# Patient Record
Sex: FEMALE | Race: WHITE | NOT HISPANIC OR LATINO | Employment: OTHER | URBAN - METROPOLITAN AREA
[De-identification: names, ages, dates, MRNs, and addresses within clinical notes are randomized per-mention and may not be internally consistent; named-entity substitution may affect disease eponyms.]

---

## 2017-05-30 ENCOUNTER — DOCUMENTATION ONLY (OUTPATIENT)
Dept: BARIATRICS | Facility: CLINIC | Age: 67
End: 2017-05-30

## 2017-05-30 NOTE — PROGRESS NOTES
Bariatric Surgery Online Course Form Submission  Someone has submitted a Bariatric Surgery Online Course Form Submission on this page.  Date: 2017-05-30 - 14:09  Patient's Name: Genoveva logan   Date of Birth: [id number] Email: [email or web address]  Phone: [phone]   Patient Address: [address] [place] [id number]  Preferred Surgical Location: [facility] [place]   I certify that I am 18 years of age or older:   Confirmation Code: [id number]  Verification of Bariatric Seminar: y  Insurance Information  Insurance or Self Pay? Self Pay/No Insurance - Skip fields below  Insurance Company Name:   Type of Coverage/Coverage Plan (i.e. PPO, HMO):   Group Name:   Subscriber Name:   Member Name (patient's name)   Member ID/Policy #:   Plan Effective Date:   Card Issuance date:

## 2017-06-02 ENCOUNTER — TELEPHONE (OUTPATIENT)
Dept: BARIATRICS | Facility: CLINIC | Age: 67
End: 2017-06-02

## 2017-06-02 DIAGNOSIS — G25.81 RESTLESS LEG SYNDROME: ICD-10-CM

## 2017-06-02 DIAGNOSIS — M79.7 FIBROMYALGIA: ICD-10-CM

## 2017-06-02 DIAGNOSIS — G35: ICD-10-CM

## 2017-06-02 DIAGNOSIS — I10 BENIGN ESSENTIAL HTN: ICD-10-CM

## 2017-06-02 DIAGNOSIS — E66.01 MORBID OBESITY DUE TO EXCESS CALORIES: Primary | ICD-10-CM

## 2017-06-02 DIAGNOSIS — K21.9 GASTROESOPHAGEAL REFLUX DISEASE, ESOPHAGITIS PRESENCE NOT SPECIFIED: ICD-10-CM

## 2017-06-02 DIAGNOSIS — E78.00 ELEVATED CHOLESTEROL: ICD-10-CM

## 2017-06-02 NOTE — TELEPHONE ENCOUNTER
Rescheduled appt's so they can all be on the same day.  She will bring her o/s labs in and she's already had the cbc and cmp done.  Dates/times agreed upon and she understands when she receives her packet of info that she needs to complete the necessary forms and bring with her to her appt..

## 2017-06-20 ENCOUNTER — TELEPHONE (OUTPATIENT)
Dept: BARIATRICS | Facility: CLINIC | Age: 67
End: 2017-06-20

## 2017-06-21 ENCOUNTER — HOSPITAL ENCOUNTER (OUTPATIENT)
Dept: RADIOLOGY | Facility: HOSPITAL | Age: 67
Discharge: HOME | End: 2017-06-21
Attending: SURGERY
Payer: MEDICARE

## 2017-06-21 ENCOUNTER — CLINICAL SUPPORT (OUTPATIENT)
Dept: BARIATRICS | Facility: CLINIC | Age: 67
End: 2017-06-21
Payer: MEDICARE

## 2017-06-21 ENCOUNTER — HOSPITAL ENCOUNTER (OUTPATIENT)
Dept: CARDIOLOGY | Facility: CLINIC | Age: 67
Discharge: HOME | End: 2017-06-21
Payer: MEDICARE

## 2017-06-21 ENCOUNTER — INITIAL CONSULT (OUTPATIENT)
Dept: BARIATRICS | Facility: CLINIC | Age: 67
End: 2017-06-21
Payer: MEDICARE

## 2017-06-21 VITALS
HEART RATE: 84 BPM | DIASTOLIC BLOOD PRESSURE: 98 MMHG | WEIGHT: 227.06 LBS | HEIGHT: 59 IN | SYSTOLIC BLOOD PRESSURE: 132 MMHG | BODY MASS INDEX: 45.77 KG/M2

## 2017-06-21 VITALS — WEIGHT: 226.88 LBS | BODY MASS INDEX: 45.82 KG/M2

## 2017-06-21 DIAGNOSIS — I10 BENIGN ESSENTIAL HTN: ICD-10-CM

## 2017-06-21 DIAGNOSIS — K21.9 GASTROESOPHAGEAL REFLUX DISEASE, ESOPHAGITIS PRESENCE NOT SPECIFIED: ICD-10-CM

## 2017-06-21 DIAGNOSIS — E78.00 ELEVATED CHOLESTEROL: ICD-10-CM

## 2017-06-21 DIAGNOSIS — E78.5 HYPERLIPIDEMIA, UNSPECIFIED HYPERLIPIDEMIA TYPE: ICD-10-CM

## 2017-06-21 DIAGNOSIS — R79.89 ELEVATED LFTS: Primary | ICD-10-CM

## 2017-06-21 DIAGNOSIS — M79.7 FIBROMYALGIA: ICD-10-CM

## 2017-06-21 DIAGNOSIS — I10 ESSENTIAL HYPERTENSION: ICD-10-CM

## 2017-06-21 DIAGNOSIS — E66.01 MORBID OBESITY DUE TO EXCESS CALORIES: ICD-10-CM

## 2017-06-21 DIAGNOSIS — G35: ICD-10-CM

## 2017-06-21 DIAGNOSIS — G25.81 RESTLESS LEG SYNDROME: ICD-10-CM

## 2017-06-21 DIAGNOSIS — E55.9 VITAMIN D DEFICIENCY: Primary | ICD-10-CM

## 2017-06-21 DIAGNOSIS — E03.9 HYPOTHYROIDISM, UNSPECIFIED TYPE: ICD-10-CM

## 2017-06-21 DIAGNOSIS — E66.01 MORBID OBESITY WITH BMI OF 45.0-49.9, ADULT: ICD-10-CM

## 2017-06-21 PROCEDURE — 1126F AMNT PAIN NOTED NONE PRSNT: CPT | Mod: S$GLB,,, | Performed by: PHYSICIAN ASSISTANT

## 2017-06-21 PROCEDURE — 71020 XR CHEST PA AND LATERAL: CPT | Mod: 26,,, | Performed by: RADIOLOGY

## 2017-06-21 PROCEDURE — 99999 PR PBB SHADOW E&M-EST. PATIENT-LVL V: CPT | Mod: PBBFAC,,, | Performed by: PHYSICIAN ASSISTANT

## 2017-06-21 PROCEDURE — 93000 ELECTROCARDIOGRAM COMPLETE: CPT | Mod: S$GLB,,, | Performed by: INTERNAL MEDICINE

## 2017-06-21 PROCEDURE — 99205 OFFICE O/P NEW HI 60 MIN: CPT | Mod: S$GLB,,, | Performed by: PHYSICIAN ASSISTANT

## 2017-06-21 PROCEDURE — 1159F MED LIST DOCD IN RCRD: CPT | Mod: S$GLB,,, | Performed by: PHYSICIAN ASSISTANT

## 2017-06-21 PROCEDURE — 99999 PR PBB SHADOW E&M-EST. PATIENT-LVL II: CPT | Mod: PBBFAC,,, | Performed by: DIETITIAN, REGISTERED

## 2017-06-21 PROCEDURE — 99499 UNLISTED E&M SERVICE: CPT | Mod: S$GLB,,, | Performed by: DIETITIAN, REGISTERED

## 2017-06-21 RX ORDER — ERGOCALCIFEROL 1.25 MG/1
50000 CAPSULE ORAL
Qty: 24 CAPSULE | Refills: 0 | Status: SHIPPED | OUTPATIENT
Start: 2017-06-22 | End: 2017-09-18

## 2017-06-21 NOTE — PROGRESS NOTES
BARIATRIC NEW PATIENT EVALUATION    CHIEF COMPLAINT:   Morbid obesity, Body mass index is 45.86 kg/m². and inability to lose weight.    HPI:  Genoveva Gilbert is a 67 y.o. female presenting for morbid obesity, Body mass index is 45.86 kg/m². and inability to lose weight. The patient has tried WW x 2 and diet modifications.  The most weight lost with Weight Watchers was 30-35 lbs.  She has been overweight since her second marriage about 13 years ago.  Her highest weight is her current weight, 227 lbs.  Her challenge is with junk food.  She wants a Sleeve Gastrectomy with Dr. Bradley.      PAST MEDICAL HISTORY:  Past Medical History:   Diagnosis Date    Allergy     Anxiety     Arthritis, rheumatoid     Back pain     Depression     Fibromyalgia     GERD (gastroesophageal reflux disease)     High cholesterol     Hypertension     Incontinence of urine     MS (multiple sclerosis)     benign; another MD stated she has no MS    Neuropathy     Restless leg syndrome     Seasonal allergies     Sinus congestion     Thyroid disease     Wheezing          PAST SURGICAL HISTORY:  Past Surgical History:   Procedure Laterality Date    APPENDECTOMY      BREAST LUMPECTOMY      bilateral, benign    BREAST SURGERY      reduction    HYSTERECTOMY      partial - fibroids    TONSILLECTOMY         FAMILY HISTORY:  Family History   Problem Relation Age of Onset    Heart disease Mother           SOCIAL HISTORY:   reports that she quit smoking about 21 years ago. She has never used smokeless tobacco. She reports that she does not drink alcohol or use drugs.  She is retired and on disability for MS only based on symptoms.  She is  40 years with 2 kids, then .  She is remarried to her childhood sweetheart.  One of her children passed away.      MEDICATIONS:  Medications have been reviewed.    ALLERGIES:  Allergies have been reviewed.    Review of Systems   Constitutional: Negative for chills, fever and  malaise/fatigue.   Eyes: Negative for blurred vision and double vision.   Respiratory: Positive for shortness of breath (walking stairs). Negative for cough and hemoptysis.    Cardiovascular: Negative for chest pain, palpitations and leg swelling.   Gastrointestinal: Negative for abdominal pain, blood in stool, constipation, diarrhea, heartburn, melena, nausea and vomiting.   Genitourinary: Negative for dysuria and hematuria.   Musculoskeletal: Positive for joint pain (right shoulder). Negative for back pain, falls, myalgias and neck pain.   Skin: Negative for rash.   Neurological: Negative for dizziness, tingling, weakness and headaches.   Endo/Heme/Allergies: Positive for environmental allergies (asymptomatic on allegra). Bruises/bleeds easily (bruise easily).   Psychiatric/Behavioral: Positive for depression (stable on medications). Negative for hallucinations, memory loss, substance abuse and suicidal ideas. The patient is not nervous/anxious and does not have insomnia.        Vitals:    06/21/17 1101   BP: (!) 132/98   Pulse: 84       Physical Exam   Constitutional: She is oriented to person, place, and time and well-developed, well-nourished, and in no distress.   HENT:   Head: Normocephalic and atraumatic.   Eyes: No scleral icterus.   Cardiovascular: Normal rate, regular rhythm, normal heart sounds and intact distal pulses.  Exam reveals no gallop and no friction rub.    No murmur heard.  Pulmonary/Chest: Effort normal and breath sounds normal. No respiratory distress. She has no wheezes. She has no rales. She exhibits no tenderness.   Abdominal: Soft. Bowel sounds are normal. She exhibits no distension and no mass. There is no tenderness. There is no rebound and no guarding.   Musculoskeletal: She exhibits no edema.   Neurological: She is alert and oriented to person, place, and time.   Skin: Skin is warm and dry. No rash noted. No erythema. No pallor.   Psychiatric: Mood, memory, affect and judgment  normal.   Nursing note and vitals reviewed.      DIAGNOSIS:  1. Morbid Obesity, Body mass index is 45.86 kg/m². and inability to lose weight.  2. Co-morbidities: depression, dyslipidemia, GERD, hypertension and osteoarthritis    PLAN:  The patient is a good candidate for Bariatric Surgery. The patient is interested in sleeve gastrectomy. The surgery and post-op care was discussed in detail with the patient. All questions were answered.    The patient understands that bariatric surgery is a tool to aid in weight loss and that they need to be committed to the diet and exercise post-operatively for successful weight loss. Discussed with patient that bariatric surgery is not the easy way out and that it will take plenty of dedication on the patient's part to be successful. Also discussed the possibility of weight regain if the patient strays from the diet guidelines or exercise requirements. Patient verbalized understanding and wishes to proceed with the work-up.    Estimated Goal weight is 175 lbs, approximately 50% of their excess weight.      **I certify that I have personally reviewed the patient's blue intake packet with the patient.  All information has been added into epic.        ORDERS:  1. Stress Test, Chest X-Ray, EKG, EGD, Upper GI and US abdomen and Stress Test from [facility]  2. Psychological Consult, Bariatric Dietician Consult, PCP Clearance and Hepatology  3. Bariatric Labs: BMP, CBC, Folate Serum, H. pylori, HgA1C, Hepatic Panel/LFT, Iron & TIBC, Lipid Profile, Magnesium, Phosphate, T3, T4, TSH, Free T4, Vitamin B12, and Vitamin B1.    Referring Physician: Tariq Crews MD  RTC: As scheduled.

## 2017-06-21 NOTE — PATIENT INSTRUCTIONS
1200- 1500 calorie Sample meal plan  80-120g protein per day.   Protein drinks and bars: 0-4 grams sugar  Drink lots of water throughout the day and exercise!  MENU # 1  Breakfast: 2 eggs, 1 turkey sausage america, 1 apple  Snack: Atkins bar  Lunch: 2 roll-ups (sliced turkey, low-fat sliced cheese, mustard), 12 baby carrots dipped in 1 Tbsp natural peanut butter  Mid-Day Snack: ¼ cup unsalted almonds, ½ cup fruit  Dinner: 1 chicken thigh simmered in tomato sauce + 2 Tbsp mozzarella cheese, ½ cup black beans, 1/2 cup steamed carrots  Evening Snack: 1/2 cup grapes with 4 cubes low-fat cheddar cheese   ___________________________________________________  MENU # 2  Breakfast: 200 calorie protein drink  Mid-morning snack : ¼ cup unsalted nuts, medium banana  Lunch: 3oz tuna or chicken salad made with 2 tbsp light martínez, over salad with tomatoes and cucumbers.   Snack: low-fat cheese stick  Dinner: 3oz hamburger america, slice of low-fat cheese, 1 cup boiled yellow squash and zucchini.   Snack: 6oz light yogurt  _______________________________________________________  Menu #3  Breakfast: 6oz plain Greek yogurt + fruit (½ banana, ½ cup fruit - pineapple, blueberries, strawberries, peach), may add Splenda to angel.  Lunch: Grilled  chicken breast w/ slice low-fat pepper cheryle cheese, 1/2 cup grilled/baked asparagus and small salad with Salad Spritzer.    Mid-Day snack: 200 calorie protein drink   Dinner: 4oz Grilled fish, ½ cup white beans, ½ cup cooked spinach  Evening Snack: fudgsicle no-sugar-added    Menu # 4  Breakfast: 1 scoop vanilla protein powder + 4oz skim milk + 4oz coffee   Snack: Pure protein bar  Lunch: 2 Lettuce tacos: 3oz seasoned ground turkey wrapped in a Kareem lettuce leaves with 1 Tbsp shredded cheese and dollop low-fat sour cream  Snack: ½ cup cottage cheese, ½ cup pineapple chunks  Dinner: Shrimp omelet: 2 eggs, ½ cup shrimp, green onions, and shredded  cheese  ______________________________________________________  Menu #5  Breakfast: 1 cup low-fat cottage cheese, ½ cup peaches (no sugar added)  Snack: 1 apple, 4 cubes cheese  Lunch: 3oz baked pork chop, 1 cup okra and tomato stew  Snack: 1/2 cup black beans + salsa + dollop light sour cream  Dinner: Caprese chicken salad: 3 oz chicken breast, 1oz fresh mozzarella, sliced tomato, 1 Tbsp olive oil, basil  Snack: sugar-free popsicle    Menu #6  Breakfast: ½ cup part-skim ricotta cheese, 2 Tbsp sugar-free strawberry preserves, sprinkle of slivered almonds  Snack: 1 orange  Lunch: 3 oz canned chicken, 1oz shredded cheddar cheese, ½  cup black beans, 2 Tbsp salsa  Snack: 200 calorie Protein drink  Dinner: 4 oz grilled salmon steak, over mixed green salad with 2 tbsp light dressing    Meal Ideas for Regular Bariatric Diet  *Recipes and products available at [email or web address]      Breakfast: (15-20g protein)    - Egg white omelet: 2 egg whites or ½ cup Egg Beaters. (Optional proteins: cheese, shrimp, black beans, chicken, sliced turkey) (Optional veggies: tomatoes, salsa, spinach, mushrooms, onions, green peppers, or small slice avocado)     - Egg and sausage: 1 egg or ¼ cup Egg Beaters (any variety), with 1 america or 2 links of Turkey sausage or Veggie breakfast sausage ([employer] or Next Gen Illumination)    - Crust-less breakfast quiche: To make a glass pie dish, mix 4oz part skim Ricotta, 1 cup skim milk, and 2 eggs as your base. Add protein: shredded cheese, sliced lean ham or turkey, turkey gonzalez/sausage. Add veggies: tomato, onion, green onion, mushroom, green pepper, spinach, etc.    - Yogurt parfait: Mix 1 - 6oz container Dannon Light N Fit vanilla yogurt, with ¼ cup crushed unsalted nuts    - Cottage cheese and fruit: ½ cup part-skim cottage cheese or ricotta cheese topped with fresh fruit or sugar free preserves     - Viri Mccarty's Vanilla Egg custard* (add 2 Tbsp instant coffee granules to make Cappuccino  Custard*)    - Hi-Protein café latte (skim milk, decaf coffee, 1 scoop protein powder). Optional to add Sugar free syrup or extract flavoring.    - Breakfast Lox: spread fat free cream cheese on slices of smoked salmon. Serve over scrambled or egg over easy (sauteed with nonstick cookspray) OR on a cucumber slice    - Eggwhich: Scramble or cook 1 large egg over easy using nonstick cookspray. Place between 2 slices of Spanish gonzalez and low fat cheese.     Lunch: (20-30g protein)    - ½ cup Black bean soup (Homemade or Progresso), with ¼ cup shredded low-fat cheese. Top with chopped tomato or fresh salsa.     - Lean deli turkey breast and low-fat sliced cheese, mustard or light martínez to moisten, rolled up together, or wrapped in a Kareem lettuce leaf    - Chicken salad made from dinner leftovers, moisten with low-fat salad dressing or light martínez. Also try leftover salmon, shrimp, tuna or boiled eggs. Serve ½ cup over dark green salad    - Fat-free canned refried beans, topped with ¼ cup shredded low-fat cheese. Top with chopped tomato or fresh salsa.     - Greek salad: Top mixed greens with 1-2oz grilled chicken, tomatoes, red onions, 2-3 kalamata olives, and sprinkle lightly with feta cheese. Spritz with Balsamic vinegar to taste.     - Crust-less lunch quiche: To make a glass pie dish, mix 4oz part skim Ricotta, 1 cup skim milk, and 2 eggs as your base. Add protein: shredded cheese, sliced lean ham or turkey, shrimp, chicken. Add veggies: tomato, onion, green onion, mushroom, green pepper, spinach, artichoke, broccoli, etc.    - Pizza bake: spread a  raza glenn mushroom with tomato sauce, low-fat shredded mozzarella and turkey pepperoni or [place] gonzalez. Add any veggies. Roast for 10-15 minutes, until cheese melted.     - Cucumber crab bites: Spread ¼ cup crab dip (lump crabmeat + light cream cheese and green onions) over sliced cucumber.     - Chicken with light spinach and artichoke dip*: Puree in food  processor: 6oz cooked and drained spinach, 2 cloves garlic, 1 can cannelloni beans, ½ cup chopped green onions, 1 can drained artichoke hearts (not marinated in oil), lemon juice and basil. Mix in 2oz chopped up chicken.    Supper: (20-30g protein)    - Serve grilled fish over dark green salad tossed with low-fat dressing, served with grilled asparagus haddad     - Rotisserie chicken salad: served with sliced strawberries, walnuts, fat-free feta cheese crumbles and 1 tbsp [place]s Own Light Raspberry [place] Vinaigrette    - Shrimp cocktail: Dip cold boiled shrimp in homemade low-sugar cocktail sauce (1/2 cup Brady One Carb ketchup, 2 tbsp horseradish, 1/4 tsp hot sauce, 1 tsp Worcestershire sauce, 1 tbsp freshly-squeezed lemon juice). Serve with dark green salad, walnuts, and crumbled blue cheese drizzled with olive oil and Balsamic vinegar    - Tuna Melt: Spread tuna salad onto 2 thick slices of tomato. Top with low-fat cheese and broil until cheese is melted. May also be made with chicken salad of shrimp salad. [place] with different types of cheeses.    - Chicken or beef fajitas (no tortilla, rice, beans, chips). Top meat and veggies w/ fresh salsa, fat free sour cream.     - Homemade low-fat Chili using extra lean ground beef or ground turkey. Top with shredded cheese and salsa as desired. May add dollop fat-free sour cream if desired    - Chicken parmesan: Top chicken breast w/ low sugar marinara sauce, mozzarella cheese and bake until chicken reaches 165*.  Serve w/ spaghetti SQUASH or Tamazight cut green beans    - Dinner Omelet with shrimp or chicken and onion, green peppers and chives.    - No noodle lasagna: Use sliced zucchini or eggplant in place of noodles.  Layer with part skim ricotta cheese and low sugar meat sauce (use very lean ground beef or ground turkey).    - Mexican chicken bake: Bake chunks of chicken breast or thigh with taco seasoning, Pace brand enchilada sauce, green onions and low-fat  cheese. Serve with ¼ cup black beans or fat free refried beans topped with chopped tomatoes or salsa.    - Mary frozen meatballs, simmered in Classico Marinara sauce. Different flavors of salsa or spaghetti sauce create different dishes! Sprinkle with parmesan cheese. Serve with grilled or steamed veggies, or a dark green salad.    - Simmer boneless skinless chicken thigh chunks in Classico Marinara sauce or roasted salsa until tender with chopped onion, bell pepper, garlic, mushrooms, spinach, etc.     - Hamburger or veggie burger, without the bun, dressed the way you like. Served with grilled or steamed veggies.    - Eggplant parmesan: Bake slices of eggplant at 350 degrees for 15 minutes. Layer tomato sauce, sliced eggplant and low-fat mozzarella cheese in a baking dish and cover with foil. Bake 30-40 more minutes or until bubbly. Uncover and bake at 400 degrees for about 15 more minutes, or until top is slightly crisp.    - Fish tacos: grilled/baked white fish, wrapped in Kareem lettuce leaf, topped with salsa, shredded low-fat cheese, and light coleslaw.    - Chicken reid: Sprinkle chicken w/ 1 tsp of hidden valley ranch dip mix. Then grill chicken and top with black beans, salsa and 1 tsp fat free sour cream.     - Cauliflower pizza crust: Use cauliflower as crust (see recipe on pedro pablo, no flour!). Top w/ low fat cheese, turkey pepperoni and veggies and bake again    - chicken or turkey crust pizza: use ground chicken or turkey instead of cauliflower, spread in [place] and bake at 350 for about 20-30 minutes(may want to add garlic, black pepper, oregano and other herbs to ground meat mixture).  Remove and top w/ low fat cheese, turkey pepperoni and veggies and bake again for another 10 minutes or until cheese is browned.     Snacks: (100-200 calories; >5g protein)    - 1 low-fat cheese stick with 8 cherry tomatoes or 1 serving fresh fruit  - 4 thin slices fat-free turkey breast and 1 slice low-fat  cheese  - 4 thin slices fat-free honey ham with wedge of melon  - 6-8 edamame pods (equivalent to about 1/4 cup edamame without pods).   - 1/4 cup unsalted nuts with ½ cup fruit  - 6-oz container Dannon Light n Fit vanilla yogurt, topped with 1oz unsalted nuts         - apple, celery or baby carrots spread with 2 Tbsp PB2  - apple slices with 1 oz slice low-fat cheese  - Apple slices dipped in 2 Tbsp of PB2  - celery, cucumber, bell pepper or baby carrots dipped in ¼ cup hummus bean spread or light spinach and artichoke dip (*recipe in lunch section)  - celery, cucumber, baby carrots dipped in high protein greek yogurt (Mix 16 oz plain greek yogurt + 1 packet of hidden valley ranch dip mix)  - Jr Links Beef Steak - 14g protein! (similar to beef jerky)  - 2 wedges Laughing Cow - Light Herb & Garlic Cheese with sliced cucumber or green bell pepper  - 1/2 cup low-fat cottage cheese with ¼ cup fruit or ¼ cup salsa  - RTD Protein drinks: Atkins, Low Carb Slim Fast, EAS light, Muscle Milk Light, etc.  - Homemade Protein drinks: GNC Soy95, Isopure, Nectar, UNJURY, Whey Gourmet, etc. Mix 1 scoop powder with 8oz skim/1% milk or light soymilk.  - Protein bars: Atkins, EAS, Pure Protein, Think Thin, Detour, etc. Must have 0-4 grams sugar - Read the label.    Takeout Options: No more than twice/week  Deli - Salads (no pasta or rice), meats, cheeses. Roasted chicken. Lox (salmon)    Mexican - Platters which don't include tortillas, chips, or rice. Go easy on the beans. Example: Fajitas without the tortillas. Ask the  not to bring chips to the table if they are too tempting.    Greek - Meat or fish and vegetable, but no bread or rice. Including hummus, baba ganoush, etc, is OK. Most sit-down Greek restaurants can provide you with cucumber slices for dipping instead of shahida bread.    Fast Food (Avoid as much as possible) - Salads (no croutons and limit salad dressing to 2 tbsp), grilled chicken sandwich without the bun  and ask for no martínez. Danyells low fat chili or Taco Bell pintos and cheese.    BBQ - The meats are fine if you ask for sauces on the side, but most of the traditional side dishes are loaded with carbs. Rip slaw, baked beans and BBQ sauce are typically made with sugar.    Chinese - Nothing deep-fried, no rice or noodles. Many Chinese sauces have starch and sugar in them, so you'll have to use your judgement. If you find that these sauces trigger cravings, or cause Dumping, you can ask for the sauce to be made without sugar or just use soy sauce.

## 2017-06-21 NOTE — PATIENT INSTRUCTIONS
The following tests will be required for your Bariatric Surgery work-up.  There may be additional tests added.      - Lab work  - Chest X-ray  - EKG  - Copy of your Stress Test from Dr. Krzysztof OLIVER  - Nutrition Consult and Clearance  - Ultrasound of Liver  - Hepatology  - Crush Xanax, Cymbalta & Amitryptiline 1 month before surgery.  - Psychological Consult and Clearance (Call the Psychiatry Department to schedule a Bariatric Surgery Clearance Appointment with Dr. Zhu at [phone])    You have been referred to psychological testing because you are considering an elective surgery that does not involve an emergency.  This testing helps us ensure patient safety and effective outcomes.  On your psychological testing day, you will be working on multiple choice or true/false questionnaires that require one to two hours of your time.  Please bring reading glasses or someone to help you read the items if needed.  Feel free to bring water or any medications you may need.  After this testing day, you will return on another day to meet with a clinical psychologist for an interview and to review your results, which will take approximately one hour.  If you have any questions about this process, please call the Department of Psychiatry at [phone].  Thank you for your cooperation!    - Primary Care Doctor Clearance (This needs to be done within 30 days of surgery date)      In preparation for bariatric surgery, please complete the following:   · Discuss your current medications with your primary care provider, remember your medications will need to be crushed, chewable, or in liquid form for the first 3 months after a Sleeve.  · If you take a blood thinner such as: Coumadin (warfarin), Pradaxa (dabigatran), or Plavix (clopidogrel), you will need to speak with your prescribing provider on how or if this medication can be stopped before surgery.   · If you take a medication for depression or anxiety, you will need to  begin crushing or opening the capsule 1-3 months prior to surgery.  Remember to discuss this with the psychologist or psychiatrist that you see.   · If you take medication for arthritis on a daily basis that is considered a non-steroidal anti-inflammatory (NSAID), please discuss with your prescribing physician an alternative medication.  After having gastric bypass or gastric sleeve, this group of medications is not appropriate to take due to increased risk of bleeding stomach ulcers.      NO SHOW POLICY      DEFINITIONS  Appointments: Pre-scheduled meetings or consultations with any physician, advanced practice provider, dietitian, or psychologist, and labs, imaging studies, sleep studies, etc.   Late cancellation: Cancelling an appointment 24-48 hours prior to scheduled time.  No-Show appointment:  is when    You do NOT arrive to your appointment at the time its scheduled.   You call to cancel or cancel via [facility] less than 24 hours in advance of your scheduled appointment   You show up 15 minutes AFTER your scheduled appointment time without any notification of being late.     POLICY  1. You are allowed up to 3 cancellations for appointments.    After 3 cancellations your case will be placed on hold for 2 months and after that time you can resume the program.   2. You are allowed only 1 no-show for an appointment.    You will be re-scheduled one time and if there is a 2nd no-show at any point, your case will be placed on hold for 3 months.  After 3 months you can resume the program.     3. Upon resuming the program after being placed on hold for either above mentioned reasons, if you have a late cancel or no show for any appointment, the bariatric team will review if youre an appropriate candidate for surgery at the monthly meeting.

## 2017-06-21 NOTE — PROGRESS NOTES
NUTRITIONAL CONSULT    Referring Physician: Justino Bradley M.D.  Reason for MNT Referral: Initial assessment for sleeve gastrectomy work-up    PAST MEDICAL HISTORY:   67 y.o. female presents with a BMI of Body mass index is 45.82 kg/m².  Weight history includes she has been overweight since her second marriage about 13 years ago.  Her highest weight is her current weight, 227 lbs. Her challenge is with junk food.  Dieting attempts include the patient has tried WW x2 and diet modifications.  The most weight lost with Weight Watchers was 30-35 lbs.    Past Medical History:   Diagnosis Date    Allergy     Anxiety     Arthritis, rheumatoid     Back pain     Depression     Fibromyalgia     GERD (gastroesophageal reflux disease)     High cholesterol     Hypertension     Incontinence of urine     MS (multiple sclerosis)     benign; another MD stated she has no MS    Neuropathy     Restless leg syndrome     Seasonal allergies     Sinus congestion     Thyroid disease     Wheezing        CLINICAL DATA:  67 y.o.-year-old White female.  Height: 4 ft 11 in  Weight: 226 lbs  IBW: 123 lbs  BMI: 45.82  The patient's goal weight (50% EBW): 175 lbs (discussed realistic weight)  Personal goal weight: 125 lbs (patient states she wants the weight to improve)    Goal for Bariatric Surgery: to improve health, to improve quality of life and to lose weight    NUTRITION & HEALTH HISTORY:  Greatest challenge: sweets, starchy CHO, portion control, irregular meal patterns, high-fat diet and junk food snacking before bed    Current diet recall:   Brk (9-10 am): cup of coffee with sweet and low and coffee mate creamer  Snk: sausage biscuits OR cottage with pineapple, 2nd cup of coffee  Breakfast sometimes runs into lunch  Di: take out from local restaurant-orders daily special-yesterday grilled pork chop+red beans and rice OR home cooked meal OR low calorie frozen dinner  Patient is not a big meat eater.     Current  Diet:  Meal pattern: 2-3 meals/day  Protein supplements: none  Snacking: night time/ day (popcorn, chips, candy)  Vegetables: Likes a variety. Eats once per week.  Fruits: Likes a variety. Eats almost daily.  Beverages: water, sugar-free beverages, coffee without sugar and fat-free milk  Dining out: Weekly. (3x's/week) Mostly restaurants and take-out.  Cooking at home: Daily. Weekly. Mostly baked and grilled meat, fish, starchy CHO and vegetables. (canned foods-beans+rice)    Exercise:  Past exercise: None    Current exercise: None  Restrictions to exercise: none    Vitamins / Minerals / Herbs:   Multivitamin  Biotin  Align Probiotic    Food Allergies:   No known    Social:  Retired. No work.  Lives with .  Grocery shopping and food prep done by patient.  Patient believes the household will be supportive after surgery.  Alcohol: Rarely. (few times/month)  Smoking: None. Quit 20+ years ago    ASSESSMENT:  · Patient reports attempts at weight loss, only to regain lost weight.  · Patient demonstrated knowledge of healthy eating behaviors and exercise patterns; admits to not eating healthy and not exercising at this point.  · Patient states willingness to change lifestyle and make behavior modifications.  · Expect good  compliance after surgery at this time.    Insurance requires NO medically supervised diet prior to consideration for bariatric surgery.    BARIATRIC DIET DISCUSSION:  Discussed diet after surgery and related to patients food record.  Reviewed diet progression before and after surgery.  Reinforced that surgery is not a magic bullet and importance of low fat foods and no snacking.  Stressed importance of exercise and its role in achieving weight loss goals.    RECOMMENDATIONS:  Patient is a potential candidate for bariatric surgery.    Needs additional visit(s) with RD.    PLAN:  Continue to review Bariatric Nutrition Guidebook at home and call with any questions.  Work on Bariatric Nutrition  Checklist.  Work on expanding variety of vegetables.  Work on gradually cutting back on starchy CHO in the diet.  Begin trying various protein supplements to determine preference.  1200-calorie diet.  1500-calorie diet.  5-6 meals per day.  Start including protein supplements in the diet plan daily.  Reduce frequency of dining out.  More grocery shopping and meal preparation at home.  Increase exercise.  Return to clinic.    SESSION TIME:  60 minutes

## 2017-06-22 ENCOUNTER — TELEPHONE (OUTPATIENT)
Dept: BARIATRICS | Facility: CLINIC | Age: 67
End: 2017-06-22

## 2017-06-22 ENCOUNTER — HOSPITAL ENCOUNTER (OUTPATIENT)
Dept: RADIOLOGY | Facility: HOSPITAL | Age: 67
Discharge: HOME | End: 2017-06-22
Attending: SURGERY
Payer: MEDICARE

## 2017-06-22 DIAGNOSIS — I10 ESSENTIAL HYPERTENSION: ICD-10-CM

## 2017-06-22 DIAGNOSIS — R79.89 ELEVATED LFTS: ICD-10-CM

## 2017-06-22 DIAGNOSIS — R93.5 ABNORMAL US (ULTRASOUND) OF ABDOMEN: Primary | ICD-10-CM

## 2017-06-22 PROCEDURE — 76700 US EXAM ABDOM COMPLETE: CPT | Mod: TC

## 2017-06-22 PROCEDURE — 76700 US EXAM ABDOM COMPLETE: CPT | Mod: 26,,, | Performed by: RADIOLOGY

## 2017-06-22 NOTE — TELEPHONE ENCOUNTER
----- Message from Aura Araujo PA-C sent at 6/21/2017  4:09 PM CDT -----  Vit D 50,000 IU 2x/wk, retest in 12 weeks. And make sure PCP sees abnormal thyroid functions.

## 2017-06-22 NOTE — TELEPHONE ENCOUNTER
Called pt and reviewed results and instructed her to contact her PCP about her Thyroid test . Pt v/u

## 2017-06-22 NOTE — TELEPHONE ENCOUNTER
----- Message from Aura Araujo PA-C sent at 6/21/2017  5:44 PM CDT -----  Regarding: WORK UP ACTION  Please also set her up for EGD, THANKS, ROSETTA

## 2017-07-03 ENCOUNTER — TELEPHONE (OUTPATIENT)
Dept: HEPATOLOGY | Facility: CLINIC | Age: 67
End: 2017-07-03

## 2017-07-03 ENCOUNTER — OFFICE VISIT (OUTPATIENT)
Dept: HEPATOLOGY | Facility: CLINIC | Age: 67
End: 2017-07-03
Payer: MEDICARE

## 2017-07-03 ENCOUNTER — LAB VISIT (OUTPATIENT)
Dept: LAB | Facility: HOSPITAL | Age: 67
End: 2017-07-03
Attending: INTERNAL MEDICINE
Payer: MEDICARE

## 2017-07-03 VITALS
OXYGEN SATURATION: 97 % | WEIGHT: 227.31 LBS | TEMPERATURE: 99 F | BODY MASS INDEX: 45.83 KG/M2 | HEIGHT: 59 IN | DIASTOLIC BLOOD PRESSURE: 74 MMHG | HEART RATE: 88 BPM | SYSTOLIC BLOOD PRESSURE: 152 MMHG

## 2017-07-03 DIAGNOSIS — R93.89 ABNORMAL FINDING ON IMAGING: ICD-10-CM

## 2017-07-03 DIAGNOSIS — R74.8 ELEVATED LIVER ENZYMES: ICD-10-CM

## 2017-07-03 DIAGNOSIS — K76.0 HEPATIC STEATOSIS: ICD-10-CM

## 2017-07-03 DIAGNOSIS — R74.8 ELEVATED LIVER ENZYMES: Primary | ICD-10-CM

## 2017-07-03 LAB
AFP SERPL-MCNC: 6.9 NG/ML
ALBUMIN SERPL BCP-MCNC: 3.2 G/DL
ALP SERPL-CCNC: 83 U/L
ALT SERPL W/O P-5'-P-CCNC: 34 U/L
ANION GAP SERPL CALC-SCNC: 10 MMOL/L
AST SERPL-CCNC: 18 U/L
BILIRUB SERPL-MCNC: 0.3 MG/DL
BUN SERPL-MCNC: 21 MG/DL
CALCIUM SERPL-MCNC: 8.9 MG/DL
CERULOPLASMIN SERPL-MCNC: 31 MG/DL
CHLORIDE SERPL-SCNC: 101 MMOL/L
CO2 SERPL-SCNC: 27 MMOL/L
CREAT SERPL-MCNC: 0.7 MG/DL
EST. GFR  (AFRICAN AMERICAN): >60 ML/MIN/1.73 M^2
EST. GFR  (NON AFRICAN AMERICAN): >60 ML/MIN/1.73 M^2
FERRITIN SERPL-MCNC: 7 NG/ML
GLUCOSE SERPL-MCNC: 128 MG/DL
HBV CORE AB SERPL QL IA: NEGATIVE
HBV SURFACE AB SER-ACNC: NEGATIVE M[IU]/ML
HBV SURFACE AG SERPL QL IA: NEGATIVE
HCV AB SERPL QL IA: NEGATIVE
HEPATITIS A ANTIBODY, IGG: NEGATIVE
IGG SERPL-MCNC: 507 MG/DL
INR PPP: 1
IRON SERPL-MCNC: 34 UG/DL
POTASSIUM SERPL-SCNC: 3.7 MMOL/L
PROT SERPL-MCNC: 6.6 G/DL
PROTHROMBIN TIME: 10.4 SEC
SATURATED IRON: 7 %
SODIUM SERPL-SCNC: 138 MMOL/L
TOTAL IRON BINDING CAPACITY: 502 UG/DL
TRANSFERRIN SERPL-MCNC: 339 MG/DL

## 2017-07-03 PROCEDURE — 86706 HEP B SURFACE ANTIBODY: CPT

## 2017-07-03 PROCEDURE — 86235 NUCLEAR ANTIGEN ANTIBODY: CPT

## 2017-07-03 PROCEDURE — 86803 HEPATITIS C AB TEST: CPT

## 2017-07-03 PROCEDURE — 86256 FLUORESCENT ANTIBODY TITER: CPT | Mod: 91

## 2017-07-03 PROCEDURE — 86790 VIRUS ANTIBODY NOS: CPT

## 2017-07-03 PROCEDURE — 80053 COMPREHEN METABOLIC PANEL: CPT

## 2017-07-03 PROCEDURE — 83540 ASSAY OF IRON: CPT

## 2017-07-03 PROCEDURE — 86038 ANTINUCLEAR ANTIBODIES: CPT

## 2017-07-03 PROCEDURE — 85610 PROTHROMBIN TIME: CPT

## 2017-07-03 PROCEDURE — 87340 HEPATITIS B SURFACE AG IA: CPT

## 2017-07-03 PROCEDURE — 86704 HEP B CORE ANTIBODY TOTAL: CPT

## 2017-07-03 PROCEDURE — 99999 PR PBB SHADOW E&M-EST. PATIENT-LVL V: CPT | Mod: PBBFAC,,, | Performed by: PHYSICIAN ASSISTANT

## 2017-07-03 PROCEDURE — 36415 COLL VENOUS BLD VENIPUNCTURE: CPT

## 2017-07-03 PROCEDURE — 99214 OFFICE O/P EST MOD 30 MIN: CPT | Mod: S$GLB,,, | Performed by: PHYSICIAN ASSISTANT

## 2017-07-03 PROCEDURE — 82105 ALPHA-FETOPROTEIN SERUM: CPT

## 2017-07-03 PROCEDURE — 1126F AMNT PAIN NOTED NONE PRSNT: CPT | Mod: S$GLB,,, | Performed by: PHYSICIAN ASSISTANT

## 2017-07-03 PROCEDURE — 82390 ASSAY OF CERULOPLASMIN: CPT

## 2017-07-03 PROCEDURE — 82728 ASSAY OF FERRITIN: CPT

## 2017-07-03 PROCEDURE — 82784 ASSAY IGA/IGD/IGG/IGM EACH: CPT

## 2017-07-03 PROCEDURE — 82104 ALPHA-1-ANTITRYPSIN PHENO: CPT

## 2017-07-03 PROCEDURE — 1159F MED LIST DOCD IN RCRD: CPT | Mod: S$GLB,,, | Performed by: PHYSICIAN ASSISTANT

## 2017-07-03 NOTE — TELEPHONE ENCOUNTER
CBC and Iron+TIBC reveal WALI    Pt phoned, instructed to start OTC ferrous sulfate 325 qD with colace and vitamin D.     Reports colonoscopy UTD and not due for a few more years    Scheduled for EGD later this week, instructed to keep as scheduled

## 2017-07-03 NOTE — PROGRESS NOTES
HEPATOLOGY CLINIC VISIT NOTE     REFERRING PROVIDER: Aura Araujo PA-C    REASON FOR VISIT: Elevated LFTs and abnormal U/S     HISTORY: This is a 67 y.o. White female here for evaluation of elevated LFTs and abnormal U/S noted as part of bariatric surgery work up. Labs reveal mildly elevated transaminases and normal synthetic liver function. U/S noted hepatosplenomegaly and a hypoechoic focus which may be focal fatty sparing. No recent AFP. She denies previous history of liver disease. She denies FH of liver disease, although is adopted so uncertain of much FH. She denies heavy alcohol use, she denies drug use. PMH of HTN, HLD, hypothyroidism, fibromyalgia and MS. Pt reports she has struggled with her weight for > 10 years. Her current BMI is 45.    Liver staging:  No formal staging  AST 25, ALT 29  Tbili WNL  Albumin low end of normal at 3.5  PLTs 259    Risk Factors:  AI:  Biliary:   ETOH: denies  Hereditary:   Medications:  NAFLD/MURILLO: BMI, HTN, HLD   Other: MVI, biotin, Align   Viral hepatitis: needs cohort screening     Past Medical History:   Diagnosis Date    Allergy     Anxiety     Arthritis, rheumatoid     Back pain     Depression     Fibromyalgia     GERD (gastroesophageal reflux disease)     High cholesterol     Hypertension     Incontinence of urine     MS (multiple sclerosis)     benign; another MD stated she has no MS    Neuropathy     Restless leg syndrome     Seasonal allergies     Sinus congestion     Thyroid disease     Wheezing      Past Surgical History:   Procedure Laterality Date    APPENDECTOMY      BREAST LUMPECTOMY      bilateral, benign    BREAST SURGERY      reduction    HYSTERECTOMY      partial - fibroids    TONSILLECTOMY       FAMILY HISTORY: Negative for liver disease  Adopted     SOCIAL HISTORY:   Retired, associate admin of physician practice x 17 years    History   Smoking Status    Former Smoker    Quit date: 6/1/1996   Smokeless Tobacco    Never Used        History   Alcohol Use No   very rare    History   Drug Use No     ROS:   No fever, chills, weight loss, (+)fatigue  No chest pain, dyspnea, cough  No abdominal pain,  nausea, vomiting  No skin rashes   No headaches, visual changes  No lower extremity edema  No depression or (+) anxiety- concerned about spot in liver       PHYSICAL EXAM:  Friendly White female, in no acute distress; alert and oriented to person, place and time  VITALS: reviewed  HEENT: Sclerae anicteric.   NECK: Supple  CVS: Regular rate and rhythm. No murmurs  LUNGS: Normal respiratory effort. Clear bilaterally  ABDOMEN: Flat, soft, nontender. No organomegaly or masses. No ascites or hernias.   SKIN: Warm and dry. No jaundice, No obvious rashes.   EXTREMITIES: No lower extremity edema  NEURO/PSYCH: Normal gate. Memory intact. Thought and speech pattern appropriate. Behavior normal. No depression or anxiety noted.    RECENT LABS:  Lab Results   Component Value Date    WBC 5.37 05/06/2016    WBC 5.37 05/06/2016    HGB 9.9 (L) 05/06/2016    HGB 9.9 (L) 05/06/2016     05/06/2016     05/06/2016     Lab Results   Component Value Date    INR 1.0 05/06/2016     Lab Results   Component Value Date    AST 25 06/21/2017    ALT 29 06/21/2017    BILITOT 0.6 06/21/2017    ALBUMIN 3.5 06/21/2017    ALKPHOS 87 06/21/2017    CREATININE 1.0 05/06/2016    BUN 21 05/06/2016     05/06/2016    K 4.1 05/06/2016     RECENT IMAGING:  U/S abdomen 06/22/17  Narrative     Comparison: none    Findings: Visualized pancreas is unremarkable.  The liver measures 18.5 cm and extends below the costal margin.  There is a 1.1 x 1.2 x 0.8 cm hypoechoic ill defined focus in the left hepatic lobe.  Liver attenuates sound suggesting steatosis.  No biliary dilatation, common duct measures 3 mm at the level of the hepatic artery.  The gallbladder is unremarkable.  The IVC and aorta show no abnormalities.  Kidneys are normal in size and 5 mm echogenic focus noted in  the right renal cortex, uncertain etiology.  Spleen enlarged measuring 12.9 x 4.9 cm.  No free fluid.   Impression       Hepatosplenomegaly and hepatic steatosis.    1.2 cm ill defined hypoechoic focus in the left hepatic lobe.  Differential considerations include focal fatty sparing or discrete lesion such as regenerative nodule or neoplasm.    Small echogenic focus in the right kidney is a nonspecific finding.  A mass such as an angiomyolipoma is within the differential.    Both of these findings could be followed in 6 months with repeat ultrasound.  As an alternative, MRI may provide additional information, if clinically concerned.     ASSESSMENT  67 y.o. White female with:  1. HEPATIC STEATOSIS  -- likely 2/2 NAFLD or MURILLO  -- assess fibrosis with fibroscan  -- rule out additional causes of liver disease  -- once fibroscan complete, will be able to clear for bariatric surgery.   -- discussed high protein diet with goal of weight loss    2. LIVER MASS  -- 1.2 cm ill defined focus in left lobe, may represent focal fatty sparing  -- MRI to further assess    3. ELEVATED TRANSAMINASES  -- likely 2/2 NAFLD or MURILLO    -- will rule out AIH, viral and hereditary liver disease.      EDUCATION:  We discussed the manifestations of NAFLD. At this time there is no FDA approved therapy for NAFLD.   The patient and I discussed the importance of diet, exercise, and weight loss for management of NAFLD. We discussed a low fat, low carb/low sugar, high fiber diet, and a goal of 30 minutes of exercise 5 times per week.   Pt is aware that fatty liver can progress to steatohepatitis and possibly to cirrhosis, putting one at increased risk for liver cancer, liver failure, and death.       PLAN:  1. Labs today  2. Fibroscan and MRI  3. Follow up visit in 6 months     Thank you for allowing me to participate in the care of Genoveva Ospina PA-C

## 2017-07-03 NOTE — PROGRESS NOTES
I have personally performed a face to face diagnostic evaluation on this patient. I have reviewed and agree with today's findings and the care plan outlined by Catarino Ospina PA-C      My findings are as follows:  Patient presents with likely NAFLD and benign liver nodule on US.    - usual workup for NAFLD including fibroscan  -  MRI already planned for evaluation of liver nodule.      she will return to Catarino Ospina PA-C  for follow-up.

## 2017-07-03 NOTE — LETTER
July 3, 2017      Justino Bradley Jr., MD  [address] [facility] [address]           [facility] - Hepatology  [address]  [place] [address]  Phone: [phone]  Fax: [phone]          Patient: Genoveva Gilbert   MR Number: [id number]   YOB: 1950   Date of Visit: 7/3/2017       Dear Dr. Justino Bradley Jr.:    Thank you for referring Genoveva Gilbert to me for evaluation. Attached you will find relevant portions of my assessment and plan of care.    If you have questions, please do not hesitate to call me. I look forward to following Genoveva Gilbert along with you.    Sincerely,    Catarino Ospina PA-C    Enclosure  CC:  No Recipients    If you would like to receive this communication electronically, please contact [email or web address] or [phone] to request more information on [facility] access.    For providers and/or their staff who would like to refer a patient to [facility], please contact us through our one-stop-shop provider referral line, [facility], at [phone].    If you feel you have received this communication in error or would no longer like to receive these types of communications, please e-mail [email or web address]

## 2017-07-05 ENCOUNTER — ANESTHESIA (OUTPATIENT)
Dept: ENDOSCOPY | Facility: HOSPITAL | Age: 67
End: 2017-07-05
Payer: MEDICARE

## 2017-07-05 ENCOUNTER — HOSPITAL ENCOUNTER (OUTPATIENT)
Facility: HOSPITAL | Age: 67
Discharge: HOME | End: 2017-07-05
Attending: INTERNAL MEDICINE | Admitting: INTERNAL MEDICINE
Payer: MEDICARE

## 2017-07-05 ENCOUNTER — SURGERY (OUTPATIENT)
Age: 67
End: 2017-07-05

## 2017-07-05 ENCOUNTER — ANESTHESIA EVENT (OUTPATIENT)
Dept: ENDOSCOPY | Facility: HOSPITAL | Age: 67
End: 2017-07-05
Payer: MEDICARE

## 2017-07-05 VITALS
OXYGEN SATURATION: 92 % | BODY MASS INDEX: 46.37 KG/M2 | TEMPERATURE: 98 F | HEIGHT: 59 IN | RESPIRATION RATE: 18 BRPM | WEIGHT: 230 LBS | RESPIRATION RATE: 27 BRPM | SYSTOLIC BLOOD PRESSURE: 134 MMHG | HEART RATE: 69 BPM | DIASTOLIC BLOOD PRESSURE: 61 MMHG

## 2017-07-05 DIAGNOSIS — K21.9 GERD (GASTROESOPHAGEAL REFLUX DISEASE): ICD-10-CM

## 2017-07-05 DIAGNOSIS — K21.9 GASTROESOPHAGEAL REFLUX DISEASE, ESOPHAGITIS PRESENCE NOT SPECIFIED: Primary | ICD-10-CM

## 2017-07-05 LAB
ANA SER QL IF: NORMAL
MITOCHONDRIA AB TITR SER IF: NORMAL {TITER}
SMOOTH MUSCLE AB TITR SER IF: NORMAL {TITER}

## 2017-07-05 PROCEDURE — 43235 EGD DIAGNOSTIC BRUSH WASH: CPT | Performed by: INTERNAL MEDICINE

## 2017-07-05 PROCEDURE — 37000009 HC ANESTHESIA EA ADD 15 MINS: Performed by: INTERNAL MEDICINE

## 2017-07-05 PROCEDURE — 37000008 HC ANESTHESIA 1ST 15 MINUTES: Performed by: INTERNAL MEDICINE

## 2017-07-05 PROCEDURE — 25000003 PHARM REV CODE 250: Performed by: INTERNAL MEDICINE

## 2017-07-05 PROCEDURE — 25000003 PHARM REV CODE 250: Performed by: NURSE ANESTHETIST, CERTIFIED REGISTERED

## 2017-07-05 PROCEDURE — D9220A PRA ANESTHESIA: Mod: ANES,,, | Performed by: ANESTHESIOLOGY

## 2017-07-05 PROCEDURE — 63600175 PHARM REV CODE 636 W HCPCS: Performed by: NURSE ANESTHETIST, CERTIFIED REGISTERED

## 2017-07-05 PROCEDURE — D9220A PRA ANESTHESIA: Mod: CRNA,,, | Performed by: NURSE ANESTHETIST, CERTIFIED REGISTERED

## 2017-07-05 PROCEDURE — 43235 EGD DIAGNOSTIC BRUSH WASH: CPT | Mod: ,,, | Performed by: INTERNAL MEDICINE

## 2017-07-05 RX ORDER — SODIUM CHLORIDE 9 MG/ML
INJECTION, SOLUTION INTRAVENOUS CONTINUOUS
Status: DISCONTINUED | OUTPATIENT
Start: 2017-07-05 | End: 2017-07-05 | Stop reason: HOSPADM

## 2017-07-05 RX ORDER — PROPOFOL 10 MG/ML
VIAL (ML) INTRAVENOUS
Status: DISCONTINUED | OUTPATIENT
Start: 2017-07-05 | End: 2017-07-05

## 2017-07-05 RX ORDER — LIDOCAINE HCL/PF 100 MG/5ML
SYRINGE (ML) INTRAVENOUS
Status: DISCONTINUED | OUTPATIENT
Start: 2017-07-05 | End: 2017-07-05

## 2017-07-05 RX ADMIN — PROPOFOL 60 MG: 10 INJECTION, EMULSION INTRAVENOUS at 10:07

## 2017-07-05 RX ADMIN — PROPOFOL 30 MG: 10 INJECTION, EMULSION INTRAVENOUS at 10:07

## 2017-07-05 RX ADMIN — LIDOCAINE HYDROCHLORIDE 80 MG: 20 INJECTION, SOLUTION INTRAVENOUS at 10:07

## 2017-07-05 RX ADMIN — PROPOFOL 40 MG: 10 INJECTION, EMULSION INTRAVENOUS at 10:07

## 2017-07-05 RX ADMIN — SODIUM CHLORIDE: 0.9 INJECTION, SOLUTION INTRAVENOUS at 09:07

## 2017-07-05 NOTE — ANESTHESIA PREPROCEDURE EVALUATION
07/05/2017  Genoveva Gilbert is a 67 y.o., female.    Anesthesia Evaluation    I have reviewed the Patient Summary Reports.    I have reviewed the Nursing Notes.      Review of Systems  Anesthesia Hx:  No problems with previous Anesthesia    Cardiovascular:   Hypertension    Hepatic/GI:   GERD    Endocrine:   Hypothyroidism    Psych:   Psychiatric History          Physical Exam  General:  Well nourished, Morbid Obesity    Airway/Jaw/Neck:  Airway Findings: Mouth Opening: Normal Tongue: Normal  Mallampati: II  TM Distance: Normal, at least 6 cm  Jaw/Neck Findings:  Neck ROM: Normal ROM     Eyes/Ears/Nose:  Eyes/Ears/Nose Findings:    Dental:  Dental Findings: In tact   Chest/Lungs:  Chest/Lungs Findings: Normal Respiratory Rate     Heart/Vascular:  Heart Findings: Rate: Normal  Rhythm: Regular Rhythm        Mental Status:  Mental Status Findings:  Cooperative, Alert and Oriented         Anesthesia Plan  Type of Anesthesia, risks & benefits discussed:  Anesthesia Type:  general  Patient's Preference: general  Intra-op Monitoring Plan: standard ASA monitors  Intra-op Monitoring Plan Comments:   Post Op Pain Control Plan:   Post Op Pain Control Plan Comments:   Induction:   IV  Beta Blocker:  Patient is not currently on a Beta-Blocker (No further documentation required).       Informed Consent: Patient understands risks and agrees with Anesthesia plan.  Questions answered. Anesthesia consent signed with patient.  ASA Score: 3     Day of Surgery Review of History & Physical:    H&P update referred to the surgeon.         Ready For Surgery From Anesthesia Perspective.

## 2017-07-05 NOTE — ANESTHESIA POSTPROCEDURE EVALUATION
"Anesthesia Post Evaluation    Patient: Genoveva Gilbert    Procedure(s) Performed: Procedure(s) (LRB):  ESOPHAGOGASTRODUODENOSCOPY (EGD) (N/A)    Final Anesthesia Type: general  Patient location during evaluation: GI PACU  Patient participation: Yes- Able to Participate  Level of consciousness: awake and alert, oriented and awake  Post-procedure vital signs: reviewed and stable  Pain management: adequate  Airway patency: patent  PONV status at discharge: No PONV  Anesthetic complications: no      Cardiovascular status: blood pressure returned to baseline and hemodynamically stable  Respiratory status: unassisted, spontaneous ventilation and room air  Hydration status: euvolemic  Follow-up not needed.        Visit Vitals  /61   Pulse 69   Temp 36.7 °C (98.1 °F)   Resp 18   Ht 4' 11" (1.499 m)   Wt 104.3 kg (230 lb)   SpO2 (!) 92%   Breastfeeding? No   BMI 46.45 kg/m²       Pain/Alexey Score: Pain Assessment Performed: Yes (7/5/2017  9:50 AM)  Presence of Pain: denies (7/5/2017 10:39 AM)  Alexey Score: 10 (7/5/2017 10:38 AM)      "

## 2017-07-05 NOTE — PATIENT INSTRUCTIONS
Discharge Summary/Instructions after an Endoscopic Procedure  Patient Name: Genoveva Gilbert  Patient MRN: [id number]  Patient YOB: 1950 Wednesday, July 05, 2017  Telma Gomez MD  RESTRICTIONS ON ACTIVITY:  - DO NOT drive a car, operate machinery, make legal/financial decisions, or   drink alcohol until the day after the procedure.    - The following day: return to full activity including work, except no heavy   lifting, straining or running for 3 days if polyps were removed.  - Diet: Eat and drink normally unless instructed otherwise.  TREATMENT FOR COMMON SIDE EFFECTS:  - Mild abdominal pain, bloating or excessive gas: rest, eat lightly and use   a heating pad.  - Sore Throat - treat with throat lozenges. Gargle with warm salt water.  SYMPTOMS TO WATCH FOR AND REPORT TO YOUR PHYSICIAN:  1. Severe abdominal pain or bloating.  2. Pain in chest.  3. Chills or fever occurring within 24 hours after a procedure.  4. A large amount of rectal bleeding, which would show as bright red,   maroon, or black stools. (A small amount of blood from the rectum is not   serious, especially if hemorrhoids are present.)  5. Because air was used during the procedure, expelling large amounts of air   from your rectum or belching is normal.  6. If a bowel prep was taken, you may not have a bowel movement for 1-3   days.  This is normal.  7. Go directly to the emergency room if you notice any of the following:   Chills and/or fever over 101 F   Persistent vomiting   Severe abdominal pain, other than gas cramps   Severe chest pain   Black, tarry stools   Any bleeding - exceeding one tablespoon  Your doctor recommends these additional instructions:  If any biopsies were performed, my office will call you in 5 to 6 business   days with any results.  You are being discharged to home.   You have a contact number available for emergencies.  The signs and symptoms   of potential delayed complications were discussed with you.  You may  return   to normal activities tomorrow.  Written discharge instructions were   provided to you.   Resume your previous diet.   Continue your present medications.   Return to your referring physician as previously scheduled.  For questions, problems or results please call your physician - Tlema Gomez MD at Work:  [phone].  [facility], EMERGENCY ROOM PHONE NUMBER: [phone]  IF A COMPLICATION OR EMERGENCY SITUATION ARISES AND YOU ARE UNABLE TO REACH   YOUR PHYSICIAN - GO TO THE EMERGENCY ROOM.  Telma Gomez MD  7/5/2017 10:33:48 AM  This report has been verified and signed electronically.

## 2017-07-05 NOTE — TRANSFER OF CARE
"Anesthesia Transfer of Care Note    Patient: Genoveva Gilbert    Procedure(s) Performed: Procedure(s) (LRB):  ESOPHAGOGASTRODUODENOSCOPY (EGD) (N/A)    Patient location: [facility]    Anesthesia Type: general    Transport from OR: Transported from OR on 2-3 L/min O2 by NC with adequate spontaneous ventilation    Post pain: adequate analgesia    Post assessment: no apparent anesthetic complications and tolerated procedure well    Post vital signs: stable    Level of consciousness: awake, responds to stimulation and alert    Nausea/Vomiting: no nausea/vomiting    Complications: none    Transfer of care protocol was followed      Last vitals:   Visit Vitals  BP (!) 142/92   Pulse 89   Temp 37.3 °C (99.1 °F)   Resp 15   Ht 4' 11" (1.499 m)   Wt 104.3 kg (230 lb)   SpO2 98%   Breastfeeding? No   BMI 46.45 kg/m²     "

## 2017-07-05 NOTE — H&P
Short Stay Endoscopy History and Physical    PCP - Tariq Crews MD  Referring Physician - Aura Araujo PA-C  [address] [place] [address]    Procedure - EGD   ASA - per anesthesia  Mallampati - per anesthesia  History of Anesthesia problems - no  Family history Anesthesia problems -  no   Plan of anesthesia - General    HPI  67 y.o. female    Reason for procedure: GERD      ROS:  Constitutional: No fevers, chills, No weight loss  CV: No chest pain  Pulm: No cough, No shortness of breath  GI: see HPI    Medical History:  has a past medical history of Allergy; Anxiety; Arthritis, rheumatoid; Back pain; Depression; Fibromyalgia; GERD (gastroesophageal reflux disease); High cholesterol; Hypertension; Incontinence of urine; MS (multiple sclerosis); Neuropathy; Restless leg syndrome; Seasonal allergies; Sinus congestion; Thyroid disease; and Wheezing.    Surgical History:  has a past surgical history that includes Hysterectomy; Breast surgery; Tonsillectomy; Appendectomy; and Breast lumpectomy.    Family History: family history includes Heart disease in her mother., see HPI    Social History:  reports that she quit smoking about 21 years ago. She has never used smokeless tobacco. She reports that she does not drink alcohol or use drugs.    Review of patient's allergies indicates:   Allergen Reactions    Keflex [cephalexin]     Latex, natural rubber Anaphylaxis     bandaids     Pcn [penicillins]        Medications:   Prescriptions Prior to Admission   Medication Sig Dispense Refill Last Dose    albuterol 90 mcg/actuation inhaler Inhale 2 puffs into the lungs every 6 (six) hours as needed for Wheezing. 1 Inhaler 6 Taking    alprazolam (XANAX) 2 MG Tab Take 1 mg by mouth nightly as needed.    Taking    amitriptyline (ELAVIL) 25 MG tablet Take 25 mg by mouth nightly as needed.   Taking    amlodipine-benazepril 5-10 mg (LOTREL) 5-10 mg per capsule Take 1 capsule by mouth once daily.   Taking     BIFIDOBACTERIUM INFANTIS (ALIGN ORAL) Take 1 capsule by mouth once daily at 6am.   Taking    biotin 300 mcg Tab Take 1 tablet by mouth once daily.   Taking    duloxetine (CYMBALTA) 20 MG capsule Take 20 mg by mouth once daily.  0 Taking    duloxetine (CYMBALTA) 60 MG capsule Take 60 mg by mouth 2 (two) times daily.    Taking    ergocalciferol (ERGOCALCIFEROL) 50,000 unit Cap Take 1 capsule (50,000 Units total) by mouth twice a week. 24 capsule 0 Taking    esomeprazole (NEXIUM) 40 MG capsule Take 40 mg by mouth before breakfast.   Taking    fexofenadine (ALLEGRA) 180 MG tablet Take 180 mg by mouth once daily.   Taking    fluticasone (FLONASE) 50 mcg/actuation nasal spray USE ONE SPRAY IEN QD  3 Taking    hydrochlorothiazide (HYDRODIURIL) 25 MG tablet Take 25 mg by mouth once daily.   Taking    hydrocodone-acetaminophen 7.5-325mg (NORCO) 7.5-325 mg per tablet Take 1 tablet by mouth every 6 (six) hours as needed for Pain.   Taking    levothyroxine (SYNTHROID) 125 MCG tablet Take 125 mcg by mouth once daily.   Taking    lovastatin (ALTOPREV) 40 mg 24 hr tablet Take 40 mg by mouth every evening.   Taking    MULTIVIT-IRON-MIN-FOLIC ACID 3,500-18-0.4 UNIT-MG-MG ORAL CHEW Take 1 tablet by mouth.   Taking    pramipexole (MIRAPEX) 0.25 MG tablet Take 0.5 mg by mouth every evening.   Taking       Physical Exam:    Vital Signs: There were no vitals filed for this visit.    General Appearance: Well appearing in no acute distress  Lungs: CTA anteriorly  Heart:  Regular rate, S1, S2 normal, no murmurs heard.  Abdomen: Soft, non tender, non distended with normal bowel sounds, no masses  Extremities: No edema    Labs:  Lab Results   Component Value Date    WBC 5.37 05/06/2016    WBC 5.37 05/06/2016    HGB 9.9 (L) 05/06/2016    HGB 9.9 (L) 05/06/2016    HCT 32.1 (L) 05/06/2016    HCT 32.1 (L) 05/06/2016     05/06/2016     05/06/2016    CHOL 202 (H) 07/31/2008    TRIG 65 07/31/2008    HDL 45 07/31/2008     ALT 34 07/03/2017    AST 18 07/03/2017     07/03/2017    K 3.7 07/03/2017     07/03/2017    CREATININE 0.7 07/03/2017    BUN 21 07/03/2017    CO2 27 07/03/2017    TSH <0.010 (L) 06/21/2017    INR 1.0 07/03/2017    HGBA1C 6.3 (H) 06/21/2017       I have explained the risks and benefits of this endoscopic procedure to the patient including but not limited to bleeding, inflammation, infection, perforation, and death.      Telma Gomez MD

## 2017-07-06 LAB
A1AT PHENOTYP SERPL-IMP: NORMAL BANDS
A1AT SERPL NEPH-MCNC: 146 MG/DL

## 2017-07-07 ENCOUNTER — TELEPHONE (OUTPATIENT)
Dept: HEPATOLOGY | Facility: CLINIC | Age: 67
End: 2017-07-07

## 2017-07-07 ENCOUNTER — HOSPITAL ENCOUNTER (OUTPATIENT)
Dept: RADIOLOGY | Facility: HOSPITAL | Age: 67
Discharge: HOME | End: 2017-07-07
Attending: SURGERY
Payer: MEDICARE

## 2017-07-07 ENCOUNTER — PROCEDURE VISIT (OUTPATIENT)
Dept: HEPATOLOGY | Facility: CLINIC | Age: 67
End: 2017-07-07
Attending: INTERNAL MEDICINE
Payer: MEDICARE

## 2017-07-07 DIAGNOSIS — R93.89 ABNORMAL FINDING ON IMAGING: ICD-10-CM

## 2017-07-07 DIAGNOSIS — R74.8 ELEVATED LIVER ENZYMES: ICD-10-CM

## 2017-07-07 DIAGNOSIS — R93.5 ABNORMAL US (ULTRASOUND) OF ABDOMEN: ICD-10-CM

## 2017-07-07 PROCEDURE — 91200 LIVER ELASTOGRAPHY: CPT | Mod: S$GLB,,, | Performed by: PHYSICIAN ASSISTANT

## 2017-07-07 PROCEDURE — 74183 MRI ABD W/O CNTR FLWD CNTR: CPT | Mod: 26,,, | Performed by: RADIOLOGY

## 2017-07-07 PROCEDURE — 25500020 PHARM REV CODE 255: Performed by: SURGERY

## 2017-07-07 PROCEDURE — A9585 GADOBUTROL INJECTION: HCPCS | Performed by: SURGERY

## 2017-07-07 PROCEDURE — 74183 MRI ABD W/O CNTR FLWD CNTR: CPT | Mod: TC

## 2017-07-07 RX ORDER — GADOBUTROL 604.72 MG/ML
10 INJECTION INTRAVENOUS
Status: COMPLETED | OUTPATIENT
Start: 2017-07-07 | End: 2017-07-07

## 2017-07-07 RX ADMIN — GADOBUTROL 10 ML: 604.72 INJECTION INTRAVENOUS at 12:07

## 2017-07-07 NOTE — TELEPHONE ENCOUNTER
----- Message from Catarino Ospina PA-C sent at 7/7/2017  8:55 AM CDT -----  Please let her know that these labs are stable. They don't show any additional cause for liver disease other than fatty liver. SHe should keep her scans and we will discuss more once those result. I would recommend HAV and HBV vaccinations. I will send them to the pharmacy and they will get back with patient.   Thanks

## 2017-07-10 ENCOUNTER — TELEPHONE (OUTPATIENT)
Dept: BARIATRICS | Facility: CLINIC | Age: 67
End: 2017-07-10

## 2017-07-10 ENCOUNTER — TELEPHONE (OUTPATIENT)
Dept: HEPATOLOGY | Facility: CLINIC | Age: 67
End: 2017-07-10

## 2017-07-10 DIAGNOSIS — K76.0 NAFLD (NONALCOHOLIC FATTY LIVER DISEASE): Primary | ICD-10-CM

## 2017-07-10 NOTE — TELEPHONE ENCOUNTER
----- Message from Lucretia Clancy sent at 7/10/2017  3:26 PM CDT -----  Contact: self  Genoveva  Called and stated that she  wanted to know when to report for surgery and wants further instructions, Please call and advise. Genoveva @ [phone] .Thanks :)

## 2017-07-10 NOTE — TELEPHONE ENCOUNTER
Spoke to pt she stated the liver dr cleared her I explained to her she still needs to be cleared from Psych and Nutrition and  Any other test and services that were required  pt v/u

## 2017-07-10 NOTE — TELEPHONE ENCOUNTER
Fibroscan [id number] for bariatric surgery from liver prospective    MRI reviewed with Dr. Michelle, recommended repeating U/S in 6 months, please enter recall and link to office visit with me

## 2017-07-10 NOTE — PROCEDURES
Procedures   Fibroscan Procedure     Name: Genoveva Gilbert  Date of Procedure : 07/10/2017   :: Catarino Ospina PA-C  Diagnosis: NAFLD    Probe: XL    Fibroscan readin.6 KPa    Fibrosis:F 0-1     CAP readin dB/m    Steatosis: :S2

## 2017-07-10 NOTE — TELEPHONE ENCOUNTER
----- Message from Yaya Michelle MD sent at 7/10/2017  1:28 PM CDT -----  Yes!    ----- Message -----  From: Catarino Ospina PA-C  Sent: 7/10/2017   9:38 AM  To: Yaya Michelle MD    Fibroscan F0-F1, ok for clearing her for surgery?   ----- Message -----  From: Yaya Michelle MD  Sent: 7/7/2017   4:31 PM  To: Catarino Ospina PA-C    Just see him once more with repeat US in 6 months   Is she still waiting for her fibroscan?    ----- Message -----  From: Catarino Ospina PA-C  Sent: 7/7/2017   3:37 PM  To: Yaya Michelle MD    We saw her together on Monday. Her U/S had mass likely focal fatty sparing. It wasn't seen on MRI.. Any further work up warranted?

## 2017-07-11 ENCOUNTER — TELEPHONE (OUTPATIENT)
Dept: BARIATRICS | Facility: CLINIC | Age: 67
End: 2017-07-11

## 2017-07-11 NOTE — TELEPHONE ENCOUNTER
----- Message from Lucretia Clancy sent at 7/10/2017  4:10 PM CDT -----  Contact: self   Genoveva States that she needs a call returned by a nurse in reference to her needing a clearance letter sent over to her cardiologist. Please call Genoveva @[phone]                                       Thanks :)

## 2017-07-12 ENCOUNTER — DOCUMENTATION ONLY (OUTPATIENT)
Dept: BARIATRICS | Facility: CLINIC | Age: 67
End: 2017-07-12

## 2017-07-12 ENCOUNTER — TELEPHONE (OUTPATIENT)
Dept: BARIATRICS | Facility: CLINIC | Age: 67
End: 2017-07-12

## 2017-07-12 ENCOUNTER — TELEPHONE (OUTPATIENT)
Dept: ENDOSCOPY | Facility: HOSPITAL | Age: 67
End: 2017-07-12

## 2017-07-12 NOTE — PROGRESS NOTES
Dashboard updated; EGD normal and MRI does not show any liver lesion.  Does show hepatosteatosis and already seeing hepatology.

## 2017-07-14 ENCOUNTER — DOCUMENTATION ONLY (OUTPATIENT)
Dept: BARIATRICS | Facility: CLINIC | Age: 67
End: 2017-07-14

## 2017-07-14 ENCOUNTER — TELEPHONE (OUTPATIENT)
Dept: BARIATRICS | Facility: CLINIC | Age: 67
End: 2017-07-14

## 2017-07-14 NOTE — TELEPHONE ENCOUNTER
----- Message from Catarino Ospina PA-C sent at 7/10/2017  1:46 PM CDT -----  Mick Chavarria, she's cleared from liver for surgery. I called to discuss this with her and she wanted me to let you know that she attempted to speak to her PCP regarding her thyroid labs but has not heard back.   Catarino

## 2017-07-19 ENCOUNTER — OFFICE VISIT (OUTPATIENT)
Dept: PSYCHIATRY | Facility: CLINIC | Age: 67
End: 2017-07-19
Payer: COMMERCIAL

## 2017-07-19 DIAGNOSIS — F41.9 ANXIETY: ICD-10-CM

## 2017-07-19 DIAGNOSIS — F32.A DEPRESSION, UNSPECIFIED DEPRESSION TYPE: ICD-10-CM

## 2017-07-19 DIAGNOSIS — E78.5 HYPERLIPIDEMIA, UNSPECIFIED HYPERLIPIDEMIA TYPE: ICD-10-CM

## 2017-07-19 DIAGNOSIS — I10 ESSENTIAL HYPERTENSION: ICD-10-CM

## 2017-07-19 DIAGNOSIS — Z01.818 PREOP EXAMINATION: Primary | ICD-10-CM

## 2017-07-19 DIAGNOSIS — E66.01 MORBID OBESITY DUE TO EXCESS CALORIES: ICD-10-CM

## 2017-07-19 DIAGNOSIS — K21.9 GASTROESOPHAGEAL REFLUX DISEASE, ESOPHAGITIS PRESENCE NOT SPECIFIED: ICD-10-CM

## 2017-07-19 PROCEDURE — 96101 PR PSYCHOLOGIC TESTING BY PSYCH/PHYS: CPT | Mod: S$GLB,,, | Performed by: PSYCHOLOGIST

## 2017-07-19 PROCEDURE — 96102 PR PSYCHOLOGIC TESTING BY TECHNICIAN: CPT | Mod: 59,S$GLB,, | Performed by: PSYCHOLOGIST

## 2017-07-19 PROCEDURE — 90791 PSYCH DIAGNOSTIC EVALUATION: CPT | Mod: S$GLB,,, | Performed by: PSYCHOLOGIST

## 2017-07-19 PROCEDURE — 99999 PR PBB SHADOW E&M-EST. PATIENT-LVL I: CPT | Mod: PBBFAC,,, | Performed by: PSYCHOLOGIST

## 2017-07-19 NOTE — Clinical Note
There are no overt psychological contraindications for bariatric surgery.  She does have a history of depression and anxiety, but reports her symptoms are well-managed with psychotropic medications.  She also has good adaptive coping strategies (like gardening) that I believe are assets for her.  I encouraged her to contact me if she needs treatment in the future.  Please let me know if you notice worsened symptoms and I'm happy to see her.   JR

## 2017-07-20 ENCOUNTER — CLINICAL SUPPORT (OUTPATIENT)
Dept: BARIATRICS | Facility: CLINIC | Age: 67
End: 2017-07-20
Payer: MEDICARE

## 2017-07-20 VITALS — BODY MASS INDEX: 47.11 KG/M2 | WEIGHT: 233.25 LBS

## 2017-07-20 DIAGNOSIS — Z71.3 DIETARY COUNSELING AND SURVEILLANCE: ICD-10-CM

## 2017-07-20 DIAGNOSIS — I10 ESSENTIAL HYPERTENSION: ICD-10-CM

## 2017-07-20 DIAGNOSIS — E66.01 MORBID OBESITY WITH BMI OF 45.0-49.9, ADULT: ICD-10-CM

## 2017-07-20 DIAGNOSIS — E66.01 MORBID OBESITY DUE TO EXCESS CALORIES: ICD-10-CM

## 2017-07-20 PROCEDURE — 99499 UNLISTED E&M SERVICE: CPT | Mod: S$GLB,,, | Performed by: DIETITIAN, REGISTERED

## 2017-07-20 PROCEDURE — 99999 PR PBB SHADOW E&M-EST. PATIENT-LVL II: CPT | Mod: PBBFAC,,, | Performed by: DIETITIAN, REGISTERED

## 2017-07-20 PROCEDURE — 97803 MED NUTRITION INDIV SUBSEQ: CPT | Mod: S$GLB,,, | Performed by: DIETITIAN, REGISTERED

## 2017-07-20 NOTE — PROGRESS NOTES
NUTRITION NOTE    Referring Physician: Justino Bradley M.D.  Reason for MNT Referral: Pre-op Nutrition Assessment Diet pending Gastric Sleeve    PAST MEDICAL HISTORY:    Patient presents for pre-op nutrition assessment with weight gain over the past month; total weight loss by making numerous dietary and lifestyle changes. Patient reports she has been on and off over the last month. Patient reports she has been eating some of her favorite foods (fried chicken and pies) in anticipation for surgery as these items are not allowed post-op. Patient admits to not completing food logs, stating she wasn't sure what she was supposed to eat. She also reports she did not like any of the foods listed on sample meal plan and meal ideas she was given at initial RD visit. Reviewed diet requirements prior to clearance for surgery. We reviewed nutrition core points check list and I provided patient with written instructions. Encouraged to reach out if she is unsure of diet. Patient encouraged to complete food logs for next appointment.     Past Medical History:   Diagnosis Date    Allergy     Anxiety     Arthritis, rheumatoid     Back pain     Depression     Fibromyalgia     GERD (gastroesophageal reflux disease)     High cholesterol     Hypertension     Incontinence of urine     MS (multiple sclerosis)     benign; another MD stated she has no MS    Neuropathy     Restless leg syndrome     Seasonal allergies     Sinus congestion     Thyroid disease     Wheezing        CLINICAL DATA:  67 y.o. female.    There were no vitals filed for this visit.    Current Weight: 233 lbs  Weight Change Since Initial Visit: +7 lbs  Ideal Body Weight: 123 lbs  BMI: 47.11    CURRENT DIET:  Regular diet.  Diet Recall: Food records are not present. (did not attempt)  Verbal Diet Recall:  Yesterday (not forth coming with intake)  Am: 2 cups of coffee with Splenda and Coffee mate powder creamer  Brk (9-10 am): egg beaters with turkey  carlos  Sussy: Protein drink   Di: Velvetta box dinner-beef stroganoff with ground meat   Snacks in bed.   Patient is not a big meat eater.     Diet Includes: 2-3 meals/day   Meal Pattern: Same.  Protein Supplements: 1 per day.  Snacking: Excess. At night time in the bed Snacks include healthy choices, junk foods and sweets. (popcorn, chips, candy)    Vegetables: Likes a variety. Eats 2-3 times per week.  Fruits: Likes a variety. Eats almost daily.  Beverages: water, sugar-free beverages, coffee without sugar, fat-free milk and 1% milk  Dining Out: Dining out: Weekly. Monthly. Mostly restaurants and take-out.  Cooking at home: Daily. Weekly. Mostly baked, grilled, smothered and fried meat, fish, starchy CHO and vegetables.    CURRENT EXERCISE:  None    Vitamins / Minerals / Herbs:   Multivitamin  Biotin  Align Probiotic    Food Allergies:   Multivitamin  Biotin  Align Probiotic    Social:  Retired. No work.  Alcohol: Rarely (few times/month). 3 oz RumChata 1-2 times/week   Smoking: None. Quit 20+years ago    ASSESSMENT:  Patient demonstrates no  willingness to change lifestyle habits as evidenced by weight gain, no exercise, no food logs and limited comprehension of bariatric diet.    Doing poorly with working on greatest challenges (sweets, starchy CHO, portion control, irregular meal patterns, high-fat diet and junk food snacking before bed).    Adequate calorie intake.  Inadequate protein intake.    PLAN:    Diet: Adjust diet plan.    Exercise: Increase.    Behavior Modification: Increase to document food & activity logs daily.    Weight loss prior to surgery (5-10% TBW): 12-23 lbs    Return to clinic in one month.    SESSION TIME:  30 minutes

## 2017-07-20 NOTE — PSYCH TESTING
[facility] [address] [place]  [address]  [phone]    REPORT OF PSYCHOLOGICAL TESTING    NAME:  Genoveva Gilbert  OC #: [id number]  : 1950    REFERRED BY: Justino Bradley Jr., M.D.    EVALUATED BY:  Pamela Jarvis, Ph.D., Clinical Psychologist  Virgil Aranda M.S., Psychometrician    DATES OF EVALUATION: 2017, 2017    EVALUATION PROCEDURES AND TIMES:  Conducted by Psychologist:  Interpretation and report of test data  Integration of information from diagnostic interview, medical record, and testing data  Conducted by Technician:  [place] Multiphasic Personality Inventory - 2 Restructured Form (MMPI-2RF)  [facility] Medicine Diagnostic (MBMD)  CPT Codes and Time:  59782 - 2 hours; 69344 - 2 hours    EVALUATION FINDINGS:  Before this evaluation was initiated, the purposes and process of the assessment and the limits of confidentiality were discussed with the patient who expressed understanding of these issues and orally consented to proceed with the evaluation.    Ms. Genoveva Gilbert is a 67-year-old White  female who is pursuing bariatric surgery to improve her health and quality of life.  Ms. Gilbert has struggled with weight since 15 years ago.  Factors that have contributed to her weight gain over the years include:  sweets, unhealthy food choices, and snack foods (i.e., candy, Cheetos, Fritos, potato chips).  She reports that sweets filled a void - I had been through so many things loss of my son, finding out I was adopted, and it got worse with the divorce.  However, Ms. Gilbert does not consider herself an emotional eater, and believes that she simply started a habit of eating sweets and snacks.  She has tried many weight loss methods on her own (i.e., Weight Watchers, diet modifications) with some success (lost 30-35 lbs. with Weight Watchers), and believes that her biggest weight loss challenge is returning to sweets and snacks.   Her motivation for seeking surgery now is to improve health, reduce pain, and improve quality of life.  Her postsurgical goals include breathing better, walking without pain, decreasing medications, and living longer.    Ms. Gilbert reports a history of depression and anxiety that is well-managed with psychotropic medication.  She denied any history of suicidality or non-suicidal self-injury.  Although she experiences occasional symptoms of depression and anxiety currently, she employs adaptive coping strategies to help her manage her symptoms.    At her initial consultation with Aura Araujo PA-C, on 06/21/2017, her BMI was 45.86.  Her medical comorbidities include: Dyslipidemia, GERD, Hypertension, and Osteoarthritis.  She completed a nutritional consultation with Jana Herrera RD, bariatric dietician, and reports that she has made many changes to her diet since beginning the program.  She has a good understanding regarding the risks and benefits of the procedure and appears motivated for change.  She was fully cooperative and engaged in the assessment process.     Ms. Gilbert produced a valid and interpretable MMPI-2-RF profile, answering items relevantly based on content. Therefore, her responses should be considered a relatively accurate reflection of her current psychological functioning. She reports cognitive difficulties including memory problems, low tolerance for frustration, difficulty coping with stress, and difficulties concentrating.  She may have a preoccupation with death or suicidal ideation.  (Of note, Ms. Gilbert acknowledged that she thinks more about death due to aging; however, she firmly denied suicidal ideation.)    The MBMD indicated that Ms. Gilbert is experiencing some instability of mood, guardedness, and suspicion.  She may be quick to vent a wide range of emotions, while also keeping her distance.  She may be distrustful of medical personnel and claim that treatment is harmful.  It  may be helpful for staff to avoid emotional confrontations, and deal with mood swings with firmness and professionalism.  She may be easily offended by trifles, and experience irritability and resentment with serious illness.  Pain-related problems may cause difficulty for her to adhere to programs of traditional medical care.  Patients with similar profiles may experience slower recovery if influenced by strong emotional reactions, suspiciousness and exaggerated negative responses, and problems with exercise and overeating.  She may benefit from pre-surgery psychiatric consultation, supportive counseling, or pain management.  She may also benefit from post-surgery physical rehabilitation, stress management, bariatric support group, and nutritional instruction plan.  (Of note, Ms. Gilbert does not consider herself guarded or suspicious.  She notes that she trusts the bariatric team and has enjoyed working with them thus far.)    DIAGNOSTIC IMPRESSIONS:    ICD-10-CM ICD-9-CM   1. Preop examination Z01.818 V72.84   2. Morbid obesity due to excess calories E66.01 278.01   3. Essential hypertension I10 401.9   4. Hyperlipidemia, unspecified hyperlipidemia type E78.5 272.4   5. Gastroesophageal reflux disease, esophagitis presence not specified K21.9 530.81   6. BMI 45.0-49.9, adult Z68.42 V85.42   7. Depression, unspecified depression type F32.9 311   8. Anxiety F41.9 300.00     SUMMARY AND RECOMMENDATIONS:  Ms. Gilbert has a long history of weight problems and is pursuing bariatric surgery at this time in an effort to improve her health and quality of life.  Test results should be considered valid, and indicate that she reports difficulties with stress and frustration, as well as some instability in mood.  She reports a history of depression and anxiety that appear well-managed with psychotropic medication and adaptive coping strategies. She was encouraged to adhere to these resources in order to continue managing her  symptoms, and was provided with information to seek therapy if she experiences any issues in the future.  She reports good social support and demonstrates several characteristics that make her a good candidate for surgery. It will be important for her to demonstrate necessary lifestyle and behavioral changes prior to surgery.  Test results show NO overt psychological contraindications for surgery.

## 2017-07-20 NOTE — PATIENT INSTRUCTIONS
Diet Changes in Preparation for Surgery:    -Aim to eat 4-6 small meals/day (breakfast, lunch, dinner with mini-meals in between that are high in protein)  -Protein goal  gm/day  -Eliminate starchy carbohydrate foods (bread, rice, pasta, potatoes, corn, peas, cereal, popcorn, crackers, etc)  -Incorporate protein drinks daily (can be 1-2 small meals)  -Eat fruits and vegetables daily  -Stay away from junks foods/high-fat foods     Review nutrition core points on last page in Nutrition Booklet.

## 2017-07-20 NOTE — PROGRESS NOTES
Psychiatry Initial Visit (PhD/LCSW)   Diagnostic Interview - CPT 47624     Date: 07/19/2017    Site: [facility]     Referral source:  Justino Bradley Jr., M.D.    Clinical status of patient: Outpatient     Ms. Genoveva Gilbert, a 67-year-old White  female, presented for initial evaluation visit. Before this evaluation was initiated, the purposes and process of the assessment and the limits of confidentiality were discussed with the patient who expressed understanding of these issues and orally consented to proceed with the evaluation.     Chief complaint/reason for encounter: Routine psychological evaluation prior to bariatric surgery.     Type of surgery sought:  Sleeve    History of present illness:  Ms. Genoveva Gilbert is a 67-year-old White  female who is pursuing bariatric surgery to improve her health and quality of life.  She reports a history of depression and anxiety that is well-managed with psychotropic medication.  She denied any history of suicidality or non-suicidal self-injury.  Although she experiences occasional symptoms of depression and anxiety currently, she employs adaptive coping strategies to help her manage her symptoms.  She has attempted making positive lifestyle changes in anticipation for surgery, with limited benefit.  The patient has a Body Mass Index of 45.86 as documented by the referring provider.    Ms. Gilbert has struggled with weight since 15 years ago.  Factors that have contributed to her weight gain over the years include:  sweets, unhealthy food choices, and snack foods (i.e., candy, Cheetos, Fritos, potato chips).  She reports that sweets filled a void - I had been through so many things loss of my son, finding out I was adopted, and it got worse with the divorce.  However, Ms. Gilbert does not consider herself an emotional eater, and believes that she simply started a habit of eating sweets and snacks.  She has tried many weight loss methods  on her own (i.e., Weight Watchers, diet modifications) with some success (lost 30-35 lbs. with Weight Watchers), and believes that her biggest weight loss challenge is returning to sweets and snacks.  Her motivation for seeking surgery now is to improve health, reduce pain, and improve quality of life.  Her postsurgical goals include breathing better, walking without pain, decreasing medications, and living longer.    Ms. Gilbert has met with bariatric nutritionist Jana Herrera RD, and reports that she has made the following lifestyle changes since beginning the bariatric program:  decreasing candy, drinking protein shakes, choosing healthy options at the store, and ordering healthy snacks online (7 lbs. weight gain since first visit).  She must continue meeting with Ms. Herrera to demonstrate the implementation of lifestyle changes prior to clearance for bariatric surgery.  Her , her ex-, granddaughter, and friend will be available to assist her during recovery after surgery.    Medical History:  Dyslipidemia, GERD, Hypertension, and Osteoarthritis    Pain:  4/10 (shoulder)    Psychiatric Symptoms:  Depression:  She reports that she has experienced depressive symptoms for over 10 years.  She believes there are things I cannot cope with.  She has difficulty coping with the death of her son, and notes that she sometimes has crying spells when triggered by memories of him.  She also has difficulty with her s physical limitations (like difficulty walking), but notes that she can manage that.  When she feels depressed, she endorses crying spells and difficulty concentrating.  However, she uses coping strategies like shopping, gardening, and going outside in order to improve symptoms.  Tara/Hypomania:  Denied.  Anxiety:  She reports that she hardly ever has issues with anxiety.  The last time she experienced significant anxiety was several months ago when she has an anxiety attack.   She gave an example of bumping my toe, which starts me thinking about everything thats wrong.  Thoughts:  Denied delusions, hallucinations.  Suicidal thoughts/behaviors:  Denied.  Self-injury:  Denied.  Substance abuse:  Denied abuse or dependence.  Sleep:  She reports that she is able to sleep, but she is taking medication to help her fall and stay asleep.    Psychiatric History:  Previous diagnosis:  Depression  Previous hospitalizations:  Denied.  History of outpatient treatment:  She is currently prescribed Cymbalta, Xanax, and Elavil by her internal medicine physician for depression.  She reports that the medication helps her maintain improved mood.    Previous suicide attempt:  Denied.    Trauma history:  Her 29-year-old son passed away in .    History of eating disorders:  History of bulimia:  Denied.  History of binge-eating episodes:  Denied.    Family history of psychiatric illness:  None reported.    Social history (marriage, employment, etc.):  Ms. Gilbert was born in [place], [place] and raised in [place], [place] by her aunt and uncle.  She notes she believed her aunt and uncle were her parents, and they took her in because my mother didnt want me.  She discovered this information when she was in her early 20s, and I went nuts - screaming, crying then I settled down.  When she was 8 years old, her aunt  from cancer.  She described her childhood as good.  She denied childhood trauma and abuse.  She did ok in school and earned a high school diploma.  She is currently on disability for benign multiple sclerosis (since ) and finances are not a problem.  She was  to her first  for 40 years, and their marriage ended because he was lying.  She has been  to her second  for 12 years.  She has one daughter, and one son who passed away in  at age 29.  She currently lives with her .  She identifies as Sabianist.    Current psychosocial stressors:  Health,  Pain, Family    Report of coping skills:  Talking to others, Gardening, Being outside, Coloring    Support system:  , Family, Friends    Substance use:  Alcohol:  She consumes rum once in a while (once every two weeks); denied history of abuse or dependency.   Drugs: Denied current use; denied history of abuse or dependency.  Tobacco: None.   Caffeine: She consumes two cups of coffee in the morning, working on cutting back in anticipation for surgery.    Current medications and drug reactions:  see medication list.    Strengths and liabilities:  Strength: Patient accepts guidance/feedback, Strength: Patient is expressive/articulate., Strength: Patient has positive support network., Strength: Patient has reasonable judgment., Liability: Patient has poor health.    Mental Status Exam:   General appearance:  appears stated age, neatly dressed, well groomed  Speech:  normal rate and tone  Level of cooperation:  cooperative  Thought processes:  logical, goal-directed  Mood:  mostly euthymic  Thought content:  no illusions, no visual hallucinations, no auditory hallucinations, no delusions, no active or passive homicidal thoughts, no active or passive suicidal ideation, no obsessions, no compulsions, no violence  Affect:  appropriate  Orientation:  oriented to person, place, and date  Memory:  Recent memory:  3 of 3 objects after brief delay.    Remote memory - intact  Attention span and concentration:  spelled HOUSE forward and backwards  Fund of general knowledge: 2 of 3 recent presidents  Abstract reasoning:    Similarities: abstract.    Proverbs: abstract.  Judgment and insight: fair  Language:  intact    Diagnostic Impression:    ICD-10-CM ICD-9-CM   1. Preop examination Z01.818 V72.84   2. Morbid obesity due to excess calories E66.01 278.01   3. Essential hypertension I10 401.9   4. Hyperlipidemia, unspecified hyperlipidemia type E78.5 272.4   5. Gastroesophageal reflux disease, esophagitis presence not  specified K21.9 530.81   6. BMI 45.0-49.9, adult Z68.42 V85.42   7. Depression, unspecified depression type F32.9 311   8. Anxiety F41.9 300.00       Summary/Conclusion:   There are no overt psychological contraindications for proceeding with bariatric surgery.  Although the patient endorses a history of depression and anxiety, she reports her symptoms are well-managed with psychotropic medication and adaptive coping strategies.  The patient has reasonable expectations for surgery, good social support, and has already begun making appropriate lifestyle changes in anticipation for surgery. The patient has verbalized appropriate awareness and commitment to the necessary behavioral changes associated with bariatric surgery and appears to have some willingness to comply with long-term lifestyle changes.  It will be important for her to demonstrate necessary lifestyle and behavioral changes prior to surgery.  There are no recommendations for psychological treatment at this time. The patient is aware of resources available should her needs change in the future.    Recommendations:  -This patient is psychologically cleared to proceed with bariatric surgery.    Please see Psychological Testing report available in Notes tab of Chart Review in Epic for results of psychological testing.      Length of Session:  60 minutes

## 2017-07-21 ENCOUNTER — TELEPHONE (OUTPATIENT)
Dept: BARIATRICS | Facility: CLINIC | Age: 67
End: 2017-07-21

## 2017-07-24 ENCOUNTER — DOCUMENTATION ONLY (OUTPATIENT)
Dept: BARIATRICS | Facility: CLINIC | Age: 67
End: 2017-07-24

## 2017-07-25 ENCOUNTER — TELEPHONE (OUTPATIENT)
Dept: BARIATRICS | Facility: CLINIC | Age: 67
End: 2017-07-25

## 2017-07-25 NOTE — TELEPHONE ENCOUNTER
Patient called in reference to questions about pre-surgery diet. Patient reports issues with eating breakfast, lunch and snacks. She is okay with dinner meals. Encouraged patient to try protein drinks for br and esther. We reviewed the expected changes to diet prior to clearance for surgery. Discussed meal ideas/sample meal plan. Encouraged patient to call with any other questions or concerns.     Discussion:   gm protein/day  Aim to eat at least 4 meals/day (brk, esther, di+1 mini-meal)  Incorporate protein drinks daily (1-2-can replace meals)

## 2017-07-25 NOTE — TELEPHONE ENCOUNTER
----- Message from Edinson Ferrera sent at 7/25/2017  8:25 AM CDT -----  Pt has questions about her intake of food. Please call pt regarding this at [phone]

## 2017-07-26 ENCOUNTER — TELEPHONE (OUTPATIENT)
Dept: BARIATRICS | Facility: CLINIC | Age: 67
End: 2017-07-26

## 2017-07-27 DIAGNOSIS — Z98.890 PERSONAL HISTORY OF SURGERY TO HEART AND GREAT VESSELS, PRESENTING HAZARDS TO HEALTH: Primary | ICD-10-CM

## 2017-07-27 DIAGNOSIS — M25.511 RIGHT SHOULDER PAIN: ICD-10-CM

## 2017-08-01 ENCOUNTER — TELEPHONE (OUTPATIENT)
Dept: BARIATRICS | Facility: CLINIC | Age: 67
End: 2017-08-01

## 2017-08-01 NOTE — TELEPHONE ENCOUNTER
Patient called in reference to questions about pre-surgery diet. Patient reports issues with getting in protein. She states she is counting protein per week. Encouraged patient to try protein drinks for br and esther. We reviewed the expected changes to diet prior to clearance for surgery. Discussed meal ideas/sample meal plan. Encouraged patient to call with any other questions or concerns.   Discussion:    gm protein/day   Aim to eat at least 4 meals/day (brk, esther, di+1 mini-meal)   Incorporate protein drinks daily (1-2-can replace meals)

## 2017-08-07 ENCOUNTER — CLINICAL SUPPORT (OUTPATIENT)
Dept: REHABILITATION | Facility: HOSPITAL | Age: 67
End: 2017-08-07
Attending: ORTHOPAEDIC SURGERY
Payer: MEDICARE

## 2017-08-07 ENCOUNTER — TELEPHONE (OUTPATIENT)
Dept: HEPATOLOGY | Facility: CLINIC | Age: 67
End: 2017-08-07

## 2017-08-07 DIAGNOSIS — G89.29 CHRONIC RIGHT SHOULDER PAIN: Primary | ICD-10-CM

## 2017-08-07 DIAGNOSIS — M62.81 MUSCLE WEAKNESS: ICD-10-CM

## 2017-08-07 DIAGNOSIS — M25.511 CHRONIC RIGHT SHOULDER PAIN: Primary | ICD-10-CM

## 2017-08-07 PROCEDURE — G8984 CARRY CURRENT STATUS: HCPCS | Mod: CK | Performed by: PHYSICAL THERAPIST

## 2017-08-07 PROCEDURE — G8985 CARRY GOAL STATUS: HCPCS | Mod: CJ | Performed by: PHYSICAL THERAPIST

## 2017-08-07 PROCEDURE — 97162 PT EVAL MOD COMPLEX 30 MIN: CPT | Performed by: PHYSICAL THERAPIST

## 2017-08-07 PROCEDURE — 97110 THERAPEUTIC EXERCISES: CPT | Performed by: PHYSICAL THERAPIST

## 2017-08-07 NOTE — PROGRESS NOTES
Name:Genoveva Gilbert  Physician:Randall Lester MD  Date of eval:8/7/2017  Orders:  Physical Therapy evaluate and treat  Clinic: [id number]  Diagnosis:  1. Chronic right shoulder pain     2. Muscle weakness         Precautions: back pain, MS, fibromyalgia neuropathy in hands and feet   Evaluation date: 8/7/2017  Visit # authorized: 1/12  Authorization period: 9-26-17  Plan of care expiration: 10-1-17  MD Follow up appt: none scheduled    Subjective     Chief complaint: R shoulder radiating to scapula, worsening  Onset of pain : 1 year ago   Mechanism of onset :  No known injury    Pt reports she is working with bariatric MD to undergo bariatric surgery, not scheduled yet    Aggravating factors: more use increased pain  Easing factors: nothing eases only time  Sleep is not disturbed. 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    Allergies:    Review of patient's allergies indicates:   Allergen Reactions    Keflex [cephalexin]     Latex, natural rubber Anaphylaxis     bandaids     Pcn [penicillins]        Medical history:   Past Medical History:   Diagnosis Date    Allergy     Anxiety     Arthritis, rheumatoid     Back pain     Depression     Fibromyalgia     GERD (gastroesophageal reflux disease)     High cholesterol     Hypertension     Incontinence of urine     MS (multiple sclerosis)     benign; another MD stated she has no MS    Neuropathy     Restless leg syndrome     Seasonal allergies     Sinus congestion     Thyroid disease     Wheezing        Medication:   Current Outpatient Prescriptions on File Prior to Visit   Medication Sig Dispense Refill     albuterol 90 mcg/actuation inhaler Inhale 2 puffs into the lungs every 6 (six) hours as needed for Wheezing. 1 Inhaler 6    alprazolam (XANAX) 2 MG Tab Take 1 mg by mouth nightly as needed.       amitriptyline (ELAVIL) 25 MG tablet Take 25 mg by mouth nightly as needed.      amlodipine-benazepril 5-10 mg (LOTREL) 5-10 mg per capsule Take 1 capsule by mouth once daily.      BIFIDOBACTERIUM INFANTIS (ALIGN ORAL) Take 1 capsule by mouth once daily at 6am.      biotin 300 mcg Tab Take 1 tablet by mouth once daily.      duloxetine (CYMBALTA) 20 MG capsule Take 20 mg by mouth once daily.  0    duloxetine (CYMBALTA) 60 MG capsule Take 60 mg by mouth 2 (two) times daily.       ergocalciferol (ERGOCALCIFEROL) 50,000 unit Cap Take 1 capsule (50,000 Units total) by mouth twice a week. 24 capsule 0    esomeprazole (NEXIUM) 40 MG capsule Take 40 mg by mouth before breakfast.      fexofenadine (ALLEGRA) 180 MG tablet Take 180 mg by mouth once daily.      fluticasone (FLONASE) 50 mcg/actuation nasal spray USE ONE SPRAY IEN QD  3    hydrochlorothiazide (HYDRODIURIL) 25 MG tablet Take 25 mg by mouth once daily.      hydrocodone-acetaminophen 7.5-325mg (NORCO) 7.5-325 mg per tablet Take 1 tablet by mouth every 6 (six) hours as needed for Pain.      levothyroxine (SYNTHROID) 125 MCG tablet Take 125 mcg by mouth once daily.      lovastatin (ALTOPREV) 40 mg 24 hr tablet Take 40 mg by mouth every evening.      MULTIVIT-IRON-MIN-FOLIC ACID 3,500-18-0.4 UNIT-MG-MG ORAL CHEW Take 1 tablet by mouth.      pramipexole (MIRAPEX) 0.25 MG tablet Take 0.5 mg by mouth every evening.       No current facility-administered medications on file prior to visit.      MRI: Partial tear of supraspinatus, infraspinatus. Ant sup labrum tear, mild tendinosis of long head of biceps tendon, hypertrophic changes at AC joint              Pain level with 0 being the lowest and 10 being the highest presently: 5  Pain level with 0 being the lowest  "and 10 being the highest at worst: 10  Pain level with 0 being the lowest and 10 being the highest at best: 5     Patient Goals: "see if I can get use without pain"    Objective     Postural examination in standing:  - forward head  - forward shoulder  - R shoulder high  - normal thoracic kyphosis  - normal lumbar lordosis    Postural examination in sitting:   - forward head  - forward shoulder  - normal thoracic kyphosis  - normal  lumbar lordosis      Functional assessment:   - walking/gait: no deficits noted  - sit to stand: significant use of hands  - sit to supine: mod difficulty        - supine to sit: mod difficulty  - supine to prone: mod difficulty    Cervical ROM: WNL  Shoulder ROM: (measured in degrees standing)  Shoulder  RUE LUE   Active Flexion 170 170   Active Abduction 170 170     Scapular instability noted with elevation and abduction in standing with increased winging, lateral rotation and protraction of the R scapula.      Shoulder ROM: (measured in degrees supine)   Shoulder  RUE LUE   Flexion 165 165   Abduction 165 165   External Rotation neutral 75 60   External Rotation with abd  90 90   Internal Rotation 60 70       Muscle Strength  MMT R L   Elbow flexion 5/5 5/5   Elbow extension 5/5 5/5   Shoulder flexion 4-/5 4-/5   Shoulder abduction 4-/5 4-/5   Shoulder external rotation 3+/5 4-/5   Shoulder internal rotation 5/5 5/5        Upper trap 5/5 5/5   Middle trap 3+/5 3+/5   Lower trap 3/5 3/5   Rhomboids 3/5 3/5     Endurance is  poor.    Palpation: tender lateral border scapula and biceps tendon region    Joint mobility: WNL    Sensation: Intact presently at times numbness and tingling in feet and hands, feet mainly        Outcome measures:    Impairment function:  Carrying and handling objects  FOTO shoulder disability score: 49  PT reviewed FOTO scores for Genoveva Gilbert on 08/07/2017.   FOTO scores were entered into EPIC - see media section.    · G-code current: CK at least 40% but < 60% " impaired, limited or restricted  · G-code goal: CJ at least 20%, but < 40% impaired, limited or restricted  Severity modifier selections based on the FOTO scoring, objective findings, and co-morbidity assessment    TREATMENT:  Therapeutic exercise: Genoveva received therapeutic exercises to develop strength, stabilization and endurance;  for 15 minutes including:see HEP sheet.   scap squeeze x 10  Depressions x 10  Protraction standing x 10  Shoulder Isometrics x 10 all planes     Pt. Education: Instructed pt. regarding:proper technique with all exercises. Pt. to demonstrate good understanding of the education provided. Genoveva demonstrated good return demonstration of activities. No cultural, environmental, or spiritual barriers identified to treatment or learning.  Assessment   This is a 67 y.o. female referred to outpatient physical therapy and presents with a medical diagnosis of R shoulder pain and PT diagnosis/findings of R shoulder pain with scapular instability and decreased shoulder strength demonstrating limitations as described in the problem list. Patient was in agreement with set goals and plan of care. Pt was given a written HEP along with posture education, instruction on body mechanics, activity modification/avoidance.  Pt. verbally understood instructions and demonstrated proper form/technique. Pt was advised to perform these exercises free of pain, and discontinue use if symptoms persist/worsen. Pt will benefit from physical therapy services in order to maximize pain free functional independence. Rehab potential is good  History  Co-morbidities and personal factors that may impact the plan of care Examination  Body Structures and Functions, activity limitations and participation restrictions that may impact the plan of care Clinical Presentation   Decision Making/ Complexity Score   Co-morbidities: MS, fibromyalgia and back pain                Personal Factors:   none Body Regions: R shoulder  girdle    Body Systems: musculoskeletal, ROM and strength           Activity limitations: all ADL reaching and lifting      Participation Restrictions: none         Evolving with changing characteristics   mod  FOTO 49         Medical necessity is demonstrated by the following IMPAIRMENTS/PROBLEM LIST:  Decreased strength  Poor posture  Decreased scapular stabilization  Increased pain with overhead reach  Increased pain with reaching in all planes  Increased pain with ADL and household activities  Functional impairment rating of: 49, CK at least 40% but < 60% impaired, limited or restricted    GOALS:   Short Term Goals:  4 weeks    Increase strength 1/2 muscle grade  Increase postural awareness to normal  Be able to perform HEP with minimal cueing required  Improve functional impairment of carrying and handling objects to 53    Long Term Goals: 8 weeks  Increase range of motion to 75%to 100% of full  Increase strength 1 muscle grade  Improve scapular stabilization to normal  Restore ability to reach fully overhead without increased pain  Restore ability to reach in all planes without increased pain or difficulty  Restore ability to perform ADL's and household activities independently and without increased pain  Pt to be independent with HEP to improve ROM and independence with ADL's  Improve functional impairment of carrying and handling objects to CJ at least 20%, but < 40% impaired, limited or restricted      Plan     Pt will be treated by physical therapy 1-3 times a week for 8 weeks to include:   Therapeutic exercises to increase ROM, strength and stabilization; joint and soft tissue mobilization with manual therapy techniques to decrease muscle tightness, pain and improve joint mobility; neuromuscular re-education to improve posture, coordination, kinesthetic sense and proprioception, therapeutic activities to improve coordination, strength and function, therapeutic taping to decrease pain, provide support and  improve function of UE(s); modalities such as moist heat, ice, ultrasound and electrical stimulation to increase circulation, decrease pain and inflammation; dry needling with manual therapy techniques to decrease pain, inflammation and swelling, increase circulation and promote healing process will be considered and utilized as needed; aquatic physical therapy will be utilized as needed.  Pt may be seen by PTA to carry out plan of care as part of Rehab team.      I certify the need for these services furnished under this plan of treatment and while under my care.    ____________________________________ Physician/Referring Practitioner                                Date of Signature

## 2017-08-07 NOTE — TELEPHONE ENCOUNTER
----- Message from Albina Rich PharmD sent at 8/1/2017 12:58 PM CDT -----  Regarding: [employer]  Good Afternoon!  I received the PA authorization this morning. Her co-pay is $8.25. I tried calling the patient but had to leave a voicemail.  Thanks,  Albina

## 2017-08-08 ENCOUNTER — TELEPHONE (OUTPATIENT)
Dept: BARIATRICS | Facility: CLINIC | Age: 67
End: 2017-08-08

## 2017-08-08 NOTE — TELEPHONE ENCOUNTER
----- Message from Lucretia Clancy sent at 8/8/2017  9:05 AM CDT -----  Contact: self   Genoveva States that she needs a call returned by a nurse in reference to her having problems with her diet . Please call Genoveva  [phone] . Thanks :)

## 2017-08-08 NOTE — TELEPHONE ENCOUNTER
Called patient to discuss issues she is having with diet. Patient reports she is having issues with getting in protein. Patient reports she has been trying to stick to 600-800 calories per day and  gm protein per day; informed patient that this calorie range is for the high protein liquid diet phase not the pre-op phase.  Verbal Diet Recall:  Brk: cup of coffee with creamer, egg beaters+egg whites+turkey gonzalez  Snk: protein drink (MM Pro series 40 gm protein)  Sussy: protein shake OR yogurt+grilled chicken sandwich OR salad with lettuce and parmesan cheese with caesar dressing OR WW dinner  Snk: green beans, fruit, protein bar  Di: lemon garlic shrimp with spaghetti squash OR salmon     Discussion:  Aim for  gm protein per day  Aim for 4-6 small meals per day  Continue to eliminate starchy CHO foods  Continue with current diet regimen    Encouraged patient to call back with any questions or concerns.

## 2017-08-08 NOTE — PLAN OF CARE
Name:Genoveva Gilbert  Physician:Randall Lester MD  Date of eval:8/7/2017  Orders:  Physical Therapy evaluate and treat  Clinic: [id number]  Diagnosis:  1. Chronic right shoulder pain     2. Muscle weakness         Precautions: back pain, MS, fibromyalgia neuropathy in hands and feet   Evaluation date: 8/7/2017  Visit # authorized: 1/12  Authorization period: 9-26-17  Plan of care expiration: 10-1-17  MD Follow up appt: none scheduled    Subjective     Chief complaint: R shoulder radiating to scapula, worsening  Onset of pain : 1 year ago   Mechanism of onset :  No known injury    Pt reports she is working with bariatric MD to undergo bariatric surgery, not scheduled yet    Aggravating factors: more use increased pain  Easing factors: nothing eases only time  Sleep is not disturbed. 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    Allergies:    Review of patient's allergies indicates:   Allergen Reactions    Keflex [cephalexin]     Latex, natural rubber Anaphylaxis     bandaids     Pcn [penicillins]        Medical history:   Past Medical History:   Diagnosis Date    Allergy     Anxiety     Arthritis, rheumatoid     Back pain     Depression     Fibromyalgia     GERD (gastroesophageal reflux disease)     High cholesterol     Hypertension     Incontinence of urine     MS (multiple sclerosis)     benign; another MD stated she has no MS    Neuropathy     Restless leg syndrome     Seasonal allergies     Sinus congestion     Thyroid disease     Wheezing        Medication:   Current Outpatient Prescriptions on File Prior to Visit   Medication Sig Dispense Refill     albuterol 90 mcg/actuation inhaler Inhale 2 puffs into the lungs every 6 (six) hours as needed for Wheezing. 1 Inhaler 6    alprazolam (XANAX) 2 MG Tab Take 1 mg by mouth nightly as needed.       amitriptyline (ELAVIL) 25 MG tablet Take 25 mg by mouth nightly as needed.      amlodipine-benazepril 5-10 mg (LOTREL) 5-10 mg per capsule Take 1 capsule by mouth once daily.      BIFIDOBACTERIUM INFANTIS (ALIGN ORAL) Take 1 capsule by mouth once daily at 6am.      biotin 300 mcg Tab Take 1 tablet by mouth once daily.      duloxetine (CYMBALTA) 20 MG capsule Take 20 mg by mouth once daily.  0    duloxetine (CYMBALTA) 60 MG capsule Take 60 mg by mouth 2 (two) times daily.       ergocalciferol (ERGOCALCIFEROL) 50,000 unit Cap Take 1 capsule (50,000 Units total) by mouth twice a week. 24 capsule 0    esomeprazole (NEXIUM) 40 MG capsule Take 40 mg by mouth before breakfast.      fexofenadine (ALLEGRA) 180 MG tablet Take 180 mg by mouth once daily.      fluticasone (FLONASE) 50 mcg/actuation nasal spray USE ONE SPRAY IEN QD  3    hydrochlorothiazide (HYDRODIURIL) 25 MG tablet Take 25 mg by mouth once daily.      hydrocodone-acetaminophen 7.5-325mg (NORCO) 7.5-325 mg per tablet Take 1 tablet by mouth every 6 (six) hours as needed for Pain.      levothyroxine (SYNTHROID) 125 MCG tablet Take 125 mcg by mouth once daily.      lovastatin (ALTOPREV) 40 mg 24 hr tablet Take 40 mg by mouth every evening.      MULTIVIT-IRON-MIN-FOLIC ACID 3,500-18-0.4 UNIT-MG-MG ORAL CHEW Take 1 tablet by mouth.      pramipexole (MIRAPEX) 0.25 MG tablet Take 0.5 mg by mouth every evening.       No current facility-administered medications on file prior to visit.      MRI: Partial tear of supraspinatus, infraspinatus. Ant sup labrum tear, mild tendinosis of long head of biceps tendon, hypertrophic changes at AC joint              Pain level with 0 being the lowest and 10 being the highest presently: 5  Pain level with 0 being the lowest  "and 10 being the highest at worst: 10  Pain level with 0 being the lowest and 10 being the highest at best: 5     Patient Goals: "see if I can get use without pain"    Objective     Postural examination in standing:  - forward head  - forward shoulder  - R shoulder high  - normal thoracic kyphosis  - normal lumbar lordosis    Postural examination in sitting:   - forward head  - forward shoulder  - normal thoracic kyphosis  - normal  lumbar lordosis      Functional assessment:   - walking/gait: no deficits noted  - sit to stand: significant use of hands  - sit to supine: mod difficulty        - supine to sit: mod difficulty  - supine to prone: mod difficulty    Cervical ROM: WNL  Shoulder ROM: (measured in degrees standing)  Shoulder  RUE LUE   Active Flexion 170 170   Active Abduction 170 170     Scapular instability noted with elevation and abduction in standing with increased winging, lateral rotation and protraction of the R scapula.      Shoulder ROM: (measured in degrees supine)   Shoulder  RUE LUE   Flexion 165 165   Abduction 165 165   External Rotation neutral 75 60   External Rotation with abd  90 90   Internal Rotation 60 70       Muscle Strength  MMT R L   Elbow flexion 5/5 5/5   Elbow extension 5/5 5/5   Shoulder flexion 4-/5 4-/5   Shoulder abduction 4-/5 4-/5   Shoulder external rotation 3+/5 4-/5   Shoulder internal rotation 5/5 5/5        Upper trap 5/5 5/5   Middle trap 3+/5 3+/5   Lower trap 3/5 3/5   Rhomboids 3/5 3/5     Endurance is  poor.    Palpation: tender lateral border scapula and biceps tendon region    Joint mobility: WNL    Sensation: Intact presently at times numbness and tingling in feet and hands, feet mainly        Outcome measures:    Impairment function:  Carrying and handling objects  FOTO shoulder disability score: 49  PT reviewed FOTO scores for Genoveva Gilbert on 08/07/2017.   FOTO scores were entered into EPIC - see media section.    · G-code current: CK at least 40% but < 60% " impaired, limited or restricted  · G-code goal: CJ at least 20%, but < 40% impaired, limited or restricted  Severity modifier selections based on the FOTO scoring, objective findings, and co-morbidity assessment    TREATMENT:  Therapeutic exercise: Genoveva received therapeutic exercises to develop strength, stabilization and endurance;  for 15 minutes including:see HEP sheet.   scap squeeze x 10  Depressions x 10  Protraction standing x 10  Shoulder Isometrics x 10 all planes     Pt. Education: Instructed pt. regarding:proper technique with all exercises. Pt. to demonstrate good understanding of the education provided. Genoveva demonstrated good return demonstration of activities. No cultural, environmental, or spiritual barriers identified to treatment or learning.  Assessment   This is a 67 y.o. female referred to outpatient physical therapy and presents with a medical diagnosis of R shoulder pain and PT diagnosis/findings of R shoulder pain with scapular instability and decreased shoulder strength demonstrating limitations as described in the problem list. Patient was in agreement with set goals and plan of care. Pt was given a written HEP along with posture education, instruction on body mechanics, activity modification/avoidance.  Pt. verbally understood instructions and demonstrated proper form/technique. Pt was advised to perform these exercises free of pain, and discontinue use if symptoms persist/worsen. Pt will benefit from physical therapy services in order to maximize pain free functional independence. Rehab potential is good  History  Co-morbidities and personal factors that may impact the plan of care Examination  Body Structures and Functions, activity limitations and participation restrictions that may impact the plan of care Clinical Presentation   Decision Making/ Complexity Score   Co-morbidities: MS, fibromyalgia and back pain                Personal Factors:   none Body Regions: R shoulder  girdle    Body Systems: musculoskeletal, ROM and strength           Activity limitations: all ADL reaching and lifting      Participation Restrictions: none         Evolving with changing characteristics   mod  FOTO 49         Medical necessity is demonstrated by the following IMPAIRMENTS/PROBLEM LIST:  Decreased strength  Poor posture  Decreased scapular stabilization  Increased pain with overhead reach  Increased pain with reaching in all planes  Increased pain with ADL and household activities  Functional impairment rating of: 49, CK at least 40% but < 60% impaired, limited or restricted    GOALS:   Short Term Goals:  4 weeks    Increase strength 1/2 muscle grade  Increase postural awareness to normal  Be able to perform HEP with minimal cueing required  Improve functional impairment of carrying and handling objects to 53    Long Term Goals: 8 weeks  Increase range of motion to 75%to 100% of full  Increase strength 1 muscle grade  Improve scapular stabilization to normal  Restore ability to reach fully overhead without increased pain  Restore ability to reach in all planes without increased pain or difficulty  Restore ability to perform ADL's and household activities independently and without increased pain  Pt to be independent with HEP to improve ROM and independence with ADL's  Improve functional impairment of carrying and handling objects to CJ at least 20%, but < 40% impaired, limited or restricted      Plan     Pt will be treated by physical therapy 1-3 times a week for 8 weeks to include:   Therapeutic exercises to increase ROM, strength and stabilization; joint and soft tissue mobilization with manual therapy techniques to decrease muscle tightness, pain and improve joint mobility; neuromuscular re-education to improve posture, coordination, kinesthetic sense and proprioception, therapeutic activities to improve coordination, strength and function, therapeutic taping to decrease pain, provide support and  improve function of UE(s); modalities such as moist heat, ice, ultrasound and electrical stimulation to increase circulation, decrease pain and inflammation; dry needling with manual therapy techniques to decrease pain, inflammation and swelling, increase circulation and promote healing process will be considered and utilized as needed; aquatic physical therapy will be utilized as needed.  Pt may be seen by PTA to carry out plan of care as part of Rehab team.      I certify the need for these services furnished under this plan of treatment and while under my care.    ____________________________________ Physician/Referring Practitioner                                Date of Signature       I certify the need for these services furnished under this plan of treatment and while under my care.  Randall Lester MD

## 2017-08-08 NOTE — TELEPHONE ENCOUNTER
Returned patient's phone call in reference to nutrition related questions. Left message with patient's .

## 2017-08-08 NOTE — TELEPHONE ENCOUNTER
----- Message from Lucretia Clancy sent at 8/7/2017  2:39 PM CDT -----  Contact: self  Genoveva States that she  needs a call returned by you in reference to her not loosing weight.                      Please call Genoveva [phone]  . Thanks :)

## 2017-08-09 ENCOUNTER — CLINICAL SUPPORT (OUTPATIENT)
Dept: REHABILITATION | Facility: HOSPITAL | Age: 67
End: 2017-08-09
Attending: ORTHOPAEDIC SURGERY
Payer: MEDICARE

## 2017-08-09 DIAGNOSIS — M25.511 CHRONIC RIGHT SHOULDER PAIN: Primary | ICD-10-CM

## 2017-08-09 DIAGNOSIS — M62.81 MUSCLE WEAKNESS: ICD-10-CM

## 2017-08-09 DIAGNOSIS — G89.29 CHRONIC RIGHT SHOULDER PAIN: Primary | ICD-10-CM

## 2017-08-09 PROCEDURE — 97110 THERAPEUTIC EXERCISES: CPT | Performed by: PHYSICAL THERAPIST

## 2017-08-09 PROCEDURE — 97140 MANUAL THERAPY 1/> REGIONS: CPT | Performed by: PHYSICAL THERAPIST

## 2017-08-09 NOTE — PROGRESS NOTES
Physical Therapy Daily Note     Name: Genoveva WHITEHEAD Brown  Clinic Number: [id number]  Diagnosis:   Encounter Diagnoses   Name Primary?    Chronic right shoulder pain Yes    Muscle weakness      Physician: Randall Lester MD  Treatment Orders: PT Eval and Treat  Past Medical History:   Diagnosis Date    Allergy     Anxiety     Arthritis, rheumatoid     Back pain     Depression     Fibromyalgia     GERD (gastroesophageal reflux disease)     High cholesterol     Hypertension     Incontinence of urine     MS (multiple sclerosis)     benign; another MD stated she has no MS    Neuropathy     Restless leg syndrome     Seasonal allergies     Sinus congestion     Thyroid disease     Wheezing        Precautions: back pain, MS, fibromyalgia neuropathy in hands and feet   Evaluation date: 8/7/2017  Visit # authorized: 2/12 on 8/9/2017  Authorization period: 9-26-17  Plan of care expiration: 10-1-17  MD Follow up appt: none scheduled    Subjective     Pt reports: shoulder is feeling better, having pain R side of neck    Pain Scale: before treatment: 7 in neck 0 in shoulder currently; after treatment: 5 in neck with movement and no pain    Objective   Cervical ROM: (measured in degrees sitting)  - flexion -  50                    - extension -  50                        - right side bending -  25        - left side bending -  40      - right rotation - 50  - left rotation - 70    After mob SB R 35 rot 70 pain level 5    Full AROM shoulder with no neck or shoulder pain, Increased upper trap recruitment noted and increased lateral rotation and protraction of scapula    TREATMENT  Therapeutic exercise: Genoveva received therapeutic exercises to develop strength, endurance, ROM and posture for 25 minutes including:     scap squeeze x 10, make sure no upper trap recruitment  Depressions x 10   Protraction standing x 10 with hands on wall  Shoulder Isometrics x 10 all planes (NP) instructed to hold since pain with  "prolonged standing   Switched to    ER neutral in sitting    Shoulder flexion to 90 in sitting  Able to perform without pain    Manual therapy: Genoveva  received the following manual therapy techniques x 15 min. to include soft tissue and joint mobilization were applied to the: neck and R upper trap to include: STM upper trap, suboccipitals, suboccipital release and gentle mobs in all planes and distraction.  Kinesiotape to inhibit upper trap with 8" I strip was performed to decrease pain in neck.  Instructed pt in use, care and precautions with tape.     Written Home Exercises Provided: indented ex as noted above   Pt demo good understanding of the education provided. Genoveva demonstrated good return demonstration of activities.     Pt. education:  · Posture reeducation, body mechanics, HEP,   · No spiritual or educational barriers to learning provided  · Pt has no cultural, educational or language barriers to learning provided.    Assessment     Pt with decreased pain after treatment and may benefit from modification of HEP   Pt will continue to benefit from skilled outpatient physical therapy to address the remaining functional deficits, provide pt/family education, and to maximize pt's level of independence in the home and community environment. .     GOALS:   Short Term Goals:  4 weeks    Increase strength 1/2 muscle grade  Increase postural awareness to normal  Be able to perform HEP with minimal cueing required  Improve functional impairment of carrying and handling objects to 53     Long Term Goals: 8 weeks  Increase range of motion to 75%to 100% of full  Increase strength 1 muscle grade  Improve scapular stabilization to normal  Restore ability to reach fully overhead without increased pain  Restore ability to reach in all planes without increased pain or difficulty  Restore ability to perform ADL's and household activities independently and without increased pain  Pt to be independent with HEP to improve ROM " and independence with ADL's  Improve functional impairment of carrying and handling objects to CJ at least 20%, but < 40% impaired, limited or restricted       Anticipated barriers to physical therapy: none  Pt's spiritual, cultural and educational needs considered and pt agreeable to plan of care and goals        Plan   Continue with established Plan of Care towards PT goals.

## 2017-08-10 ENCOUNTER — TELEPHONE (OUTPATIENT)
Dept: BARIATRICS | Facility: CLINIC | Age: 67
End: 2017-08-10

## 2017-08-10 NOTE — TELEPHONE ENCOUNTER
Called patient to inform her of our team meeting decision that she isn't a candidate for surgery.  Discussion was of her memory, repeated instructions with nutrition, risk not outweighing the benefit of the surgery for her and anxiety, and seeming overwhelmed with the process.  She was tearful, we talked for a while and I told her I was sorry but we weren't looking to create more harm for her than good and this wasn't an easy decision.  She said ok and Anjelica's appt was cancelled.

## 2017-08-14 ENCOUNTER — TELEPHONE (OUTPATIENT)
Dept: BARIATRICS | Facility: CLINIC | Age: 67
End: 2017-08-14

## 2017-08-14 NOTE — TELEPHONE ENCOUNTER
----- Message from Jeanine Yeung MA sent at 8/14/2017  3:56 PM CDT -----      ----- Message -----  From: Crispin Gamez  Sent: 8/14/2017  12:13 PM  To: [facility] Staff    Patient states that (s)he needs to speak with nurse in ref to her denial for sx//please call back at [phone]//thank you

## 2017-08-14 NOTE — TELEPHONE ENCOUNTER
Called patient to discuss our conversation again from last week when I gave her the results of the outcome of our monthly meeting.  I went through the decision and why and she states that she hopes we don't do this to other [facility] patients for the insurance money.  I explained to her that this wasn't a fair comment as we feel like this is an elective surgery and that we would not bring our patients more harm that good.  I've told her that we've had patient go through the program to only find out they have cancer when they would otherwise not have known.  I told her that we truly felt this was a difficult decision to make and that we don't take these decisions lightly.  I told her if we were in this for the insurance money we would go ahead and do surgery and not worry but this isn't what we're about and she understands.  She thanked me for the information and then she asked if I could give her info about the liver lesion that was seen.  I told her in reading the notes it appears that she has to f/u in 6 months with hepatology and that they will do another US at that time.  I told her I'd email the PA in hepatology and have her call her to discuss further what would be needed and she was appreciative.

## 2017-08-16 ENCOUNTER — CLINICAL SUPPORT (OUTPATIENT)
Dept: REHABILITATION | Facility: HOSPITAL | Age: 67
End: 2017-08-16
Attending: ORTHOPAEDIC SURGERY
Payer: MEDICARE

## 2017-08-16 ENCOUNTER — TELEPHONE (OUTPATIENT)
Dept: HEPATOLOGY | Facility: CLINIC | Age: 67
End: 2017-08-16

## 2017-08-16 DIAGNOSIS — G89.29 CHRONIC RIGHT SHOULDER PAIN: Primary | ICD-10-CM

## 2017-08-16 DIAGNOSIS — M62.81 MUSCLE WEAKNESS: ICD-10-CM

## 2017-08-16 DIAGNOSIS — M25.511 CHRONIC RIGHT SHOULDER PAIN: Primary | ICD-10-CM

## 2017-08-16 PROCEDURE — 97110 THERAPEUTIC EXERCISES: CPT | Performed by: PHYSICAL THERAPIST

## 2017-08-16 NOTE — TELEPHONE ENCOUNTER
----- Message from Catarino Ospina PA-C sent at 8/16/2017  3:29 PM CDT -----  Regarding: RE: Hepatology for Future  Please let her know that her imaging was reviewed with Dr. Michelle and we will repeat an U/S and follow up in 6 months.       ----- Message -----  From: Rhoda Perez RN  Sent: 8/14/2017   5:09 PM  To: Aura Araujo PA-C, #  Subject: Hepatology for Future                            Hi Catarino,  We have decided not to do bariatric surgery on Ms. Gilebrt. It was felt in our team meeting that the risk outweighed the benefit for her and it was discussed with her the reason why this was felt.    She ask about her liver scan findings and I told her that I'd reach out to you so that you can discuss with her further what would be needed.    I told her from what I read that she would have a f/u in 6 months with a f/u US as well.  Are you able to discuss with her?  Mariluz Hernandez

## 2017-08-16 NOTE — PROGRESS NOTES
Physical Therapy Daily Note     Name: Genoveva Gilbert  [facility] Number: [id number]  Diagnosis:   No diagnosis found.  Physician: Randall Lester MD  Treatment Orders: PT Eval and Treat  Past Medical History:   Diagnosis Date    Allergy     Anxiety     Arthritis, rheumatoid     Back pain     Depression     Fibromyalgia     GERD (gastroesophageal reflux disease)     High cholesterol     Hypertension     Incontinence of urine     MS (multiple sclerosis)     benign; another MD stated she has no MS    Neuropathy     Restless leg syndrome     Seasonal allergies     Sinus congestion     Thyroid disease     Wheezing        Precautions: back pain, MS, fibromyalgia neuropathy in hands and feet   Evaluation date: 8/7/2017  Visit # authorized: 3/12 on 8/16/2017  Authorization period: 9-26-17  Plan of care expiration: 10-1-17  MD Follow up appt: none scheduled    Subjective     Pt reports: no shoulder pain and no neck pain    Pain Scale: before treatment: 0 in neck 0 in shoulder currently; after treatment: 0 in neck with movement and no pain    Objective   Cervical ROM: (measured in degrees sitting)8-9-17  - flexion -  50                    - extension -  50                        - right side bending -  25        - left side bending -  40      - right rotation - 50  - left rotation - 70      Full AROM shoulder with no neck or shoulder pain, Increased upper trap recruitment noted and increased lateral rotation and protraction of scapula    TREATMENT  Therapeutic exercise: Genoveva received therapeutic exercises to develop strength, endurance, ROM and posture for 25 minutes including:     scap squeeze x 10, make sure no upper trap recruitment  Depressions x 10   Protraction standing x 10 with hands on wall   ER with YTB standing x 10     ER B with YTB sitting x 10   Abduction shoulder B in sitting x 10  ER neutral in sitting   Shoulder flexion to 90 in sitting, able to progress to full flexion without pain  Verbal  "and tactile cueing provided for proper performance and recruitment.      (NP)Manual therapy: Genoveva  received the following manual therapy techniques x 15 min. to include soft tissue and joint mobilization were applied to the: neck and R upper trap to include: STM upper trap, suboccipitals, suboccipital release and gentle mobs in all planes and distraction.  Kinesiotape to inhibit upper trap with 8" I strip was performed to decrease pain in neck.  Instructed pt in use, care and precautions with tape.     Written Home Exercises Provided: indented ex as noted above   Pt demo good understanding of the education provided. Genoveva demonstrated good return demonstration of activities.     Pt. education:  · Posture reeducation, body mechanics, HEP,   · No spiritual or educational barriers to learning provided  · Pt has no cultural, educational or language barriers to learning provided.    Assessment     Pt with no pain and able to progress strengthening   Pt will continue to benefit from skilled outpatient physical therapy to address the remaining functional deficits, provide pt/family education, and to maximize pt's level of independence in the home and community environment. .     GOALS:   Short Term Goals:  4 weeks    Increase strength 1/2 muscle grade  Increase postural awareness to normal  Be able to perform HEP with minimal cueing required  Improve functional impairment of carrying and handling objects to 53     Long Term Goals: 8 weeks  Increase range of motion to 75%to 100% of full  Increase strength 1 muscle grade  Improve scapular stabilization to normal  Restore ability to reach fully overhead without increased pain  Restore ability to reach in all planes without increased pain or difficulty  Restore ability to perform ADL's and household activities independently and without increased pain  Pt to be independent with HEP to improve ROM and independence with ADL's  Improve functional impairment of carrying and " handling objects to CJ at least 20%, but < 40% impaired, limited or restricted       Anticipated barriers to physical therapy: none  Pt's spiritual, cultural and educational needs considered and pt agreeable to plan of care and goals        Plan   Continue with established Plan of Care towards PT goals.

## 2017-08-16 NOTE — TELEPHONE ENCOUNTER
MA called patient relay message regarding her imaging. Patient stated that she is not comfortable waiting 6 months to repeat imaging knowing that we are not sure what is going on her liver.     She want us to faxed her imaging results to Dr. Crews.     Faxed Imaging to Dr. Crews office

## 2017-08-23 ENCOUNTER — CLINICAL SUPPORT (OUTPATIENT)
Dept: REHABILITATION | Facility: HOSPITAL | Age: 67
End: 2017-08-23
Attending: ORTHOPAEDIC SURGERY
Payer: MEDICARE

## 2017-08-23 DIAGNOSIS — M62.81 MUSCLE WEAKNESS: ICD-10-CM

## 2017-08-23 DIAGNOSIS — G89.29 CHRONIC RIGHT SHOULDER PAIN: Primary | ICD-10-CM

## 2017-08-23 DIAGNOSIS — M25.511 CHRONIC RIGHT SHOULDER PAIN: Primary | ICD-10-CM

## 2017-08-23 PROCEDURE — 97140 MANUAL THERAPY 1/> REGIONS: CPT | Performed by: PHYSICAL THERAPIST

## 2017-08-23 PROCEDURE — 97110 THERAPEUTIC EXERCISES: CPT | Performed by: PHYSICAL THERAPIST

## 2017-08-23 NOTE — PROGRESS NOTES
Physical Therapy Daily Note     Name: Genoveva WHITEHEAD Brown  Clinic Number: [id number]  Diagnosis:   Encounter Diagnoses   Name Primary?    Chronic right shoulder pain Yes    Muscle weakness      Physician: Randall Lester MD  Treatment Orders: PT Eval and Treat  Past Medical History:   Diagnosis Date    Allergy     Anxiety     Arthritis, rheumatoid     Back pain     Depression     Fibromyalgia     GERD (gastroesophageal reflux disease)     High cholesterol     Hypertension     Incontinence of urine     MS (multiple sclerosis)     benign; another MD stated she has no MS    Neuropathy     Restless leg syndrome     Seasonal allergies     Sinus congestion     Thyroid disease     Wheezing        Precautions: back pain, MS, fibromyalgia neuropathy in hands and feet   Evaluation date: 8/7/2017  Visit # authorized: 4/12 on 8/23/2017  Authorization period: 9-26-17  Plan of care expiration: 10-1-17  MD Follow up appt: none scheduled    Subjective     Pt reports: shoulder has been hurting since last visit, pointing to post AC area  Pt had to move several 20# bags of soil in garage, did not think about that aggravating shoulder    Pain Scale: before treatment: 0 in neck 3 in shoulder currently; after treatment: 0 in neck and 0 shoulder    Objective   Cervical ROM: (measured in degrees sitting)8-9-17  - flexion -  50                    - extension -  50                        - right side bending -  25        - left side bending -  40      - right rotation - 50  - left rotation - 70      Full AROM and PROM , Increased upper trap recruitment noted and increased lateral rotation and protraction of scapula    TREATMENT  Therapeutic exercise: Genoveva received therapeutic exercises to develop strength, endurance, ROM and posture for 15 minutes including:     scap squeeze x 20, make sure no upper trap recruitment  Depressions x 20   Protraction standing x 10 with hands on wall  ER with YTB standing x 10(NP)    ER B with  "YTB sitting x 10(NP)  Abduction shoulder B in sitting x 10  ER neutral in sitting x 8  Shoulder flexion to 90 in sitting, able to progress to full flexion without pain   Biceps with YTB in sitting(NP)   Triceps with YTB x 10(NP)  Verbal and tactile cueing provided for proper performance and recruitment.      Manual therapy: Genoveva  received the following manual therapy techniques x 10 min. to include soft tissue and joint mobilization were applied to the: neck and R upper trap to include: STM upper trap, suboccipitals, suboccipital release and gentle mobs with gentle shoulder and scapula mobs with AC mob sidelying.  Kinesiotape to inhibit upper trap with 8" I strip was performed to decrease pain in neck.  Instructed pt in use, care and precautions with tape.  Ice to R shoulder for pain and soreness     Written Home Exercises Provided: none today  Pt demo good understanding of the education provided. Genoveva demonstrated good return demonstration of activities.     Pt. education:  · Posture reeducation, body mechanics, HEP,   · No spiritual or educational barriers to learning provided  · Pt has no cultural, educational or language barriers to learning provided.    Assessment     SHoulder aggravated adding theraband so will hold TB and progress more slowly improved at end and benefits from ice.  Pt may have extended aggravation with lifting of bags of soil etc   Pt will continue to benefit from skilled outpatient physical therapy to address the remaining functional deficits, provide pt/family education, and to maximize pt's level of independence in the home and community environment. .     GOALS:   Short Term Goals:  4 weeks    Increase strength 1/2 muscle grade  Increase postural awareness to normal  Be able to perform HEP with minimal cueing required  Improve functional impairment of carrying and handling objects to 53     Long Term Goals: 8 weeks  Increase range of motion to 75%to 100% of full  Increase strength 1 " muscle grade  Improve scapular stabilization to normal  Restore ability to reach fully overhead without increased pain  Restore ability to reach in all planes without increased pain or difficulty  Restore ability to perform ADL's and household activities independently and without increased pain  Pt to be independent with HEP to improve ROM and independence with ADL's  Improve functional impairment of carrying and handling objects to CJ at least 20%, but < 40% impaired, limited or restricted       Anticipated barriers to physical therapy: none  Pt's spiritual, cultural and educational needs considered and pt agreeable to plan of care and goals        Plan   Continue with established Plan of Care towards PT goals.

## 2017-08-30 ENCOUNTER — CLINICAL SUPPORT (OUTPATIENT)
Dept: REHABILITATION | Facility: HOSPITAL | Age: 67
End: 2017-08-30
Attending: ORTHOPAEDIC SURGERY
Payer: MEDICARE

## 2017-08-30 DIAGNOSIS — M62.81 MUSCLE WEAKNESS: ICD-10-CM

## 2017-08-30 DIAGNOSIS — M25.511 CHRONIC RIGHT SHOULDER PAIN: Primary | ICD-10-CM

## 2017-08-30 DIAGNOSIS — G89.29 CHRONIC RIGHT SHOULDER PAIN: Primary | ICD-10-CM

## 2017-08-30 PROCEDURE — 97140 MANUAL THERAPY 1/> REGIONS: CPT | Performed by: PHYSICAL THERAPIST

## 2017-08-30 PROCEDURE — 97110 THERAPEUTIC EXERCISES: CPT | Performed by: PHYSICAL THERAPIST

## 2017-08-30 NOTE — PROGRESS NOTES
Physical Therapy Daily Note     Name: Genoveva Gilbert  [facility] Number: [id number]  Diagnosis:   Encounter Diagnoses   Name Primary?    Chronic right shoulder pain Yes    Muscle weakness      Physician: Randall Lester MD  Treatment Orders: PT Eval and Treat  Past Medical History:   Diagnosis Date    Allergy     Anxiety     Arthritis, rheumatoid     Back pain     Depression     Fibromyalgia     GERD (gastroesophageal reflux disease)     High cholesterol     Hypertension     Incontinence of urine     MS (multiple sclerosis)     benign; another MD stated she has no MS    Neuropathy     Restless leg syndrome     Seasonal allergies     Sinus congestion     Thyroid disease     Wheezing        Precautions: back pain, MS, fibromyalgia neuropathy in hands and feet   Evaluation date: 8/7/2017  Visit # authorized: 4/12 on 8/30/2017  Authorization period: 9-26-17  Plan of care expiration: 10-1-17  MD Follow up appt: none scheduled    Subjective     Pt reports: shoulder was feeling better Pt sat at kitchen table and did a lot of coloring Pt states iced shoulder and feeling better Pt fell slipping on pecan in yard  at home, aggravated back but shoulder bothering her some  Pain Scale: before treatment: 0 in neck 2 in shoulder currently; after treatment: 0 in neck and 0 shoulder    Objective   Cervical ROM: (measured in degrees sitting)8-9-17  - flexion -  50                    - extension -  50                        - right side bending -  25        - left side bending -  40      - right rotation - 50  - left rotation - 70        TREATMENT  Therapeutic exercise: Genoveva received therapeutic exercises to develop strength, endurance, ROM and posture for 15 minutes including:     scap squeeze x 20, make sure no upper trap recruitment  Depressions x 20   Protraction standing x 10 with hands on wall  ER with YTB standing x 10(NP)    ER B with YTB sitting x 10  Abduction shoulder B in sitting x 2/10  Shoulder flexion  "to 90 in sitting, able to progress to full flexion without pain 2/10   Biceps with YTB in sitting  10   Triceps with YTB x 10  Verbal and tactile cueing provided for proper performance and recruitment.      Manual therapy: Genoveva  received the following manual therapy techniques x 10 min. to include soft tissue and joint mobilization were applied to the: neck and R upper trap to include: STM upper trap, suboccipitals, suboccipital release and gentle mobs with gentle shoulder and scapula mobs with AC mob sidelying.  Kinesiotape to inhibit upper trap with 8" I strip was performed to decrease pain in neck.  Instructed pt in use, care and precautions with tape.  Ice to R shoulder for pain and soreness     Written Home Exercises Provided: none today  Pt demo good understanding of the education provided. Genoveva demonstrated good return demonstration of activities.     Pt. education:  · Posture reeducation, body mechanics, HEP,   · No spiritual or educational barriers to learning provided  · Pt has no cultural, educational or language barriers to learning provided.    Assessment     Pt was doing well and aggravated shoulder slightly with fall.  Pt feels comfortable with HEP and to work I for 2 weeks then reassess and if doing well discharge.  Pt understands to call if problem arises.   Pt will continue to benefit from skilled outpatient physical therapy to address the remaining functional deficits, provide pt/family education, and to maximize pt's level of independence in the home and community environment. .     GOALS:   Short Term Goals:  4 weeks    Increase strength 1/2 muscle grade  Increase postural awareness to normal  Be able to perform HEP with minimal cueing required  Improve functional impairment of carrying and handling objects to 53     Long Term Goals: 8 weeks  Increase range of motion to 75%to 100% of full  Increase strength 1 muscle grade  Improve scapular stabilization to normal  Restore ability to reach " fully overhead without increased pain  Restore ability to reach in all planes without increased pain or difficulty  Restore ability to perform ADL's and household activities independently and without increased pain  Pt to be independent with HEP to improve ROM and independence with ADL's  Improve functional impairment of carrying and handling objects to CJ at least 20%, but < 40% impaired, limited or restricted       Anticipated barriers to physical therapy: none  Pt's spiritual, cultural and educational needs considered and pt agreeable to plan of care and goals        Plan   Continue with established Plan of Care towards PT goals.

## 2017-09-15 ENCOUNTER — TELEPHONE (OUTPATIENT)
Dept: NEUROLOGY | Facility: CLINIC | Age: 67
End: 2017-09-15

## 2017-09-15 NOTE — TELEPHONE ENCOUNTER
----- Message from Jewel KRISHNAN Dick sent at 9/15/2017 11:37 AM CDT -----  Contact: Self @ [phone]  Pt would like to schedule a NP appt with the doctor due to pain in entire body, especially the toes. Doctor was fully committed until 10/30/17, but would like to be seen sooner.        I have spoken with this patient and scheduled her to come in Monday 9/18

## 2017-09-18 ENCOUNTER — OFFICE VISIT (OUTPATIENT)
Dept: NEUROLOGY | Facility: CLINIC | Age: 67
End: 2017-09-18
Payer: MEDICARE

## 2017-09-18 VITALS
HEIGHT: 59 IN | HEART RATE: 86 BPM | DIASTOLIC BLOOD PRESSURE: 74 MMHG | SYSTOLIC BLOOD PRESSURE: 111 MMHG | BODY MASS INDEX: 46.57 KG/M2 | WEIGHT: 231 LBS

## 2017-09-18 DIAGNOSIS — G89.29 CHRONIC RIGHT SHOULDER PAIN: ICD-10-CM

## 2017-09-18 DIAGNOSIS — M54.40 CHRONIC MIDLINE LOW BACK PAIN WITH SCIATICA, SCIATICA LATERALITY UNSPECIFIED: Primary | ICD-10-CM

## 2017-09-18 DIAGNOSIS — G89.29 CHRONIC MIDLINE LOW BACK PAIN WITH SCIATICA, SCIATICA LATERALITY UNSPECIFIED: Primary | ICD-10-CM

## 2017-09-18 DIAGNOSIS — M79.7 FIBROMYALGIA: ICD-10-CM

## 2017-09-18 DIAGNOSIS — M25.511 CHRONIC RIGHT SHOULDER PAIN: ICD-10-CM

## 2017-09-18 PROCEDURE — 3008F BODY MASS INDEX DOCD: CPT | Mod: S$GLB,,, | Performed by: NEUROMUSCULOSKELETAL MEDICINE & OMM

## 2017-09-18 PROCEDURE — 99204 OFFICE O/P NEW MOD 45 MIN: CPT | Mod: S$GLB,,, | Performed by: NEUROMUSCULOSKELETAL MEDICINE & OMM

## 2017-09-18 PROCEDURE — 1159F MED LIST DOCD IN RCRD: CPT | Mod: S$GLB,,, | Performed by: NEUROMUSCULOSKELETAL MEDICINE & OMM

## 2017-09-18 PROCEDURE — 1125F AMNT PAIN NOTED PAIN PRSNT: CPT | Mod: S$GLB,,, | Performed by: NEUROMUSCULOSKELETAL MEDICINE & OMM

## 2017-09-18 PROCEDURE — 3078F DIAST BP <80 MM HG: CPT | Mod: S$GLB,,, | Performed by: NEUROMUSCULOSKELETAL MEDICINE & OMM

## 2017-09-18 PROCEDURE — 99999 PR PBB SHADOW E&M-EST. PATIENT-LVL IV: CPT | Mod: PBBFAC,,, | Performed by: NEUROMUSCULOSKELETAL MEDICINE & OMM

## 2017-09-18 PROCEDURE — 3074F SYST BP LT 130 MM HG: CPT | Mod: S$GLB,,, | Performed by: NEUROMUSCULOSKELETAL MEDICINE & OMM

## 2017-09-18 RX ORDER — VALSARTAN 320 MG/1
320 TABLET ORAL DAILY
Status: ON HOLD | COMMUNITY
End: 2019-02-28

## 2017-09-18 RX ORDER — GABAPENTIN 400 MG/1
400 CAPSULE ORAL 3 TIMES DAILY
Refills: 3 | COMMUNITY
Start: 2017-07-31 | End: 2018-01-17

## 2017-09-18 RX ORDER — FERROUS SULFATE 325(65) MG
325 TABLET, DELAYED RELEASE (ENTERIC COATED) ORAL
Status: ON HOLD | COMMUNITY
End: 2019-02-28

## 2017-09-18 NOTE — PROGRESS NOTES
Genoveva Gilbert  1950  Review of patient's allergies indicates:   Allergen Reactions    Keflex [cephalexin]     Latex, natural rubber Anaphylaxis     bandaids     Pcn [penicillins]      [unfilled]    Past Medical History:   Diagnosis Date    Allergy     Anxiety     Arthritis, rheumatoid     Back pain     Depression     Fibromyalgia     GERD (gastroesophageal reflux disease)     High cholesterol     Hypertension     Incontinence of urine     MS (multiple sclerosis)     benign; another MD stated she has no MS    Neuropathy     Restless leg syndrome     Seasonal allergies     Sinus congestion     Thyroid disease     Wheezing      Social History     Social History    Marital status:      Spouse name: N/A    Number of children: N/A    Years of education: N/A     Occupational History    Not on file.     Social History Main Topics    Smoking status: Former Smoker     Quit date: 6/1/1996    Smokeless tobacco: Never Used    Alcohol use Yes      Comment: very little     Drug use: No    Sexual activity: Not on file     Other Topics Concern    Not on file     Social History Narrative    No narrative on file     Family History   Problem Relation Age of Onset    Heart disease Mother        Review of systems:  Constitutional-negative  Eyes-negative  ENT, mouth-negative  Cardiovascular-negative  Respiratory-negative  GI-negative  - negative  Musculoskeletal-negative  Skin-negative  Neurologic-negative  Psychiatric-negative  Endocrine-negative  Hematology/lymph nodes-negative  Allergies/immunology-negative  Genoveva Gilbert  1950  Review of patient's allergies indicates:   Allergen Reactions    Keflex [cephalexin]     Latex, natural rubber Anaphylaxis     bandaids     Pcn [penicillins]      [unfilled]    Past Medical History:   Diagnosis Date    Allergy     Anxiety     Arthritis, rheumatoid     Back pain     Depression     Fibromyalgia     GERD (gastroesophageal  reflux disease)     High cholesterol     Hypertension     Incontinence of urine     MS (multiple sclerosis)     benign; another MD stated she has no MS    Neuropathy     Restless leg syndrome     Seasonal allergies     Sinus congestion     Thyroid disease     Wheezing      Social History     Social History    Marital status:      Spouse name: N/A    Number of children: N/A    Years of education: N/A     Occupational History    Not on file.     Social History Main Topics    Smoking status: Former Smoker     Quit date: 6/1/1996    Smokeless tobacco: Never Used    Alcohol use Yes      Comment: very little     Drug use: No    Sexual activity: Not on file     Other Topics Concern    Not on file     Social History Narrative    No narrative on file     Family History   Problem Relation Age of Onset    Heart disease Mother        Review of systems:  Constitutional-negative  Eyes-negative  ENT, mouth-negative  Cardiovascular-negative  Respiratory-negative  GI-negative  - negative  Musculoskeletal-negative  Skin-negative  Neurologic-negative  Psychiatric-negative  Endocrine-negative  Hematology/lymph nodes-negative  Allergies/immunology-negative  Gen. Appearance: Well-developed with no obvious deformities  Carotid arteries symmetrical pulses  Peripheral vascular shows symmetrical pulses with no obvious edema or tenderness  Social History : Patient is a former patient of Dr. Limon who recently retired.  She is accompanied by her .  Present history:   This is a 67-year-old white female who presents with a history of fibromyalgia and peripheral neuropathy symptoms.  She had been followed by Dr. Limon who retired.  Her primary doctor is Dr. Angelito Moreno.  She complains of muscle pains all over not in the joints.  She complains of hand pain and arm leg and back pain.  She was previously seen by me back in 2011 at which time she was having a lot of back problemsSEE NOTE BELOW  from previous  office   She is on multiple medications for chronic pain including hydrocodone which she admits to twice a day over the last 4 days with increased pain, amitriptyline 25 mg at bedtime, Cymbalta 30 mg in the morning and 60 mg at night with an additional 20 mg in the morning; Xanax; gabapentin 400 mg 3 times per day.  [facility]   [address], [address]  [facility] [address]                         OFFICE : [phone]  FAX : [phone]     Neurology  follow up    Date: 3-11-11    Patient Name: Genoveva Gilbert   : [birth year]   Allergies:  None noted   Referring MD:    Medications   Keppra    Neurontin 300 mg TID   See chart                          Past medical history: low back pain; history of epidural steroid         injections with good results; carpal tunnel syndrome  cervical radiculopathy with cervical spinal stenosis  Social History:alcohol /Tobacco: non-smoker, alcohol - occ.   Family History: non-contributory.    Tests done: EMG nerve conduction study 11: right L3-5 chronic neurogenic atrophy with acute changes at left L5-S1  History:   patient presents for follow-up for low back pain and severe burning pain in the feet.  Keppra helped some.  Medication is not helping sufficiently.  Patient has seen Dr. Longoria for nerve blocks in the past.    Neurological Exam:    This is an alert, attentive, verbal and coherent female who is in no distress.    .  Motor examination: normal strength.  Right-handed.    Sensory examination: pinprick and touch was normal and symmetrical.  Vibration sense  -normal at  toes  DTRs 1+ to 2+ and symmetrical.  Both planters were flexor.  Cerebellar upper exam - normal.  Gait and coordination - normal.  Romberg-normal     Tandem gait -normal.  Involuntary movements -negative.  Neck examination: full range of motion; no cervical/trapezius tenderness on palpation.    Lumbar examination: low back tenderness-positive;  sciatic notch  tenderness-negative  straight leg raising test-negative  Impression: lumbar disc disease; lumbar radiculopathy bilateral; history of cervical disc disease  Plan/Medications: referred to Dr. Longoria for appropriate nerve block versus epidural steroid injection                   Neurological Exam: Today  Mental status-alert and oriented to person, place, and time; attention span and concentration is good. Fund of knowledge-patient is aware of current events and able to give detailed history of the current problem.recent and remote memory seems intact. Language function is normal with no evidence of aphasia  Cranial nerves:Visual acuity to hand chart -normal; visual fields to confrontation normal;pupils were equal and reactive to light ;no evidence of ptosis ;  funduscopic examination was normal with sharp disc margins. external ocular movements were full with no nystagmus. Facial sensation to pinprick : normal ; corneal reflexes intact; Facial muscles were symmetrical. Hearing is unimpaired symmetrical finger rub; Tongue movements - normal ; palate movements - normal ;Swallowing unimpaired. Shoulder shrug was intact with good strength Speech was normal  Motor examination: Upper : normal                                      Lower extremities - Normal;muscle tone was normal ;                  Right-handed  Sensory examination:   Upper; normal pinprick and soft touch ;   Lower extremities - normal and symmetrical.   Vibration sense: Less than 10 @ toes  Deep tendon reflexes: upper extremities : Absent symmetrical ;     lower extremities KJ- absent; AJ absent Both plantar responses were flexor  Cerebellar examination upper: Normal finger to nose and rapid alternating movements  Gait: Slightly unsteady;      heel and toe walk normal  Romberg test: negative       Tandem gait: Normal    Involuntary movements: Negative  TMJ - no tenderness  Cervical examination: Decrease range of motion with no pain Cervical tenderness  :negative  Lumbar examination: Deferred  Impression: Fibromyalgia; peripheral neuropathy; chronic pain syndrome in the colon    Recommendations/Plan : Emphasize weight loss and mild exercise-walking; suggested follow-up with rheumatology for her fibromyalgia which I do not treat.

## 2017-09-21 ENCOUNTER — DOCUMENTATION ONLY (OUTPATIENT)
Dept: REHABILITATION | Facility: HOSPITAL | Age: 67
End: 2017-09-21

## 2017-09-21 DIAGNOSIS — G89.29 CHRONIC RIGHT SHOULDER PAIN: Primary | ICD-10-CM

## 2017-09-21 DIAGNOSIS — M62.81 MUSCLE WEAKNESS: ICD-10-CM

## 2017-09-21 DIAGNOSIS — M25.511 CHRONIC RIGHT SHOULDER PAIN: Primary | ICD-10-CM

## 2017-11-13 ENCOUNTER — TELEPHONE (OUTPATIENT)
Dept: BARIATRICS | Facility: CLINIC | Age: 67
End: 2017-11-13

## 2017-11-13 NOTE — TELEPHONE ENCOUNTER
----- Message from Stephenie Kulkarni sent at 11/13/2017 12:25 PM CST -----  [phone]//pt states that she needs to speak with ms chilel in ref to some information her pcp dicussed in ref to her possibly being reconsidered for surgery//please call/thank you

## 2017-11-13 NOTE — TELEPHONE ENCOUNTER
Returned call. Discussed with her the reasons for the decision for not doing the surgery and that it wasn't an easy decision. It was because of memory issues, nutrition and not understanding what was needed for nutrition. I read Jana's notes to her and that it was a team decision and not just one person and we felt the risk was greater than the benefit for her. I offered her medial wt loss and she will consider it and call back if she's interested.  I told her I was sorry but we're not ones to bring a patient more harm than good and it wouldn't be good for our patients if we just did the surgery just to do it.

## 2017-11-13 NOTE — TELEPHONE ENCOUNTER
----- Message from Lucretia Clancy sent at 11/13/2017  3:36 PM CST -----  Contact: self   Genoveva States that she needs a call returned by a nurse in reference to the notes that she has the systems and why her surgery was approved , Please call Genoveva @ [phone] .                                   Thanks :)

## 2017-11-15 ENCOUNTER — TELEPHONE (OUTPATIENT)
Dept: HEPATOLOGY | Facility: CLINIC | Age: 67
End: 2017-11-15

## 2017-11-15 NOTE — TELEPHONE ENCOUNTER
Ma CALLED patient back, schedule her US and follow up visit. Mailed appt reminder to patient. RENITA

## 2017-11-15 NOTE — TELEPHONE ENCOUNTER
----- Message from Shari Bains sent at 11/14/2017 10:42 AM CST -----  Patient calling to schedule her appt with Catarino in [place] with Labs and US.     Please call   [phone]

## 2018-01-05 ENCOUNTER — OFFICE VISIT (OUTPATIENT)
Dept: NEUROLOGY | Facility: CLINIC | Age: 68
End: 2018-01-05
Payer: MEDICARE

## 2018-01-05 VITALS — WEIGHT: 231.06 LBS | BODY MASS INDEX: 46.58 KG/M2 | HEIGHT: 59 IN

## 2018-01-05 DIAGNOSIS — G47.62 LEG CRAMPS, SLEEP RELATED: ICD-10-CM

## 2018-01-05 DIAGNOSIS — R41.89 SUBJECTIVE MEMORY COMPLAINTS: ICD-10-CM

## 2018-01-05 DIAGNOSIS — M54.32 SCIATICA OF LEFT SIDE: Primary | ICD-10-CM

## 2018-01-05 PROCEDURE — 99215 OFFICE O/P EST HI 40 MIN: CPT | Mod: S$GLB,,, | Performed by: PSYCHIATRY & NEUROLOGY

## 2018-01-05 PROCEDURE — 99999 PR PBB SHADOW E&M-EST. PATIENT-LVL III: CPT | Mod: PBBFAC,,, | Performed by: PSYCHIATRY & NEUROLOGY

## 2018-01-05 RX ORDER — TIZANIDINE HYDROCHLORIDE 2 MG/1
2 CAPSULE, GELATIN COATED ORAL DAILY
COMMUNITY
End: 2018-04-25

## 2018-01-05 RX ORDER — DICLOFENAC SODIUM 75 MG/1
TABLET, DELAYED RELEASE ORAL
Refills: 1 | COMMUNITY
Start: 2017-12-04 | End: 2018-04-05

## 2018-01-05 NOTE — PATIENT INSTRUCTIONS
Leg Cramps  A muscle cramp or spasm is a strong contraction of the muscle fibers. It is also called a charley horse. This may occur in the foot, calf, or thigh at night when the legs are elevated. If the spasm is prolonged, it can become very painful.  This may be caused by sleeping in an uncomfortable position, muscle fatigue, poor muscle tone from lack of exercise and stretching, dehydration, electrolyte imbalance, diabetes, alcohol use, and certain medicine.  Home care  · Drink plenty of fluids during the day to prevent dehydration.  · Stretch your legs before bedtime.  · Eat a diet high in potassium. These foods include fresh fruit, such as bananas, oranges, cantaloupe, and honeydew melon. It also includes apple, prune, orange, grape and pineapple juices. Other foods high in potassium are white, red, and schuster beans, baked potatoes, raw spinach, cod, flounder, halibut, salmon, and scallops.  · Talk with your healthcare provider about taking mineral and vitamin supplements that contain magnesium and vitamin B-12 if you are not already taking these. Other prescription medicines may also be used.  · Avoid stimulants such as caffeine, nicotine, decongestants.  How to relieve an acute leg cramp  · For mild pain, getting out of the bed and walking may help. Some people find relief with heat and massage. You can apply heat with a warm shower, bath, or compress. Some people feel better with a cold packs. You can make an ice pack by filling a plastic bag that seals at the top with ice cubes and then wrapping it with a thin towel. Try both and use the method that feels best for 15 to 20 minutes at a time.  · For severe pain, stretching the muscle that is in spasm may quickly relieve the pain.  · When the spasm is in your foot, your toes may curl up or down. To stretch the muscle in spasm, bend your toes in the opposite direction. If the spasm pulls your toes up, bend them down. If the spasm pulls them down, bend them  up.  · When the spasm is in your calf, bend the ankle so the foot points upward toward your knee.  · When the spasm is in your thigh, bend or straighten the knee and hip until you feel relief.  Follow-up care  Follow up with your healthcare provider, or as advised.  When to seek medical advice  Call your healthcare provider right away if any of these occur:  · Walking makes your pain worse and rest makes it better  · You develop weakness in the affected leg  · Pain or frequency of spasms increases and is not controlled by the above measures  Date Last Reviewed: 11/23/2015  © 3814-7473 [employer] [address] [place] [address], [place] [address]. All rights reserved. This information is not intended as a substitute for professional medical care. Always follow your healthcare professional's instructions.

## 2018-01-05 NOTE — PROGRESS NOTES
[facility] NEUROLOGY  [facility] [place]    Date: 1/5/18  Patient Name: Genoveva Gilbert   MRN: [id number]   PCP: Tariq Crews  Referring Provider: Self, Aaareferral    Assessment:   Genoveva Gilbert is a 67 y.o. female presenting with multiple neurologic point as outlined below.  Discussed conservative management strategies for management of nocturnal leg cramps.  Patient may benefit from physical therapy for management of her ongoing sciatica and have provided referral today.  Provided MOCA testing today given patient's subjective memory complaints.  Patient is currently within normal limits.  Will continue to follow clinically.    Plan:     Problem List Items Addressed This Visit        Neuro    Subjective memory complaints    Overview     MOCA 27/30 on 1/5/17            Orthopedic    Sciatica of left side - Primary    Current Assessment & Plan     -- referred to PT/OT         Relevant Orders    Ambulatory Referral to Physical/Occupational Therapy       Other    Leg cramps, sleep related    Current Assessment & Plan     -- counseled on conservative management strategies               I spent a total of 50 minutes in face to face time with the patient, over half of which was spent on counseling and education about the patient's diagnosis and medications.     Crispin Zamora MD  [facility]   Department of Neurology    Patient note was created using Dragon Dictation.  Any errors in syntax or even information may not have been identified and edited on initial review prior to signing this note.  Subjective:        HPI:   Ms. Genoveva Gilbert is a 67 y.o. female who presents with a chief complaint of multiple neurologic complaints.  Her  who contributes to the history.  The patient reports mild lower extremity cramping in the evenings when she lies down to go to sleep.  She states that the leg cramps often resolved immediately after getting up to walk.  She also endorses chronic  left-sided sciatic pain.  She states that she has followed with pain management and is seeing a chiropractor, but states that she has not followed with physical therapy for management of her low back pain and resultant lower extremity weakness.  Of note, per review of Dr. Bruce's notes, the patient does have a known chronic lumbar radiculopathy confirmed on EMG (report personally reviewed).  She also endorses subjective memory changes, stating that she is forgetful and often relies on her  to remind her where objects are or to do things.  She keeps a planner to help keep track of her day-to-day activities.  She denies any issues completing activities of daily living or dangerous behaviors.  She denies any mood changes.    PAST MEDICAL HISTORY:  Past Medical History:   Diagnosis Date    Allergy     Anxiety     Arthritis, rheumatoid     Back pain     Depression     Fibromyalgia     GERD (gastroesophageal reflux disease)     High cholesterol     Hypertension     Incontinence of urine     MS (multiple sclerosis)     benign; another MD stated she has no MS    Neuropathy     Restless leg syndrome     Seasonal allergies     Sinus congestion     Thyroid disease     Wheezing        PAST SURGICAL HISTORY:  Past Surgical History:   Procedure Laterality Date    APPENDECTOMY      BREAST LUMPECTOMY      bilateral, benign    BREAST SURGERY      reduction    HYSTERECTOMY      partial - fibroids    TONSILLECTOMY         CURRENT MEDS:  Current Outpatient Prescriptions   Medication Sig Dispense Refill    albuterol 90 mcg/actuation inhaler Inhale 2 puffs into the lungs every 6 (six) hours as needed for Wheezing. 1 Inhaler 6    alprazolam (XANAX) 2 MG Tab Take 1 mg by mouth nightly as needed.       amitriptyline (ELAVIL) 25 MG tablet Take 25 mg by mouth nightly as needed.      ascorbic acid, vitamin C, (VITAMIN C) 100 MG tablet Take 100 mg by mouth once daily.      BIFIDOBACTERIUM INFANTIS (ALIGN  ORAL) Take 1 capsule by mouth once daily at 6am.      biotin 300 mcg Tab Take 1 tablet by mouth once daily.      diclofenac (VOLTAREN) 75 MG EC tablet TK 1 T PO BID  1    duloxetine (CYMBALTA) 60 MG capsule Take 60 mg by mouth 2 (two) times daily.       esomeprazole (NEXIUM) 40 MG capsule Take 40 mg by mouth before breakfast.      ferrous sulfate 325 (65 FE) MG EC tablet Take 325 mg by mouth 3 (three) times daily with meals.      fexofenadine (ALLEGRA) 180 MG tablet Take 180 mg by mouth once daily.      fluticasone (FLONASE) 50 mcg/actuation nasal spray USE ONE SPRAY IEN QD  3    GLUCOSAMINE/CHONDRO MIR A (COSAMIN DS ORAL) Take by mouth 2 (two) times daily.      hydrochlorothiazide (HYDRODIURIL) 25 MG tablet Take 25 mg by mouth once daily.      hydrocodone-acetaminophen 7.5-325mg (NORCO) 7.5-325 mg per tablet Take 1 tablet by mouth every 6 (six) hours as needed for Pain.      levothyroxine (SYNTHROID) 125 MCG tablet Take 125 mcg by mouth once daily.      lovastatin (ALTOPREV) 40 mg 24 hr tablet Take 40 mg by mouth every evening.      MULTIVIT-IRON-MIN-FOLIC ACID 3,500-18-0.4 UNIT-MG-MG ORAL CHEW Take 1 tablet by mouth.      pramipexole (MIRAPEX) 0.25 MG tablet Take 0.5 mg by mouth every evening.      tiZANidine 2 mg Cap Take 2 mg by mouth once daily.      valsartan (DIOVAN) 320 MG tablet Take 320 mg by mouth once daily.      vitamin E 100 UNIT capsule Take 100 Units by mouth once daily.      duloxetine (CYMBALTA) 20 MG capsule Take 20 mg by mouth once daily.  0    gabapentin (NEURONTIN) 400 MG capsule Take 400 mg by mouth 3 (three) times daily.  3     No current facility-administered medications for this visit.        ALLERGIES:  Review of patient's allergies indicates:   Allergen Reactions    Keflex [cephalexin]     Latex, natural rubber Anaphylaxis     bandaids     Pcn [penicillins]        FAMILY HISTORY:  Family History   Problem Relation Age of Onset    Heart disease Mother        SOCIAL  "HISTORY:  Social History   Substance Use Topics    Smoking status: Former Smoker     Quit date: 6/1/1996    Smokeless tobacco: Never Used    Alcohol use Yes      Comment: very little        Review of Systems:  12 review of systems is negative except for the symptoms mentioned in HPI.      Objective:     Vitals:    01/05/18 0934   Weight: 104.8 kg (231 lb 0.7 oz)   Height: 4' 11" (1.499 m)     General: NAD, well nourished   Eyes: no tearing, discharge, no erythema   ENT: moist mucous membranes of the oral cavity, nares patent    Neck: Supple, full range of motion  Cardiovascular: Warm and well perfused, pulses equal and symmetrical  Lungs: Normal work of breathing, normal chest wall excursions  Skin: No rash, lesions, or breakdown on exposed skin  Psychiatry: Mood and affect are appropriate   Abdomen: soft, non tender, non distended  Extremeties: No cyanosis, clubbing or edema.    Neurological   MENTAL STATUS: Alert and oriented to person, place, and time. Attention and concentration within normal limits. Speech without dysarthria, able to name and repeat without difficulty. Recent and remote memory within normal limits   CRANIAL NERVES: Visual fields intact. PERRL. EOMI. Facial sensation intact. Face symmetrical. Hearing grossly intact. Full shoulder shrug bilaterally. Tongue protrudes midline   SENSORY: Sensation is intact to light touch throughout.  Positive straight leg raise on L.  MOTOR: Normal bulk and tone.  5/5 deltoid, biceps, triceps, interosseous, hand  bilaterally. 3/5 iliopsoas, knee extension/flexion, foot dorsi/plantarflexion bilaterally.    REFLEXES: Symmetric and 1+ throughout.   CEREBELLAR/COORDINATION/GAIT: Gait steady.  Heel to shin intact. Finger to nose intact. Normal rapid alternating movements.     MOCA Results 01/05/2018 :   Visuospatial/Executive: 5/5  Naming: 3/3  Attention: 6/6  Language: 3/3  Abstraction: 2/2  Delayed Recall: 2/5  Orientation:6/6  Total: 27/30    "

## 2018-01-17 ENCOUNTER — TELEPHONE (OUTPATIENT)
Dept: HEPATOLOGY | Facility: CLINIC | Age: 68
End: 2018-01-17

## 2018-01-17 ENCOUNTER — OFFICE VISIT (OUTPATIENT)
Dept: HEPATOLOGY | Facility: CLINIC | Age: 68
End: 2018-01-17
Payer: MEDICARE

## 2018-01-17 ENCOUNTER — HOSPITAL ENCOUNTER (OUTPATIENT)
Dept: RADIOLOGY | Facility: HOSPITAL | Age: 68
Discharge: HOME | End: 2018-01-17
Attending: INTERNAL MEDICINE
Payer: MEDICARE

## 2018-01-17 VITALS
SYSTOLIC BLOOD PRESSURE: 171 MMHG | WEIGHT: 227.94 LBS | OXYGEN SATURATION: 98 % | TEMPERATURE: 97 F | DIASTOLIC BLOOD PRESSURE: 79 MMHG | HEIGHT: 60 IN | HEART RATE: 90 BPM | RESPIRATION RATE: 18 BRPM | BODY MASS INDEX: 44.75 KG/M2

## 2018-01-17 DIAGNOSIS — K76.0 NAFLD (NONALCOHOLIC FATTY LIVER DISEASE): ICD-10-CM

## 2018-01-17 DIAGNOSIS — K76.0 HEPATIC STEATOSIS: ICD-10-CM

## 2018-01-17 DIAGNOSIS — K76.0 NAFLD (NONALCOHOLIC FATTY LIVER DISEASE): Primary | ICD-10-CM

## 2018-01-17 DIAGNOSIS — I10 ESSENTIAL HYPERTENSION: ICD-10-CM

## 2018-01-17 PROCEDURE — 99999 PR PBB SHADOW E&M-EST. PATIENT-LVL V: CPT | Mod: PBBFAC,,, | Performed by: PHYSICIAN ASSISTANT

## 2018-01-17 PROCEDURE — 76700 US EXAM ABDOM COMPLETE: CPT | Mod: 26,,, | Performed by: RADIOLOGY

## 2018-01-17 PROCEDURE — 99214 OFFICE O/P EST MOD 30 MIN: CPT | Mod: S$GLB,,, | Performed by: PHYSICIAN ASSISTANT

## 2018-01-17 PROCEDURE — 76700 US EXAM ABDOM COMPLETE: CPT | Mod: TC

## 2018-01-17 RX ORDER — AMITRIPTYLINE HYDROCHLORIDE 50 MG/1
50 TABLET, FILM COATED ORAL NIGHTLY
Refills: 4 | COMMUNITY
Start: 2017-11-26 | End: 2019-06-04 | Stop reason: SDUPTHER

## 2018-01-17 NOTE — TELEPHONE ENCOUNTER
----- Message from Yaya Michelle MD sent at 1/17/2018  3:24 PM CST -----  Perfect    ----- Message -----  From: Catarino Ospina PA-C  Sent: 1/17/2018   2:43 PM  To: aYya Michelle MD    We saw her in clinic together for her initial eval, F0-F1 fibroscan with NAFLD. We felt focus was focal fatty sparing, not seen on MRI, you recommended repeat U/S in 6 months. Focus smaller and HRI decreased. Are you okay with me repeating scan in 1 year?

## 2018-01-17 NOTE — PROGRESS NOTES
HEPATOLOGY CLINIC VISIT NOTE     REFERRING PROVIDER: Aura Araujo PA-C    REASON FOR VISIT: Elevated LFTs and abnormal U/S     HISTORY: This is a 67 y.o. White female here for follow up of NAFLD. She was initially referred for elevated liver enzymes and a liver mass. She had a work up negative for viral hepatitis, A1AT, AIH, HH, and [place]. She had fibrosis staging with fibroscan that was consistent with F0-F1. U/S noted hepatosplenomegaly and a hypoechoic focus which may be focal fatty sparing. She had a MRI to further imaging the hypoechoic focus, and it was not identified. Her repeat U/S visualized the focus again and notes that HRI is smaller and focus has decreased in size and is again consistent with focal fatty sparing.     After last visit, pt was declined for elective bariatric surgery. Pt reports she was discouraged by this. She reports compliance with diet but struggled with this during the holidays. She reports she got back on track yesterday.     Liver staging:  Fibroscan F0-F1   AST 25, ALT 29  Tbili WNL  Albumin low end of normal at 3.5  PLTs 259    Risk Factors:  AI:  Biliary:   ETOH: denies  Hereditary:   Medications:  NAFLD/MURILLO: BMI, HTN, HLD   Other: MVI, biotin, Align   Viral hepatitis: needs cohort screening     Past Medical History:   Diagnosis Date    Allergy     Anxiety     Arthritis, rheumatoid     Back pain     Depression     Fibromyalgia     GERD (gastroesophageal reflux disease)     High cholesterol     Hypertension     Incontinence of urine     MS (multiple sclerosis)     benign; another MD stated she has no MS    Neuropathy     Restless leg syndrome     Seasonal allergies     Sinus congestion     Thyroid disease     Wheezing      Past Surgical History:   Procedure Laterality Date    APPENDECTOMY      BREAST LUMPECTOMY      bilateral, benign    BREAST SURGERY      reduction    HYSTERECTOMY      partial - fibroids    TONSILLECTOMY       FAMILY HISTORY: Negative  for liver disease  Adopted     SOCIAL HISTORY:   Retired, associate admin of physician practice x 17 years    History   Smoking Status    Former Smoker    Quit date: 6/1/1996   Smokeless Tobacco    Never Used       History   Alcohol Use    Yes     Comment: very little    very rare    History   Drug Use No     ROS:   No fever, chills  No chest pain, dyspnea, cough  No abdominal pain,  nausea, vomiting  No skin rashes   No headaches, visual changes  No lower extremity edema      PHYSICAL EXAM:  Friendly White female, in no acute distress; alert and oriented to person, place and time  VITALS: reviewed  HEENT: Sclerae anicteric.   NECK: Supple  LUNGS: Normal respiratory effort.  ABDOMEN: Flat, soft, nontender.  SKIN: Warm and dry. No jaundice, No obvious rashes.   EXTREMITIES: No lower extremity edema  NEURO/PSYCH: Normal gate. Memory intact. Thought and speech pattern appropriate. Behavior normal. No depression or anxiety noted.    RECENT LABS:  Lab Results   Component Value Date    WBC 5.37 05/06/2016    WBC 5.37 05/06/2016    HGB 9.9 (L) 05/06/2016    HGB 9.9 (L) 05/06/2016     05/06/2016     05/06/2016     Lab Results   Component Value Date    INR 1.0 07/03/2017     Lab Results   Component Value Date    AST 18 07/03/2017    ALT 34 07/03/2017    BILITOT 0.3 07/03/2017    ALBUMIN 3.2 (L) 07/03/2017    ALKPHOS 83 07/03/2017    CREATININE 0.7 07/03/2017    BUN 21 07/03/2017     07/03/2017    K 3.7 07/03/2017    AFP 6.9 07/03/2017     RECENT IMAGING:  U/S 01/2018  Narrative     Time of Procedure: 01/17/18 10:30:54  Accession # [id number]    Reason for study: Hepatic steatosis.    Comparison: MRI 7/7/17, ultrasound 6/22/17.    Technique: Abdominal ultrasound was performed.    Findings:     The liver is normal in size, measuring 14.0cm.  The liver demonstrates diffuse sonographic attenuation, compatible with hepatic steatosis.  The hepatorenal index is elevated measuring 1.94, previously 2.15.  There  is a small hypoechoic focus within the left hepatic lobe measuring up to 0.8 cm, previously measuring up to 1.2 cm, for which now correlate is identified on MRI examination.  This likely represents a small focus of focal fatty sparing.      The common bile duct is normal in caliber, measuring 4 mm. No intrahepatic biliary ductal dilatation.    The gallbladder appears normal. No cholelithiasis, collateral thickening, pericholecystic fluid, or hyperemia  Sonographic Covarrubias's sign is negative.     The visualized portions of the pancreas, aorta, and IVC are unremarkable.     The right and left kidneys are normal in size measuring 12.2 and 11.2 cm, respectively, without mass or hydronephrosis.  5 mm hyperechoic focus along the right upper pole appears unchanged and likely represents a junctional parenchymal defect containing normal fat.    The spleen is normal in size measuring 12.2 x 4.4 cm, without focal abnormality. No ascites.   Impression         Hepatic steatosis.      0.8 cm hypoechoic focus within the left hepatic lobe, not visualized on prior MRI examination and decreased in size from prior ultrasound.  This nonspecific and may represent focal fatty sparing.     U/S abdomen 06/22/17  Narrative     Comparison: none    Findings: Visualized pancreas is unremarkable.  The liver measures 18.5 cm and extends below the costal margin.  There is a 1.1 x 1.2 x 0.8 cm hypoechoic ill defined focus in the left hepatic lobe.  Liver attenuates sound suggesting steatosis.  No biliary dilatation, common duct measures 3 mm at the level of the hepatic artery.  The gallbladder is unremarkable.  The IVC and aorta show no abnormalities.  Kidneys are normal in size and 5 mm echogenic focus noted in the right renal cortex, uncertain etiology.  Spleen enlarged measuring 12.9 x 4.9 cm.  No free fluid.   Impression       Hepatosplenomegaly and hepatic steatosis.    1.2 cm ill defined hypoechoic focus in the left hepatic lobe.   Differential considerations include focal fatty sparing or discrete lesion such as regenerative nodule or neoplasm.    Small echogenic focus in the right kidney is a nonspecific finding.  A mass such as an angiomyolipoma is within the differential.    Both of these findings could be followed in 6 months with repeat ultrasound.  As an alternative, MRI may provide additional information, if clinically concerned.     ASSESSMENT  67 y.o. White female with:  1. HEPATIC STEATOSIS  -- No fibrosis, consistent with NAFLD, fibroscan F0-F1  -- negative serological work up  -- transaminases stable      2. LIVER MASS  -- 1.2 cm ill defined focus in left lobe, may represent focal fatty sparing  -- not visualized on MRI, smaller on U/S, will review with Dr. Michelle.     3.HTN  -- stable, pt informed to discuss with cardiology     EDUCATION:  We discussed the manifestations of NAFLD. At this time there is no FDA approved therapy for NAFLD.   The patient and I discussed the importance of diet, exercise, and weight loss for management of NAFLD. We discussed a low fat, low carb/low sugar, high fiber diet, and a goal of 30 minutes of exercise 5 times per week.   Pt is aware that fatty liver can progress to steatohepatitis and possibly to cirrhosis, putting one at increased risk for liver cancer, liver failure, and death.       PLAN:  1. Follow up visit in 6 months     Thank you for allowing me to participate in the care of Genoveva Ospina PA-C

## 2018-03-13 ENCOUNTER — HOSPITAL ENCOUNTER (OUTPATIENT)
Dept: RADIOLOGY | Facility: HOSPITAL | Age: 68
Discharge: HOME | End: 2018-03-13
Attending: PHYSICIAN ASSISTANT
Payer: MEDICARE

## 2018-03-13 ENCOUNTER — OFFICE VISIT (OUTPATIENT)
Dept: SPORTS MEDICINE | Facility: CLINIC | Age: 68
End: 2018-03-13
Payer: MEDICARE

## 2018-03-13 VITALS
DIASTOLIC BLOOD PRESSURE: 82 MMHG | HEART RATE: 70 BPM | BODY MASS INDEX: 44.57 KG/M2 | SYSTOLIC BLOOD PRESSURE: 154 MMHG | HEIGHT: 60 IN | WEIGHT: 227 LBS

## 2018-03-13 DIAGNOSIS — S76.012A STRAIN OF LEFT PSOAS MUSCLE, INITIAL ENCOUNTER: ICD-10-CM

## 2018-03-13 DIAGNOSIS — M25.552 LEFT HIP PAIN: Primary | ICD-10-CM

## 2018-03-13 DIAGNOSIS — M25.552 LEFT HIP PAIN: ICD-10-CM

## 2018-03-13 DIAGNOSIS — R60.0 EDEMA, LOWER EXTREMITY: ICD-10-CM

## 2018-03-13 PROCEDURE — 99204 OFFICE O/P NEW MOD 45 MIN: CPT | Mod: S$GLB,,, | Performed by: PHYSICIAN ASSISTANT

## 2018-03-13 PROCEDURE — 99999 PR PBB SHADOW E&M-EST. PATIENT-LVL V: CPT | Mod: PBBFAC,,, | Performed by: PHYSICIAN ASSISTANT

## 2018-03-13 PROCEDURE — 73502 X-RAY EXAM HIP UNI 2-3 VIEWS: CPT | Mod: TC,FY,PO,LT

## 2018-03-13 PROCEDURE — 73502 X-RAY EXAM HIP UNI 2-3 VIEWS: CPT | Mod: 26,LT,, | Performed by: RADIOLOGY

## 2018-03-13 PROCEDURE — 3079F DIAST BP 80-89 MM HG: CPT | Mod: CPTII,S$GLB,, | Performed by: PHYSICIAN ASSISTANT

## 2018-03-13 PROCEDURE — 3077F SYST BP >= 140 MM HG: CPT | Mod: CPTII,S$GLB,, | Performed by: PHYSICIAN ASSISTANT

## 2018-03-13 RX ORDER — CELECOXIB 200 MG/1
200 CAPSULE ORAL 2 TIMES DAILY
Qty: 60 CAPSULE | Refills: 0 | Status: SHIPPED | OUTPATIENT
Start: 2018-03-13 | End: 2018-04-25

## 2018-03-13 NOTE — PROGRESS NOTES
Subjective:          Chief Complaint: Genoveva Gilbert is a 68 y.o. female who had concerns including Pain of the Left Hip.    Genoveva Gilbert is a retiree.The pain started 3 weeks ago after she fell and hit a bathroom cabinet on the way down. Pain is becoming progressively worse. Pain is over (points to) ASIS and psoas. She has had 3-4 falls within a years span. She reports that the pain is a 8 /10 aching, stabbing and constant pain today and not responding adequately to conservative measures which have included activity modifications, rest, and oral medication. Is affecting ADLs and limiting desired level of activity. Denies numbness, tingling, radiation, and inability to bear weight.  Pain is 10 /10 at its worst    Worse with ambulation and movement  Better with rest.   Nocturnal symptoms: (+)    No previous surgeries on hips              Review of Systems   Constitution: Negative for chills and fever.   HENT: Negative for congestion and sore throat.    Eyes: Negative for discharge and double vision.   Cardiovascular: Positive for claudication and leg swelling. Negative for chest pain, palpitations and syncope.   Respiratory: Negative for cough and shortness of breath.    Endocrine: Negative for cold intolerance and heat intolerance.   Skin: Negative for dry skin and rash.   Musculoskeletal: Positive for back pain, falls and joint pain. Negative for joint swelling, muscle cramps, muscle weakness and neck pain.   Gastrointestinal: Negative for abdominal pain, nausea and vomiting.   Neurological: Negative for focal weakness, numbness and paresthesias.       Pain Related Questions  Over the past 3 days, what was your average pain during activity? (I.e. running, jogging, walking, climbing stairs, getting dressed, ect.): 10  Over the past 3 days, what was your highest pain level?: 10  Over the past 3 days, what was your lowest pain level? : 8    Other  How many nights a week are you awakened by your affected body part?:  0  Was the patient's HEIGHT measured or patient reported?: Patient Reported  Was the patient's WEIGHT measured or patient reported?: Measured      Objective:        General: Genoveva is well-developed, well-nourished, appears stated age, in no acute distress, alert and oriented to time, place and person.     General    Constitutional: She is oriented to person, place, and time. She appears well-developed and well-nourished. No distress.   HENT:   Head: Normocephalic and atraumatic.   Nose: Nose normal.   Eyes: Conjunctivae and EOM are normal. Pupils are equal, round, and reactive to light.   Neck: No JVD present.   Cardiovascular: Normal rate, regular rhythm and normal heart sounds.    Pulmonary/Chest: Effort normal and breath sounds normal. No respiratory distress.   Abdominal: Soft. Bowel sounds are normal. She exhibits no distension. There is no tenderness.   Neurological: She is alert and oriented to person, place, and time. She has normal reflexes. Coordination normal.   Psychiatric: She has a normal mood and affect. Her behavior is normal. Judgment and thought content normal.     General Musculoskeletal Exam   Gait: normal       Right Knee Exam     Inspection   Effusion: effusion    Left Knee Exam     Inspection   Effusion: absent    Right Hip Exam     Inspection   Swelling: absent  Bruising: absent  No deformity of hip.  Erythema: absent    Range of Motion   Extension: 10   Flexion: 90   Internal Rotation: 30   External Rotation: 60   Abduction: 45   Adduction: 30     Tests   Pain w/ forced internal rotation (JERROD): absent  Pain w/ forced external rotation (FADIR): absent  Kassandra: negative  Stinchfield test: negative  Log Roll: negative    Other   Sensation: normal  Left Hip Exam     Inspection   Swelling: absent  No deformity of hip.  Erythema: absent  Bruising: absent    Tenderness   The patient tender to palpation of the psoas tendon.    Range of Motion   Extension: 10   Flexion: 90   Internal Rotation: 30    External Rotation: 60   Abduction: 45   Adduction: 30     Tests   Pain w/ forced internal rotation (JERROD): absent  Pain w/ forced external rotation (FADIR): absent  Kassandra: negative  Stinchfield test: negative  Log Roll: negative    Other   Sensation: normal          Muscle Strength   Right Lower Extremity   Hip Abduction: 5/5   Hip Adduction: 5/5   Hip Flexion: 5/5   Ankle Dorsiflexion:  5/5   Left Lower Extremity   Hip Abduction: 5/5   Hip Adduction: 5/5   Hip Flexion: 5/5   Ankle Dorsiflexion:  5/5     Vascular Exam     Right Pulses  Dorsalis Pedis:      2+  Posterior Tibial:      2+        Left Pulses  Dorsalis Pedis:      2+  Posterior Tibial:      2+        Edema  Right Upper Leg: absent  Right Lower Leg: present  Left Upper Leg: absent  Left Lower Leg: present    Radiographic Findings 03/13/2018:    Left hip AP lateral, comparison 6/22/12. The skeletal structures are intact with good alignment. Hip joint spaces are minimally narrowed. Articular surfaces are smooth. The SI joints are unremarkable.   Impression      No acute abnormality. Minimal DJD at the hips.       Xrays of the pelvis and left hip were ordered and reviewed by me today. These findings were discussed and reviewed with the patient.            Assessment:       Encounter Diagnosis   Name Primary?    Left hip pain Yes          Plan:       1. Ambulatory referral to physical therapy for psoas and hip pain. Dry needling recommended.  2. Celebrex 200 mg twice daily PRN for pain management. Patient understands to take with food and/or OTC prilosec to decrease GI side effects. Pt was informed not to take Voltaren and Celebrex at the same time daily.  3. Ice compress to the affected area 2-3x a day for 15-20 minutes as needed for pain management.  4. Bilateral JOBST stocking Rx given for claudication and lower leg edema.  5. RTC to see Elmer Nation PA-C in 6 weeks for follow-up.          All of the patient's questions were answered and the patient  will contact us if they have any questions or concerns in the interim.      Patient questionnaires may have been collected.

## 2018-04-02 ENCOUNTER — TELEPHONE (OUTPATIENT)
Dept: NEUROLOGY | Facility: CLINIC | Age: 68
End: 2018-04-02

## 2018-04-02 NOTE — TELEPHONE ENCOUNTER
Most of the time the patient cant walk with out the walker. The patient was able to walk today, but she has been having down to the toes, she states she went to the primary doctor last week and wants to wait for your visit.

## 2018-04-02 NOTE — TELEPHONE ENCOUNTER
----- Message from Crispin Zamora MD sent at 4/2/2018  1:12 PM CDT -----  Contact: self, [phone]  She may want to see her PCP in the meantime.    ----- Message -----  From: Rasta Gilbert MA  Sent: 4/2/2018  10:11 AM  To: Crispin Zamora MD    You don't have anything available.  ----- Message -----  From: Gwendolyn Daisy  Sent: 4/2/2018   8:52 AM  To: Noah LEON Staff    Patient has an appointment scheduled on 4/5 but requests to be seen sooner if possible.States she has been having severe body pain in the last 2 weeks. Please advise.

## 2018-04-05 ENCOUNTER — OFFICE VISIT (OUTPATIENT)
Dept: NEUROLOGY | Facility: CLINIC | Age: 68
End: 2018-04-05
Payer: MEDICARE

## 2018-04-05 VITALS
WEIGHT: 229 LBS | DIASTOLIC BLOOD PRESSURE: 71 MMHG | BODY MASS INDEX: 44.72 KG/M2 | HEART RATE: 73 BPM | SYSTOLIC BLOOD PRESSURE: 103 MMHG

## 2018-04-05 DIAGNOSIS — E78.00 ELEVATED CHOLESTEROL: ICD-10-CM

## 2018-04-05 DIAGNOSIS — M54.32 BILATERAL SCIATICA: Primary | ICD-10-CM

## 2018-04-05 DIAGNOSIS — I10 BENIGN ESSENTIAL HTN: ICD-10-CM

## 2018-04-05 DIAGNOSIS — G25.81 RESTLESS LEG SYNDROME: ICD-10-CM

## 2018-04-05 DIAGNOSIS — M54.31 BILATERAL SCIATICA: Primary | ICD-10-CM

## 2018-04-05 DIAGNOSIS — M54.12 CERVICAL RADICULOPATHY: ICD-10-CM

## 2018-04-05 DIAGNOSIS — E66.01 MORBID OBESITY DUE TO EXCESS CALORIES: ICD-10-CM

## 2018-04-05 DIAGNOSIS — G35: ICD-10-CM

## 2018-04-05 DIAGNOSIS — K21.9 GASTROESOPHAGEAL REFLUX DISEASE, ESOPHAGITIS PRESENCE NOT SPECIFIED: ICD-10-CM

## 2018-04-05 DIAGNOSIS — M79.7 FIBROMYALGIA: ICD-10-CM

## 2018-04-05 PROCEDURE — 3078F DIAST BP <80 MM HG: CPT | Mod: CPTII,S$GLB,, | Performed by: PSYCHIATRY & NEUROLOGY

## 2018-04-05 PROCEDURE — 99999 PR PBB SHADOW E&M-EST. PATIENT-LVL IV: CPT | Mod: PBBFAC,,, | Performed by: PSYCHIATRY & NEUROLOGY

## 2018-04-05 PROCEDURE — 99214 OFFICE O/P EST MOD 30 MIN: CPT | Mod: S$GLB,,, | Performed by: PSYCHIATRY & NEUROLOGY

## 2018-04-05 PROCEDURE — 3074F SYST BP LT 130 MM HG: CPT | Mod: CPTII,S$GLB,, | Performed by: PSYCHIATRY & NEUROLOGY

## 2018-04-05 RX ORDER — AMLODIPINE BESYLATE 5 MG/1
TABLET ORAL
COMMUNITY
Start: 2018-03-26 | End: 2019-05-09

## 2018-04-05 RX ORDER — GABAPENTIN 100 MG/1
100 CAPSULE ORAL 3 TIMES DAILY
Qty: 90 CAPSULE | Refills: 11 | Status: SHIPPED | OUTPATIENT
Start: 2018-04-05 | End: 2018-12-19 | Stop reason: ALTCHOICE

## 2018-04-05 NOTE — PATIENT INSTRUCTIONS
Magnetic Resonance Imaging (MRI)     You will be asked to hold very still during the scan.     Magnetic resonance imaging (MRI) is a test that lets your doctor see detailed pictures of the inside of your body. MRI combines the use of strong magnets and radio waves to form an MRI image.  How do I get ready for an MRI?  · Follow any directions you are given for not eating or drinking before the test.  · Ask your provider if you should stop taking any medicine before the test.  · Follow your normal daily routine unless your provider tells you otherwise.  · You'll be asked to remove your watch, jewelry, hearing aids, credit cards, pens, pocket knives, eyeglasses, and other metal objects.  · You may be asked to remove your makeup. Makeup may contain some metal.  · Most MRI tests take 30 to 60 minutes. Depending on the type of MRI you are having, the test may take longer. Give yourself extra time to check in.     MRI uses strong magnets. Metal is affected by magnets and can distort the image. The magnet used in MRI can cause metal objects in your body to move. If you have a metal implant, you may not be able to have an MRI unless the implant is certified as MRI safe. People with these implants should not have an MRI:  · Ear (cochlear) implants  · Certain clips used for brain aneurysms  · Certain metal coils put in blood vessels  · Most defibrillators  · Most pacemakers  Be sure to tell the radiologist or technologist if you:  · Have had any previous surgeries  · Have a pacemaker, surgical clips, metal plate or pins, an artificial joint, staples or screws, ear (cochlear) implants, or other implants  · Wear a medicated adhesive patch  · Have metal splinters in your body  · Have implanted nerve stimulators or drug-infusion ports  · Have tattoos or body piercings. Some tattoo inks contain metal.  · Work with metal  · Have braces. You must remove any dental work.  · Have a bullet or other metal in your body  Also tell the  radiologist or technologist if you:  · Are pregnant or think you may be  · Are afraid of small, enclosed spaces (claustrophobic)  · Are allergic to X-ray dye (contrast medium), iodine, shellfish, or any medicines  · Have other allergies  · Are breastfeeding  · Have a history of cancer  · Have any serious health problems. This includes kidney disease or a liver transplant. You may not be able to have the contrast material used for MRI.   What happens during an MRI?  · You may be asked to wear a hospital gown.  · You may be given earplugs to wear if you need them.  · You may be injected with a special dye (contrast) that improves the MRI image.   · Youll lie down on a platform that slides into the magnet.  What happens after an MRI?  · You can get back to normal activities right away. If you were given contrast, it will pass naturally through your body within a day. You may be told to drink more water or other fluids during this time.   · Your doctor will discuss the test results with you during a follow-up appointment or over the phone.  · Your next appointment is: __________________  Date Last Reviewed: 6/2/2015  © 9209-0792 [employer]. [address] [place] [address], [place] [address]. All rights reserved. This information is not intended as a substitute for professional medical care. Always follow your healthcare professional's instructions.

## 2018-04-05 NOTE — PROGRESS NOTES
[facility] NEUROLOGY  [facility] [place]    Date: 4/5/18  Patient Name: Genoveva Gilbert   MRN: [id number]   PCP: Tariq Crews  Referring Provider: No ref. provider found    Assessment:   Genoveva Gilbert is a 68 y.o. female presenting in follow-up for multiple neurologic issues.  Will obtain MRI L and C-spine given worsening bilateral lower extremity weakness and bilateral radicular arm pain.  Also provided referral to medical fitness program at patient's request.    Plan:     Problem List Items Addressed This Visit        GI    Gastroesophageal reflux disease       Orthopedic    Fibromyalgia    Relevant Orders    Ambulatory Referral to [employer]      Other Visit Diagnoses     Bilateral sciatica    -  Primary    Relevant Orders    MRI Lumbar Spine Without Contrast    MRI Cervical Spine Without Contrast    Ambulatory Referral to [employer]    MRI Cervical Spine Without Contrast    MRI Lumbar Spine Without Contrast    Cervical radiculopathy        Relevant Orders    MRI Lumbar Spine Without Contrast    MRI Cervical Spine Without Contrast    Ambulatory Referral to [employer]    MRI Cervical Spine Without Contrast    MRI Lumbar Spine Without Contrast    Morbid obesity due to excess calories        BMI 40.0-44.9, adult        Relevant Orders    Ambulatory Referral to [employer]    Benign essential HTN        Relevant Orders    Ambulatory Referral to [employer]    Benign multiple sclerosis        Elevated cholesterol        Relevant Orders    Ambulatory Referral to [email or web address]    Restless leg syndrome            I spent a total of 50 minutes in face to face time with the patient, over half of which was spent on counseling and education about the patient's diagnosis and medications.     Crispin Zamora MD  [facility]   Department of Neurology    Patient  note was created using Dragon Dictation.  Any errors in syntax or even information may not have been identified and edited on initial review prior to signing this note.  Subjective:        HPI:   Ms. Genoveva Gilbert is a 68 y.o. female who presents with a chief complaint of multiple neurologic complaints presenting in follow-up.  The patient reports that since her last visit, she experienced an episode 3 weeks ago of pain radiating throughout her entire body, particularly in her lower back and neck.  She describes a gnawing pain made worse by movement with the first pain occurring in her lumbar spine.  She states that her pain is not currently controlled by her Cymbalta and notes that her legs are giving out intermittently. She also expresses concern about her progressive weight gain and request referral to medical fitness program.    PAST MEDICAL HISTORY:  Past Medical History:   Diagnosis Date    Allergy     Anxiety     Arthritis, rheumatoid     Back pain     Depression     Fibromyalgia     GERD (gastroesophageal reflux disease)     High cholesterol     Hypertension     Incontinence of urine     MS (multiple sclerosis)     benign; another MD stated she has no MS    Neuropathy     Restless leg syndrome     Seasonal allergies     Sinus congestion     Thyroid disease     Wheezing        PAST SURGICAL HISTORY:  Past Surgical History:   Procedure Laterality Date    APPENDECTOMY      BREAST LUMPECTOMY      bilateral, benign    BREAST SURGERY      reduction    HYSTERECTOMY      partial - fibroids    TONSILLECTOMY         CURRENT MEDS:  Current Outpatient Prescriptions   Medication Sig Dispense Refill    albuterol 90 mcg/actuation inhaler Inhale 2 puffs into the lungs every 6 (six) hours as needed for Wheezing. 1 Inhaler 6    alprazolam (XANAX) 2 MG Tab Take 1 mg by mouth nightly as needed.       amitriptyline (ELAVIL) 50 MG tablet Take 50 mg by mouth every evening.  4    amLODIPine (NORVASC) 5 MG  tablet       ascorbic acid, vitamin C, (VITAMIN C) 100 MG tablet Take 100 mg by mouth once daily.      BIFIDOBACTERIUM INFANTIS (ALIGN ORAL) Take 1 capsule by mouth once daily at 6am.      biotin 300 mcg Tab Take 1 tablet by mouth once daily.      celecoxib (CELEBREX) 200 MG capsule Take 1 capsule (200 mg total) by mouth 2 (two) times daily. 60 capsule 0    duloxetine (CYMBALTA) 60 MG capsule Take 60 mg by mouth 2 (two) times daily.       esomeprazole (NEXIUM) 40 MG capsule Take 40 mg by mouth before breakfast.      ferrous sulfate 325 (65 FE) MG EC tablet Take 325 mg by mouth 3 (three) times daily with meals.      fexofenadine (ALLEGRA) 180 MG tablet Take 180 mg by mouth once daily.      fluticasone (FLONASE) 50 mcg/actuation nasal spray USE ONE SPRAY IEN QD  3    GLUCOSAMINE/CHONDRO MIR A (COSAMIN DS ORAL) Take by mouth 2 (two) times daily.      hydrochlorothiazide (HYDRODIURIL) 25 MG tablet Take 25 mg by mouth once daily.      hydrocodone-acetaminophen 7.5-325mg (NORCO) 7.5-325 mg per tablet Take 1 tablet by mouth every 6 (six) hours as needed for Pain.      levothyroxine (SYNTHROID) 125 MCG tablet Take 125 mcg by mouth once daily.      lovastatin (ALTOPREV) 40 mg 24 hr tablet Take 40 mg by mouth every evening.      MULTIVIT-IRON-MIN-FOLIC ACID 3,500-18-0.4 UNIT-MG-MG ORAL CHEW Take 1 tablet by mouth.      pramipexole (MIRAPEX) 0.25 MG tablet Take 0.5 mg by mouth every evening.      tiZANidine 2 mg Cap Take 2 mg by mouth once daily.      valsartan (DIOVAN) 320 MG tablet Take 320 mg by mouth once daily.      vitamin E 100 UNIT capsule Take 100 Units by mouth once daily.      gabapentin (NEURONTIN) 100 MG capsule Take 1 capsule (100 mg total) by mouth 3 (three) times daily. 90 capsule 11     No current facility-administered medications for this visit.        ALLERGIES:  Review of patient's allergies indicates:   Allergen Reactions    Keflex [cephalexin]     Latex, natural rubber Anaphylaxis      bandaids     Pcn [penicillins]        FAMILY HISTORY:  Family History   Problem Relation Age of Onset    Heart disease Mother        SOCIAL HISTORY:  Social History   Substance Use Topics    Smoking status: Former Smoker     Quit date: 6/1/1996    Smokeless tobacco: Never Used    Alcohol use Yes      Comment: very little        Review of Systems:  12 review of systems is negative except for the symptoms mentioned in HPI.      Objective:     Vitals:    04/05/18 0931   BP: 103/71   Pulse: 73   Weight: 103.9 kg (229 lb)     General: NAD, well nourished   Eyes: no tearing, discharge, no erythema   ENT: moist mucous membranes of the oral cavity, nares patent    Neck: Supple, full range of motion  Cardiovascular: Warm and well perfused, pulses equal and symmetrical  Lungs: Normal work of breathing, normal chest wall excursions  Skin: No rash, lesions, or breakdown on exposed skin  Psychiatry: Mood and affect are appropriate   Abdomen: soft, non tender, non distended  Extremeties: No cyanosis, clubbing or edema.    Neurological   MENTAL STATUS: Alert and oriented to person, place, and time. Attention and concentration within normal limits. Speech without dysarthria, able to name and repeat without difficulty. Recent and remote memory within normal limits   CRANIAL NERVES: Visual fields intact. PERRL. EOMI. Facial sensation intact. Face symmetrical. Hearing grossly intact. Full shoulder shrug bilaterally. Tongue protrudes midline   SENSORY: Sensation is intact to light touch throughout.  Positive straight leg raise on L.  MOTOR: Normal bulk and tone.  5/5 deltoid, biceps, triceps, interosseous, hand  bilaterally. 3/5 iliopsoas, knee extension/flexion, foot dorsi/plantarflexion bilaterally.    REFLEXES: Symmetric and 1+ throughout.   CEREBELLAR/COORDINATION/GAIT: Gait steady. Finger to nose intact. Normal rapid alternating movements.     MOCA Results 01/05/2018 :   Visuospatial/Executive: 5/5  Naming:  3/3  Attention: 6/6  Language: 3/3  Abstraction: 2/2  Delayed Recall: 2/5  Orientation:6/6  Total: 27/30

## 2018-04-24 DIAGNOSIS — M54.16 LUMBAR RADICULOPATHY: ICD-10-CM

## 2018-04-24 DIAGNOSIS — M54.12 CERVICAL RADICULITIS: Primary | ICD-10-CM

## 2018-04-25 ENCOUNTER — OFFICE VISIT (OUTPATIENT)
Dept: URGENT CARE | Facility: CLINIC | Age: 68
End: 2018-04-25
Payer: MEDICARE

## 2018-04-25 VITALS
SYSTOLIC BLOOD PRESSURE: 177 MMHG | HEART RATE: 64 BPM | DIASTOLIC BLOOD PRESSURE: 80 MMHG | BODY MASS INDEX: 43.59 KG/M2 | HEIGHT: 60 IN | RESPIRATION RATE: 19 BRPM | WEIGHT: 222 LBS | TEMPERATURE: 99 F | OXYGEN SATURATION: 97 %

## 2018-04-25 DIAGNOSIS — M54.16 LUMBAR RADICULOPATHY, ACUTE: Primary | ICD-10-CM

## 2018-04-25 DIAGNOSIS — M53.87 SCIATICA ASSOCIATED WITH DISORDER OF LUMBOSACRAL SPINE: ICD-10-CM

## 2018-04-25 PROCEDURE — 3077F SYST BP >= 140 MM HG: CPT | Mod: CPTII,S$GLB,, | Performed by: NURSE PRACTITIONER

## 2018-04-25 PROCEDURE — 96372 THER/PROPH/DIAG INJ SC/IM: CPT | Mod: S$GLB,,, | Performed by: EMERGENCY MEDICINE

## 2018-04-25 PROCEDURE — 3079F DIAST BP 80-89 MM HG: CPT | Mod: CPTII,S$GLB,, | Performed by: NURSE PRACTITIONER

## 2018-04-25 PROCEDURE — 99203 OFFICE O/P NEW LOW 30 MIN: CPT | Mod: 25,S$GLB,, | Performed by: NURSE PRACTITIONER

## 2018-04-25 RX ORDER — TIZANIDINE 2 MG/1
TABLET ORAL
Refills: 1 | COMMUNITY
Start: 2018-04-21 | End: 2019-05-09

## 2018-04-25 RX ORDER — METOPROLOL SUCCINATE 50 MG/1
TABLET, EXTENDED RELEASE ORAL
Refills: 1 | Status: ON HOLD | COMMUNITY
Start: 2018-04-17 | End: 2019-02-28

## 2018-04-25 RX ORDER — DICLOFENAC SODIUM 75 MG/1
TABLET, DELAYED RELEASE ORAL
Refills: 1 | Status: ON HOLD | COMMUNITY
Start: 2018-04-13 | End: 2019-02-28

## 2018-04-25 RX ORDER — KETOROLAC TROMETHAMINE 30 MG/ML
60 INJECTION, SOLUTION INTRAMUSCULAR; INTRAVENOUS
Status: COMPLETED | OUTPATIENT
Start: 2018-04-25 | End: 2018-04-25

## 2018-04-25 RX ORDER — LOVASTATIN 40 MG/1
TABLET ORAL
Refills: 3 | COMMUNITY
Start: 2018-04-20 | End: 2018-04-25

## 2018-04-25 RX ADMIN — KETOROLAC TROMETHAMINE 60 MG: 30 INJECTION, SOLUTION INTRAMUSCULAR; INTRAVENOUS at 06:04

## 2018-04-25 NOTE — PROGRESS NOTES
Subjective:       Patient ID: Genoveva Gilbert is a 68 y.o. female.    Vitals:  height is 5' (1.524 m) and weight is 100.7 kg (222 lb). Her oral temperature is 99 °F (37.2 °C). Her blood pressure is 177/80 (abnormal) and her pulse is 64. Her respiration is 19 and oxygen saturation is 97%.     Chief Complaint: Back Pain (left side of buttock )    Patient has a long history of cervical and lumbar problems and sees pain management for these issues. She has a hx of receiving both cervical and lumbar ESIs, as well as being on tramadol to control the pain. Pt endorses that the pain has been constant and that she has been trying to reach her PCP by phone to make an appointment but has been unsuccessful. States she received an MRI on Monday to her lower back but has yet to review it with her physician.     She explains that the pain is in her buttock extending down the thigh (sharp in nature) with occasional numbness or tingling to foot.       Back Pain   This is a new problem. Episode onset: 04/17/18. The problem occurs constantly. The problem has been gradually worsening since onset. The pain is present in the gluteal. Quality: sharp. The pain radiates to the right thigh. The pain is at a severity of 10/10. The pain is severe. The pain is the same all the time. The symptoms are aggravated by standing and sitting. Stiffness is present all day. Associated symptoms include paresthesias and tingling. Pertinent negatives include no abdominal pain, bladder incontinence, bowel incontinence, chest pain, dysuria, fever, headaches, leg pain, numbness, paresis, pelvic pain, perianal numbness, weakness or weight loss. She has tried ice, heat, muscle relaxant and bed rest for the symptoms. The treatment provided mild relief.     Review of Systems   Constitution: Negative for fever, weakness, malaise/fatigue and weight loss.   Cardiovascular: Negative for chest pain.   Skin: Negative for color change and rash.   Musculoskeletal: Positive  for back pain. Negative for muscle cramps, muscle weakness and stiffness.   Gastrointestinal: Negative for abdominal pain and bowel incontinence.   Genitourinary: Negative for bladder incontinence, dysuria, hematuria, pelvic pain and urgency.   Neurological: Positive for paresthesias and tingling. Negative for disturbances in coordination, headaches and numbness.       Objective:      Physical Exam   Constitutional: She is oriented to person, place, and time. Vital signs are normal. She appears well-developed and well-nourished. She is active and cooperative. No distress.   HENT:   Head: Normocephalic and atraumatic.   Nose: Nose normal.   Mouth/Throat: Oropharynx is clear and moist and mucous membranes are normal.   Eyes: Conjunctivae and lids are normal.   Neck: Trachea normal, normal range of motion, full passive range of motion without pain and phonation normal. Neck supple.   Cardiovascular: Normal rate, regular rhythm, normal heart sounds, intact distal pulses and normal pulses.    Pulmonary/Chest: Effort normal and breath sounds normal.   Abdominal: Soft. Normal appearance and bowel sounds are normal. She exhibits no abdominal bruit, no pulsatile midline mass and no mass.   Musculoskeletal: She exhibits no edema or deformity.        Lumbar back: She exhibits normal range of motion, no tenderness, no bony tenderness, no swelling, no edema, no deformity, no laceration, no pain, no spasm and normal pulse.   NEG ST LEG RAISE  FULL ROM B LE WITH 5/5 STRENGTH  2+DTR PATELLA AND ACHILLES  NVIT DISTALLY WITH SILT AND 2+BCR  ABLE TO AMBULATE WITH SMOOTH RHYTHMIC GAIT    No tenderness to palpation to buttock or lower back, no muscle spasms noted.    Neurological: She is alert and oriented to person, place, and time. She has normal strength and normal reflexes. No sensory deficit.   Skin: Skin is warm, dry and intact. She is not diaphoretic.   Psychiatric: She has a normal mood and affect. Her speech is normal and  behavior is normal. Judgment and thought content normal. Cognition and memory are normal.   Nursing note and vitals reviewed.      Assessment:       1. Lumbar radiculopathy, acute    2. Sciatica associated with disorder of lumbosacral spine        Plan:         Discussed plan of care with Dr. Christianson and is in agreement with plan of care to give patient a larger dose of Toradol to her size as well as her acuity of pain.     Lumbar radiculopathy, acute  -     Ambulatory referral to Internal Medicine    Sciatica associated with disorder of lumbosacral spine    Other orders  -     ketorolac injection 60 mg; Inject 2 mLs (60 mg total) into the muscle one time.      Patient Instructions   SOMEONE WILL CALL YOU TO SET UP APPOINTMENT WITH PCP. START DICLOFENAC TOMORROW AND DO NOT TAKE ANY OTHER NSAIDS SUCH AS IBUPROFEN, ALEVE, OR MOTRIN.     Please follow up with your Primary care provider within 2-5 days if your signs and symptoms have not resolved or worsen.     If your condition worsens or fails to improve we recommend that you receive another evaluation at the emergency room immediately or contact your primary medical clinic to discuss your concerns.   You must understand that you have received an Urgent Care treatment only and that you may be released before all of your medical problems are known or treated. You, the patient, will arrange for follow up care as instructed.       Back Care Tips    Caring for your back  These are things you can do to prevent a recurrence of acute back pain and to reduce symptoms from chronic back pain:  · Maintain a healthy weight. If you are overweight, losing weight will help most types of back pain.  · Exercise is an important part of recovery from most types of back pain. The muscles behind and in front of the spine support the back. This means strengthening both the back muscles and the abdominal muscles will provide better support for your spine.   · Swimming and brisk walking are  good overall exercises to improve your fitness level.  · Practice safe lifting methods (below).  · Practice good posture when sitting, standing and walking. Avoid prolonged sitting. This puts more stress on the lower back than standing or walking.  · Wear quality shoes with sufficient arch support. Foot and ankle alignment can affect back symptoms. Women should avoid wearing high heels.  · Therapeutic massage can help relax the back muscles without stretching them.  · During the first 24 to 72 hours after an acute injury or flare-up of chronic back pain, apply an ice pack to the painful area for 20 minutes and then remove it for 20 minutes, over a period of 60 to 90 minutes, or several times a day. As a safety precaution, do not use a heating pad at bedtime. Sleeping on a heating pad can lead to skin burns or tissue damage.  · You can alternate ice and heat therapies.  Medications  Talk to your healthcare provider before using medicines, especially if you have other medical problems or are taking other medicines.  · You may use acetaminophen or ibuprofen to control pain, unless your healthcare provider prescribed other pain medicine. If you have chronic conditions like diabetes, liver or kidney disease, stomach ulcers, or gastrointestinal bleeding, or are taking blood thinners, talk with your healthcare provider before taking any medicines.  · Be careful if you are given prescription pain medicines, narcotics, or medicine for muscle spasm. They can cause drowsiness, affect your coordination, reflexes, and judgment. Do not drive or operate heavy machinery while taking these types of medicines. Take prescription pain medicine only as prescribed by your healthcare provider.  Lumbar stretch  Here is a simple stretching exercise that will help relax muscle spasm and keep your back more limber. If exercise makes your back pain worse, dont do it.  · Lie on your back with your knees bent and both feet on the  ground.  · Slowly raise your left knee to your chest as you flatten your lower back against the floor. Hold for 5 seconds.  · Relax and repeat the exercise with your right knee.  · Do 10 of these exercises for each leg.  Safe lifting method  · Dont bend over at the waist to lift an object off the floor.  Instead, bend your knees and hips in a squat.   · Keep your back and head upright  · Hold the object close to your body, directly in front of you.  · Straighten your legs to lift the object.   · Lower the object to the floor in the reverse fashion.  · If you must slide something across the floor, push it.  Posture tips  Sitting  Sit in chairs with straight backs or low-back support. Keep your knees lower than your hips, with your feet flat on the floor.  When driving, sit up straight. Adjust the seat forward so you are not leaning toward the steering wheel.  A small pillow or rolled towel behind your lower back may help if you are driving long distances.   Standing  When standing for long periods, shift most of your weight to one leg at a time. Alternate legs every few minutes.   Sleeping  The best way to sleep is on your side with your knees bent. Put a low pillow under your head to support your neck in a neutral spine position. Avoid thick pillows that bend your neck to one side. Put a pillow between your legs to further relax your lower back. If you sleep on your back, put pillows under your knees to support your legs in a slightly flexed position. Use a firm mattress. If your mattress sags, replace it, or use a 1/2-inch plywood board under the mattress to add support.  Follow-up care  Follow up with your healthcare provider, or as advised.  If X-rays, a CT scan or an MRI scan were taken, they will be reviewed by a radiologist. You will be notified of any new findings that may affect your care.  Call 911  Seek emergency medical care if any of the following occur:  · Trouble breathing  · Confusion  · Very  drowsy  · Fainting or loss of consciousness  · Rapid or very slow heart rate  · Loss of  bowel or bladder control  When to seek medical care  Call your healthcare provider if any of the following occur:  · Pain becomes worse or spreads to your arms or legs  · Weakness or numbness in one or both arms or legs  · Numbness in the groin area  Date Last Reviewed: 6/1/2016  © 4973-6590 [employer]. [address] [place] [address]. All rights reserved. This information is not intended as a substitute for professional medical care. Always follow your healthcare professional's instructions.        Possible Causes of Low Back or Leg Pain    The symptoms in your back or leg may be due to pressure on a nerve. This pressure may be caused by a damaged disk or by abnormal bone growth. Either way, you may feel pain, burning, tingling, or numbness. If you have pressure on a nerve that connects to the sciatic nerve, pain may shoot down your leg.    Pressure from the disk  Constant wear and tear can weaken a disk over time and cause back pain. The disk can then be damaged by a sudden movement or injury. If its soft center begins to bulge, the disk may press on a nerve. Or the outside of the disk may tear, and the soft center may squeeze through and pinch a nerve.    Pressure from bone  As a disk wears out, the vertebrae right above and below the disk begin to touch. This can put pressure on a nerve. Often, abnormal bone (called bone spurs) grows where the vertebrae rub against each other. This can cause the foramen or the spinal canal to narrow (called stenosis) and press against a nerve.  Date Last Reviewed: 10/4/2015  © 7904-7034 [employer]. [address] [place] [address]. All rights reserved. This information is not intended as a substitute for professional medical care. Always follow your healthcare professional's instructions.

## 2018-04-25 NOTE — PATIENT INSTRUCTIONS
SOMEONE WILL CALL YOU TO SET UP APPOINTMENT WITH PCP. START DICLOFENAC TOMORROW AND DO NOT TAKE ANY OTHER NSAIDS SUCH AS IBUPROFEN, ALEVE, OR MOTRIN.     Please follow up with your Primary care provider within 2-5 days if your signs and symptoms have not resolved or worsen.     If your condition worsens or fails to improve we recommend that you receive another evaluation at the emergency room immediately or contact your primary medical clinic to discuss your concerns.   You must understand that you have received an Urgent Care treatment only and that you may be released before all of your medical problems are known or treated. You, the patient, will arrange for follow up care as instructed.       Back Care Tips    Caring for your back  These are things you can do to prevent a recurrence of acute back pain and to reduce symptoms from chronic back pain:  · Maintain a healthy weight. If you are overweight, losing weight will help most types of back pain.  · Exercise is an important part of recovery from most types of back pain. The muscles behind and in front of the spine support the back. This means strengthening both the back muscles and the abdominal muscles will provide better support for your spine.   · Swimming and brisk walking are good overall exercises to improve your fitness level.  · Practice safe lifting methods (below).  · Practice good posture when sitting, standing and walking. Avoid prolonged sitting. This puts more stress on the lower back than standing or walking.  · Wear quality shoes with sufficient arch support. Foot and ankle alignment can affect back symptoms. Women should avoid wearing high heels.  · Therapeutic massage can help relax the back muscles without stretching them.  · During the first 24 to 72 hours after an acute injury or flare-up of chronic back pain, apply an ice pack to the painful area for 20 minutes and then remove it for 20 minutes, over a period of 60 to 90 minutes, or  several times a day. As a safety precaution, do not use a heating pad at bedtime. Sleeping on a heating pad can lead to skin burns or tissue damage.  · You can alternate ice and heat therapies.  Medications  Talk to your healthcare provider before using medicines, especially if you have other medical problems or are taking other medicines.  · You may use acetaminophen or ibuprofen to control pain, unless your healthcare provider prescribed other pain medicine. If you have chronic conditions like diabetes, liver or kidney disease, stomach ulcers, or gastrointestinal bleeding, or are taking blood thinners, talk with your healthcare provider before taking any medicines.  · Be careful if you are given prescription pain medicines, narcotics, or medicine for muscle spasm. They can cause drowsiness, affect your coordination, reflexes, and judgment. Do not drive or operate heavy machinery while taking these types of medicines. Take prescription pain medicine only as prescribed by your healthcare provider.  Lumbar stretch  Here is a simple stretching exercise that will help relax muscle spasm and keep your back more limber. If exercise makes your back pain worse, dont do it.  · Lie on your back with your knees bent and both feet on the ground.  · Slowly raise your left knee to your chest as you flatten your lower back against the floor. Hold for 5 seconds.  · Relax and repeat the exercise with your right knee.  · Do 10 of these exercises for each leg.  Safe lifting method  · Dont bend over at the waist to lift an object off the floor.  Instead, bend your knees and hips in a squat.   · Keep your back and head upright  · Hold the object close to your body, directly in front of you.  · Straighten your legs to lift the object.   · Lower the object to the floor in the reverse fashion.  · If you must slide something across the floor, push it.  Posture tips  Sitting  Sit in chairs with straight backs or low-back support. Keep  your knees lower than your hips, with your feet flat on the floor.  When driving, sit up straight. Adjust the seat forward so you are not leaning toward the steering wheel.  A small pillow or rolled towel behind your lower back may help if you are driving long distances.   Standing  When standing for long periods, shift most of your weight to one leg at a time. Alternate legs every few minutes.   Sleeping  The best way to sleep is on your side with your knees bent. Put a low pillow under your head to support your neck in a neutral spine position. Avoid thick pillows that bend your neck to one side. Put a pillow between your legs to further relax your lower back. If you sleep on your back, put pillows under your knees to support your legs in a slightly flexed position. Use a firm mattress. If your mattress sags, replace it, or use a 1/2-inch plywood board under the mattress to add support.  Follow-up care  Follow up with your healthcare provider, or as advised.  If X-rays, a CT scan or an MRI scan were taken, they will be reviewed by a radiologist. You will be notified of any new findings that may affect your care.  Call 911  Seek emergency medical care if any of the following occur:  · Trouble breathing  · Confusion  · Very drowsy  · Fainting or loss of consciousness  · Rapid or very slow heart rate  · Loss of  bowel or bladder control  When to seek medical care  Call your healthcare provider if any of the following occur:  · Pain becomes worse or spreads to your arms or legs  · Weakness or numbness in one or both arms or legs  · Numbness in the groin area  Date Last Reviewed: 6/1/2016  © 7588-5814 [employer]. [address] [place] [address]. All rights reserved. This information is not intended as a substitute for professional medical care. Always follow your healthcare professional's instructions.        Possible Causes of Low Back or Leg Pain    The symptoms in your back or leg may be due  to pressure on a nerve. This pressure may be caused by a damaged disk or by abnormal bone growth. Either way, you may feel pain, burning, tingling, or numbness. If you have pressure on a nerve that connects to the sciatic nerve, pain may shoot down your leg.    Pressure from the disk  Constant wear and tear can weaken a disk over time and cause back pain. The disk can then be damaged by a sudden movement or injury. If its soft center begins to bulge, the disk may press on a nerve. Or the outside of the disk may tear, and the soft center may squeeze through and pinch a nerve.    Pressure from bone  As a disk wears out, the vertebrae right above and below the disk begin to touch. This can put pressure on a nerve. Often, abnormal bone (called bone spurs) grows where the vertebrae rub against each other. This can cause the foramen or the spinal canal to narrow (called stenosis) and press against a nerve.  Date Last Reviewed: 10/4/2015  © 1421-1317 [employer]. [address] [place] [address], [place] [address]. All rights reserved. This information is not intended as a substitute for professional medical care. Always follow your healthcare professional's instructions.

## 2018-06-01 ENCOUNTER — TELEPHONE (OUTPATIENT)
Dept: HEMATOLOGY/ONCOLOGY | Facility: CLINIC | Age: 68
End: 2018-06-01

## 2018-06-07 ENCOUNTER — TELEPHONE (OUTPATIENT)
Dept: NEUROLOGY | Facility: CLINIC | Age: 68
End: 2018-06-07

## 2018-06-07 ENCOUNTER — PATIENT MESSAGE (OUTPATIENT)
Dept: NEUROLOGY | Facility: CLINIC | Age: 68
End: 2018-06-07

## 2018-06-07 NOTE — TELEPHONE ENCOUNTER
I called the patient to discuss the gabapentin , she feels she needs to stop. she takes 100 mg 3 times a day. The last week the patient has been talking in her sleep and halogenating. The patient feels it's the gabapentin this medication has given her problems before. She has not taken it today at all. The patient wants a call back.

## 2018-06-07 NOTE — TELEPHONE ENCOUNTER
----- Message from Soco Sorenson sent at 6/7/2018  9:54 AM CDT -----  Contact: [phone]/self  Patient requesting to speak with you regarding side effects from medication gabapentin (NEURONTIN) 100 MG capsule. Please advise.

## 2018-06-12 ENCOUNTER — TELEPHONE (OUTPATIENT)
Dept: NEUROLOGY | Facility: CLINIC | Age: 68
End: 2018-06-12

## 2018-06-12 NOTE — TELEPHONE ENCOUNTER
I called to talk with the patient about a possible appointment for today. The patient wants to know if there is something other than gabapentin that she can take for the pain that she is having, the patient states she can't walk and she is packing to move to [place]

## 2018-07-09 ENCOUNTER — TELEPHONE (OUTPATIENT)
Dept: NEUROLOGY | Facility: CLINIC | Age: 68
End: 2018-07-09

## 2018-07-09 NOTE — TELEPHONE ENCOUNTER
The patient is crying and depressed, she has a gait problem and now wants a call to talk. I scheduled the patient an appointment for the first available which is 08/08/2018

## 2018-08-08 ENCOUNTER — OFFICE VISIT (OUTPATIENT)
Dept: NEUROLOGY | Facility: CLINIC | Age: 68
End: 2018-08-08
Payer: MEDICARE

## 2018-08-08 VITALS — HEIGHT: 60 IN | WEIGHT: 222 LBS | BODY MASS INDEX: 43.59 KG/M2

## 2018-08-08 DIAGNOSIS — R60.0 LOWER EXTREMITY EDEMA: ICD-10-CM

## 2018-08-08 DIAGNOSIS — R29.6 FREQUENT FALLS: Primary | ICD-10-CM

## 2018-08-08 PROCEDURE — 99999 PR PBB SHADOW E&M-EST. PATIENT-LVL IV: CPT | Mod: PBBFAC,,, | Performed by: PSYCHIATRY & NEUROLOGY

## 2018-08-08 PROCEDURE — 99214 OFFICE O/P EST MOD 30 MIN: CPT | Mod: S$GLB,,, | Performed by: PSYCHIATRY & NEUROLOGY

## 2018-08-08 NOTE — PATIENT INSTRUCTIONS
Fall Prevention  Falls often occur due to slipping, tripping or losing your balance. Millions of people fall every year and injure themselves. Here are ways to reduce your risk of falling again.  · Think about your fall, was there anything that caused your fall that can be fixed, removed, or replaced?  · Make your home safe by keeping walkways clear of objects you may trip over.  · Use non-slip pads under rugs. Do not use area rugs or small throw rugs.  · Use non-slip mats in bathtubs and showers.  · Install handrails and lights on staircases.  · Do not walk in poorly lit areas.  · Do not stand on chairs or wobbly ladders.  · Use caution when reaching overhead or looking upward. This position can cause a loss of balance.  · Be sure your shoes fit properly, have non-slip bottoms and are in good condition.   · Wear shoes both inside and out. Avoid going barefoot or wearing slippers.  · Be cautious when going up and down stairs, curbs, and when walking on uneven sidewalks.  · If your balance is poor, consider using a cane or walker.  · If your fall was related to alcohol use, stop or limit alcohol intake.   · If your fall was related to use of sleeping medicines, talk to your doctor about this. You may need to reduce your dosage at bedtime if you awaken during the night to go to the bathroom.    · To reduce the need for nighttime bathroom trips:  ¨ Avoid drinking fluids for several hours before going to bed  ¨ Empty your bladder before going to bed  ¨ Men can keep a urinal at the bedside  · Stay as active as you can. Balance, flexibility, strength, and endurance all come from exercise. They all play a role in preventing falls. Ask your healthcare provider which types of activity are right for you.  · Get your vision checked on a regular basis.  · If you have pets, know where they are before you stand up or walk so you don't trip over them.  · Use night lights.  Date Last Reviewed: 11/5/2015  © 7329-5534 [employer]. [address] [place] [address], [place] [address]. All rights reserved. This information is not intended as a substitute for professional medical care. Always follow your healthcare professional's instructions.

## 2018-08-08 NOTE — PROGRESS NOTES
[facility] NEUROLOGY  [facility] [place]    Date: 8/8/18  Patient Name: Genoveva Gilbert   MRN: [id number]   PCP: Tariq Crews  Referring Provider: No ref. provider found    Assessment:   Genoveva Gilbert is a 68 y.o. female presenting in follow-up for multiple neurologic issues.  Will attempt to reschedule MRI L and C-spine given worsening bilateral lower extremity weakness and bilateral radicular arm pain.  Have provided referral to PT for ongoing management of frequent falls.  Patient requests referral to Family Medicine to establish primary care with a new provider closer to her home.    Plan:     Problem List Items Addressed This Visit     None      Visit Diagnoses     Frequent falls    -  Primary    Relevant Orders    Ambulatory consult to Physical Therapy    Lower extremity edema        Relevant Orders    Ambulatory consult to Family Practice    Ambulatory consult to Internal Medicine        I spent a total of 50 minutes in face to face time with the patient, over half of which was spent on counseling and education about the patient's diagnosis and medications.     Crispin Zamora MD  [facility]   Department of Neurology    Patient note was created using Dragon Dictation.  Any errors in syntax or even information may not have been identified and edited on initial review prior to signing this note.  Subjective:        HPI:   Ms. eGnoveva Gilbert is a 68 y.o. female who presents with a chief complaint of multiple neurologic complaints presenting in follow-up.  She presents today with her  who contributes to the history.  The patient reports that since her last visit she has not followed up with the [facility] medical fitness program is recommended. She has also not yet undergone her MRI of her cervical and lumbar spine.  She reports that she has had multiple falls, all of which have been mechanical in nature, stating that she does not walk with a walker or cane despite  being told to.  She also admits that she has been climbing on ladders and climbing up stairs despite being discouraged from doing this.  She states that on the whole, however, her pain control is good and she has started walking more now that she has moved to a new apartment.  She does requests referral to a new PCP closer to her new home in [place] today.    PAST MEDICAL HISTORY:  Past Medical History:   Diagnosis Date    Allergy     Anxiety     Arthritis, rheumatoid     Back pain     Depression     Fibromyalgia     GERD (gastroesophageal reflux disease)     High cholesterol     Hypertension     Incontinence of urine     MS (multiple sclerosis)     benign; another MD stated she has no MS    Neuropathy     Restless leg syndrome     Seasonal allergies     Sinus congestion     Thyroid disease     Wheezing        PAST SURGICAL HISTORY:  Past Surgical History:   Procedure Laterality Date    APPENDECTOMY      BREAST LUMPECTOMY      bilateral, benign    BREAST SURGERY      reduction    HYSTERECTOMY      partial - fibroids    TONSILLECTOMY         CURRENT MEDS:  Current Outpatient Prescriptions   Medication Sig Dispense Refill    albuterol 90 mcg/actuation inhaler Inhale 2 puffs into the lungs every 6 (six) hours as needed for Wheezing. 1 Inhaler 6    alprazolam (XANAX) 2 MG Tab Take 1 mg by mouth nightly as needed.       amitriptyline (ELAVIL) 50 MG tablet Take 50 mg by mouth every evening.  4    amLODIPine (NORVASC) 5 MG tablet       diclofenac (VOLTAREN) 75 MG EC tablet TK 1 T PO BID  1    duloxetine (CYMBALTA) 60 MG capsule Take 60 mg by mouth 2 (two) times daily.       esomeprazole (NEXIUM) 40 MG capsule Take 40 mg by mouth before breakfast.      fexofenadine (ALLEGRA) 180 MG tablet Take 180 mg by mouth once daily.      fluticasone (FLONASE) 50 mcg/actuation nasal spray USE ONE SPRAY IEN QD  3    GLUCOSAMINE/CHONDRO MIR A (COSAMIN DS ORAL) Take by mouth 2 (two) times daily.       hydrochlorothiazide (HYDRODIURIL) 25 MG tablet Take 25 mg by mouth once daily.      hydrocodone-acetaminophen 7.5-325mg (NORCO) 7.5-325 mg per tablet Take 1 tablet by mouth every 6 (six) hours as needed for Pain.      levothyroxine (SYNTHROID) 125 MCG tablet Take 125 mcg by mouth once daily.      lovastatin (ALTOPREV) 40 mg 24 hr tablet Take 40 mg by mouth every evening.      metoprolol succinate (TOPROL-XL) 50 MG 24 hr tablet TK 1 T PO QD  1    MULTIVIT-IRON-MIN-FOLIC ACID 3,500-18-0.4 UNIT-MG-MG ORAL CHEW Take 1 tablet by mouth.      tiZANidine (ZANAFLEX) 2 MG tablet TK 1 T PO Q 8 H AS NEEDED. NTE 3 DOSES IN 24 H  1    ascorbic acid, vitamin C, (VITAMIN C) 100 MG tablet Take 100 mg by mouth once daily.      BIFIDOBACTERIUM INFANTIS (ALIGN ORAL) Take 1 capsule by mouth once daily at 6am.      biotin 300 mcg Tab Take 1 tablet by mouth once daily.      ferrous sulfate 325 (65 FE) MG EC tablet Take 325 mg by mouth 3 (three) times daily with meals.      gabapentin (NEURONTIN) 100 MG capsule Take 1 capsule (100 mg total) by mouth 3 (three) times daily. 90 capsule 11    pramipexole (MIRAPEX) 0.25 MG tablet Take 0.5 mg by mouth every evening.      valsartan (DIOVAN) 320 MG tablet Take 320 mg by mouth once daily.      vitamin E 100 UNIT capsule Take 100 Units by mouth once daily.       No current facility-administered medications for this visit.        ALLERGIES:  Review of patient's allergies indicates:   Allergen Reactions    Keflex [cephalexin]     Latex, natural rubber Anaphylaxis     bandaids     Pcn [penicillins]        FAMILY HISTORY:  Family History   Problem Relation Age of Onset    Heart disease Mother        SOCIAL HISTORY:  Social History   Substance Use Topics    Smoking status: Former Smoker     Quit date: 6/1/1996    Smokeless tobacco: Never Used    Alcohol use Yes      Comment: very little        Review of Systems:  12 review of systems is negative except for the symptoms mentioned in  HPI.      Objective:     Vitals:    08/08/18 1438   Weight: 100.7 kg (222 lb 0.1 oz)   Height: 5' (1.524 m)     General: NAD, well nourished   Eyes: no tearing, discharge, no erythema   ENT: moist mucous membranes of the oral cavity, nares patent    Neck: Supple, full range of motion  Cardiovascular: Warm and well perfused, pulses equal and symmetrical  Lungs: Normal work of breathing, normal chest wall excursions  Skin: No rash, lesions, or breakdown on exposed skin  Psychiatry: Mood and affect are appropriate   Abdomen: soft, non tender, non distended  Extremeties: No cyanosis, clubbing or edema.    Neurological   MENTAL STATUS: Alert and oriented to person, place, and time. Attention and concentration within normal limits. Speech without dysarthria, able to name and repeat without difficulty. Recent and remote memory within normal limits   CRANIAL NERVES: Visual fields intact. PERRL. EOMI. Facial sensation intact. Face symmetrical. Hearing grossly intact. Full shoulder shrug bilaterally. Tongue protrudes midline   SENSORY: Sensation is intact to light touch throughout.   MOTOR: Normal bulk and tone.  5/5 deltoid, biceps, triceps, interosseous, hand  bilaterally. 3/5 iliopsoas, knee extension/flexion, foot dorsi/plantarflexion bilaterally.    REFLEXES: Symmetric and 1+ throughout.   CEREBELLAR/COORDINATION/GAIT: Gait steady. Finger to nose intact. Normal rapid alternating movements.     MOCA Results 01/05/2018 :   Visuospatial/Executive: 5/5  Naming: 3/3  Attention: 6/6  Language: 3/3  Abstraction: 2/2  Delayed Recall: 2/5  Orientation:6/6  Total: 27/30

## 2018-09-10 ENCOUNTER — TELEPHONE (OUTPATIENT)
Dept: NEUROLOGY | Facility: CLINIC | Age: 68
End: 2018-09-10

## 2018-11-02 ENCOUNTER — TELEPHONE (OUTPATIENT)
Dept: NEUROLOGY | Facility: CLINIC | Age: 68
End: 2018-11-02

## 2018-11-02 NOTE — TELEPHONE ENCOUNTER
----- Message from Eliazar Sierra sent at 11/2/2018 12:47 PM CDT -----  Contact: [phone]/self  Patient requesting to speak with the nurse (personal). Patient did not care to elaborate   Please advise

## 2018-12-19 ENCOUNTER — OFFICE VISIT (OUTPATIENT)
Dept: NEUROLOGY | Facility: CLINIC | Age: 68
End: 2018-12-19
Payer: MEDICARE

## 2018-12-19 VITALS
HEIGHT: 60 IN | WEIGHT: 216.06 LBS | SYSTOLIC BLOOD PRESSURE: 121 MMHG | BODY MASS INDEX: 42.42 KG/M2 | HEART RATE: 54 BPM | DIASTOLIC BLOOD PRESSURE: 74 MMHG

## 2018-12-19 DIAGNOSIS — M54.32 SCIATICA OF LEFT SIDE: ICD-10-CM

## 2018-12-19 DIAGNOSIS — M79.7 FIBROMYALGIA: ICD-10-CM

## 2018-12-19 PROBLEM — M54.31 BILATERAL SCIATICA: Status: ACTIVE | Noted: 2018-01-05

## 2018-12-19 PROCEDURE — 3074F SYST BP LT 130 MM HG: CPT | Mod: CPTII,S$GLB,, | Performed by: PSYCHIATRY & NEUROLOGY

## 2018-12-19 PROCEDURE — 3078F DIAST BP <80 MM HG: CPT | Mod: CPTII,S$GLB,, | Performed by: PSYCHIATRY & NEUROLOGY

## 2018-12-19 PROCEDURE — 99999 PR PBB SHADOW E&M-EST. PATIENT-LVL III: CPT | Mod: PBBFAC,,, | Performed by: PSYCHIATRY & NEUROLOGY

## 2018-12-19 PROCEDURE — 99214 OFFICE O/P EST MOD 30 MIN: CPT | Mod: S$GLB,,, | Performed by: PSYCHIATRY & NEUROLOGY

## 2018-12-19 PROCEDURE — 1101F PT FALLS ASSESS-DOCD LE1/YR: CPT | Mod: CPTII,S$GLB,, | Performed by: PSYCHIATRY & NEUROLOGY

## 2018-12-19 RX ORDER — PREGABALIN 50 MG/1
CAPSULE ORAL
Refills: 5 | COMMUNITY
Start: 2018-10-24 | End: 2018-12-19 | Stop reason: SDUPTHER

## 2018-12-19 RX ORDER — PREGABALIN 75 MG/1
75 CAPSULE ORAL 3 TIMES DAILY
Qty: 270 CAPSULE | Refills: 1 | Status: SHIPPED | OUTPATIENT
Start: 2018-12-19 | End: 2019-06-02 | Stop reason: SDUPTHER

## 2018-12-19 NOTE — ASSESSMENT & PLAN NOTE
Obtaining records from [facility], imaging discs never sent  Anticipate repeat referral to pain management  Increasing lyrica to 75 mg TID dosing

## 2018-12-19 NOTE — PROGRESS NOTES
[facility] NEUROLOGY  [facility] [place]    Date: 12/19/18  Patient Name: Genoveva Gilbert   MRN: [id number]   PCP: Tariq Crews  Referring Provider: No ref. provider found    Assessment:   Genoveva Gilbert is a 68 y.o. female presenting in follow-up for multiple neurologic issues.  Will attempt to obtain outside records of imaging.  Patient has failed repeat trials of PT.  Anticipate referral to pain management.  Will increase Lyrica to 75 mg now with t.i.d. in lieu of b.i.d. dosing.    Plan:     Problem List Items Addressed This Visit        Orthopedic    Bilateral sciatica    Current Assessment & Plan     Obtaining records from [facility], imaging discs never sent  Anticipate repeat referral to pain management  Increasing lyrica to 75 mg TID dosing           Fibromyalgia    Current Assessment & Plan     Increasing lyrica to 75 mg TID             Crispin Zamora MD  [facility]   Department of Neurology    Patient note was created using Dragon Dictation.  Any errors in syntax or even information may not have been identified and edited on initial review prior to signing this note.  Subjective:        HPI:   Ms. Genoveva Gilbert is a 68 y.o. female who presents with a chief complaint of multiple neurologic complaints presenting in follow-up.   Presenting in follow-up for chronic back pain and sciatica.  She presents today with her  who contributes to the history.  The patient reports that she has been doing okay since her last visit.  She initially tried gabapentin which she found over sedating and is now taking Lyrica however is taking it only b.i.d..  She notes that the medication tends to wear off mid day.  She did undergo imaging at an outside imaging facility however she does not bring the records of this with her today and they were not sent the office.  She has completed and failed many attempted physical therapy with no improvement though is willing to consider repeat  referral to pain management pending her results.    PAST MEDICAL HISTORY:  Past Medical History:   Diagnosis Date    Allergy     Anxiety     Arthritis, rheumatoid     Back pain     Depression     Fibromyalgia     GERD (gastroesophageal reflux disease)     High cholesterol     Hypertension     Incontinence of urine     MS (multiple sclerosis)     benign; another MD stated she has no MS    Neuropathy     Restless leg syndrome     Sciatica of left side 1/5/2018    Seasonal allergies     Sinus congestion     Thyroid disease     Wheezing        PAST SURGICAL HISTORY:  Past Surgical History:   Procedure Laterality Date    APPENDECTOMY      ARTHROSCOPY, KNEE Left 3/20/2014    [facility]    ARTHROSCOPY-SHOULDER Right 4/16/2015    [facility]    BREAST LUMPECTOMY      bilateral, benign    BREAST SURGERY      reduction    CHONDROPLASTY-KNEE Left 3/20/2014    [facility]    ESOPHAGOGASTRODUODENOSCOPY (EGD) N/A 7/5/2017    Performed by [facility] ([facility] F[facility])    HYSTERECTOMY      partial - fibroids    REPAIR ROTATOR CUFF ARTHROSCOPIC Right 4/16/2015    [facility]    TONSILLECTOMY         CURRENT MEDS:  Current Outpatient Medications   Medication Sig Dispense Refill    albuterol 90 mcg/actuation inhaler Inhale 2 puffs into the lungs every 6 (six) hours as needed for Wheezing. 1 Inhaler 6    alprazolam (XANAX) 2 MG Tab Take 1 mg by mouth nightly as needed.       amitriptyline (ELAVIL) 50 MG tablet Take 50 mg by mouth every evening.  4    amLODIPine (NORVASC) 5 MG tablet       ascorbic acid, vitamin C, (VITAMIN C) 100 MG tablet Take 100 mg by mouth once daily.      BIFIDOBACTERIUM INFANTIS (ALIGN ORAL) Take 1 capsule by mouth once daily at 6am.      biotin 300 mcg Tab Take 1 tablet by mouth once daily.      diclofenac (VOLTAREN) 75 MG EC  tablet TK 1 T PO BID  1    duloxetine (CYMBALTA) 60 MG capsule Take 60 mg by mouth 2 (two) times daily.       esomeprazole (NEXIUM) 40 MG capsule Take 40 mg by mouth before breakfast.      ferrous sulfate 325 (65 FE) MG EC tablet Take 325 mg by mouth 3 (three) times daily with meals.      fexofenadine (ALLEGRA) 180 MG tablet Take 180 mg by mouth once daily.      fluticasone (FLONASE) 50 mcg/actuation nasal spray USE ONE SPRAY IEN QD  3    GLUCOSAMINE/CHONDRO MIR A (COSAMIN DS ORAL) Take by mouth 2 (two) times daily.      hydrochlorothiazide (HYDRODIURIL) 25 MG tablet Take 25 mg by mouth once daily.      hydrocodone-acetaminophen 7.5-325mg (NORCO) 7.5-325 mg per tablet Take 1 tablet by mouth every 6 (six) hours as needed for Pain.      levothyroxine (SYNTHROID) 125 MCG tablet Take 125 mcg by mouth once daily.      lovastatin (ALTOPREV) 40 mg 24 hr tablet Take 40 mg by mouth every evening.      metoprolol succinate (TOPROL-XL) 50 MG 24 hr tablet TK 1 T PO QD  1    MULTIVIT-IRON-MIN-FOLIC ACID 3,500-18-0.4 UNIT-MG-MG ORAL CHEW Take 1 tablet by mouth.      pramipexole (MIRAPEX) 0.25 MG tablet Take 0.5 mg by mouth every evening.      tiZANidine (ZANAFLEX) 2 MG tablet TK 1 T PO Q 8 H AS NEEDED. NTE 3 DOSES IN 24 H  1    valsartan (DIOVAN) 320 MG tablet Take 320 mg by mouth once daily.      vitamin E 100 UNIT capsule Take 100 Units by mouth once daily.      pregabalin (LYRICA) 75 MG capsule Take 1 capsule (75 mg total) by mouth 3 (three) times daily. 270 capsule 1     No current facility-administered medications for this visit.        ALLERGIES:  Review of patient's allergies indicates:   Allergen Reactions    Keflex [cephalexin]     Latex, natural rubber Anaphylaxis     bandaids     Pcn [penicillins]      FAMILY HISTORY:  Family History   Problem Relation Age of Onset    Heart disease Mother      SOCIAL HISTORY:  Social History     Tobacco Use    Smoking status: Former Smoker     Last attempt to quit:  1996     Years since quittin.5    Smokeless tobacco: Never Used   Substance Use Topics    Alcohol use: Yes     Comment: very little     Drug use: No     Review of Systems:  12 review of systems is negative except for the symptoms mentioned in HPI.      Objective:     Vitals:    18 1114   BP: 121/74   Pulse: (!) 54   Weight: 98 kg (216 lb 0.8 oz)   Height: 5' (1.524 m)     General: NAD, well nourished   Eyes: no tearing, discharge, no erythema   ENT: moist mucous membranes of the oral cavity, nares patent    Neck: Supple, full range of motion  Cardiovascular: Warm and well perfused, pulses equal and symmetrical  Lungs: Normal work of breathing, normal chest wall excursions  Skin: No rash, lesions, or breakdown on exposed skin  Psychiatry: Mood and affect are appropriate   Abdomen: soft, non tender, non distended  Extremeties: No cyanosis, clubbing or edema.    Neurological   MENTAL STATUS: Alert and oriented to person, place, and time. Attention and concentration within normal limits. Speech without dysarthria, able to name and repeat without difficulty. Recent and remote memory within normal limits   CRANIAL NERVES: Visual fields intact. PERRL. EOMI. Facial sensation intact. Face symmetrical. Hearing grossly intact. Full shoulder shrug bilaterally. Tongue protrudes midline   SENSORY: Sensation is intact to light touch throughout.   MOTOR: Normal bulk and tone.  5/5 deltoid, biceps, triceps, interosseous, hand  bilaterally. 3/5 iliopsoas, knee extension/flexion, foot dorsi/plantarflexion bilaterally.    REFLEXES: Symmetric and 1+ throughout.   CEREBELLAR/COORDINATION/GAIT: Gait steady.  Normal rapid alternating movements.     MOCA Results 2018 :   Visuospatial/Executive: 5/5  Naming: 3/3  Attention:   Language: 3/3  Abstraction:   Delayed Recall:   Orientation:  Total:

## 2018-12-21 ENCOUNTER — TELEPHONE (OUTPATIENT)
Dept: NEUROLOGY | Facility: CLINIC | Age: 68
End: 2018-12-21

## 2018-12-21 NOTE — TELEPHONE ENCOUNTER
----- Message from Claire Griffin sent at 12/21/2018  2:10 PM CST -----  Contact: Self [phone]  Patient would like to speak with you about speaking with someone at [facility] and they are mailing the disk out. Please advise

## 2018-12-21 NOTE — TELEPHONE ENCOUNTER
I called [facility] and left a message for the patient to discuss the labs coming from [facility] and also Dr. Zamora wants to see them after the baby comes.

## 2018-12-21 NOTE — TELEPHONE ENCOUNTER
I spoke with the patient and she states that [facility] is to send over a copy of the disk as late as today and to call her back if I don't  Get it

## 2018-12-26 DIAGNOSIS — M54.12 CERVICAL RADICULOPATHY: ICD-10-CM

## 2018-12-26 DIAGNOSIS — M54.16 LUMBAR RADICULOPATHY: Primary | ICD-10-CM

## 2019-02-13 ENCOUNTER — OFFICE VISIT (OUTPATIENT)
Dept: PAIN MEDICINE | Facility: CLINIC | Age: 69
End: 2019-02-13
Payer: MEDICARE

## 2019-02-13 VITALS
WEIGHT: 216 LBS | HEIGHT: 61 IN | DIASTOLIC BLOOD PRESSURE: 65 MMHG | HEART RATE: 57 BPM | BODY MASS INDEX: 40.78 KG/M2 | SYSTOLIC BLOOD PRESSURE: 107 MMHG

## 2019-02-13 DIAGNOSIS — M47.896 OTHER SPONDYLOSIS, LUMBAR REGION: ICD-10-CM

## 2019-02-13 DIAGNOSIS — M50.30 DDD (DEGENERATIVE DISC DISEASE), CERVICAL: ICD-10-CM

## 2019-02-13 DIAGNOSIS — M51.36 DDD (DEGENERATIVE DISC DISEASE), LUMBAR: Primary | ICD-10-CM

## 2019-02-13 PROCEDURE — 3074F SYST BP LT 130 MM HG: CPT | Mod: CPTII,S$GLB,, | Performed by: ANESTHESIOLOGY

## 2019-02-13 PROCEDURE — 3078F DIAST BP <80 MM HG: CPT | Mod: CPTII,S$GLB,, | Performed by: ANESTHESIOLOGY

## 2019-02-13 PROCEDURE — 99999 PR PBB SHADOW E&M-EST. PATIENT-LVL III: ICD-10-PCS | Mod: PBBFAC,,, | Performed by: ANESTHESIOLOGY

## 2019-02-13 PROCEDURE — 99204 PR OFFICE/OUTPT VISIT, NEW, LEVL IV, 45-59 MIN: ICD-10-PCS | Mod: S$GLB,,, | Performed by: ANESTHESIOLOGY

## 2019-02-13 PROCEDURE — 1101F PR PT FALLS ASSESS DOC 0-1 FALLS W/OUT INJ PAST YR: ICD-10-PCS | Mod: CPTII,S$GLB,, | Performed by: ANESTHESIOLOGY

## 2019-02-13 PROCEDURE — 99999 PR PBB SHADOW E&M-EST. PATIENT-LVL III: CPT | Mod: PBBFAC,,, | Performed by: ANESTHESIOLOGY

## 2019-02-13 PROCEDURE — 3074F PR MOST RECENT SYSTOLIC BLOOD PRESSURE < 130 MM HG: ICD-10-PCS | Mod: CPTII,S$GLB,, | Performed by: ANESTHESIOLOGY

## 2019-02-13 PROCEDURE — 1101F PT FALLS ASSESS-DOCD LE1/YR: CPT | Mod: CPTII,S$GLB,, | Performed by: ANESTHESIOLOGY

## 2019-02-13 PROCEDURE — 3078F PR MOST RECENT DIASTOLIC BLOOD PRESSURE < 80 MM HG: ICD-10-PCS | Mod: CPTII,S$GLB,, | Performed by: ANESTHESIOLOGY

## 2019-02-13 PROCEDURE — 99204 OFFICE O/P NEW MOD 45 MIN: CPT | Mod: S$GLB,,, | Performed by: ANESTHESIOLOGY

## 2019-02-13 PROCEDURE — 99499 UNLISTED E&M SERVICE: CPT | Mod: S$GLB,,, | Performed by: ANESTHESIOLOGY

## 2019-02-13 PROCEDURE — 99499 RISK ADDL DX/OHS AUDIT: ICD-10-PCS | Mod: S$GLB,,, | Performed by: ANESTHESIOLOGY

## 2019-02-13 RX ORDER — VENLAFAXINE HYDROCHLORIDE 75 MG/1
CAPSULE, EXTENDED RELEASE ORAL
Refills: 3 | COMMUNITY
Start: 2018-12-14 | End: 2020-06-05 | Stop reason: DRUGHIGH

## 2019-02-13 RX ORDER — LOSARTAN POTASSIUM 100 MG/1
TABLET ORAL
Refills: 1 | COMMUNITY
Start: 2019-01-21 | End: 2019-05-09

## 2019-02-13 RX ORDER — OMEPRAZOLE 20 MG/1
CAPSULE, DELAYED RELEASE ORAL
Refills: 3 | COMMUNITY
Start: 2018-12-21 | End: 2020-10-01

## 2019-02-13 RX ORDER — POTASSIUM CHLORIDE 750 MG/1
TABLET, EXTENDED RELEASE ORAL
Refills: 4 | COMMUNITY
Start: 2018-12-14 | End: 2019-09-03 | Stop reason: ALTCHOICE

## 2019-02-13 RX ORDER — PROMETHAZINE HYDROCHLORIDE AND CODEINE PHOSPHATE 6.25; 1 MG/5ML; MG/5ML
SOLUTION ORAL
Refills: 2 | COMMUNITY
Start: 2019-01-03 | End: 2019-06-25 | Stop reason: ALTCHOICE

## 2019-02-13 NOTE — H&P (VIEW-ONLY)
This note was completed with dictation software and grammatical errors may exist.    Referring Physician: Crispin Zamora MD    PCP: Tariq Crews MD      CC:  Lower back pain    HPI:   Genoveva Gilbert is a 69 y.o. female referred to us for lower back pain. Pain has been gradually worsened over past 3 years.  No recent traumatic incident.  She has constant intermittent aching, throbbing pain in her lower back.  Pain rarely radiates down her lower extremities.  Pain worsens standing, bending, walking, lifting getting worked.  Pain improves with rest.  She has tried physical therapy with minimal benefit.  She really has had MRIs of the cervical and lumbar spine.  She has not had recent interventions lumbar spine.  She denies any worsening weakness.  No bowel bladder changes.    ROS:  CONSTITUTIONAL: No fevers, chills, night sweats, wt. loss, appetite changes  SKIN: no rashes or itching  ENT: No headaches, head trauma, vision changes, or eye pain  LYMPH NODES: None noticed   CV: No chest pain, palpitations.   RESP: No shortness of breath, dyspnea on exertion, cough, wheezing, or hemoptysis  GI: No nausea, emesis, diarrhea, constipation, melena, hematochezia, pain.    : No dysuria, hematuria, urgency, or frequency   HEME: No easy bruising, bleeding problems  PSYCHIATRIC: No depression, anxiety, psychosis, hallucinations.  NEURO: No seizures, memory loss, dizziness or difficulty sleeping  MSK:  Positive HPI      Past Medical History:   Diagnosis Date    Allergy     Anxiety     Arthritis, rheumatoid     Back pain     Depression     Fibromyalgia     GERD (gastroesophageal reflux disease)     High cholesterol     Hypertension     Incontinence of urine     MS (multiple sclerosis)     benign; another MD stated she has no MS    Neuropathy     Restless leg syndrome     Sciatica of left side 1/5/2018    Seasonal allergies     Sinus congestion     Thyroid disease     Wheezing      Past Surgical History:  "  Procedure Laterality Date    APPENDECTOMY      ARTHROSCOPY, KNEE Left 3/20/2014    [facility]    ARTHROSCOPY-SHOULDER Right 2015    [facility]    BREAST LUMPECTOMY      bilateral, benign    BREAST SURGERY      reduction    CHONDROPLASTY-KNEE Left 3/20/2014    [facility]    ESOPHAGOGASTRODUODENOSCOPY (EGD) N/A 2017    [facility] (4[facility] F[facility])    HYSTERECTOMY      partial - fibroids    REPAIR ROTATOR CUFF ARTHROSCOPIC Right 2015    [facility]    TONSILLECTOMY       Family History   Problem Relation Age of Onset    Heart disease Mother      Social History     Socioeconomic History    Marital status:      Spouse name: None    Number of children: None    Years of education: None    Highest education level: None   Social Needs    Financial resource strain: None    Food insecurity - worry: None    Food insecurity - inability: None    Transportation needs - medical: None    Transportation needs - non-medical: None   Occupational History    None   Tobacco Use    Smoking status: Former Smoker     Last attempt to quit: 1996     Years since quittin.7    Smokeless tobacco: Never Used   Substance and Sexual Activity    Alcohol use: Yes     Comment: very little     Drug use: No    Sexual activity: None   Other Topics Concern    None   Social History Narrative    None         Medications/Allergies: See med card    Vitals:    19 1420   BP: 107/65   Pulse: (!) 57   Weight: 98 kg (216 lb)   Height: 5' 1" (1.549 m)   PainSc:   8   PainLoc: Back         Physical exam:    GENERAL: A and O x3, the patient appears well groomed and is in no acute distress.  Skin: No rashes or obvious lesions  HEENT: normocephalic, atraumatic  CARDIOVASCULAR:  Palpable peripheral pulses  LUNGS: easy work of " breathing  ABDOMEN: soft, nontender   UPPER EXTREMITIES: Normal alignment, normal range of motion, no atrophy, no skin changes,  hair growth and nail growth normal and equal bilaterally. No swelling, no tenderness.    LOWER EXTREMITIES:  Normal alignment, normal range of motion, no atrophy, no skin changes,  hair growth and nail growth normal and equal bilaterally. No swelling, no tenderness.  LUMBAR SPINE  Lumbar spine: ROM is very limited with flexion extension and oblique extension with moderate increased pain.    Avinash's test causes no increased pain on either side.    Supine straight leg raise is negative bilaterally.    Internal and external rotation of the hip causes no increased pain on either side.  Myofascial exam: No tenderness to palpation across lumbar paraspinous muscles.      MENTAL STATUS: normal orientation, speech, language, and fund of knowledge for social situation.  Emotional state appropriate.    CRANIAL NERVES:  II:  PERRL bilaterally,   III,IV,VI: EOMI.    V:  Facial sensation equal bilaterally  VII:  Facial motor function normal.  VIII:  Hearing equal to finger rub bilaterally  IX/X: Gag normal, palate symmetric  XI:  Shoulder shrug equal, head turn equal  XII:  Tongue midline without fasciculations      MOTOR: Tone and bulk: normal bilateral upper and lower Strength: normal   Delt Bi Tri WE WF     R 5 5 5 5 5 5   L 5 5 5 5 5 5     IP ADD ABD Quad TA Gas HAM  R 5 5 5 5 5 5 5  L 5 5 5 5 5 5 5    SENSATION: Light touch and pinprick intact bilaterally  REFLEXES: normal, symmetric, nonbrisk.  Toes down, no clonus. No hoffmans.  GAIT: normal rise, base, steps, and arm swing.        Imaging:  MRI L-spine 4/2018 [facility]  L1-2, moderate facet degenerative changes bilaterally.  Mild neural foraminal narrowing bilaterally.  L2-3, moderate facet changes bilaterally.  Mild left-sided neural foraminal narrowing  L3-4, diffuse disc protrusion.  Moderate facet and ligamentum flavum hypertrophy.   Moderate narrowing of the right-sided neural foraminal  L4-5, annular disc bulge.  Moderate facet changes.  Mild narrowing of neural foraminal bilateral.  L5-S1, unroofing of the disc posteriorly due to anterolisthesis of L5 on S1.  Severe facet changes.  Severe neural foraminal narrowing on the left and mild on the right    Assessment:  Patient referred for lower back pain  1. DDD (degenerative disc disease), lumbar    2. Other spondylosis, lumbar region    3. DDD (degenerative disc disease), cervical          Plan:  1. I have stressed the importance of physical activity and exercise to improve overall health  2. Reviewed pertinent imaging and records with patient  3. I believe her low back pain maybe due to facet arthropathy and have recommended lumbar medial branch blocks as a diagnostic procedure.  If successful, would proceed with radiofrequency ablation.  4. Follow up after procedure      Thank you for referring this interesting patient, and I look forward to continuing to collaborate in her care.

## 2019-02-13 NOTE — PROGRESS NOTES
This note was completed with dictation software and grammatical errors may exist.    Referring Physician: Crispin Zamora MD    PCP: Tariq Crews MD      CC:  Lower back pain    HPI:   Genovvea Gilbert is a 69 y.o. female referred to us for lower back pain. Pain has been gradually worsened over past 3 years.  No recent traumatic incident.  She has constant intermittent aching, throbbing pain in her lower back.  Pain rarely radiates down her lower extremities.  Pain worsens standing, bending, walking, lifting getting worked.  Pain improves with rest.  She has tried physical therapy with minimal benefit.  She really has had MRIs of the cervical and lumbar spine.  She has not had recent interventions lumbar spine.  She denies any worsening weakness.  No bowel bladder changes.    ROS:  CONSTITUTIONAL: No fevers, chills, night sweats, wt. loss, appetite changes  SKIN: no rashes or itching  ENT: No headaches, head trauma, vision changes, or eye pain  LYMPH NODES: None noticed   CV: No chest pain, palpitations.   RESP: No shortness of breath, dyspnea on exertion, cough, wheezing, or hemoptysis  GI: No nausea, emesis, diarrhea, constipation, melena, hematochezia, pain.    : No dysuria, hematuria, urgency, or frequency   HEME: No easy bruising, bleeding problems  PSYCHIATRIC: No depression, anxiety, psychosis, hallucinations.  NEURO: No seizures, memory loss, dizziness or difficulty sleeping  MSK:  Positive HPI      Past Medical History:   Diagnosis Date    Allergy     Anxiety     Arthritis, rheumatoid     Back pain     Depression     Fibromyalgia     GERD (gastroesophageal reflux disease)     High cholesterol     Hypertension     Incontinence of urine     MS (multiple sclerosis)     benign; another MD stated she has no MS    Neuropathy     Restless leg syndrome     Sciatica of left side 1/5/2018    Seasonal allergies     Sinus congestion     Thyroid disease     Wheezing      Past Surgical History:  "  Procedure Laterality Date    APPENDECTOMY      ARTHROSCOPY, KNEE Left 3/20/2014    [facility]    ARTHROSCOPY-SHOULDER Right 2015    [facility]    BREAST LUMPECTOMY      bilateral, benign    BREAST SURGERY      reduction    CHONDROPLASTY-KNEE Left 3/20/2014    [facility]    ESOPHAGOGASTRODUODENOSCOPY (EGD) N/A 2017    [facility] ([facility])    HYSTERECTOMY      partial - fibroids    REPAIR ROTATOR CUFF ARTHROSCOPIC Right 2015    [facility]    TONSILLECTOMY       Family History   Problem Relation Age of Onset    Heart disease Mother      Social History     Socioeconomic History    Marital status:      Spouse name: None    Number of children: None    Years of education: None    Highest education level: None   Social Needs    Financial resource strain: None    Food insecurity - worry: None    Food insecurity - inability: None    Transportation needs - medical: None    Transportation needs - non-medical: None   Occupational History    None   Tobacco Use    Smoking status: Former Smoker     Last attempt to quit: 1996     Years since quittin.7    Smokeless tobacco: Never Used   Substance and Sexual Activity    Alcohol use: Yes     Comment: very little     Drug use: No    Sexual activity: None   Other Topics Concern    None   Social History Narrative    None         Medications/Allergies: See med card    Vitals:    19 1420   BP: 107/65   Pulse: (!) 57   Weight: 98 kg (216 lb)   Height: 5' 1" (1.549 m)   PainSc:   8   PainLoc: Back         Physical exam:    GENERAL: A and O x3, the patient appears well groomed and is in no acute distress.  Skin: No rashes or obvious lesions  HEENT: normocephalic, atraumatic  CARDIOVASCULAR:  Palpable peripheral pulses  LUNGS: easy work of " breathing  ABDOMEN: soft, nontender   UPPER EXTREMITIES: Normal alignment, normal range of motion, no atrophy, no skin changes,  hair growth and nail growth normal and equal bilaterally. No swelling, no tenderness.    LOWER EXTREMITIES:  Normal alignment, normal range of motion, no atrophy, no skin changes,  hair growth and nail growth normal and equal bilaterally. No swelling, no tenderness.  LUMBAR SPINE  Lumbar spine: ROM is very limited with flexion extension and oblique extension with moderate increased pain.    Avinash's test causes no increased pain on either side.    Supine straight leg raise is negative bilaterally.    Internal and external rotation of the hip causes no increased pain on either side.  Myofascial exam: No tenderness to palpation across lumbar paraspinous muscles.      MENTAL STATUS: normal orientation, speech, language, and fund of knowledge for social situation.  Emotional state appropriate.    CRANIAL NERVES:  II:  PERRL bilaterally,   III,IV,VI: EOMI.    V:  Facial sensation equal bilaterally  VII:  Facial motor function normal.  VIII:  Hearing equal to finger rub bilaterally  IX/X: Gag normal, palate symmetric  XI:  Shoulder shrug equal, head turn equal  XII:  Tongue midline without fasciculations      MOTOR: Tone and bulk: normal bilateral upper and lower Strength: normal   Delt Bi Tri WE WF     R 5 5 5 5 5 5   L 5 5 5 5 5 5     IP ADD ABD Quad TA Gas HAM  R 5 5 5 5 5 5 5  L 5 5 5 5 5 5 5    SENSATION: Light touch and pinprick intact bilaterally  REFLEXES: normal, symmetric, nonbrisk.  Toes down, no clonus. No hoffmans.  GAIT: normal rise, base, steps, and arm swing.        Imaging:  MRI L-spine 4/2018 [facility]  L1-2, moderate facet degenerative changes bilaterally.  Mild neural foraminal narrowing bilaterally.  L2-3, moderate facet changes bilaterally.  Mild left-sided neural foraminal narrowing  L3-4, diffuse disc protrusion.  Moderate facet and ligamentum flavum hypertrophy.   Moderate narrowing of the right-sided neural foraminal  L4-5, annular disc bulge.  Moderate facet changes.  Mild narrowing of neural foraminal bilateral.  L5-S1, unroofing of the disc posteriorly due to anterolisthesis of L5 on S1.  Severe facet changes.  Severe neural foraminal narrowing on the left and mild on the right    Assessment:  Patient referred for lower back pain  1. DDD (degenerative disc disease), lumbar    2. Other spondylosis, lumbar region    3. DDD (degenerative disc disease), cervical          Plan:  1. I have stressed the importance of physical activity and exercise to improve overall health  2. Reviewed pertinent imaging and records with patient  3. I believe her low back pain maybe due to facet arthropathy and have recommended lumbar medial branch blocks as a diagnostic procedure.  If successful, would proceed with radiofrequency ablation.  4. Follow up after procedure      Thank you for referring this interesting patient, and I look forward to continuing to collaborate in her care.

## 2019-02-13 NOTE — PROGRESS NOTES
Patient, Genoveva Gilbert (MRN #[id number]), presented with a recorded BMI of 40.81 kg/m^2 consistent with the definition of morbid obesity (ICD-10 E66.01). The patient's morbid obesity was monitored, evaluated, addressed and/or treated. This addendum to the medical record is made on 02/13/2019.

## 2019-02-14 DIAGNOSIS — M47.896 OTHER SPONDYLOSIS, LUMBAR REGION: Primary | ICD-10-CM

## 2019-02-25 DIAGNOSIS — M25.511 RIGHT SHOULDER PAIN, UNSPECIFIED CHRONICITY: ICD-10-CM

## 2019-02-25 DIAGNOSIS — M25.559 ARTHRALGIA OF HIP, UNSPECIFIED LATERALITY: Primary | ICD-10-CM

## 2019-02-28 ENCOUNTER — HOSPITAL ENCOUNTER (OUTPATIENT)
Facility: AMBULARY SURGERY CENTER | Age: 69
Discharge: HOME | End: 2019-02-28
Attending: ANESTHESIOLOGY | Admitting: ANESTHESIOLOGY
Payer: MEDICARE

## 2019-02-28 DIAGNOSIS — M47.896 OTHER SPONDYLOSIS, LUMBAR REGION: Primary | ICD-10-CM

## 2019-02-28 PROCEDURE — 99152 PR MOD CONSCIOUS SEDATION, SAME PHYS, 5+ YRS, FIRST 15 MIN: ICD-10-PCS | Mod: ,,, | Performed by: ANESTHESIOLOGY

## 2019-02-28 PROCEDURE — 64494 PR INJ DX/THER AGNT PARAVERT FACET JOINT,IMG GUIDE,LUMBAR/SAC, 2ND LEVEL: ICD-10-PCS | Mod: 50,,, | Performed by: ANESTHESIOLOGY

## 2019-02-28 PROCEDURE — 64493 PR INJ DX/THER AGNT PARAVERT FACET JOINT,IMG GUIDE,LUMBAR/SAC,1ST LVL: ICD-10-PCS | Mod: 50,,, | Performed by: ANESTHESIOLOGY

## 2019-02-28 PROCEDURE — 64494 INJ PARAVERT F JNT L/S 2 LEV: CPT | Mod: RT | Performed by: ANESTHESIOLOGY

## 2019-02-28 PROCEDURE — 64494 INJ PARAVERT F JNT L/S 2 LEV: CPT | Mod: 50,,, | Performed by: ANESTHESIOLOGY

## 2019-02-28 PROCEDURE — 99152 MOD SED SAME PHYS/QHP 5/>YRS: CPT | Mod: ,,, | Performed by: ANESTHESIOLOGY

## 2019-02-28 PROCEDURE — 64493 INJ PARAVERT F JNT L/S 1 LEV: CPT | Mod: 50,,, | Performed by: ANESTHESIOLOGY

## 2019-02-28 PROCEDURE — 64493 INJ PARAVERT F JNT L/S 1 LEV: CPT | Mod: LT | Performed by: ANESTHESIOLOGY

## 2019-02-28 RX ORDER — MIDAZOLAM HYDROCHLORIDE 2 MG/2ML
INJECTION, SOLUTION INTRAMUSCULAR; INTRAVENOUS
Status: DISCONTINUED | OUTPATIENT
Start: 2019-02-28 | End: 2019-02-28 | Stop reason: HOSPADM

## 2019-02-28 RX ORDER — BUPIVACAINE HYDROCHLORIDE 5 MG/ML
INJECTION, SOLUTION EPIDURAL; INTRACAUDAL
Status: DISCONTINUED | OUTPATIENT
Start: 2019-02-28 | End: 2019-02-28 | Stop reason: HOSPADM

## 2019-02-28 RX ORDER — SODIUM CHLORIDE, SODIUM LACTATE, POTASSIUM CHLORIDE, CALCIUM CHLORIDE 600; 310; 30; 20 MG/100ML; MG/100ML; MG/100ML; MG/100ML
INJECTION, SOLUTION INTRAVENOUS ONCE AS NEEDED
Status: DISCONTINUED | OUTPATIENT
Start: 2019-02-28 | End: 2019-02-28 | Stop reason: HOSPADM

## 2019-02-28 NOTE — OP NOTE
PROCEDURE DATE: 2/28/2019    PROCEDURE:  Bilateral L3,4,5 medial branch nerve blocks    DIAGNOSIS:  Other lumbar spondylosis    Post Op diagnosis: Same    PHYSICIAN: Thad Manning MD    MEDICATIONS INJECTED: 0.5% bupivicaine, 0.5 ml at each level    SEDATION MEDICATIONS:RN IV Versed    LOCAL ANESTHETIC USED: None    ESTIMATED BLOOD LOSS:  None    COMPLICATIONS:  None    TECHNIQUE: A time out was taken to identify the patient, procedure and side of the procedure. The patient was placed in a prone position, then prepped and draped in the usual sterile fashion using ChloraPrep and sterile towels.  The levels were determined under fluoroscopic guidance and then marked.  A 25-gauge 3.5 inch needle was introduced to the anatomic location of the L3,4,5 medial branch nerves on the bilateral side. The above medication was then injected. The patient tolerated the procedure well.     The patient was monitored after the procedure. Patient was given pain diary to record pain levels at home.     If found to have greater than a 50% recovery and so will be scheduled for a radiofrequency ablation of the corresponding nerves.  Patient was given post procedure and discharge instructions to follow at home.  The patient was discharged in a stable condition.

## 2019-02-28 NOTE — DISCHARGE INSTRUCTIONS
Before leaving, please make sure you have all your personal belongings such as glasses, purses, wallets, keys, cell phones, jewelry, jackets etc  Anesthesia information    Anesthesia Safety      You have been given medicine  to sedate you during your procedure today. This may have included both a pain medicine and sleeping medicine. Most of the effects have worn off; however, you may continue to have some drowsiness for the next  24 hours. Anesthesia and pain medicines can cause nausea, sleepiness, dizziness and  constipation.    HOME CARE:  1) For the next EIGHT HOURS, you should be watched by a responsible adult to look for any worsening of your condition.  2) DO NOT DRINK any ALCOHOL for the next 24 HOURS.  3) DO NOT DRIVE or operate dangerous machinery during the next 24 HOURS.  FOLLOW UP with your doctor or this facility if you are not alert and back to your usual level of activity within 24 hrs.  GET PROMPT MEDICAL ATTENTION if any of the following occur:  -- Increased drowsiness  -- Increased weakness or dizziness  -- Repeated vomiting  -- If you cannot be awakened    Nerve Block  This was a test, not a treatment. Your pain may return.  Keep your pain journal Dr office will call to check your pain levels within 3 days   Perform activities that normally cause you pain during this testing period    Home care instructions  You may apply ice pack to the injection site for 2 days , NO HEAT for 2 days  You may take a shower but no soaking above level of injection in tub or pool for 2 days  Resume Aspirin, Plavix, or Coumadin the day after the procedure unless otherwise instructed.  Do not drive for 24 hr      SEEK  IMMEDIATE MEDICAL HELP FOR:  Severe increase in your usual pain or appearance of new pain  Prolonged  ( more than 8 hours)or increasing weakness or numbness in the legs or arms  Drainage, redness, active bleeding, or increased swelling at the injection site  Temperature over 100.0 degrees F.  Headache  that increases when your head is upright and decreases when you lie flat  Shortness of breath, chest pain, or problems breathing

## 2019-03-01 VITALS
RESPIRATION RATE: 18 BRPM | SYSTOLIC BLOOD PRESSURE: 146 MMHG | OXYGEN SATURATION: 96 % | HEIGHT: 61 IN | DIASTOLIC BLOOD PRESSURE: 69 MMHG | WEIGHT: 216.06 LBS | HEART RATE: 67 BPM | BODY MASS INDEX: 40.79 KG/M2 | TEMPERATURE: 98 F

## 2019-03-04 DIAGNOSIS — R60.0 LEG EDEMA: Primary | ICD-10-CM

## 2019-03-04 DIAGNOSIS — I50.9 CHRONIC HEART FAILURE: ICD-10-CM

## 2019-03-06 ENCOUNTER — TELEPHONE (OUTPATIENT)
Dept: PAIN MEDICINE | Facility: CLINIC | Age: 69
End: 2019-03-06

## 2019-03-06 NOTE — TELEPHONE ENCOUNTER
Patient reports 85% or greater decrease in pain following bilateral L3,4,5 Medial Branch Block done on 2/28/19. Patient scheduled on 3/21/19 for second bilateral L3,4,5 Medial Branch Block. Instructions given. Patient accepted and voiced understanding.

## 2019-03-06 NOTE — TELEPHONE ENCOUNTER
----- Message from Maru Miller sent at 3/6/2019  9:27 AM CST -----  Contact: [phone]  Patient is requesting a call back from the nurse inquiring about post op info and another procedure.    Please call the patient upon request at phone number [phone].

## 2019-03-07 DIAGNOSIS — M47.896 OTHER SPONDYLOSIS, LUMBAR REGION: Primary | ICD-10-CM

## 2019-03-07 DIAGNOSIS — I50.9 CHRONIC HEART FAILURE: Primary | ICD-10-CM

## 2019-03-08 ENCOUNTER — TELEPHONE (OUTPATIENT)
Dept: PAIN MEDICINE | Facility: CLINIC | Age: 69
End: 2019-03-08

## 2019-03-08 NOTE — TELEPHONE ENCOUNTER
----- Message from Rosangela Cuevas sent at 3/8/2019 10:34 AM CST -----  Contact: Patient  Type: Needs Medical Advice    Who Called:  Patient  Best Call Back Number:   Additional Information: Calling to speak with the Nurse to find out the next step after her surgery. Please advise

## 2019-03-18 ENCOUNTER — TELEPHONE (OUTPATIENT)
Dept: PAIN MEDICINE | Facility: CLINIC | Age: 69
End: 2019-03-18

## 2019-03-18 NOTE — TELEPHONE ENCOUNTER
----- Message from Paula Corral sent at 3/18/2019 12:28 PM CDT -----  Contact: patient  Type: Needs Medical Advice    Who Called:  patient  Best Call Back Number: [phone]  Additional Information: patient calling because she received a letter in the mail about her procedure. She said she was never notified about the procedure and she needs to speak to someone about this matter. Please give call back

## 2019-03-18 NOTE — TELEPHONE ENCOUNTER
Pt stated she fell and does not remember speaking to nurse and scheduling procedure. She would like to go forward with procedure. She is feeling fine now and does not wish to seek treatment for possible concussion .

## 2019-03-21 ENCOUNTER — HOSPITAL ENCOUNTER (OUTPATIENT)
Facility: AMBULARY SURGERY CENTER | Age: 69
Discharge: HOME | End: 2019-03-21
Attending: ANESTHESIOLOGY | Admitting: ANESTHESIOLOGY
Payer: MEDICARE

## 2019-03-21 VITALS
HEIGHT: 61 IN | SYSTOLIC BLOOD PRESSURE: 137 MMHG | TEMPERATURE: 98 F | BODY MASS INDEX: 40.79 KG/M2 | WEIGHT: 216.06 LBS | DIASTOLIC BLOOD PRESSURE: 77 MMHG | RESPIRATION RATE: 18 BRPM | HEART RATE: 61 BPM | OXYGEN SATURATION: 98 %

## 2019-03-21 DIAGNOSIS — M47.896 OTHER SPONDYLOSIS, LUMBAR REGION: Primary | ICD-10-CM

## 2019-03-21 PROCEDURE — 64494 INJ PARAVERT F JNT L/S 2 LEV: CPT | Mod: 50,,, | Performed by: ANESTHESIOLOGY

## 2019-03-21 PROCEDURE — 64494 INJ PARAVERT F JNT L/S 2 LEV: CPT | Mod: LT | Performed by: ANESTHESIOLOGY

## 2019-03-21 PROCEDURE — 64493 INJ PARAVERT F JNT L/S 1 LEV: CPT | Mod: 50,,, | Performed by: ANESTHESIOLOGY

## 2019-03-21 PROCEDURE — 99152 PR MOD CONSCIOUS SEDATION, SAME PHYS, 5+ YRS, FIRST 15 MIN: ICD-10-PCS | Mod: ,,, | Performed by: ANESTHESIOLOGY

## 2019-03-21 PROCEDURE — 64494 PR INJ DX/THER AGNT PARAVERT FACET JOINT,IMG GUIDE,LUMBAR/SAC, 2ND LEVEL: ICD-10-PCS | Mod: 50,,, | Performed by: ANESTHESIOLOGY

## 2019-03-21 PROCEDURE — 64493 INJ PARAVERT F JNT L/S 1 LEV: CPT | Mod: RT | Performed by: ANESTHESIOLOGY

## 2019-03-21 PROCEDURE — 99152 MOD SED SAME PHYS/QHP 5/>YRS: CPT | Mod: ,,, | Performed by: ANESTHESIOLOGY

## 2019-03-21 PROCEDURE — 64493 PR INJ DX/THER AGNT PARAVERT FACET JOINT,IMG GUIDE,LUMBAR/SAC,1ST LVL: ICD-10-PCS | Mod: 50,,, | Performed by: ANESTHESIOLOGY

## 2019-03-21 RX ORDER — BUPIVACAINE HYDROCHLORIDE 5 MG/ML
INJECTION, SOLUTION EPIDURAL; INTRACAUDAL
Status: DISCONTINUED | OUTPATIENT
Start: 2019-03-21 | End: 2019-03-21 | Stop reason: HOSPADM

## 2019-03-21 RX ORDER — SODIUM CHLORIDE, SODIUM LACTATE, POTASSIUM CHLORIDE, CALCIUM CHLORIDE 600; 310; 30; 20 MG/100ML; MG/100ML; MG/100ML; MG/100ML
INJECTION, SOLUTION INTRAVENOUS ONCE AS NEEDED
Status: COMPLETED | OUTPATIENT
Start: 2019-03-21 | End: 2019-03-21

## 2019-03-21 RX ORDER — MIDAZOLAM HYDROCHLORIDE 2 MG/2ML
INJECTION, SOLUTION INTRAMUSCULAR; INTRAVENOUS
Status: DISCONTINUED | OUTPATIENT
Start: 2019-03-21 | End: 2019-03-21 | Stop reason: HOSPADM

## 2019-03-21 RX ORDER — BUPIVACAINE HYDROCHLORIDE 5 MG/ML
INJECTION, SOLUTION EPIDURAL; INTRACAUDAL
Status: DISCONTINUED
Start: 2019-03-21 | End: 2019-03-21 | Stop reason: HOSPADM

## 2019-03-21 RX ADMIN — SODIUM CHLORIDE, SODIUM LACTATE, POTASSIUM CHLORIDE, CALCIUM CHLORIDE: 600; 310; 30; 20 INJECTION, SOLUTION INTRAVENOUS at 12:03

## 2019-03-21 NOTE — DISCHARGE SUMMARY
[facility]  Discharge Note  Short Stay    Admit Date: 3/21/2019    Discharge Date and Time: 3/21/2019    Attending Physician: Thad Manning MD     Discharge Provider: Thad Manning    Diagnoses:  Active Hospital Problems    Diagnosis  POA    *Other spondylosis, lumbar region [M47.896]  Yes      Resolved Hospital Problems   No resolved problems to display.       Hospital Course: Lumbar MBB  Discharged Condition: Good    Final Diagnoses:   Active Hospital Problems    Diagnosis  POA    *Other spondylosis, lumbar region [M47.896]  Yes      Resolved Hospital Problems   No resolved problems to display.       Disposition: Home or Self Care    Follow up/Patient Instructions:    Medications:  Reconciled Home Medications:      Medication List      CONTINUE taking these medications    albuterol 90 mcg/actuation inhaler  Commonly known as:  PROVENTIL/VENTOLIN HFA  Inhale 2 puffs into the lungs every 6 (six) hours as needed for Wheezing.     ALPRAZolam 2 MG Tab  Commonly known as:  XANAX  Take 1 mg by mouth nightly as needed.     amitriptyline 50 MG tablet  Commonly known as:  ELAVIL  Take 50 mg by mouth every evening.     amLODIPine 5 MG tablet  Commonly known as:  NORVASC     CENTRUM 3,500-18-0.4 unit-mg-mg Chew  Generic drug:  multivit-iron-min-folic acid  Take 1 tablet by mouth.     DULoxetine 60 MG capsule  Commonly known as:  CYMBALTA  Take 60 mg by mouth 2 (two) times daily.     esomeprazole 40 MG capsule  Commonly known as:  NEXIUM  Take 40 mg by mouth before breakfast.     fexofenadine 180 MG tablet  Commonly known as:  ALLEGRA  Take 180 mg by mouth once daily.     fluticasone 50 mcg/actuation nasal spray  Commonly known as:  FLONASE  USE ONE SPRAY IEN QD     hydroCHLOROthiazide 25 MG tablet  Commonly known as:  HYDRODIURIL  Take 25 mg by mouth once daily.     HYDROcodone-acetaminophen 7.5-325 mg per tablet  Commonly known as:  NORCO  Take 1 tablet by mouth every 6 (six) hours as needed for Pain.      levothyroxine 125 MCG tablet  Commonly known as:  SYNTHROID  Take 125 mcg by mouth once daily.     losartan 100 MG tablet  Commonly known as:  COZAAR  TK 1 T PO D     lovastatin 40 mg 24 hr tablet  Commonly known as:  ALTOPREV  Take 40 mg by mouth every evening.     omeprazole 20 MG capsule  Commonly known as:  PRILOSEC  TK ONE C PO ONCE D     potassium chloride SA 10 MEQ tablet  Commonly known as:  K-DUR,KLOR-CON  TK 1 T PO QD     pramipexole 0.25 MG tablet  Commonly known as:  MIRAPEX  Take 0.5 mg by mouth every evening.     pregabalin 75 MG capsule  Commonly known as:  LYRICA  Take 1 capsule (75 mg total) by mouth 3 (three) times daily.     promethazine-codeine 6.25-10 mg/5 ml 6.25-10 mg/5 mL syrup  Commonly known as:  PHENERGAN with CODEINE  TK 5 MLS PO Q 6 H PRF COUGH     tiZANidine 2 MG tablet  Commonly known as:  ZANAFLEX  TK 1 T PO Q 8 H AS NEEDED. NTE 3 DOSES IN 24 H     venlafaxine 75 MG 24 hr capsule  Commonly known as:  EFFEXOR-XR  TK 1 C PO QD          Discharge Procedure Orders   Call MD for:  temperature >100.4     Call MD for:  persistent nausea and vomiting or diarrhea     Call MD for:  severe uncontrolled pain     Call MD for:  redness, tenderness, or signs of infection (pain, swelling, redness, odor or green/yellow discharge around incision site)     Call MD for:  difficulty breathing or increased cough     Call MD for:  severe persistent headache        Follow up with MD in 2-3 weeks    Discharge Procedure Orders (must include Diet, Follow-up, Activity):   Discharge Procedure Orders (must include Diet, Follow-up, Activity)   Call MD for:  temperature >100.4     Call MD for:  persistent nausea and vomiting or diarrhea     Call MD for:  severe uncontrolled pain     Call MD for:  redness, tenderness, or signs of infection (pain, swelling, redness, odor or green/yellow discharge around incision site)     Call MD for:  difficulty breathing or increased cough     Call MD for:  severe persistent  headache

## 2019-03-21 NOTE — OP NOTE
PROCEDURE DATE: 3/21/2019    PROCEDURE:  Bilateral L3,4,5 medial branch nerve blocks    DIAGNOSIS:  Other lumbar spondylosis    Post Op diagnosis: Same    PHYSICIAN: Thad Manning MD    MEDICATIONS INJECTED: 0.5% bupivicaine, 0.5 ml at each level    SEDATION MEDICATIONS:RN IV Versed    LOCAL ANESTHETIC USED: None    ESTIMATED BLOOD LOSS:  None    COMPLICATIONS:  None    TECHNIQUE: A time out was taken to identify the patient, procedure and side of the procedure. The patient was placed in a prone position, then prepped and draped in the usual sterile fashion using ChloraPrep and sterile towels.  The levels were determined under fluoroscopic guidance and then marked.  A 25-gauge 3.5 inch needle was introduced to the anatomic location of the L3,4,5 medial branch nerves on the bilateral side. The above medication was then injected. The patient tolerated the procedure well.     The patient was monitored after the procedure. Patient was given pain diary to record pain levels at home.     If found to have greater than a 50% recovery and so will be scheduled for a radiofrequency ablation of the corresponding nerves.  Patient was given post procedure and discharge instructions to follow at home.  The patient was discharged in a stable condition.

## 2019-03-21 NOTE — DISCHARGE INSTRUCTIONS
NERVE BLOCK   This was a test not a treatment. Please keep pain journal as instructed  Tips for recovery  · You may use an ice pack at your injection site for comfort.  · You may shower this evening.   · Do not use a heating pad or take tub baths or swim for 2 days.  · Take your usual medication for pain if needed.  · Dont drive the day of the procedure.  · Wait until tomorrow to resume any blood thinners (aspirin, Plavix, Coumadin) but you may resume all your other medications today.  When to call your doctor  Call right away if you notice any of the following symptoms:  · Severe pain or headache  · Fever or chills  · Redness or swelling around the injection site   · Difficulty breathing  · Vomiting  · Increasing numbness or weakness in arms or legs  ·            Procedural Sedation (Adult)  You have been given medicine by vein to sedate you during your procedure. This may have included both a pain medicine and sleeping medicine. Most of the effects have worn off. But you may continue to have some drowsiness for the next 6 to 8 hours.  Home care  Follow these guidelines when you get home:  · For the next 8 hours, you should be watched by a responsible adult to look for any worsening of your condition.  · Do not take any oral medicine for pain or sleep during the next 4 hour, since this might react with the medicines you were given in the hospital causing a much stronger response than usual.  · Do not drink any alcohol for the next 24 hours.  · Do not drive, operate dangerous machinery,care for a child or make any important decisions during the next 24 hours.    Follow-up care  Follow up with your health care provider if you are not alert and back to your usual level of activity within 12 hours.  When to seek medical advice  Call your health care provider right away if any of these occur:  · Increased drowsiness  · Increased weakness or dizziness  · Repeated vomiting  · You cannot be awakened

## 2019-03-21 NOTE — H&P
CC: lower back pain    HPI: The patient is a 69 y.o. female with a history of lumbar spondylosis here for lumbar MBB. There are no major changes in history and physical from 2/13/19 by myself.    Past Medical History:   Diagnosis Date    Allergy     Anxiety     Arthritis, rheumatoid     Back pain     Depression     Fibromyalgia     GERD (gastroesophageal reflux disease)     High cholesterol     Hypertension     Incontinence of urine     MS (multiple sclerosis)     benign; another MD stated she has no MS    Neuropathy     Restless leg syndrome     Sciatica of left side 1/5/2018    Seasonal allergies     Sinus congestion     Thyroid disease     Wheezing        Past Surgical History:   Procedure Laterality Date    APPENDECTOMY      ARTHROSCOPY, KNEE Left 3/20/2014    [facility]    ARTHROSCOPY-SHOULDER Right 4/16/2015    [facility]    Block-nerve-medial branch-lumbar Bilateral 2/28/2019    [facility]    BREAST LUMPECTOMY      bilateral, benign    BREAST SURGERY      reduction    CHONDROPLASTY-KNEE Left 3/20/2014    [facility]    ESOPHAGOGASTRODUODENOSCOPY (EGD) N/A 7/5/2017    Performed [facility] (4TH [facility])    HYSTERECTOMY      partial - fibroids    REPAIR ROTATOR CUFF ARTHROSCOPIC Right 4/16/2015    [facility]    TONSILLECTOMY         Family History   Problem Relation Age of Onset    Heart disease Mother        Social History     Socioeconomic History    Marital status:      Spouse name: None    Number of children: None    Years of education: None    Highest education level: None   Social Needs    Financial resource strain: None    Food insecurity - worry: None    Food insecurity - inability: None    Transportation needs - medical: None    Transportation needs - non-medical: None  "  Occupational History    None   Tobacco Use    Smoking status: Former Smoker     Last attempt to quit: 1996     Years since quittin.8    Smokeless tobacco: Never Used   Substance and Sexual Activity    Alcohol use: Yes     Comment: very little     Drug use: No    Sexual activity: None   Other Topics Concern    None   Social History Narrative    None       Current Facility-Administered Medications   Medication Dose Route Frequency Provider Last Rate Last Dose    bupivacaine (PF) 0.5% (5 mg/mL) injection    PRN Thad Manning MD   5 mL at 19 1156    lactated ringers infusion   Intravenous Once PRN Thad Manning MD           Review of patient's allergies indicates:   Allergen Reactions    Keflex [cephalexin]     Pcn [penicillins]     Adhesive Other (See Comments)     bandainds redness at skin       Vitals:    19 1507 19 1153   BP:  (!) 115/56   Pulse:  (!) 56   Resp:  18   Temp:  98.1 °F (36.7 °C)   TempSrc:  Skin   SpO2:  97%   Weight: 98 kg (216 lb 0.8 oz)    Height: 5' 1" (1.549 m)        REVIEW OF SYSTEMS:     GENERAL: No weight loss, malaise or fevers.  HEENT:  No recent changes in vision or hearing  NECK: Negative for lumps, no difficulty with swallowing.  RESPIRATORY: Negative for cough, wheezing or shortness of breath, patient denies any recent URI.  CARDIOVASCULAR: Negative for chest pain, leg swelling or palpitations.  GI: Negative for abdominal discomfort, blood in stools or black stools or change in bowel habits.  MUSCULOSKELETAL: See HPI.  SKIN: Negative for lesions, rash, and itching.  PSYCH: No suicidal or homicidal ideations, no current mood disturbances.  HEMATOLOGY/LYMPHOLOGY: Negative for prolonged bleeding, bruising easily or swollen nodes. Patient is not currently taking any anti-coagulants  ENDO: No history of diabetes or thyroid dysfunction  NEURO: No history of syncope, paralysis, seizures or tremors.All other reviewed and negative other than HPI.    Physical " exam:  Gen: A and O x3, pleasant, well-groomed  Skin: No rashes or obvious lesions  HEENT: PERRLA, no obvious deformities on ears or in canals. No thyroid masses, trachea midline, no palpable lymph nodes in neck, axilla.  CVS: Regular rate and rhythm, normal S1 and S2, no murmurs.  Resp: Clear to auscultation bilaterally.  Abdomen: Soft, NT/ND, normal bowel sounds present.  Musculoskeletal/Neuro: Moving all extremities    Assessment:  Other spondylosis, lumbar region  -     Case Request Operating Room: Block-nerve-medial branch-lumbar L3,4,5  -     Place in Outpatient; Standing  -     Vital signs; Standing  -     Insert peripheral IV; Standing  -     Verify informed consent; Standing  -     Notify physician ; Standing  -     Notify physician ; Standing  -     Notify physician (specify); Standing  -     Diet NPO; Standing    Other orders  -     Log roll; Standing  -     lactated ringers infusion  -     IP VTE LOW RISK PATIENT; Standing  -     bupivacaine (PF) 0.5% (5 mg/mL) injection          PLAN: Lumbar MBB      This patient has been cleared for surgery in an ambulatory surgical facility    ASA 3,  Mallampatti Score 3  No history of anesthetic complications  Plan for RN IV sedation

## 2019-03-25 ENCOUNTER — HOSPITAL ENCOUNTER (OUTPATIENT)
Dept: CARDIOLOGY | Facility: HOSPITAL | Age: 69
Discharge: HOME | End: 2019-03-25
Attending: INTERNAL MEDICINE
Payer: MEDICARE

## 2019-03-25 DIAGNOSIS — I50.9 CHRONIC HEART FAILURE: ICD-10-CM

## 2019-03-25 LAB
AORTIC ROOT ANNULUS: 2.83 CM
AORTIC VALVE CUSP SEPERATION: 1.66 CM
AV INDEX (PROSTH): 0.63
AV MEAN GRADIENT: 6.31 MMHG
AV PEAK GRADIENT: 12.82 MMHG
AV VALVE AREA: 1.75 CM2
AV VELOCITY RATIO: 0.62
CV ECHO LV RWT: 0.51 CM
DOP CALC AO PEAK VEL: 1.79 M/S
DOP CALC AO VTI: 39.61 CM
DOP CALC LVOT AREA: 2.77 CM2
DOP CALC LVOT DIAMETER: 1.88 CM
DOP CALC LVOT PEAK VEL: 1.11 M/S
DOP CALC LVOT STROKE VOLUME: 69.14 CM3
DOP CALCLVOT PEAK VEL VTI: 24.92 CM
E WAVE DECELERATION TIME: 257.35 MSEC
E/A RATIO: 0.98
ECHO LV POSTERIOR WALL: 1.09 CM (ref 0.6–1.1)
FRACTIONAL SHORTENING: 29 % (ref 28–44)
INTERVENTRICULAR SEPTUM: 1.11 CM (ref 0.6–1.1)
IVRT: 0.1 MSEC
LEFT ATRIUM SIZE: 2.9 CM
LEFT INTERNAL DIMENSION IN SYSTOLE: 3.04 CM (ref 2.1–4)
LEFT VENTRICLE DIASTOLIC VOLUME: 83.01 ML
LEFT VENTRICLE SYSTOLIC VOLUME: 36.12 ML
LEFT VENTRICULAR INTERNAL DIMENSION IN DIASTOLE: 4.3 CM (ref 3.5–6)
LEFT VENTRICULAR MASS: 162.94 G
MV PEAK A VEL: 0.98 M/S
MV PEAK E VEL: 0.96 M/S
PISA TR MAX VEL: 2.6 M/S
PULM VEIN S/D RATIO: 1.4
PV PEAK D VEL: 0.35 M/S
PV PEAK S VEL: 0.49 M/S
PV PEAK VELOCITY: 0.99 CM/S
RA PRESSURE: 3 MMHG
RIGHT VENTRICULAR END-DIASTOLIC DIMENSION: 2.1 CM
TR MAX PG: 27.04 MMHG
TV REST PULMONARY ARTERY PRESSURE: 30 MMHG

## 2019-03-25 PROCEDURE — 93306 TRANSTHORACIC ECHO (TTE) COMPLETE (CUPID ONLY): ICD-10-PCS | Mod: 26,,, | Performed by: INTERNAL MEDICINE

## 2019-03-25 PROCEDURE — 93306 TTE W/DOPPLER COMPLETE: CPT | Mod: 26,,, | Performed by: INTERNAL MEDICINE

## 2019-03-25 PROCEDURE — 93306 TTE W/DOPPLER COMPLETE: CPT

## 2019-03-27 ENCOUNTER — OFFICE VISIT (OUTPATIENT)
Dept: PAIN MEDICINE | Facility: CLINIC | Age: 69
End: 2019-03-27
Payer: MEDICARE

## 2019-03-27 VITALS
WEIGHT: 216 LBS | HEIGHT: 61 IN | BODY MASS INDEX: 40.78 KG/M2 | DIASTOLIC BLOOD PRESSURE: 60 MMHG | SYSTOLIC BLOOD PRESSURE: 104 MMHG | HEART RATE: 64 BPM

## 2019-03-27 DIAGNOSIS — M50.30 DDD (DEGENERATIVE DISC DISEASE), CERVICAL: ICD-10-CM

## 2019-03-27 DIAGNOSIS — M51.36 DDD (DEGENERATIVE DISC DISEASE), LUMBAR: ICD-10-CM

## 2019-03-27 DIAGNOSIS — M47.896 OTHER SPONDYLOSIS, LUMBAR REGION: Primary | ICD-10-CM

## 2019-03-27 PROCEDURE — 3074F PR MOST RECENT SYSTOLIC BLOOD PRESSURE < 130 MM HG: ICD-10-PCS | Mod: CPTII,S$GLB,, | Performed by: PHYSICIAN ASSISTANT

## 2019-03-27 PROCEDURE — 99214 PR OFFICE/OUTPT VISIT, EST, LEVL IV, 30-39 MIN: ICD-10-PCS | Mod: S$GLB,,, | Performed by: PHYSICIAN ASSISTANT

## 2019-03-27 PROCEDURE — 1101F PR PT FALLS ASSESS DOC 0-1 FALLS W/OUT INJ PAST YR: ICD-10-PCS | Mod: CPTII,S$GLB,, | Performed by: PHYSICIAN ASSISTANT

## 2019-03-27 PROCEDURE — 1101F PT FALLS ASSESS-DOCD LE1/YR: CPT | Mod: CPTII,S$GLB,, | Performed by: PHYSICIAN ASSISTANT

## 2019-03-27 PROCEDURE — 3078F PR MOST RECENT DIASTOLIC BLOOD PRESSURE < 80 MM HG: ICD-10-PCS | Mod: CPTII,S$GLB,, | Performed by: PHYSICIAN ASSISTANT

## 2019-03-27 PROCEDURE — 99999 PR PBB SHADOW E&M-EST. PATIENT-LVL IV: ICD-10-PCS | Mod: PBBFAC,,, | Performed by: PHYSICIAN ASSISTANT

## 2019-03-27 PROCEDURE — 99214 OFFICE O/P EST MOD 30 MIN: CPT | Mod: S$GLB,,, | Performed by: PHYSICIAN ASSISTANT

## 2019-03-27 PROCEDURE — 3078F DIAST BP <80 MM HG: CPT | Mod: CPTII,S$GLB,, | Performed by: PHYSICIAN ASSISTANT

## 2019-03-27 PROCEDURE — 3074F SYST BP LT 130 MM HG: CPT | Mod: CPTII,S$GLB,, | Performed by: PHYSICIAN ASSISTANT

## 2019-03-27 PROCEDURE — 99999 PR PBB SHADOW E&M-EST. PATIENT-LVL IV: CPT | Mod: PBBFAC,,, | Performed by: PHYSICIAN ASSISTANT

## 2019-03-27 RX ORDER — VALSARTAN 320 MG/1
TABLET ORAL
COMMUNITY
Start: 2019-03-18 | End: 2019-05-09

## 2019-03-27 RX ORDER — METOPROLOL SUCCINATE 50 MG/1
TABLET, EXTENDED RELEASE ORAL
COMMUNITY
Start: 2019-03-26 | End: 2019-06-25 | Stop reason: ALTCHOICE

## 2019-03-27 RX ORDER — FUROSEMIDE 20 MG/1
TABLET ORAL
Refills: 3 | COMMUNITY
Start: 2019-03-15 | End: 2019-06-25

## 2019-03-27 NOTE — H&P (VIEW-ONLY)
Referring Physician: No ref. provider found    PCP: Tariq Crews MD      CC:  Lower back pain    Interval History:  Genoveva Gilbert is a 69 y.o. female with chronic low back pain who presents today for f/u s/p repeat Medial branch block. Reports 70-80% relief of low back pain. She would like to move forward with RFA. She has tried PT with minimal benefit. Denies b/b changes or LE weakness. Denies radiation. Pain is unchanged in quality or location. She does report falling out of the bed a couple of weeks ago. She has had confusion since which is improving. She has appt with Dr. Crews for evaluation tomorrow. Pain today is rated 0/10.  Pt has been seen in the clinic before, however pt is new to me.     History below per Dr. Manning    HPI:   Genoveva Gilbert is a 69 y.o. female referred to us for lower back pain. Pain has been gradually worsened over past 3 years.  No recent traumatic incident.  She has constant intermittent aching, throbbing pain in her lower back.  Pain rarely radiates down her lower extremities.  Pain worsens standing, bending, walking, lifting getting worked.  Pain improves with rest.  She has tried physical therapy with minimal benefit.  She really has had MRIs of the cervical and lumbar spine.  She has not had recent interventions lumbar spine.  She denies any worsening weakness.  No bowel bladder changes.    ROS:  CONSTITUTIONAL: No fevers, chills, night sweats, wt. loss, appetite changes  SKIN: no rashes or itching  ENT: No headaches, head trauma, vision changes, or eye pain  LYMPH NODES: None noticed   CV: No chest pain, palpitations.   RESP: No shortness of breath, dyspnea on exertion, cough, wheezing, or hemoptysis  GI: No nausea, emesis, diarrhea, constipation, melena, hematochezia, pain.    : No dysuria, hematuria, urgency, or frequency   HEME: No easy bruising, bleeding problems  PSYCHIATRIC: No depression, anxiety, psychosis, hallucinations.  NEURO: No seizures, memory loss, dizziness  or difficulty sleeping  MSK:  Positive HPI      Past Medical History:   Diagnosis Date    Allergy     Anxiety     Arthritis, rheumatoid     Back pain     Depression     Fibromyalgia     GERD (gastroesophageal reflux disease)     High cholesterol     Hypertension     Incontinence of urine     MS (multiple sclerosis)     benign; another MD stated she has no MS    Neuropathy     Restless leg syndrome     Sciatica of left side 1/5/2018    Seasonal allergies     Sinus congestion     Thyroid disease     Wheezing      Past Surgical History:   Procedure Laterality Date    APPENDECTOMY      ARTHROSCOPY, KNEE Left 3/20/2014    [facility]    ARTHROSCOPY-SHOULDER Right 4/16/2015    [facility]    Block-nerve-medial branch-lumbar Bilateral 2/28/2019    [facility]    Block-nerve-medial branch-lumbar L3,4,5 Bilateral 3/21/2019    [facility]    BREAST LUMPECTOMY      bilateral, benign    BREAST SURGERY      reduction    CHONDROPLASTY-KNEE Left 3/20/2014    [facility]    ESOPHAGOGASTRODUODENOSCOPY (EGD) N/A 7/5/2017    [facility] ([facility]    HYSTERECTOMY      partial - fibroids    REPAIR ROTATOR CUFF ARTHROSCOPIC Right 4/16/2015    [facility]    TONSILLECTOMY       Family History   Problem Relation Age of Onset    Heart disease Mother      Social History     Socioeconomic History    Marital status:      Spouse name: None    Number of children: None    Years of education: None    Highest education level: None   Occupational History    None   Social Needs    Financial resource strain: None    Food insecurity:     Worry: None     Inability: None    Transportation needs:     Medical: None     Non-medical: None   Tobacco Use    Smoking status: Former Smoker      "Last attempt to quit: 1996     Years since quittin.8    Smokeless tobacco: Never Used   Substance and Sexual Activity    Alcohol use: Yes     Comment: very little     Drug use: No    Sexual activity: None   Lifestyle    Physical activity:     Days per week: None     Minutes per session: None    Stress: None   Relationships    Social connections:     Talks on phone: None     Gets together: None     Attends Congregational service: None     Active member of club or organization: None     Attends meetings of clubs or organizations: None     Relationship status: None    Intimate partner violence:     Fear of current or ex partner: None     Emotionally abused: None     Physically abused: None     Forced sexual activity: None   Other Topics Concern    None   Social History Narrative    None         Medications/Allergies: See med card    Vitals:    19 1338   BP: 104/60   Pulse: 64   Weight: 98 kg (216 lb)   Height: 5' 1" (1.549 m)   PainSc:   4   PainLoc: Back         Physical exam:    GENERAL: A and O x3, the patient appears well groomed and is in no acute distress.  Skin: No rashes or obvious lesions  HEENT: normocephalic, atraumatic  CARDIOVASCULAR:  RRR  LUNGS: non labored breathing  ABDOMEN: soft, nontender   UPPER EXTREMITIES: Normal alignment, normal range of motion, no atrophy, no skin changes,  hair growth and nail growth normal and equal bilaterally. No swelling, no tenderness.    LOWER EXTREMITIES:  Normal alignment, normal range of motion, no atrophy, no skin changes,  hair growth and nail growth normal and equal bilaterally. No swelling, no tenderness.  LUMBAR SPINE  Lumbar spine: ROM is very limited with flexion extension and oblique extension with moderate increased pain.    Avinash's test causes no increased pain on either side.    Supine straight leg raise is negative bilaterally.    Internal and external rotation of the hip causes no increased pain on either side.  Myofascial exam: No " tenderness to palpation across lumbar paraspinous muscles.      MENTAL STATUS: normal orientation, speech, language, and fund of knowledge for social situation.  Emotional state appropriate.    CRANIAL NERVES:  II:  PERRL bilaterally,   III,IV,VI: EOMI.    V:  Facial sensation equal bilaterally  VII:  Facial motor function normal.  VIII:  Hearing equal to finger rub bilaterally  IX/X: Gag normal, palate symmetric  XI:  Shoulder shrug equal, head turn equal  XII:  Tongue midline without fasciculations      MOTOR: Tone and bulk: normal bilateral upper and lower Strength: normal   Delt Bi Tri WE WF     R 5 5 5 5 5 5   L 5 5 5 5 5 5     IP ADD ABD Quad TA Gas HAM  R 5 5 5 5 5 5 5  L 5 5 5 5 5 5 5    SENSATION: Light touch and pinprick intact bilaterally  REFLEXES: normal, symmetric, nonbrisk.  Toes down, no clonus. No hoffmans.  GAIT: normal rise, base, steps, and arm swing.        Imaging:  MRI L-spine 4/2018 [facility]  L1-2, moderate facet degenerative changes bilaterally.  Mild neural foraminal narrowing bilaterally.  L2-3, moderate facet changes bilaterally.  Mild left-sided neural foraminal narrowing  L3-4, diffuse disc protrusion.  Moderate facet and ligamentum flavum hypertrophy.  Moderate narrowing of the right-sided neural foraminal  L4-5, annular disc bulge.  Moderate facet changes.  Mild narrowing of neural foraminal bilateral.  L5-S1, unroofing of the disc posteriorly due to anterolisthesis of L5 on S1.  Severe facet changes.  Severe neural foraminal narrowing on the left and mild on the right    Assessment:  Genoveva Gilbert is a 69 y.o. male with back pain  1. Other spondylosis, lumbar region    2. DDD (degenerative disc disease), lumbar    3. DDD (degenerative disc disease), cervical      Plan:  1. I have stressed the importance of physical activity and exercise to improve overall health  2. Reviewed pertinent imaging and records with patient  3. Schedule lumbar MB RFA at L3, 4, 5 bilaterally. I have  explained the risks, benefits, and alternatives of the procedure in detail. The patient voices understanding and all questions have been answered. The patient agrees to proceed as planned. Written Consent obtained. .  4. F/u with Dr. Crews for possible concussion.   5. F/u s/p RFA

## 2019-03-27 NOTE — PROGRESS NOTES
Referring Physician: No ref. provider found    PCP: Tariq Crews MD      CC:  Lower back pain    Interval History:  Genoveva Gilbert is a 69 y.o. female with chronic low back pain who presents today for f/u s/p repeat Medial branch block. Reports 70-80% relief of low back pain. She would like to move forward with RFA. She has tried PT with minimal benefit. Denies b/b changes or LE weakness. Denies radiation. Pain is unchanged in quality or location. She does report falling out of the bed a couple of weeks ago. She has had confusion since which is improving. She has appt with Dr. Crews for evaluation tomorrow. Pain today is rated 0/10.  Pt has been seen in the clinic before, however pt is new to me.     History below per Dr. Manning    HPI:   Genoveva Gilbert is a 69 y.o. female referred to us for lower back pain. Pain has been gradually worsened over past 3 years.  No recent traumatic incident.  She has constant intermittent aching, throbbing pain in her lower back.  Pain rarely radiates down her lower extremities.  Pain worsens standing, bending, walking, lifting getting worked.  Pain improves with rest.  She has tried physical therapy with minimal benefit.  She really has had MRIs of the cervical and lumbar spine.  She has not had recent interventions lumbar spine.  She denies any worsening weakness.  No bowel bladder changes.    ROS:  CONSTITUTIONAL: No fevers, chills, night sweats, wt. loss, appetite changes  SKIN: no rashes or itching  ENT: No headaches, head trauma, vision changes, or eye pain  LYMPH NODES: None noticed   CV: No chest pain, palpitations.   RESP: No shortness of breath, dyspnea on exertion, cough, wheezing, or hemoptysis  GI: No nausea, emesis, diarrhea, constipation, melena, hematochezia, pain.    : No dysuria, hematuria, urgency, or frequency   HEME: No easy bruising, bleeding problems  PSYCHIATRIC: No depression, anxiety, psychosis, hallucinations.  NEURO: No seizures, memory loss, dizziness  or difficulty sleeping  MSK:  Positive HPI      Past Medical History:   Diagnosis Date    Allergy     Anxiety     Arthritis, rheumatoid     Back pain     Depression     Fibromyalgia     GERD (gastroesophageal reflux disease)     High cholesterol     Hypertension     Incontinence of urine     MS (multiple sclerosis)     benign; another MD stated she has no MS    Neuropathy     Restless leg syndrome     Sciatica of left side 1/5/2018    Seasonal allergies     Sinus congestion     Thyroid disease     Wheezing      Past Surgical History:   Procedure Laterality Date    APPENDECTOMY      ARTHROSCOPY, KNEE Left 3/20/2014    [facility]    ARTHROSCOPY-SHOULDER Right 4/16/2015    [facility]    Block-nerve-medial branch-lumbar Bilateral 2/28/2019    [facility]    Block-nerve-medial branch-lumbar L3,4,5 Bilateral 3/21/2019    [facility] OR    BREAST LUMPECTOMY      bilateral, benign    BREAST SURGERY      reduction    CHONDROPLASTY-KNEE Left 3/20/2014    [facility]    ESOPHAGOGASTRODUODENOSCOPY (EGD) N/A 7/5/2017    Performed [facility] at [facility] ENDO (4TH FLR)    HYSTERECTOMY      partial - fibroids    REPAIR ROTATOR CUFF ARTHROSCOPIC Right 4/16/2015    [facility]    TONSILLECTOMY       Family History   Problem Relation Age of Onset    Heart disease Mother      Social History     Socioeconomic History    Marital status:      Spouse name: None    Number of children: None    Years of education: None    Highest education level: None   Occupational History    None   Social Needs    Financial resource strain: None    Food insecurity:     Worry: None     Inability: None    Transportation needs:     Medical: None     Non-medical: None   Tobacco Use    Smoking status: Former Smoker      "Last attempt to quit: 1996     Years since quittin.8    Smokeless tobacco: Never Used   Substance and Sexual Activity    Alcohol use: Yes     Comment: very little     Drug use: No    Sexual activity: None   Lifestyle    Physical activity:     Days per week: None     Minutes per session: None    Stress: None   Relationships    Social connections:     Talks on phone: None     Gets together: None     Attends Spiritism service: None     Active member of club or organization: None     Attends meetings of clubs or organizations: None     Relationship status: None    Intimate partner violence:     Fear of current or ex partner: None     Emotionally abused: None     Physically abused: None     Forced sexual activity: None   Other Topics Concern    None   Social History Narrative    None         Medications/Allergies: See med card    Vitals:    19 1338   BP: 104/60   Pulse: 64   Weight: 98 kg (216 lb)   Height: 5' 1" (1.549 m)   PainSc:   4   PainLoc: Back         Physical exam:    GENERAL: A and O x3, the patient appears well groomed and is in no acute distress.  Skin: No rashes or obvious lesions  HEENT: normocephalic, atraumatic  CARDIOVASCULAR:  RRR  LUNGS: non labored breathing  ABDOMEN: soft, nontender   UPPER EXTREMITIES: Normal alignment, normal range of motion, no atrophy, no skin changes,  hair growth and nail growth normal and equal bilaterally. No swelling, no tenderness.    LOWER EXTREMITIES:  Normal alignment, normal range of motion, no atrophy, no skin changes,  hair growth and nail growth normal and equal bilaterally. No swelling, no tenderness.  LUMBAR SPINE  Lumbar spine: ROM is very limited with flexion extension and oblique extension with moderate increased pain.    Avinash's test causes no increased pain on either side.    Supine straight leg raise is negative bilaterally.    Internal and external rotation of the hip causes no increased pain on either side.  Myofascial exam: No " tenderness to palpation across lumbar paraspinous muscles.      MENTAL STATUS: normal orientation, speech, language, and fund of knowledge for social situation.  Emotional state appropriate.    CRANIAL NERVES:  II:  PERRL bilaterally,   III,IV,VI: EOMI.    V:  Facial sensation equal bilaterally  VII:  Facial motor function normal.  VIII:  Hearing equal to finger rub bilaterally  IX/X: Gag normal, palate symmetric  XI:  Shoulder shrug equal, head turn equal  XII:  Tongue midline without fasciculations      MOTOR: Tone and bulk: normal bilateral upper and lower Strength: normal   Delt Bi Tri WE WF     R 5 5 5 5 5 5   L 5 5 5 5 5 5     IP ADD ABD Quad TA Gas HAM  R 5 5 5 5 5 5 5  L 5 5 5 5 5 5 5    SENSATION: Light touch and pinprick intact bilaterally  REFLEXES: normal, symmetric, nonbrisk.  Toes down, no clonus. No hoffmans.  GAIT: normal rise, base, steps, and arm swing.        Imaging:  MRI L-spine 4/2018 [facility]  L1-2, moderate facet degenerative changes bilaterally.  Mild neural foraminal narrowing bilaterally.  L2-3, moderate facet changes bilaterally.  Mild left-sided neural foraminal narrowing  L3-4, diffuse disc protrusion.  Moderate facet and ligamentum flavum hypertrophy.  Moderate narrowing of the right-sided neural foraminal  L4-5, annular disc bulge.  Moderate facet changes.  Mild narrowing of neural foraminal bilateral.  L5-S1, unroofing of the disc posteriorly due to anterolisthesis of L5 on S1.  Severe facet changes.  Severe neural foraminal narrowing on the left and mild on the right    Assessment:  Genoveva Gilbert is a 69 y.o. male with back pain  1. Other spondylosis, lumbar region    2. DDD (degenerative disc disease), lumbar    3. DDD (degenerative disc disease), cervical      Plan:  1. I have stressed the importance of physical activity and exercise to improve overall health  2. Reviewed pertinent imaging and records with patient  3. Schedule lumbar MB RFA at L3, 4, 5 bilaterally. I have  explained the risks, benefits, and alternatives of the procedure in detail. The patient voices understanding and all questions have been answered. The patient agrees to proceed as planned. Written Consent obtained. .  4. F/u with Dr. Crews for possible concussion.   5. F/u s/p RFA

## 2019-03-29 DIAGNOSIS — M47.896 OTHER SPONDYLOSIS, LUMBAR REGION: Primary | ICD-10-CM

## 2019-04-05 ENCOUNTER — TELEPHONE (OUTPATIENT)
Dept: PAIN MEDICINE | Facility: CLINIC | Age: 69
End: 2019-04-05

## 2019-04-05 NOTE — TELEPHONE ENCOUNTER
----- Message from Kaylie Pollock sent at 4/5/2019  4:33 PM CDT -----  Contact: pt  Type: Needs Medical Advice    Who Called:  Pt  Best Contact Number:[phone] (home)   Additional Information: pt asked for a call back about her procedure she don't know anything need information please call back.

## 2019-04-12 ENCOUNTER — HOSPITAL ENCOUNTER (OUTPATIENT)
Facility: AMBULARY SURGERY CENTER | Age: 69
Discharge: HOME | End: 2019-04-12
Attending: ANESTHESIOLOGY | Admitting: ANESTHESIOLOGY
Payer: MEDICARE

## 2019-04-12 DIAGNOSIS — M47.896 OTHER SPONDYLOSIS, LUMBAR REGION: Primary | ICD-10-CM

## 2019-04-12 PROCEDURE — 99152 PR MOD CONSCIOUS SEDATION, SAME PHYS, 5+ YRS, FIRST 15 MIN: ICD-10-PCS | Mod: ,,, | Performed by: ANESTHESIOLOGY

## 2019-04-12 PROCEDURE — 64636 DESTROY L/S FACET JNT ADDL: CPT | Mod: RT | Performed by: ANESTHESIOLOGY

## 2019-04-12 PROCEDURE — 64635 PR DESTROY LUMB/SAC FACET JNT: ICD-10-PCS | Mod: 50,,, | Performed by: ANESTHESIOLOGY

## 2019-04-12 PROCEDURE — 64635 DESTROY LUMB/SAC FACET JNT: CPT | Mod: RT | Performed by: ANESTHESIOLOGY

## 2019-04-12 PROCEDURE — 64636 PR DESTROY L/S FACET JNT ADDL: ICD-10-PCS | Mod: 50,,, | Performed by: ANESTHESIOLOGY

## 2019-04-12 PROCEDURE — 99152 MOD SED SAME PHYS/QHP 5/>YRS: CPT | Mod: ,,, | Performed by: ANESTHESIOLOGY

## 2019-04-12 PROCEDURE — 64635 DESTROY LUMB/SAC FACET JNT: CPT | Mod: 50,,, | Performed by: ANESTHESIOLOGY

## 2019-04-12 PROCEDURE — 64636 DESTROY L/S FACET JNT ADDL: CPT | Mod: 50,,, | Performed by: ANESTHESIOLOGY

## 2019-04-12 RX ORDER — SODIUM CHLORIDE, SODIUM LACTATE, POTASSIUM CHLORIDE, CALCIUM CHLORIDE 600; 310; 30; 20 MG/100ML; MG/100ML; MG/100ML; MG/100ML
INJECTION, SOLUTION INTRAVENOUS ONCE AS NEEDED
Status: COMPLETED | OUTPATIENT
Start: 2019-04-12 | End: 2019-04-12

## 2019-04-12 RX ORDER — MIDAZOLAM HYDROCHLORIDE 2 MG/2ML
INJECTION, SOLUTION INTRAMUSCULAR; INTRAVENOUS
Status: DISCONTINUED | OUTPATIENT
Start: 2019-04-12 | End: 2019-04-12 | Stop reason: HOSPADM

## 2019-04-12 RX ORDER — METHYLPREDNISOLONE ACETATE 80 MG/ML
INJECTION, SUSPENSION INTRA-ARTICULAR; INTRALESIONAL; INTRAMUSCULAR; SOFT TISSUE
Status: DISCONTINUED | OUTPATIENT
Start: 2019-04-12 | End: 2019-04-12 | Stop reason: HOSPADM

## 2019-04-12 RX ORDER — LIDOCAINE HYDROCHLORIDE 20 MG/ML
INJECTION, SOLUTION EPIDURAL; INFILTRATION; INTRACAUDAL; PERINEURAL
Status: DISCONTINUED | OUTPATIENT
Start: 2019-04-12 | End: 2019-04-12 | Stop reason: HOSPADM

## 2019-04-12 RX ORDER — FENTANYL CITRATE 50 UG/ML
INJECTION, SOLUTION INTRAMUSCULAR; INTRAVENOUS
Status: DISCONTINUED | OUTPATIENT
Start: 2019-04-12 | End: 2019-04-12 | Stop reason: HOSPADM

## 2019-04-12 RX ORDER — LIDOCAINE HYDROCHLORIDE 10 MG/ML
INJECTION, SOLUTION EPIDURAL; INFILTRATION; INTRACAUDAL; PERINEURAL
Status: DISCONTINUED | OUTPATIENT
Start: 2019-04-12 | End: 2019-04-12 | Stop reason: HOSPADM

## 2019-04-12 RX ORDER — BUPIVACAINE HYDROCHLORIDE 2.5 MG/ML
INJECTION, SOLUTION EPIDURAL; INFILTRATION; INTRACAUDAL
Status: DISCONTINUED | OUTPATIENT
Start: 2019-04-12 | End: 2019-04-12 | Stop reason: HOSPADM

## 2019-04-12 RX ADMIN — SODIUM CHLORIDE, SODIUM LACTATE, POTASSIUM CHLORIDE, CALCIUM CHLORIDE: 600; 310; 30; 20 INJECTION, SOLUTION INTRAVENOUS at 01:04

## 2019-04-12 NOTE — DISCHARGE INSTRUCTIONS
Anesthesia: After Your Surgery  Youve just had surgery. During surgery, you received medication called anesthesia to keep you comfortable and pain-free. After surgery, you may experience some pain or nausea. This is normal. Here are some tips for feeling better and recovering after surgery.         Stay on schedule with your medication.   Going Home  Your doctor or nurse will show you how to take care of yourself when you go home. He or she will also answer your questions. Have an adult family member or friend drive you home. For the first 24 hours after your surgery:  · Do not drive or use heavy equipment.  · Do not make important decisions or sign documents.  · Avoid alcohol.  · Have someone stay with you, if possible. They can watch for problems and help keep you safe.  Be sure to keep all follow-up doctors appointments. And rest after your procedure for as long as your doctor tells you to.  Coping with Pain  If you have pain after surgery, pain medication will help you feel better. Take it as directed, before pain becomes severe. Also, ask your doctor or pharmacist about other ways to control pain, such as with heat, ice, and relaxation. And follow any other instructions your surgeon or nurse gives you.  Tips for Taking Pain Medication  To get the best relief possible, remember these points:  · Pain medications can upset your stomach. Taking them with a little food may help.  · Most pain relievers taken by mouth need at least 20 to 30 minutes to take effect.  · Taking medication on a schedule can help you remember to take it. Try to time your medication so that you can take it before beginning an activity, such as dressing, walking, or sitting down for dinner.  · Constipation is a common side effect of pain medications. Drink lots of fluids. Eating fruit and vegetables can also help. Dont take laxatives unless your surgeon has prescribed them.  · Mixing alcohol and pain medication can cause dizziness and slow  your breathing. It can even be fatal. Dont drink alcohol while taking pain medication.  · Pain medication can slow your reflexes. Dont drive or operate machinery while taking pain medication.  Managing Nausea  Some people have an upset stomach after surgery. This is often due to anesthesia, pain, pain medications, or the stress of surgery. The following tips will help you manage nausea and get good nutrition as you recover. If you were on a special diet before surgery, ask your doctor if you should follow it during recovery. These tips may help:  · Dont push yourself to eat. Your body will tell you what to eat and when.  · Start off with liquids and soup. They are easier to digest.  · Progress to semisolids (mashed potatoes, applesauce, and gelatin) as you feel ready.  · Slowly move to solid foods. Dont eat fatty, rich, or spicy foods at first.  · Dont force yourself to have three large meals a day. Instead, eat smaller amounts more often.  · Take pain medications with a small amount of solid food, such as crackers or toast.  Call Your Surgeon If  · You still have pain an hour after taking medication (it may not be strong enough).  · You feel too sleepy, dizzy, or groggy (medication may be too strong).  · You have side effects like nausea, vomiting, or skin changes (rash, itching, or hives).   Before leaving, please make sure you have all your personal belongings such as glasses, purses, wallets, keys, cell phones, jewelry, jackets etc     RADIOFREQUENCY/Pain injection instructions:     This procedure may take several weeks to relieve pain  You may get some pain relief from the local anesthetic initally.    No driving for 24 hrs.   Activity as tolerated- gradually increase activities.  Dont lift over 10 lbs for 24 hrs   No heat at injection sites x 2 days. No heating pads, hot tubs, saunas, or swimming in any body of water or pool for 2 days.  Use ice pack for mild swelling and for comfort , apply for 20  minutes, remove for 20 minute intervals. No direct contact of ice itself  to skin.  May shower today. No tub baths for two days.      Resume Aspirin, Plavix, or Coumadin the day after the procedure unless otherwise instructed.   If diabetic,monitor your glucose carefully as steroids can increase your glucose level    Seek immediate medical help for:   Severe increase in your usual pain or appearance of new pain.  Prolonged (more than 8 hours) or increasing weakness or numbness in the legs or arms. Numbing medicine was injected and can affect the messages to and from the brain and legs or arms.  .    Fever above 100.4F ,Drainage,redness,active bleeding, or increased swelling at the injection site.  Headache, shortness of breath, chest pain, or breathing problems.    Radiofrequency of Nerves    After this procedure you may have increased pain for three days and lingering pain for up to 6 weeks.   Most patients feel better after 4-6  weeks.    Use your pain medications as needed but the goal of this treartment is to wean you off your pain medication.  You may have weakness after the procedure due to the numbing injection.  You may feel a sunburn effect and some patients may need a burn cream for the skin after 2 days.    Use ice pack for discomfort :apply for 20 minutes, remove for 20 minutes for up to 2 days. Do not sleep with ice pack.  Do not use a heating pad or take tub baths or swim for 2 days.  You may shower today  Gradually increase your activities.  Dont lift anything over 10 lbs for the first 24 hours  Dont drive the day of the procedure Wait 24 hrs to drive.  Wait until tomorrow to resume any blood thinners (aspirin, Plavix, Coumadin) but you may resume all your other medications today.  If Diabetic, monitor you glucose carefully due to steroids  used for this procedure    Seek Medical Attention if you have:  Severe pain or headache  Fever or chills  Redness or swelling around the injection  site   Difficulty breathing  Vomiting or Increasing numbness or weakness in arms or legs

## 2019-04-12 NOTE — OP NOTE
PROCEDURE DATE: 4/12/2019    PROCEDURE:  Radiofrequency ablation of the L3,4,5 medial branch nerves on the bilateral-side utilizing fluoroscopy    DIAGNOSIS:  Other lumbar spondylosis  Post op Diagnosis: Same    PHYSICIAN: Thad Manning MD    MEDICATIONS INJECTED:  From a mixture of 6ml of 0.25% bupivicaine and 80mg of methylprednisone,  1ml of this solution was injected at each level.    LOCAL ANESTHETIC USED: Lidocaine 1%, 2 ml given at each site.    SEDATION MEDICATIONS: RN IV sedation    ESTIMATED BLOOD LOSS:  None    COMPLICATIONS:  None    TECHNIQUE:  A time out was taken to identify patient and procedure side prior to starting the procedure. Laying in a prone position, the patient was prepped and draped in the usual sterile fashion using ChloraPrep and sterile towels.  The levels were determined under fluoroscopic guidance and then marked.  Local anesthetic was given by raising a wheal at the skin over each site and then infiltrated approximately 2cm deeper.  A 20-gauge  100 mm RF needle was introduced to the anatomic location of the bilateral L3,4,5 medial branch nerves.  Motor stimulation up to 2 Volts at each level confirmed no motor nerve involvement.  Impedance was less than 800 ohms at each level.  1ml of 2% lidocaine was instilled prior to lesioning.  Ablation was performed per level utilizing radiofrequency generator 80°C for 60 seconds.  Needles were then rotated 90° and a second lesion was performed.  The above noted medication was then injected slowly. The patient tolerated the procedure well.     The patient was monitored after the procedure.  Patient was given post procedure and discharge instructions to follow at home.  The patient was discharged in a stable condition

## 2019-04-12 NOTE — DISCHARGE SUMMARY
[facility]  Discharge Note  Short Stay    Admit Date: 4/12/2019    Discharge Date and Time: 4/12/2019    Attending Physician: Thad Manning MD     Discharge Provider: Thad Manning    Diagnoses:  Active Hospital Problems    Diagnosis  POA    *Other spondylosis, lumbar region [M47.896]  Yes      Resolved Hospital Problems   No resolved problems to display.       Hospital Course: Lumbar MB RFA  Discharged Condition: Good    Final Diagnoses:   Active Hospital Problems    Diagnosis  POA    *Other spondylosis, lumbar region [M47.896]  Yes      Resolved Hospital Problems   No resolved problems to display.       Disposition: Home or Self Care    Follow up/Patient Instructions:    Medications:  Reconciled Home Medications:      Medication List      CONTINUE taking these medications    albuterol 90 mcg/actuation inhaler  Commonly known as:  PROVENTIL/VENTOLIN HFA  Inhale 2 puffs into the lungs every 6 (six) hours as needed for Wheezing.     ALPRAZolam 2 MG Tab  Commonly known as:  XANAX  Take 1 mg by mouth nightly as needed.     amitriptyline 50 MG tablet  Commonly known as:  ELAVIL  Take 50 mg by mouth every evening.     amLODIPine 5 MG tablet  Commonly known as:  NORVASC     CENTRUM 3,500-18-0.4 unit-mg-mg Chew  Generic drug:  multivit-iron-min-folic acid  Take 1 tablet by mouth.     DULoxetine 60 MG capsule  Commonly known as:  CYMBALTA  Take 60 mg by mouth 2 (two) times daily.     esomeprazole 40 MG capsule  Commonly known as:  NEXIUM  Take 40 mg by mouth before breakfast.     fexofenadine 180 MG tablet  Commonly known as:  ALLEGRA  Take 180 mg by mouth once daily.     fluticasone 50 mcg/actuation nasal spray  Commonly known as:  FLONASE  USE ONE SPRAY IEN QD     furosemide 20 MG tablet  Commonly known as:  LASIX  TK 1 TO 2 TS PO QD FOR FLUID RETENTION     hydroCHLOROthiazide 25 MG tablet  Commonly known as:  HYDRODIURIL  Take 25 mg by mouth once daily.     HYDROcodone-acetaminophen 7.5-325 mg per tablet  Commonly  known as:  NORCO  Take 1 tablet by mouth every 6 (six) hours as needed for Pain.     levothyroxine 125 MCG tablet  Commonly known as:  SYNTHROID  Take 125 mcg by mouth once daily.     losartan 100 MG tablet  Commonly known as:  COZAAR  TK 1 T PO D     lovastatin 40 mg 24 hr tablet  Commonly known as:  ALTOPREV  Take 40 mg by mouth every evening.     metoprolol succinate 50 MG 24 hr tablet  Commonly known as:  TOPROL-XL     omeprazole 20 MG capsule  Commonly known as:  PRILOSEC  TK ONE C PO ONCE D     potassium chloride SA 10 MEQ tablet  Commonly known as:  K-DUR,KLOR-CON  TK 1 T PO QD     pramipexole 0.25 MG tablet  Commonly known as:  MIRAPEX  Take 0.5 mg by mouth every evening.     pregabalin 75 MG capsule  Commonly known as:  LYRICA  Take 1 capsule (75 mg total) by mouth 3 (three) times daily.     promethazine-codeine 6.25-10 mg/5 ml 6.25-10 mg/5 mL syrup  Commonly known as:  PHENERGAN with CODEINE  TK 5 MLS PO Q 6 H PRF COUGH     tiZANidine 2 MG tablet  Commonly known as:  ZANAFLEX  TK 1 T PO Q 8 H AS NEEDED. NTE 3 DOSES IN 24 H     valsartan 320 MG tablet  Commonly known as:  DIOVAN     venlafaxine 75 MG 24 hr capsule  Commonly known as:  EFFEXOR-XR  TK 1 C PO QD          Discharge Procedure Orders   Call MD for:  temperature >100.4     Call MD for:  persistent nausea and vomiting or diarrhea     Call MD for:  severe uncontrolled pain     Call MD for:  redness, tenderness, or signs of infection (pain, swelling, redness, odor or green/yellow discharge around incision site)     Call MD for:  difficulty breathing or increased cough     Call MD for:  severe persistent headache        Follow up with MD in 2-3 weeks    Discharge Procedure Orders (must include Diet, Follow-up, Activity):   Discharge Procedure Orders (must include Diet, Follow-up, Activity)   Call MD for:  temperature >100.4     Call MD for:  persistent nausea and vomiting or diarrhea     Call MD for:  severe uncontrolled pain     Call MD for:   redness, tenderness, or signs of infection (pain, swelling, redness, odor or green/yellow discharge around incision site)     Call MD for:  difficulty breathing or increased cough     Call MD for:  severe persistent headache

## 2019-04-15 VITALS
RESPIRATION RATE: 18 BRPM | WEIGHT: 216 LBS | DIASTOLIC BLOOD PRESSURE: 75 MMHG | SYSTOLIC BLOOD PRESSURE: 126 MMHG | HEART RATE: 85 BPM | BODY MASS INDEX: 40.78 KG/M2 | HEIGHT: 61 IN | TEMPERATURE: 98 F | OXYGEN SATURATION: 94 %

## 2019-04-25 ENCOUNTER — PATIENT OUTREACH (OUTPATIENT)
Dept: ADMINISTRATIVE | Facility: HOSPITAL | Age: 69
End: 2019-04-25

## 2019-04-25 NOTE — PROGRESS NOTES
Health Maintenance Due   Topic Date Due    Mammogram  01/31/1988    DEXA SCAN  01/31/1988    Colonoscopy  01/31/1998    Lipid Panel  07/31/2013

## 2019-04-25 NOTE — LETTER
"April 25, 2019    Genoveva Gilbert  [address] [facility] [address] [facility] [address]  [place] [address]             [facility]  [address]  [facility] [address]  Phone: [phone] Dear Genoveva Gilbert,    [facility] is committed to your overall health.  To help you get the most out of each of your visits, we will review your information to make sure you are up to date on all of your recommended tests and/or procedures.      As a new patient to Lena Woods MD , we may not have your complete medical records. She has found that your chart shows you may be due for a:    COLORECTAL CANCER SCREENING  MAMMOGRAM  BONE MINERAL DENSITY SCAN (DEXA)  Fasting cholesterol labs (Lipid Panel)      If you have had any of the above done at another facility, please bring the records or information with you so that your record at [facility] will be complete.     Our records show you are due for colon cancer screening.  If you have already had your screening, or you have made an appointment for your screening, congratulations!  You're on the road to good health. If you haven't signed up for a colorectal screening please accept this invitation to be screened.      According to the American Cancer Society, colon cancer is the third most common cancer for people in the [place].  A simple screening test "Fit Kit" - done in your own home - can help find colon cancer at an early stage when it can be treated, even before any signs or symptoms develop. THIS IS A YEARLY TEST.    Testing for blood in your stool (feces or bowel movement) is the first step. If you have an upcoming visit with your Primary Care Physician you can  a Fit Kit during your visit or you can  a Fit Kit at your Primary Care Clinic prior to your visit.    The Fit Kit includes:    · Instructions on how to perform the test  · (1) Sheet of tissue paper  · (1) Small Absorption pad  · (1) Bottle to hold the sample and a small probe to help you take the sample  · (1) " "Small plastic bio-hazard bag  · (1) Postage-paid return envelope    Please do your test (the instructions will tell you how) and then return your sample in the postage-paid return envelope within 24 hours of collection.     If your test results are negative, you won't need testing again for another year.  If results show you need more testing, we will call you with next steps.    Regular colorectal cancer screening is one of the most powerful weapons for preventing colon cancer.  The website https://www.cancer.org/cancer/colon-rectal-cancer.html can answer many of your questions about this cancer and its prevention.  Just search for "colorectal cancer".         If you are currently taking medication, please bring it with you to your appointment for review.    Also, if you have any type of Advanced Directives, please bring them with you to your office visit so we may scan them into your chart.      Thank You,  Your [facility]  MD Queta Ch LPN Clinical Care Coordinator  [facility] [address] [place] [address]  Phone [phone]  Fax [phone]       "

## 2019-05-09 ENCOUNTER — TELEPHONE (OUTPATIENT)
Dept: FAMILY MEDICINE | Facility: CLINIC | Age: 69
End: 2019-05-09

## 2019-05-09 ENCOUNTER — OFFICE VISIT (OUTPATIENT)
Dept: FAMILY MEDICINE | Facility: CLINIC | Age: 69
End: 2019-05-09
Payer: MEDICARE

## 2019-05-09 VITALS
WEIGHT: 236.56 LBS | OXYGEN SATURATION: 93 % | TEMPERATURE: 98 F | HEIGHT: 61 IN | SYSTOLIC BLOOD PRESSURE: 87 MMHG | BODY MASS INDEX: 44.66 KG/M2 | HEART RATE: 74 BPM | DIASTOLIC BLOOD PRESSURE: 59 MMHG

## 2019-05-09 DIAGNOSIS — R47.81 SLURRED SPEECH: Primary | ICD-10-CM

## 2019-05-09 DIAGNOSIS — R53.81 PHYSICAL DECONDITIONING: ICD-10-CM

## 2019-05-09 DIAGNOSIS — R60.0 EDEMA OF BOTH LOWER EXTREMITIES: ICD-10-CM

## 2019-05-09 DIAGNOSIS — I10 ESSENTIAL HYPERTENSION: ICD-10-CM

## 2019-05-09 DIAGNOSIS — Z78.0 ASYMPTOMATIC MENOPAUSAL STATE: ICD-10-CM

## 2019-05-09 DIAGNOSIS — G35 MULTIPLE SCLEROSIS: ICD-10-CM

## 2019-05-09 DIAGNOSIS — R53.1 WEAKNESS: ICD-10-CM

## 2019-05-09 DIAGNOSIS — R26.81 UNSTEADY GAIT: ICD-10-CM

## 2019-05-09 DIAGNOSIS — E03.9 ACQUIRED HYPOTHYROIDISM: ICD-10-CM

## 2019-05-09 DIAGNOSIS — K12.1 ULCER MOUTH: ICD-10-CM

## 2019-05-09 PROCEDURE — 3074F SYST BP LT 130 MM HG: CPT | Mod: CPTII,S$GLB,, | Performed by: FAMILY MEDICINE

## 2019-05-09 PROCEDURE — 1101F PR PT FALLS ASSESS DOC 0-1 FALLS W/OUT INJ PAST YR: ICD-10-PCS | Mod: CPTII,S$GLB,, | Performed by: FAMILY MEDICINE

## 2019-05-09 PROCEDURE — 3078F DIAST BP <80 MM HG: CPT | Mod: CPTII,S$GLB,, | Performed by: FAMILY MEDICINE

## 2019-05-09 PROCEDURE — 1101F PT FALLS ASSESS-DOCD LE1/YR: CPT | Mod: CPTII,S$GLB,, | Performed by: FAMILY MEDICINE

## 2019-05-09 PROCEDURE — 3078F PR MOST RECENT DIASTOLIC BLOOD PRESSURE < 80 MM HG: ICD-10-PCS | Mod: CPTII,S$GLB,, | Performed by: FAMILY MEDICINE

## 2019-05-09 PROCEDURE — 99214 PR OFFICE/OUTPT VISIT, EST, LEVL IV, 30-39 MIN: ICD-10-PCS | Mod: S$GLB,,, | Performed by: FAMILY MEDICINE

## 2019-05-09 PROCEDURE — 99214 OFFICE O/P EST MOD 30 MIN: CPT | Mod: S$GLB,,, | Performed by: FAMILY MEDICINE

## 2019-05-09 PROCEDURE — 99999 PR PBB SHADOW E&M-EST. PATIENT-LVL V: ICD-10-PCS | Mod: PBBFAC,,, | Performed by: FAMILY MEDICINE

## 2019-05-09 PROCEDURE — 81001 URINALYSIS AUTO W/SCOPE: CPT

## 2019-05-09 PROCEDURE — 99999 PR PBB SHADOW E&M-EST. PATIENT-LVL V: CPT | Mod: PBBFAC,,, | Performed by: FAMILY MEDICINE

## 2019-05-09 PROCEDURE — 3074F PR MOST RECENT SYSTOLIC BLOOD PRESSURE < 130 MM HG: ICD-10-PCS | Mod: CPTII,S$GLB,, | Performed by: FAMILY MEDICINE

## 2019-05-09 RX ORDER — LOSARTAN POTASSIUM 50 MG/1
50 TABLET ORAL DAILY
Qty: 90 TABLET | Refills: 3 | Status: SHIPPED | OUTPATIENT
Start: 2019-05-09 | End: 2020-04-24

## 2019-05-09 NOTE — TELEPHONE ENCOUNTER
----- Message from Marley Ramos sent at 5/9/2019  4:01 PM CDT -----  Contact: Caleb north/BETHEL [phone]  Please call her to clarify the diagnoses this patient will see them for.  Is it only the physical deconditioning or all of them?  If all, do you want her to have lymphedema therapy or physical therapy for the edema? Thank you!

## 2019-05-09 NOTE — PROGRESS NOTES
Subjective:       Patient ID: Genoveva Gilbert is a 69 y.o. female.    Chief Complaint: Establish Care    HPI     Mrs. Gilbert presetnst to clinic for swelling in her feet. Has been having this issue for months. Has a hard time walking. Complains that she is weak. Loses balance because she is weak as well. Having this for about 8 months.     Patient is also worried about her slurred speech. Has been going on for months as well. Has a history of MS. Hasn't seen a neurologist in a while. Also has problems walking.    Low bp noted today. List currently shows that she is on losartan 100, valsartan, amlodipine, toprol, and hctz.     After visit was complete patient added that she also has sores in her mouth. Would like something for it.     Past Medical History:   Diagnosis Date    Allergy     Anxiety     Arthritis, rheumatoid     Back pain     Depression     Fibromyalgia     GERD (gastroesophageal reflux disease)     High cholesterol     Hypertension     Incontinence of urine     MS (multiple sclerosis)     benign; another MD stated she has no MS    Neuropathy     Restless leg syndrome     Sciatica of left side 1/5/2018    Seasonal allergies     Sinus congestion     Thyroid disease     Wheezing        Past Surgical History:   Procedure Laterality Date    APPENDECTOMY      ARTHROSCOPY, KNEE Left 3/20/2014    [facility]    ARTHROSCOPY-SHOULDER Right 4/16/2015    [facility]    Block-nerve-medial branch-lumbar Bilateral 2/28/2019    [facility] at [facility]    Block-nerve-medial branch-lumbar L3,4,5 Bilateral 3/21/2019    [facility] at [facility]    BREAST LUMPECTOMY      bilateral, benign    BREAST SURGERY      reduction    CHONDROPLASTY-KNEE Left 3/20/2014    [facility]    ESOPHAGOGASTRODUODENOSCOPY (EGD) N/A 7/5/2017    [facility]formed [facility] at [facility] ENDO  (4TH FLR)    HYSTERECTOMY      partial - fibroids    Radiofrequency Ablation, Nerve, Spinal, Lumbar, Medial Branch, 1 Level Bilateral 2019    [facility]formed [facility]    REPAIR ROTATOR CUFF ARTHROSCOPIC Right 2015    [facility] at [facility]    TONSILLECTOMY         Family History   Problem Relation Age of Onset    Heart disease Mother        Review of patient's allergies indicates:   Allergen Reactions    Keflex [cephalexin]     Pcn [penicillins]     Adhesive Other (See Comments)     bandainds redness at skin           Social History     Substance and Sexual Activity   Drug Use No       Social History     Tobacco Use    Smoking status: Former Smoker     Last attempt to quit: 1996     Years since quittin.9    Smokeless tobacco: Never Used   Substance Use Topics    Alcohol use: Yes     Comment: very little        Social History     Substance and Sexual Activity   Sexual Activity Not on file         Current Outpatient Medications:     albuterol 90 mcg/actuation inhaler, Inhale 2 puffs into the lungs every 6 (six) hours as needed for Wheezing., Disp: 1 Inhaler, Rfl: 6    alprazolam (XANAX) 2 MG Tab, Take 1 mg by mouth nightly as needed. , Disp: , Rfl:     amitriptyline (ELAVIL) 50 MG tablet, Take 50 mg by mouth every evening., Disp: , Rfl: 4    duloxetine (CYMBALTA) 60 MG capsule, Take 60 mg by mouth 2 (two) times daily. , Disp: , Rfl:     fexofenadine (ALLEGRA) 180 MG tablet, Take 180 mg by mouth once daily., Disp: , Rfl:     fluticasone (FLONASE) 50 mcg/actuation nasal spray, USE ONE SPRAY IEN QD, Disp: , Rfl: 3    furosemide (LASIX) 20 MG tablet, TK 1 TO 2 TS PO QD FOR FLUID RETENTION, Disp: , Rfl: 3    hydrochlorothiazide (HYDRODIURIL) 25 MG tablet, Take 25 mg by mouth once daily., Disp: , Rfl:     hydrocodone-acetaminophen 7.5-325mg (NORCO) 7.5-325 mg per tablet, Take 1 tablet by mouth every 6 (six) hours as needed for Pain., Disp: ,  "Rfl:     levothyroxine (SYNTHROID) 125 MCG tablet, Take 125 mcg by mouth once daily., Disp: , Rfl:     losartan (COZAAR) 100 MG tablet, TK 1 T PO D, Disp: , Rfl: 1    lovastatin (ALTOPREV) 40 mg 24 hr tablet, Take 40 mg by mouth every evening., Disp: , Rfl:     metoprolol succinate (TOPROL-XL) 50 MG 24 hr tablet, , Disp: , Rfl:     MULTIVIT-IRON-MIN-FOLIC ACID 3,500-18-0.4 UNIT-MG-MG ORAL CHEW, Take 1 tablet by mouth., Disp: , Rfl:     omeprazole (PRILOSEC) 20 MG capsule, TK ONE C PO ONCE D, Disp: , Rfl: 3    potassium chloride SA (K-DUR,KLOR-CON) 10 MEQ tablet, TK 1 T PO QD, Disp: , Rfl: 4    pramipexole (MIRAPEX) 0.25 MG tablet, Take 0.5 mg by mouth every evening., Disp: , Rfl:     pregabalin (LYRICA) 75 MG capsule, Take 1 capsule (75 mg total) by mouth 3 (three) times daily., Disp: 270 capsule, Rfl: 1    promethazine-codeine 6.25-10 mg/5 ml (PHENERGAN WITH CODEINE) 6.25-10 mg/5 mL syrup, TK 5 MLS PO Q 6 H PRF COUGH, Disp: , Rfl: 2    valsartan (DIOVAN) 320 MG tablet, , Disp: , Rfl:     venlafaxine (EFFEXOR-XR) 75 MG 24 hr capsule, TK 1 C PO QD, Disp: , Rfl: 3    Review of Systems   Constitutional: Negative for chills and fever.   HENT: Negative for congestion and sore throat.    Eyes: Negative for visual disturbance.   Respiratory: Negative for cough and shortness of breath.    Cardiovascular: Negative for chest pain.   Gastrointestinal: Negative for abdominal pain, constipation, diarrhea, nausea and vomiting.   Genitourinary: Negative for dysuria.   Musculoskeletal: Positive for joint swelling.   Skin: Negative for rash and wound.   Neurological: Positive for speech difficulty and weakness. Negative for dizziness and headaches.   Hematological: Does not bruise/bleed easily.           Objective:          Vitals:    05/09/19 1417   BP: (!) 87/59   Pulse: 74   Temp: 98.3 °F (36.8 °C)   SpO2: (!) 93%   Weight: 107.3 kg (236 lb 8.9 oz)   Height: 5' 1" (1.549 m)       Physical Exam   Constitutional: She " appears well-developed and well-nourished.   HENT:   Head: Normocephalic and atraumatic.   Cardiovascular: Normal rate and regular rhythm.   Pulmonary/Chest: Effort normal and breath sounds normal.   Abdominal: Soft. Bowel sounds are normal.   Musculoskeletal:        Right ankle: She exhibits swelling.        Left ankle: She exhibits swelling.        Right lower leg: She exhibits swelling and edema.        Left lower leg: She exhibits swelling and edema.   Neurological: She is alert.   Skin: Skin is warm.   Psychiatric: She has a normal mood and affect.               Assessment/Plan     Genoveva was seen today for establish care.    Diagnoses and all orders for this visit:    Slurred speech  -     Ambulatory referral to Neurology    Unsteady gait  -     Ambulatory referral to Neurology    Weakness  -     Comprehensive metabolic panel; Future  -     CBC auto differential; Future    Physical deconditioning  -     Ambulatory referral to Physical Therapy    Ulcer mouth  -     (Magic mouthwash) 1:1:1 Benadryl 12.5mg/5ml liq, aluminum & magnesium hydroxide-simehticone (Maalox), lidocaine viscous 2%; Swish and spit 5 mLs every 4 (four) hours as needed. for mouth sores  - Told to f/u with denitst    Edema of both lower extremities  -     URINALYSIS to check for protein  -     Ambulatory referral to Occupational Therapy  -     US Lower Extremity Veins Bilateral Insufficiency; Future    Asymptomatic menopausal state  -     DXA Bone Density Spine And Hip; Future    Acquired hypothyroidism  -     TSH; Future    Multiple sclerosis  -     Ambulatory referral to Neurology  -     MRI Brain Without Contrast; Future    Essential hypertension  -     D/c amlodipine and valsartan. Decrease losartan to 50mg.     Follow up in about 2 weeks (around 5/23/2019) for htn.    Future Appointments   Date Time Provider Department Center   6/4/2019  2:00 PM Crispin Zamora MD [facility] NEURO [facility]   6/20/2019  2:00 PM Lena Woods MD [facility]  MED [place]   8/22/2019 10:00 AM Bacilio Stein DO [facility] [place]  [facility]

## 2019-05-10 ENCOUNTER — LAB VISIT (OUTPATIENT)
Dept: LAB | Facility: HOSPITAL | Age: 69
End: 2019-05-10
Attending: FAMILY MEDICINE
Payer: MEDICARE

## 2019-05-10 ENCOUNTER — PATIENT OUTREACH (OUTPATIENT)
Dept: ADMINISTRATIVE | Facility: HOSPITAL | Age: 69
End: 2019-05-10

## 2019-05-10 DIAGNOSIS — R82.998 URINE WBC INCREASED: Primary | ICD-10-CM

## 2019-05-10 DIAGNOSIS — R82.998 URINE WBC INCREASED: ICD-10-CM

## 2019-05-10 DIAGNOSIS — Z13.220 ENCOUNTER FOR LIPID SCREENING FOR CARDIOVASCULAR DISEASE: Primary | ICD-10-CM

## 2019-05-10 DIAGNOSIS — Z13.6 ENCOUNTER FOR LIPID SCREENING FOR CARDIOVASCULAR DISEASE: Primary | ICD-10-CM

## 2019-05-10 DIAGNOSIS — Z12.39 BREAST CANCER SCREENING: ICD-10-CM

## 2019-05-10 LAB
BACTERIA #/AREA URNS AUTO: ABNORMAL /HPF
BILIRUB UR QL STRIP: NEGATIVE
CLARITY UR REFRACT.AUTO: ABNORMAL
COLOR UR AUTO: ABNORMAL
GLUCOSE UR QL STRIP: NEGATIVE
HGB UR QL STRIP: NEGATIVE
HYALINE CASTS UR QL AUTO: 32 /LPF
KETONES UR QL STRIP: NEGATIVE
LEUKOCYTE ESTERASE UR QL STRIP: ABNORMAL
MICROSCOPIC COMMENT: ABNORMAL
NITRITE UR QL STRIP: NEGATIVE
NON-SQ EPI CELLS #/AREA URNS AUTO: 2 /HPF
PH UR STRIP: 5 [PH] (ref 5–8)
PROT UR QL STRIP: ABNORMAL
RBC #/AREA URNS AUTO: 2 /HPF (ref 0–4)
SP GR UR STRIP: 1.01 (ref 1–1.03)
SQUAMOUS #/AREA URNS AUTO: 30 /HPF
URN SPEC COLLECT METH UR: ABNORMAL
WBC #/AREA URNS AUTO: 50 /HPF (ref 0–5)

## 2019-05-10 PROCEDURE — 87086 URINE CULTURE/COLONY COUNT: CPT

## 2019-05-12 LAB — BACTERIA UR CULT: NORMAL

## 2019-05-14 ENCOUNTER — HOSPITAL ENCOUNTER (OUTPATIENT)
Dept: RADIOLOGY | Facility: CLINIC | Age: 69
Discharge: HOME | End: 2019-05-14
Attending: FAMILY MEDICINE
Payer: MEDICARE

## 2019-05-14 DIAGNOSIS — Z12.39 BREAST CANCER SCREENING: ICD-10-CM

## 2019-05-14 DIAGNOSIS — R60.0 EDEMA OF BOTH LOWER EXTREMITIES: ICD-10-CM

## 2019-05-14 DIAGNOSIS — Z78.0 ASYMPTOMATIC MENOPAUSAL STATE: ICD-10-CM

## 2019-05-14 PROCEDURE — 93970 EXTREMITY STUDY: CPT | Mod: 26,,, | Performed by: RADIOLOGY

## 2019-05-14 PROCEDURE — 93970 EXTREMITY STUDY: CPT | Mod: TC,PO

## 2019-05-14 PROCEDURE — 77067 SCR MAMMO BI INCL CAD: CPT | Mod: TC,PO

## 2019-05-14 PROCEDURE — 93970 US LOWER EXTREMITY VEINS BILATERAL INSUFFICIENCY: ICD-10-PCS | Mod: 26,,, | Performed by: RADIOLOGY

## 2019-05-14 PROCEDURE — 77067 SCR MAMMO BI INCL CAD: CPT | Mod: 26,,, | Performed by: RADIOLOGY

## 2019-05-14 PROCEDURE — 77063 MAMMO DIGITAL SCREENING BILAT WITH TOMOSYNTHESIS_CAD: ICD-10-PCS | Mod: 26,,, | Performed by: RADIOLOGY

## 2019-05-14 PROCEDURE — 77080 DXA BONE DENSITY AXIAL: CPT | Mod: TC,PO

## 2019-05-14 PROCEDURE — 77080 DXA BONE DENSITY AXIAL: CPT | Mod: 26,,, | Performed by: RADIOLOGY

## 2019-05-14 PROCEDURE — 77080 DEXA BONE DENSITY SPINE HIP: ICD-10-PCS | Mod: 26,,, | Performed by: RADIOLOGY

## 2019-05-14 PROCEDURE — 77067 MAMMO DIGITAL SCREENING BILAT WITH TOMOSYNTHESIS_CAD: ICD-10-PCS | Mod: 26,,, | Performed by: RADIOLOGY

## 2019-05-14 PROCEDURE — 77063 BREAST TOMOSYNTHESIS BI: CPT | Mod: 26,,, | Performed by: RADIOLOGY

## 2019-05-16 ENCOUNTER — HOSPITAL ENCOUNTER (OUTPATIENT)
Dept: RADIOLOGY | Facility: HOSPITAL | Age: 69
Discharge: HOME | End: 2019-05-16
Attending: FAMILY MEDICINE
Payer: MEDICARE

## 2019-05-16 DIAGNOSIS — G35 MULTIPLE SCLEROSIS: ICD-10-CM

## 2019-05-16 DIAGNOSIS — R60.0 EDEMA OF BOTH LOWER EXTREMITIES: Primary | ICD-10-CM

## 2019-05-16 PROCEDURE — 70551 MRI BRAIN WITHOUT CONTRAST: ICD-10-PCS | Mod: 26,,, | Performed by: RADIOLOGY

## 2019-05-16 PROCEDURE — 70551 MRI BRAIN STEM W/O DYE: CPT | Mod: 26,,, | Performed by: RADIOLOGY

## 2019-05-16 PROCEDURE — 70551 MRI BRAIN STEM W/O DYE: CPT | Mod: TC

## 2019-05-17 ENCOUNTER — TELEPHONE (OUTPATIENT)
Dept: FAMILY MEDICINE | Facility: CLINIC | Age: 69
End: 2019-05-17

## 2019-05-17 NOTE — TELEPHONE ENCOUNTER
"Patient is complaining of severe acid reflux. States since she was taken off oc the Esomeprazole, it has been bothering her. Yesterday it "burned so bad I felt like I was dying". She wants to know if there is any way she can be put back on it.  "

## 2019-05-17 NOTE — TELEPHONE ENCOUNTER
----- Message from Chris House sent at 5/17/2019  8:47 AM CDT -----  Contact: self   Patient want to speak with a nurse regarding medication questions and having heartburn when she not taking omeprazole please call back at [phone] (home)

## 2019-05-20 ENCOUNTER — TELEPHONE (OUTPATIENT)
Dept: FAMILY MEDICINE | Facility: CLINIC | Age: 69
End: 2019-05-20

## 2019-05-20 NOTE — TELEPHONE ENCOUNTER
----- Message from Herson Patel sent at 5/20/2019  4:22 PM CDT -----  Contact: Patient  Advising returning a call to Pol[place], possibly about test results.

## 2019-05-29 ENCOUNTER — OFFICE VISIT (OUTPATIENT)
Dept: PAIN MEDICINE | Facility: CLINIC | Age: 69
End: 2019-05-29
Payer: MEDICARE

## 2019-05-29 ENCOUNTER — TELEPHONE (OUTPATIENT)
Dept: NEUROLOGY | Facility: CLINIC | Age: 69
End: 2019-05-29

## 2019-05-29 VITALS
HEIGHT: 61 IN | BODY MASS INDEX: 44.56 KG/M2 | HEART RATE: 83 BPM | DIASTOLIC BLOOD PRESSURE: 60 MMHG | SYSTOLIC BLOOD PRESSURE: 99 MMHG | WEIGHT: 236 LBS

## 2019-05-29 DIAGNOSIS — M54.16 LUMBAR RADICULITIS: ICD-10-CM

## 2019-05-29 DIAGNOSIS — M51.36 DDD (DEGENERATIVE DISC DISEASE), LUMBAR: Primary | ICD-10-CM

## 2019-05-29 DIAGNOSIS — M47.896 OTHER SPONDYLOSIS, LUMBAR REGION: ICD-10-CM

## 2019-05-29 DIAGNOSIS — M54.16 LUMBAR RADICULOPATHY: Primary | ICD-10-CM

## 2019-05-29 PROCEDURE — 1101F PT FALLS ASSESS-DOCD LE1/YR: CPT | Mod: CPTII,S$GLB,, | Performed by: ANESTHESIOLOGY

## 2019-05-29 PROCEDURE — 3074F SYST BP LT 130 MM HG: CPT | Mod: CPTII,S$GLB,, | Performed by: ANESTHESIOLOGY

## 2019-05-29 PROCEDURE — 3078F DIAST BP <80 MM HG: CPT | Mod: CPTII,S$GLB,, | Performed by: ANESTHESIOLOGY

## 2019-05-29 PROCEDURE — 3078F PR MOST RECENT DIASTOLIC BLOOD PRESSURE < 80 MM HG: ICD-10-PCS | Mod: CPTII,S$GLB,, | Performed by: ANESTHESIOLOGY

## 2019-05-29 PROCEDURE — 99214 PR OFFICE/OUTPT VISIT, EST, LEVL IV, 30-39 MIN: ICD-10-PCS | Mod: S$GLB,,, | Performed by: ANESTHESIOLOGY

## 2019-05-29 PROCEDURE — 99999 PR PBB SHADOW E&M-EST. PATIENT-LVL IV: ICD-10-PCS | Mod: PBBFAC,,, | Performed by: ANESTHESIOLOGY

## 2019-05-29 PROCEDURE — 1101F PR PT FALLS ASSESS DOC 0-1 FALLS W/OUT INJ PAST YR: ICD-10-PCS | Mod: CPTII,S$GLB,, | Performed by: ANESTHESIOLOGY

## 2019-05-29 PROCEDURE — 99214 OFFICE O/P EST MOD 30 MIN: CPT | Mod: S$GLB,,, | Performed by: ANESTHESIOLOGY

## 2019-05-29 PROCEDURE — 3074F PR MOST RECENT SYSTOLIC BLOOD PRESSURE < 130 MM HG: ICD-10-PCS | Mod: CPTII,S$GLB,, | Performed by: ANESTHESIOLOGY

## 2019-05-29 PROCEDURE — 99999 PR PBB SHADOW E&M-EST. PATIENT-LVL IV: CPT | Mod: PBBFAC,,, | Performed by: ANESTHESIOLOGY

## 2019-05-29 NOTE — PROGRESS NOTES
Referring Physician: No ref. provider found    PCP: Lena Woods MD      CC:  Lower back pain    Interval History:  Genoveva Gilbert is a 69 y.o. female with chronic low back pain who returns to our clinic.  She states lower back pain is much more tolerable following lumbar MB RFA procedure. She states her pain is mostly in the buttock and posterior.  She does have history DDD.   She has tried PT with minimal benefit. Denies b/b changes or LE weakness. Denies radiation.  Prior HPI:   Genoveva Gilbert is a 69 y.o. female referred to us for lower back pain. Pain has been gradually worsened over past 3 years.  No recent traumatic incident.  She has constant intermittent aching, throbbing pain in her lower back.  Pain rarely radiates down her lower extremities.  Pain worsens standing, bending, walking, lifting getting worked.  Pain improves with rest.  She has tried physical therapy with minimal benefit.  She really has had MRIs of the cervical and lumbar spine.  She has not had recent interventions lumbar spine.  She denies any worsening weakness.  No bowel bladder changes.    Interventional history:  Status post lumbar MB RFA procedure on 412 219% relief of lower.    ROS:  CONSTITUTIONAL: No fevers, chills, night sweats, wt. loss, appetite changes  SKIN: no rashes or itching  ENT: No headaches, head trauma, vision changes, or eye pain  LYMPH NODES: None noticed   CV: No chest pain, palpitations.   RESP: No shortness of breath, dyspnea on exertion, cough, wheezing, or hemoptysis  GI: No nausea, emesis, diarrhea, constipation, melena, hematochezia, pain.    : No dysuria, hematuria, urgency, or frequency   HEME: No easy bruising, bleeding problems  PSYCHIATRIC: No depression, anxiety, psychosis, hallucinations.  NEURO: No seizures, memory loss, dizziness or difficulty sleeping  MSK:  Positive HPI      Past Medical History:   Diagnosis Date    Allergy     Anxiety     Arthritis, rheumatoid     Back pain      Depression     Fibromyalgia     GERD (gastroesophageal reflux disease)     High cholesterol     Hypertension     Incontinence of urine     MS (multiple sclerosis)     benign; another MD stated she has no MS    Neuropathy     Restless leg syndrome     Sciatica of left side 1/5/2018    Seasonal allergies     Sinus congestion     Thyroid disease     Wheezing      Past Surgical History:   Procedure Laterality Date    APPENDECTOMY      ARTHROSCOPY, KNEE Left 3/20/2014    [facility]    ARTHROSCOPY-SHOULDER Right 4/16/2015    [facility]    Block-nerve-medial branch-lumbar Bilateral 2/28/2019    [facility]    Block-nerve-medial branch-lumbar L3,4,5 Bilateral 3/21/2019    [facility]    BREAST LUMPECTOMY      bilateral, benign    BREAST SURGERY      reduction    CHONDROPLASTY-KNEE Left 3/20/2014    [facility]    ESOPHAGOGASTRODUODENOSCOPY (EGD) N/A 7/5/2017    [facility] (4TH FLR)    HYSTERECTOMY      partial - fibroids    Radiofrequency Ablation, Nerve, Spinal, Lumbar, Medial Branch, 1 Level Bilateral 4/12/2019    [facility]    REPAIR ROTATOR CUFF ARTHROSCOPIC Right 4/16/2015    [facility]    TONSILLECTOMY       Family History   Problem Relation Age of Onset    Heart disease Mother      Social History     Socioeconomic History    Marital status:      Spouse name: Not on file    Number of children: Not on file    Years of education: Not on file    Highest education level: Not on file   Occupational History    Not on file   Social Needs    Financial resource strain: Not on file    Food insecurity:     Worry: Not on file     Inability: Not on file    Transportation needs:     Medical: Not on file     Non-medical: Not on file  "  Tobacco Use    Smoking status: Former Smoker     Last attempt to quit: 1996     Years since quittin.0    Smokeless tobacco: Never Used   Substance and Sexual Activity    Alcohol use: Yes     Comment: very little     Drug use: No    Sexual activity: Not on file   Lifestyle    Physical activity:     Days per week: Not on file     Minutes per session: Not on file    Stress: Not on file   Relationships    Social connections:     Talks on phone: Not on file     Gets together: Not on file     Attends Baptism service: Not on file     Active member of club or organization: Not on file     Attends meetings of clubs or organizations: Not on file     Relationship status: Not on file   Other Topics Concern    Not on file   Social History Narrative    Not on file         Medications/Allergies: See med card    Vitals:    19 1107   BP: 99/60   Pulse: 83   Weight: 107 kg (236 lb)   Height: 5' 1" (1.549 m)   PainSc: 10-Worst pain ever   PainLoc: Back         Physical exam:    GENERAL: A and O x3, the patient appears well groomed and is in no acute distress.  Skin: No rashes or obvious lesions  HEENT: normocephalic, atraumatic  CARDIOVASCULAR:  RRR  LUNGS: non labored breathing  ABDOMEN: soft, nontender   UPPER EXTREMITIES: Normal alignment, normal range of motion, no atrophy, no skin changes,  hair growth and nail growth normal and equal bilaterally. No swelling, no tenderness.    LOWER EXTREMITIES:  Normal alignment, normal range of motion, no atrophy, no skin changes,  hair growth and nail growth normal and equal bilaterally. No swelling, no tenderness.  LUMBAR SPINE  Lumbar spine: ROM is very limited with flexion extension and oblique extension with moderate increased pain.    Avinash's test causes no increased pain on either side.    Supine straight leg raise is negative bilaterally.    Internal and external rotation of the hip causes no increased pain on either side.  Myofascial exam: No tenderness " to palpation across lumbar paraspinous muscles.      MENTAL STATUS: normal orientation, speech, language, and fund of knowledge for social situation.  Emotional state appropriate.    CRANIAL NERVES:  II:  PERRL bilaterally,   III,IV,VI: EOMI.    V:  Facial sensation equal bilaterally  VII:  Facial motor function normal.  VIII:  Hearing equal to finger rub bilaterally  IX/X: Gag normal, palate symmetric  XI:  Shoulder shrug equal, head turn equal  XII:  Tongue midline without fasciculations      MOTOR: Tone and bulk: normal bilateral upper and lower Strength: normal   Delt Bi Tri WE WF     R 5 5 5 5 5 5   L 5 5 5 5 5 5     IP ADD ABD Quad TA Gas HAM  R 5 5 5 5 5 5 5  L 5 5 5 5 5 5 5    SENSATION: Light touch and pinprick intact bilaterally  REFLEXES: normal, symmetric, nonbrisk.  Toes down, no clonus. No hoffmans.  GAIT: normal rise, base, steps, and arm swing.        Imaging:  MRI L-spine 4/2018 [facility]  L1-2, moderate facet degenerative changes bilaterally.  Mild neural foraminal narrowing bilaterally.  L2-3, moderate facet changes bilaterally.  Mild left-sided neural foraminal narrowing  L3-4, diffuse disc protrusion.  Moderate facet and ligamentum flavum hypertrophy.  Moderate narrowing of the right-sided neural foraminal  L4-5, annular disc bulge.  Moderate facet changes.  Mild narrowing of neural foraminal bilateral.  L5-S1, unroofing of the disc posteriorly due to anterolisthesis of L5 on S1.  Severe facet changes.  Severe neural foraminal narrowing on the left and mild on the right    Assessment:  Genoveva iGlbert is a 69 y.o. male with back pain  1. DDD (degenerative disc disease), lumbar    2. Lumbar radiculitis    3. Other spondylosis, lumbar region      Plan:  1. I have stressed the importance of physical activity and exercise to improve overall health  2. Reviewed pertinent imaging and records with patient  3. Patient with significant benefit following Lumbar MB RFA.  Patient will continue to  monitor her progress.  May consider repeat procedure in future if pain returns or worsens.   4. I think that the patient's back pain and radicular leg symptoms are due to degenerative disc disease and have recommended a lumbar epidural steroid injection to the L5-S1 level(s).  5. Follow up after procedure

## 2019-05-29 NOTE — H&P (VIEW-ONLY)
Referring Physician: No ref. provider found    PCP: Lena Woods MD      CC:  Lower back pain    Interval History:  Genoveva Gilbert is a 69 y.o. female with chronic low back pain who returns to our clinic.  She states lower back pain is much more tolerable following lumbar MB RFA procedure. She states her pain is mostly in the buttock and posterior.  She does have history DDD.   She has tried PT with minimal benefit. Denies b/b changes or LE weakness. Denies radiation.  Prior HPI:   Genoveva Gilbert is a 69 y.o. female referred to us for lower back pain. Pain has been gradually worsened over past 3 years.  No recent traumatic incident.  She has constant intermittent aching, throbbing pain in her lower back.  Pain rarely radiates down her lower extremities.  Pain worsens standing, bending, walking, lifting getting worked.  Pain improves with rest.  She has tried physical therapy with minimal benefit.  She really has had MRIs of the cervical and lumbar spine.  She has not had recent interventions lumbar spine.  She denies any worsening weakness.  No bowel bladder changes.    Interventional history:  Status post lumbar MB RFA procedure on 412 219% relief of lower.    ROS:  CONSTITUTIONAL: No fevers, chills, night sweats, wt. loss, appetite changes  SKIN: no rashes or itching  ENT: No headaches, head trauma, vision changes, or eye pain  LYMPH NODES: None noticed   CV: No chest pain, palpitations.   RESP: No shortness of breath, dyspnea on exertion, cough, wheezing, or hemoptysis  GI: No nausea, emesis, diarrhea, constipation, melena, hematochezia, pain.    : No dysuria, hematuria, urgency, or frequency   HEME: No easy bruising, bleeding problems  PSYCHIATRIC: No depression, anxiety, psychosis, hallucinations.  NEURO: No seizures, memory loss, dizziness or difficulty sleeping  MSK:  Positive HPI      Past Medical History:   Diagnosis Date    Allergy     Anxiety     Arthritis, rheumatoid     Back pain      Depression     Fibromyalgia     GERD (gastroesophageal reflux disease)     High cholesterol     Hypertension     Incontinence of urine     MS (multiple sclerosis)     benign; another MD stated she has no MS    Neuropathy     Restless leg syndrome     Sciatica of left side 1/5/2018    Seasonal allergies     Sinus congestion     Thyroid disease     Wheezing      Past Surgical History:   Procedure Laterality Date    APPENDECTOMY      ARTHROSCOPY, KNEE Left 3/20/2014    [facility] OR    ARTHROSCOPY-SHOULDER Right 4/16/2015    [facility]    Block-nerve-medial branch-lumbar Bilateral 2/28/2019    [facility]    Block-nerve-medial branch-lumbar L3,4,5 Bilateral 3/21/2019    [facility]    BREAST LUMPECTOMY      bilateral, benign    BREAST SURGERY      reduction    CHONDROPLASTY-KNEE Left 3/20/2014    [facility]    ESOPHAGOGASTRODUODENOSCOPY (EGD) N/A 7/5/2017    [facility]O (4TH FLR)    HYSTERECTOMY      partial - fibroids    Radiofrequency Ablation, Nerve, Spinal, Lumbar, Medial Branch, 1 Level Bilateral 4/12/2019    [facility]    REPAIR ROTATOR CUFF ARTHROSCOPIC Right 4/16/2015    [facility]    TONSILLECTOMY       Family History   Problem Relation Age of Onset    Heart disease Mother      Social History     Socioeconomic History    Marital status:      Spouse name: Not on file    Number of children: Not on file    Years of education: Not on file    Highest education level: Not on file   Occupational History    Not on file   Social Needs    Financial resource strain: Not on file    Food insecurity:     Worry: Not on file     Inability: Not on file    Transportation needs:     Medical: Not on file     Non-medical: Not on file  "  Tobacco Use    Smoking status: Former Smoker     Last attempt to quit: 1996     Years since quittin.0    Smokeless tobacco: Never Used   Substance and Sexual Activity    Alcohol use: Yes     Comment: very little     Drug use: No    Sexual activity: Not on file   Lifestyle    Physical activity:     Days per week: Not on file     Minutes per session: Not on file    Stress: Not on file   Relationships    Social connections:     Talks on phone: Not on file     Gets together: Not on file     Attends Adventism service: Not on file     Active member of club or organization: Not on file     Attends meetings of clubs or organizations: Not on file     Relationship status: Not on file   Other Topics Concern    Not on file   Social History Narrative    Not on file         Medications/Allergies: See med card    Vitals:    19 1107   BP: 99/60   Pulse: 83   Weight: 107 kg (236 lb)   Height: 5' 1" (1.549 m)   PainSc: 10-Worst pain ever   PainLoc: Back         Physical exam:    GENERAL: A and O x3, the patient appears well groomed and is in no acute distress.  Skin: No rashes or obvious lesions  HEENT: normocephalic, atraumatic  CARDIOVASCULAR:  RRR  LUNGS: non labored breathing  ABDOMEN: soft, nontender   UPPER EXTREMITIES: Normal alignment, normal range of motion, no atrophy, no skin changes,  hair growth and nail growth normal and equal bilaterally. No swelling, no tenderness.    LOWER EXTREMITIES:  Normal alignment, normal range of motion, no atrophy, no skin changes,  hair growth and nail growth normal and equal bilaterally. No swelling, no tenderness.  LUMBAR SPINE  Lumbar spine: ROM is very limited with flexion extension and oblique extension with moderate increased pain.    Avinash's test causes no increased pain on either side.    Supine straight leg raise is negative bilaterally.    Internal and external rotation of the hip causes no increased pain on either side.  Myofascial exam: No tenderness " to palpation across lumbar paraspinous muscles.      MENTAL STATUS: normal orientation, speech, language, and fund of knowledge for social situation.  Emotional state appropriate.    CRANIAL NERVES:  II:  PERRL bilaterally,   III,IV,VI: EOMI.    V:  Facial sensation equal bilaterally  VII:  Facial motor function normal.  VIII:  Hearing equal to finger rub bilaterally  IX/X: Gag normal, palate symmetric  XI:  Shoulder shrug equal, head turn equal  XII:  Tongue midline without fasciculations      MOTOR: Tone and bulk: normal bilateral upper and lower Strength: normal   Delt Bi Tri WE WF     R 5 5 5 5 5 5   L 5 5 5 5 5 5     IP ADD ABD Quad TA Gas HAM  R 5 5 5 5 5 5 5  L 5 5 5 5 5 5 5    SENSATION: Light touch and pinprick intact bilaterally  REFLEXES: normal, symmetric, nonbrisk.  Toes down, no clonus. No hoffmans.  GAIT: normal rise, base, steps, and arm swing.        Imaging:  MRI L-spine 4/2018 [facility]  L1-2, moderate facet degenerative changes bilaterally.  Mild neural foraminal narrowing bilaterally.  L2-3, moderate facet changes bilaterally.  Mild left-sided neural foraminal narrowing  L3-4, diffuse disc protrusion.  Moderate facet and ligamentum flavum hypertrophy.  Moderate narrowing of the right-sided neural foraminal  L4-5, annular disc bulge.  Moderate facet changes.  Mild narrowing of neural foraminal bilateral.  L5-S1, unroofing of the disc posteriorly due to anterolisthesis of L5 on S1.  Severe facet changes.  Severe neural foraminal narrowing on the left and mild on the right    Assessment:  Genoveva Gilbert is a 69 y.o. male with back pain  1. DDD (degenerative disc disease), lumbar    2. Lumbar radiculitis    3. Other spondylosis, lumbar region      Plan:  1. I have stressed the importance of physical activity and exercise to improve overall health  2. Reviewed pertinent imaging and records with patient  3. Patient with significant benefit following Lumbar MB RFA.  Patient will continue to  monitor her progress.  May consider repeat procedure in future if pain returns or worsens.   4. I think that the patient's back pain and radicular leg symptoms are due to degenerative disc disease and have recommended a lumbar epidural steroid injection to the L5-S1 level(s).  5. Follow up after procedure

## 2019-05-29 NOTE — TELEPHONE ENCOUNTER
I called the patient to discuss the question she has for Dr. Zamora She has back pain and went to a doctor Dr. Manning and she says the pain as moved up a little and now wants an appointment and a second opinion, she has been falling and hallucinating . I scheduled a appointment for 06/04/2019

## 2019-05-30 ENCOUNTER — TELEPHONE (OUTPATIENT)
Dept: FAMILY MEDICINE | Facility: CLINIC | Age: 69
End: 2019-05-30

## 2019-05-30 NOTE — TELEPHONE ENCOUNTER
----- Message from Herson Patel sent at 5/29/2019  3:58 PM CDT -----  Contact: Patient [phone] (C) or [phone] (H)  Type: Needs Medical Advice    Who Called:  Patient [phone] (C) or [phone] (H)    Best Call Back Number:Patient [phone] (C) or [phone] (H)    Additional Information: Advised she will not be able to make her appmnt on 6-6-19 with Estefany Dick. Advised she needs to take care of her  who has COPD and Parkinson's disease.   Also advised she would like to discuss having home health care nurse visit her home for on going future care.

## 2019-05-30 NOTE — TELEPHONE ENCOUNTER
Called patient to reschedule appt to an extended appt to accommodate her extra requests to discuss home health visits.

## 2019-06-03 RX ORDER — PREGABALIN 75 MG/1
CAPSULE ORAL
Qty: 270 CAPSULE | Refills: 1 | Status: SHIPPED | OUTPATIENT
Start: 2019-06-03 | End: 2019-06-04

## 2019-06-04 ENCOUNTER — OFFICE VISIT (OUTPATIENT)
Dept: NEUROLOGY | Facility: CLINIC | Age: 69
End: 2019-06-04
Payer: MEDICARE

## 2019-06-04 VITALS
WEIGHT: 225.5 LBS | HEART RATE: 90 BPM | SYSTOLIC BLOOD PRESSURE: 93 MMHG | DIASTOLIC BLOOD PRESSURE: 63 MMHG | HEIGHT: 61 IN | BODY MASS INDEX: 42.57 KG/M2

## 2019-06-04 DIAGNOSIS — F41.9 ANXIETY: ICD-10-CM

## 2019-06-04 DIAGNOSIS — M54.32 BILATERAL SCIATICA: ICD-10-CM

## 2019-06-04 DIAGNOSIS — R41.89 SUBJECTIVE MEMORY COMPLAINTS: ICD-10-CM

## 2019-06-04 DIAGNOSIS — F32.A DEPRESSION, UNSPECIFIED DEPRESSION TYPE: Primary | ICD-10-CM

## 2019-06-04 DIAGNOSIS — G47.62 LEG CRAMPS, SLEEP RELATED: ICD-10-CM

## 2019-06-04 DIAGNOSIS — M54.31 BILATERAL SCIATICA: ICD-10-CM

## 2019-06-04 PROCEDURE — 3074F SYST BP LT 130 MM HG: CPT | Mod: CPTII,S$GLB,, | Performed by: PSYCHIATRY & NEUROLOGY

## 2019-06-04 PROCEDURE — 99214 PR OFFICE/OUTPT VISIT, EST, LEVL IV, 30-39 MIN: ICD-10-PCS | Mod: S$GLB,,, | Performed by: PSYCHIATRY & NEUROLOGY

## 2019-06-04 PROCEDURE — 1101F PT FALLS ASSESS-DOCD LE1/YR: CPT | Mod: CPTII,S$GLB,, | Performed by: PSYCHIATRY & NEUROLOGY

## 2019-06-04 PROCEDURE — 3078F DIAST BP <80 MM HG: CPT | Mod: CPTII,S$GLB,, | Performed by: PSYCHIATRY & NEUROLOGY

## 2019-06-04 PROCEDURE — 3074F PR MOST RECENT SYSTOLIC BLOOD PRESSURE < 130 MM HG: ICD-10-PCS | Mod: CPTII,S$GLB,, | Performed by: PSYCHIATRY & NEUROLOGY

## 2019-06-04 PROCEDURE — 1101F PR PT FALLS ASSESS DOC 0-1 FALLS W/OUT INJ PAST YR: ICD-10-PCS | Mod: CPTII,S$GLB,, | Performed by: PSYCHIATRY & NEUROLOGY

## 2019-06-04 PROCEDURE — 3078F PR MOST RECENT DIASTOLIC BLOOD PRESSURE < 80 MM HG: ICD-10-PCS | Mod: CPTII,S$GLB,, | Performed by: PSYCHIATRY & NEUROLOGY

## 2019-06-04 PROCEDURE — 99214 OFFICE O/P EST MOD 30 MIN: CPT | Mod: S$GLB,,, | Performed by: PSYCHIATRY & NEUROLOGY

## 2019-06-04 PROCEDURE — 99999 PR PBB SHADOW E&M-EST. PATIENT-LVL IV: CPT | Mod: PBBFAC,,, | Performed by: PSYCHIATRY & NEUROLOGY

## 2019-06-04 PROCEDURE — 99999 PR PBB SHADOW E&M-EST. PATIENT-LVL IV: ICD-10-PCS | Mod: PBBFAC,,, | Performed by: PSYCHIATRY & NEUROLOGY

## 2019-06-04 RX ORDER — AMITRIPTYLINE HYDROCHLORIDE 10 MG/1
TABLET, FILM COATED ORAL
Qty: 177 TABLET | Refills: 0 | OUTPATIENT
Start: 2019-06-04

## 2019-06-04 RX ORDER — AMITRIPTYLINE HYDROCHLORIDE 10 MG/1
TABLET, FILM COATED ORAL
Qty: 75 TABLET | Refills: 0 | Status: SHIPPED | OUTPATIENT
Start: 2019-06-04 | End: 2019-06-04

## 2019-06-04 RX ORDER — TALC
3 POWDER (GRAM) TOPICAL NIGHTLY
Qty: 90 TABLET | Refills: 3 | Status: SHIPPED | OUTPATIENT
Start: 2019-06-04 | End: 2019-06-25 | Stop reason: ALTCHOICE

## 2019-06-04 NOTE — PATIENT INSTRUCTIONS
Taper OFF Elavil (Amitriptyline):  Week 1: 40 mg nightly (4 tabs)  Week 2: 30 mg nightly (3 tabs)  Week 3: 20 mg nightly (2 tabs)  Week 4: 10 mg nightly (1 tab)  Week 5: 5 mg  (half tab)    Taper OFF of Xanax (Alprazolam):  Week 1: 1 mg morning and 1 mg night  Week 2: 1 mg morning  Week 3: 1 mg every other day then STOP    STOP Lyrica      Taking Medicine Safely    Medicine is given to help treat or prevent illness. But if you dont take it correctly, it might not help. It might even harm you. Your healthcare provider or pharmacist can help you learn the right way to take your medicine. Listed below are some tips to help you take medicine safely.  Safety tips  Do's and don'ts include the following:   · Have a routine for taking each medicine. Make it part of something you do each day, such as brushing your teeth or eating a meal.  · When you go to the hospital or your healthcare providers office, bring all your current medicines in their original boxes or bottles. If you cant do that, bring an up-to-date list of your medicines.  · Don't stop taking a prescription medicine unless your healthcare provider tells you to. Doing so could make your condition worse.  · Don't share medicines.  · Let your healthcare provider and pharmacist know of any allergies you have and if there have been any changes in your health since you were last prescribed these medicines.   · Taking prescription medicines with alcohol, street drugs, herbs, supplements, or even some over-the-counter medicines can be harmful. Talk to your healthcare provider or pharmacist before using any of these things while taking a prescription medicine.  · When filling your prescriptions, try using the same pharmacy for all your medicines. If not, let the pharmacist know what medicines you are already on.  · Consider putting a week's worth of medicines in a container marked for each day of the week.   · Keep medicines out of the reach of children and pets. Be  sure all medicine bottles have safety caps.  · Keep narcotics and sedatives out of sight or in a locked box or safe.  · Keep your medicines in a dry and cool location (not in the bathroom.)   · Don't use medicine that has  or that doesnt look or smell right. Get rid of it properly. To find out the right way to get rid of medicine:  ¨ Call your [facility] or [facility] household trash and recycling service and ask if a medicine take-back program is available in your community.  ¨ Call your local pharmacy and ask the right way to get rid of the medicine.  ¨ Go to www.fda.gov/ForConsumers/ConsumerUpdates/azu121774 to learn how to get rid of medicines safely.  · Medicine that comes in a container for a single dose should be used only 1 time. If you use the container a second time, it may have germs in it that can cause illness. These illnesses include hepatitis B and C. They also include infections of the brain or spinal cord (meningitis and epidural abscess).   Using generic medicines  Medicines have brand names and generic (chemical) names. When a medicine is first made, it is sold only under its brand name. Later, it can be made and sold as a generic. Generic medicines cost less than brand-name medicines and most work just as well. Most people can use the generic medicine instead of the brand-name medicine, unless their healthcare provider says otherwise.   Date Last Reviewed: 2016  © 9748-5941 [employer]. [address] [place] [address], [place] [address]. All rights reserved. This information is not intended as a substitute for professional medical care. Always follow your healthcare professional's instructions.

## 2019-06-04 NOTE — PROGRESS NOTES
[facility] NEUROLOGY  [facility] [place]    Date: 6/4/19  Patient Name: Genoveva Gilbert   MRN: [id number]   PCP: Lena Woods  Referring Provider: No ref. provider found    Assessment:   Geonveva Gilbert is a 69 y.o. female Presenting for evaluation of multiple neurologic complaints the majority of which appear to be connected to polypharmacy.  Have personally reviewed patient's MRI brain which shows no acute lesion to account for her intermittent slurred speech.  Have recommended gradual taper off of both amitriptyline and Xanax per Beer's criteria.  Have discussed use of melatonin for sleep and sleep hygiene with patient in lieu of benzos and TCAs for management of sleep.  Have also provided referral to Psychiatry and Psychology.  Additionally, patient feels that she is deriving no symptomatic benefit from Lyrica.  Will discontinue this today.  Will attempt to streamline patient's polypharmacy as much as possible.    Plan:     Problem List Items Addressed This Visit        Neuro    Subjective memory complaints    Overview     MOCA 27/30 on 1/5/17         Current Assessment & Plan     -- taper off amitriptyline and xanax, taper schedule provided            Orthopedic    Bilateral sciatica    Current Assessment & Plan     Stop lyrica            Other    Leg cramps, sleep related    Current Assessment & Plan     On mirapex           Other Visit Diagnoses     Depression, unspecified depression type    -  Primary    Relevant Orders    Ambulatory referral to Psychiatry    Ambulatory consult to Psychology    Anxiety        Relevant Orders    Ambulatory referral to Psychiatry    Ambulatory consult to Psychology        Crispin Zamora MD  [facility]   Department of Neurology    Patient note was created using Dragon Dictation.  Any errors in syntax or even information may not have been identified and edited on initial review prior to signing this note.  Subjective:        HPI:   Ms. Genoveva WHITEHEAD  Vladimir is a 69 y.o. female presenting in follow-up for multiple neurologic complaints.  She presents today with her  who contributes to the history.  To get day the patient reports of feeling sleepy all the time, poor memory, and intermittently slurring her words.  Her  states that he feels that she is overmedicated.  She has been talking in her sleep and at times has been hallucinating well-formed images of people who were talking to her  In recent months, the patient has been taking a combination of amitriptyline, Effexor, Cymbalta, Xanax, and Lyrica.  She states that these are coming from a variety of providers (Lyrica provided by myself).  She states that she does feel that her anxiety and depression are getting worse.  She is not following with a psychiatrist or psychologist.  She is amenable to this today.  She also is in agreement that she feels overmedicated.    PAST MEDICAL HISTORY:  Past Medical History:   Diagnosis Date    Allergy     Anxiety     Arthritis, rheumatoid     Back pain     Depression     Fibromyalgia     GERD (gastroesophageal reflux disease)     High cholesterol     Hypertension     Incontinence of urine     MS (multiple sclerosis)     benign; another MD stated she has no MS    Neuropathy     Restless leg syndrome     Sciatica of left side 1/5/2018    Seasonal allergies     Sinus congestion     Thyroid disease     Wheezing        PAST SURGICAL HISTORY:  Past Surgical History:   Procedure Laterality Date    APPENDECTOMY      ARTHROSCOPY, KNEE Left 3/20/2014    [facility]    ARTHROSCOPY-SHOULDER Right 4/16/2015    Performed by [facility] at [facility]    Block-nerve-medial branch-lumbar Bilateral 2/28/2019    [facility]    Block-nerve-medial branch-lumbar L3,4,5 Bilateral 3/21/2019    [facility] at [facility] OR    BREAST LUMPECTOMY      bilateral, benign    BREAST SURGERY       reduction    CHONDROPLASTY-KNEE Left 3/20/2014    [facility] OR    ESOPHAGOGASTRODUODENOSCOPY (EGD) N/A 7/5/2017    [facility] ENDO (4TH FLR)    HYSTERECTOMY      partial - fibroids    Radiofrequency Ablation, Nerve, Spinal, Lumbar, Medial Branch, 1 Level Bilateral 4/12/2019    Per[facility] at [facility] OR    REPAIR ROTATOR CUFF ARTHROSCOPIC Right 4/16/2015    Per[facility] by [facility] at [facility] OR    TONSILLECTOMY         CURRENT MEDS:  Current Outpatient Medications   Medication Sig Dispense Refill    (Magic mouthwash) 1:1:1 Benadryl 12.5mg/5ml liq, aluminum & magnesium hydroxide-simehticone (Maalox), lidocaine viscous 2% Swish and spit 5 mLs every 4 (four) hours as needed. for mouth sores 360 mL 0    albuterol 90 mcg/actuation inhaler Inhale 2 puffs into the lungs every 6 (six) hours as needed for Wheezing. 1 Inhaler 6    duloxetine (CYMBALTA) 60 MG capsule Take 60 mg by mouth 2 (two) times daily.       fexofenadine (ALLEGRA) 180 MG tablet Take 180 mg by mouth once daily.      fluticasone (FLONASE) 50 mcg/actuation nasal spray USE ONE SPRAY IEN QD  3    furosemide (LASIX) 20 MG tablet TK 1 TO 2 TS PO QD FOR FLUID RETENTION  3    hydrochlorothiazide (HYDRODIURIL) 25 MG tablet Take 25 mg by mouth once daily.      levothyroxine (SYNTHROID) 125 MCG tablet Take 125 mcg by mouth once daily.      losartan (COZAAR) 50 MG tablet Take 1 tablet (50 mg total) by mouth once daily. 90 tablet 3    lovastatin (ALTOPREV) 40 mg 24 hr tablet Take 40 mg by mouth every evening.      metoprolol succinate (TOPROL-XL) 50 MG 24 hr tablet       MULTIVIT-IRON-MIN-FOLIC ACID 3,500-18-0.4 UNIT-MG-MG ORAL CHEW Take 1 tablet by mouth.      omeprazole (PRILOSEC) 20 MG capsule TK ONE C PO ONCE D  3    potassium chloride SA (K-DUR,KLOR-CON) 10 MEQ tablet TK 1 T PO QD  4    pramipexole (MIRAPEX) 0.25 MG tablet Take 0.5 mg by mouth every  "evening.      promethazine-codeine 6.25-10 mg/5 ml (PHENERGAN WITH CODEINE) 6.25-10 mg/5 mL syrup TK 5 MLS PO Q 6 H PRF COUGH  2    venlafaxine (EFFEXOR-XR) 75 MG 24 hr capsule TK 1 C PO QD  3    melatonin 3 mg Tab Take 1 tablet (3 mg total) by mouth every evening. 90 tablet 3     No current facility-administered medications for this visit.        ALLERGIES:  Review of patient's allergies indicates:   Allergen Reactions    Keflex [cephalexin]     Latex, natural rubber Anaphylaxis     bandaids     Pcn [penicillins]      FAMILY HISTORY:  Family History   Problem Relation Age of Onset    Heart disease Mother      SOCIAL HISTORY:  Social History     Tobacco Use    Smoking status: Former Smoker     Last attempt to quit: 1996     Years since quittin.0    Smokeless tobacco: Never Used   Substance Use Topics    Alcohol use: Yes     Comment: very little     Drug use: No     Review of Systems:  12 review of systems is negative except for the symptoms mentioned in HPI.      Objective:     Vitals:    19 1348   BP: 93/63   BP Location: Right arm   Patient Position: Sitting   BP Method: Large (Automatic)   Pulse: 90   Weight: 102.3 kg (225 lb 8.5 oz)   Height: 5' 1" (1.549 m)     General: NAD, well nourished   Eyes: no tearing, discharge, no erythema   ENT: moist mucous membranes of the oral cavity, nares patent    Neck: Supple, full range of motion  Cardiovascular: Warm and well perfused, pulses equal and symmetrical  Lungs: Normal work of breathing, normal chest wall excursions  Skin: No rash, lesions, or breakdown on exposed skin  Psychiatry: Mood and affect are appropriate   Abdomen: soft, non tender, non distended  Extremeties: No cyanosis, clubbing or edema.    Neurological   MENTAL STATUS: Alert and oriented to person, place, and time. Attention and concentration within normal limits. Speech without dysarthria, able to name and repeat without difficulty. Recent and remote memory within normal " limits   CRANIAL NERVES: Visual fields intact. PERRL. EOMI. Facial sensation intact. Face symmetrical. Hearing grossly intact. Full shoulder shrug bilaterally. Tongue protrudes midline   SENSORY: Sensation is intact to light touch throughout.   MOTOR: Normal bulk and tone.  5/5 deltoid, biceps, triceps, interosseous, hand  bilaterally. 3/5 iliopsoas, knee extension/flexion, foot dorsi/plantarflexion bilaterally.    REFLEXES: Symmetric and 1+ throughout.   CEREBELLAR/COORDINATION/GAIT: Gait steady.  Normal rapid alternating movements.     MOCA Results 01/05/2018 :   Visuospatial/Executive: 5/5  Naming: 3/3  Attention: 6/6  Language: 3/3  Abstraction: 2/2  Delayed Recall: 2/5  Orientation:6/6  Total: 27/30

## 2019-06-05 NOTE — TELEPHONE ENCOUNTER
----- Message from Gwendolyn Villa sent at 6/5/2019  9:45 AM CDT -----  Contact: self, [phone] (M)  Patient states pharmacy on her appointment summary is incorrect. States her pharmacy is [facility] on the [address] of [address], phone [phone].

## 2019-06-05 NOTE — TELEPHONE ENCOUNTER
Spoke with Ms Gilbert and let her know that her pharmacy was changed as requested, she stated that she didn't call or say anything about her pharmacy but is ok with the change.

## 2019-06-06 ENCOUNTER — TELEPHONE (OUTPATIENT)
Dept: NEUROLOGY | Facility: CLINIC | Age: 69
End: 2019-06-06

## 2019-06-06 NOTE — TELEPHONE ENCOUNTER
The patient canceled the neuro appointment for in Aug because she thinks the slurred speech is due to taking to much medication.

## 2019-06-06 NOTE — TELEPHONE ENCOUNTER
----- Message from Radha Amaya sent at 6/6/2019 11:33 AM CDT -----  Contact: [phone]  Patient requested to speak with the nurse about the visit summary from her appt on 06/04 and advised it states for her to see another neurologist. Please call.

## 2019-06-10 ENCOUNTER — PATIENT OUTREACH (OUTPATIENT)
Dept: ADMINISTRATIVE | Facility: HOSPITAL | Age: 69
End: 2019-06-10

## 2019-06-12 ENCOUNTER — HOSPITAL ENCOUNTER (OUTPATIENT)
Facility: AMBULARY SURGERY CENTER | Age: 69
Discharge: HOME | End: 2019-06-12
Attending: ANESTHESIOLOGY | Admitting: ANESTHESIOLOGY
Payer: MEDICARE

## 2019-06-12 DIAGNOSIS — M54.16 LUMBAR RADICULITIS: Primary | ICD-10-CM

## 2019-06-12 PROCEDURE — 62323 NJX INTERLAMINAR LMBR/SAC: CPT | Mod: ,,, | Performed by: ANESTHESIOLOGY

## 2019-06-12 PROCEDURE — 99152 PR MOD CONSCIOUS SEDATION, SAME PHYS, 5+ YRS, FIRST 15 MIN: ICD-10-PCS | Mod: ,,, | Performed by: ANESTHESIOLOGY

## 2019-06-12 PROCEDURE — 99152 MOD SED SAME PHYS/QHP 5/>YRS: CPT | Mod: ,,, | Performed by: ANESTHESIOLOGY

## 2019-06-12 PROCEDURE — 62323 PR INJ LUMBAR/SACRAL, W/IMAGING GUIDANCE: ICD-10-PCS | Mod: ,,, | Performed by: ANESTHESIOLOGY

## 2019-06-12 PROCEDURE — 62323 NJX INTERLAMINAR LMBR/SAC: CPT | Performed by: ANESTHESIOLOGY

## 2019-06-12 RX ORDER — BUPIVACAINE HYDROCHLORIDE 2.5 MG/ML
INJECTION, SOLUTION EPIDURAL; INFILTRATION; INTRACAUDAL
Status: DISCONTINUED
Start: 2019-06-12 | End: 2019-06-12 | Stop reason: WASHOUT

## 2019-06-12 RX ORDER — SODIUM CHLORIDE 9 MG/ML
INJECTION, SOLUTION INTRAMUSCULAR; INTRAVENOUS; SUBCUTANEOUS
Status: DISCONTINUED | OUTPATIENT
Start: 2019-06-12 | End: 2019-06-12 | Stop reason: HOSPADM

## 2019-06-12 RX ORDER — DEXAMETHASONE SODIUM PHOSPHATE 10 MG/ML
INJECTION INTRAMUSCULAR; INTRAVENOUS
Status: DISCONTINUED | OUTPATIENT
Start: 2019-06-12 | End: 2019-06-12 | Stop reason: HOSPADM

## 2019-06-12 RX ORDER — FENTANYL CITRATE 50 UG/ML
INJECTION, SOLUTION INTRAMUSCULAR; INTRAVENOUS
Status: DISCONTINUED | OUTPATIENT
Start: 2019-06-12 | End: 2019-06-12 | Stop reason: HOSPADM

## 2019-06-12 RX ORDER — SODIUM CHLORIDE, SODIUM LACTATE, POTASSIUM CHLORIDE, CALCIUM CHLORIDE 600; 310; 30; 20 MG/100ML; MG/100ML; MG/100ML; MG/100ML
INJECTION, SOLUTION INTRAVENOUS ONCE AS NEEDED
Status: COMPLETED | OUTPATIENT
Start: 2019-06-12 | End: 2019-06-12

## 2019-06-12 RX ORDER — LIDOCAINE HYDROCHLORIDE 10 MG/ML
INJECTION, SOLUTION EPIDURAL; INFILTRATION; INTRACAUDAL; PERINEURAL
Status: DISCONTINUED | OUTPATIENT
Start: 2019-06-12 | End: 2019-06-12 | Stop reason: HOSPADM

## 2019-06-12 RX ORDER — MIDAZOLAM HYDROCHLORIDE 2 MG/2ML
INJECTION, SOLUTION INTRAMUSCULAR; INTRAVENOUS
Status: DISCONTINUED | OUTPATIENT
Start: 2019-06-12 | End: 2019-06-12 | Stop reason: HOSPADM

## 2019-06-12 RX ADMIN — SODIUM CHLORIDE, SODIUM LACTATE, POTASSIUM CHLORIDE, CALCIUM CHLORIDE: 600; 310; 30; 20 INJECTION, SOLUTION INTRAVENOUS at 11:06

## 2019-06-12 NOTE — DISCHARGE INSTRUCTIONS
Recovery After Procedural Sedation (Adult)  You have been given medicine by vein to make you sleep during your surgery. This may have included both a pain medicine and sleeping medicine. Most of the effects have worn off. But you may still have some drowsiness for the next 6 to 8 hours.  Home care  Follow these guidelines when you get home:  · For the next 8 hours, you should be watched by a responsible adult. This person should make sure your condition is not getting worse.  · Don't drink any alcohol for the next 24 hours.  · Don't drive, operate dangerous machinery, or make important business or personal decisions during the next 24 hours.  Note: Your healthcare provider may tell you not to take any medicine by mouth for pain or sleep in the next 4 hours. These medicines may react with the medicines you were given in the hospital. This could cause a much stronger response than usual.  Follow-up care  Follow up with your healthcare provider if you are not alert and back to your usual level of activity within 12 hours.  When to seek medical advice  Call your healthcare provider right away if any of these occur:  · Drowsiness gets worse  · Weakness or dizziness gets worse  · Repeated vomiting  · You can't be awakened   Date Last Reviewed: 10/18/2016  © 7144-3615 [employer]. [address] [place] [address], [place] [address]. All rights reserved. This information is not intended as a substitute for professional medical care. Always follow your healthcare professional's instructions.      Home care instructions  You may apply ice pack to the injection site for 20 minutes periods for the first 24hrs, NO HEAT for 2 days   You may shower today but dont soak in a tub above the injection site for 2 days  Resume Aspirin, Plavix, or Coumadin the day after the procedure unless otherwise instructed.  Gradually increase activity  Do not drive for 24 hr  If diabetic monitor your glucose carefully. Follow up with your  primary MD if this is a problem    SEEK  IMMEDIATE MEDICAL HELP FOR:  Severe increase in your usual pain or appearance of new pain  Prolonged or increasing weakness or numbness in the legs or arms       -numbing medicine was injected that may affect the nerves that carry information from  Your muscles to your brain and vice versa. Numbness can last up to 6 hours , so be very careful walking.    Drainage, redness, active bleeding, or increased swelling at the injection site  Temperature over 100.0 degrees F.  Headache that increases when your head is upright and decreases when you lie flat  Shortness of breath, chest pain, or problems breathing

## 2019-06-12 NOTE — DISCHARGE SUMMARY
[facility]  Discharge Note  Short Stay    Admit Date: 6/12/2019    Discharge Date and Time: 6/12/2019    Attending Physician: Thad Manning MD     Discharge Provider: Thad Mannign    Diagnoses:  Active Hospital Problems    Diagnosis  POA    *Lumbar radiculitis [M54.16]  Yes      Resolved Hospital Problems   No resolved problems to display.       Hospital Course: Lumbar CAITLIN  Discharged Condition: Good    Final Diagnoses:   Active Hospital Problems    Diagnosis  POA    *Lumbar radiculitis [M54.16]  Yes      Resolved Hospital Problems   No resolved problems to display.       Disposition: Home or Self Care    Follow up/Patient Instructions:    Medications:  Reconciled Home Medications:      Medication List      CONTINUE taking these medications    (MAGIC MOUTHWASH) 1:1:1 BENADRYL 12.5MG/5ML LIQ, ALUMINUM & MAGNESIUM  Swish and spit 5 mLs every 4 (four) hours as needed. for mouth sores     albuterol 90 mcg/actuation inhaler  Commonly known as:  PROVENTIL/VENTOLIN HFA  Inhale 2 puffs into the lungs every 6 (six) hours as needed for Wheezing.     CENTRUM 3,500-18-0.4 unit-mg-mg Chew  Generic drug:  multivit-iron-min-folic acid  Take 1 tablet by mouth.     DULoxetine 60 MG capsule  Commonly known as:  CYMBALTA  Take 60 mg by mouth 2 (two) times daily.     fexofenadine 180 MG tablet  Commonly known as:  ALLEGRA  Take 180 mg by mouth once daily.     fluticasone propionate 50 mcg/actuation nasal spray  Commonly known as:  FLONASE  USE ONE SPRAY IEN QD     furosemide 20 MG tablet  Commonly known as:  LASIX  TK 1 TO 2 TS PO QD FOR FLUID RETENTION     hydroCHLOROthiazide 25 MG tablet  Commonly known as:  HYDRODIURIL  Take 25 mg by mouth once daily.     levothyroxine 125 MCG tablet  Commonly known as:  SYNTHROID  Take 125 mcg by mouth once daily.     losartan 50 MG tablet  Commonly known as:  COZAAR  Take 1 tablet (50 mg total) by mouth once daily.     lovastatin 40 mg 24 hr tablet  Commonly known as:  ALTOPREV  Take 40 mg  by mouth every evening.     melatonin 3 mg Tab  Take 1 tablet (3 mg total) by mouth every evening.     metoprolol succinate 50 MG 24 hr tablet  Commonly known as:  TOPROL-XL     omeprazole 20 MG capsule  Commonly known as:  PRILOSEC  TK ONE C PO ONCE D     potassium chloride SA 10 MEQ tablet  Commonly known as:  K-DUR,KLOR-CON  TK 1 T PO QD     pramipexole 0.25 MG tablet  Commonly known as:  MIRAPEX  Take 0.5 mg by mouth every evening.     promethazine-codeine 6.25-10 mg/5 ml 6.25-10 mg/5 mL syrup  Commonly known as:  PHENERGAN with CODEINE  TK 5 MLS PO Q 6 H PRF COUGH     venlafaxine 75 MG 24 hr capsule  Commonly known as:  EFFEXOR-XR  TK 1 C PO QD          Discharge Procedure Orders   Call MD for:  temperature >100.4     Call MD for:  persistent nausea and vomiting or diarrhea     Call MD for:  severe uncontrolled pain     Call MD for:  redness, tenderness, or signs of infection (pain, swelling, redness, odor or green/yellow discharge around incision site)     Call MD for:  difficulty breathing or increased cough     Call MD for:  severe persistent headache        Follow up with MD in 2-3 weeks    Discharge Procedure Orders (must include Diet, Follow-up, Activity):   Discharge Procedure Orders (must include Diet, Follow-up, Activity)   Call MD for:  temperature >100.4     Call MD for:  persistent nausea and vomiting or diarrhea     Call MD for:  severe uncontrolled pain     Call MD for:  redness, tenderness, or signs of infection (pain, swelling, redness, odor or green/yellow discharge around incision site)     Call MD for:  difficulty breathing or increased cough     Call MD for:  severe persistent headache

## 2019-06-12 NOTE — OP NOTE
PROCEDURE DATE: 6/12/2019    Procedure:   Interlaminar epidural steroid injection at L5-S1 under fluoroscopic guidance.    Diagnosis: lUMBAR DISC DISPLACEMENT WITHOUT MYELOPATHY  pOSTOP DIAGNOSIS: sAME    Physician: Thad Manning M.D.    Medications injected:10 mg dexamethasone with 4 ml of preservative free NaCl    Local anesthetic injected:    Lidocaine 1% 2 ml total    Sedation Medications: RN IV sedation    Estimated blood loss:  None    Complications:  NOne    Technique:  Time-out taken to identify patient and procedure prior to starting the procedure.  With the patient laying in a prone position, the area was prepped and draped in the usual sterile fashion using ChloraPrep and a fenestrated drape.  After determining the target level with an AP fluoroscopic view, local anesthetic was given using a 25-gauge 1.5 inch needle by raising a wheal and then infiltrating toward the interlaminar entry space.  A 3.5inch 20-gauge Touhy needle was introduced under AP fluoroscopic guidance to the interlaminar space of L5-S1. Once the trajectory was established, the needle was visualized in the lateral view and advanced using loss of resistance technique. Once in the desired position, 1ml contrast was injected to confirm placement and there was no vascular uptake nor intrathecal spread.  The medication was then injected slowly. The patient tolerated the procedure well.      The patient was monitored after the procedure.   They were given post-procedure and discharge instructions to follow at home.  The patient was discharged in a stable condition.

## 2019-06-13 VITALS
SYSTOLIC BLOOD PRESSURE: 136 MMHG | HEIGHT: 61 IN | WEIGHT: 225 LBS | TEMPERATURE: 98 F | DIASTOLIC BLOOD PRESSURE: 85 MMHG | RESPIRATION RATE: 16 BRPM | HEART RATE: 78 BPM | OXYGEN SATURATION: 95 % | BODY MASS INDEX: 42.48 KG/M2

## 2019-06-14 ENCOUNTER — TELEPHONE (OUTPATIENT)
Dept: FAMILY MEDICINE | Facility: CLINIC | Age: 69
End: 2019-06-14

## 2019-06-14 NOTE — TELEPHONE ENCOUNTER
Spoke to patient requesting to reschedule appointment due to personal matter. Appointment rescheduled to 6/25 patient confirmed appointment

## 2019-06-14 NOTE — TELEPHONE ENCOUNTER
"----- Message from Bacilio Melchor sent at 6/14/2019  9:24 AM CDT -----  Contact: Patient  Type: Reschedule Request    Patient would like to reschedule an appointment.    Who Called: Patient  Original Appointment Scheduled: 6/20  Preference for Appointment Day/Time: Morning ("like around 11"); no specific day  Best Call Back Number: [phone]  Additional Information: Patient is rescheduling as  is having surgery on 6/19. Thanks!  "

## 2019-06-25 ENCOUNTER — OFFICE VISIT (OUTPATIENT)
Dept: FAMILY MEDICINE | Facility: CLINIC | Age: 69
End: 2019-06-25
Payer: MEDICARE

## 2019-06-25 ENCOUNTER — DOCUMENTATION ONLY (OUTPATIENT)
Dept: FAMILY MEDICINE | Facility: CLINIC | Age: 69
End: 2019-06-25

## 2019-06-25 VITALS
BODY MASS INDEX: 40.87 KG/M2 | WEIGHT: 216.5 LBS | HEART RATE: 91 BPM | DIASTOLIC BLOOD PRESSURE: 64 MMHG | HEIGHT: 61 IN | TEMPERATURE: 99 F | OXYGEN SATURATION: 97 % | SYSTOLIC BLOOD PRESSURE: 104 MMHG

## 2019-06-25 DIAGNOSIS — M79.89 LEG SWELLING: ICD-10-CM

## 2019-06-25 DIAGNOSIS — R12 HEART BURN: Primary | ICD-10-CM

## 2019-06-25 PROCEDURE — 99999 PR PBB SHADOW E&M-EST. PATIENT-LVL IV: ICD-10-PCS | Mod: PBBFAC,,, | Performed by: FAMILY MEDICINE

## 2019-06-25 PROCEDURE — 1101F PR PT FALLS ASSESS DOC 0-1 FALLS W/OUT INJ PAST YR: ICD-10-PCS | Mod: CPTII,S$GLB,, | Performed by: FAMILY MEDICINE

## 2019-06-25 PROCEDURE — 99214 OFFICE O/P EST MOD 30 MIN: CPT | Mod: S$GLB,,, | Performed by: FAMILY MEDICINE

## 2019-06-25 PROCEDURE — 99214 PR OFFICE/OUTPT VISIT, EST, LEVL IV, 30-39 MIN: ICD-10-PCS | Mod: S$GLB,,, | Performed by: FAMILY MEDICINE

## 2019-06-25 PROCEDURE — 99999 PR PBB SHADOW E&M-EST. PATIENT-LVL IV: CPT | Mod: PBBFAC,,, | Performed by: FAMILY MEDICINE

## 2019-06-25 PROCEDURE — 1101F PT FALLS ASSESS-DOCD LE1/YR: CPT | Mod: CPTII,S$GLB,, | Performed by: FAMILY MEDICINE

## 2019-06-25 PROCEDURE — 3078F DIAST BP <80 MM HG: CPT | Mod: CPTII,S$GLB,, | Performed by: FAMILY MEDICINE

## 2019-06-25 PROCEDURE — 3074F PR MOST RECENT SYSTOLIC BLOOD PRESSURE < 130 MM HG: ICD-10-PCS | Mod: CPTII,S$GLB,, | Performed by: FAMILY MEDICINE

## 2019-06-25 PROCEDURE — 3074F SYST BP LT 130 MM HG: CPT | Mod: CPTII,S$GLB,, | Performed by: FAMILY MEDICINE

## 2019-06-25 PROCEDURE — 3078F PR MOST RECENT DIASTOLIC BLOOD PRESSURE < 80 MM HG: ICD-10-PCS | Mod: CPTII,S$GLB,, | Performed by: FAMILY MEDICINE

## 2019-06-25 RX ORDER — FLUTICASONE PROPIONATE 220 UG/1
AEROSOL, METERED RESPIRATORY (INHALATION)
Refills: 3 | COMMUNITY
Start: 2019-06-04 | End: 2019-06-25

## 2019-06-25 RX ORDER — ALPRAZOLAM 2 MG/1
TABLET ORAL
Refills: 4 | COMMUNITY
Start: 2019-06-10 | End: 2020-09-09

## 2019-06-25 RX ORDER — LEVOTHYROXINE SODIUM 88 UG/1
TABLET ORAL
Refills: 2 | COMMUNITY
Start: 2019-06-22 | End: 2020-11-30

## 2019-06-25 RX ORDER — FUROSEMIDE 20 MG/1
20 TABLET ORAL DAILY PRN
Qty: 90 TABLET | Refills: 3 | Status: SHIPPED | OUTPATIENT
Start: 2019-06-25 | End: 2019-09-03 | Stop reason: ALTCHOICE

## 2019-06-25 RX ORDER — BUDESONIDE AND FORMOTEROL FUMARATE DIHYDRATE 160; 4.5 UG/1; UG/1
AEROSOL RESPIRATORY (INHALATION)
Refills: 11 | COMMUNITY
Start: 2019-06-04 | End: 2020-07-31

## 2019-06-25 RX ORDER — AMITRIPTYLINE HYDROCHLORIDE 10 MG/1
TABLET, FILM COATED ORAL
COMMUNITY
Start: 2019-06-04 | End: 2019-09-03 | Stop reason: ALTCHOICE

## 2019-06-25 RX ORDER — HYDROCODONE BITARTRATE AND ACETAMINOPHEN 7.5; 325 MG/1; MG/1
TABLET ORAL
Refills: 0 | COMMUNITY
Start: 2019-06-12 | End: 2020-01-27

## 2019-06-25 NOTE — PROGRESS NOTES
Pre-Visit Chart Review  For Appointment Scheduled on 6/25/2019    Health Maintenance Due   Topic Date Due    Colonoscopy  01/31/1998

## 2019-06-25 NOTE — PROGRESS NOTES
Subjective:       Patient ID: Genoveva Gilbert is a 69 y.o. female.    Chief Complaint: Follow-up    HPI    Been having problems with acid reflux. Has never had problems before. Now its starting to come up a bit. Burning is getting bad. No changes in diet. Prilosec used to help but not anymore.      F/u of swelling of her legs. States that symptoms have improved since last visit. Discussed labs ordered at last visit.     Past Medical History:   Diagnosis Date    Allergy     Anxiety     Arthritis, rheumatoid     Back pain     Depression     Fibromyalgia     GERD (gastroesophageal reflux disease)     High cholesterol     Hypertension     Incontinence of urine     MS (multiple sclerosis)     benign; another MD stated she has no MS    Neuropathy     Restless leg syndrome     Sciatica of left side 1/5/2018    Seasonal allergies     Sinus congestion     Thyroid disease     Wheezing        Past Surgical History:   Procedure Laterality Date    APPENDECTOMY      ARTHROSCOPY, KNEE Left 3/20/2014    [facility]    ARTHROSCOPY-SHOULDER Right 4/16/2015    [facility]    Block-nerve-medial branch-lumbar Bilateral 2/28/2019    [facility]    Block-nerve-medial branch-lumbar L3,4,5 Bilateral 3/21/2019    [facility]    BREAST LUMPECTOMY      bilateral, benign    BREAST SURGERY      reduction    CHONDROPLASTY-KNEE Left 3/20/2014    [facility]    ESOPHAGOGASTRODUODENOSCOPY (EGD) N/A 7/5/2017    [facility] (4TH FLR)    HYSTERECTOMY      partial - fibroids    Injection-steroid-epidural-lumbar N/A 6/12/2019    [facility]    Radiofrequency Ablation, Nerve, Spinal, Lumbar, Medial Branch, 1 Level Bilateral 4/12/2019    [facility]    REPAIR ROTATOR CUFF ARTHROSCOPIC  Right 2015    [facility]    TONSILLECTOMY         Family History   Problem Relation Age of Onset    Heart disease Mother        Social History     Tobacco Use    Smoking status: Former Smoker     Last attempt to quit: 1996     Years since quittin.0    Smokeless tobacco: Never Used   Substance Use Topics    Alcohol use: Yes     Comment: very little     Drug use: No       Social History     Substance and Sexual Activity   Sexual Activity Not on file          Current Outpatient Medications:     ALPRAZolam (XANAX) 2 MG Tab, TK 1 T PO  BID, Disp: , Rfl: 4    amitriptyline (ELAVIL) 10 MG tablet, , Disp: , Rfl:     duloxetine (CYMBALTA) 60 MG capsule, Take 60 mg by mouth 2 (two) times daily. , Disp: , Rfl:     fexofenadine (ALLEGRA) 180 MG tablet, Take 180 mg by mouth once daily., Disp: , Rfl:     fluticasone (FLONASE) 50 mcg/actuation nasal spray, USE ONE SPRAY IEN QD, Disp: , Rfl: 3    furosemide (LASIX) 20 MG tablet, TK 1 TO 2 TS PO QD FOR FLUID RETENTION, Disp: , Rfl: 3    hydrochlorothiazide (HYDRODIURIL) 25 MG tablet, Take 25 mg by mouth once daily., Disp: , Rfl:     HYDROcodone-acetaminophen (NORCO) 7.5-325 mg per tablet, TK 1 T PO BID ONLY PRF  FOR SEVERE PAIN, Disp: , Rfl: 0    levothyroxine (SYNTHROID) 88 MCG tablet, TK 1 T PO QD, Disp: , Rfl: 2    losartan (COZAAR) 50 MG tablet, Take 1 tablet (50 mg total) by mouth once daily., Disp: 90 tablet, Rfl: 3    lovastatin (ALTOPREV) 40 mg 24 hr tablet, Take 40 mg by mouth every evening., Disp: , Rfl:     MULTIVIT-IRON-MIN-FOLIC ACID 3,500-18-0.4 UNIT-MG-MG ORAL CHEW, Take 1 tablet by mouth., Disp: , Rfl:     omeprazole (PRILOSEC) 20 MG capsule, TK ONE C PO ONCE D, Disp: , Rfl: 3    potassium chloride SA (K-DUR,KLOR-CON) 10 MEQ tablet, TK 1 T PO QD, Disp: , Rfl: 4    SYMBICORT 160-4.5 mcg/actuation HFAA, INL 2 PFS PO BID, Disp: , Rfl: 11    venlafaxine (EFFEXOR-XR) 75 MG 24 hr capsule, TK 1 C PO QD,  "Disp: , Rfl: 3    (Magic mouthwash) 1:1:1 Benadryl 12.5mg/5ml liq, aluminum & magnesium hydroxide-simehticone (Maalox), lidocaine viscous 2%, Swish and spit 5 mLs every 4 (four) hours as needed. for mouth sores, Disp: 360 mL, Rfl: 0     Review of patient's allergies indicates:   Allergen Reactions    Keflex [cephalexin]     Pcn [penicillins]     Adhesive Other (See Comments)     bandainds redness at skin            Review of Systems   Constitutional: Negative for chills and fever.   HENT: Negative for congestion and sore throat.    Eyes: Negative for visual disturbance.   Respiratory: Negative for cough and shortness of breath.    Cardiovascular: Negative for chest pain.   Gastrointestinal: Negative for abdominal pain, constipation, diarrhea, nausea and vomiting.   Genitourinary: Negative for dysuria.   Musculoskeletal: Negative for joint swelling.   Skin: Negative for rash and wound.   Neurological: Negative for dizziness and headaches.   Hematological: Does not bruise/bleed easily.           Objective:          Vitals:    06/25/19 1409   BP: 104/64   Pulse: 91   Temp: 99 °F (37.2 °C)   SpO2: 97%   Weight: 98.2 kg (216 lb 7.9 oz)   Height: 5' 1" (1.549 m)       Physical Exam   Constitutional: She appears well-developed and well-nourished. She is cooperative. No distress.   HENT:   Head: Normocephalic and atraumatic.   Right Ear: Hearing and external ear normal.   Left Ear: Hearing and external ear normal.   Nose: Nose normal.   Eyes: Conjunctivae, EOM and lids are normal.   Cardiovascular: Normal rate, regular rhythm, normal heart sounds and normal pulses.   Pulmonary/Chest: Effort normal and breath sounds normal.   Abdominal: Soft. Normal appearance and bowel sounds are normal. There is no tenderness.   Musculoskeletal: Normal range of motion.   Neurological: She is alert.   Skin: Skin is warm. No rash noted. No cyanosis.   Psychiatric: She has a normal mood and affect. Her speech is normal and behavior " is normal. Cognition and memory are normal.   Vitals reviewed.              Assessment/Plan     Genoveva was seen today for follow-up.    Diagnoses and all orders for this visit:    Heart burn  -     Will start ranitidine (ZANTAC) 300 MG tablet; Take 1 tablet (300 mg total) by mouth every evening.  -     H. pylori antigen, stool; Future    Leg swelling  -     Continue furosemide (LASIX) 20 MG tablet; Take 1 tablet (20 mg total) by mouth daily as needed.    Follow up in about 1 month (around 7/23/2019) for heartburn.    Future Appointments   Date Time Provider Department Center   7/3/2019 10:45 AM Thad Manning MD [facility] [place]       Patient readiness: acceptance and barriers:none    Time spent with patient: 30 minutes    Barriers to medications present (no )    Adverse reactions to current medications (no)    Over the counter medications reviewed (No)    Lena Woods MD  [facility]

## 2019-06-25 NOTE — PATIENT INSTRUCTIONS
Please make sure you make an appt with your GI doctor.   Lifestyle Changes for Controlling GERD  When you have GERD, stomach acid feels as if its backing up toward your mouth. Whether or not you take medicine to control your GERD, your symptoms can often be improved with lifestyle changes. Talk to your healthcare provider about the following suggestions. These suggestions may help you get relief from your symptoms.      Raise your head  Reflux is more likely to strike when youre lying down flat, because stomach fluid can flow backward more easily. Raising the head of your bed 4 to 6 inches can help. To do this:  · Slide blocks or books under the legs at the head of your bed. Or, place a wedge under the mattress. Many foam stores can make a suitable wedge for you. The wedge should run from your waist to the top of your head.  · Dont just prop your head on several pillows. This increases pressure on your stomach. It can make GERD worse.  Watch your eating habits  Certain foods may increase the acid in your stomach or relax the lower esophageal sphincter. This makes GERD more likely. Its best to avoid the following if they cause you symptoms:  · Coffee, tea, and carbonated drinks (with and without caffeine)  · Fatty, fried, or spicy food  · Mint, chocolate, onions, and tomatoes  · Peppermint  · Any other foods that seem to irritate your stomach or cause you pain  Relieve the pressure  Tips include the following:  · Eat smaller meals, even if you have to eat more often.  · Dont lie down right after you eat. Wait a few hours for your stomach to empty.  · Avoid tight belts and tight-fitting clothes.  · Lose excess weight.  Tobacco and alcohol  Avoid smoking tobacco and drinking alcohol. They can make GERD symptoms worse.  Date Last Reviewed: 7/1/2016  © 7237-0644 [employer]. [address] [place] [address], [place] [address]. All rights reserved. This information is not intended as a substitute for professional  medical care. Always follow your healthcare professional's instructions.        Ranitidine tablets or capsules  What is this medicine?  RANITIDINE (albert shrestha) is a type of antihistamine that blocks the release of stomach acid. It is used to treat stomach or intestinal ulcers. It can relieve ulcer pain and discomfort, and the heartburn from acid reflux.  How should I use this medicine?  Take this medicine by mouth with a glass of water. Follow the directions on the prescription label. If you only take this medicine once a day, take it at bedtime. Take your medicine at regular intervals. Do not take your medicine more often than directed. Do not stop taking except on your doctor's advice.  Talk to your pediatrician regarding the use of this medicine in children. Special care may be needed.  What side effects may I notice from receiving this medicine?  Side effects that you should report to your doctor or health care professional as soon as possible:  · agitation, nervousness, depression, hallucinations  · allergic reactions like skin rash, itching or hives, swelling of the face, lips, or tongue  · breast enlargement in both males and females  · breathing problems  · redness, blistering, peeling or loosening of the skin, including inside the mouth  · unusual bleeding or bruising  · unusually weak or tired  · vomiting  · yellowing of the skin or eyes  Side effects that usually do not require medical attention (report to your doctor or health care professional if they continue or are bothersome):  · constipation or diarrhea  · dizziness  · headache  · nausea  What may interact with this medicine?  · atazanavir  · delavirdine  · gefitinib  · glipizide  · ketoconazole  · midazolam  · procainamide  · propantheline  · triazolam  · warfarin  What if I miss a dose?  If you miss a dose, take it as soon as you can. If it is almost time for your next dose, take only that dose. Do not take double or extra doses.  Where should  I keep my medicine?  Keep out of the reach of children.  Store at room temperature between 15 and 30 degrees C (59 and 86 degrees F). Protect from light and moisture. Keep container tightly closed. Throw away any unused medicine after the expiration date.  What should I tell my health care provider before I take this medicine?  They need to know if you have any of these conditions:  · kidney disease  · liver disease  · porphyria  · an unusual or allergic reaction to ranitidine, other medicines, foods, dyes, or preservatives  · pregnant or trying to get pregnant  · breast-feeding  What should I watch for while using this medicine?  Tell your doctor or health care professional if your condition does not start to get better or gets worse. You may need to take this medicine for several days as prescribed before your symptoms get better. Finish the full course of tablets prescribed, even if you feel better.  Do not smoke cigarettes or drink alcohol. These increase irritation in your stomach and can lengthen the time it will take for ulcers to heal. Cigarettes and alcohol can also make acid reflux or heartburn worse.  If you get black, tarry stools or vomit up what looks like coffee grounds, call your doctor or health care professional at once. You may have a bleeding ulcer.  NOTE:This sheet is a summary. It may not cover all possible information. If you have questions about this medicine, talk to your doctor, pharmacist, or health care provider. Copyright© 2017 [employer]

## 2019-06-26 ENCOUNTER — LAB VISIT (OUTPATIENT)
Dept: LAB | Facility: HOSPITAL | Age: 69
End: 2019-06-26
Attending: FAMILY MEDICINE
Payer: MEDICARE

## 2019-06-26 DIAGNOSIS — R12 HEART BURN: ICD-10-CM

## 2019-06-26 PROCEDURE — 87338 HPYLORI STOOL AG IA: CPT

## 2019-07-03 ENCOUNTER — OFFICE VISIT (OUTPATIENT)
Dept: PAIN MEDICINE | Facility: CLINIC | Age: 69
End: 2019-07-03
Payer: MEDICARE

## 2019-07-03 VITALS
DIASTOLIC BLOOD PRESSURE: 60 MMHG | HEIGHT: 61 IN | BODY MASS INDEX: 40.78 KG/M2 | WEIGHT: 216 LBS | HEART RATE: 79 BPM | SYSTOLIC BLOOD PRESSURE: 91 MMHG

## 2019-07-03 DIAGNOSIS — M47.896 OTHER SPONDYLOSIS, LUMBAR REGION: ICD-10-CM

## 2019-07-03 DIAGNOSIS — M54.16 LUMBAR RADICULITIS: ICD-10-CM

## 2019-07-03 DIAGNOSIS — M51.36 DDD (DEGENERATIVE DISC DISEASE), LUMBAR: Primary | ICD-10-CM

## 2019-07-03 PROCEDURE — 1101F PR PT FALLS ASSESS DOC 0-1 FALLS W/OUT INJ PAST YR: ICD-10-PCS | Mod: CPTII,S$GLB,, | Performed by: ANESTHESIOLOGY

## 2019-07-03 PROCEDURE — 3078F DIAST BP <80 MM HG: CPT | Mod: CPTII,S$GLB,, | Performed by: ANESTHESIOLOGY

## 2019-07-03 PROCEDURE — 99999 PR PBB SHADOW E&M-EST. PATIENT-LVL III: CPT | Mod: PBBFAC,,, | Performed by: ANESTHESIOLOGY

## 2019-07-03 PROCEDURE — 99999 PR PBB SHADOW E&M-EST. PATIENT-LVL III: ICD-10-PCS | Mod: PBBFAC,,, | Performed by: ANESTHESIOLOGY

## 2019-07-03 PROCEDURE — 3078F PR MOST RECENT DIASTOLIC BLOOD PRESSURE < 80 MM HG: ICD-10-PCS | Mod: CPTII,S$GLB,, | Performed by: ANESTHESIOLOGY

## 2019-07-03 PROCEDURE — 3074F SYST BP LT 130 MM HG: CPT | Mod: CPTII,S$GLB,, | Performed by: ANESTHESIOLOGY

## 2019-07-03 PROCEDURE — 99213 PR OFFICE/OUTPT VISIT, EST, LEVL III, 20-29 MIN: ICD-10-PCS | Mod: S$GLB,,, | Performed by: ANESTHESIOLOGY

## 2019-07-03 PROCEDURE — 3074F PR MOST RECENT SYSTOLIC BLOOD PRESSURE < 130 MM HG: ICD-10-PCS | Mod: CPTII,S$GLB,, | Performed by: ANESTHESIOLOGY

## 2019-07-03 PROCEDURE — 99213 OFFICE O/P EST LOW 20 MIN: CPT | Mod: S$GLB,,, | Performed by: ANESTHESIOLOGY

## 2019-07-03 PROCEDURE — 1101F PT FALLS ASSESS-DOCD LE1/YR: CPT | Mod: CPTII,S$GLB,, | Performed by: ANESTHESIOLOGY

## 2019-07-03 NOTE — PROGRESS NOTES
Referring Physician: No ref. provider found    PCP: Lena Woods MD      CC:  Lower back pain    Interval History:  Genoveva Gilbert is a 69 y.o. female with chronic low back pain who returns to our clinic.  She states lower back pain is much more tolerable following lumbar MB RFA procedure as well as most recent lumbar CAITLIN.  Pain is much more tolerable.  She continued home exercise of mild benefits.  She has some residual pain with household chores states pain is much less severe and manageable.  She denies any worsening weakness.  No bowel bladder changes.  Prior HPI:   Genoveva Gilbert is a 69 y.o. female referred to us for lower back pain. Pain has been gradually worsened over past 3 years.  No recent traumatic incident.  She has constant intermittent aching, throbbing pain in her lower back.  Pain rarely radiates down her lower extremities.  Pain worsens standing, bending, walking, lifting getting worked.  Pain improves with rest.  She has tried physical therapy with minimal benefit.  She really has had MRIs of the cervical and lumbar spine.  She has not had recent interventions lumbar spine.  She denies any worsening weakness.  No bowel bladder changes.    Interventional history:  Status post lumbar MB RFA procedure on 04/2019 with 50% relief of her lower back pain.  Lumbar CAITLIN on 06/12/2019 with 50% relief of her low back and buttock pain      ROS:  CONSTITUTIONAL: No fevers, chills, night sweats, wt. loss, appetite changes  SKIN: no rashes or itching  ENT: No headaches, head trauma, vision changes, or eye pain  LYMPH NODES: None noticed   CV: No chest pain, palpitations.   RESP: No shortness of breath, dyspnea on exertion, cough, wheezing, or hemoptysis  GI: No nausea, emesis, diarrhea, constipation, melena, hematochezia, pain.    : No dysuria, hematuria, urgency, or frequency   HEME: No easy bruising, bleeding problems  PSYCHIATRIC: No depression, anxiety, psychosis, hallucinations.  NEURO: No seizures,  memory loss, dizziness or difficulty sleeping  MSK:  Positive HPI      Past Medical History:   Diagnosis Date    Allergy     Anxiety     Arthritis, rheumatoid     Back pain     Depression     Fibromyalgia     GERD (gastroesophageal reflux disease)     High cholesterol     Hypertension     Incontinence of urine     MS (multiple sclerosis)     benign; another MD stated she has no MS    Neuropathy     Restless leg syndrome     Sciatica of left side 1/5/2018    Seasonal allergies     Sinus congestion     Thyroid disease     Wheezing      Past Surgical History:   Procedure Laterality Date    APPENDECTOMY      ARTHROSCOPY, KNEE Left 3/20/2014    [facility]    ARTHROSCOPY-SHOULDER Right 4/16/2015    [facility]    Block-nerve-medial branch-lumbar Bilateral 2/28/2019    [facility]    Block-nerve-medial branch-lumbar L3,4,5 Bilateral 3/21/2019    [facility]    BREAST LUMPECTOMY      bilateral, benign    BREAST SURGERY      reduction    CHONDROPLASTY-KNEE Left 3/20/2014    [facility]    ESOPHAGOGASTRODUODENOSCOPY (EGD) N/A 7/5/2017    [facility] ENDO (4TH FLR)    HYSTERECTOMY      partial - fibroids    Injection-steroid-epidural-lumbar N/A 6/12/2019    [facility]    Radiofrequency Ablation, Nerve, Spinal, Lumbar, Medial Branch, 1 Level Bilateral 4/12/2019    [facility]    REPAIR ROTATOR CUFF ARTHROSCOPIC Right 4/16/2015    [facility]    TONSILLECTOMY       Family History   Problem Relation Age of Onset    Heart disease Mother      Social History     Socioeconomic History    Marital status:      Spouse name: Not on file    Number of children: Not on file    Years of education: Not on file    Highest  "education level: Not on file   Occupational History    Not on file   Social Needs    Financial resource strain: Not on file    Food insecurity:     Worry: Not on file     Inability: Not on file    Transportation needs:     Medical: Not on file     Non-medical: Not on file   Tobacco Use    Smoking status: Former Smoker     Last attempt to quit: 1996     Years since quittin.1    Smokeless tobacco: Never Used   Substance and Sexual Activity    Alcohol use: Yes     Comment: very little     Drug use: No    Sexual activity: Not on file   Lifestyle    Physical activity:     Days per week: Not on file     Minutes per session: Not on file    Stress: Not on file   Relationships    Social connections:     Talks on phone: Not on file     Gets together: Not on file     Attends [employer] service: Not on file     Active member of club or organization: Not on file     Attends meetings of clubs or organizations: Not on file     Relationship status: Not on file   Other Topics Concern    Not on file   Social History Narrative    Not on file         Medications/Allergies: See med card    Vitals:    19 1037   BP: 91/60   Pulse: 79   Weight: 98 kg (216 lb)   Height: 5' 1" (1.549 m)   PainSc:   6   PainLoc: Back         Physical exam:    GENERAL: A and O x3, the patient appears well groomed and is in no acute distress.  Skin: No rashes or obvious lesions  HEENT: normocephalic, atraumatic  CARDIOVASCULAR:  RRR  LUNGS: non labored breathing  ABDOMEN: soft, nontender   UPPER EXTREMITIES: Normal alignment, normal range of motion, no atrophy, no skin changes,  hair growth and nail growth normal and equal bilaterally. No swelling, no tenderness.    LOWER EXTREMITIES:  Normal alignment, normal range of motion, no atrophy, no skin changes,  hair growth and nail growth normal and equal bilaterally. No swelling, no tenderness.  LUMBAR SPINE  Lumbar spine: ROM is very limited with flexion extension and oblique extension " with moderate increased pain.    Avinash's test causes no increased pain on either side.    Supine straight leg raise is negative bilaterally.    Internal and external rotation of the hip causes no increased pain on either side.  Myofascial exam: No tenderness to palpation across lumbar paraspinous muscles.      MENTAL STATUS: normal orientation, speech, language, and fund of knowledge for social situation.  Emotional state appropriate.    CRANIAL NERVES:  II:  PERRL bilaterally,   III,IV,VI: EOMI.    V:  Facial sensation equal bilaterally  VII:  Facial motor function normal.  VIII:  Hearing equal to finger rub bilaterally  IX/X: Gag normal, palate symmetric  XI:  Shoulder shrug equal, head turn equal  XII:  Tongue midline without fasciculations      MOTOR: Tone and bulk: normal bilateral upper and lower Strength: normal   Delt Bi Tri WE WF     R 5 5 5 5 5 5   L 5 5 5 5 5 5     IP ADD ABD Quad TA Gas HAM  R 5 5 5 5 5 5 5  L 5 5 5 5 5 5 5    SENSATION: Light touch and pinprick intact bilaterally  REFLEXES: normal, symmetric, nonbrisk.  Toes down, no clonus. No hoffmans.  GAIT: normal rise, base, steps, and arm swing.        Imaging:  MRI L-spine 4/2018 [facility]  L1-2, moderate facet degenerative changes bilaterally.  Mild neural foraminal narrowing bilaterally.  L2-3, moderate facet changes bilaterally.  Mild left-sided neural foraminal narrowing  L3-4, diffuse disc protrusion.  Moderate facet and ligamentum flavum hypertrophy.  Moderate narrowing of the right-sided neural foraminal  L4-5, annular disc bulge.  Moderate facet changes.  Mild narrowing of neural foraminal bilateral.  L5-S1, unroofing of the disc posteriorly due to anterolisthesis of L5 on S1.  Severe facet changes.  Severe neural foraminal narrowing on the left and mild on the right    Assessment:  Genoveva Gilbert is a 69 y.o. male with back pain  1. DDD (degenerative disc disease), lumbar    2. Lumbar radiculitis    3. Other spondylosis, lumbar  region      Plan:  1. I have stressed the importance of physical activity and exercise to improve overall health  2. Reviewed pertinent imaging and records with patient  3. Patient with significant benefit following Lumbar MB RFA and lumbar CAITLIN.  Patient will continue to monitor her progress.  May consider repeat procedure in future if pain returns or worsens.   4. She will follow-up as needed

## 2019-07-06 LAB — H PYLORI AG STL QL IA: NOT DETECTED

## 2019-07-11 ENCOUNTER — PATIENT OUTREACH (OUTPATIENT)
Dept: ADMINISTRATIVE | Facility: HOSPITAL | Age: 69
End: 2019-07-11

## 2019-07-11 NOTE — LETTER
July 11, 2019    [address]  [place] [address]             [facility]  [address]  [facility] [address]  Phone: [phone] [address]  [facility] [address]        Dear, Genoveva Gilbert      [facility] is committed to your overall health.  To help you get the most out of each of your visits, we will review your information to make sure you are up to date on all of your recommended tests and/or procedures.      Dr. Lena Woods MD  has found that your chart shows you may be due for     COLON CANCER SCREENING    If you have had any of the above done at another facility, please bring the records or information with you so that your record at [facility] will be complete.  If you would like to schedule any of these, please contact the clinic at [phone].    If you are currently taking medication, please bring it with you to your appointment for review.    Also, if you have any type of Advanced Directives, please bring them with you to your office visit so we may scan them into your chart.    Thank You,    Your [facility] Team,  MD Hannah Larson,  Panel Care Coordinator  [place] [facility] [address] [place] [address]  Phone [phone]  Fax [phone]

## 2019-07-15 ENCOUNTER — TELEPHONE (OUTPATIENT)
Dept: FAMILY MEDICINE | Facility: CLINIC | Age: 69
End: 2019-07-15

## 2019-07-15 NOTE — TELEPHONE ENCOUNTER
----- Message from Herson Patel sent at 7/15/2019 12:26 PM CDT -----  Contact: Patient [phone]  Type: Needs Medical Advice    Who Called:  Patient [phone]    Additional Information:     Advised returning a call to the office not sure from whom. Possibly about test results. Please call to discuss.

## 2019-07-15 NOTE — TELEPHONE ENCOUNTER
Spoke to patient regarding h pylori results patient states the zantac is working well for her patient states her heartburn is not that bad states she still has a little heartburn but not like before starting zantac

## 2019-07-23 ENCOUNTER — DOCUMENTATION ONLY (OUTPATIENT)
Dept: FAMILY MEDICINE | Facility: CLINIC | Age: 69
End: 2019-07-23

## 2019-07-23 NOTE — PROGRESS NOTES
Pre-Visit Chart Review  For Appointment Scheduled on 7/25/2019    Health Maintenance Due   Topic Date Due    Colonoscopy  01/31/1968

## 2019-07-29 DIAGNOSIS — Z12.11 COLON CANCER SCREENING: ICD-10-CM

## 2019-08-19 ENCOUNTER — PATIENT OUTREACH (OUTPATIENT)
Dept: ADMINISTRATIVE | Facility: OTHER | Age: 69
End: 2019-08-19

## 2019-09-03 ENCOUNTER — OFFICE VISIT (OUTPATIENT)
Dept: FAMILY MEDICINE | Facility: CLINIC | Age: 69
End: 2019-09-03
Payer: MEDICARE

## 2019-09-03 ENCOUNTER — TELEPHONE (OUTPATIENT)
Dept: PAIN MEDICINE | Facility: CLINIC | Age: 69
End: 2019-09-03

## 2019-09-03 VITALS
SYSTOLIC BLOOD PRESSURE: 112 MMHG | BODY MASS INDEX: 41.25 KG/M2 | WEIGHT: 218.5 LBS | HEART RATE: 71 BPM | HEIGHT: 61 IN | DIASTOLIC BLOOD PRESSURE: 62 MMHG | TEMPERATURE: 98 F

## 2019-09-03 DIAGNOSIS — M54.16 LUMBAR RADICULOPATHY: Primary | ICD-10-CM

## 2019-09-03 DIAGNOSIS — I89.0 LYMPHEDEMA: ICD-10-CM

## 2019-09-03 DIAGNOSIS — L30.4 INTERTRIGO: Primary | ICD-10-CM

## 2019-09-03 PROCEDURE — 99999 PR PBB SHADOW E&M-EST. PATIENT-LVL III: CPT | Mod: PBBFAC,,, | Performed by: FAMILY MEDICINE

## 2019-09-03 PROCEDURE — 1101F PT FALLS ASSESS-DOCD LE1/YR: CPT | Mod: CPTII,S$GLB,, | Performed by: FAMILY MEDICINE

## 2019-09-03 PROCEDURE — 3078F DIAST BP <80 MM HG: CPT | Mod: CPTII,S$GLB,, | Performed by: FAMILY MEDICINE

## 2019-09-03 PROCEDURE — 3074F SYST BP LT 130 MM HG: CPT | Mod: CPTII,S$GLB,, | Performed by: FAMILY MEDICINE

## 2019-09-03 PROCEDURE — 3074F PR MOST RECENT SYSTOLIC BLOOD PRESSURE < 130 MM HG: ICD-10-PCS | Mod: CPTII,S$GLB,, | Performed by: FAMILY MEDICINE

## 2019-09-03 PROCEDURE — 99999 PR PBB SHADOW E&M-EST. PATIENT-LVL III: ICD-10-PCS | Mod: PBBFAC,,, | Performed by: FAMILY MEDICINE

## 2019-09-03 PROCEDURE — 99214 PR OFFICE/OUTPT VISIT, EST, LEVL IV, 30-39 MIN: ICD-10-PCS | Mod: S$GLB,,, | Performed by: FAMILY MEDICINE

## 2019-09-03 PROCEDURE — 3078F PR MOST RECENT DIASTOLIC BLOOD PRESSURE < 80 MM HG: ICD-10-PCS | Mod: CPTII,S$GLB,, | Performed by: FAMILY MEDICINE

## 2019-09-03 PROCEDURE — 99214 OFFICE O/P EST MOD 30 MIN: CPT | Mod: S$GLB,,, | Performed by: FAMILY MEDICINE

## 2019-09-03 PROCEDURE — 1101F PR PT FALLS ASSESS DOC 0-1 FALLS W/OUT INJ PAST YR: ICD-10-PCS | Mod: CPTII,S$GLB,, | Performed by: FAMILY MEDICINE

## 2019-09-03 RX ORDER — PRAMIPEXOLE DIHYDROCHLORIDE 0.5 MG/1
0.5 TABLET ORAL DAILY
COMMUNITY
End: 2020-09-15 | Stop reason: SDUPTHER

## 2019-09-03 RX ORDER — KETOCONAZOLE 20 MG/G
CREAM TOPICAL DAILY
Qty: 60 G | Refills: 0 | Status: SHIPPED | OUTPATIENT
Start: 2019-09-03 | End: 2019-10-29 | Stop reason: SDUPTHER

## 2019-09-03 RX ORDER — FUROSEMIDE 40 MG/1
40 TABLET ORAL 2 TIMES DAILY
Status: ON HOLD | COMMUNITY
End: 2019-10-22

## 2019-09-03 RX ORDER — AMITRIPTYLINE HYDROCHLORIDE 10 MG/1
50 TABLET, FILM COATED ORAL NIGHTLY PRN
COMMUNITY
End: 2020-12-03 | Stop reason: SDUPTHER

## 2019-09-03 RX ORDER — DONEPEZIL HYDROCHLORIDE 10 MG/1
10 TABLET, FILM COATED ORAL NIGHTLY
Status: ON HOLD | COMMUNITY
End: 2019-10-22

## 2019-09-03 NOTE — H&P (VIEW-ONLY)
Subjective:       Patient ID: Genoveva Gilbert is a 69 y.o. female.    Chief Complaint: Leg Swelling and Rash    HPI   Patient presents with her  for evaluation of a rash and 2nd opinion on lower extremity swelling that has been chronic and unchanged.  She tells me she has chronically had a rash on off within the skin fold of her panus.  At times the rash is warm, red, weeps clear but smelly fluid and is itchy and burning.  With washing the area with head and shoulders more frequently and placing a tile in the skin fold to keep the area dry the rash improves and sometimes completely resolves but always returns after variable period of time.  The rash is present much more often during the summer and is generally worse during this time.  She tells me currently the rash she is minor as she has been using Head and Shoulders washes daily for nearly 2 weeks but she tells me it just will not completely resolve this time.  The area is dry, clean, closed and not malodorous with only faint redness noticeable in the mirror.  She has not noticed any increased warmth and denies any itching or pain.  She has not noticed anything other than warm temperatures and less frequent bathing causing symptoms to be more frequent and worse and has not noticed anything that makes symptoms better although them cold temperatures, frequent bathing and use of head and shoulders that makes symptoms better.  She denies any other associated symptoms.  She tells me she has a history of lymphedema and has chronically had large lower extremities bilateral with some on off pitting edema although not since being on hydrochlorothiazide and Lasix chronically.  She tells me she follows with her cardiologist (Dr. Ospina) for this in as recommended that she elevate her lower extremities as much as possible and use compression stockings although she tells me she does not use these as they are too tight and generally does not take a concerted effort to  "elevate her legs frequently although she notes symptoms were improved when she did use compression stockings and elevation previously.  She has been told there is little else to be done for her legs other weight loss which she tells me she has been working on.  She has been offered referral to the lymphedema clinic but has not been interested in this.  She has had an extensive vascular workup completed through [facility] previously which confirmed lymphedema is and symptoms have been chronically unchanged.  She has no other concerns or complaints today.    Review of Systems   Constitutional: Negative for activity change, appetite change, fatigue and unexpected weight change.   Respiratory: Negative for cough, chest tightness, shortness of breath and wheezing.    Cardiovascular: Positive for leg swelling. Negative for chest pain and palpitations.   Gastrointestinal: Negative for abdominal pain, blood in stool, constipation, diarrhea, nausea and vomiting.   Genitourinary: Negative for difficulty urinating, dysuria and flank pain.   Musculoskeletal: Negative for arthralgias and myalgias.   Skin: Positive for rash. Negative for wound.   Neurological: Negative for dizziness, tremors, syncope, weakness, light-headedness and headaches.   Hematological: Negative for adenopathy. Does not bruise/bleed easily.   Psychiatric/Behavioral: Negative for dysphoric mood and sleep disturbance. The patient is not nervous/anxious.          Objective:      Vitals:    09/03/19 1501   BP: 112/62   Pulse: 71   Temp: 98.2 °F (36.8 °C)   TempSrc: Oral   Weight: 99.1 kg (218 lb 7.6 oz)   Height: 5' 1" (1.549 m)   PainSc: 0-No pain     Physical Exam   Constitutional: She is oriented to person, place, and time. She appears well-developed and well-nourished. No distress.   HENT:   Head: Normocephalic and atraumatic.   Cardiovascular: Normal rate, regular rhythm and normal heart sounds. Exam reveals no gallop and no friction rub. "   No murmur heard.  Pulmonary/Chest: Effort normal and breath sounds normal. No respiratory distress. She has no wheezes. She exhibits no tenderness.   Abdominal: Soft. Bowel sounds are normal. She exhibits no distension and no mass. There is no tenderness. There is no rebound and no guarding.   Musculoskeletal: Normal range of motion. She exhibits no edema, tenderness or deformity.        Right lower leg: She exhibits swelling. She exhibits no tenderness, no bony tenderness, no edema, no deformity and no laceration.        Left lower leg: She exhibits swelling. She exhibits no tenderness, no bony tenderness, no edema, no deformity and no laceration.   Large bilateral lower extremities without pitting present.  She has no pain to palpation, increased warmth or redness present.   Lymphadenopathy:        Right: No inguinal adenopathy present.        Left: No inguinal adenopathy present.   Neurological: She is alert and oriented to person, place, and time.   Skin: Skin is warm and dry. Rash noted. She is not diaphoretic.        She has a faintly erythematous rash along the lateral skin folds of the pannus as in the graphical documentation.  The skin is clean, closed and dry without other abnormalities present.  There is no tenderness to palpation and no mass or sinus tracts present.   Psychiatric: She has a normal mood and affect. Her behavior is normal. Judgment and thought content normal.   Nursing note and vitals reviewed.        Assessment:       1. Intertrigo    2. Lymphedema          Plan:   Intertrigo  -     ketoconazole (NIZORAL) 2 % cream; Apply topically once daily.  Dispense: 60 g; Refill: 0    Lymphedema      Follow up if symptoms worsen or fail to improve.    Estela appears to be stable and doing well today with improving but not resolved intertrigo.  I discussed this condition with her and it is likely secondary to fungal causes it has responded well to topical had shoulders which has any eye fungal  component.  As this has not resolved the rash quickly I discussed use of topical Nizoral cream and after reviewing the risks and benefits of the medication with her she was very interested in this.  I advised her of conditions that fungus grow well in including warm, dark and moist areas and highly recommended that she try to keep the skin folds clean, dry and exposed to air and light as much as possible.  She was in agreement with this.  The rash has resolved she may use products such as gold Bond or corn starch to prevent further rash and to keep the area dry if necessary.  She can continue using head and shoulders or Selsun Blue although the fungus may have developed some resistance already.  She does appear to have lymphedema bilateral and I discussed this condition with her including pathophysiology and possible treatment options.  She was well educated on this condition and I advised her that I have very little else to offer her and recommended compression and elevation in addition to her diuretics.  She was not willing to use compression stockings and I discussed other compression options that she may be able to tolerate as she reports she could not wear compression stockings longer than about an hour secondary to pains.  She advised me she would try over-the-counter compression stockings or wrapping her legs from the toes up with an Ace bandage and would try elevating her lower extremities more often as this has worked well for her in the past.  She still had no interest in referral to the lymphedema Clinic at this time.  I did discuss red flag symptoms for DVT, cellulitis and several other lower extremity concerns that she will watch for and alert her cardiologist or Dr. Woods if any of these occur.  I will see her back as needed in the future if she has any further problems and cannot be seen by her PCP.    Estela was seen for a 30 minute visit today in greater than half this time was spent counseling on the  above.

## 2019-09-03 NOTE — TELEPHONE ENCOUNTER
----- Message from Reny Muse sent at 9/3/2019  8:38 AM CDT -----  Type: Needs Medical Advice    Who Called:  patient  Symptoms (please be specific):  Back pain  How long has patient had these symptoms:  britney  Pharmacy name and phone #:  [facility]  Best Call Back Number: [phone] (please call this cell phone number)  Additional Information: she has another doctor's appt at 11am in [facility]/please leave a message if she is not able to answer/patient is wanting to schedule the epidural that she and Dr Manning discussed

## 2019-09-03 NOTE — PROGRESS NOTES
Subjective:       Patient ID: Genoveva Gilbert is a 69 y.o. female.    Chief Complaint: Leg Swelling and Rash    HPI   Patient presents with her  for evaluation of a rash and 2nd opinion on lower extremity swelling that has been chronic and unchanged.  She tells me she has chronically had a rash on off within the skin fold of her panus.  At times the rash is warm, red, weeps clear but smelly fluid and is itchy and burning.  With washing the area with head and shoulders more frequently and placing a tile in the skin fold to keep the area dry the rash improves and sometimes completely resolves but always returns after variable period of time.  The rash is present much more often during the summer and is generally worse during this time.  She tells me currently the rash she is minor as she has been using Head and Shoulders washes daily for nearly 2 weeks but she tells me it just will not completely resolve this time.  The area is dry, clean, closed and not malodorous with only faint redness noticeable in the mirror.  She has not noticed any increased warmth and denies any itching or pain.  She has not noticed anything other than warm temperatures and less frequent bathing causing symptoms to be more frequent and worse and has not noticed anything that makes symptoms better although them cold temperatures, frequent bathing and use of head and shoulders that makes symptoms better.  She denies any other associated symptoms.  She tells me she has a history of lymphedema and has chronically had large lower extremities bilateral with some on off pitting edema although not since being on hydrochlorothiazide and Lasix chronically.  She tells me she follows with her cardiologist (Dr. Ospina) for this in as recommended that she elevate her lower extremities as much as possible and use compression stockings although she tells me she does not use these as they are too tight and generally does not take a concerted effort to  "elevate her legs frequently although she notes symptoms were improved when she did use compression stockings and elevation previously.  She has been told there is little else to be done for her legs other weight loss which she tells me she has been working on.  She has been offered referral to the lymphedema clinic but has not been interested in this.  She has had an extensive vascular workup completed through [facility] previously which confirmed lymphedema is and symptoms have been chronically unchanged.  She has no other concerns or complaints today.    Review of Systems   Constitutional: Negative for activity change, appetite change, fatigue and unexpected weight change.   Respiratory: Negative for cough, chest tightness, shortness of breath and wheezing.    Cardiovascular: Positive for leg swelling. Negative for chest pain and palpitations.   Gastrointestinal: Negative for abdominal pain, blood in stool, constipation, diarrhea, nausea and vomiting.   Genitourinary: Negative for difficulty urinating, dysuria and flank pain.   Musculoskeletal: Negative for arthralgias and myalgias.   Skin: Positive for rash. Negative for wound.   Neurological: Negative for dizziness, tremors, syncope, weakness, light-headedness and headaches.   Hematological: Negative for adenopathy. Does not bruise/bleed easily.   Psychiatric/Behavioral: Negative for dysphoric mood and sleep disturbance. The patient is not nervous/anxious.          Objective:      Vitals:    09/03/19 1501   BP: 112/62   Pulse: 71   Temp: 98.2 °F (36.8 °C)   TempSrc: Oral   Weight: 99.1 kg (218 lb 7.6 oz)   Height: 5' 1" (1.549 m)   PainSc: 0-No pain     Physical Exam   Constitutional: She is oriented to person, place, and time. She appears well-developed and well-nourished. No distress.   HENT:   Head: Normocephalic and atraumatic.   Cardiovascular: Normal rate, regular rhythm and normal heart sounds. Exam reveals no gallop and no friction rub. "   No murmur heard.  Pulmonary/Chest: Effort normal and breath sounds normal. No respiratory distress. She has no wheezes. She exhibits no tenderness.   Abdominal: Soft. Bowel sounds are normal. She exhibits no distension and no mass. There is no tenderness. There is no rebound and no guarding.   Musculoskeletal: Normal range of motion. She exhibits no edema, tenderness or deformity.        Right lower leg: She exhibits swelling. She exhibits no tenderness, no bony tenderness, no edema, no deformity and no laceration.        Left lower leg: She exhibits swelling. She exhibits no tenderness, no bony tenderness, no edema, no deformity and no laceration.   Large bilateral lower extremities without pitting present.  She has no pain to palpation, increased warmth or redness present.   Lymphadenopathy:        Right: No inguinal adenopathy present.        Left: No inguinal adenopathy present.   Neurological: She is alert and oriented to person, place, and time.   Skin: Skin is warm and dry. Rash noted. She is not diaphoretic.        She has a faintly erythematous rash along the lateral skin folds of the pannus as in the graphical documentation.  The skin is clean, closed and dry without other abnormalities present.  There is no tenderness to palpation and no mass or sinus tracts present.   Psychiatric: She has a normal mood and affect. Her behavior is normal. Judgment and thought content normal.   Nursing note and vitals reviewed.        Assessment:       1. Intertrigo    2. Lymphedema          Plan:   Intertrigo  -     ketoconazole (NIZORAL) 2 % cream; Apply topically once daily.  Dispense: 60 g; Refill: 0    Lymphedema      Follow up if symptoms worsen or fail to improve.    Estela appears to be stable and doing well today with improving but not resolved intertrigo.  I discussed this condition with her and it is likely secondary to fungal causes it has responded well to topical had shoulders which has any eye fungal  component.  As this has not resolved the rash quickly I discussed use of topical Nizoral cream and after reviewing the risks and benefits of the medication with her she was very interested in this.  I advised her of conditions that fungus grow well in including warm, dark and moist areas and highly recommended that she try to keep the skin folds clean, dry and exposed to air and light as much as possible.  She was in agreement with this.  The rash has resolved she may use products such as gold Bond or corn starch to prevent further rash and to keep the area dry if necessary.  She can continue using head and shoulders or Selsun Blue although the fungus may have developed some resistance already.  She does appear to have lymphedema bilateral and I discussed this condition with her including pathophysiology and possible treatment options.  She was well educated on this condition and I advised her that I have very little else to offer her and recommended compression and elevation in addition to her diuretics.  She was not willing to use compression stockings and I discussed other compression options that she may be able to tolerate as she reports she could not wear compression stockings longer than about an hour secondary to pains.  She advised me she would try over-the-counter compression stockings or wrapping her legs from the toes up with an Ace bandage and would try elevating her lower extremities more often as this has worked well for her in the past.  She still had no interest in referral to the lymphedema Clinic at this time.  I did discuss red flag symptoms for DVT, cellulitis and several other lower extremity concerns that she will watch for and alert her cardiologist or Dr. Woods if any of these occur.  I will see her back as needed in the future if she has any further problems and cannot be seen by her PCP.    Estela was seen for a 30 minute visit today in greater than half this time was spent counseling on the  above.

## 2019-09-03 NOTE — TELEPHONE ENCOUNTER
----- Message from Belkys Varela sent at 9/3/2019 11:49 AM CDT -----  Contact: Estela  Type:  Patient Returning Call    Who Called:  patient  Who Left Message for Patient:  Nichole Hawk  Does the patient know what this is regarding?:  Schedule a procedure  Best Call Back Number:  [phone]  Additional Information:  Please advise--thank you

## 2019-09-09 ENCOUNTER — PATIENT OUTREACH (OUTPATIENT)
Dept: ADMINISTRATIVE | Facility: OTHER | Age: 69
End: 2019-09-09

## 2019-09-10 ENCOUNTER — OFFICE VISIT (OUTPATIENT)
Dept: NEUROLOGY | Facility: CLINIC | Age: 69
End: 2019-09-10
Payer: MEDICARE

## 2019-09-10 VITALS
DIASTOLIC BLOOD PRESSURE: 60 MMHG | HEART RATE: 62 BPM | SYSTOLIC BLOOD PRESSURE: 97 MMHG | WEIGHT: 218.5 LBS | HEIGHT: 61 IN | BODY MASS INDEX: 41.25 KG/M2

## 2019-09-10 DIAGNOSIS — R41.89 PSEUDODEMENTIA: ICD-10-CM

## 2019-09-10 DIAGNOSIS — R41.89 COGNITIVE CHANGE: ICD-10-CM

## 2019-09-10 DIAGNOSIS — R40.4 TRANSIENT ALTERATION OF AWARENESS: Primary | ICD-10-CM

## 2019-09-10 DIAGNOSIS — F32.A DEPRESSION, UNSPECIFIED DEPRESSION TYPE: ICD-10-CM

## 2019-09-10 PROCEDURE — 3078F DIAST BP <80 MM HG: CPT | Mod: CPTII,S$GLB,, | Performed by: PSYCHIATRY & NEUROLOGY

## 2019-09-10 PROCEDURE — 99214 OFFICE O/P EST MOD 30 MIN: CPT | Mod: S$GLB,,, | Performed by: PSYCHIATRY & NEUROLOGY

## 2019-09-10 PROCEDURE — 3074F PR MOST RECENT SYSTOLIC BLOOD PRESSURE < 130 MM HG: ICD-10-PCS | Mod: CPTII,S$GLB,, | Performed by: PSYCHIATRY & NEUROLOGY

## 2019-09-10 PROCEDURE — 99999 PR PBB SHADOW E&M-EST. PATIENT-LVL IV: ICD-10-PCS | Mod: PBBFAC,,, | Performed by: PSYCHIATRY & NEUROLOGY

## 2019-09-10 PROCEDURE — 1101F PT FALLS ASSESS-DOCD LE1/YR: CPT | Mod: CPTII,S$GLB,, | Performed by: PSYCHIATRY & NEUROLOGY

## 2019-09-10 PROCEDURE — 99999 PR PBB SHADOW E&M-EST. PATIENT-LVL IV: CPT | Mod: PBBFAC,,, | Performed by: PSYCHIATRY & NEUROLOGY

## 2019-09-10 PROCEDURE — 3074F SYST BP LT 130 MM HG: CPT | Mod: CPTII,S$GLB,, | Performed by: PSYCHIATRY & NEUROLOGY

## 2019-09-10 PROCEDURE — 3078F PR MOST RECENT DIASTOLIC BLOOD PRESSURE < 80 MM HG: ICD-10-PCS | Mod: CPTII,S$GLB,, | Performed by: PSYCHIATRY & NEUROLOGY

## 2019-09-10 PROCEDURE — 99214 PR OFFICE/OUTPT VISIT, EST, LEVL IV, 30-39 MIN: ICD-10-PCS | Mod: S$GLB,,, | Performed by: PSYCHIATRY & NEUROLOGY

## 2019-09-10 PROCEDURE — 1101F PR PT FALLS ASSESS DOC 0-1 FALLS W/OUT INJ PAST YR: ICD-10-PCS | Mod: CPTII,S$GLB,, | Performed by: PSYCHIATRY & NEUROLOGY

## 2019-09-10 NOTE — PROGRESS NOTES
[facility] NEUROLOGY  [facility] [place]    Date: 9/10/19  Patient Name: Genoveva Gilbert   MRN: [id number]   PCP: Lena Woods  Referring Provider: No ref. provider found    Assessment:   Genoveva Gilbert is a 69 y.o. female Presenting in follow-up for subjective cognitive changes.  Patient continues to describe mild cognitive changes with emotional distress out of proportion to her presentation. Have again urged follow up with psychiatry and psychology, which patient has not yet done. Continue to advocate for ultimate taper off of amitriptyline and xanax. Will obtain baseline EEG given episode of possible LOC.  Plan:     Problem List Items Addressed This Visit     None      Visit Diagnoses     Transient alteration of awareness    -  Primary    Relevant Orders    EEG,w/awake & asleep record    Depression, unspecified depression type        Relevant Orders    Ambulatory referral to Neuropsychology    Ambulatory referral to Psychiatry    Ambulatory consult to Psychology    Cognitive change        Relevant Orders    Ambulatory referral to Neuropsychology    Pseudodementia        Relevant Orders    Ambulatory referral to Psychiatry    Ambulatory consult to Psychology        Crispin Zamora MD  [facility]   Department of Neurology    Patient note was created using Dragon Dictation.  Any errors in syntax or even information may not have been identified and edited on initial review prior to signing this note.  Subjective:        HPI:   Ms. Genoveva Gilbert is a 69 y.o. female presenting in follow-up for subjective memory changes.  The patient reports that since her last visit she has been under considerable financial stressors and she and her  nearly a to file for bankruptcy again.  She states that in the intervening time she feels that her short-term memory is worse.  She states she had a conversation with her friend and then several days later had another conversation with the same  friend repeated the same story before remembering she had already shared it.  She is tearful throughout much of today's visit.  She has not followed up with Psychiatry or Psychology as recommended. She also endorses at least 1 episode of transient alteration of awareness stating that she believes she may have briefly consciousness and found herself slumped over the of her bed with her arm over her nightstand.  She is uncertain how she got there.  She denies tongue biting or incontinence and denied prodrome or feelings of presyncope.    PAST MEDICAL HISTORY:  Past Medical History:   Diagnosis Date    Allergy     Anxiety     Arthritis, rheumatoid     Back pain     Depression     Fibromyalgia     GERD (gastroesophageal reflux disease)     High cholesterol     Hypertension     Incontinence of urine     MS (multiple sclerosis)     benign; another MD stated she has no MS    Neuropathy     Restless leg syndrome     Sciatica of left side 1/5/2018    Seasonal allergies     Sinus congestion     Thyroid disease     Wheezing        PAST SURGICAL HISTORY:  Past Surgical History:   Procedure Laterality Date    APPENDECTOMY      ARTHROSCOPY, KNEE Left 3/20/2014    [facility]    ARTHROSCOPY-SHOULDER Right 4/16/2015    [facility]    Block-nerve-medial branch-lumbar Bilateral 2/28/2019    Performed by [facility] at [facility] OR    Block-nerve-medial branch-lumbar L3,4,5 Bilateral 3/21/2019    [facility] at [facility] OR    BREAST LUMPECTOMY      bilateral, benign    BREAST SURGERY      reduction    CHONDROPLASTY-KNEE Left 3/20/2014    [facility]    ESOPHAGOGASTRODUODENOSCOPY (EGD) N/A 7/5/2017    Performed [facility]O (4TH FLR)    HYSTERECTOMY      partial - fibroids    Injection-steroid-epidural-lumbar N/A 6/12/2019    [facility] at [facility] OR     Radiofrequency Ablation, Nerve, Spinal, Lumbar, Medial Branch, 1 Level Bilateral 4/12/2019    [facility]    REPAIR ROTATOR CUFF ARTHROSCOPIC Right 4/16/2015    [facility]    TONSILLECTOMY         CURRENT MEDS:  Current Outpatient Medications   Medication Sig Dispense Refill    ALPRAZolam (XANAX) 2 MG Tab TK 1 T PO  BID  4    amitriptyline (ELAVIL) 10 MG tablet Take 10 mg by mouth nightly as needed for Insomnia.      donepezil (ARICEPT) 10 MG tablet Take 10 mg by mouth every evening.      duloxetine (CYMBALTA) 60 MG capsule Take 60 mg by mouth 2 (two) times daily.       fluticasone (FLONASE) 50 mcg/actuation nasal spray USE ONE SPRAY IEN QD  3    furosemide (LASIX) 40 MG tablet Take 40 mg by mouth 2 (two) times daily.      hydrochlorothiazide (HYDRODIURIL) 25 MG tablet Take 25 mg by mouth once daily.      HYDROcodone-acetaminophen (NORCO) 7.5-325 mg per tablet TK 1 T PO BID ONLY PRF  FOR SEVERE PAIN  0    ketoconazole (NIZORAL) 2 % cream Apply topically once daily. 60 g 0    levothyroxine (SYNTHROID) 88 MCG tablet TK 1 T PO QD  2    losartan (COZAAR) 50 MG tablet Take 1 tablet (50 mg total) by mouth once daily. 90 tablet 3    lovastatin (ALTOPREV) 40 mg 24 hr tablet Take 40 mg by mouth every evening.      omeprazole (PRILOSEC) 20 MG capsule TK ONE C PO ONCE D  3    pramipexole (MIRAPEX) 0.5 MG tablet Take 0.5 mg by mouth once daily. Patient takes 2 at bedtime      ranitidine (ZANTAC) 300 MG tablet Take 1 tablet (300 mg total) by mouth every evening. 90 tablet 1    SYMBICORT 160-4.5 mcg/actuation HFAA INL 2 PFS PO BID  11    venlafaxine (EFFEXOR-XR) 75 MG 24 hr capsule TK 1 C PO QD  3    FLUAD 8283-5038, 65 YR UP,,PF, 45 mcg (15 mcg x 3)/0.5 mL Syrg       MULTIVIT-IRON-MIN-FOLIC ACID 3,500-18-0.4 UNIT-MG-MG ORAL CHEW Take 1 tablet by mouth.       No current facility-administered medications for this visit.        ALLERGIES:  Review of  "patient's allergies indicates:   Allergen Reactions    Keflex [cephalexin]     Latex, natural rubber Anaphylaxis     bandaids     Pcn [penicillins]      FAMILY HISTORY:  Family History   Problem Relation Age of Onset    Heart disease Mother      SOCIAL HISTORY:  Social History     Tobacco Use    Smoking status: Former Smoker     Last attempt to quit: 1996     Years since quittin.2    Smokeless tobacco: Never Used   Substance Use Topics    Alcohol use: Yes     Comment: very little     Drug use: No     Review of Systems:  12 review of systems is negative except for the symptoms mentioned in HPI.      Objective:     Vitals:    09/10/19 1309   BP: 97/60   Pulse: 62   Weight: 99.1 kg (218 lb 7.6 oz)   Height: 5' 1" (1.549 m)     General: NAD, well nourished   Eyes: no tearing, discharge, no erythema   ENT: moist mucous membranes of the oral cavity, nares patent    Neck: Supple, full range of motion  Cardiovascular: Warm and well perfused, pulses equal and symmetrical  Lungs: Normal work of breathing, normal chest wall excursions  Skin: No rash, lesions, or breakdown on exposed skin  Psychiatry: Mood and affect are appropriate, tearful    Abdomen: soft, non tender, non distended  Extremeties: No cyanosis, clubbing or edema.    Neurological   MENTAL STATUS: Alert and oriented to person, place, and time. Attention and concentration within normal limits. Speech without dysarthria, able to name and repeat without difficulty. Recent and remote memory within normal limits   CRANIAL NERVES: Visual fields intact. PERRL. EOMI. Facial sensation intact. Face symmetrical. Hearing grossly intact. Full shoulder shrug bilaterally. Tongue protrudes midline   SENSORY: Sensation is intact to light touch throughout.   MOTOR: Normal bulk and tone.  5/5 deltoid, biceps, triceps, interosseous, hand  bilaterally. 3/5 iliopsoas, knee extension/flexion, foot dorsi/plantarflexion bilaterally.    REFLEXES: Symmetric and 1+ " throughout.   CEREBELLAR/COORDINATION/GAIT: Gait steady.  Normal rapid alternating movements.     MOCA Results 01/05/2018 :   Visuospatial/Executive: 5/5  Naming: 3/3  Attention: 6/6  Language: 3/3  Abstraction: 2/2  Delayed Recall: 2/5  Orientation:6/6  Total: 27/30    MOCA Results 09/10/2019 :   Visuospatial/Executive: 3/5  Naming: 3/3  Attention: 6/6  Language: 2/3  Abstraction: 2/2  Delayed Recall: 3/5  Orientation: 6/6  Total:  25/30

## 2019-09-16 ENCOUNTER — HOSPITAL ENCOUNTER (OUTPATIENT)
Facility: AMBULARY SURGERY CENTER | Age: 69
Discharge: HOME | End: 2019-09-16
Attending: ANESTHESIOLOGY | Admitting: ANESTHESIOLOGY
Payer: MEDICARE

## 2019-09-16 DIAGNOSIS — M54.16 LUMBAR RADICULITIS: Primary | ICD-10-CM

## 2019-09-16 PROCEDURE — 99152 PR MOD CONSCIOUS SEDATION, SAME PHYS, 5+ YRS, FIRST 15 MIN: ICD-10-PCS | Mod: ,,, | Performed by: ANESTHESIOLOGY

## 2019-09-16 PROCEDURE — 62323 PR INJ LUMBAR/SACRAL, W/IMAGING GUIDANCE: ICD-10-PCS | Mod: ,,, | Performed by: ANESTHESIOLOGY

## 2019-09-16 PROCEDURE — 62323 NJX INTERLAMINAR LMBR/SAC: CPT | Mod: ,,, | Performed by: ANESTHESIOLOGY

## 2019-09-16 PROCEDURE — 62323 NJX INTERLAMINAR LMBR/SAC: CPT | Performed by: ANESTHESIOLOGY

## 2019-09-16 PROCEDURE — 99152 MOD SED SAME PHYS/QHP 5/>YRS: CPT | Mod: ,,, | Performed by: ANESTHESIOLOGY

## 2019-09-16 RX ORDER — SODIUM CHLORIDE 9 MG/ML
INJECTION, SOLUTION INTRAMUSCULAR; INTRAVENOUS; SUBCUTANEOUS
Status: DISCONTINUED | OUTPATIENT
Start: 2019-09-16 | End: 2019-09-16 | Stop reason: HOSPADM

## 2019-09-16 RX ORDER — MIDAZOLAM HYDROCHLORIDE 2 MG/2ML
INJECTION, SOLUTION INTRAMUSCULAR; INTRAVENOUS
Status: DISCONTINUED | OUTPATIENT
Start: 2019-09-16 | End: 2019-09-16 | Stop reason: HOSPADM

## 2019-09-16 RX ORDER — DEXAMETHASONE SODIUM PHOSPHATE 10 MG/ML
INJECTION INTRAMUSCULAR; INTRAVENOUS
Status: DISCONTINUED | OUTPATIENT
Start: 2019-09-16 | End: 2019-09-16 | Stop reason: HOSPADM

## 2019-09-16 RX ORDER — LIDOCAINE HYDROCHLORIDE 10 MG/ML
INJECTION, SOLUTION EPIDURAL; INFILTRATION; INTRACAUDAL; PERINEURAL
Status: DISCONTINUED | OUTPATIENT
Start: 2019-09-16 | End: 2019-09-16 | Stop reason: HOSPADM

## 2019-09-16 RX ORDER — FENTANYL CITRATE 50 UG/ML
INJECTION, SOLUTION INTRAMUSCULAR; INTRAVENOUS
Status: DISCONTINUED | OUTPATIENT
Start: 2019-09-16 | End: 2019-09-16 | Stop reason: HOSPADM

## 2019-09-16 RX ORDER — SODIUM CHLORIDE, SODIUM LACTATE, POTASSIUM CHLORIDE, CALCIUM CHLORIDE 600; 310; 30; 20 MG/100ML; MG/100ML; MG/100ML; MG/100ML
INJECTION, SOLUTION INTRAVENOUS ONCE AS NEEDED
Status: COMPLETED | OUTPATIENT
Start: 2019-09-16 | End: 2019-09-16

## 2019-09-16 RX ADMIN — SODIUM CHLORIDE, SODIUM LACTATE, POTASSIUM CHLORIDE, CALCIUM CHLORIDE: 600; 310; 30; 20 INJECTION, SOLUTION INTRAVENOUS at 11:09

## 2019-09-16 NOTE — DISCHARGE SUMMARY
[facility]  Discharge Note  Short Stay    Admit Date: 9/16/2019    Discharge Date and Time: 9/16/2019    Attending Physician: Thad Manning MD     Discharge Provider: Thad Manning    Diagnoses:  Active Hospital Problems    Diagnosis  POA    *Lumbar radiculitis [M54.16]  Yes      Resolved Hospital Problems   No resolved problems to display.       Hospital Course: [facility]  Discharged Condition: Good    Final Diagnoses:   Active Hospital Problems    Diagnosis  POA    *Lumbar radiculitis [M54.16]  Yes      Resolved Hospital Problems   No resolved problems to display.       Disposition: Home or Self Care    Follow up/Patient Instructions:    Medications:  Reconciled Home Medications:      Medication List      CONTINUE taking these medications    ALPRAZolam 2 MG Tab  Commonly known as:  XANAX  TK 1 T PO  BID     amitriptyline 10 MG tablet  Commonly known as:  ELAVIL  Take 10 mg by mouth nightly as needed for Insomnia.     CENTRUM 3,500-18-0.4 unit-mg-mg Chew  Generic drug:  multivit-iron-min-folic acid  Take 1 tablet by mouth.     donepezil 10 MG tablet  Commonly known as:  ARICEPT  Take 10 mg by mouth every evening.     DULoxetine 60 MG capsule  Commonly known as:  CYMBALTA  Take 60 mg by mouth 2 (two) times daily.     FLUAD [id number] (65 YR UP)(PF) 45 mcg (15 mcg x 3)/0.5 mL Syrg  Generic drug:  flu vac 2019 [id number](PF)     fluticasone propionate 50 mcg/actuation nasal spray  Commonly known as:  FLONASE  USE ONE SPRAY IEN QD     furosemide 40 MG tablet  Commonly known as:  LASIX  Take 40 mg by mouth 2 (two) times daily.     hydroCHLOROthiazide 25 MG tablet  Commonly known as:  HYDRODIURIL  Take 25 mg by mouth once daily.     HYDROcodone-acetaminophen 7.5-325 mg per tablet  Commonly known as:  NORCO  TK 1 T PO BID ONLY PRF  FOR SEVERE PAIN     ketoconazole 2 % cream  Commonly known as:  NIZORAL  Apply topically once daily.     levothyroxine 88 MCG tablet  Commonly known as:  SYNTHROID  TK 1 T PO QD      losartan 50 MG tablet  Commonly known as:  COZAAR  Take 1 tablet (50 mg total) by mouth once daily.     lovastatin 40 mg 24 hr tablet  Commonly known as:  ALTOPREV  Take 40 mg by mouth every evening.     omeprazole 20 MG capsule  Commonly known as:  PRILOSEC  TK ONE C PO ONCE D     pramipexole 0.5 MG tablet  Commonly known as:  MIRAPEX  Take 0.5 mg by mouth once daily. Patient takes 2 at bedtime     ranitidine 300 MG tablet  Commonly known as:  ZANTAC  Take 1 tablet (300 mg total) by mouth every evening.     SYMBICORT 160-4.5 mcg/actuation Hfaa  Generic drug:  budesonide-formoterol 160-4.5 mcg  INL 2 PFS PO BID     venlafaxine 75 MG 24 hr capsule  Commonly known as:  EFFEXOR-XR  TK 1 C PO QD          Discharge Procedure Orders   Call MD for:  temperature >100.4     Call MD for:  persistent nausea and vomiting or diarrhea     Call MD for:  severe uncontrolled pain     Call MD for:  redness, tenderness, or signs of infection (pain, swelling, redness, odor or green/yellow discharge around incision site)     Call MD for:  difficulty breathing or increased cough     Call MD for:  severe persistent headache        Follow up with MD in 2-3 weeks    Discharge Procedure Orders (must include Diet, Follow-up, Activity):   Discharge Procedure Orders (must include Diet, Follow-up, Activity)   Call MD for:  temperature >100.4     Call MD for:  persistent nausea and vomiting or diarrhea     Call MD for:  severe uncontrolled pain     Call MD for:  redness, tenderness, or signs of infection (pain, swelling, redness, odor or green/yellow discharge around incision site)     Call MD for:  difficulty breathing or increased cough     Call MD for:  severe persistent headache

## 2019-09-16 NOTE — OP NOTE
PROCEDURE DATE: 9/16/2019    Procedure:   Interlaminar epidural steroid injection at L5-S1 under fluoroscopic guidance.    Diagnosis: lUMBAR DISC DISPLACEMENT WITHOUT MYELOPATHY  pOSTOP DIAGNOSIS: sAME    Physician: Thad Manning M.D.    Medications injected:10 mg dexamethasone with 4 ml of preservative free NaCl    Local anesthetic injected:    Lidocaine 1% 2 ml total    Sedation Medications: RN IV sedation    Estimated blood loss:  None    Complications:  None    Technique:  Time-out taken to identify patient and procedure prior to starting the procedure.  With the patient laying in a prone position, the area was prepped and draped in the usual sterile fashion using ChloraPrep and a fenestrated drape.  After determining the target level with an AP fluoroscopic view, local anesthetic was given using a 25-gauge 1.5 inch needle by raising a wheal and then infiltrating toward the interlaminar entry space.  A 3.5inch 20-gauge Touhy needle was introduced under AP fluoroscopic guidance to the interlaminar space of L5-S1. Once the trajectory was established, the needle was visualized in the lateral view and advanced using loss of resistance technique. Once in the desired position, 1ml contrast was injected to confirm placement and there was no vascular uptake nor intrathecal spread.  The medication was then injected slowly. The patient tolerated the procedure well.      The patient was monitored after the procedure.   They were given post-procedure and discharge instructions to follow at home.  The patient was discharged in a stable condition.

## 2019-09-16 NOTE — DISCHARGE INSTRUCTIONS
Recovery After Procedural Sedation (Adult)  You have been given medicine by vein to make you sleep during your surgery. This may have included both a pain medicine and sleeping medicine. Most of the effects have worn off. But you may still have some drowsiness for the next 6 to 8 hours.  Home care  Follow these guidelines when you get home:  · For the next 8 hours, you should be watched by a responsible adult. This person should make sure your condition is not getting worse.  · Don't drink any alcohol for the next 24 hours.  · Don't drive, operate dangerous machinery, or make important business or personal decisions during the next 24 hours.  Note: Your healthcare provider may tell you not to take any medicine by mouth for pain or sleep in the next 4 hours. These medicines may react with the medicines you were given in the hospital. This could cause a much stronger response than usual.  Follow-up care  Follow up with your healthcare provider if you are not alert and back to your usual level of activity within 12 hours.  When to seek medical advice  Call your healthcare provider right away if any of these occur:  · Drowsiness gets worse  · Weakness or dizziness gets worse  · Repeated vomiting  · You can't be awakened   Date Last Reviewed: 10/18/2016  © 6998-4998 [employer]. [address] [place] [address], [place] [address]. All rights reserved. This information is not intended as a substitute for professional medical care. Always follow your healthcare professional's instructions.      Pain injection instructions:     This procedure may take a couple weeks to relieve pain  You may get some pain relief from the local anesthetic initally.    No driving for 24 hrs.   Activity as tolerated- gradually increase activities.  Dont lift over 10 lbs for 24 hrs   No heat at injection sites x 2 days. No heating pads, hot tubs, saunas, or swimming in any body of water or pool for 2 days.  Use ice pack for mild swelling  and for comfort , apply for 20 minutes, remove for 20 minute intervals. No direct contact of ice itself  to skin.  May shower today.  Do not allow shower water to beat omn injection site(s) for 2 daysNo tub baths for two days.      Resume Aspirin, Plavix, or Coumadin the day after the procedure unless otherwise instructed.   If diabetic,monitor your glucose carefully as steroids can increase your glucose level    Seek immediate medical help for:   Severe increase in your usual pain or appearance of new pain.  Prolonged (more than 8 hours) or increasing weakness or numbness in the legs or arms. Numbing medicine was injected and can affect the messages to and from the brain and legs or arms.  .    Fever above 100.4 degrees F ,Drainage,redness,active bleeding, or increased swelling at the injection site.  Headache, shortness of breath, chest pain, or breathing problems.

## 2019-09-16 NOTE — PLAN OF CARE
Patient is awake and alert alert and sitting in chair; she is able to stand up and ambulate to wheelchair with standby assist( pt prefers to be d/c'd via wheelchair). Patient denies dizziness ,weakness ,pain or nausea. Patient's  injection site without redness, swelling or drainage. All belongings listed on pre op check list have been returned to patient. Patient states she is ready to go home. Patient's spouse chairside and states he is ready to take patient home and he is driving.

## 2019-09-17 VITALS
HEIGHT: 61 IN | HEART RATE: 59 BPM | BODY MASS INDEX: 41.25 KG/M2 | WEIGHT: 218.5 LBS | SYSTOLIC BLOOD PRESSURE: 107 MMHG | RESPIRATION RATE: 18 BRPM | TEMPERATURE: 98 F | DIASTOLIC BLOOD PRESSURE: 53 MMHG | OXYGEN SATURATION: 100 %

## 2019-09-19 ENCOUNTER — TELEPHONE (OUTPATIENT)
Dept: PAIN MEDICINE | Facility: CLINIC | Age: 69
End: 2019-09-19

## 2019-09-19 ENCOUNTER — CLINICAL SUPPORT (OUTPATIENT)
Dept: URGENT CARE | Facility: CLINIC | Age: 69
End: 2019-09-19
Payer: MEDICARE

## 2019-09-19 VITALS
HEART RATE: 76 BPM | BODY MASS INDEX: 41.16 KG/M2 | HEIGHT: 61 IN | SYSTOLIC BLOOD PRESSURE: 125 MMHG | WEIGHT: 218 LBS | TEMPERATURE: 98 F | OXYGEN SATURATION: 96 % | DIASTOLIC BLOOD PRESSURE: 79 MMHG | RESPIRATION RATE: 18 BRPM

## 2019-09-19 DIAGNOSIS — M25.552 PAIN OF LEFT HIP JOINT: Primary | ICD-10-CM

## 2019-09-19 DIAGNOSIS — S76.012A STRAIN OF LEFT HIP, INITIAL ENCOUNTER: ICD-10-CM

## 2019-09-19 DIAGNOSIS — S39.012A STRAIN OF LUMBAR REGION, INITIAL ENCOUNTER: ICD-10-CM

## 2019-09-19 DIAGNOSIS — M54.42 ACUTE MIDLINE LOW BACK PAIN WITH LEFT-SIDED SCIATICA: ICD-10-CM

## 2019-09-19 PROCEDURE — 72110 PR  X-RAY LUMBAR SPINE 4 VW: ICD-10-PCS | Mod: S$GLB,,, | Performed by: NURSE PRACTITIONER

## 2019-09-19 PROCEDURE — 99214 PR OFFICE/OUTPT VISIT, EST, LEVL IV, 30-39 MIN: ICD-10-PCS | Mod: 25,S$GLB,, | Performed by: NURSE PRACTITIONER

## 2019-09-19 PROCEDURE — 99214 OFFICE O/P EST MOD 30 MIN: CPT | Mod: 25,S$GLB,, | Performed by: NURSE PRACTITIONER

## 2019-09-19 PROCEDURE — 72170 X-RAY EXAM OF PELVIS: CPT | Mod: S$GLB,,, | Performed by: NURSE PRACTITIONER

## 2019-09-19 PROCEDURE — 96372 PR INJECTION,THERAP/PROPH/DIAG2ST, IM OR SUBCUT: ICD-10-PCS | Mod: S$GLB,,, | Performed by: NURSE PRACTITIONER

## 2019-09-19 PROCEDURE — 96372 THER/PROPH/DIAG INJ SC/IM: CPT | Mod: S$GLB,,, | Performed by: NURSE PRACTITIONER

## 2019-09-19 PROCEDURE — 72170 PR  X-RAY PELVIS 1/2 VW: ICD-10-PCS | Mod: S$GLB,,, | Performed by: NURSE PRACTITIONER

## 2019-09-19 PROCEDURE — 72110 X-RAY EXAM L-2 SPINE 4/>VWS: CPT | Mod: S$GLB,,, | Performed by: NURSE PRACTITIONER

## 2019-09-19 RX ORDER — METHOCARBAMOL 500 MG/1
500 TABLET, FILM COATED ORAL 3 TIMES DAILY PRN
Qty: 45 TABLET | Refills: 0 | Status: SHIPPED | OUTPATIENT
Start: 2019-09-19 | End: 2019-09-24

## 2019-09-19 RX ORDER — HYDROCODONE BITARTRATE AND ACETAMINOPHEN 7.5; 325 MG/1; MG/1
1 TABLET ORAL EVERY 6 HOURS PRN
Qty: 20 TABLET | Refills: 0 | Status: SHIPPED | OUTPATIENT
Start: 2019-09-19 | End: 2020-05-28 | Stop reason: SDUPTHER

## 2019-09-19 RX ORDER — KETOROLAC TROMETHAMINE 30 MG/ML
30 INJECTION, SOLUTION INTRAMUSCULAR; INTRAVENOUS
Status: COMPLETED | OUTPATIENT
Start: 2019-09-19 | End: 2019-09-19

## 2019-09-19 RX ADMIN — KETOROLAC TROMETHAMINE 30 MG: 30 INJECTION, SOLUTION INTRAMUSCULAR; INTRAVENOUS at 05:09

## 2019-09-19 NOTE — TELEPHONE ENCOUNTER
----- Message from Kaylie Pollock sent at 9/19/2019  4:37 PM CDT -----  Contact: Patient  Type: Needs Medical Advice    Who Called:  Patient  Best Call Back Number: [phone] (home)   Additional Information: patient is at [facility]- took Xrays no results yet. Wanted to inform Nichole

## 2019-09-19 NOTE — PATIENT INSTRUCTIONS
Hip Strain    You have a strain of the muscles around the hip joint. A muscle strain is a stretching or tearing of muscle fibers. This causes pain, especially when you move that muscle. There may also be some swelling and bruising.  Home care  · Stay off the injured leg as much as possible until you can walk on it without pain. If you have a lot of pain with walking, crutches or a walker may be prescribed. These can be rented or purchased at many pharmacies and surgical or orthopedic supply stores. Follow your healthcare provider's advice regarding when to begin putting weight on that leg.  · Apply an ice pack over the injured area for 15 to 20 minutes every 3 to 6 hours. You should do this for the first 24 to 48 hours. You can make an ice pack by filling a plastic bag that seals at the top with ice cubes and then wrapping it with a thin towel. Be careful not to injure your skin with the ice treatments. Ice should never be applied directly to skin. Continue the use of ice packs for relief of pain and swelling as needed. After 48 hours, apply heat (warm shower or warm bath) for 15 to 20 minutes several times a day, or alternate ice and heat.  · You may use over-the-counter pain medicine to control pain, unless another pain medicine was prescribed. If you have chronic liver or kidney disease or ever had a stomach ulcer or GI bleeding, talk with your healthcare provider beforeusing these medicines.  · If you play sports, you may resume these activities when you are able to hop and run on the injured leg without pain.  Follow-up care  Follow up with your healthcare provider, or as advised. If your symptoms do not begin to get better after a week, more tests may be needed.  If X-rays were taken, you will be told of any new findings that may affect your care.  When to seek medical advice  Call your healthcare provider right away if any of these occur:  · Increased swelling or bruising  · Increased pain  · Losing the  ability to put weight on the injured side  Date Last Reviewed: 11/19/2015  © 5565-5921 [employer]. [address] [place] [address], [place] [address]. All rights reserved. This information is not intended as a substitute for professional medical care. Always follow your healthcare professional's instructions.        Back Sprain or Strain    Injury to the muscles (strain) or ligaments (sprain) around the spine can be troubling. Injury may occur after a sudden forceful twisting or bending force such as in a car accident, after a simple awkward movement, or after lifting something heavy with poor body positioning. In any case, muscle spasm is often present and adds to the pain.  Thankfully, most people feel better in 1 to 2 weeks, and most of the rest in 1 to 2 months. Most people can remain active. Unless you had a forceful or traumatic physical injury such as a car accident or fall, X-rays may not be ordered for the first evaluation of a back sprain or strain. If pain continues and does not respond to medical treatment, your healthcare provider may then order X-rays and other tests.  Home care  The following guidelines will help you care for your injury at home:  · When in bed, try to find a comfortable position. A firm mattress is best. Try lying flat on your back with pillows under your knees. You can also try lying on your side with your knees bent up toward your chest and a pillow between your knees.  · Don't sit for long periods. Try not to take long car rides or take other trips that have you sitting for a long time. This puts more stress on the lower back than standing or walking.  · During the first 24 to 72 hours after an injury or flare-up, apply an ice pack to the painful area for 20 minutes. Then remove it for 20 minutes. Do this for 60 to 90 minutes, or several times a day. This will reduce swelling and pain. Be sure to wrap the ice pack in a thin towel or plastic to protect your skin.  · You can  start with ice, then switch to heat. Heat from a hot shower, hot bath, or heating pad reduces pain and works well for muscle spasms. Put heat on the painful area for 20 minutes, then remove for 20 minutes. Do this for 60 to 90 minutes, or several times a day. Do not use a heating pad while sleeping. It can burn the skin.  · You can alternate the ice and heat. Talk with your healthcare provider to find out the best treatment or therapy for your back pain.  · Therapeutic massage will help relax the back muscles without stretching them.  · Be aware of safe lifting methods. Do not lift anything over 15 pounds until all of the pain is gone.  Medicines  Talk to your healthcare provider before using medicines, especially if you have other health problems or are taking other medicines.  · You may use acetaminophen or ibuprofen to control pain, unless another pain medicine was prescribed. If you have chronic conditions like diabetes, liver or kidney disease, stomach ulcers, or gastrointestinal bleeding, or are taking blood-thinner medicines, talk with your doctor before taking any medicines.  · Be careful if you are given prescription medicines, narcotics, or medicine for muscle spasm. They can cause drowsiness, and affect your coordination, reflexes, and judgment. Do not drive or operate heavy machinery when taking these types of medicines. Only take pain medicine as prescribed by your healthcare provider.  Follow-up care  Follow up with your healthcare provider, or as advised. You may need physical therapy or more tests if your symptoms get worse.  If you had X-rays your healthcare provider may be checking for any broken bones, breaks, or fractures. Bruises and sprains can sometimes hurt as much as a fracture. These injuries can take time to heal completely. If your symptoms dont improve or they get worse, talk with your healthcare provider. You may need a repeat X-ray or other tests.  Call 911  Call for emergency care if  any of the following occur:  · Trouble breathing  · Confused  · Very drowsy or trouble awakening  · Fainting or loss of consciousness  · Rapid or very slow heart rate  · Loss of bowel or bladder control  When to seek medical advice  Call your healthcare provider right away if any of the following occur:  · Pain gets worse or spreads to your arms or legs  · Weakness or numbness in one or both arms or legs  · Numbness in the groin or genital area  Date Last Reviewed: 6/1/2016 © 2000-2017 [employer]. [address] [place] [id number]. All rights reserved. This information is not intended as a substitute for professional medical care. Always follow your healthcare professional's instructions.

## 2019-09-19 NOTE — PROGRESS NOTES
"Subjective:       Patient ID: Genoveva Gilbert is a 69 y.o. female.    Vitals:  height is 5' 1" (1.549 m) and weight is 98.9 kg (218 lb). Her oral temperature is 97.9 °F (36.6 °C). Her blood pressure is 125/79 and her pulse is 76. Her respiration is 18 and oxygen saturation is 96%.     Chief Complaint: Fall    Pt presents for left hip and lower back pain after fall yesterday. Pt has h/o chronic back pain and received steroid inj by her pain management MD on 9/16/19. She denies complications from procedure. Yesterday she experienced a mechanical fall in her garage. She fell onto her left hip and back. She has been experiencing pain to her left ant hip and low back since the fall yesterday which she describes as sharp throbbing quality, constant timing, worsens with ambulation, mod severity, radiates down her left upper leg. She denies bowel or bladder incont. She denies saddle parasthesias.     Fall   The accident occurred 12 to 24 hours ago. The fall occurred while walking. The point of impact was the head (back). Pertinent negatives include no fever, headaches, nausea or vomiting. Treatments tried: tramadol.       Constitution: Negative for chills, fatigue and fever.   HENT: Negative for congestion and sore throat.    Neck: Negative for painful lymph nodes.   Cardiovascular: Negative for chest pain and leg swelling.   Eyes: Negative for double vision and blurred vision.   Respiratory: Negative for cough and shortness of breath.    Gastrointestinal: Negative for nausea, vomiting and diarrhea.   Genitourinary: Negative for dysuria, frequency, urgency and history of kidney stones.   Musculoskeletal: Positive for joint pain and back pain. Negative for joint swelling, muscle cramps and muscle ache.   Skin: Negative for color change, pale, rash and bruising.   Allergic/Immunologic: Negative for seasonal allergies.   Neurological: Negative for dizziness, history of vertigo, light-headedness, passing out and headaches. "   Hematologic/Lymphatic: Negative for swollen lymph nodes.   Psychiatric/Behavioral: Negative for nervous/anxious, sleep disturbance and depression. The patient is not nervous/anxious.        Objective:      Physical Exam   Constitutional: She is oriented to person, place, and time. She appears well-developed and well-nourished. She is cooperative.  Non-toxic appearance. She does not appear ill. No distress.   HENT:   Head: Normocephalic and atraumatic.   Right Ear: Hearing, tympanic membrane, external ear and ear canal normal.   Left Ear: Hearing, tympanic membrane, external ear and ear canal normal.   Nose: Nose normal. No mucosal edema, rhinorrhea or nasal deformity. No epistaxis. Right sinus exhibits no maxillary sinus tenderness and no frontal sinus tenderness. Left sinus exhibits no maxillary sinus tenderness and no frontal sinus tenderness.   Mouth/Throat: Uvula is midline, oropharynx is clear and moist and mucous membranes are normal. No trismus in the jaw. Normal dentition. No uvula swelling. No posterior oropharyngeal erythema.   Eyes: Conjunctivae and lids are normal. Right eye exhibits no discharge. Left eye exhibits no discharge. No scleral icterus.   Sclera clear bilat   Neck: Trachea normal, normal range of motion, full passive range of motion without pain and phonation normal. Neck supple.   Cardiovascular: Normal rate, regular rhythm, normal heart sounds, intact distal pulses and normal pulses.   Pulmonary/Chest: Effort normal and breath sounds normal. No respiratory distress.   Abdominal: Soft. Normal appearance and bowel sounds are normal. She exhibits no distension, no pulsatile midline mass and no mass. There is no tenderness.   Musculoskeletal: Normal range of motion. She exhibits tenderness. She exhibits no edema or deformity.   Pain with palpation to left ant hip and lower mid back, no vertebral tenderness, FROM to left hip, neg straight leg raise. Pain to left hip with full internal  rotation.   Neurological: She is alert and oriented to person, place, and time. She exhibits normal muscle tone. Coordination normal.   Skin: Skin is warm, dry and intact. She is not diaphoretic. No pallor.   Psychiatric: She has a normal mood and affect. Her speech is normal and behavior is normal. Judgment and thought content normal. Cognition and memory are normal.   Nursing note and vitals reviewed.      Assessment:       1. Pain of left hip joint    2. Acute midline low back pain with left-sided sciatica    3. Strain of lumbar region, initial encounter    4. Strain of left hip, initial encounter        Plan:         Pain of left hip joint  -     XR HIP 2 VIEW LEFT; Future; Expected date: 09/19/2019  -     XR LUMBAR SPINE COMPLETE 5 VIEW; Future; Expected date: 09/19/2019    Acute midline low back pain with left-sided sciatica  -     XR HIP 2 VIEW LEFT; Future; Expected date: 09/19/2019  -     XR LUMBAR SPINE COMPLETE 5 VIEW; Future; Expected date: 09/19/2019    Strain of lumbar region, initial encounter    Strain of left hip, initial encounter    Other orders  -     ketorolac injection 30 mg  -     HYDROcodone-acetaminophen (NORCO) 7.5-325 mg per tablet; Take 1 tablet by mouth every 6 (six) hours as needed for Pain.  Dispense: 20 tablet; Refill: 0  -     methocarbamol (ROBAXIN) 500 MG Tab; Take 1 tablet (500 mg total) by mouth 3 (three) times daily as needed.  Dispense: 45 tablet; Refill: 0    pt advised on close f/u with Dr. Manning pain management

## 2019-09-19 NOTE — TELEPHONE ENCOUNTER
----- Message from Gee KRISHNAN Frisard sent at 9/19/2019 12:44 PM CDT -----  Contact: same  Patient called in and stated she had an epidural on Monday 9/16/19 & patient stated she took a fall in her garage and fell.    Patient stated she is in a lot of pain & fell on her back.  Patient did not got to the ER.    Patient would like a call back number is [phone] cell

## 2019-09-20 ENCOUNTER — TELEPHONE (OUTPATIENT)
Dept: PAIN MEDICINE | Facility: CLINIC | Age: 69
End: 2019-09-20

## 2019-09-20 NOTE — TELEPHONE ENCOUNTER
----- Message from Belkys Varela sent at 9/20/2019  8:57 AM CDT -----  Contact: Genoveva  Type:  Patient Returning Call    Who Called:  patient  Who Left Message for Patient:  Nichole  Does the patient know what this is regarding?:  Epidural Monday & had bad fall Wednesday  Best Call Back Number:  [phone]  Additional Information:  Please advise--thank you

## 2019-09-27 ENCOUNTER — HOSPITAL ENCOUNTER (OUTPATIENT)
Dept: NEUROLOGY | Facility: HOSPITAL | Age: 69
Discharge: HOME | End: 2019-09-27
Attending: PSYCHIATRY & NEUROLOGY
Payer: MEDICARE

## 2019-09-27 DIAGNOSIS — R40.4 TRANSIENT ALTERATION OF AWARENESS: ICD-10-CM

## 2019-09-27 PROCEDURE — 95819 PR EEG,W/AWAKE & ASLEEP RECORD: ICD-10-PCS | Mod: 26,,, | Performed by: PSYCHIATRY & NEUROLOGY

## 2019-09-27 PROCEDURE — 95819 EEG AWAKE AND ASLEEP: CPT | Mod: 26,,, | Performed by: PSYCHIATRY & NEUROLOGY

## 2019-09-27 PROCEDURE — 95819 EEG AWAKE AND ASLEEP: CPT

## 2019-10-05 NOTE — PROCEDURES
Genoveva Gilbert is a 69 y.o. female patient.  Date - 9/27/19   EEG NUMBER:  [id number]     REFERRING PHYSICIAN:  Dr. Zamora      This EEG was performed to assess for evidence of underlying epilepsy.     ELECTROENCEPHALOGRAM REPORT     METHODOLOGY:  Electroencephalographic (EEG) recording is recorded with electrodes placed according to the International 10-20 placement system.  Thirty two (32) channels of digital signal (sampling rate of 512/sec), including T1 and T2, were simultaneously recorded from the scalp and may include EKG, EMG, and/or eye monitors.  Recording band pass was 0.1 to 512 Hz.  Digital video recording of the patient is simultaneously recorded with the EEG.  The patient is instructed to report clinical symptoms which may occur during the recording session.  EEG and video recording are stored and archived in digital format.    Activation procedures, which include photic stimulation, hyperventilation and instructing patients to perform simple tasks, are done in selected patients.   The EEG is displayed on a monitor screen and can be reviewed using different montages.  Computer assisted-analysis is employed to detect spike and electrographic seizure activity.   The entire record is submitted for computer analysis.  The entire recording is visually reviewed, and the times identified by computer analysis as being spikes or seizures are reviewed again.    Compressed spectral analysis (CSA) is also performed on the activity recorded from each individual channel.  This is displayed as a power display of frequencies from 0 to 30 Hz over time.   The CSA is reviewed looking for asymmetries in power between homologous areas of the scalp, then compared with the original EEG recording.    [employer] software was also utilized in the review of this study.  This software suite analyzes the EEG recording in multiple domains.  Coherence and rhythmicity are computed to identify EEG sections which may contain organized  seizures.  Each channel undergoes analysis to detect the presence of spike and sharp waves which have special and morphological characteristics of epileptic activity.  The routine EEG recording is converted from special into frequency domain.  This is then displayed comparing homologous areas to identify areas of significant asymmetry.  Algorithm to identify non-cortically generated artifact is used to separate artifact from the EEG.       EEG FINDINGS:  The recording was obtained with a number of standard bipolar and referential montages during wakefulness, drowsiness and sleep.  In the alert state, the posterior background rhythm was a symmetric, well-modulated 9 to 10 Hz alpha rhythm, which reacted symmetrically to eye opening.  Intermittent photic stimulation evoked symmetric posterior driving responses. Hyperventilation produced physiological slowing.  No abnormalities were activated by photic stimulation or hyperventilation.  During drowsiness, the background rhythm waxed and waned and there were periods of slowing.  During stage II sleep, symmetric V waves and sleep spindles were noted.  There were no focal abnormalities.  There were no interictal epileptiform abnormalities and no clinical or electrographic seizures were recorded.   Excessive beta activity was noted throughout the record which was diffuse.     The EKG channel revealed a sinus rhythm.     IMPRESSION:  This is a normal EEG during wakefulness, drowsiness and sleep.     CLINICAL CORRELATION:  The patient is a  69 -year-old female who is being evaluated for episodes of loss of consciousness.  The patient is currently not maintained on any anti-seizure medications.  This is a normal EEG during wakefulness, drowsiness and sleep.  There is no evidence for neither cortical dysfunction nor an epileptic process on this recording.  No seizures were recorded during this study.    Drew Sofia  10/5/2019

## 2019-10-07 ENCOUNTER — OFFICE VISIT (OUTPATIENT)
Dept: PAIN MEDICINE | Facility: CLINIC | Age: 69
End: 2019-10-07
Payer: MEDICARE

## 2019-10-07 VITALS
WEIGHT: 218 LBS | HEART RATE: 65 BPM | HEIGHT: 61 IN | SYSTOLIC BLOOD PRESSURE: 92 MMHG | DIASTOLIC BLOOD PRESSURE: 46 MMHG | BODY MASS INDEX: 41.16 KG/M2

## 2019-10-07 DIAGNOSIS — M51.36 DDD (DEGENERATIVE DISC DISEASE), LUMBAR: ICD-10-CM

## 2019-10-07 DIAGNOSIS — M47.896 OTHER SPONDYLOSIS, LUMBAR REGION: Primary | ICD-10-CM

## 2019-10-07 DIAGNOSIS — R53.81 PHYSICAL DECONDITIONING: ICD-10-CM

## 2019-10-07 DIAGNOSIS — E66.01 MORBID OBESITY WITH BMI OF 45.0-49.9, ADULT: ICD-10-CM

## 2019-10-07 PROCEDURE — 99214 OFFICE O/P EST MOD 30 MIN: CPT | Mod: S$GLB,,, | Performed by: ANESTHESIOLOGY

## 2019-10-07 PROCEDURE — 99999 PR PBB SHADOW E&M-EST. PATIENT-LVL V: ICD-10-PCS | Mod: PBBFAC,,, | Performed by: ANESTHESIOLOGY

## 2019-10-07 PROCEDURE — 1101F PT FALLS ASSESS-DOCD LE1/YR: CPT | Mod: CPTII,S$GLB,, | Performed by: ANESTHESIOLOGY

## 2019-10-07 PROCEDURE — 3074F SYST BP LT 130 MM HG: CPT | Mod: CPTII,S$GLB,, | Performed by: ANESTHESIOLOGY

## 2019-10-07 PROCEDURE — 99999 PR PBB SHADOW E&M-EST. PATIENT-LVL V: CPT | Mod: PBBFAC,,, | Performed by: ANESTHESIOLOGY

## 2019-10-07 PROCEDURE — 3078F PR MOST RECENT DIASTOLIC BLOOD PRESSURE < 80 MM HG: ICD-10-PCS | Mod: CPTII,S$GLB,, | Performed by: ANESTHESIOLOGY

## 2019-10-07 PROCEDURE — 99214 PR OFFICE/OUTPT VISIT, EST, LEVL IV, 30-39 MIN: ICD-10-PCS | Mod: S$GLB,,, | Performed by: ANESTHESIOLOGY

## 2019-10-07 PROCEDURE — 3074F PR MOST RECENT SYSTOLIC BLOOD PRESSURE < 130 MM HG: ICD-10-PCS | Mod: CPTII,S$GLB,, | Performed by: ANESTHESIOLOGY

## 2019-10-07 PROCEDURE — 3078F DIAST BP <80 MM HG: CPT | Mod: CPTII,S$GLB,, | Performed by: ANESTHESIOLOGY

## 2019-10-07 PROCEDURE — 1101F PR PT FALLS ASSESS DOC 0-1 FALLS W/OUT INJ PAST YR: ICD-10-PCS | Mod: CPTII,S$GLB,, | Performed by: ANESTHESIOLOGY

## 2019-10-07 RX ORDER — MEMANTINE HYDROCHLORIDE 10 MG/1
1 TABLET ORAL 2 TIMES DAILY
Refills: 3 | COMMUNITY
Start: 2019-10-03 | End: 2020-12-09 | Stop reason: ALTCHOICE

## 2019-10-07 NOTE — PROGRESS NOTES
Referring Physician: No ref. provider found    PCP: Lena Woods MD      CC:  Lower back pain    Interval History:  Genoveva Gilbert is a 69 y.o. female with chronic low back pain who returns to our clinic.  She states lower back pain is much more tolerable following lumbar MB RFA procedure.  However, lower back pain has recurred.  She desires repeat lumbar MB RFA procedure. Most recent lumbar CAITLIN eyes did not help much with the lower back pain.  She continued home exercise with mild benefits.   She denies any worsening weakness.  No bowel bladder changes.  She states not taking hydrocodone as prescribed by her PCP.  States Tramadol has been more helpful for the pain.  Prior HPI:   Genoveva Gilbert is a 69 y.o. female referred to us for lower back pain. Pain has been gradually worsened over past 3 years.  No recent traumatic incident.  She has constant intermittent aching, throbbing pain in her lower back.  Pain rarely radiates down her lower extremities.  Pain worsens standing, bending, walking, lifting getting worked.  Pain improves with rest.  She has tried physical therapy with minimal benefit.  She really has had MRIs of the cervical and lumbar spine.  She has not had recent interventions lumbar spine.  She denies any worsening weakness.  No bowel bladder changes.    Interventional history:  Status post lumbar MB RFA procedure on 04/2019 with 50% relief of her lower back pain.  Lumbar CAITLIN on 06/12/2019 with 50% relief of her low back and buttock pain, Lumbar CAITLIN on 9/16/19 with minimal relief      ROS:  CONSTITUTIONAL: No fevers, chills, night sweats, wt. loss, appetite changes  SKIN: no rashes or itching  ENT: No headaches, head trauma, vision changes, or eye pain  LYMPH NODES: None noticed   CV: No chest pain, palpitations.   RESP: No shortness of breath, dyspnea on exertion, cough, wheezing, or hemoptysis  GI: No nausea, emesis, diarrhea, constipation, melena, hematochezia, pain.    : No dysuria, hematuria,  urgency, or frequency   HEME: No easy bruising, bleeding problems  PSYCHIATRIC: No depression, anxiety, psychosis, hallucinations.  NEURO: No seizures, memory loss, dizziness or difficulty sleeping  MSK:  Positive HPI      Past Medical History:   Diagnosis Date    Allergy     Anxiety     Arthritis, rheumatoid     Back pain     Depression     Fibromyalgia     GERD (gastroesophageal reflux disease)     High cholesterol     Hypertension     Incontinence of urine     MS (multiple sclerosis)     benign; another MD stated she has no MS    Neuropathy     Restless leg syndrome     Sciatica of left side 1/5/2018    Seasonal allergies     Sinus congestion     Thyroid disease     Wheezing      Past Surgical History:   Procedure Laterality Date    APPENDECTOMY      BREAST LUMPECTOMY      bilateral, benign    BREAST SURGERY      reduction    EPIDURAL STEROID INJECTION INTO LUMBAR SPINE N/A 6/12/2019    Procedure: Injection-steroid-epidural-lumbar;  Surgeon: Thad Manning MD;  Location: [facility] OR;  Service: Pain Management;  Laterality: N/A;  L5-S1    EPIDURAL STEROID INJECTION INTO LUMBAR SPINE N/A 9/16/2019    Procedure: Injection-steroid-epidural-lumbar;  Surgeon: Thad Manning MD;  Location: [facility] OR;  Service: Pain Management;  Laterality: N/A;  L5-S1    HYSTERECTOMY      partial - fibroids    INJECTION OF ANESTHETIC AGENT AROUND MEDIAL BRANCH NERVES INNERVATING LUMBAR FACET JOINT Bilateral 2/28/2019    Procedure: Block-nerve-medial branch-lumbar;  Surgeon: Thad Manning MD;  Location: [facility] OR;  Service: Pain Management;  Laterality: Bilateral;  L3, 4,5     INJECTION OF ANESTHETIC AGENT AROUND MEDIAL BRANCH NERVES INNERVATING LUMBAR FACET JOINT Bilateral 3/21/2019    Procedure: Block-nerve-medial branch-lumbar L3,4,5;  Surgeon: Thad Manning MD;  Location: [facility] OR;  Service: Pain Management;  Laterality: Bilateral;    RADIOFREQUENCY ABLATION OF LUMBAR MEDIAL BRANCH NERVE AT SINGLE LEVEL Bilateral 4/12/2019     "Procedure: Radiofrequency Ablation, Nerve, Spinal, Lumbar, Medial Branch, 1 Level;  Surgeon: Thad Manning MD;  Location: [facility] OR;  Service: Pain Management;  Laterality: Bilateral;  L3, 4, 5  lumbar  [employer] pain management generator  SN [id number]  80 degrees for 75 seconds x2    TONSILLECTOMY       Family History   Problem Relation Age of Onset    Heart disease Mother      Social History     Socioeconomic History    Marital status:      Spouse name: Not on file    Number of children: Not on file    Years of education: Not on file    Highest education level: Not on file   Occupational History    Not on file   Social Needs    Financial resource strain: Not on file    Food insecurity:     Worry: Not on file     Inability: Not on file    Transportation needs:     Medical: Not on file     Non-medical: Not on file   Tobacco Use    Smoking status: Former Smoker     Last attempt to quit: 1996     Years since quittin.3    Smokeless tobacco: Never Used   Substance and Sexual Activity    Alcohol use: Yes     Comment: very little     Drug use: No    Sexual activity: Not on file   Lifestyle    Physical activity:     Days per week: Not on file     Minutes per session: Not on file    Stress: Not on file   Relationships    Social connections:     Talks on phone: Not on file     Gets together: Not on file     Attends [employer] service: Not on file     Active member of club or organization: Not on file     Attends meetings of clubs or organizations: Not on file     Relationship status: Not on file   Other Topics Concern    Not on file   Social History Narrative    Not on file         Medications/Allergies: See med card    Vitals:    10/07/19 1513   BP: (!) 92/46   Pulse: 65   Weight: 98.9 kg (218 lb)   Height: 5' 1" (1.549 m)   PainSc:   6   PainLoc: Back         Physical exam:    GENERAL: A and O x3, the patient appears well groomed and is in no acute distress.  Skin: No rashes or obvious " lesions  HEENT: normocephalic, atraumatic  CARDIOVASCULAR:  RRR  LUNGS: non labored breathing  ABDOMEN: soft, nontender   UPPER EXTREMITIES: Normal alignment, normal range of motion, no atrophy, no skin changes,  hair growth and nail growth normal and equal bilaterally. No swelling, no tenderness.    LOWER EXTREMITIES:  Normal alignment, normal range of motion, no atrophy, no skin changes,  hair growth and nail growth normal and equal bilaterally. No swelling, no tenderness.  LUMBAR SPINE  Lumbar spine: ROM is very limited with flexion extension and oblique extension with moderate increased pain.    Avinash's test causes no increased pain on either side.    Supine straight leg raise is negative bilaterally.    Internal and external rotation of the hip causes no increased pain on either side.  Myofascial exam: No tenderness to palpation across lumbar paraspinous muscles.      MENTAL STATUS: normal orientation, speech, language, and fund of knowledge for social situation.  Emotional state appropriate.    CRANIAL NERVES:  II:  PERRL bilaterally,   III,IV,VI: EOMI.    V:  Facial sensation equal bilaterally  VII:  Facial motor function normal.  VIII:  Hearing equal to finger rub bilaterally  IX/X: Gag normal, palate symmetric  XI:  Shoulder shrug equal, head turn equal  XII:  Tongue midline without fasciculations      MOTOR: Tone and bulk: normal bilateral upper and lower Strength: normal   Delt Bi Tri WE WF     R 5 5 5 5 5 5   L 5 5 5 5 5 5     IP ADD ABD Quad TA Gas HAM  R 5 5 5 5 5 5 5  L 5 5 5 5 5 5 5    SENSATION: Light touch and pinprick intact bilaterally  REFLEXES: normal, symmetric, nonbrisk.  Toes down, no clonus. No hoffmans.  GAIT: normal rise, base, steps, and arm swing.        Imaging:  MRI L-spine 4/2018 [facility]  L1-2, moderate facet degenerative changes bilaterally.  Mild neural foraminal narrowing bilaterally.  L2-3, moderate facet changes bilaterally.  Mild left-sided neural foraminal  narrowing  L3-4, diffuse disc protrusion.  Moderate facet and ligamentum flavum hypertrophy.  Moderate narrowing of the right-sided neural foraminal  L4-5, annular disc bulge.  Moderate facet changes.  Mild narrowing of neural foraminal bilateral.  L5-S1, unroofing of the disc posteriorly due to anterolisthesis of L5 on S1.  Severe facet changes.  Severe neural foraminal narrowing on the left and mild on the right    Assessment:  Genoveva Gilbert is a 69 y.o. male with back pain  1. Other spondylosis, lumbar region    2. DDD (degenerative disc disease), lumbar    3. Morbid obesity with BMI of 45.0-49.9, adult    4. Physical deconditioning      Plan:  1. I have stressed the importance of physical activity and exercise to improve overall health  2. Reviewed pertinent imaging and records with patient  3. Schedule repeat lumbar MB RFA procedure to help with her axial lower back pain.  4.  Continue medication per PCP.    5. Follow-up after the procedure

## 2019-10-07 NOTE — H&P (VIEW-ONLY)
Referring Physician: No ref. provider found    PCP: Lena Woods MD      CC:  Lower back pain    Interval History:  Genoveva Gilbert is a 69 y.o. female with chronic low back pain who returns to our clinic.  She states lower back pain is much more tolerable following lumbar MB RFA procedure.  However, lower back pain has recurred.  She desires repeat lumbar MB RFA procedure. Most recent lumbar CAITLIN eyes did not help much with the lower back pain.  She continued home exercise with mild benefits.   She denies any worsening weakness.  No bowel bladder changes.  She states not taking hydrocodone as prescribed by her PCP.  States Tramadol has been more helpful for the pain.  Prior HPI:   Genoveva Gilbert is a 69 y.o. female referred to us for lower back pain. Pain has been gradually worsened over past 3 years.  No recent traumatic incident.  She has constant intermittent aching, throbbing pain in her lower back.  Pain rarely radiates down her lower extremities.  Pain worsens standing, bending, walking, lifting getting worked.  Pain improves with rest.  She has tried physical therapy with minimal benefit.  She really has had MRIs of the cervical and lumbar spine.  She has not had recent interventions lumbar spine.  She denies any worsening weakness.  No bowel bladder changes.    Interventional history:  Status post lumbar MB RFA procedure on 04/2019 with 50% relief of her lower back pain.  Lumbar CAITLIN on 06/12/2019 with 50% relief of her low back and buttock pain, Lumbar CAITLIN on 9/16/19 with minimal relief      ROS:  CONSTITUTIONAL: No fevers, chills, night sweats, wt. loss, appetite changes  SKIN: no rashes or itching  ENT: No headaches, head trauma, vision changes, or eye pain  LYMPH NODES: None noticed   CV: No chest pain, palpitations.   RESP: No shortness of breath, dyspnea on exertion, cough, wheezing, or hemoptysis  GI: No nausea, emesis, diarrhea, constipation, melena, hematochezia, pain.    : No dysuria, hematuria,  urgency, or frequency   HEME: No easy bruising, bleeding problems  PSYCHIATRIC: No depression, anxiety, psychosis, hallucinations.  NEURO: No seizures, memory loss, dizziness or difficulty sleeping  MSK:  Positive HPI      Past Medical History:   Diagnosis Date    Allergy     Anxiety     Arthritis, rheumatoid     Back pain     Depression     Fibromyalgia     GERD (gastroesophageal reflux disease)     High cholesterol     Hypertension     Incontinence of urine     MS (multiple sclerosis)     benign; another MD stated she has no MS    Neuropathy     Restless leg syndrome     Sciatica of left side 1/5/2018    Seasonal allergies     Sinus congestion     Thyroid disease     Wheezing      Past Surgical History:   Procedure Laterality Date    APPENDECTOMY      BREAST LUMPECTOMY      bilateral, benign    BREAST SURGERY      reduction    EPIDURAL STEROID INJECTION INTO LUMBAR SPINE N/A 6/12/2019    Procedure: Injection-steroid-epidural-lumbar;  Surgeon: Thad Manning MD;  [facility]  Service: Pain Management;  Laterality: N/A;  L5-S1    EPIDURAL STEROID INJECTION INTO LUMBAR SPINE N/A 9/16/2019    Procedure: Injection-steroid-epidural-lumbar;  Surgeon: Thad Manning MD;  [facility];  Service: Pain Management;  Laterality: N/A;  L5-S1    HYSTERECTOMY      partial - fibroids    INJECTION OF ANESTHETIC AGENT AROUND MEDIAL BRANCH NERVES INNERVATING LUMBAR FACET JOINT Bilateral 2/28/2019    Procedure: Block-nerve-medial branch-lumbar;  Surgeon: Thad Manning MD;  [facility];  Service: Pain Management;  Laterality: Bilateral;  L3, 4,5     INJECTION OF ANESTHETIC AGENT AROUND MEDIAL BRANCH NERVES INNERVATING LUMBAR FACET JOINT Bilateral 3/21/2019    Procedure: Block-nerve-medial branch-lumbar L3,4,5;  Surgeon: Thad Manning MD;  [facility];  Service: Pain Management;  Laterality: Bilateral;    RADIOFREQUENCY ABLATION OF LUMBAR MEDIAL BRANCH NERVE AT SINGLE LEVEL Bilateral 4/12/2019     "Procedure: Radiofrequency Ablation, Nerve, Spinal, Lumbar, Medial Branch, 1 Level;  Surgeon: Thad Manning MD;  Location: [facility] OR;  Service: Pain Management;  Laterality: Bilateral;  L3, 4, 5  lumbar  [employer] pain management generator  SN [id number]  80 degrees for 75 seconds x2    TONSILLECTOMY       Family History   Problem Relation Age of Onset    Heart disease Mother      Social History     Socioeconomic History    Marital status:      Spouse name: Not on file    Number of children: Not on file    Years of education: Not on file    Highest education level: Not on file   Occupational History    Not on file   Social Needs    Financial resource strain: Not on file    Food insecurity:     Worry: Not on file     Inability: Not on file    Transportation needs:     Medical: Not on file     Non-medical: Not on file   Tobacco Use    Smoking status: Former Smoker     Last attempt to quit: 1996     Years since quittin.3    Smokeless tobacco: Never Used   Substance and Sexual Activity    Alcohol use: Yes     Comment: very little     Drug use: No    Sexual activity: Not on file   Lifestyle    Physical activity:     Days per week: Not on file     Minutes per session: Not on file    Stress: Not on file   Relationships    Social connections:     Talks on phone: Not on file     Gets together: Not on file     Attends [employer] service: Not on file     Active member of club or organization: Not on file     Attends meetings of clubs or organizations: Not on file     Relationship status: Not on file   Other Topics Concern    Not on file   Social History Narrative    Not on file         Medications/Allergies: See med card    Vitals:    10/07/19 1513   BP: (!) 92/46   Pulse: 65   Weight: 98.9 kg (218 lb)   Height: 5' 1" (1.549 m)   PainSc:   6   PainLoc: Back         Physical exam:    GENERAL: A and O x3, the patient appears well groomed and is in no acute distress.  Skin: No rashes or obvious " lesions  HEENT: normocephalic, atraumatic  CARDIOVASCULAR:  RRR  LUNGS: non labored breathing  ABDOMEN: soft, nontender   UPPER EXTREMITIES: Normal alignment, normal range of motion, no atrophy, no skin changes,  hair growth and nail growth normal and equal bilaterally. No swelling, no tenderness.    LOWER EXTREMITIES:  Normal alignment, normal range of motion, no atrophy, no skin changes,  hair growth and nail growth normal and equal bilaterally. No swelling, no tenderness.  LUMBAR SPINE  Lumbar spine: ROM is very limited with flexion extension and oblique extension with moderate increased pain.    Avinash's test causes no increased pain on either side.    Supine straight leg raise is negative bilaterally.    Internal and external rotation of the hip causes no increased pain on either side.  Myofascial exam: No tenderness to palpation across lumbar paraspinous muscles.      MENTAL STATUS: normal orientation, speech, language, and fund of knowledge for social situation.  Emotional state appropriate.    CRANIAL NERVES:  II:  PERRL bilaterally,   III,IV,VI: EOMI.    V:  Facial sensation equal bilaterally  VII:  Facial motor function normal.  VIII:  Hearing equal to finger rub bilaterally  IX/X: Gag normal, palate symmetric  XI:  Shoulder shrug equal, head turn equal  XII:  Tongue midline without fasciculations      MOTOR: Tone and bulk: normal bilateral upper and lower Strength: normal   Delt Bi Tri WE WF     R 5 5 5 5 5 5   L 5 5 5 5 5 5     IP ADD ABD Quad TA Gas HAM  R 5 5 5 5 5 5 5  L 5 5 5 5 5 5 5    SENSATION: Light touch and pinprick intact bilaterally  REFLEXES: normal, symmetric, nonbrisk.  Toes down, no clonus. No hoffmans.  GAIT: normal rise, base, steps, and arm swing.        Imaging:  MRI L-spine 4/2018 [facility]  L1-2, moderate facet degenerative changes bilaterally.  Mild neural foraminal narrowing bilaterally.  L2-3, moderate facet changes bilaterally.  Mild left-sided neural foraminal  narrowing  L3-4, diffuse disc protrusion.  Moderate facet and ligamentum flavum hypertrophy.  Moderate narrowing of the right-sided neural foraminal  L4-5, annular disc bulge.  Moderate facet changes.  Mild narrowing of neural foraminal bilateral.  L5-S1, unroofing of the disc posteriorly due to anterolisthesis of L5 on S1.  Severe facet changes.  Severe neural foraminal narrowing on the left and mild on the right    Assessment:  Genoveva Gilbert is a 69 y.o. male with back pain  1. Other spondylosis, lumbar region    2. DDD (degenerative disc disease), lumbar    3. Morbid obesity with BMI of 45.0-49.9, adult    4. Physical deconditioning      Plan:  1. I have stressed the importance of physical activity and exercise to improve overall health  2. Reviewed pertinent imaging and records with patient  3. Schedule repeat lumbar MB RFA procedure to help with her axial lower back pain.  4.  Continue medication per PCP.    5. Follow-up after the procedure

## 2019-10-08 DIAGNOSIS — M47.896 OTHER SPONDYLOSIS, LUMBAR REGION: Primary | ICD-10-CM

## 2019-10-22 ENCOUNTER — HOSPITAL ENCOUNTER (OUTPATIENT)
Facility: AMBULARY SURGERY CENTER | Age: 69
Discharge: HOME | End: 2019-10-22
Attending: ANESTHESIOLOGY | Admitting: ANESTHESIOLOGY
Payer: MEDICARE

## 2019-10-22 DIAGNOSIS — M47.896 OTHER SPONDYLOSIS, LUMBAR REGION: Primary | ICD-10-CM

## 2019-10-22 PROCEDURE — 99152 PR MOD CONSCIOUS SEDATION, SAME PHYS, 5+ YRS, FIRST 15 MIN: ICD-10-PCS | Mod: ,,, | Performed by: ANESTHESIOLOGY

## 2019-10-22 PROCEDURE — 64635 DESTROY LUMB/SAC FACET JNT: CPT | Mod: LT | Performed by: ANESTHESIOLOGY

## 2019-10-22 PROCEDURE — 99152 MOD SED SAME PHYS/QHP 5/>YRS: CPT | Mod: ,,, | Performed by: ANESTHESIOLOGY

## 2019-10-22 PROCEDURE — 64635 DESTROY LUMB/SAC FACET JNT: CPT | Mod: 50,,, | Performed by: ANESTHESIOLOGY

## 2019-10-22 PROCEDURE — 64636 PR DESTROY L/S FACET JNT ADDL: ICD-10-PCS | Mod: 50,,, | Performed by: ANESTHESIOLOGY

## 2019-10-22 PROCEDURE — 64635 PR DESTROY LUMB/SAC FACET JNT: ICD-10-PCS | Mod: 50,,, | Performed by: ANESTHESIOLOGY

## 2019-10-22 PROCEDURE — 64636 DESTROY L/S FACET JNT ADDL: CPT | Mod: RT | Performed by: ANESTHESIOLOGY

## 2019-10-22 PROCEDURE — 64636 DESTROY L/S FACET JNT ADDL: CPT | Mod: 50,,, | Performed by: ANESTHESIOLOGY

## 2019-10-22 RX ORDER — ALBUTEROL SULFATE 90 UG/1
2 AEROSOL, METERED RESPIRATORY (INHALATION) EVERY 6 HOURS PRN
COMMUNITY
End: 2020-07-31

## 2019-10-22 RX ORDER — LIDOCAINE HYDROCHLORIDE 20 MG/ML
INJECTION, SOLUTION EPIDURAL; INFILTRATION; INTRACAUDAL; PERINEURAL
Status: DISCONTINUED | OUTPATIENT
Start: 2019-10-22 | End: 2019-10-22 | Stop reason: HOSPADM

## 2019-10-22 RX ORDER — METOPROLOL SUCCINATE 50 MG/1
50 TABLET, EXTENDED RELEASE ORAL DAILY
COMMUNITY
End: 2020-07-22 | Stop reason: SDUPTHER

## 2019-10-22 RX ORDER — CELECOXIB 200 MG/1
200 CAPSULE ORAL DAILY
COMMUNITY
End: 2021-03-11 | Stop reason: SDUPTHER

## 2019-10-22 RX ORDER — FENTANYL CITRATE 50 UG/ML
INJECTION, SOLUTION INTRAMUSCULAR; INTRAVENOUS
Status: DISCONTINUED | OUTPATIENT
Start: 2019-10-22 | End: 2019-10-22 | Stop reason: HOSPADM

## 2019-10-22 RX ORDER — DIPHENHYDRAMINE HCL 25 MG
25 TABLET ORAL NIGHTLY PRN
COMMUNITY
End: 2020-01-27

## 2019-10-22 RX ORDER — POTASSIUM CHLORIDE 750 MG/1
10 CAPSULE, EXTENDED RELEASE ORAL NIGHTLY
COMMUNITY
End: 2021-09-15 | Stop reason: SDUPTHER

## 2019-10-22 RX ORDER — SODIUM CHLORIDE, SODIUM LACTATE, POTASSIUM CHLORIDE, CALCIUM CHLORIDE 600; 310; 30; 20 MG/100ML; MG/100ML; MG/100ML; MG/100ML
INJECTION, SOLUTION INTRAVENOUS ONCE AS NEEDED
Status: COMPLETED | OUTPATIENT
Start: 2019-10-22 | End: 2019-10-22

## 2019-10-22 RX ORDER — MINERAL OIL
180 ENEMA (ML) RECTAL DAILY PRN
COMMUNITY
End: 2020-07-17

## 2019-10-22 RX ORDER — AMLODIPINE BESYLATE 5 MG/1
5 TABLET ORAL DAILY
COMMUNITY
End: 2020-06-20

## 2019-10-22 RX ORDER — BUPIVACAINE HYDROCHLORIDE 2.5 MG/ML
INJECTION, SOLUTION EPIDURAL; INFILTRATION; INTRACAUDAL
Status: DISCONTINUED | OUTPATIENT
Start: 2019-10-22 | End: 2019-10-22 | Stop reason: HOSPADM

## 2019-10-22 RX ORDER — MIDAZOLAM HYDROCHLORIDE 2 MG/2ML
INJECTION, SOLUTION INTRAMUSCULAR; INTRAVENOUS
Status: DISCONTINUED | OUTPATIENT
Start: 2019-10-22 | End: 2019-10-22 | Stop reason: HOSPADM

## 2019-10-22 RX ORDER — METHYLPREDNISOLONE ACETATE 80 MG/ML
INJECTION, SUSPENSION INTRA-ARTICULAR; INTRALESIONAL; INTRAMUSCULAR; SOFT TISSUE
Status: DISCONTINUED | OUTPATIENT
Start: 2019-10-22 | End: 2019-10-22 | Stop reason: HOSPADM

## 2019-10-22 RX ORDER — DEXAMETHASONE SODIUM PHOSPHATE 10 MG/ML
INJECTION INTRAMUSCULAR; INTRAVENOUS
Status: DISCONTINUED | OUTPATIENT
Start: 2019-10-22 | End: 2019-10-22 | Stop reason: HOSPADM

## 2019-10-22 RX ORDER — ACETAMINOPHEN AND PHENYLEPHRINE HCL 325; 5 MG/1; MG/1
13000 TABLET ORAL DAILY
COMMUNITY
End: 2022-06-09

## 2019-10-22 RX ADMIN — SODIUM CHLORIDE, SODIUM LACTATE, POTASSIUM CHLORIDE, CALCIUM CHLORIDE: 600; 310; 30; 20 INJECTION, SOLUTION INTRAVENOUS at 10:10

## 2019-10-22 NOTE — DISCHARGE SUMMARY
[facility]  Discharge Note  Short Stay    Admit Date: 10/22/2019    Discharge Date and Time: 10/22/2019    Attending Physician: Thad Manning MD     Discharge Provider: Thad Manning    Diagnoses:  Active Hospital Problems    Diagnosis  POA    *Other spondylosis, lumbar region [M47.896]  Yes      Resolved Hospital Problems   No resolved problems to display.       Hospital Course: Lumbar MB RFA  Discharged Condition: Good    Final Diagnoses:   Active Hospital Problems    Diagnosis  POA    *Other spondylosis, lumbar region [M47.896]  Yes      Resolved Hospital Problems   No resolved problems to display.       Disposition: Home or Self Care    Follow up/Patient Instructions:    Medications:  Reconciled Home Medications:      Medication List      CONTINUE taking these medications    ALPRAZolam 2 MG Tab  Commonly known as:  XANAX  TK 1 T PO  BID     amitriptyline 10 MG tablet  Commonly known as:  ELAVIL  Take 10 mg by mouth nightly as needed for Insomnia.     amLODIPine 5 MG tablet  Commonly known as:  NORVASC  Take 5 mg by mouth once daily.     BENADRYL ALLERGY 25 mg tablet  Generic drug:  diphenhydrAMINE  Take 25 mg by mouth nightly as needed for Insomnia.     biotin 10,000 mcg Cap  Take 13,000 mcg by mouth once daily.     celecoxib 200 MG capsule  Commonly known as:  CeleBREX  Take 200 mg by mouth once daily.     CENTRUM 3,500-18-0.4 unit-mg-mg Chew  Generic drug:  multivit-iron-min-folic acid  Take 1 tablet by mouth.     fexofenadine 180 MG tablet  Commonly known as:  ALLEGRA  Take 180 mg by mouth daily as needed.     fluticasone propionate 50 mcg/actuation nasal spray  Commonly known as:  FLONASE  USE ONE SPRAY IEN QD     hydroCHLOROthiazide 25 MG tablet  Commonly known as:  HYDRODIURIL  Take 25 mg by mouth once daily.     * HYDROcodone-acetaminophen 7.5-325 mg per tablet  Commonly known as:  NORCO  TK 1 T PO BID ONLY PRF  FOR SEVERE PAIN     * HYDROcodone-acetaminophen 7.5-325 mg per tablet  Commonly known  as:  NORCO  Take 1 tablet by mouth every 6 (six) hours as needed for Pain.     ketoconazole 2 % cream  Commonly known as:  NIZORAL  Apply topically once daily.     levothyroxine 88 MCG tablet  Commonly known as:  SYNTHROID  TK 1 T PO QD     losartan 50 MG tablet  Commonly known as:  COZAAR  Take 1 tablet (50 mg total) by mouth once daily.     lovastatin 40 mg 24 hr tablet  Commonly known as:  ALTOPREV  Take 40 mg by mouth every evening.     memantine 10 MG Tab  Commonly known as:  NAMENDA  TK 1 T PO BID     metoprolol succinate 50 MG 24 hr tablet  Commonly known as:  TOPROL-XL  Take 50 mg by mouth once daily.     omeprazole 20 MG capsule  Commonly known as:  PRILOSEC  TK ONE C PO ONCE D     potassium chloride 10 MEQ Cpsr  Commonly known as:  MICRO-K  Take 10 mEq by mouth every evening.     pramipexole 0.5 MG tablet  Commonly known as:  MIRAPEX  Take 0.5 mg by mouth once daily. Patient takes 2 at bedtime     PROVENTIL HFA 90 mcg/actuation inhaler  Generic drug:  albuterol  Inhale 2 puffs into the lungs every 6 (six) hours as needed for Wheezing. Rescue     SYMBICORT 160-4.5 mcg/actuation Hfaa  Generic drug:  budesonide-formoterol 160-4.5 mcg  INL 2 PFS PO BID     VALACYCLOVIR ORAL  Take by mouth.     venlafaxine 75 MG 24 hr capsule  Commonly known as:  EFFEXOR-XR  TK 1 C PO QD         * This list has 2 medication(s) that are the same as other medications prescribed for you. Read the directions carefully, and ask your doctor or other care provider to review them with you.              Discharge Procedure Orders   Call MD for:  temperature >100.4     Call MD for:  persistent nausea and vomiting or diarrhea     Call MD for:  severe uncontrolled pain     Call MD for:  redness, tenderness, or signs of infection (pain, swelling, redness, odor or green/yellow discharge around incision site)     Call MD for:  difficulty breathing or increased cough     Call MD for:  severe persistent headache        Follow up with MD in 2-3  weeks    Discharge Procedure Orders (must include Diet, Follow-up, Activity):   Discharge Procedure Orders (must include Diet, Follow-up, Activity)   Call MD for:  temperature >100.4     Call MD for:  persistent nausea and vomiting or diarrhea     Call MD for:  severe uncontrolled pain     Call MD for:  redness, tenderness, or signs of infection (pain, swelling, redness, odor or green/yellow discharge around incision site)     Call MD for:  difficulty breathing or increased cough     Call MD for:  severe persistent headache

## 2019-10-22 NOTE — DISCHARGE INSTRUCTIONS
Before leaving, please make sure you have all your personal belongings such as glasses, purses, wallets, keys, cell phones, jewelry, jackets etc      Anesthesia information    Anesthesia Safety      You have been given medicine  to sedate you during your procedure today. This may have included both a pain medicine and sleeping medicine. Most of the effects have worn off; however, you may continue to have some drowsiness for the next  24 hours. Anesthesia and pain medicines can cause nausea, sleepiness, dizziness and  constipation.    HOME CARE:  1) For the next EIGHT HOURS, you should be watched by a responsible adult to look for any worsening of your condition.  2) DO NOT DRINK any ALCOHOL for the next 24 HOURS.  3) DO NOT DRIVE or operate dangerous machinery during the next 24 HOURS.  FOLLOW UP with your doctor or this facility if you are not alert and back to your usual level of activity within 24 hrs.  GET PROMPT MEDICAL ATTENTION if any of the following occur:  -- Increased drowsiness  -- Increased weakness or dizziness  -- Repeated vomiting  -- If you cannot be awakened    Before leaving, please make sure you have all your personal belongings such as glasses, purses, wallets, keys, cell phones, jewelry, jackets etc    RADIOFREQUENCY/ Pain injection instructions:     This procedure may take a couple weeks to relieve pain    No driving for 24 hrs.   Activity as tolerated- gradually increase activities.  Dont lift over 10 lbs for 24 hrs   No heat at injection sites x 2 days. No heating pads, hot tubs, saunas, or swimming in any body of water or pool for 2 days.  Use ice pack for mild swelling and for comfort , apply for 20 minutes, remove for 20 minute intervals. No direct contact of ice itself  to skin.  May shower today. No tub baths for two days.      Resume Aspirin, Plavix, or Coumadin the day after the procedure unless otherwise instructed.   If diabetic,monitor your glucose carefully as steroids can  increase your glucose level    Seek immediate medical help for:   Severe increase in your usual pain or appearance of new pain.  Prolonged (mor kimberly 8 hours) or increasing weakness or numbness in the legs or arms.  .    Fever above 100.4 degrees F,Drainage,redness,active bleeding, or increased swelling at the injection site.  Headache, shortness of breath, chest pain, or breathing problems.       Radiofrequency of Nerves    After this procedure you may have increased pain for three days and lingering pain for up to 6 weeks.   Most patients feel better after 4-6  weeks.    Use your pain medications as needed but the goal of this treartment is to wean you off your pain medication.  You may have weakness after the procedure due to the numbing injection  After Surgery:  Always be aware that any surgery can cause these symptoms:    Pain- Medication can be prescribed for pain to decrease your pain but may not completely take your pain away.  Over the Counter pain medicine my be enough and you can always use Ice and rest to help ease pain.    Bleeding- a little bleeding after a surgery is usually within normal.  If there is a lot of blood you need to notify your MD.  Emergency treatments of bleeding are cold application, elevation of the bleeding site and compression.    Infection- Infection after surgery is NOT a normal occurrence.  Signs of infection are fever, swelling, hot to touch the incision.  If this occurs notify your MD immediately.    Nausea- this can be common after a surgery especially if you have had anesthesia medicine or are taking pain medicine.  Staying on clear liquids, bland foods, gingerale, or over the counter anti nausea medicines can help.  If you vomit more than once, notify your MD.  Anti Nausea medicines can be prescribed.      Use ice pack for discomfort :apply for 20 minutes, remove for 20 minutes for up to 2 days. Do not sleep with ice pack.  Do not use a heating pad or take tub baths or swim  for 2 days.  You may shower today  Gradually increase your activities.  Dont lift anything over 10 lbs for the first 24 hours  Dont drive the day of the procedure Wait 24 hrs to drive.  Wait until tomorrow to resume any blood thinners (aspirin, Plavix, Coumadin) but you may resume all your other medications today.  If Diabetic, monitor you glucose carefully due to steroids  used for this procedure    Seek Medical Attention if you have:  Severe pain or headache  Fever or chills  Redness or swelling around the injection site   Difficulty breathing  Vomiting or Increasing numbness or weakness in arms or legs    After Surgery:  Always be aware that any surgery can cause these symptoms:    Pain- Medication can be prescribed for pain to decrease your pain but may not completely take your pain away.  Over the Counter pain medicine my be enough and you can always use Ice and rest to help ease pain.    Bleeding-  notify your MD if you have any bleeding from injection sites.  Emergency treatments of bleeding are cold application, elevation of the bleeding site and compression.    Infection- Infection after surgery is NOT a normal occurrence.  Signs of infection are fever, swelling, hot to touch the incision.  If this occurs notify your MD immediately.    Nausea- this can be common after a surgery especially if you have had anesthesia medicine or are taking pain medicine. The steroid the Dr injected can have a side effect of Nausea.  Staying on clear liquids, bland foods, gingerale, or over the counter anti nausea medicines can help.  If you vomit more than once, notify your MD.  Anti Nausea medicines can be prescribed.

## 2019-10-22 NOTE — PLAN OF CARE
Patient sitting in chair and states she is  ready to go home; denies pain nausea or any weakness.  Patient able to stand up and ambulate at bedside with standby assist. Vital signs and injection sites stable.Spouse present at chairside and states he is ready to take patient home and that he is driving the patient home. All belongings listed on pre op check list have been returned to patient.

## 2019-10-22 NOTE — OP NOTE
PROCEDURE DATE: 10/22/2019    PROCEDURE:  Radiofrequency ablation of the L3,4,5 medial branch nerves on the bilateral-side utilizing fluoroscopy    DIAGNOSIS:  Other lumbar spondylosis  Post op Diagnosis: Same    PHYSICIAN: Thad Manning MD    MEDICATIONS INJECTED:  From a mixture of 6ml of 0.25% bupivicaine and 80mg of methylprednisone,  1ml of this solution was injected at each level.    LOCAL ANESTHETIC USED: Lidocaine 1%, 2 ml given at each site.    SEDATION MEDICATIONS: RN IV sedation    ESTIMATED BLOOD LOSS:  None    COMPLICATIONS:  None    TECHNIQUE:  A time out was taken to identify patient and procedure side prior to starting the procedure. Laying in a prone position, the patient was prepped and draped in the usual sterile fashion using ChloraPrep and sterile towels.  The levels were determined under fluoroscopic guidance and then marked.  Local anesthetic was given by raising a wheal at the skin over each site and then infiltrated approximately 2cm deeper.  A 18-gauge  150 mm RF needle was introduced to the anatomic location of the bilateral L3,4,5 medial branch nerves.  Motor stimulation up to 2 Volts at each level confirmed no motor nerve involvement.  Impedance was less than 800 ohms at each level.  1ml of 2% lidocaine was instilled prior to lesioning.  Ablation was performed per level utilizing radiofrequency generator 80°C for 60 seconds.  Needles were then rotated 90° and a second lesion was performed.  The above noted medication was then injected slowly. The patient tolerated the procedure well.     The patient was monitored after the procedure.  Patient was given post procedure and discharge instructions to follow at home.  The patient was discharged in a stable condition

## 2019-10-23 VITALS
TEMPERATURE: 98 F | HEIGHT: 61 IN | WEIGHT: 218 LBS | DIASTOLIC BLOOD PRESSURE: 56 MMHG | BODY MASS INDEX: 41.16 KG/M2 | RESPIRATION RATE: 18 BRPM | SYSTOLIC BLOOD PRESSURE: 132 MMHG | OXYGEN SATURATION: 98 % | HEART RATE: 54 BPM

## 2019-10-29 DIAGNOSIS — L30.4 INTERTRIGO: ICD-10-CM

## 2019-10-29 RX ORDER — KETOCONAZOLE 20 MG/G
CREAM TOPICAL DAILY
Qty: 60 G | Refills: 0 | Status: SHIPPED | OUTPATIENT
Start: 2019-10-29 | End: 2020-07-17

## 2019-12-05 LAB — CRC RECOMMENDATION EXT: NORMAL

## 2019-12-20 ENCOUNTER — TELEPHONE (OUTPATIENT)
Dept: PAIN MEDICINE | Facility: CLINIC | Age: 69
End: 2019-12-20

## 2019-12-20 ENCOUNTER — OFFICE VISIT (OUTPATIENT)
Dept: PAIN MEDICINE | Facility: CLINIC | Age: 69
End: 2019-12-20
Payer: MEDICARE

## 2019-12-20 VITALS
WEIGHT: 213 LBS | HEART RATE: 67 BPM | SYSTOLIC BLOOD PRESSURE: 129 MMHG | HEIGHT: 61 IN | BODY MASS INDEX: 40.22 KG/M2 | DIASTOLIC BLOOD PRESSURE: 69 MMHG

## 2019-12-20 DIAGNOSIS — E66.01 MORBID OBESITY WITH BMI OF 45.0-49.9, ADULT: ICD-10-CM

## 2019-12-20 DIAGNOSIS — M51.36 DDD (DEGENERATIVE DISC DISEASE), LUMBAR: ICD-10-CM

## 2019-12-20 DIAGNOSIS — M47.896 OTHER SPONDYLOSIS, LUMBAR REGION: Primary | ICD-10-CM

## 2019-12-20 PROCEDURE — 1101F PT FALLS ASSESS-DOCD LE1/YR: CPT | Mod: CPTII,S$GLB,, | Performed by: ANESTHESIOLOGY

## 2019-12-20 PROCEDURE — 99213 PR OFFICE/OUTPT VISIT, EST, LEVL III, 20-29 MIN: ICD-10-PCS | Mod: S$GLB,,, | Performed by: ANESTHESIOLOGY

## 2019-12-20 PROCEDURE — 99213 OFFICE O/P EST LOW 20 MIN: CPT | Mod: S$GLB,,, | Performed by: ANESTHESIOLOGY

## 2019-12-20 PROCEDURE — 99999 PR PBB SHADOW E&M-EST. PATIENT-LVL V: ICD-10-PCS | Mod: PBBFAC,,, | Performed by: ANESTHESIOLOGY

## 2019-12-20 PROCEDURE — 3074F PR MOST RECENT SYSTOLIC BLOOD PRESSURE < 130 MM HG: ICD-10-PCS | Mod: CPTII,S$GLB,, | Performed by: ANESTHESIOLOGY

## 2019-12-20 PROCEDURE — 1159F PR MEDICATION LIST DOCUMENTED IN MEDICAL RECORD: ICD-10-PCS | Mod: S$GLB,,, | Performed by: ANESTHESIOLOGY

## 2019-12-20 PROCEDURE — 1125F PR PAIN SEVERITY QUANTIFIED, PAIN PRESENT: ICD-10-PCS | Mod: S$GLB,,, | Performed by: ANESTHESIOLOGY

## 2019-12-20 PROCEDURE — 1101F PR PT FALLS ASSESS DOC 0-1 FALLS W/OUT INJ PAST YR: ICD-10-PCS | Mod: CPTII,S$GLB,, | Performed by: ANESTHESIOLOGY

## 2019-12-20 PROCEDURE — 1159F MED LIST DOCD IN RCRD: CPT | Mod: S$GLB,,, | Performed by: ANESTHESIOLOGY

## 2019-12-20 PROCEDURE — 3078F DIAST BP <80 MM HG: CPT | Mod: CPTII,S$GLB,, | Performed by: ANESTHESIOLOGY

## 2019-12-20 PROCEDURE — 3074F SYST BP LT 130 MM HG: CPT | Mod: CPTII,S$GLB,, | Performed by: ANESTHESIOLOGY

## 2019-12-20 PROCEDURE — 1125F AMNT PAIN NOTED PAIN PRSNT: CPT | Mod: S$GLB,,, | Performed by: ANESTHESIOLOGY

## 2019-12-20 PROCEDURE — 99999 PR PBB SHADOW E&M-EST. PATIENT-LVL V: CPT | Mod: PBBFAC,,, | Performed by: ANESTHESIOLOGY

## 2019-12-20 PROCEDURE — 3078F PR MOST RECENT DIASTOLIC BLOOD PRESSURE < 80 MM HG: ICD-10-PCS | Mod: CPTII,S$GLB,, | Performed by: ANESTHESIOLOGY

## 2019-12-20 RX ORDER — DICLOFENAC SODIUM 10 MG/G
2 GEL TOPICAL DAILY
Qty: 1 TUBE | Refills: 2 | Status: ON HOLD | OUTPATIENT
Start: 2019-12-20 | End: 2020-05-27

## 2019-12-20 NOTE — PROGRESS NOTES
Referring Physician: No ref. provider found    PCP: Lena Woods MD      CC:  Lower back pain    Interval History:  Genoveva Gilbert is a 69 y.o. female with chronic low back pain who returns to our clinic.  She states lower back pain is much more tolerable following lumbar MB RFA procedure.  Pain is improved.   She continued home exercise with mild benefits.   She denies any worsening weakness.  No bowel bladder changes.    Prior HPI:   Genoveva Gilbert is a 69 y.o. female referred to us for lower back pain. Pain has been gradually worsened over past 3 years.  No recent traumatic incident.  She has constant intermittent aching, throbbing pain in her lower back.  Pain rarely radiates down her lower extremities.  Pain worsens standing, bending, walking, lifting getting worked.  Pain improves with rest.  She has tried physical therapy with minimal benefit.  She really has had MRIs of the cervical and lumbar spine.  She has not had recent interventions lumbar spine.  She denies any worsening weakness.  No bowel bladder changes.    Interventional history:  Status post lumbar MB RFA procedure on 04/2019 with 50% relief of her lower back pain.  Lumbar CAITLIN on 06/12/2019 with 50% relief of her low back and buttock pain, Lumbar CAITLIN on 9/16/19 with minimal relief  - s/p Lumbar MB RFA on 10/2019 with 60% relief    ROS:  CONSTITUTIONAL: No fevers, chills, night sweats, wt. loss, appetite changes  SKIN: no rashes or itching  ENT: No headaches, head trauma, vision changes, or eye pain  LYMPH NODES: None noticed   CV: No chest pain, palpitations.   RESP: No shortness of breath, dyspnea on exertion, cough, wheezing, or hemoptysis  GI: No nausea, emesis, diarrhea, constipation, melena, hematochezia, pain.    : No dysuria, hematuria, urgency, or frequency   HEME: No easy bruising, bleeding problems  PSYCHIATRIC: No depression, anxiety, psychosis, hallucinations.  NEURO: No seizures, memory loss, dizziness or difficulty  sleeping  MSK:  Positive HPI      Past Medical History:   Diagnosis Date    Allergy     Anxiety     Arthritis, rheumatoid     Back pain     Depression     Fibromyalgia     GERD (gastroesophageal reflux disease)     High cholesterol     Hypertension     Incontinence of urine     MS (multiple sclerosis)     benign; another MD stated she has no MS    Neuropathy     Restless leg syndrome     Sciatica of left side 1/5/2018    Seasonal allergies     Sinus congestion     Thyroid disease     Wheezing      Past Surgical History:   Procedure Laterality Date    APPENDECTOMY      BREAST LUMPECTOMY      bilateral, benign    BREAST SURGERY      reduction    EPIDURAL STEROID INJECTION INTO LUMBAR SPINE N/A 6/12/2019    Procedure: Injection-steroid-epidural-lumbar;  Surgeon: Thad Manning MD;  Location: [facility];  Service: Pain Management;  Laterality: N/A;  L5-S1    EPIDURAL STEROID INJECTION INTO LUMBAR SPINE N/A 9/16/2019    Procedure: Injection-steroid-epidural-lumbar;  Surgeon: Thad Manning MD;  Location: [facility];  Service: Pain Management;  Laterality: N/A;  L5-S1    HYSTERECTOMY      partial - fibroids    INJECTION OF ANESTHETIC AGENT AROUND MEDIAL BRANCH NERVES INNERVATING LUMBAR FACET JOINT Bilateral 2/28/2019    Procedure: Block-nerve-medial branch-lumbar;  Surgeon: Thad Manning MD;  Location: [facility];  Service: Pain Management;  Laterality: Bilateral;  L3, 4,5     INJECTION OF ANESTHETIC AGENT AROUND MEDIAL BRANCH NERVES INNERVATING LUMBAR FACET JOINT Bilateral 3/21/2019    Procedure: Block-nerve-medial branch-lumbar L3,4,5;  Surgeon: Thad Manning MD;  [facility];  Service: Pain Management;  Laterality: Bilateral;    RADIOFREQUENCY ABLATION OF LUMBAR MEDIAL BRANCH NERVE AT SINGLE LEVEL Bilateral 4/12/2019    Procedure: Radiofrequency Ablation, Nerve, Spinal, Lumbar, Medial Branch, 1 Level;  Surgeon: Thad Manning MD;  Location: [facility];  Service: Pain Management;  Laterality: Bilateral;  L3, 4,  "5  lumbar  NiraliHood pain management generator  SN [id number]  80 degrees for 75 seconds x2    RADIOFREQUENCY ABLATION OF LUMBAR MEDIAL BRANCH NERVE AT SINGLE LEVEL Bilateral 10/22/2019    Procedure: Radiofrequency Ablation, Nerve, Spinal, Lumbar, Medial Branch, L3,4,5;  Surgeon: Thad Manning MD;  [facility];  Service: Pain Management;  Laterality: Bilateral;  burned at 80 degrees C X 60 seconds X 2 each site    TONSILLECTOMY       Family History   Problem Relation Age of Onset    Heart disease Mother      Social History     Socioeconomic History    Marital status:      Spouse name: Not on file    Number of children: Not on file    Years of education: Not on file    Highest education level: Not on file   Occupational History    Not on file   Social Needs    Financial resource strain: Not on file    Food insecurity:     Worry: Not on file     Inability: Not on file    Transportation needs:     Medical: Not on file     Non-medical: Not on file   Tobacco Use    Smoking status: Former Smoker     Last attempt to quit: 1996     Years since quittin.5    Smokeless tobacco: Never Used   Substance and Sexual Activity    Alcohol use: Yes     Comment: very little     Drug use: No    Sexual activity: Not on file   Lifestyle    Physical activity:     Days per week: Not on file     Minutes per session: Not on file    Stress: Not on file   Relationships    Social connections:     Talks on phone: Not on file     Gets together: Not on file     Attends [employer] service: Not on file     Active member of club or organization: Not on file     Attends meetings of clubs or organizations: Not on file     Relationship status: Not on file   Other Topics Concern    Not on file   Social History Narrative    Not on file         Medications/Allergies: See med card    Vitals:    19 1104   BP: 129/69   Pulse: 67   Weight: 96.6 kg (213 lb)   Height: 5' 1" (1.549 m)   PainSc:   4   PainLoc: Back "         Physical exam:    GENERAL: A and O x3, the patient appears well groomed and is in no acute distress.  Skin: No rashes or obvious lesions  HEENT: normocephalic, atraumatic  CARDIOVASCULAR:  RRR  LUNGS: non labored breathing  ABDOMEN: soft, nontender   UPPER EXTREMITIES: Normal alignment, normal range of motion, no atrophy, no skin changes,  hair growth and nail growth normal and equal bilaterally. No swelling, no tenderness.    LOWER EXTREMITIES:  Normal alignment, normal range of motion, no atrophy, no skin changes,  hair growth and nail growth normal and equal bilaterally. No swelling, no tenderness.  LUMBAR SPINE  Lumbar spine: ROM is very limited with flexion extension and oblique extension with moderate increased pain.    Avinash's test causes no increased pain on either side.    Supine straight leg raise is negative bilaterally.    Internal and external rotation of the hip causes no increased pain on either side.  Myofascial exam: No tenderness to palpation across lumbar paraspinous muscles.      MENTAL STATUS: normal orientation, speech, language, and fund of knowledge for social situation.  Emotional state appropriate.    CRANIAL NERVES:  II:  PERRL bilaterally,   III,IV,VI: EOMI.    V:  Facial sensation equal bilaterally  VII:  Facial motor function normal.  VIII:  Hearing equal to finger rub bilaterally  IX/X: Gag normal, palate symmetric  XI:  Shoulder shrug equal, head turn equal  XII:  Tongue midline without fasciculations      MOTOR: Tone and bulk: normal bilateral upper and lower Strength: normal   Delt Bi Tri WE WF     R 5 5 5 5 5 5   L 5 5 5 5 5 5     IP ADD ABD Quad TA Gas HAM  R 5 5 5 5 5 5 5  L 5 5 5 5 5 5 5    SENSATION: Light touch and pinprick intact bilaterally  REFLEXES: normal, symmetric, nonbrisk.  Toes down, no clonus. No hoffmans.  GAIT: normal rise, base, steps, and arm swing.        Imaging:  MRI L-spine 4/2018 [facility]  L1-2, moderate facet degenerative changes  bilaterally.  Mild neural foraminal narrowing bilaterally.  L2-3, moderate facet changes bilaterally.  Mild left-sided neural foraminal narrowing  L3-4, diffuse disc protrusion.  Moderate facet and ligamentum flavum hypertrophy.  Moderate narrowing of the right-sided neural foraminal  L4-5, annular disc bulge.  Moderate facet changes.  Mild narrowing of neural foraminal bilateral.  L5-S1, unroofing of the disc posteriorly due to anterolisthesis of L5 on S1.  Severe facet changes.  Severe neural foraminal narrowing on the left and mild on the right    Assessment:  Genoveva Gilbert is a 69 y.o. male with back pain  1. Other spondylosis, lumbar region    2. DDD (degenerative disc disease), lumbar    3. Morbid obesity with BMI of 45.0-49.9, adult      Plan:  1. I have stressed the importance of physical activity and exercise to improve overall health  2. Reviewed pertinent imaging and records with patient  3. Patient with significant benefit following Lumbar MB RFA.  Patient will continue to monitor her progress.  May consider repeat procedure in future if pain returns or worsens.   4.  Continue medication per PCP.    5. Follow-up as needed

## 2019-12-20 NOTE — TELEPHONE ENCOUNTER
----- Message from Rakan Marquez sent at 12/20/2019  1:33 PM CST -----  Type: Needs Medical Advice    Who Called:  Self   Symptoms (please be specific):    How long has patient had these symptoms:  NA   Pharmacy name and phone #:  [phone]  Best Call Back Number: [phone]  Additional Information: Patient was advised a new rx was called into the pharmacy [facility]. The patient states the pharmacy has not received the rx,.

## 2019-12-20 NOTE — TELEPHONE ENCOUNTER
----- Message from Beata Sewell sent at 12/20/2019  3:22 PM CST -----  Type: Needs Medical Advice    Who Called:  Patient   Pharmacy name and phone #:   [id number] - [place] - [address]  [facility] [place] [address]  Phone: [phone] Fax: [phone]  Best Call Back Number: [phone]  Additional Information: patient states she received a call form her pharmacy stating her insurance needs additional information on diclofenac sodium (VOLTAREN) 1 % Gel, she called her insurance company but the did not give her what information is needed, please contact to advise

## 2020-01-02 PROBLEM — I89.0 LYMPHEDEMA OF BOTH LOWER EXTREMITIES: Status: ACTIVE | Noted: 2020-01-02

## 2020-01-20 ENCOUNTER — HOSPITAL ENCOUNTER (EMERGENCY)
Facility: HOSPITAL | Age: 70
Discharge: HOME | End: 2020-01-20
Attending: EMERGENCY MEDICINE
Payer: MEDICARE

## 2020-01-20 VITALS
HEART RATE: 78 BPM | DIASTOLIC BLOOD PRESSURE: 75 MMHG | RESPIRATION RATE: 18 BRPM | SYSTOLIC BLOOD PRESSURE: 160 MMHG | BODY MASS INDEX: 42.6 KG/M2 | WEIGHT: 217 LBS | TEMPERATURE: 98 F | HEIGHT: 60 IN | OXYGEN SATURATION: 97 %

## 2020-01-20 DIAGNOSIS — M25.511 CHRONIC PAIN OF BOTH SHOULDERS: Primary | ICD-10-CM

## 2020-01-20 DIAGNOSIS — M25.512 CHRONIC PAIN OF BOTH SHOULDERS: Primary | ICD-10-CM

## 2020-01-20 DIAGNOSIS — G89.29 CHRONIC PAIN OF BOTH SHOULDERS: Primary | ICD-10-CM

## 2020-01-20 PROCEDURE — 96372 THER/PROPH/DIAG INJ SC/IM: CPT

## 2020-01-20 PROCEDURE — 63600175 PHARM REV CODE 636 W HCPCS: Performed by: PHYSICIAN ASSISTANT

## 2020-01-20 PROCEDURE — 99284 EMERGENCY DEPT VISIT MOD MDM: CPT | Mod: 25

## 2020-01-20 RX ORDER — METHYLPREDNISOLONE SOD SUCC 125 MG
80 VIAL (EA) INJECTION
Status: COMPLETED | OUTPATIENT
Start: 2020-01-20 | End: 2020-01-20

## 2020-01-20 RX ADMIN — METHYLPREDNISOLONE SODIUM SUCCINATE 80 MG: 125 INJECTION, POWDER, FOR SOLUTION INTRAMUSCULAR; INTRAVENOUS at 03:01

## 2020-01-20 NOTE — ED PROVIDER NOTES
Encounter Date: 1/20/2020    SCRIBE #1 NOTE: I, Justus Layton, am scribing for, and in the presence of, Jeanette Sky PA-C.       History     Chief Complaint   Patient presents with    Shoulder Pain     bilateral shoulder pain x 1 month        Time seen by provider: 3:01 PM on 01/20/2020    Genoveva Gilbert is a 69 y.o. female with PMHx of HTN, HLD, GERD, Anxiety, neuropathy, rheumatoid arthritis, fibromyalgia, MS, and sciatica of left side who presents to the ED with an onset of worsening left and right shoulder pain beginning x1 month ago. The patient has had previous episodes of bilateral shoulder pain. The patient notes it hurts worse when moving her left arm around. The patient has gotten a cortisone shot on her left shoulder previously which improved the pain. The patient has been taking tramadol for her pain. The patient explains they've recently moved to this area and are still trying to get a PCP for her fibromyalgia. She denied any injury. She denied any chest pain or shortness of breath. The patient denies fever, chills, cough, runny nose, sore throat, chest pain, SOB, or any other symptoms at this time. No pertinent PSHx.       The history is provided by the patient.     Review of patient's allergies indicates:   Allergen Reactions    Keflex [cephalexin]     Latex, natural rubber Anaphylaxis     bandaids     Pcn [penicillins]     Adhesive Other (See Comments)     bandainds redness at skin     Past Medical History:   Diagnosis Date    Allergy     Anxiety     Arthritis, rheumatoid     Back pain     Depression     Fibromyalgia     GERD (gastroesophageal reflux disease)     High cholesterol     Hypertension     Incontinence of urine     MS (multiple sclerosis)     benign; another MD stated she has no MS    Neuropathy     Restless leg syndrome     Sciatica of left side 1/5/2018    Seasonal allergies     Sinus congestion     Thyroid disease     Wheezing      Past Surgical History:    Procedure Laterality Date    APPENDECTOMY      BREAST LUMPECTOMY      bilateral, benign    BREAST SURGERY      reduction    EPIDURAL STEROID INJECTION INTO LUMBAR SPINE N/A 6/12/2019    Procedure: Injection-steroid-epidural-lumbar;  Surgeon: Thad Manning MD;  Location: [facility];  Service: Pain Management;  Laterality: N/A;  L5-S1    EPIDURAL STEROID INJECTION INTO LUMBAR SPINE N/A 9/16/2019    Procedure: Injection-steroid-epidural-lumbar;  Surgeon: Thad Manning MD;  Location: [facility];  Service: Pain Management;  Laterality: N/A;  L5-S1    HYSTERECTOMY      partial - fibroids    INJECTION OF ANESTHETIC AGENT AROUND MEDIAL BRANCH NERVES INNERVATING LUMBAR FACET JOINT Bilateral 2/28/2019    Procedure: Block-nerve-medial branch-lumbar;  Surgeon: Thad Manning MD;  Location: [facility];  Service: Pain Management;  Laterality: Bilateral;  L3, 4,5     INJECTION OF ANESTHETIC AGENT AROUND MEDIAL BRANCH NERVES INNERVATING LUMBAR FACET JOINT Bilateral 3/21/2019    Procedure: Block-nerve-medial branch-lumbar L3,4,5;  Surgeon: Thad Manning MD;  Location: [facility];  Service: Pain Management;  Laterality: Bilateral;    RADIOFREQUENCY ABLATION OF LUMBAR MEDIAL BRANCH NERVE AT SINGLE LEVEL Bilateral 4/12/2019    Procedure: Radiofrequency Ablation, Nerve, Spinal, Lumbar, Medial Branch, 1 Level;  Surgeon: Thad Manning MD;  Location: [facility];  Service: Pain Management;  Laterality: Bilateral;  L3, 4, 5  lumbar  [employer] pain management generator  SN [id number]  80 degrees for 75 seconds x2    RADIOFREQUENCY ABLATION OF LUMBAR MEDIAL BRANCH NERVE AT SINGLE LEVEL Bilateral 10/22/2019    Procedure: Radiofrequency Ablation, Nerve, Spinal, Lumbar, Medial Branch, L3,4,5;  Surgeon: Thad Manning MD;  Location: [facility];  Service: Pain Management;  Laterality: Bilateral;  burned at 80 degrees C X 60 seconds X 2 each site    TONSILLECTOMY       Family History   Problem Relation Age of Onset    Heart disease Mother      Social History      Tobacco Use    Smoking status: Former Smoker     Last attempt to quit: 1996     Years since quittin.6    Smokeless tobacco: Never Used   Substance Use Topics    Alcohol use: Yes     Comment: very little     Drug use: No     Review of Systems   Constitutional: Negative for activity change, appetite change, chills and fever.   HENT: Negative for congestion, rhinorrhea and sore throat.    Eyes: Negative for redness and visual disturbance.   Respiratory: Negative for cough, chest tightness and shortness of breath.    Cardiovascular: Negative for chest pain.   Gastrointestinal: Negative for abdominal pain, diarrhea, nausea and vomiting.   Genitourinary: Negative for dysuria and frequency.   Musculoskeletal: Positive for arthralgias (left and right shoulder). Negative for back pain, neck pain and neck stiffness.   Skin: Negative for rash.   Neurological: Negative for dizziness, syncope, numbness and headaches.       Physical Exam     Initial Vitals [20 1454]   BP Pulse Resp Temp SpO2   (!) 160/75 78 18 97.9 °F (36.6 °C) 97 %      MAP       --         Physical Exam    Nursing note and vitals reviewed.  Constitutional: She appears well-developed and well-nourished. She is not diaphoretic. She is cooperative.  Non-toxic appearance. She does not have a sickly appearance. No distress.   HENT:   Head: Normocephalic and atraumatic.   Right Ear: External ear normal.   Left Ear: External ear normal.   Nose: Nose normal.   Mouth/Throat: Uvula is midline.   Eyes: Conjunctivae and lids are normal.   Neck: Normal range of motion and full passive range of motion without pain. Neck supple.   Cardiovascular: Normal rate, regular rhythm, normal heart sounds and intact distal pulses. Exam reveals no gallop and no friction rub.    No murmur heard.  Pulses:       Radial pulses are 2+ on the right side, and 2+ on the left side.        Dorsalis pedis pulses are 2+ on the right side, and 2+ on the left side.         Posterior tibial pulses are 2+ on the right side, and 2+ on the left side.   Pulmonary/Chest: Breath sounds normal. She has no wheezes. She has no rhonchi. She has no rales.   Abdominal: Soft. Normal appearance. There is no tenderness. There is no rigidity, no rebound and no guarding.   Musculoskeletal: Normal range of motion. She exhibits tenderness.   Mild bony tenderness to palpation of bilateral shoulders, L>R. Full ROM of bilateral shoulders. Pain with active ROM, but no pain with passive ROM of bilateral shoulders, L>R. No erythema or swelling of bilateral shoulders.   Neurological: She is alert. She has normal strength. No sensory deficit.   Skin: Skin is warm, dry and intact. No rash and no abscess noted. No erythema.   Psychiatric: She has a normal mood and affect.         ED Course   Procedures  Labs Reviewed - No data to display       Imaging Results    None          Medical Decision Making:   History:   Old Medical Records: I decided to obtain old medical records.       APC / Resident Notes:   Urgent evaluation of 69 y.o. Female with PMHx of fibromyalgia, osteoarthritis who presents with complaint of bilateral shoulder pain, left greater than right, x 1 month. Patient has had similar pain in the past. She is followed by pain management for the pain. Presented to the ED because she couldn't sleep last night secondary to pain. On exam, she has bony tenderness to bilateral shoulders, L>R. Full ROM of bilateral shoulders. Pain with active ROM of bilateral shoulders, but no pain with passive ROM. No trauma or injury. I doubt fracture/dislocation. No fever, chills, redness, or swelling. I doubt septic arthritis. Will provide the patient with a dose of methylprednisolone for pain relief in the ED. Patient is seen by Dr. Manning with pain management. She was recently prescribed Norco for pain. She should follow up her PCP and pain management for further prescriptions. She is requesting a prescription of tramadol as  she has been taking her husbands. Return precautions given. Based on my clinical evaluation, I do not appreciate any immediate, emergent, or life threatening condition or etiology that warrants additional workup today and feel that the patient can be discharged with close follow up care.  Patient is to follow up with their primary care provider. Case was discussed with Dr. Mckeon who is in agreement with the plan of care. All questions answered.          Scribe Attestation:   Scribe #1: I performed the above scribed service and the documentation accurately describes the services I performed. I attest to the accuracy of the note.    I, Jeanette Sky PA-C, personally performed the services described in this documentation. All medical record entries made by the scribe were at my direction and in my presence.  I have reviewed the chart and agree that the record reflects my personal performance and is accurate and complete. Jeanette Sky PA-C.  4:40 PM 01/20/2020                        Clinical Impression:       ICD-10-CM ICD-9-CM   1. Chronic pain of both shoulders M25.511 719.41    G89.29 338.29    M25.512          Disposition:   Disposition: Discharged  Condition: Stable                     Jeanette Sky PA-C  01/20/20 1641

## 2020-01-22 ENCOUNTER — OFFICE VISIT (OUTPATIENT)
Dept: PAIN MEDICINE | Facility: CLINIC | Age: 70
End: 2020-01-22
Payer: MEDICARE

## 2020-01-22 VITALS
SYSTOLIC BLOOD PRESSURE: 135 MMHG | BODY MASS INDEX: 40.97 KG/M2 | HEIGHT: 61 IN | HEART RATE: 67 BPM | DIASTOLIC BLOOD PRESSURE: 81 MMHG | WEIGHT: 217 LBS

## 2020-01-22 DIAGNOSIS — M25.512 CHRONIC PAIN OF BOTH SHOULDERS: Primary | ICD-10-CM

## 2020-01-22 DIAGNOSIS — M25.511 CHRONIC PAIN OF BOTH SHOULDERS: Primary | ICD-10-CM

## 2020-01-22 DIAGNOSIS — G89.29 CHRONIC PAIN OF BOTH SHOULDERS: Primary | ICD-10-CM

## 2020-01-22 DIAGNOSIS — M51.36 DDD (DEGENERATIVE DISC DISEASE), LUMBAR: ICD-10-CM

## 2020-01-22 DIAGNOSIS — M47.896 OTHER SPONDYLOSIS, LUMBAR REGION: ICD-10-CM

## 2020-01-22 PROCEDURE — 3075F SYST BP GE 130 - 139MM HG: CPT | Mod: CPTII,S$GLB,, | Performed by: PHYSICIAN ASSISTANT

## 2020-01-22 PROCEDURE — 1159F PR MEDICATION LIST DOCUMENTED IN MEDICAL RECORD: ICD-10-PCS | Mod: S$GLB,,, | Performed by: PHYSICIAN ASSISTANT

## 2020-01-22 PROCEDURE — 99213 OFFICE O/P EST LOW 20 MIN: CPT | Mod: S$GLB,,, | Performed by: PHYSICIAN ASSISTANT

## 2020-01-22 PROCEDURE — 1159F MED LIST DOCD IN RCRD: CPT | Mod: S$GLB,,, | Performed by: PHYSICIAN ASSISTANT

## 2020-01-22 PROCEDURE — 1125F AMNT PAIN NOTED PAIN PRSNT: CPT | Mod: S$GLB,,, | Performed by: PHYSICIAN ASSISTANT

## 2020-01-22 PROCEDURE — 99999 PR PBB SHADOW E&M-EST. PATIENT-LVL IV: CPT | Mod: PBBFAC,,, | Performed by: PHYSICIAN ASSISTANT

## 2020-01-22 PROCEDURE — 1125F PR PAIN SEVERITY QUANTIFIED, PAIN PRESENT: ICD-10-PCS | Mod: S$GLB,,, | Performed by: PHYSICIAN ASSISTANT

## 2020-01-22 PROCEDURE — 99999 PR PBB SHADOW E&M-EST. PATIENT-LVL IV: ICD-10-PCS | Mod: PBBFAC,,, | Performed by: PHYSICIAN ASSISTANT

## 2020-01-22 PROCEDURE — 3079F PR MOST RECENT DIASTOLIC BLOOD PRESSURE 80-89 MM HG: ICD-10-PCS | Mod: CPTII,S$GLB,, | Performed by: PHYSICIAN ASSISTANT

## 2020-01-22 PROCEDURE — 1101F PR PT FALLS ASSESS DOC 0-1 FALLS W/OUT INJ PAST YR: ICD-10-PCS | Mod: CPTII,S$GLB,, | Performed by: PHYSICIAN ASSISTANT

## 2020-01-22 PROCEDURE — 99213 PR OFFICE/OUTPT VISIT, EST, LEVL III, 20-29 MIN: ICD-10-PCS | Mod: S$GLB,,, | Performed by: PHYSICIAN ASSISTANT

## 2020-01-22 PROCEDURE — 1101F PT FALLS ASSESS-DOCD LE1/YR: CPT | Mod: CPTII,S$GLB,, | Performed by: PHYSICIAN ASSISTANT

## 2020-01-22 PROCEDURE — 3075F PR MOST RECENT SYSTOLIC BLOOD PRESS GE 130-139MM HG: ICD-10-PCS | Mod: CPTII,S$GLB,, | Performed by: PHYSICIAN ASSISTANT

## 2020-01-22 PROCEDURE — 3079F DIAST BP 80-89 MM HG: CPT | Mod: CPTII,S$GLB,, | Performed by: PHYSICIAN ASSISTANT

## 2020-01-22 NOTE — PROGRESS NOTES
Referring Physician: No ref. provider found    PCP: Lena Woods MD      CC:  Lower back pain    Interval History:  Genoveva Gilbert is a 69 y.o. female with chronic low back and shoulder pain who presents today to discuss bilateral shoulder pain, L>R. She was evaluated in the ED. She has difficulty reaching behind her back to get dress. She can reach overhead. She has an appointment with Dr. Pitts on Monday. She has benefited in the past from left shoulder injections. She did receive a IM steroid injection in the ED. She takes Hydrocodone prescribed by PCP which has not been helpful for current shoulder pain. She also admits trying her husbands Tramadol which also didn't provide much pain relief.  Lower back pain continues to be improved s/p RFA.  She continued home exercise with mild benefits.   She denies any worsening weakness.  No bowel bladder changes.  Pain today is rated 8/10.    Prior HPI:   Genoveva Gilbert is a 69 y.o. female referred to us for lower back pain. Pain has been gradually worsened over past 3 years.  No recent traumatic incident.  She has constant intermittent aching, throbbing pain in her lower back.  Pain rarely radiates down her lower extremities.  Pain worsens standing, bending, walking, lifting getting worked.  Pain improves with rest.  She has tried physical therapy with minimal benefit.  She really has had MRIs of the cervical and lumbar spine.  She has not had recent interventions lumbar spine.  She denies any worsening weakness.  No bowel bladder changes.    Interventional history:  Status post lumbar MB RFA procedure on 04/2019 with 50% relief of her lower back pain.  Lumbar CAITLIN on 06/12/2019 with 50% relief of her low back and buttock pain, Lumbar CAITLIN on 9/16/19 with minimal relief  - s/p Lumbar MB RFA on 10/2019 with 60% relief    ROS:  CONSTITUTIONAL: No fevers, chills, night sweats, wt. loss, appetite changes  SKIN: no rashes or itching  ENT: No headaches, head trauma, vision  changes, or eye pain  LYMPH NODES: None noticed   CV: No chest pain, palpitations.   RESP: No shortness of breath, dyspnea on exertion, cough, wheezing, or hemoptysis  GI: No nausea, emesis, diarrhea, constipation, melena, hematochezia, pain.    : No dysuria, hematuria, urgency, or frequency   HEME: No easy bruising, bleeding problems  PSYCHIATRIC: No depression, anxiety, psychosis, hallucinations.  NEURO: No seizures, memory loss, dizziness or difficulty sleeping  MSK:  Positive HPI      Past Medical History:   Diagnosis Date    Allergy     Anxiety     Arthritis, rheumatoid     Back pain     Depression     Fibromyalgia     GERD (gastroesophageal reflux disease)     High cholesterol     Hypertension     Incontinence of urine     MS (multiple sclerosis)     benign; another MD stated she has no MS    Neuropathy     Restless leg syndrome     Sciatica of left side 1/5/2018    Seasonal allergies     Sinus congestion     Thyroid disease     Wheezing      Past Surgical History:   Procedure Laterality Date    APPENDECTOMY      BREAST LUMPECTOMY      bilateral, benign    BREAST SURGERY      reduction    EPIDURAL STEROID INJECTION INTO LUMBAR SPINE N/A 6/12/2019    Procedure: Injection-steroid-epidural-lumbar;  Surgeon: Thad Manning MD;  Location: [facility] OR;  Service: Pain Management;  Laterality: N/A;  L5-S1    EPIDURAL STEROID INJECTION INTO LUMBAR SPINE N/A 9/16/2019    Procedure: Injection-steroid-epidural-lumbar;  Surgeon: Thad Manning MD;  Location: [facility] OR;  Service: Pain Management;  Laterality: N/A;  L5-S1    HYSTERECTOMY      partial - fibroids    INJECTION OF ANESTHETIC AGENT AROUND MEDIAL BRANCH NERVES INNERVATING LUMBAR FACET JOINT Bilateral 2/28/2019    Procedure: Block-nerve-medial branch-lumbar;  Surgeon: Thad Mannnig MD;  Location: [facility] OR;  Service: Pain Management;  Laterality: Bilateral;  L3, 4,5     INJECTION OF ANESTHETIC AGENT AROUND MEDIAL BRANCH NERVES INNERVATING LUMBAR FACET  JOINT Bilateral 3/21/2019    Procedure: Block-nerve-medial branch-lumbar L3,4,5;  Surgeon: Thad Manning MD;  Location: [facility] OR;  Service: Pain Management;  Laterality: Bilateral;    RADIOFREQUENCY ABLATION OF LUMBAR MEDIAL BRANCH NERVE AT SINGLE LEVEL Bilateral 2019    Procedure: Radiofrequency Ablation, Nerve, Spinal, Lumbar, Medial Branch, 1 Level;  Surgeon: Thad Manning MD;  Location: [facility] OR;  Service: Pain Management;  Laterality: Bilateral;  L3, 4, 5  lumbar  [employer] pain management generator  SN [id number]  80 degrees for 75 seconds x2    RADIOFREQUENCY ABLATION OF LUMBAR MEDIAL BRANCH NERVE AT SINGLE LEVEL Bilateral 10/22/2019    Procedure: Radiofrequency Ablation, Nerve, Spinal, Lumbar, Medial Branch, L3,4,5;  Surgeon: Thad Manning MD;  Location: [facility] OR;  Service: Pain Management;  Laterality: Bilateral;  burned at 80 degrees C X 60 seconds X 2 each site    TONSILLECTOMY       Family History   Problem Relation Age of Onset    Heart disease Mother      Social History     Socioeconomic History    Marital status:      Spouse name: Not on file    Number of children: Not on file    Years of education: Not on file    Highest education level: Not on file   Occupational History    Not on file   Social Needs    Financial resource strain: Not on file    Food insecurity:     Worry: Not on file     Inability: Not on file    Transportation needs:     Medical: Not on file     Non-medical: Not on file   Tobacco Use    Smoking status: Former Smoker     Last attempt to quit: 1996     Years since quittin.6    Smokeless tobacco: Never Used   Substance and Sexual Activity    Alcohol use: Yes     Comment: very little     Drug use: No    Sexual activity: Not on file   Lifestyle    Physical activity:     Days per week: Not on file     Minutes per session: Not on file    Stress: Not on file   Relationships    Social connections:     Talks on phone: Not on file     Gets together: Not on  "file     Attends Shinto service: Not on file     Active member of club or organization: Not on file     Attends meetings of clubs or organizations: Not on file     Relationship status: Not on file   Other Topics Concern    Not on file   Social History Narrative    Not on file         Medications/Allergies: See med card    Vitals:    01/22/20 1050   BP: 135/81   Pulse: 67   Weight: 98.4 kg (217 lb)   Height: 5' 1" (1.549 m)   PainSc:   8   PainLoc: Back         Physical exam:    GENERAL: A and O x3, the patient appears well groomed and is in no acute distress.  Skin: No rashes or obvious lesions  HEENT: normocephalic, atraumatic  CARDIOVASCULAR:  RRR  LUNGS: non labored breathing  ABDOMEN: soft, nontender   UPPER EXTREMITIES: Normal alignment,  no atrophy, no skin changes,  hair growth and nail growth normal and equal bilaterally. No swelling, severe tenderness to AC joint on the left. Pain with internal rotation   LOWER EXTREMITIES:  Normal alignment, normal range of motion, no atrophy, no skin changes,  hair growth and nail growth normal and equal bilaterally. No swelling, no tenderness.  LUMBAR SPINE  Lumbar spine: ROM is very limited with flexion extension and oblique extension with minimum increased pain.    Avinash's test causes no increased pain on either side.    Supine straight leg raise is negative bilaterally.    Internal and external rotation of the hip causes no increased pain on either side.  Myofascial exam: No tenderness to palpation across lumbar paraspinous muscles.      MENTAL STATUS: normal orientation, speech, language, and fund of knowledge for social situation.  Emotional state appropriate.    CRANIAL NERVES:  II:  PERRL bilaterally,   III,IV,VI: EOMI.    V:  Facial sensation equal bilaterally  VII:  Facial motor function normal.  VIII:  Hearing equal to finger rub bilaterally  IX/X: Gag normal, palate symmetric  XI:  Shoulder shrug equal, head turn equal  XII:  Tongue midline without " fasciculations      MOTOR: Tone and bulk: normal bilateral upper and lower Strength: normal   Delt Bi Tri WE WF     R 5 5 5 5 5 5   L 5 5 5 5 5 5     IP ADD ABD Quad TA Gas HAM  R 5 5 5 5 5 5 5  L 5 5 5 5 5 5 5    SENSATION: Light touch and pinprick intact bilaterally  REFLEXES: normal, symmetric, nonbrisk.  Toes down, no clonus. No hoffmans.  GAIT: normal rise, base, steps, and arm swing.        Imaging:  MRI L-spine 4/2018 [facility]  L1-2, moderate facet degenerative changes bilaterally.  Mild neural foraminal narrowing bilaterally.  L2-3, moderate facet changes bilaterally.  Mild left-sided neural foraminal narrowing  L3-4, diffuse disc protrusion.  Moderate facet and ligamentum flavum hypertrophy.  Moderate narrowing of the right-sided neural foraminal  L4-5, annular disc bulge.  Moderate facet changes.  Mild narrowing of neural foraminal bilateral.  L5-S1, unroofing of the disc posteriorly due to anterolisthesis of L5 on S1.  Severe facet changes.  Severe neural foraminal narrowing on the left and mild on the right    Assessment:  Genoveva Gilbert is a 69 y.o. male with back pain  1. Chronic pain of both shoulders    2. Other spondylosis, lumbar region    3. DDD (degenerative disc disease), lumbar      Plan:  1. I have stressed the importance of physical activity and exercise to improve overall health  2. Patient with significant benefit following Lumbar MB RFA.  Patient will continue to monitor her progress.  May consider repeat procedure in future if pain returns or worsens.   3.  Continue medication per PCP.  Discussed dangers of taking medication not prescribed to her. She reports she only did it once and will not take medications not prescribed to her in the future  4. F/u with Dr. Pitts  5.  Follow-up as needed

## 2020-01-24 ENCOUNTER — PATIENT OUTREACH (OUTPATIENT)
Dept: ADMINISTRATIVE | Facility: OTHER | Age: 70
End: 2020-01-24

## 2020-01-24 DIAGNOSIS — M25.511 BILATERAL SHOULDER PAIN, UNSPECIFIED CHRONICITY: Primary | ICD-10-CM

## 2020-01-24 DIAGNOSIS — M25.512 BILATERAL SHOULDER PAIN, UNSPECIFIED CHRONICITY: Primary | ICD-10-CM

## 2020-01-27 ENCOUNTER — HOSPITAL ENCOUNTER (OUTPATIENT)
Dept: RADIOLOGY | Facility: HOSPITAL | Age: 70
Discharge: HOME | End: 2020-01-27
Attending: ORTHOPAEDIC SURGERY
Payer: MEDICARE

## 2020-01-27 ENCOUNTER — OFFICE VISIT (OUTPATIENT)
Dept: ORTHOPEDICS | Facility: CLINIC | Age: 70
End: 2020-01-27
Payer: MEDICARE

## 2020-01-27 VITALS
WEIGHT: 217 LBS | SYSTOLIC BLOOD PRESSURE: 120 MMHG | DIASTOLIC BLOOD PRESSURE: 61 MMHG | HEIGHT: 61 IN | HEART RATE: 72 BPM | RESPIRATION RATE: 14 BRPM | BODY MASS INDEX: 40.97 KG/M2

## 2020-01-27 DIAGNOSIS — M25.511 BILATERAL SHOULDER PAIN, UNSPECIFIED CHRONICITY: ICD-10-CM

## 2020-01-27 DIAGNOSIS — M25.512 BILATERAL SHOULDER PAIN, UNSPECIFIED CHRONICITY: ICD-10-CM

## 2020-01-27 DIAGNOSIS — M75.100 TEAR OF ROTATOR CUFF, UNSPECIFIED LATERALITY, UNSPECIFIED TEAR EXTENT, UNSPECIFIED WHETHER TRAUMATIC: Primary | ICD-10-CM

## 2020-01-27 PROCEDURE — 99214 PR OFFICE/OUTPT VISIT, EST, LEVL IV, 30-39 MIN: ICD-10-PCS | Mod: 25,S$GLB,, | Performed by: ORTHOPAEDIC SURGERY

## 2020-01-27 PROCEDURE — 99214 OFFICE O/P EST MOD 30 MIN: CPT | Mod: 25,S$GLB,, | Performed by: ORTHOPAEDIC SURGERY

## 2020-01-27 PROCEDURE — 1101F PT FALLS ASSESS-DOCD LE1/YR: CPT | Mod: CPTII,S$GLB,, | Performed by: ORTHOPAEDIC SURGERY

## 2020-01-27 PROCEDURE — 73030 X-RAY EXAM OF SHOULDER: CPT | Mod: TC,50,PN

## 2020-01-27 PROCEDURE — 3078F PR MOST RECENT DIASTOLIC BLOOD PRESSURE < 80 MM HG: ICD-10-PCS | Mod: CPTII,S$GLB,, | Performed by: ORTHOPAEDIC SURGERY

## 2020-01-27 PROCEDURE — 1159F PR MEDICATION LIST DOCUMENTED IN MEDICAL RECORD: ICD-10-PCS | Mod: S$GLB,,, | Performed by: ORTHOPAEDIC SURGERY

## 2020-01-27 PROCEDURE — 1125F PR PAIN SEVERITY QUANTIFIED, PAIN PRESENT: ICD-10-PCS | Mod: S$GLB,,, | Performed by: ORTHOPAEDIC SURGERY

## 2020-01-27 PROCEDURE — 99999 PR PBB SHADOW E&M-EST. PATIENT-LVL III: ICD-10-PCS | Mod: PBBFAC,,, | Performed by: ORTHOPAEDIC SURGERY

## 2020-01-27 PROCEDURE — 1101F PR PT FALLS ASSESS DOC 0-1 FALLS W/OUT INJ PAST YR: ICD-10-PCS | Mod: CPTII,S$GLB,, | Performed by: ORTHOPAEDIC SURGERY

## 2020-01-27 PROCEDURE — 3074F PR MOST RECENT SYSTOLIC BLOOD PRESSURE < 130 MM HG: ICD-10-PCS | Mod: CPTII,S$GLB,, | Performed by: ORTHOPAEDIC SURGERY

## 2020-01-27 PROCEDURE — 1125F AMNT PAIN NOTED PAIN PRSNT: CPT | Mod: S$GLB,,, | Performed by: ORTHOPAEDIC SURGERY

## 2020-01-27 PROCEDURE — 99999 PR PBB SHADOW E&M-EST. PATIENT-LVL III: CPT | Mod: PBBFAC,,, | Performed by: ORTHOPAEDIC SURGERY

## 2020-01-27 PROCEDURE — 3078F DIAST BP <80 MM HG: CPT | Mod: CPTII,S$GLB,, | Performed by: ORTHOPAEDIC SURGERY

## 2020-01-27 PROCEDURE — 1159F MED LIST DOCD IN RCRD: CPT | Mod: S$GLB,,, | Performed by: ORTHOPAEDIC SURGERY

## 2020-01-27 PROCEDURE — 20610 LARGE JOINT ASPIRATION/INJECTION: L SUBACROMIAL BURSA: ICD-10-PCS | Mod: LT,S$GLB,, | Performed by: ORTHOPAEDIC SURGERY

## 2020-01-27 PROCEDURE — 73030 X-RAY EXAM OF SHOULDER: CPT | Mod: 26,50,, | Performed by: RADIOLOGY

## 2020-01-27 PROCEDURE — 3074F SYST BP LT 130 MM HG: CPT | Mod: CPTII,S$GLB,, | Performed by: ORTHOPAEDIC SURGERY

## 2020-01-27 PROCEDURE — 20610 DRAIN/INJ JOINT/BURSA W/O US: CPT | Mod: LT,S$GLB,, | Performed by: ORTHOPAEDIC SURGERY

## 2020-01-27 PROCEDURE — 73030 XR SHOULDER TRAUMA 3 VIEW BILATERAL: ICD-10-PCS | Mod: 26,50,, | Performed by: RADIOLOGY

## 2020-01-27 RX ORDER — TRIAMCINOLONE ACETONIDE 40 MG/ML
40 INJECTION, SUSPENSION INTRA-ARTICULAR; INTRAMUSCULAR
Status: DISCONTINUED | OUTPATIENT
Start: 2020-01-27 | End: 2020-01-27 | Stop reason: HOSPADM

## 2020-01-27 RX ORDER — RIVASTIGMINE TARTRATE 4.5 MG/1
6 CAPSULE ORAL
COMMUNITY
Start: 2020-01-10 | End: 2021-03-08

## 2020-01-27 RX ADMIN — TRIAMCINOLONE ACETONIDE 40 MG: 40 INJECTION, SUSPENSION INTRA-ARTICULAR; INTRAMUSCULAR at 02:01

## 2020-01-27 NOTE — PROGRESS NOTES
Patient, Genoveva Gilbert (MRN #[id number]), presented with a recorded BMI of 41 kg/m^2 consistent with the definition of morbid obesity (ICD-10 E66.01). The patient's morbid obesity was monitored, evaluated, addressed and/or treated. This addendum to the medical record is made on 01/27/2020.

## 2020-01-27 NOTE — PROCEDURES
Large Joint Aspiration/Injection: L subacromial bursa  Date/Time: 1/27/2020 2:15 PM  Performed by: Rio Pitts MD  Authorized by: Rio Pitts MD     Consent Done?:  Yes (Verbal)  Indications:  Pain  Procedure site marked: Yes    Timeout: Prior to procedure the correct patient, procedure, and site was verified    Anesthesia    Anesthetic: lidocaine 1% without epinephrine and bupivacaine 0.25% without epinephrine  Anesthetic total: 6mL    Location:  Shoulder  Site:  L subacromial bursa  Prep: Patient was prepped and draped in usual sterile fashion    Needle size:  20 G  Ultrasonic Guidance for needle placement: No  Approach:  Posterior  Medications:  40 mg triamcinolone acetonide 40 mg/mL  Patient tolerance:  Patient tolerated the procedure well with no immediate complications

## 2020-01-27 NOTE — LETTER
January 27, 2020      Lena Woods MD  [address]  [facility] [address]           [address]  [facility] [address]  Phone: [phone]          Patient: Genoveva Gilbert   MR Number: [id number]   YOB: 1950   Date of Visit: 1/27/2020       Dear Dr. Lena Woods:    Thank you for referring Genoveva Gilbert to me for evaluation. Attached you will find relevant portions of my assessment and plan of care.    If you have questions, please do not hesitate to call me. I look forward to following Genoveva Gilbert along with you.    Sincerely,    Rio Pitts MD    Enclosure  CC:  No Recipients    If you would like to receive this communication electronically, please contact [email or web address] or [phone] to request more information on [employer] Link access.    For providers and/or their staff who would like to refer a patient to [facility], please contact us through our one-stop-shop provider referral line, [facility] , at [phone].    If you feel you have received this communication in error or would no longer like to receive these types of communications, please e-mail [email or web address]

## 2020-01-27 NOTE — PROGRESS NOTES
Past Medical History:   Diagnosis Date    Allergy     Anxiety     Arthritis, rheumatoid     Back pain     Depression     Fibromyalgia     GERD (gastroesophageal reflux disease)     High cholesterol     Hypertension     Incontinence of urine     MS (multiple sclerosis)     benign; another MD stated she has no MS    Neuropathy     Restless leg syndrome     Sciatica of left side 1/5/2018    Seasonal allergies     Sinus congestion     Thyroid disease     Wheezing        Past Surgical History:   Procedure Laterality Date    APPENDECTOMY      BREAST LUMPECTOMY      bilateral, benign    BREAST SURGERY      reduction    EPIDURAL STEROID INJECTION INTO LUMBAR SPINE N/A 6/12/2019    Procedure: Injection-steroid-epidural-lumbar;  Surgeon: Thad Manning MD;  Location: [facility] OR;  Service: Pain Management;  Laterality: N/A;  L5-S1    EPIDURAL STEROID INJECTION INTO LUMBAR SPINE N/A 9/16/2019    Procedure: Injection-steroid-epidural-lumbar;  Surgeon: Thad Manning MD;  Location: [facility] OR;  Service: Pain Management;  Laterality: N/A;  L5-S1    HYSTERECTOMY      partial - fibroids    INJECTION OF ANESTHETIC AGENT AROUND MEDIAL BRANCH NERVES INNERVATING LUMBAR FACET JOINT Bilateral 2/28/2019    Procedure: Block-nerve-medial branch-lumbar;  Surgeon: Thad Manning MD;  Location: [facility] OR;  Service: Pain Management;  Laterality: Bilateral;  L3, 4,5     INJECTION OF ANESTHETIC AGENT AROUND MEDIAL BRANCH NERVES INNERVATING LUMBAR FACET JOINT Bilateral 3/21/2019    Procedure: Block-nerve-medial branch-lumbar L3,4,5;  Surgeon: Thad Manning MD;  Location: [facility] OR;  Service: Pain Management;  Laterality: Bilateral;    RADIOFREQUENCY ABLATION OF LUMBAR MEDIAL BRANCH NERVE AT SINGLE LEVEL Bilateral 4/12/2019    Procedure: Radiofrequency Ablation, Nerve, Spinal, Lumbar, Medial Branch, 1 Level;  Surgeon: Thad Manning MD;  Location: [facility] OR;  Service: Pain Management;  Laterality: Bilateral;  L3, 4, 5  lumbar  Nirali-Hood pain  management generator   [id number]  80 degrees for 75 seconds x2    RADIOFREQUENCY ABLATION OF LUMBAR MEDIAL BRANCH NERVE AT SINGLE LEVEL Bilateral 10/22/2019    Procedure: Radiofrequency Ablation, Nerve, Spinal, Lumbar, Medial Branch, L3,4,5;  Surgeon: Thad Manning MD;  Location: [facility];  Service: Pain Management;  Laterality: Bilateral;  burned at 80 degrees C X 60 seconds X 2 each site    TONSILLECTOMY         Current Outpatient Medications   Medication Sig    albuterol (PROVENTIL HFA) 90 mcg/actuation inhaler Inhale 2 puffs into the lungs every 6 (six) hours as needed for Wheezing. Rescue    ALPRAZolam (XANAX) 2 MG Tab TK 1 T PO  BID    amitriptyline (ELAVIL) 10 MG tablet Take 10 mg by mouth nightly as needed for Insomnia.    amLODIPine (NORVASC) 5 MG tablet Take 5 mg by mouth once daily.    biotin 10,000 mcg Cap Take 13,000 mcg by mouth once daily.    celecoxib (CELEBREX) 200 MG capsule Take 200 mg by mouth once daily.    fexofenadine (ALLEGRA) 180 MG tablet Take 180 mg by mouth daily as needed.    fluticasone (FLONASE) 50 mcg/actuation nasal spray USE ONE SPRAY IEN QD    hydrochlorothiazide (HYDRODIURIL) 25 MG tablet Take 25 mg by mouth once daily.    ketoconazole (NIZORAL) 2 % cream Apply topically once daily.    levothyroxine (SYNTHROID) 88 MCG tablet TK 1 T PO QD    losartan (COZAAR) 50 MG tablet Take 1 tablet (50 mg total) by mouth once daily.    lovastatin (ALTOPREV) 40 mg 24 hr tablet Take 40 mg by mouth every evening.    memantine (NAMENDA) 10 MG Tab TK 1 T PO BID    metoprolol succinate (TOPROL-XL) 50 MG 24 hr tablet Take 50 mg by mouth once daily.    MULTIVIT-IRON-MIN-FOLIC ACID 3,500-18-0.4 UNIT-MG-MG ORAL CHEW Take 1 tablet by mouth.    potassium chloride (MICRO-K) 10 MEQ CpSR Take 10 mEq by mouth every evening.    rivastigmine tartrate 4.5 mg capsule 1 capsule with food    SYMBICORT 160-4.5 mcg/actuation HFAA INL 2 PFS PO BID    valacyclovir HCl (VALACYCLOVIR ORAL) Take by  mouth.    venlafaxine (EFFEXOR-XR) 75 MG 24 hr capsule TK 1 C PO QD    diclofenac sodium (VOLTAREN) 1 % Gel Apply 2 g topically once daily. (Patient not taking: Reported on 2020)    HYDROcodone-acetaminophen (NORCO) 7.5-325 mg per tablet Take 1 tablet by mouth every 6 (six) hours as needed for Pain. (Patient not taking: Reported on 2020)    omeprazole (PRILOSEC) 20 MG capsule TK ONE C PO ONCE D    pramipexole (MIRAPEX) 0.5 MG tablet Take 0.5 mg by mouth once daily. Patient takes 2 at bedtime     No current facility-administered medications for this visit.        Review of patient's allergies indicates:   Allergen Reactions    Keflex [cephalexin]     Latex, natural rubber Anaphylaxis     bandaids     Pcn [penicillins]     Adhesive Other (See Comments)     bandainds redness at skin       Family History   Problem Relation Age of Onset    Heart disease Mother        Social History     Socioeconomic History    Marital status:      Spouse name: Not on file    Number of children: Not on file    Years of education: Not on file    Highest education level: Not on file   Occupational History    Not on file   Social Needs    Financial resource strain: Not on file    Food insecurity:     Worry: Not on file     Inability: Not on file    Transportation needs:     Medical: Not on file     Non-medical: Not on file   Tobacco Use    Smoking status: Former Smoker     Last attempt to quit: 1996     Years since quittin.6    Smokeless tobacco: Never Used   Substance and Sexual Activity    Alcohol use: Yes     Comment: very little     Drug use: No    Sexual activity: Not on file   Lifestyle    Physical activity:     Days per week: Not on file     Minutes per session: Not on file    Stress: Not on file   Relationships    Social connections:     Talks on phone: Not on file     Gets together: Not on file     Attends Judaism service: Not on file     Active member of club or organization:  Not on file     Attends meetings of clubs or organizations: Not on file     Relationship status: Not on file   Other Topics Concern    Not on file   Social History Narrative    Not on file       Chief Complaint:   Chief Complaint   Patient presents with    Shoulder Pain     bilateral shoulder pain       History of present illness:  This is a 69-year-old female seen for bilateral shoulder pain.  Left greater than right.  Patient is on Celebrex.  She has had pain now for about a month.  No injury or trauma.  Hurts to raise her arm overhead or reach behind her back.  Symptoms are worsening and moderate.  Pain is a 5/10.      Review of Systems:    Constitution: Negative for chills, fever, and sweats.  Negative for unexplained weight loss.    HENT:  Negative for headaches and blurry vision.    Cardiovascular:Negative for chest pain or irregular heart beat. Negative for hypertension.    Respiratory:  Negative for cough and positive for shortness of breath.    Gastrointestinal: Negative for abdominal pain, heartburn, melena, nausea, and vomitting.    Genitourinary:  Negative bladder incontinence and dysuria.  Positive for urgency.    Musculoskeletal:  See HPI    Neurological:  Positive for numbness.    Psychiatric/Behavioral:  Positive for depression and anxiety    Endocrine: Negative for polyuria    Hematologic/Lymphatic: Negative for bleeding problem.  Does  bruise/bleed easily.    Skin: Negative for poor would healing and rash      Physical Examination:    Vital Signs:    Vitals:    01/27/20 1337   BP: 120/61   Pulse: 72   Resp: 14       Body mass index is 41 kg/m².    This a well-developed, well nourished patient in no acute distress.  They are alert and oriented and cooperative to examination.  Pt. walks without an antalgic gait.      Examination of bilateral shoulders shows no rashes or erythema. There are no masses, ecchymosis, or atrophy. The patient has full range of motion in forward flexion, external  rotation, and internal rotation to the mid T-spine. The patient has mildly positive impingement signs. - Montezuma's test. - Speeds test. Nontender to palpation over a.c. joint. Normal stability anteriorly, posteriorly, and negative sulcus sign. Passive range of motion: Forward flexion of 180°, external rotation at 90° of 90°, internal rotation of 50°, and external rotation at 0° of 50°. 2+ radial pulse. Intact axillary, radial, median and ulnar sensation. 5 out of 5 resisted forward flexion, external rotation, and negative lift off test.      X-rays:  X-rays of both shoulders are ordered and reviewed which show some sclerosis around the greater tuberosity of the right shoulder consistent with rotator cuff syndrome and some mild arthritic change of the left shoulder     Assessment::  Bilateral rotator cuff syndrome    Plan:  I reviewed the findings with her today. I recommended formal physical therapy.  The patient really want a cortisone injection in her left shoulder.  I agree to 1 injection in the left shoulder today. Follow-up in 6 weeks.    This note was created using [employer] voice recognition software that occasionally misinterpreted phrases or words.    Consult note is delivered via Epic messaging service.

## 2020-01-28 ENCOUNTER — TELEPHONE (OUTPATIENT)
Dept: ORTHOPEDICS | Facility: CLINIC | Age: 70
End: 2020-01-28

## 2020-01-28 NOTE — TELEPHONE ENCOUNTER
Called and spoke with patient and advised that Dr. Pitts did not send in medication for the patient. The injection that she received was to help with the pain in her shoulder. She verbalized understanding.

## 2020-02-11 ENCOUNTER — PATIENT OUTREACH (OUTPATIENT)
Dept: ADMINISTRATIVE | Facility: OTHER | Age: 70
End: 2020-02-11

## 2020-02-11 NOTE — PROGRESS NOTES
Sent fax request for the release of Colonoscopy results from Dr. Valentin Office. Fax number [phone].

## 2020-02-11 NOTE — LETTER
Genoveva Gilbert is a patient of Dr. Lena Woods (PCP) at [facility]. While reviewing his/her chart, it has come to our attention that there is an outstanding lab/procedure. Please help keep our Health Maintenance records as accurate and as up to date as possible by supplying the following:     Colonoscopy                                                                           Please fax to [facility] @ [phone].    Thank you for your assistance in our patient's healthcare.     Sincerely,     Sherry Maciel M.A.

## 2020-02-20 ENCOUNTER — TELEPHONE (OUTPATIENT)
Dept: ORTHOPEDICS | Facility: CLINIC | Age: 70
End: 2020-02-20

## 2020-02-20 NOTE — TELEPHONE ENCOUNTER
----- Message from Thad Alfaro sent at 2/20/2020  9:55 AM CST -----  Contact: Shivani amaro PT   op2  fax   Needs PT orders has [employer]

## 2020-02-21 ENCOUNTER — TELEPHONE (OUTPATIENT)
Dept: NEUROLOGY | Facility: CLINIC | Age: 70
End: 2020-02-21

## 2020-02-21 NOTE — TELEPHONE ENCOUNTER
----- Message from Nancy Nowak sent at 2/21/2020  9:11 AM CST -----  Contact: [phone]  Pt called in to speak with the nurse regarding an account's username on [facility]. Please call back [phone]

## 2020-02-21 NOTE — TELEPHONE ENCOUNTER
I called the patient and she states she got the answer to the question about getting into her [facility] portal.

## 2020-03-08 ENCOUNTER — PATIENT OUTREACH (OUTPATIENT)
Dept: ADMINISTRATIVE | Facility: OTHER | Age: 70
End: 2020-03-08

## 2020-03-09 ENCOUNTER — TELEPHONE (OUTPATIENT)
Dept: ORTHOPEDICS | Facility: CLINIC | Age: 70
End: 2020-03-09

## 2020-03-09 NOTE — TELEPHONE ENCOUNTER
----- Message from Bianca Harp sent at 3/9/2020  9:02 AM CDT -----  Contact: Patient  Type: Needs Medical Advice    Who Called:  Patient  Best Call Back Number:   Additional Information: Patient had an appointment today but had to reschedule to make an emergency visit with her dentist as her crown fell out.  Rescheduled to 3/23, just wanted to let Dr Pitts know the reason she rescheduled.

## 2020-03-18 ENCOUNTER — PATIENT OUTREACH (OUTPATIENT)
Dept: ADMINISTRATIVE | Facility: OTHER | Age: 70
End: 2020-03-18

## 2020-04-14 ENCOUNTER — NURSE TRIAGE (OUTPATIENT)
Dept: ADMINISTRATIVE | Facility: CLINIC | Age: 70
End: 2020-04-14

## 2020-04-14 NOTE — TELEPHONE ENCOUNTER
Pt called COVID hotline as a referral from her doctor for information on testing. Per the patient, her doctor wants her to get tested. She is asymptomatic at this time. I explained she is unlikely to be tested at one of the drive-thru sites without a prescription since she is asymptomatic. She will call her doctor back and see if she can get an order for the test. I advised her to call us back with anymore questions.    Reason for Disposition   COVID-19 Testing, questions about    Protocols used: CORONAVIRUS (COVID-19) DIAGNOSED OR [id number]

## 2020-04-21 ENCOUNTER — TELEPHONE (OUTPATIENT)
Dept: NEUROLOGY | Facility: CLINIC | Age: 70
End: 2020-04-21

## 2020-04-24 DIAGNOSIS — I10 ESSENTIAL HYPERTENSION: ICD-10-CM

## 2020-04-24 RX ORDER — LOSARTAN POTASSIUM 50 MG/1
TABLET ORAL
Qty: 90 TABLET | Refills: 1 | Status: SHIPPED | OUTPATIENT
Start: 2020-04-24 | End: 2020-12-03 | Stop reason: SDUPTHER

## 2020-04-28 ENCOUNTER — TELEPHONE (OUTPATIENT)
Dept: PAIN MEDICINE | Facility: CLINIC | Age: 70
End: 2020-04-28

## 2020-04-28 NOTE — TELEPHONE ENCOUNTER
----- Message from Saranya Mcwilliams sent at 4/28/2020 10:55 AM CDT -----  Contact: Patient  Type:  Apoointment Request    Name of Caller:  Patient  When is the first available appointment?  n/a  Symptoms:  Back pain worsening-   Best Call Back Number: [phone]  Additional Information:  Would like to discuss another procedure

## 2020-04-29 DIAGNOSIS — Z01.818 PRE-OP TESTING: ICD-10-CM

## 2020-04-29 DIAGNOSIS — M43.06 SPONDYLOLYSIS, LUMBAR REGION: Primary | ICD-10-CM

## 2020-05-05 ENCOUNTER — PATIENT MESSAGE (OUTPATIENT)
Dept: ADMINISTRATIVE | Facility: HOSPITAL | Age: 70
End: 2020-05-05

## 2020-05-06 ENCOUNTER — PATIENT OUTREACH (OUTPATIENT)
Dept: ADMINISTRATIVE | Facility: OTHER | Age: 70
End: 2020-05-06

## 2020-05-06 ENCOUNTER — TELEPHONE (OUTPATIENT)
Dept: PAIN MEDICINE | Facility: CLINIC | Age: 70
End: 2020-05-06

## 2020-05-06 NOTE — TELEPHONE ENCOUNTER
----- Message from Karyn Shabazz sent at 5/6/2020  9:36 AM CDT -----  Contact: Patient Procedure Insurance Approval  Type: Needs Medical Advice  Who Called:  Pt  Best Call Back Number: [phone]   Additional Information: Pt is scheduled to have a procedure next Friday radio frequency, she received a letter from her insurance company, approving an injectio. Patient is requesting a call back to make sure her insurance will cover the procedure and not her. Please call back and advise

## 2020-05-11 ENCOUNTER — HOSPITAL ENCOUNTER (OUTPATIENT)
Dept: RADIOLOGY | Facility: HOSPITAL | Age: 70
Discharge: HOME | End: 2020-05-11
Attending: ORTHOPAEDIC SURGERY
Payer: MEDICARE

## 2020-05-11 ENCOUNTER — OFFICE VISIT (OUTPATIENT)
Dept: ORTHOPEDICS | Facility: CLINIC | Age: 70
End: 2020-05-11
Payer: MEDICARE

## 2020-05-11 VITALS — WEIGHT: 217 LBS | HEIGHT: 61 IN | BODY MASS INDEX: 40.97 KG/M2 | TEMPERATURE: 99 F

## 2020-05-11 DIAGNOSIS — M17.12 ARTHRITIS OF KNEE, LEFT: Primary | ICD-10-CM

## 2020-05-11 DIAGNOSIS — M25.562 LEFT KNEE PAIN, UNSPECIFIED CHRONICITY: ICD-10-CM

## 2020-05-11 DIAGNOSIS — M25.562 LEFT KNEE PAIN, UNSPECIFIED CHRONICITY: Primary | ICD-10-CM

## 2020-05-11 PROCEDURE — 73562 X-RAY EXAM OF KNEE 3: CPT | Mod: TC,PN,RT,59

## 2020-05-11 PROCEDURE — 20610 DRAIN/INJ JOINT/BURSA W/O US: CPT | Mod: LT,S$GLB,, | Performed by: ORTHOPAEDIC SURGERY

## 2020-05-11 PROCEDURE — 73564 X-RAY EXAM KNEE 4 OR MORE: CPT | Mod: 26,LT,, | Performed by: RADIOLOGY

## 2020-05-11 PROCEDURE — 1159F PR MEDICATION LIST DOCUMENTED IN MEDICAL RECORD: ICD-10-PCS | Mod: S$GLB,,, | Performed by: ORTHOPAEDIC SURGERY

## 2020-05-11 PROCEDURE — 97760 ORTHOTIC MGMT&TRAING 1ST ENC: CPT | Mod: GP,S$GLB,, | Performed by: ORTHOPAEDIC SURGERY

## 2020-05-11 PROCEDURE — 1126F PR PAIN SEVERITY QUANTIFIED, NO PAIN PRESENT: ICD-10-PCS | Mod: S$GLB,,, | Performed by: ORTHOPAEDIC SURGERY

## 2020-05-11 PROCEDURE — 20610 LARGE JOINT ASPIRATION/INJECTION: L KNEE: ICD-10-PCS | Mod: LT,S$GLB,, | Performed by: ORTHOPAEDIC SURGERY

## 2020-05-11 PROCEDURE — 1101F PR PT FALLS ASSESS DOC 0-1 FALLS W/OUT INJ PAST YR: ICD-10-PCS | Mod: CPTII,S$GLB,, | Performed by: ORTHOPAEDIC SURGERY

## 2020-05-11 PROCEDURE — 99999 PR PBB SHADOW E&M-EST. PATIENT-LVL III: CPT | Mod: PBBFAC,,, | Performed by: ORTHOPAEDIC SURGERY

## 2020-05-11 PROCEDURE — 1101F PT FALLS ASSESS-DOCD LE1/YR: CPT | Mod: CPTII,S$GLB,, | Performed by: ORTHOPAEDIC SURGERY

## 2020-05-11 PROCEDURE — 1126F AMNT PAIN NOTED NONE PRSNT: CPT | Mod: S$GLB,,, | Performed by: ORTHOPAEDIC SURGERY

## 2020-05-11 PROCEDURE — 73562 X-RAY EXAM OF KNEE 3: CPT | Mod: 26,RT,, | Performed by: RADIOLOGY

## 2020-05-11 PROCEDURE — 99214 PR OFFICE/OUTPT VISIT, EST, LEVL IV, 30-39 MIN: ICD-10-PCS | Mod: 25,S$GLB,, | Performed by: ORTHOPAEDIC SURGERY

## 2020-05-11 PROCEDURE — 99999 PR PBB SHADOW E&M-EST. PATIENT-LVL III: ICD-10-PCS | Mod: PBBFAC,,, | Performed by: ORTHOPAEDIC SURGERY

## 2020-05-11 PROCEDURE — 73564 XR KNEE ORTHO LEFT WITH FLEXION: ICD-10-PCS | Mod: 26,LT,, | Performed by: RADIOLOGY

## 2020-05-11 PROCEDURE — 1159F MED LIST DOCD IN RCRD: CPT | Mod: S$GLB,,, | Performed by: ORTHOPAEDIC SURGERY

## 2020-05-11 PROCEDURE — 99214 OFFICE O/P EST MOD 30 MIN: CPT | Mod: 25,S$GLB,, | Performed by: ORTHOPAEDIC SURGERY

## 2020-05-11 PROCEDURE — 97760 PR ORTHOTIC MGMT&TRAINJ INITIAL ENC EA 15 MINS: ICD-10-PCS | Mod: GP,S$GLB,, | Performed by: ORTHOPAEDIC SURGERY

## 2020-05-11 PROCEDURE — 73562 XR KNEE ORTHO LEFT WITH FLEXION: ICD-10-PCS | Mod: 26,RT,, | Performed by: RADIOLOGY

## 2020-05-11 RX ORDER — TRIAMCINOLONE ACETONIDE 40 MG/ML
40 INJECTION, SUSPENSION INTRA-ARTICULAR; INTRAMUSCULAR
Status: DISCONTINUED | OUTPATIENT
Start: 2020-05-11 | End: 2020-05-11 | Stop reason: HOSPADM

## 2020-05-11 RX ADMIN — TRIAMCINOLONE ACETONIDE 40 MG: 40 INJECTION, SUSPENSION INTRA-ARTICULAR; INTRAMUSCULAR at 10:05

## 2020-05-11 NOTE — PROCEDURES
Large Joint Aspiration/Injection: L knee  Date/Time: 5/11/2020 10:45 AM  Performed by: Rio Pitts MD  Authorized by: Rio Pitts MD     Consent Done?:  Yes (Verbal)  Indications:  Pain  Site marked: the procedure site was marked    Timeout: prior to procedure the correct patient, procedure, and site was verified    Local anesthetic:  Lidocaine 1% without epinephrine and bupivacaine 0.25% without epinephrine  Anesthetic total (ml):  6      Details:  Needle Size:  20 G  Approach:  Anterolateral  Location:  Knee  Site:  L knee  Medications:  40 mg triamcinolone acetonide 40 mg/mL  Patient tolerance:  Patient tolerated the procedure well with no immediate complications

## 2020-05-11 NOTE — PROGRESS NOTES
Past Medical History:   Diagnosis Date    Allergy     Anxiety     Arthritis, rheumatoid     Back pain     Depression     Fibromyalgia     GERD (gastroesophageal reflux disease)     High cholesterol     Hypertension     Incontinence of urine     MS (multiple sclerosis)     benign; another MD stated she has no MS    Neuropathy     Restless leg syndrome     Sciatica of left side 1/5/2018    Seasonal allergies     Sinus congestion     Thyroid disease     Wheezing        Past Surgical History:   Procedure Laterality Date    APPENDECTOMY      BREAST LUMPECTOMY      bilateral, benign    BREAST SURGERY      reduction    EPIDURAL STEROID INJECTION INTO LUMBAR SPINE N/A 6/12/2019    Procedure: Injection-steroid-epidural-lumbar;  Surgeon: Thad Manning MD;  Location: [facility] OR;  Service: Pain Management;  Laterality: N/A;  L5-S1    EPIDURAL STEROID INJECTION INTO LUMBAR SPINE N/A 9/16/2019    Procedure: Injection-steroid-epidural-lumbar;  Surgeon: Thad Manning MD;  Location: [facility] OR;  Service: Pain Management;  Laterality: N/A;  L5-S1    HYSTERECTOMY      partial - fibroids    INJECTION OF ANESTHETIC AGENT AROUND MEDIAL BRANCH NERVES INNERVATING LUMBAR FACET JOINT Bilateral 2/28/2019    Procedure: Block-nerve-medial branch-lumbar;  Surgeon: Thad Manning MD;  Location: [facility] OR;  Service: Pain Management;  Laterality: Bilateral;  L3, 4,5     INJECTION OF ANESTHETIC AGENT AROUND MEDIAL BRANCH NERVES INNERVATING LUMBAR FACET JOINT Bilateral 3/21/2019    Procedure: Block-nerve-medial branch-lumbar L3,4,5;  Surgeon: Thad Manning MD;  Location: [facility] OR;  Service: Pain Management;  Laterality: Bilateral;    RADIOFREQUENCY ABLATION OF LUMBAR MEDIAL BRANCH NERVE AT SINGLE LEVEL Bilateral 4/12/2019    Procedure: Radiofrequency Ablation, Nerve, Spinal, Lumbar, Medial Branch, 1 Level;  Surgeon: Thad Manning MD;  Location: [facility] OR;  Service: Pain Management;  Laterality: Bilateral;  L3, 4, 5  lumbar  Nirali-Hood pain  management generator   [id number]  80 degrees for 75 seconds x2    RADIOFREQUENCY ABLATION OF LUMBAR MEDIAL BRANCH NERVE AT SINGLE LEVEL Bilateral 10/22/2019    Procedure: Radiofrequency Ablation, Nerve, Spinal, Lumbar, Medial Branch, L3,4,5;  Surgeon: Thad Manning MD;  [facility]  Service: Pain Management;  Laterality: Bilateral;  burned at 80 degrees C X 60 seconds X 2 each site    TONSILLECTOMY         Current Outpatient Medications   Medication Sig    albuterol (PROVENTIL HFA) 90 mcg/actuation inhaler Inhale 2 puffs into the lungs every 6 (six) hours as needed for Wheezing. Rescue    ALPRAZolam (XANAX) 2 MG Tab TK 1 T PO  BID    amitriptyline (ELAVIL) 10 MG tablet Take 10 mg by mouth nightly as needed for Insomnia.    amLODIPine (NORVASC) 5 MG tablet Take 5 mg by mouth once daily.    biotin 10,000 mcg Cap Take 13,000 mcg by mouth once daily.    celecoxib (CELEBREX) 200 MG capsule Take 200 mg by mouth once daily.    diclofenac sodium (VOLTAREN) 1 % Gel Apply 2 g topically once daily.    fexofenadine (ALLEGRA) 180 MG tablet Take 180 mg by mouth daily as needed.    fluticasone (FLONASE) 50 mcg/actuation nasal spray USE ONE SPRAY IEN QD    hydrochlorothiazide (HYDRODIURIL) 25 MG tablet Take 25 mg by mouth once daily.    HYDROcodone-acetaminophen (NORCO) 7.5-325 mg per tablet Take 1 tablet by mouth every 6 (six) hours as needed for Pain.    ketoconazole (NIZORAL) 2 % cream Apply topically once daily.    levothyroxine (SYNTHROID) 88 MCG tablet TK 1 T PO QD    losartan (COZAAR) 50 MG tablet TAKE 1 TABLET(50 MG) BY MOUTH EVERY DAY.    lovastatin (ALTOPREV) 40 mg 24 hr tablet Take 40 mg by mouth every evening.    memantine (NAMENDA) 10 MG Tab TK 1 T PO BID    metoprolol succinate (TOPROL-XL) 50 MG 24 hr tablet Take 50 mg by mouth once daily.    MULTIVIT-IRON-MIN-FOLIC ACID 3,500-18-0.4 UNIT-MG-MG ORAL CHEW Take 1 tablet by mouth.    omeprazole (PRILOSEC) 20 MG capsule TK ONE C PO ONCE D     potassium chloride (MICRO-K) 10 MEQ CpSR Take 10 mEq by mouth every evening.    pramipexole (MIRAPEX) 0.5 MG tablet Take 0.5 mg by mouth once daily. Patient takes 2 at bedtime    rivastigmine tartrate 4.5 mg capsule 1 capsule with food    SYMBICORT 160-4.5 mcg/actuation HFAA INL 2 PFS PO BID    valacyclovir HCl (VALACYCLOVIR ORAL) Take by mouth.    venlafaxine (EFFEXOR-XR) 75 MG 24 hr capsule TK 1 C PO QD     No current facility-administered medications for this visit.        Review of patient's allergies indicates:   Allergen Reactions    Keflex [cephalexin]     Latex, natural rubber Anaphylaxis     bandaids     Pcn [penicillins]     Adhesive Other (See Comments)     bandainds redness at skin       Family History   Problem Relation Age of Onset    Heart disease Mother        Social History     Socioeconomic History    Marital status:      Spouse name: Not on file    Number of children: Not on file    Years of education: Not on file    Highest education level: Not on file   Occupational History    Not on file   Social Needs    Financial resource strain: Not on file    Food insecurity:     Worry: Not on file     Inability: Not on file    Transportation needs:     Medical: Not on file     Non-medical: Not on file   Tobacco Use    Smoking status: Former Smoker     Last attempt to quit: 1996     Years since quittin.9    Smokeless tobacco: Never Used   Substance and Sexual Activity    Alcohol use: Yes     Comment: very little     Drug use: No    Sexual activity: Not on file   Lifestyle    Physical activity:     Days per week: Not on file     Minutes per session: Not on file    Stress: Not on file   Relationships    Social connections:     Talks on phone: Not on file     Gets together: Not on file     Attends [employer] service: Not on file     Active member of club or organization: Not on file     Attends meetings of clubs or organizations: Not on file     Relationship status: Not  on file   Other Topics Concern    Not on file   Social History Narrative    Not on file       Chief Complaint:   Chief Complaint   Patient presents with    Shoulder Pain     bilateral shoulder pain       History of present illness:  This is a 70-year-old female seen for a new complaint of left knee pain.  Left knee feels like it wants to give out.  She has a history of problems with that knee and has had it scoped in the past.  Pain is along the medial aspect of the knee.  Up to an 8/10.  Sharp pains for the last 6 weeks.  No new injury or trauma.    Review of Systems:    Constitution: Negative for chills, fever, and sweats.  Negative for unexplained weight loss.    HENT:  Negative for headaches and blurry vision.    Cardiovascular:Negative for chest pain or irregular heart beat. Negative for hypertension.    Respiratory:  Negative for cough and positive for shortness of breath.    Gastrointestinal: Negative for abdominal pain, heartburn, melena, nausea, and vomitting.    Genitourinary:  Negative bladder incontinence and dysuria.  Positive for urgency.    Musculoskeletal:  See HPI    Neurological:  Positive for numbness.    Psychiatric/Behavioral:  Positive for depression and anxiety    Endocrine: Negative for polyuria    Hematologic/Lymphatic: Negative for bleeding problem.  Does  bruise/bleed easily.    Skin: Negative for poor would healing and rash      Physical Examination:    Vital Signs:    Vitals:    05/11/20 1044   Temp: 98.6 °F (37 °C)       Body mass index is 41 kg/m².    This a well-developed, well nourished patient in no acute distress.  They are alert and oriented and cooperative to examination.  Pt. walks without an antalgic gait.      Examination of the left knee shows no rashes or erythema. There are no masses ecchymosis or effusion. Patient has full range of motion from 0-130°. Patient is nontender to palpation over lateral joint line and moderately tender to palpation over the medial joint line.  Patient has a - Lachman exam, - anterior drawer exam, and - posterior drawer exam. - Kevin's exam. Knee is stable to varus and valgus stress. 5 out of 5 motor strength. Palpable distal pulses. Intact light touch sensation. Negative Patellofemoral crepitus      X-rays:  X-rays of left knee is ordered and reviewed which show moderate to severe medial compartment narrowing of both knees.     Assessment::  Left moderate to severe knee arthritis    Plan:  I reviewed the findings with her today. I recommended a cortisone injection as well as a hinged knee brace.  Follow-up as needed.    We performed a custom orthotic/brace fitting, adjusting and training with the patient. The patient demonstrated understanding and proper care. This was performed for 15 minutes.          This note was created using [employer] voice recognition software that occasionally misinterpreted phrases or words.    Consult note is delivered via Epic messaging service.

## 2020-05-11 NOTE — H&P (VIEW-ONLY)
Past Medical History:   Diagnosis Date    Allergy     Anxiety     Arthritis, rheumatoid     Back pain     Depression     Fibromyalgia     GERD (gastroesophageal reflux disease)     High cholesterol     Hypertension     Incontinence of urine     MS (multiple sclerosis)     benign; another MD stated she has no MS    Neuropathy     Restless leg syndrome     Sciatica of left side 1/5/2018    Seasonal allergies     Sinus congestion     Thyroid disease     Wheezing        Past Surgical History:   Procedure Laterality Date    APPENDECTOMY      BREAST LUMPECTOMY      bilateral, benign    BREAST SURGERY      reduction    EPIDURAL STEROID INJECTION INTO LUMBAR SPINE N/A 6/12/2019    Procedure: Injection-steroid-epidural-lumbar;  Surgeon: Thad Manning MD;  Location: [facility] OR;  Service: Pain Management;  Laterality: N/A;  L5-S1    EPIDURAL STEROID INJECTION INTO LUMBAR SPINE N/A 9/16/2019    Procedure: Injection-steroid-epidural-lumbar;  Surgeon: Thad Manning MD;  Location: [facility] OR;  Service: Pain Management;  Laterality: N/A;  L5-S1    HYSTERECTOMY      partial - fibroids    INJECTION OF ANESTHETIC AGENT AROUND MEDIAL BRANCH NERVES INNERVATING LUMBAR FACET JOINT Bilateral 2/28/2019    Procedure: Block-nerve-medial branch-lumbar;  Surgeon: Thad Manning MD;  Location: [facility] OR;  Service: Pain Management;  Laterality: Bilateral;  L3, 4,5     INJECTION OF ANESTHETIC AGENT AROUND MEDIAL BRANCH NERVES INNERVATING LUMBAR FACET JOINT Bilateral 3/21/2019    Procedure: Block-nerve-medial branch-lumbar L3,4,5;  Surgeon: Thad Manning MD;  Location: [facility] OR;  Service: Pain Management;  Laterality: Bilateral;    RADIOFREQUENCY ABLATION OF LUMBAR MEDIAL BRANCH NERVE AT SINGLE LEVEL Bilateral 4/12/2019    Procedure: Radiofrequency Ablation, Nerve, Spinal, Lumbar, Medial Branch, 1 Level;  Surgeon: Thad Manning MD;  Location: [facility] OR;  Service: Pain Management;  Laterality: Bilateral;  L3, 4, 5  lumbar  Nirali-Hood pain  management generator   [id number]  80 degrees for 75 seconds x2    RADIOFREQUENCY ABLATION OF LUMBAR MEDIAL BRANCH NERVE AT SINGLE LEVEL Bilateral 10/22/2019    Procedure: Radiofrequency Ablation, Nerve, Spinal, Lumbar, Medial Branch, L3,4,5;  Surgeon: Thad Manning MD;  Location: [facility];  Service: Pain Management;  Laterality: Bilateral;  burned at 80 degrees C X 60 seconds X 2 each site    TONSILLECTOMY         Current Outpatient Medications   Medication Sig    albuterol (PROVENTIL HFA) 90 mcg/actuation inhaler Inhale 2 puffs into the lungs every 6 (six) hours as needed for Wheezing. Rescue    ALPRAZolam (XANAX) 2 MG Tab TK 1 T PO  BID    amitriptyline (ELAVIL) 10 MG tablet Take 10 mg by mouth nightly as needed for Insomnia.    amLODIPine (NORVASC) 5 MG tablet Take 5 mg by mouth once daily.    biotin 10,000 mcg Cap Take 13,000 mcg by mouth once daily.    celecoxib (CELEBREX) 200 MG capsule Take 200 mg by mouth once daily.    diclofenac sodium (VOLTAREN) 1 % Gel Apply 2 g topically once daily.    fexofenadine (ALLEGRA) 180 MG tablet Take 180 mg by mouth daily as needed.    fluticasone (FLONASE) 50 mcg/actuation nasal spray USE ONE SPRAY IEN QD    hydrochlorothiazide (HYDRODIURIL) 25 MG tablet Take 25 mg by mouth once daily.    HYDROcodone-acetaminophen (NORCO) 7.5-325 mg per tablet Take 1 tablet by mouth every 6 (six) hours as needed for Pain.    ketoconazole (NIZORAL) 2 % cream Apply topically once daily.    levothyroxine (SYNTHROID) 88 MCG tablet TK 1 T PO QD    losartan (COZAAR) 50 MG tablet TAKE 1 TABLET(50 MG) BY MOUTH EVERY DAY.    lovastatin (ALTOPREV) 40 mg 24 hr tablet Take 40 mg by mouth every evening.    memantine (NAMENDA) 10 MG Tab TK 1 T PO BID    metoprolol succinate (TOPROL-XL) 50 MG 24 hr tablet Take 50 mg by mouth once daily.    MULTIVIT-IRON-MIN-FOLIC ACID 3,500-18-0.4 UNIT-MG-MG ORAL CHEW Take 1 tablet by mouth.    omeprazole (PRILOSEC) 20 MG capsule TK ONE C PO ONCE D     potassium chloride (MICRO-K) 10 MEQ CpSR Take 10 mEq by mouth every evening.    pramipexole (MIRAPEX) 0.5 MG tablet Take 0.5 mg by mouth once daily. Patient takes 2 at bedtime    rivastigmine tartrate 4.5 mg capsule 1 capsule with food    SYMBICORT 160-4.5 mcg/actuation HFAA INL 2 PFS PO BID    valacyclovir HCl (VALACYCLOVIR ORAL) Take by mouth.    venlafaxine (EFFEXOR-XR) 75 MG 24 hr capsule TK 1 C PO QD     No current facility-administered medications for this visit.        Review of patient's allergies indicates:   Allergen Reactions    Keflex [cephalexin]     Latex, natural rubber Anaphylaxis     bandaids     Pcn [penicillins]     Adhesive Other (See Comments)     bandainds redness at skin       Family History   Problem Relation Age of Onset    Heart disease Mother        Social History     Socioeconomic History    Marital status:      Spouse name: Not on file    Number of children: Not on file    Years of education: Not on file    Highest education level: Not on file   Occupational History    Not on file   Social Needs    Financial resource strain: Not on file    Food insecurity:     Worry: Not on file     Inability: Not on file    Transportation needs:     Medical: Not on file     Non-medical: Not on file   Tobacco Use    Smoking status: Former Smoker     Last attempt to quit: 1996     Years since quittin.9    Smokeless tobacco: Never Used   Substance and Sexual Activity    Alcohol use: Yes     Comment: very little     Drug use: No    Sexual activity: Not on file   Lifestyle    Physical activity:     Days per week: Not on file     Minutes per session: Not on file    Stress: Not on file   Relationships    Social connections:     Talks on phone: Not on file     Gets together: Not on file     Attends Restoration service: Not on file     Active member of club or organization: Not on file     Attends meetings of clubs or organizations: Not on file     Relationship status: Not  on file   Other Topics Concern    Not on file   Social History Narrative    Not on file       Chief Complaint:   Chief Complaint   Patient presents with    Shoulder Pain     bilateral shoulder pain       History of present illness:  This is a 70-year-old female seen for a new complaint of left knee pain.  Left knee feels like it wants to give out.  She has a history of problems with that knee and has had it scoped in the past.  Pain is along the medial aspect of the knee.  Up to an 8/10.  Sharp pains for the last 6 weeks.  No new injury or trauma.    Review of Systems:    Constitution: Negative for chills, fever, and sweats.  Negative for unexplained weight loss.    HENT:  Negative for headaches and blurry vision.    Cardiovascular:Negative for chest pain or irregular heart beat. Negative for hypertension.    Respiratory:  Negative for cough and positive for shortness of breath.    Gastrointestinal: Negative for abdominal pain, heartburn, melena, nausea, and vomitting.    Genitourinary:  Negative bladder incontinence and dysuria.  Positive for urgency.    Musculoskeletal:  See HPI    Neurological:  Positive for numbness.    Psychiatric/Behavioral:  Positive for depression and anxiety    Endocrine: Negative for polyuria    Hematologic/Lymphatic: Negative for bleeding problem.  Does  bruise/bleed easily.    Skin: Negative for poor would healing and rash      Physical Examination:    Vital Signs:    Vitals:    05/11/20 1044   Temp: 98.6 °F (37 °C)       Body mass index is 41 kg/m².    This a well-developed, well nourished patient in no acute distress.  They are alert and oriented and cooperative to examination.  Pt. walks without an antalgic gait.      Examination of the left knee shows no rashes or erythema. There are no masses ecchymosis or effusion. Patient has full range of motion from 0-130°. Patient is nontender to palpation over lateral joint line and moderately tender to palpation over the medial joint line.  Patient has a - Lachman exam, - anterior drawer exam, and - posterior drawer exam. - Kevin's exam. Knee is stable to varus and valgus stress. 5 out of 5 motor strength. Palpable distal pulses. Intact light touch sensation. Negative Patellofemoral crepitus      X-rays:  X-rays of left knee is ordered and reviewed which show moderate to severe medial compartment narrowing of both knees.     Assessment::  Left moderate to severe knee arthritis    Plan:  I reviewed the findings with her today. I recommended a cortisone injection as well as a hinged knee brace.  Follow-up as needed.    We performed a custom orthotic/brace fitting, adjusting and training with the patient. The patient demonstrated understanding and proper care. This was performed for 15 minutes.          This note was created using [employer] voice recognition software that occasionally misinterpreted phrases or words.    Consult note is delivered via Epic messaging service.

## 2020-05-14 ENCOUNTER — TELEPHONE (OUTPATIENT)
Dept: PAIN MEDICINE | Facility: CLINIC | Age: 70
End: 2020-05-14

## 2020-05-14 NOTE — TELEPHONE ENCOUNTER
Pt did not have Covid test done. Pre op called her and rescheduled her to 5/27.  Message sent to surgery to reschedule

## 2020-05-25 ENCOUNTER — LAB VISIT (OUTPATIENT)
Dept: PRIMARY CARE CLINIC | Facility: CLINIC | Age: 70
End: 2020-05-25
Payer: MEDICARE

## 2020-05-25 DIAGNOSIS — Z01.818 PRE-OP TESTING: Primary | ICD-10-CM

## 2020-05-25 DIAGNOSIS — Z01.818 PRE-OP TESTING: ICD-10-CM

## 2020-05-25 PROCEDURE — U0003 INFECTIOUS AGENT DETECTION BY NUCLEIC ACID (DNA OR RNA); SEVERE ACUTE RESPIRATORY SYNDROME CORONAVIRUS 2 (SARS-COV-2) (CORONAVIRUS DISEASE [COVID-19]), AMPLIFIED PROBE TECHNIQUE, MAKING USE OF HIGH THROUGHPUT TECHNOLOGIES AS DESCRIBED BY CMS-2020-01-R: HCPCS

## 2020-05-26 LAB — SARS-COV-2 RNA RESP QL NAA+PROBE: NOT DETECTED

## 2020-05-27 ENCOUNTER — HOSPITAL ENCOUNTER (OUTPATIENT)
Facility: AMBULARY SURGERY CENTER | Age: 70
Discharge: HOME | End: 2020-05-27
Attending: ANESTHESIOLOGY | Admitting: ANESTHESIOLOGY
Payer: MEDICARE

## 2020-05-27 DIAGNOSIS — M47.896 OTHER SPONDYLOSIS, LUMBAR REGION: Primary | ICD-10-CM

## 2020-05-27 PROCEDURE — 64635 DESTROY LUMB/SAC FACET JNT: CPT | Mod: LT | Performed by: ANESTHESIOLOGY

## 2020-05-27 PROCEDURE — 64635 DESTROY LUMB/SAC FACET JNT: CPT | Mod: 50,,, | Performed by: ANESTHESIOLOGY

## 2020-05-27 PROCEDURE — 64636 DESTROY L/S FACET JNT ADDL: CPT | Mod: LT,,, | Performed by: ANESTHESIOLOGY

## 2020-05-27 PROCEDURE — 64636 PR DESTROY L/S FACET JNT ADDL: ICD-10-PCS | Mod: LT,,, | Performed by: ANESTHESIOLOGY

## 2020-05-27 PROCEDURE — 64636 DESTROY L/S FACET JNT ADDL: CPT | Mod: LT | Performed by: ANESTHESIOLOGY

## 2020-05-27 PROCEDURE — 64635 PR DESTROY LUMB/SAC FACET JNT: ICD-10-PCS | Mod: 50,,, | Performed by: ANESTHESIOLOGY

## 2020-05-27 RX ORDER — SODIUM CHLORIDE, SODIUM LACTATE, POTASSIUM CHLORIDE, CALCIUM CHLORIDE 600; 310; 30; 20 MG/100ML; MG/100ML; MG/100ML; MG/100ML
INJECTION, SOLUTION INTRAVENOUS ONCE AS NEEDED
Status: COMPLETED | OUTPATIENT
Start: 2020-05-27 | End: 2020-05-27

## 2020-05-27 RX ORDER — FENTANYL CITRATE 50 UG/ML
INJECTION, SOLUTION INTRAMUSCULAR; INTRAVENOUS
Status: DISCONTINUED | OUTPATIENT
Start: 2020-05-27 | End: 2020-05-27 | Stop reason: HOSPADM

## 2020-05-27 RX ORDER — MIDAZOLAM HYDROCHLORIDE 2 MG/2ML
INJECTION, SOLUTION INTRAMUSCULAR; INTRAVENOUS
Status: DISCONTINUED | OUTPATIENT
Start: 2020-05-27 | End: 2020-05-27 | Stop reason: HOSPADM

## 2020-05-27 RX ORDER — LIDOCAINE HYDROCHLORIDE 20 MG/ML
INJECTION, SOLUTION EPIDURAL; INFILTRATION; INTRACAUDAL; PERINEURAL
Status: DISCONTINUED | OUTPATIENT
Start: 2020-05-27 | End: 2020-05-27 | Stop reason: HOSPADM

## 2020-05-27 RX ORDER — METHYLPREDNISOLONE ACETATE 80 MG/ML
INJECTION, SUSPENSION INTRA-ARTICULAR; INTRALESIONAL; INTRAMUSCULAR; SOFT TISSUE
Status: DISCONTINUED | OUTPATIENT
Start: 2020-05-27 | End: 2020-05-27 | Stop reason: HOSPADM

## 2020-05-27 RX ORDER — BUPIVACAINE HYDROCHLORIDE 2.5 MG/ML
INJECTION, SOLUTION EPIDURAL; INFILTRATION; INTRACAUDAL
Status: DISCONTINUED | OUTPATIENT
Start: 2020-05-27 | End: 2020-05-27 | Stop reason: HOSPADM

## 2020-05-27 RX ORDER — LIDOCAINE HYDROCHLORIDE 10 MG/ML
INJECTION, SOLUTION EPIDURAL; INFILTRATION; INTRACAUDAL; PERINEURAL
Status: DISCONTINUED | OUTPATIENT
Start: 2020-05-27 | End: 2020-05-27 | Stop reason: HOSPADM

## 2020-05-27 RX ADMIN — SODIUM CHLORIDE, SODIUM LACTATE, POTASSIUM CHLORIDE, CALCIUM CHLORIDE: 600; 310; 30; 20 INJECTION, SOLUTION INTRAVENOUS at 12:05

## 2020-05-27 NOTE — DISCHARGE SUMMARY
[facility]  Discharge Note  Short Stay    Admit Date: 5/27/2020    Discharge Date and Time: 5/27/2020    Attending Physician: Thad Manning MD     Discharge Provider: Thad Manning    Diagnoses:  Active Hospital Problems    Diagnosis  POA    *Other spondylosis, lumbar region [M47.896]  Yes      Resolved Hospital Problems   No resolved problems to display.       Hospital Course: Lumbar MB RFA  Discharged Condition: Good    Final Diagnoses:   Active Hospital Problems    Diagnosis  POA    *Other spondylosis, lumbar region [M47.896]  Yes      Resolved Hospital Problems   No resolved problems to display.       Disposition: Home or Self Care    Follow up/Patient Instructions:    Medications:  Reconciled Home Medications:      Medication List      CONTINUE taking these medications    ALPRAZolam 2 MG Tab  Commonly known as:  XANAX  TK 1 T PO  BID     amitriptyline 10 MG tablet  Commonly known as:  ELAVIL  Take 10 mg by mouth nightly as needed for Insomnia.     amLODIPine 5 MG tablet  Commonly known as:  NORVASC  Take 5 mg by mouth once daily.     biotin 10,000 mcg Cap  Take 13,000 mcg by mouth once daily.     celecoxib 200 MG capsule  Commonly known as:  CeleBREX  Take 200 mg by mouth once daily.     CENTRUM 3,500-18-0.4 unit-mg-mg Chew  Generic drug:  multivit-iron-min-folic acid  Take 1 tablet by mouth.     fexofenadine 180 MG tablet  Commonly known as:  ALLEGRA  Take 180 mg by mouth daily as needed.     hydroCHLOROthiazide 25 MG tablet  Commonly known as:  HYDRODIURIL  Take 25 mg by mouth once daily.     HYDROcodone-acetaminophen 7.5-325 mg per tablet  Commonly known as:  NORCO  Take 1 tablet by mouth every 6 (six) hours as needed for Pain.     ketoconazole 2 % cream  Commonly known as:  NIZORAL  Apply topically once daily.     levothyroxine 88 MCG tablet  Commonly known as:  SYNTHROID  TK 1 T PO QD     losartan 50 MG tablet  Commonly known as:  COZAAR  TAKE 1 TABLET(50 MG) BY MOUTH EVERY DAY.     lovastatin 40 mg  24 hr tablet  Commonly known as:  ALTOPREV  Take 40 mg by mouth every evening.     memantine 10 MG Tab  Commonly known as:  NAMENDA  TK 1 T PO BID     metoprolol succinate 50 MG 24 hr tablet  Commonly known as:  TOPROL-XL  Take 50 mg by mouth once daily.     omeprazole 20 MG capsule  Commonly known as:  PRILOSEC  TK ONE C PO ONCE D     potassium chloride 10 MEQ Cpsr  Commonly known as:  MICRO-K  Take 10 mEq by mouth every evening.     pramipexole 0.5 MG tablet  Commonly known as:  MIRAPEX  Take 0.5 mg by mouth once daily. Patient takes 2 at bedtime     PROVENTIL HFA 90 mcg/actuation inhaler  Generic drug:  albuterol  Inhale 2 puffs into the lungs every 6 (six) hours as needed for Wheezing. Rescue     rivastigmine tartrate 4.5 mg capsule  1 capsule with food     SYMBICORT 160-4.5 mcg/actuation Hfaa  Generic drug:  budesonide-formoterol 160-4.5 mcg  INL 2 PFS PO BID     VALACYCLOVIR ORAL  Take by mouth.     venlafaxine 75 MG 24 hr capsule  Commonly known as:  EFFEXOR-XR  TK 1 C PO QD          Discharge Procedure Orders   Call MD for:  temperature >100.4     Call MD for:  persistent nausea and vomiting or diarrhea     Call MD for:  severe uncontrolled pain     Call MD for:  redness, tenderness, or signs of infection (pain, swelling, redness, odor or green/yellow discharge around incision site)     Call MD for:  difficulty breathing or increased cough     Call MD for:  severe persistent headache        Follow up with MD in 2-3 weeks    Discharge Procedure Orders (must include Diet, Follow-up, Activity):   Discharge Procedure Orders (must include Diet, Follow-up, Activity)   Call MD for:  temperature >100.4     Call MD for:  persistent nausea and vomiting or diarrhea     Call MD for:  severe uncontrolled pain     Call MD for:  redness, tenderness, or signs of infection (pain, swelling, redness, odor or green/yellow discharge around incision site)     Call MD for:  difficulty breathing or increased cough     Call MD for:   severe persistent headache

## 2020-05-27 NOTE — PLAN OF CARE
"Patient awake alert and states ready to go home. She denies pain ,nausea weakness or dizziness. Patient states"just tired". Vital signs and injection sites sites stable.  Patient  wheeled out to driveway, spouse present,discharge instructions given. Both pt and spouse able to teach back instructions. Spouse states he is ready to take pt home and he is driving.. All belongings listed on pre op check list have been returned to patient.  "

## 2020-05-27 NOTE — DISCHARGE INSTRUCTIONS
Before leaving, please make sure you have all your personal belongings such as glasses, purses, wallets, keys, cell phones, jewelry, jackets etc     RADIOFREQUENCY/Pain injection instructions:     This procedure may take a couple weeks to relieve pain  You may get some pain relief from the local anesthetic initally.    No driving for 24 hrs.   Activity as tolerated- gradually increase activities.  Dont lift over 10 lbs for 24 hrs   No heat at injection sites x 2 days. No heating pads, hot tubs, saunas, or swimming in any body of water or pool for 2 days.  Use ice pack for mild swelling and for comfort , apply for 20 minutes, remove for 20 minute intervals. No direct contact of ice itself  to skin.  May shower today. Do not allow shower water to beat on injection sites for 2 days. No tub baths for two days.      Resume Aspirin, Plavix, or Coumadin the day after the procedure unless otherwise instructed.   If diabetic,monitor your glucose carefully as steroids can increase your glucose level    Seek immediate medical help for:   Severe increase in your usual pain or appearance of new pain.  Prolonged (more than 8 hours) or increasing weakness or numbness in the legs or arms. Numbing medicine was injected and can affect the messages to and from the brain and legs or arms.  .    Fever above 101 ,Drainage,redness,active bleeding, or increased swelling at the injection site.  Headache, shortness of breath, chest pain, or breathing problems.       Radiofrequency of Nerves    After this procedure you may have increased pain for three days and lingering pain for up to 6 weeks.   Most patients feel better after 4-6  weeks.    Use your pain medications as needed but the goal of this treartment is to wean you off your pain medication.  You may have weakness after the procedure due to the numbing injection.  You may feel a sunburn effect and some patients may need a burn cream for the skin, but only apply after r 2 days.    Use  ice pack for discomfort :apply for 20 minutes, remove for 20 minutes for up to 2 days. Do not sleep with ice pack.  Do not use a heating pad or take tub baths or swim for 2 days.  You may shower today  Gradually increase your activities.  Dont lift anything over 10 lbs for the first 24 hours  Dont drive the day of the procedure Wait 24 hrs to drive.  Wait until tomorrow to resume any blood thinners (aspirin, Plavix, Coumadin) but you may resume all your other medications today.  If Diabetic, monitor you glucose carefully due to steroids  used for this procedure    Seek Medical Attention if you have:  Severe pain or headache  Fever or chills  Redness or swelling around the injection site   Difficulty breathing  Vomiting or Increasing numbness or weakness in arms or legs    After Surgery:  Always be aware that any surgery can cause these symptoms:    Pain- Medication can be prescribed for pain to decrease your pain but may not completely take your pain away.  Over the Counter pain medicine my be enough and you can always use Ice and rest to help ease pain.    Bleeding- a little bleeding after a surgery is usually within normal.  If there is a lot of blood you need to notify your MD.  Emergency treatments of bleeding are cold application, elevation of the bleeding site and compression.    Infection- Infection after surgery is NOT a normal occurrence.  Signs of infection are fever, swelling, hot to touch the incision.  If this occurs notify your MD immediately.    Nausea- this can be common after a surgery especially if you have had anesthesia medicine or are taking pain medicine. The steroid the Dr injected can have a side effect of Nausea.  Staying on clear liquids, bland foods, gingerale, or over the counter anti nausea medicines can help.  If you vomit more than once, notify your MD.  Anti Nausea medicines can be prescribed.

## 2020-05-27 NOTE — OP NOTE
PROCEDURE DATE: 5/27/2020    PROCEDURE:  Radiofrequency ablation of the L3,4,5 medial branch nerves on the bilateral-side utilizing fluoroscopy    DIAGNOSIS:  Other lumbar spondylosis  Post op Diagnosis: Same    PHYSICIAN: Thad Manning MD    MEDICATIONS INJECTED:  From a mixture of 6ml of 0.25% bupivicaine and 80mg of methylprednisone,  1ml of this solution was injected at each level.    LOCAL ANESTHETIC USED: Lidocaine 1%, 2 ml given at each site.    SEDATION MEDICATIONS: RN IV sedation    ESTIMATED BLOOD LOSS:  None    COMPLICATIONS:  None    TECHNIQUE:  A time out was taken to identify patient and procedure side prior to starting the procedure. Laying in a prone position, the patient was prepped and draped in the usual sterile fashion using ChloraPrep and sterile towels.  The levels were determined under fluoroscopic guidance and then marked.  Local anesthetic was given by raising a wheal at the skin over each site and then infiltrated approximately 2cm deeper.  A 20-gauge  100 mm RF needle was introduced to the anatomic location of the bilateral L3,4,5 medial branch nerves.  Motor stimulation up to 2 Volts at each level confirmed no motor nerve involvement.  Impedance was less than 800 ohms at each level.  1ml of 2% lidocaine was instilled prior to lesioning.  Ablation was performed per level utilizing radiofrequency generator 80°C for 60 seconds.  Needles were then rotated 90° and a second lesion was performed.  The above noted medication was then injected slowly. The patient tolerated the procedure well.     The patient was monitored after the procedure.  Patient was given post procedure and discharge instructions to follow at home.  The patient was discharged in a stable condition

## 2020-05-28 VITALS
RESPIRATION RATE: 16 BRPM | SYSTOLIC BLOOD PRESSURE: 134 MMHG | OXYGEN SATURATION: 100 % | DIASTOLIC BLOOD PRESSURE: 63 MMHG | HEIGHT: 61 IN | TEMPERATURE: 98 F | BODY MASS INDEX: 40.97 KG/M2 | WEIGHT: 217 LBS | HEART RATE: 65 BPM

## 2020-06-02 PROBLEM — F32.1 MODERATE MAJOR DEPRESSION, SINGLE EPISODE: Status: ACTIVE | Noted: 2020-06-02

## 2020-06-02 PROBLEM — I50.9 HEART FAILURE: Status: ACTIVE | Noted: 2020-06-02

## 2020-06-02 PROBLEM — M19.019 LOCALIZED, PRIMARY OSTEOARTHRITIS OF SHOULDER REGION: Status: ACTIVE | Noted: 2020-06-02

## 2020-06-02 PROBLEM — M19.91 PRIMARY OSTEOARTHRITIS: Status: ACTIVE | Noted: 2020-06-02

## 2020-06-02 PROBLEM — N18.30 CHRONIC RENAL INSUFFICIENCY, STAGE III (MODERATE): Status: ACTIVE | Noted: 2020-06-02

## 2020-06-02 PROBLEM — G47.00 INSOMNIA: Status: ACTIVE | Noted: 2020-06-02

## 2020-06-02 PROBLEM — J41.0 SIMPLE CHRONIC BRONCHITIS: Status: ACTIVE | Noted: 2020-06-02

## 2020-06-02 PROBLEM — G25.81 RESTLESS LEGS SYNDROME: Status: ACTIVE | Noted: 2020-06-02

## 2020-06-02 PROBLEM — E66.01 MORBID OBESITY: Status: ACTIVE | Noted: 2020-06-02

## 2020-06-02 PROBLEM — E11.42 POLYNEUROPATHY DUE TO TYPE 2 DIABETES MELLITUS: Status: ACTIVE | Noted: 2020-06-02

## 2020-06-02 PROBLEM — G47.33 OBSTRUCTIVE SLEEP APNEA SYNDROME: Status: ACTIVE | Noted: 2020-06-02

## 2020-06-02 PROBLEM — M06.9 RHEUMATOID ARTHRITIS: Status: ACTIVE | Noted: 2020-06-02

## 2020-06-03 ENCOUNTER — TELEPHONE (OUTPATIENT)
Dept: PAIN MEDICINE | Facility: CLINIC | Age: 70
End: 2020-06-03

## 2020-06-03 NOTE — TELEPHONE ENCOUNTER
----- Message from Tia Sandhu MA sent at 6/3/2020 12:55 PM CDT -----  Contact: [phone]  Pt is requesting a call back in regards of her falling after 3 days of her procs. Pt stated she can not walk and is in pain. Pt is requesting to have a pain medication called in.  Please call back discuss  Pharmacy and Phone: . [id number] [place] [id number] AT [facility] [address] [phone] (Phone)

## 2020-06-03 NOTE — TELEPHONE ENCOUNTER
Pt stated she fell 3 days after her procedure. She sat on a rolling chair and it fell out from under her. Pt ask for pain meds. I explained Dr Crews sent in pain meds on 5/28. Pt stated she was not aware.

## 2020-06-04 ENCOUNTER — TELEPHONE (OUTPATIENT)
Dept: PAIN MEDICINE | Facility: CLINIC | Age: 70
End: 2020-06-04

## 2020-06-04 NOTE — TELEPHONE ENCOUNTER
----- Message from Marley Ramos sent at 6/4/2020  4:09 PM CDT -----  Patient is returning a missed call.  Please call her at [phone].  Thank you!

## 2020-07-02 ENCOUNTER — OFFICE VISIT (OUTPATIENT)
Dept: ORTHOPEDICS | Facility: CLINIC | Age: 70
End: 2020-07-02
Payer: MEDICARE

## 2020-07-02 VITALS — TEMPERATURE: 99 F | RESPIRATION RATE: 18 BRPM | WEIGHT: 217 LBS | BODY MASS INDEX: 40.97 KG/M2 | HEIGHT: 61 IN

## 2020-07-02 DIAGNOSIS — M75.100 TEAR OF ROTATOR CUFF, UNSPECIFIED LATERALITY, UNSPECIFIED TEAR EXTENT, UNSPECIFIED WHETHER TRAUMATIC: ICD-10-CM

## 2020-07-02 DIAGNOSIS — M25.512 BILATERAL SHOULDER PAIN, UNSPECIFIED CHRONICITY: Primary | ICD-10-CM

## 2020-07-02 DIAGNOSIS — M17.12 PRIMARY OSTEOARTHRITIS OF LEFT KNEE: Primary | ICD-10-CM

## 2020-07-02 DIAGNOSIS — M25.512 LEFT SHOULDER PAIN, UNSPECIFIED CHRONICITY: Primary | ICD-10-CM

## 2020-07-02 DIAGNOSIS — M17.12 ARTHRITIS OF KNEE, LEFT: ICD-10-CM

## 2020-07-02 DIAGNOSIS — M25.511 BILATERAL SHOULDER PAIN, UNSPECIFIED CHRONICITY: Primary | ICD-10-CM

## 2020-07-02 PROCEDURE — 99213 OFFICE O/P EST LOW 20 MIN: CPT | Mod: S$GLB,,, | Performed by: ORTHOPAEDIC SURGERY

## 2020-07-02 PROCEDURE — 99213 PR OFFICE/OUTPT VISIT, EST, LEVL III, 20-29 MIN: ICD-10-PCS | Mod: S$GLB,,, | Performed by: ORTHOPAEDIC SURGERY

## 2020-07-02 PROCEDURE — 1101F PT FALLS ASSESS-DOCD LE1/YR: CPT | Mod: CPTII,S$GLB,, | Performed by: ORTHOPAEDIC SURGERY

## 2020-07-02 PROCEDURE — 3008F PR BODY MASS INDEX (BMI) DOCUMENTED: ICD-10-PCS | Mod: CPTII,S$GLB,, | Performed by: ORTHOPAEDIC SURGERY

## 2020-07-02 PROCEDURE — 1126F PR PAIN SEVERITY QUANTIFIED, NO PAIN PRESENT: ICD-10-PCS | Mod: S$GLB,,, | Performed by: ORTHOPAEDIC SURGERY

## 2020-07-02 PROCEDURE — 1159F MED LIST DOCD IN RCRD: CPT | Mod: S$GLB,,, | Performed by: ORTHOPAEDIC SURGERY

## 2020-07-02 PROCEDURE — 1126F AMNT PAIN NOTED NONE PRSNT: CPT | Mod: S$GLB,,, | Performed by: ORTHOPAEDIC SURGERY

## 2020-07-02 PROCEDURE — 3008F BODY MASS INDEX DOCD: CPT | Mod: CPTII,S$GLB,, | Performed by: ORTHOPAEDIC SURGERY

## 2020-07-02 PROCEDURE — 99999 PR PBB SHADOW E&M-EST. PATIENT-LVL IV: CPT | Mod: PBBFAC,,, | Performed by: ORTHOPAEDIC SURGERY

## 2020-07-02 PROCEDURE — 1159F PR MEDICATION LIST DOCUMENTED IN MEDICAL RECORD: ICD-10-PCS | Mod: S$GLB,,, | Performed by: ORTHOPAEDIC SURGERY

## 2020-07-02 PROCEDURE — 99999 PR PBB SHADOW E&M-EST. PATIENT-LVL IV: ICD-10-PCS | Mod: PBBFAC,,, | Performed by: ORTHOPAEDIC SURGERY

## 2020-07-02 PROCEDURE — 1101F PR PT FALLS ASSESS DOC 0-1 FALLS W/OUT INJ PAST YR: ICD-10-PCS | Mod: CPTII,S$GLB,, | Performed by: ORTHOPAEDIC SURGERY

## 2020-07-02 NOTE — PROGRESS NOTES
Past Medical History:   Diagnosis Date    Allergy     Anxiety     Arthritis, rheumatoid     Back pain     Depression     Fibromyalgia     GERD (gastroesophageal reflux disease)     High cholesterol     Hypertension     Incontinence of urine     MS (multiple sclerosis)     benign; another MD stated she has no MS    Neuropathy     Restless leg syndrome     Sciatica of left side 1/5/2018    Seasonal allergies     Sinus congestion     Thyroid disease     Wheezing        Past Surgical History:   Procedure Laterality Date    APPENDECTOMY      BREAST LUMPECTOMY      bilateral, benign    BREAST SURGERY      reduction    EPIDURAL STEROID INJECTION INTO LUMBAR SPINE N/A 6/12/2019    Procedure: Injection-steroid-epidural-lumbar;  Surgeon: Thad Manning MD;  Location: [facility] OR;  Service: Pain Management;  Laterality: N/A;  L5-S1    EPIDURAL STEROID INJECTION INTO LUMBAR SPINE N/A 9/16/2019    Procedure: Injection-steroid-epidural-lumbar;  Surgeon: Thad Manning MD;  Location: [facility] OR;  Service: Pain Management;  Laterality: N/A;  L5-S1    HYSTERECTOMY      partial - fibroids    INJECTION OF ANESTHETIC AGENT AROUND MEDIAL BRANCH NERVES INNERVATING LUMBAR FACET JOINT Bilateral 2/28/2019    Procedure: Block-nerve-medial branch-lumbar;  Surgeon: Thad Manning MD;  Location: [facility] OR;  Service: Pain Management;  Laterality: Bilateral;  L3, 4,5     INJECTION OF ANESTHETIC AGENT AROUND MEDIAL BRANCH NERVES INNERVATING LUMBAR FACET JOINT Bilateral 3/21/2019    Procedure: Block-nerve-medial branch-lumbar L3,4,5;  Surgeon: Thad Manning MD;  Location: [facility] OR;  Service: Pain Management;  Laterality: Bilateral;    RADIOFREQUENCY ABLATION OF LUMBAR MEDIAL BRANCH NERVE AT SINGLE LEVEL Bilateral 4/12/2019    Procedure: Radiofrequency Ablation, Nerve, Spinal, Lumbar, Medial Branch, 1 Level;  Surgeon: Thad Manning MD;  Location: [facility] OR;  Service: Pain Management;  Laterality: Bilateral;  L3, 4, 5  lumbar  Nirali-Hood pain  management generator   [id number]  80 degrees for 75 seconds x2    RADIOFREQUENCY ABLATION OF LUMBAR MEDIAL BRANCH NERVE AT SINGLE LEVEL Bilateral 10/22/2019    Procedure: Radiofrequency Ablation, Nerve, Spinal, Lumbar, Medial Branch, L3,4,5;  Surgeon: Thad Manning MD;  Location: [facility] OR;  Service: Pain Management;  Laterality: Bilateral;  burned at 80 degrees C X 60 seconds X 2 each site    RADIOFREQUENCY ABLATION OF LUMBAR MEDIAL BRANCH NERVE AT SINGLE LEVEL Bilateral 5/27/2020    Procedure: Radiofrequency Ablation, Nerve, Spinal, Lumbar, Medial Branch, 1 Level;  Surgeon: Thad Manning MD;  Location: [facility] OR;  Service: Pain Management;  Laterality: Bilateral;  L3,4,5    TONSILLECTOMY         Current Outpatient Medications   Medication Sig    albuterol (PROVENTIL HFA) 90 mcg/actuation inhaler Inhale 2 puffs into the lungs every 6 (six) hours as needed for Wheezing. Rescue    ALPRAZolam (XANAX) 2 MG Tab TK 1 T PO  BID    amitriptyline (ELAVIL) 10 MG tablet Take 10 mg by mouth nightly as needed for Insomnia.    amLODIPine (NORVASC) 5 MG tablet TAKE 1 TABLET BY MOUTH EVERY DAY    biotin 10,000 mcg Cap Take 13,000 mcg by mouth once daily.    celecoxib (CELEBREX) 200 MG capsule Take 200 mg by mouth once daily.    fexofenadine (ALLEGRA) 180 MG tablet Take 180 mg by mouth daily as needed.    hydrochlorothiazide (HYDRODIURIL) 25 MG tablet Take 25 mg by mouth once daily.    HYDROcodone-acetaminophen (NORCO) 7.5-325 mg per tablet Take 1 tablet by mouth every 12 (twelve) hours as needed for Pain (ONLY FOR SEVERE PAIN).    ketoconazole (NIZORAL) 2 % cream Apply topically once daily.    levothyroxine (SYNTHROID) 88 MCG tablet TK 1 T PO QD    losartan (COZAAR) 50 MG tablet TAKE 1 TABLET(50 MG) BY MOUTH EVERY DAY.    lovastatin (ALTOPREV) 40 mg 24 hr tablet Take 40 mg by mouth every evening.    memantine (NAMENDA) 10 MG Tab TK 1 T PO BID    metoprolol succinate (TOPROL-XL) 50 MG 24 hr tablet Take 50 mg by mouth  once daily.    MULTIVIT-IRON-MIN-FOLIC ACID 3,500-18-0.4 UNIT-MG-MG ORAL CHEW Take 1 tablet by mouth.    omeprazole (PRILOSEC) 20 MG capsule TK ONE C PO ONCE D    potassium chloride (MICRO-K) 10 MEQ CpSR Take 10 mEq by mouth every evening.    pramipexole (MIRAPEX) 0.5 MG tablet Take 0.5 mg by mouth once daily. Patient takes 2 at bedtime    promethazine-codeine 6.25-10 mg/5 ml (PHENERGAN WITH CODEINE) 6.25-10 mg/5 mL syrup TK 5 ML PO Q 6 H PRF COUGH    rivastigmine tartrate 4.5 mg capsule 1 capsule with food    SYMBICORT 160-4.5 mcg/actuation HFAA INL 2 PFS PO BID    valacyclovir HCl (VALACYCLOVIR ORAL) Take by mouth.    venlafaxine (EFFEXOR-XR) 150 MG Cp24 TK 1 C PO QD     No current facility-administered medications for this visit.        Review of patient's allergies indicates:   Allergen Reactions    Keflex [cephalexin]     Latex, natural rubber Anaphylaxis     bandaids     Pcn [penicillins]     Adhesive Other (See Comments)     bandainds redness at skin       Family History   Problem Relation Age of Onset    Heart disease Mother        Social History     Socioeconomic History    Marital status:      Spouse name: Not on file    Number of children: Not on file    Years of education: Not on file    Highest education level: Not on file   Occupational History    Not on file   Social Needs    Financial resource strain: Not on file    Food insecurity     Worry: Not on file     Inability: Not on file    Transportation needs     Medical: Not on file     Non-medical: Not on file   Tobacco Use    Smoking status: Former Smoker     Quit date: 1996     Years since quittin.1    Smokeless tobacco: Never Used   Substance and Sexual Activity    Alcohol use: Yes     Comment: very little     Drug use: No    Sexual activity: Not on file   Lifestyle    Physical activity     Days per week: Not on file     Minutes per session: Not on file    Stress: Not on file   Relationships    Social  connections     Talks on phone: Not on file     Gets together: Not on file     Attends Cheondoism service: Not on file     Active member of club or organization: Not on file     Attends meetings of clubs or organizations: Not on file     Relationship status: Not on file   Other Topics Concern    Not on file   Social History Narrative    Not on file       Chief Complaint:   Chief Complaint   Patient presents with    Left Shoulder - Pain    Left Knee - Pain       History of present illness:  This is a 70-year-old female seen for a new complaint of left knee pain.  Left knee feels like it wants to give out.  She has a history of problems with that knee and has had it scoped in the past.  Pain is along the medial aspect of the knee.  Up to an 8/10.  Sharp pains for the last 6 weeks.  No new injury or trauma.  I injected her left knee with cortisone back in May.  It did really last very long.  She is also complaining of left shoulder pain.  We injected that back in January and she got about 4 months of relief.    Review of Systems:    Constitution: Negative for chills, fever, and sweats.  Negative for unexplained weight loss.    HENT:  Negative for headaches and blurry vision.    Cardiovascular:Negative for chest pain or irregular heart beat. Negative for hypertension.    Respiratory:  Negative for cough and positive for shortness of breath.    Gastrointestinal: Negative for abdominal pain, heartburn, melena, nausea, and vomitting.    Genitourinary:  Negative bladder incontinence and dysuria.  Positive for urgency.    Musculoskeletal:  See HPI    Neurological:  Positive for numbness.    Psychiatric/Behavioral:  Positive for depression and anxiety    Endocrine: Negative for polyuria    Hematologic/Lymphatic: Negative for bleeding problem.  Does  bruise/bleed easily.    Skin: Negative for poor would healing and rash      Physical Examination:    Vital Signs:    Vitals:    07/02/20 1446   Resp: 18   Temp: 98.8 °F (37.1  °C)       Body mass index is 41 kg/m².    This a well-developed, well nourished patient in no acute distress.  They are alert and oriented and cooperative to examination.  Pt. walks without an antalgic gait.      Examination of the left knee shows no rashes or erythema. There are no masses ecchymosis or effusion. Patient has full range of motion from 0-130°. Patient is nontender to palpation over lateral joint line and moderately tender to palpation over the medial joint line. Patient has a - Lachman exam, - anterior drawer exam, and - posterior drawer exam. - Kevin's exam. Knee is stable to varus and valgus stress. 5 out of 5 motor strength. Palpable distal pulses. Intact light touch sensation. Negative Patellofemoral crepitus    Examination of the left shoulder shows no rashes or erythema. There are no masses, ecchymosis, or atrophy. The patient has full range of motion in forward flexion, external rotation, and internal rotation to the mid T-spine. The patient has - impingement signs. - [place]'s test. - Speeds test. Nontender to palpation over a.c. joint. Normal stability anteriorly, posteriorly, and negative sulcus sign. Passive range of motion: Forward flexion of 180°, external rotation at 90° of 90°, internal rotation of 50°, and external rotation at 0° of 50°. 2+ radial pulse. Intact axillary, radial, median and ulnar sensation. 5 out of 5 resisted forward flexion, external rotation, and negative lift off test.        X-rays:  X-rays of left knee is  reviewed which show moderate to severe medial compartment narrowing of both knees.  X-rays of the left shoulder available for review which show some sclerosis near the greater tuberosity consistent with chronic rotator cuff syndrome     Assessment::  Left moderate to severe knee arthritis  Likely left rotator cuff tear    Plan:  I reviewed the findings with her today.  I recommended an MRI of her left shoulder since a continues to bother her.  We will also  get authorization for a Euflexxa series for her left knee.  Follow-up in 2 weeks.  I encouraged her to drink some Gatorade to hopefully help with her cramping and muscle spasms.  Would need to check in with her PCP if it continued to bother her.      This note was created using ethology voice recognition software that occasionally misinterpreted phrases or words.    Consult note is delivered via Epic messaging service.

## 2020-07-08 ENCOUNTER — HOSPITAL ENCOUNTER (OUTPATIENT)
Dept: RADIOLOGY | Facility: HOSPITAL | Age: 70
Discharge: HOME | End: 2020-07-08
Attending: ORTHOPAEDIC SURGERY
Payer: MEDICARE

## 2020-07-08 DIAGNOSIS — M25.512 LEFT SHOULDER PAIN, UNSPECIFIED CHRONICITY: ICD-10-CM

## 2020-07-08 PROCEDURE — 73221 MRI JOINT UPR EXTREM W/O DYE: CPT | Mod: TC,LT

## 2020-07-08 PROCEDURE — 73221 MRI SHOULDER WITHOUT CONTRAST LEFT: ICD-10-PCS | Mod: 26,LT,, | Performed by: RADIOLOGY

## 2020-07-08 PROCEDURE — 73221 MRI JOINT UPR EXTREM W/O DYE: CPT | Mod: 26,LT,, | Performed by: RADIOLOGY

## 2020-07-10 ENCOUNTER — TELEPHONE (OUTPATIENT)
Dept: ORTHOPEDICS | Facility: CLINIC | Age: 70
End: 2020-07-10

## 2020-07-10 NOTE — TELEPHONE ENCOUNTER
----- Message from Rio Pitts MD sent at 7/9/2020  7:04 AM CDT -----  Results noted. Pt needs appt to discuss results and treatment options.

## 2020-07-10 NOTE — TELEPHONE ENCOUNTER
Patient requested to receive her MRI results. She insisted on being added to Dr. Pitts's schedule on Monday, and stated she refuses to wait for any later date to receive her results. Patient added to schedule for 7- at 02:45 pm.

## 2020-07-10 NOTE — TELEPHONE ENCOUNTER
----- Message from Thad Alfaro sent at 7/10/2020  3:06 PM CDT -----  Regarding: Pre MRI questions  Contact: pt  [phone]  Please call

## 2020-07-13 ENCOUNTER — OFFICE VISIT (OUTPATIENT)
Dept: ORTHOPEDICS | Facility: CLINIC | Age: 70
End: 2020-07-13
Payer: MEDICARE

## 2020-07-13 VITALS — HEIGHT: 61 IN | WEIGHT: 217 LBS | TEMPERATURE: 99 F | RESPIRATION RATE: 18 BRPM | BODY MASS INDEX: 40.97 KG/M2

## 2020-07-13 DIAGNOSIS — M75.100 TEAR OF ROTATOR CUFF, UNSPECIFIED LATERALITY, UNSPECIFIED TEAR EXTENT, UNSPECIFIED WHETHER TRAUMATIC: Primary | ICD-10-CM

## 2020-07-13 PROCEDURE — 99213 PR OFFICE/OUTPT VISIT, EST, LEVL III, 20-29 MIN: ICD-10-PCS | Mod: S$GLB,,, | Performed by: ORTHOPAEDIC SURGERY

## 2020-07-13 PROCEDURE — 99999 PR PBB SHADOW E&M-EST. PATIENT-LVL V: ICD-10-PCS | Mod: PBBFAC,,, | Performed by: ORTHOPAEDIC SURGERY

## 2020-07-13 PROCEDURE — 3008F BODY MASS INDEX DOCD: CPT | Mod: CPTII,S$GLB,, | Performed by: ORTHOPAEDIC SURGERY

## 2020-07-13 PROCEDURE — 1101F PT FALLS ASSESS-DOCD LE1/YR: CPT | Mod: CPTII,S$GLB,, | Performed by: ORTHOPAEDIC SURGERY

## 2020-07-13 PROCEDURE — 99213 OFFICE O/P EST LOW 20 MIN: CPT | Mod: S$GLB,,, | Performed by: ORTHOPAEDIC SURGERY

## 2020-07-13 PROCEDURE — 3008F PR BODY MASS INDEX (BMI) DOCUMENTED: ICD-10-PCS | Mod: CPTII,S$GLB,, | Performed by: ORTHOPAEDIC SURGERY

## 2020-07-13 PROCEDURE — 99999 PR PBB SHADOW E&M-EST. PATIENT-LVL V: CPT | Mod: PBBFAC,,, | Performed by: ORTHOPAEDIC SURGERY

## 2020-07-13 PROCEDURE — 1125F AMNT PAIN NOTED PAIN PRSNT: CPT | Mod: S$GLB,,, | Performed by: ORTHOPAEDIC SURGERY

## 2020-07-13 PROCEDURE — 1101F PR PT FALLS ASSESS DOC 0-1 FALLS W/OUT INJ PAST YR: ICD-10-PCS | Mod: CPTII,S$GLB,, | Performed by: ORTHOPAEDIC SURGERY

## 2020-07-13 PROCEDURE — 1159F MED LIST DOCD IN RCRD: CPT | Mod: S$GLB,,, | Performed by: ORTHOPAEDIC SURGERY

## 2020-07-13 PROCEDURE — 1159F PR MEDICATION LIST DOCUMENTED IN MEDICAL RECORD: ICD-10-PCS | Mod: S$GLB,,, | Performed by: ORTHOPAEDIC SURGERY

## 2020-07-13 PROCEDURE — 1125F PR PAIN SEVERITY QUANTIFIED, PAIN PRESENT: ICD-10-PCS | Mod: S$GLB,,, | Performed by: ORTHOPAEDIC SURGERY

## 2020-07-15 NOTE — PROGRESS NOTES
Past Medical History:   Diagnosis Date    Allergy     Anxiety     Arthritis, rheumatoid     Back pain     Depression     Fibromyalgia     GERD (gastroesophageal reflux disease)     High cholesterol     Hypertension     Incontinence of urine     MS (multiple sclerosis)     benign; another MD stated she has no MS    Neuropathy     Restless leg syndrome     Sciatica of left side 1/5/2018    Seasonal allergies     Sinus congestion     Thyroid disease     Wheezing        Past Surgical History:   Procedure Laterality Date    APPENDECTOMY      BREAST LUMPECTOMY      bilateral, benign    BREAST SURGERY      reduction    EPIDURAL STEROID INJECTION INTO LUMBAR SPINE N/A 6/12/2019    Procedure: Injection-steroid-epidural-lumbar;  Surgeon: Thad Manning MD;  Location: [facility] OR;  Service: Pain Management;  Laterality: N/A;  L5-S1    EPIDURAL STEROID INJECTION INTO LUMBAR SPINE N/A 9/16/2019    Procedure: Injection-steroid-epidural-lumbar;  Surgeon: Thad Manning MD;  Location: [facility] OR;  Service: Pain Management;  Laterality: N/A;  L5-S1    HYSTERECTOMY      partial - fibroids    INJECTION OF ANESTHETIC AGENT AROUND MEDIAL BRANCH NERVES INNERVATING LUMBAR FACET JOINT Bilateral 2/28/2019    Procedure: Block-nerve-medial branch-lumbar;  Surgeon: Thad Manning MD;  Location: [facility] OR;  Service: Pain Management;  Laterality: Bilateral;  L3, 4,5     INJECTION OF ANESTHETIC AGENT AROUND MEDIAL BRANCH NERVES INNERVATING LUMBAR FACET JOINT Bilateral 3/21/2019    Procedure: Block-nerve-medial branch-lumbar L3,4,5;  Surgeon: Thad Manning MD;  Location: [facility] OR;  Service: Pain Management;  Laterality: Bilateral;    RADIOFREQUENCY ABLATION OF LUMBAR MEDIAL BRANCH NERVE AT SINGLE LEVEL Bilateral 4/12/2019    Procedure: Radiofrequency Ablation, Nerve, Spinal, Lumbar, Medial Branch, 1 Level;  Surgeon: Thad Manning MD;  Location: [facility] OR;  Service: Pain Management;  Laterality: Bilateral;  L3, 4, 5  lumbar  Nirali-Hood pain  management generator   [id number]  80 degrees for 75 seconds x2    RADIOFREQUENCY ABLATION OF LUMBAR MEDIAL BRANCH NERVE AT SINGLE LEVEL Bilateral 10/22/2019    Procedure: Radiofrequency Ablation, Nerve, Spinal, Lumbar, Medial Branch, L3,4,5;  Surgeon: Thad Manning MD;  [facility]  Service: Pain Management;  Laterality: Bilateral;  burned at 80 degrees C X 60 seconds X 2 each site    RADIOFREQUENCY ABLATION OF LUMBAR MEDIAL BRANCH NERVE AT SINGLE LEVEL Bilateral 5/27/2020    Procedure: Radiofrequency Ablation, Nerve, Spinal, Lumbar, Medial Branch, 1 Level;  Surgeon: Thad Manning MD;  [facility]  Service: Pain Management;  Laterality: Bilateral;  L3,4,5    TONSILLECTOMY         Current Outpatient Medications   Medication Sig    albuterol (PROVENTIL HFA) 90 mcg/actuation inhaler Inhale 2 puffs into the lungs every 6 (six) hours as needed for Wheezing. Rescue    ALPRAZolam (XANAX) 2 MG Tab TK 1 T PO  BID    amitriptyline (ELAVIL) 10 MG tablet Take 10 mg by mouth nightly as needed for Insomnia.    amLODIPine (NORVASC) 5 MG tablet TAKE 1 TABLET BY MOUTH EVERY DAY    biotin 10,000 mcg Cap Take 13,000 mcg by mouth once daily.    celecoxib (CELEBREX) 200 MG capsule Take 200 mg by mouth once daily.    fexofenadine (ALLEGRA) 180 MG tablet Take 180 mg by mouth daily as needed.    hydrochlorothiazide (HYDRODIURIL) 25 MG tablet Take 25 mg by mouth once daily.    HYDROcodone-acetaminophen (NORCO) 7.5-325 mg per tablet Take 1 tablet by mouth every 12 (twelve) hours as needed for Pain (ONLY FOR SEVERE PAIN).    ketoconazole (NIZORAL) 2 % cream Apply topically once daily.    levothyroxine (SYNTHROID) 88 MCG tablet TK 1 T PO QD    losartan (COZAAR) 50 MG tablet TAKE 1 TABLET(50 MG) BY MOUTH EVERY DAY.    lovastatin (ALTOPREV) 40 mg 24 hr tablet Take 40 mg by mouth every evening.    memantine (NAMENDA) 10 MG Tab TK 1 T PO BID    metoprolol succinate (TOPROL-XL) 50 MG 24 hr tablet Take 50 mg by mouth  once daily.    MULTIVIT-IRON-MIN-FOLIC ACID 3,500-18-0.4 UNIT-MG-MG ORAL CHEW Take 1 tablet by mouth.    omeprazole (PRILOSEC) 20 MG capsule TK ONE C PO ONCE D    potassium chloride (MICRO-K) 10 MEQ CpSR Take 10 mEq by mouth every evening.    pramipexole (MIRAPEX) 0.5 MG tablet Take 0.5 mg by mouth once daily. Patient takes 2 at bedtime    promethazine-codeine 6.25-10 mg/5 ml (PHENERGAN WITH CODEINE) 6.25-10 mg/5 mL syrup TK 5 ML PO Q 6 H PRF COUGH    rivastigmine tartrate 4.5 mg capsule 1 capsule with food    SYMBICORT 160-4.5 mcg/actuation HFAA INL 2 PFS PO BID    valacyclovir HCl (VALACYCLOVIR ORAL) Take by mouth.    venlafaxine (EFFEXOR-XR) 150 MG Cp24 TK 1 C PO QD     Current Facility-Administered Medications   Medication    [START ON 2020] sodium hyaluronate (EUFLEXXA) 10 mg/mL(mw 2.4 -3.6 million) injection 20 mg       Review of patient's allergies indicates:   Allergen Reactions    Keflex [cephalexin]     Latex, natural rubber Anaphylaxis     bandaids     Pcn [penicillins]     Adhesive Other (See Comments)     bandainds redness at skin       Family History   Problem Relation Age of Onset    Heart disease Mother        Social History     Socioeconomic History    Marital status:      Spouse name: Not on file    Number of children: Not on file    Years of education: Not on file    Highest education level: Not on file   Occupational History    Not on file   Social Needs    Financial resource strain: Not on file    Food insecurity     Worry: Not on file     Inability: Not on file    Transportation needs     Medical: Not on file     Non-medical: Not on file   Tobacco Use    Smoking status: Former Smoker     Quit date: 1996     Years since quittin.1    Smokeless tobacco: Never Used   Substance and Sexual Activity    Alcohol use: Yes     Comment: very little     Drug use: No    Sexual activity: Not on file   Lifestyle    Physical activity     Days per week: Not on  file     Minutes per session: Not on file    Stress: Not on file   Relationships    Social connections     Talks on phone: Not on file     Gets together: Not on file     Attends Episcopalian service: Not on file     Active member of club or organization: Not on file     Attends meetings of clubs or organizations: Not on file     Relationship status: Not on file   Other Topics Concern    Not on file   Social History Narrative    Not on file       Chief Complaint:   Chief Complaint   Patient presents with    Left Shoulder - Pain, Follow-up, Results       History of present illness:  This is a 70-year-old female seen for a new complaint of left knee pain.  Left knee feels like it wants to give out.  She has a history of problems with that knee and has had it scoped in the past.  Pain is along the medial aspect of the knee.  Up to an 8/10.  Sharp pains for the last 6 weeks.  No new injury or trauma.  I injected her left knee with cortisone back in May.  It did really last very long.  She is also complaining of left shoulder pain.  We injected that back in January and she got about 4 months of relief.  MRI just showed a very small rotator cuff tear    Review of Systems:    Constitution: Negative for chills, fever, and sweats.  Negative for unexplained weight loss.    HENT:  Negative for headaches and blurry vision.    Cardiovascular:Negative for chest pain or irregular heart beat. Negative for hypertension.    Respiratory:  Negative for cough and positive for shortness of breath.    Gastrointestinal: Negative for abdominal pain, heartburn, melena, nausea, and vomitting.    Genitourinary:  Negative bladder incontinence and dysuria.  Positive for urgency.    Musculoskeletal:  See HPI    Neurological:  Positive for numbness.    Psychiatric/Behavioral:  Positive for depression and anxiety    Endocrine: Negative for polyuria    Hematologic/Lymphatic: Negative for bleeding problem.  Does  bruise/bleed easily.    Skin:  Negative for poor would healing and rash      Physical Examination:    Vital Signs:    Vitals:    07/13/20 1438   Resp: 18   Temp: 98.7 °F (37.1 °C)       Body mass index is 41 kg/m².    This a well-developed, well nourished patient in no acute distress.  They are alert and oriented and cooperative to examination.  Pt. walks without an antalgic gait.      Examination of the left knee shows no rashes or erythema. There are no masses ecchymosis or effusion. Patient has full range of motion from 0-130°. Patient is nontender to palpation over lateral joint line and moderately tender to palpation over the medial joint line. Patient has a - Lachman exam, - anterior drawer exam, and - posterior drawer exam. - Kevin's exam. Knee is stable to varus and valgus stress. 5 out of 5 motor strength. Palpable distal pulses. Intact light touch sensation. Negative Patellofemoral crepitus    Examination of the left shoulder shows no rashes or erythema. There are no masses, ecchymosis, or atrophy. The patient has full range of motion in forward flexion, external rotation, and internal rotation to the mid T-spine. The patient has - impingement signs. - [place]'s test. - Speeds test. Nontender to palpation over a.c. joint. Normal stability anteriorly, posteriorly, and negative sulcus sign. Passive range of motion: Forward flexion of 180°, external rotation at 90° of 90°, internal rotation of 50°, and external rotation at 0° of 50°. 2+ radial pulse. Intact axillary, radial, median and ulnar sensation. 5 out of 5 resisted forward flexion, external rotation, and negative lift off test.        X-rays:  X-rays of left knee is  reviewed which show moderate to severe medial compartment narrowing of both knees.  X-rays of the left shoulder available for review which show some sclerosis near the greater tuberosity consistent with chronic rotator cuff syndrome    MRI of the left shoulder:Small partial-thickness tear of the supraspinatus tendon.   Rotator cuff tendinosis ranging from mild to moderate.  Small glenohumeral joint effusion.  Mild subacromial subdeltoid bursitis.     Assessment::  Left moderate to severe knee arthritis  Small left rotator cuff tear    Plan:  I reviewed the findings with her today.  I recommended some formal physical therapy to treat the rotator cuff tear.  She declined and wished to have just an exercise guide that she can do on her own.  I will see her back in 2 months.    This note was created using [employer] voice recognition software that occasionally misinterpreted phrases or words.    Consult note is delivered via Epic messaging service.

## 2020-07-21 ENCOUNTER — TELEPHONE (OUTPATIENT)
Dept: ORTHOPEDICS | Facility: CLINIC | Age: 70
End: 2020-07-21

## 2020-07-21 NOTE — TELEPHONE ENCOUNTER
Called and spoke with patient and she advised that her insurance company advised her that clinic notes were not sent over for approval for her injections. She stated that they did not know that she received an injection of steroid back in May. She also advised that she has been doing a HEP and been taking pain medication, Ibuprofen and Tylenol with no relief. Advised her that I will contact her insurance and see what we need to send over for an appeal. She verbalized understanding.

## 2020-07-21 NOTE — TELEPHONE ENCOUNTER
----- Message from Genny Murphy MA sent at 7/21/2020 12:50 PM CDT -----  Contact: pt  Call back    Insurance denial letter

## 2020-07-22 ENCOUNTER — TELEPHONE (OUTPATIENT)
Dept: ORTHOPEDICS | Facility: CLINIC | Age: 70
End: 2020-07-22

## 2020-07-22 NOTE — TELEPHONE ENCOUNTER
Called and left message for patient to return call. Also advised that we have peer to peer review set up today at 2pm with her insurance. We should know a decision to her injection appeal after Dr. Pitts speaks with the reviewer. Advised that I will contact her back after the review.

## 2020-07-22 NOTE — TELEPHONE ENCOUNTER
----- Message from Genny Murphy MA sent at 7/22/2020 10:29 AM CDT -----  Contact: pt  Questions on appeal,   [employer] called her, no expedited appeal   Will take 7-10 days   Call back

## 2020-07-30 ENCOUNTER — OFFICE VISIT (OUTPATIENT)
Dept: ORTHOPEDICS | Facility: CLINIC | Age: 70
End: 2020-07-30
Payer: MEDICARE

## 2020-07-30 VITALS — TEMPERATURE: 98 F | BODY MASS INDEX: 44.37 KG/M2 | HEIGHT: 60 IN | RESPIRATION RATE: 18 BRPM | WEIGHT: 226 LBS

## 2020-07-30 DIAGNOSIS — M17.12 PRIMARY OSTEOARTHRITIS OF LEFT KNEE: Primary | ICD-10-CM

## 2020-07-30 PROCEDURE — 99499 UNLISTED E&M SERVICE: CPT | Mod: S$GLB,,, | Performed by: ORTHOPAEDIC SURGERY

## 2020-07-30 PROCEDURE — 20610 DRAIN/INJ JOINT/BURSA W/O US: CPT | Mod: LT,S$GLB,, | Performed by: ORTHOPAEDIC SURGERY

## 2020-07-30 PROCEDURE — 99999 PR PBB SHADOW E&M-EST. PATIENT-LVL III: CPT | Mod: PBBFAC,,, | Performed by: ORTHOPAEDIC SURGERY

## 2020-07-30 PROCEDURE — 99999 PR PBB SHADOW E&M-EST. PATIENT-LVL III: ICD-10-PCS | Mod: PBBFAC,,, | Performed by: ORTHOPAEDIC SURGERY

## 2020-07-30 PROCEDURE — 99499 NO LOS: ICD-10-PCS | Mod: S$GLB,,, | Performed by: ORTHOPAEDIC SURGERY

## 2020-07-30 PROCEDURE — 20610 LARGE JOINT ASPIRATION/INJECTION: L KNEE: ICD-10-PCS | Mod: LT,S$GLB,, | Performed by: ORTHOPAEDIC SURGERY

## 2020-07-30 NOTE — PROCEDURES
Large Joint Aspiration/Injection: L knee    Date/Time: 7/30/2020 1:15 PM  Performed by: Rio Pitts MD  Authorized by: Rio Pitts MD     Consent Done?:  Yes (Verbal)  Indications:  Pain  Site marked: the procedure site was marked    Timeout: prior to procedure the correct patient, procedure, and site was verified      Details:  Needle Size:  20 G  Approach:  Anterolateral  Location:  Knee  Site:  L knee  Medications:  20 mg sodium hyaluronate (EUFLEXXA) 10 mg/mL(mw 2.4 -3.6 million)  Patient tolerance:  Patient tolerated the procedure well with no immediate complications

## 2020-07-30 NOTE — PROGRESS NOTES
Past Medical History:   Diagnosis Date    Allergy     Anxiety     Arthritis, rheumatoid     Back pain     Depression     Fibromyalgia     GERD (gastroesophageal reflux disease)     High cholesterol     Hypertension     Incontinence of urine     MS (multiple sclerosis)     benign; another MD stated she has no MS    Neuropathy     Restless leg syndrome     Sciatica of left side 1/5/2018    Seasonal allergies     Sinus congestion     Thyroid disease     Wheezing        Past Surgical History:   Procedure Laterality Date    APPENDECTOMY      BREAST LUMPECTOMY      bilateral, benign    BREAST SURGERY      reduction    EPIDURAL STEROID INJECTION INTO LUMBAR SPINE N/A 6/12/2019    Procedure: Injection-steroid-epidural-lumbar;  Surgeon: Thad Manning MD;  Location: [facility] OR;  Service: Pain Management;  Laterality: N/A;  L5-S1    EPIDURAL STEROID INJECTION INTO LUMBAR SPINE N/A 9/16/2019    Procedure: Injection-steroid-epidural-lumbar;  Surgeon: Thad Manning MD;  Location: [facility] OR;  Service: Pain Management;  Laterality: N/A;  L5-S1    HYSTERECTOMY      partial - fibroids    INJECTION OF ANESTHETIC AGENT AROUND MEDIAL BRANCH NERVES INNERVATING LUMBAR FACET JOINT Bilateral 2/28/2019    Procedure: Block-nerve-medial branch-lumbar;  Surgeon: Thad Manning MD;  Location: [facility];  Service: Pain Management;  Laterality: Bilateral;  L3, 4,5     INJECTION OF ANESTHETIC AGENT AROUND MEDIAL BRANCH NERVES INNERVATING LUMBAR FACET JOINT Bilateral 3/21/2019    Procedure: Block-nerve-medial branch-lumbar L3,4,5;  Surgeon: Thad Manning MD;  Location: [facility];  Service: Pain Management;  Laterality: Bilateral;    RADIOFREQUENCY ABLATION OF LUMBAR MEDIAL BRANCH NERVE AT SINGLE LEVEL Bilateral 4/12/2019    Procedure: Radiofrequency Ablation, Nerve, Spinal, Lumbar, Medial Branch, 1 Level;  Surgeon: Thad Manning MD;  Location: [facility];  Service: Pain Management;  Laterality: Bilateral;  L3, 4, 5  lumbar  Nirali-Hood pain  management generator   [id number]  80 degrees for 75 seconds x2    RADIOFREQUENCY ABLATION OF LUMBAR MEDIAL BRANCH NERVE AT SINGLE LEVEL Bilateral 10/22/2019    Procedure: Radiofrequency Ablation, Nerve, Spinal, Lumbar, Medial Branch, L3,4,5;  Surgeon: Thad Manning MD;  Location: [facility] OR;  Service: Pain Management;  Laterality: Bilateral;  burned at 80 degrees C X 60 seconds X 2 each site    RADIOFREQUENCY ABLATION OF LUMBAR MEDIAL BRANCH NERVE AT SINGLE LEVEL Bilateral 5/27/2020    Procedure: Radiofrequency Ablation, Nerve, Spinal, Lumbar, Medial Branch, 1 Level;  Surgeon: Thad Manning MD;  Location: [facility] OR;  Service: Pain Management;  Laterality: Bilateral;  L3,4,5    TONSILLECTOMY         Current Outpatient Medications   Medication Sig    albuterol (PROVENTIL HFA) 90 mcg/actuation inhaler Inhale 2 puffs into the lungs every 6 (six) hours as needed for Wheezing. Rescue    ALPRAZolam (XANAX) 2 MG Tab TK 1 T PO  BID    amitriptyline (ELAVIL) 10 MG tablet Take 10 mg by mouth nightly as needed for Insomnia.    amLODIPine (NORVASC) 5 MG tablet TAKE 1 TABLET BY MOUTH EVERY DAY    biotin 10,000 mcg Cap Take 13,000 mcg by mouth once daily.    celecoxib (CELEBREX) 200 MG capsule Take 200 mg by mouth once daily.    hydrochlorothiazide (HYDRODIURIL) 25 MG tablet Take 25 mg by mouth once daily.    HYDROcodone-acetaminophen (NORCO) 7.5-325 mg per tablet Take 1 tablet by mouth every 12 (twelve) hours as needed for Pain (ONLY FOR SEVERE PAIN).    levothyroxine (SYNTHROID) 88 MCG tablet TK 1 T PO QD    losartan (COZAAR) 50 MG tablet TAKE 1 TABLET(50 MG) BY MOUTH EVERY DAY.    lovastatin (ALTOPREV) 40 mg 24 hr tablet Take 40 mg by mouth every evening.    memantine (NAMENDA) 10 MG Tab Take 1 tablet by mouth 2 (two) times a day.    metoprolol succinate (TOPROL-XL) 50 MG 24 hr tablet TAKE 1 TABLET BY MOUTH EVERY DAY    MULTIVIT-IRON-MIN-FOLIC ACID 3,500-18-0.4 UNIT-MG-MG ORAL CHEW Take 1 tablet by mouth.     omeprazole (PRILOSEC) 20 MG capsule TK ONE C PO ONCE D    potassium chloride (MICRO-K) 10 MEQ CpSR Take 10 mEq by mouth every evening.    potassium chloride SA (K-DUR,KLOR-CON) 10 MEQ tablet TAKE 1 TABLET BY MOUTH EVERY DAY    pramipexole (MIRAPEX) 0.5 MG tablet Take 0.5 mg by mouth once daily. Patient takes 2 at bedtime    promethazine-codeine 6.25-10 mg/5 ml (PHENERGAN WITH CODEINE) 6.25-10 mg/5 mL syrup TK 5 ML PO Q 6 H PRF COUGH    rivastigmine tartrate 4.5 mg capsule 1 capsule with food    SYMBICORT 160-4.5 mcg/actuation HFAA INL 2 PFS PO BID    valacyclovir HCl (VALACYCLOVIR ORAL) Take by mouth.    venlafaxine (EFFEXOR-XR) 150 MG Cp24 TK 1 C PO QD     Current Facility-Administered Medications   Medication    sodium hyaluronate (EUFLEXXA) 10 mg/mL(mw 2.4 -3.6 million) injection 20 mg       Review of patient's allergies indicates:   Allergen Reactions    Keflex [cephalexin]     Latex, natural rubber Anaphylaxis     bandaids     Pcn [penicillins]     Adhesive Other (See Comments)     bandainds redness at skin       Family History   Problem Relation Age of Onset    Heart disease Mother        Social History     Socioeconomic History    Marital status:      Spouse name: Not on file    Number of children: Not on file    Years of education: Not on file    Highest education level: Not on file   Occupational History    Not on file   Social Needs    Financial resource strain: Not on file    Food insecurity     Worry: Not on file     Inability: Not on file    Transportation needs     Medical: Not on file     Non-medical: Not on file   Tobacco Use    Smoking status: Former Smoker     Quit date: 1996     Years since quittin.1    Smokeless tobacco: Never Used   Substance and Sexual Activity    Alcohol use: Yes     Comment: very little     Drug use: No    Sexual activity: Not on file   Lifestyle    Physical activity     Days per week: Not on file     Minutes per session: Not on  file    Stress: Not on file   Relationships    Social connections     Talks on phone: Not on file     Gets together: Not on file     Attends Zoroastrianism service: Not on file     Active member of club or organization: Not on file     Attends meetings of clubs or organizations: Not on file     Relationship status: Not on file   Other Topics Concern    Not on file   Social History Narrative    Not on file       Chief Complaint:   Chief Complaint   Patient presents with    Knee Pain     left knee Euflexxa 1/3       History of present illness:  This is a 70-year-old female seen for a new complaint of left knee pain.  Left knee feels like it wants to give out.  She has a history of problems with that knee and has had it scoped in the past.  Pain is along the medial aspect of the knee.  Up to an 8/10.  Sharp pains for the last 6 weeks.  No new injury or trauma.  I injected her left knee with cortisone back in May.  It did really last very long.  She is also complaining of left shoulder pain.  We injected that back in January and she got about 4 months of relief.  MRI just showed a very small rotator cuff tear    Review of Systems:    Constitution: Negative for chills, fever, and sweats.  Negative for unexplained weight loss.    HENT:  Negative for headaches and blurry vision.    Cardiovascular:Negative for chest pain or irregular heart beat. Negative for hypertension.    Respiratory:  Negative for cough and positive for shortness of breath.    Gastrointestinal: Negative for abdominal pain, heartburn, melena, nausea, and vomitting.    Genitourinary:  Negative bladder incontinence and dysuria.  Positive for urgency.    Musculoskeletal:  See HPI    Neurological:  Positive for numbness.    Psychiatric/Behavioral:  Positive for depression and anxiety    Endocrine: Negative for polyuria    Hematologic/Lymphatic: Negative for bleeding problem.  Does  bruise/bleed easily.    Skin: Negative for poor would healing and  rash      Physical Examination:    Vital Signs:    Vitals:    07/30/20 1245   Resp: 18   Temp: 98.2 °F (36.8 °C)       Body mass index is 44.14 kg/m².    This a well-developed, well nourished patient in no acute distress.  They are alert and oriented and cooperative to examination.  Pt. walks without an antalgic gait.      Examination of the left knee shows no rashes or erythema. There are no masses ecchymosis or effusion. Patient has full range of motion from 0-130°. Patient is nontender to palpation over lateral joint line and moderately tender to palpation over the medial joint line. Patient has a - Lachman exam, - anterior drawer exam, and - posterior drawer exam. - Kevin's exam. Knee is stable to varus and valgus stress. 5 out of 5 motor strength. Palpable distal pulses. Intact light touch sensation. Negative Patellofemoral crepitus    Examination of the left shoulder shows no rashes or erythema. There are no masses, ecchymosis, or atrophy. The patient has full range of motion in forward flexion, external rotation, and internal rotation to the mid T-spine. The patient has - impingement signs. - Maricao's test. - Speeds test. Nontender to palpation over a.c. joint. Normal stability anteriorly, posteriorly, and negative sulcus sign. Passive range of motion: Forward flexion of 180°, external rotation at 90° of 90°, internal rotation of 50°, and external rotation at 0° of 50°. 2+ radial pulse. Intact axillary, radial, median and ulnar sensation. 5 out of 5 resisted forward flexion, external rotation, and negative lift off test.        X-rays:  X-rays of left knee is  reviewed which show moderate to severe medial compartment narrowing of both knees.  X-rays of the left shoulder available for review which show some sclerosis near the greater tuberosity consistent with chronic rotator cuff syndrome    MRI of the left shoulder:Small partial-thickness tear of the supraspinatus tendon.  Rotator cuff tendinosis ranging  from mild to moderate.  Small glenohumeral joint effusion.  Mild subacromial subdeltoid bursitis.     Assessment::  Left moderate to severe knee arthritis  Small left rotator cuff tear    Plan:  I injected her left knee today with Euflexxa 1 of 3.  Follow up next week.    This note was created using [employer] voice recognition software that occasionally misinterpreted phrases or words.    Consult note is delivered via Epic messaging service.

## 2020-08-06 ENCOUNTER — OFFICE VISIT (OUTPATIENT)
Dept: ORTHOPEDICS | Facility: CLINIC | Age: 70
End: 2020-08-06
Payer: MEDICARE

## 2020-08-06 VITALS — BODY MASS INDEX: 44.37 KG/M2 | HEIGHT: 60 IN | RESPIRATION RATE: 16 BRPM | WEIGHT: 226 LBS

## 2020-08-06 DIAGNOSIS — M17.10 ARTHRITIS OF KNEE: Primary | ICD-10-CM

## 2020-08-06 DIAGNOSIS — M75.100 TEAR OF ROTATOR CUFF, UNSPECIFIED LATERALITY, UNSPECIFIED TEAR EXTENT, UNSPECIFIED WHETHER TRAUMATIC: ICD-10-CM

## 2020-08-06 PROCEDURE — 1159F MED LIST DOCD IN RCRD: CPT | Mod: S$GLB,,, | Performed by: ORTHOPAEDIC SURGERY

## 2020-08-06 PROCEDURE — 20610 DRAIN/INJ JOINT/BURSA W/O US: CPT | Mod: LT,S$GLB,, | Performed by: ORTHOPAEDIC SURGERY

## 2020-08-06 PROCEDURE — 1126F PR PAIN SEVERITY QUANTIFIED, NO PAIN PRESENT: ICD-10-PCS | Mod: S$GLB,,, | Performed by: ORTHOPAEDIC SURGERY

## 2020-08-06 PROCEDURE — 1159F PR MEDICATION LIST DOCUMENTED IN MEDICAL RECORD: ICD-10-PCS | Mod: S$GLB,,, | Performed by: ORTHOPAEDIC SURGERY

## 2020-08-06 PROCEDURE — 99213 OFFICE O/P EST LOW 20 MIN: CPT | Mod: 25,S$GLB,, | Performed by: ORTHOPAEDIC SURGERY

## 2020-08-06 PROCEDURE — 1126F AMNT PAIN NOTED NONE PRSNT: CPT | Mod: S$GLB,,, | Performed by: ORTHOPAEDIC SURGERY

## 2020-08-06 PROCEDURE — 99213 PR OFFICE/OUTPT VISIT, EST, LEVL III, 20-29 MIN: ICD-10-PCS | Mod: 25,S$GLB,, | Performed by: ORTHOPAEDIC SURGERY

## 2020-08-06 PROCEDURE — 1101F PT FALLS ASSESS-DOCD LE1/YR: CPT | Mod: CPTII,S$GLB,, | Performed by: ORTHOPAEDIC SURGERY

## 2020-08-06 PROCEDURE — 99999 PR PBB SHADOW E&M-EST. PATIENT-LVL III: ICD-10-PCS | Mod: PBBFAC,,, | Performed by: ORTHOPAEDIC SURGERY

## 2020-08-06 PROCEDURE — 3008F PR BODY MASS INDEX (BMI) DOCUMENTED: ICD-10-PCS | Mod: CPTII,S$GLB,, | Performed by: ORTHOPAEDIC SURGERY

## 2020-08-06 PROCEDURE — 3008F BODY MASS INDEX DOCD: CPT | Mod: CPTII,S$GLB,, | Performed by: ORTHOPAEDIC SURGERY

## 2020-08-06 PROCEDURE — 20610 LARGE JOINT ASPIRATION/INJECTION: L KNEE: ICD-10-PCS | Mod: LT,S$GLB,, | Performed by: ORTHOPAEDIC SURGERY

## 2020-08-06 PROCEDURE — 99999 PR PBB SHADOW E&M-EST. PATIENT-LVL III: CPT | Mod: PBBFAC,,, | Performed by: ORTHOPAEDIC SURGERY

## 2020-08-06 PROCEDURE — 1101F PR PT FALLS ASSESS DOC 0-1 FALLS W/OUT INJ PAST YR: ICD-10-PCS | Mod: CPTII,S$GLB,, | Performed by: ORTHOPAEDIC SURGERY

## 2020-08-06 RX ORDER — TRIAMCINOLONE ACETONIDE 40 MG/ML
40 INJECTION, SUSPENSION INTRA-ARTICULAR; INTRAMUSCULAR
Status: DISCONTINUED | OUTPATIENT
Start: 2020-08-06 | End: 2020-08-06 | Stop reason: HOSPADM

## 2020-08-06 RX ADMIN — TRIAMCINOLONE ACETONIDE 40 MG: 40 INJECTION, SUSPENSION INTRA-ARTICULAR; INTRAMUSCULAR at 11:08

## 2020-08-06 NOTE — PROCEDURES
Large Joint Aspiration/Injection: L knee    Date/Time: 8/6/2020 11:00 AM  Performed by: Rio Pitts MD  Authorized by: Rio Pitts MD     Consent Done?:  Yes (Verbal)  Indications:  Pain  Site marked: the procedure site was marked    Timeout: prior to procedure the correct patient, procedure, and site was verified      Details:  Needle Size:  20 G  Approach:  Anterolateral  Location:  Knee  Site:  L knee  Medications:  20 mg sodium hyaluronate (EUFLEXXA) 10 mg/mL(mw 2.4 -3.6 million)  Patient tolerance:  Patient tolerated the procedure well with no immediate complications

## 2020-08-17 ENCOUNTER — OFFICE VISIT (OUTPATIENT)
Dept: ORTHOPEDICS | Facility: CLINIC | Age: 70
End: 2020-08-17
Payer: MEDICARE

## 2020-08-17 VITALS — HEIGHT: 60 IN | RESPIRATION RATE: 16 BRPM | WEIGHT: 226 LBS | BODY MASS INDEX: 44.37 KG/M2

## 2020-08-17 DIAGNOSIS — M17.10 ARTHRITIS OF KNEE: Primary | ICD-10-CM

## 2020-08-17 PROCEDURE — 99499 UNLISTED E&M SERVICE: CPT | Mod: S$GLB,,, | Performed by: ORTHOPAEDIC SURGERY

## 2020-08-17 PROCEDURE — 20610 LARGE JOINT ASPIRATION/INJECTION: L KNEE: ICD-10-PCS | Mod: LT,S$GLB,, | Performed by: ORTHOPAEDIC SURGERY

## 2020-08-17 PROCEDURE — 99499 NO LOS: ICD-10-PCS | Mod: S$GLB,,, | Performed by: ORTHOPAEDIC SURGERY

## 2020-08-17 PROCEDURE — 99999 PR PBB SHADOW E&M-EST. PATIENT-LVL III: CPT | Mod: PBBFAC,,, | Performed by: ORTHOPAEDIC SURGERY

## 2020-08-17 PROCEDURE — 99999 PR PBB SHADOW E&M-EST. PATIENT-LVL III: ICD-10-PCS | Mod: PBBFAC,,, | Performed by: ORTHOPAEDIC SURGERY

## 2020-08-17 PROCEDURE — 20610 DRAIN/INJ JOINT/BURSA W/O US: CPT | Mod: LT,S$GLB,, | Performed by: ORTHOPAEDIC SURGERY

## 2020-08-17 NOTE — PROGRESS NOTES
Past Medical History:   Diagnosis Date    Allergy     Anxiety     Arthritis, rheumatoid     Back pain     Depression     Fibromyalgia     GERD (gastroesophageal reflux disease)     High cholesterol     Hypertension     Incontinence of urine     MS (multiple sclerosis)     benign; another MD stated she has no MS    Neuropathy     Restless leg syndrome     Sciatica of left side 1/5/2018    Seasonal allergies     Sinus congestion     Thyroid disease     Wheezing        Past Surgical History:   Procedure Laterality Date    APPENDECTOMY      BREAST LUMPECTOMY      bilateral, benign    BREAST SURGERY      reduction    EPIDURAL STEROID INJECTION INTO LUMBAR SPINE N/A 6/12/2019    Procedure: Injection-steroid-epidural-lumbar;  Surgeon: Thad Manning MD;  Location: [facility] OR;  Service: Pain Management;  Laterality: N/A;  L5-S1    EPIDURAL STEROID INJECTION INTO LUMBAR SPINE N/A 9/16/2019    Procedure: Injection-steroid-epidural-lumbar;  Surgeon: Thad Manning MD;  Location: [facility] OR;  Service: Pain Management;  Laterality: N/A;  L5-S1    HYSTERECTOMY      partial - fibroids    INJECTION OF ANESTHETIC AGENT AROUND MEDIAL BRANCH NERVES INNERVATING LUMBAR FACET JOINT Bilateral 2/28/2019    Procedure: Block-nerve-medial branch-lumbar;  Surgeon: Thad Manning MD;  Location: [facility] OR;  Service: Pain Management;  Laterality: Bilateral;  L3, 4,5     INJECTION OF ANESTHETIC AGENT AROUND MEDIAL BRANCH NERVES INNERVATING LUMBAR FACET JOINT Bilateral 3/21/2019    Procedure: Block-nerve-medial branch-lumbar L3,4,5;  Surgeon: Thad Manning MD;  Location: [facility] OR;  Service: Pain Management;  Laterality: Bilateral;    RADIOFREQUENCY ABLATION OF LUMBAR MEDIAL BRANCH NERVE AT SINGLE LEVEL Bilateral 4/12/2019    Procedure: Radiofrequency Ablation, Nerve, Spinal, Lumbar, Medial Branch, 1 Level;  Surgeon: Thad Manning MD;  Location: [facility] OR;  Service: Pain Management;  Laterality: Bilateral;  L3, 4, 5  lumbar  Nirali-Hood pain  management generator   [id number]  80 degrees for 75 seconds x2    RADIOFREQUENCY ABLATION OF LUMBAR MEDIAL BRANCH NERVE AT SINGLE LEVEL Bilateral 10/22/2019    Procedure: Radiofrequency Ablation, Nerve, Spinal, Lumbar, Medial Branch, L3,4,5;  Surgeon: Thad Manning MD;  Location: [facility] OR;  Service: Pain Management;  Laterality: Bilateral;  burned at 80 degrees C X 60 seconds X 2 each site    RADIOFREQUENCY ABLATION OF LUMBAR MEDIAL BRANCH NERVE AT SINGLE LEVEL Bilateral 5/27/2020    Procedure: Radiofrequency Ablation, Nerve, Spinal, Lumbar, Medial Branch, 1 Level;  Surgeon: Thad Manning MD;  Location: [facility] OR;  Service: Pain Management;  Laterality: Bilateral;  L3,4,5    TONSILLECTOMY         Current Outpatient Medications   Medication Sig    albuterol (PROAIR HFA) 90 mcg/actuation inhaler Inhale 2 puffs into the lungs every 6 (six) hours as needed for Wheezing. Rescue    ALPRAZolam (XANAX) 2 MG Tab TK 1 T PO  BID    amitriptyline (ELAVIL) 10 MG tablet Take 10 mg by mouth nightly as needed for Insomnia.    amLODIPine (NORVASC) 5 MG tablet TAKE 1 TABLET BY MOUTH EVERY DAY    biotin 10,000 mcg Cap Take 13,000 mcg by mouth once daily.    celecoxib (CELEBREX) 200 MG capsule Take 200 mg by mouth once daily.    fluticasone-umeclidin-vilanter (TRELEGY ELLIPTA) 100-62.5-25 mcg DsDv Inhale 1 puff into the lungs once daily.    furosemide (LASIX) 20 MG tablet Take 1 tablet (20 mg total) by mouth once daily.    hydrochlorothiazide (HYDRODIURIL) 25 MG tablet Take 25 mg by mouth once daily.    levothyroxine (SYNTHROID) 88 MCG tablet TK 1 T PO QD    losartan (COZAAR) 50 MG tablet TAKE 1 TABLET(50 MG) BY MOUTH EVERY DAY.    lovastatin (ALTOPREV) 40 mg 24 hr tablet Take 40 mg by mouth every evening.    memantine (NAMENDA) 10 MG Tab Take 1 tablet by mouth 2 (two) times a day.    metoprolol succinate (TOPROL-XL) 50 MG 24 hr tablet TAKE 1 TABLET BY MOUTH EVERY DAY    MULTIVIT-IRON-MIN-FOLIC ACID 3,500-18-0.4  UNIT-MG-MG ORAL CHEW Take 1 tablet by mouth.    omeprazole (PRILOSEC) 20 MG capsule TK ONE C PO ONCE D    potassium chloride (MICRO-K) 10 MEQ CpSR Take 10 mEq by mouth every evening.    potassium chloride SA (K-DUR,KLOR-CON) 10 MEQ tablet TAKE 1 TABLET BY MOUTH EVERY DAY    pramipexole (MIRAPEX) 0.5 MG tablet Take 0.5 mg by mouth once daily. Patient takes 2 at bedtime    promethazine-codeine 6.25-10 mg/5 ml (PHENERGAN WITH CODEINE) 6.25-10 mg/5 mL syrup TK 5 ML PO Q 6 H PRF COUGH    rivastigmine tartrate 4.5 mg capsule 1 capsule with food    valacyclovir HCl (VALACYCLOVIR ORAL) Take by mouth.    venlafaxine (EFFEXOR-XR) 150 MG Cp24 TK 1 C PO QD     No current facility-administered medications for this visit.        Review of patient's allergies indicates:   Allergen Reactions    Keflex [cephalexin]     Latex, natural rubber Anaphylaxis     bandaids     Pcn [penicillins]     Adhesive Other (See Comments)     bandainds redness at skin       Family History   Problem Relation Age of Onset    Heart disease Mother        Social History     Socioeconomic History    Marital status:      Spouse name: Not on file    Number of children: Not on file    Years of education: Not on file    Highest education level: Not on file   Occupational History    Not on file   Social Needs    Financial resource strain: Not on file    Food insecurity     Worry: Not on file     Inability: Not on file    Transportation needs     Medical: Not on file     Non-medical: Not on file   Tobacco Use    Smoking status: Former Smoker     Quit date: 1996     Years since quittin.2    Smokeless tobacco: Never Used   Substance and Sexual Activity    Alcohol use: Yes     Comment: very little     Drug use: No    Sexual activity: Not on file   Lifestyle    Physical activity     Days per week: Not on file     Minutes per session: Not on file    Stress: Not on file   Relationships    Social connections     Talks on  phone: Not on file     Gets together: Not on file     Attends Congregation service: Not on file     Active member of club or organization: Not on file     Attends meetings of clubs or organizations: Not on file     Relationship status: Not on file   Other Topics Concern    Not on file   Social History Narrative    Not on file       Chief Complaint:   Chief Complaint   Patient presents with    Knee Pain     Euflexxa 3/3        History of present illness:  This is a 70-year-old female seen for left knee and shoulder pain.  Left knee feels like it wants to give out.  She has a history of problems with that knee and has had it scoped in the past.  Pain is along the medial aspect of the knee.  Sharp pains for the last 6 weeks.  No new injury or trauma.  I injected her left knee with cortisone back in May.  It did not really last very long.  She is also complaining of left shoulder pain.  We injected that back in January and she got about 4 months of relief.  MRI just showed a very small rotator cuff tear    Review of Systems:    Musculoskeletal:  See HPI    Neurological:  Positive for numbness.    Physical Examination:    Vital Signs:    Vitals:    08/17/20 1002   Resp: 16       Body mass index is 44.14 kg/m².    This a well-developed, well nourished patient in no acute distress.  They are alert and oriented and cooperative to examination.  Pt. walks without an antalgic gait.      Examination of the left knee shows no rashes or erythema. There are no masses ecchymosis or effusion. Patient has full range of motion from 0-130°. Patient is nontender to palpation over lateral joint line and moderately tender to palpation over the medial joint line. Patient has a - Lachman exam, - anterior drawer exam, and - posterior drawer exam. - Kevin's exam. Knee is stable to varus and valgus stress. 5 out of 5 motor strength. Palpable distal pulses. Intact light touch sensation. Negative Patellofemoral crepitus    Examination of the  left shoulder shows no rashes or erythema. There are no masses, ecchymosis, or atrophy. The patient has full range of motion in forward flexion, external rotation, and internal rotation to the mid T-spine. The patient has - impingement signs. - [place]'s test. - Speeds test. Nontender to palpation over a.c. joint. Normal stability anteriorly, posteriorly, and negative sulcus sign. Passive range of motion: Forward flexion of 180°, external rotation at 90° of 90°, internal rotation of 50°, and external rotation at 0° of 50°. 2+ radial pulse. Intact axillary, radial, median and ulnar sensation. 5 out of 5 resisted forward flexion, external rotation, and negative lift off test.        X-rays:  X-rays of left knee is  reviewed which show moderate to severe medial compartment narrowing of both knees.  X-rays of the left shoulder available for review which show some sclerosis near the greater tuberosity consistent with chronic rotator cuff syndrome    MRI of the left shoulder:Small partial-thickness tear of the supraspinatus tendon.  Rotator cuff tendinosis ranging from mild to moderate.  Small glenohumeral joint effusion.  Mild subacromial subdeltoid bursitis.     Assessment::  Left moderate to severe knee arthritis  Small left rotator cuff tear    Plan:  I injected her left knee today with Euflexxa 3 of 3.  We also got her into Neurology for her neuropathy.  Follow-up in about 6 weeks.    This note was created using [email or web address] voice recognition software that occasionally misinterpreted phrases or words.    Consult note is delivered via Epic messaging service.

## 2020-08-17 NOTE — PROCEDURES
Large Joint Aspiration/Injection: L knee    Date/Time: 8/17/2020 10:15 AM  Performed by: Rio Pitts MD  Authorized by: Rio Pitts MD     Consent Done?:  Yes (Verbal)  Indications:  Pain  Site marked: the procedure site was marked    Timeout: prior to procedure the correct patient, procedure, and site was verified      Details:  Needle Size:  20 G  Approach:  Anterolateral  Location:  Knee  Site:  L knee  Medications:  20 mg sodium hyaluronate (EUFLEXXA) 10 mg/mL(mw 2.4 -3.6 million)  Patient tolerance:  Patient tolerated the procedure well with no immediate complications

## 2020-09-24 ENCOUNTER — OFFICE VISIT (OUTPATIENT)
Dept: ORTHOPEDICS | Facility: CLINIC | Age: 70
End: 2020-09-24
Payer: MEDICARE

## 2020-09-24 VITALS — HEIGHT: 60 IN | RESPIRATION RATE: 17 BRPM | WEIGHT: 226 LBS | BODY MASS INDEX: 44.37 KG/M2

## 2020-09-24 DIAGNOSIS — M17.10 ARTHRITIS OF KNEE: Primary | ICD-10-CM

## 2020-09-24 DIAGNOSIS — M25.562 LEFT KNEE PAIN, UNSPECIFIED CHRONICITY: Primary | ICD-10-CM

## 2020-09-24 PROCEDURE — 1125F PR PAIN SEVERITY QUANTIFIED, PAIN PRESENT: ICD-10-PCS | Mod: S$GLB,,, | Performed by: ORTHOPAEDIC SURGERY

## 2020-09-24 PROCEDURE — 1101F PT FALLS ASSESS-DOCD LE1/YR: CPT | Mod: CPTII,S$GLB,, | Performed by: ORTHOPAEDIC SURGERY

## 2020-09-24 PROCEDURE — 99213 OFFICE O/P EST LOW 20 MIN: CPT | Mod: S$GLB,,, | Performed by: ORTHOPAEDIC SURGERY

## 2020-09-24 PROCEDURE — 1101F PR PT FALLS ASSESS DOC 0-1 FALLS W/OUT INJ PAST YR: ICD-10-PCS | Mod: CPTII,S$GLB,, | Performed by: ORTHOPAEDIC SURGERY

## 2020-09-24 PROCEDURE — 1125F AMNT PAIN NOTED PAIN PRSNT: CPT | Mod: S$GLB,,, | Performed by: ORTHOPAEDIC SURGERY

## 2020-09-24 PROCEDURE — 99999 PR PBB SHADOW E&M-EST. PATIENT-LVL IV: CPT | Mod: PBBFAC,,, | Performed by: ORTHOPAEDIC SURGERY

## 2020-09-24 PROCEDURE — 3008F PR BODY MASS INDEX (BMI) DOCUMENTED: ICD-10-PCS | Mod: CPTII,S$GLB,, | Performed by: ORTHOPAEDIC SURGERY

## 2020-09-24 PROCEDURE — 99999 PR PBB SHADOW E&M-EST. PATIENT-LVL IV: ICD-10-PCS | Mod: PBBFAC,,, | Performed by: ORTHOPAEDIC SURGERY

## 2020-09-24 PROCEDURE — 1159F PR MEDICATION LIST DOCUMENTED IN MEDICAL RECORD: ICD-10-PCS | Mod: S$GLB,,, | Performed by: ORTHOPAEDIC SURGERY

## 2020-09-24 PROCEDURE — 1159F MED LIST DOCD IN RCRD: CPT | Mod: S$GLB,,, | Performed by: ORTHOPAEDIC SURGERY

## 2020-09-24 PROCEDURE — 3008F BODY MASS INDEX DOCD: CPT | Mod: CPTII,S$GLB,, | Performed by: ORTHOPAEDIC SURGERY

## 2020-09-24 PROCEDURE — 99213 PR OFFICE/OUTPT VISIT, EST, LEVL III, 20-29 MIN: ICD-10-PCS | Mod: S$GLB,,, | Performed by: ORTHOPAEDIC SURGERY

## 2020-09-24 NOTE — PROGRESS NOTES
Past Medical History:   Diagnosis Date    Allergy     Anxiety     Arthritis, rheumatoid     Back pain     Depression     Fibromyalgia     GERD (gastroesophageal reflux disease)     High cholesterol     Hypertension     Incontinence of urine     MS (multiple sclerosis)     benign; another MD stated she has no MS    Neuropathy     Restless leg syndrome     Sciatica of left side 1/5/2018    Seasonal allergies     Sinus congestion     Thyroid disease     Wheezing        Past Surgical History:   Procedure Laterality Date    APPENDECTOMY      BREAST LUMPECTOMY      bilateral, benign    BREAST SURGERY      reduction    EPIDURAL STEROID INJECTION INTO LUMBAR SPINE N/A 6/12/2019    Procedure: Injection-steroid-epidural-lumbar;  Surgeon: Thad Manning MD;  Location: [facility] OR;  Service: Pain Management;  Laterality: N/A;  L5-S1    EPIDURAL STEROID INJECTION INTO LUMBAR SPINE N/A 9/16/2019    Procedure: Injection-steroid-epidural-lumbar;  Surgeon: Thad Manning MD;  Location: [facility] OR;  Service: Pain Management;  Laterality: N/A;  L5-S1    HYSTERECTOMY      partial - fibroids    INJECTION OF ANESTHETIC AGENT AROUND MEDIAL BRANCH NERVES INNERVATING LUMBAR FACET JOINT Bilateral 2/28/2019    Procedure: Block-nerve-medial branch-lumbar;  Surgeon: Thad Manning MD;  Location: [facility] OR;  Service: Pain Management;  Laterality: Bilateral;  L3, 4,5     INJECTION OF ANESTHETIC AGENT AROUND MEDIAL BRANCH NERVES INNERVATING LUMBAR FACET JOINT Bilateral 3/21/2019    Procedure: Block-nerve-medial branch-lumbar L3,4,5;  Surgeon: Thad Manning MD;  Location: [facility] OR;  Service: Pain Management;  Laterality: Bilateral;    RADIOFREQUENCY ABLATION OF LUMBAR MEDIAL BRANCH NERVE AT SINGLE LEVEL Bilateral 4/12/2019    Procedure: Radiofrequency Ablation, Nerve, Spinal, Lumbar, Medial Branch, 1 Level;  Surgeon: Thad Manning MD;  Location: [facility] OR;  Service: Pain Management;  Laterality: Bilateral;  L3, 4, 5  lumbar  Nirali-Hood pain  management generator   [id number]  80 degrees for 75 seconds x2    RADIOFREQUENCY ABLATION OF LUMBAR MEDIAL BRANCH NERVE AT SINGLE LEVEL Bilateral 10/22/2019    Procedure: Radiofrequency Ablation, Nerve, Spinal, Lumbar, Medial Branch, L3,4,5;  Surgeon: Thad Manning MD;  Location: [facility] OR;  Service: Pain Management;  Laterality: Bilateral;  burned at 80 degrees C X 60 seconds X 2 each site    RADIOFREQUENCY ABLATION OF LUMBAR MEDIAL BRANCH NERVE AT SINGLE LEVEL Bilateral 5/27/2020    Procedure: Radiofrequency Ablation, Nerve, Spinal, Lumbar, Medial Branch, 1 Level;  Surgeon: Thad Manning MD;  Location: [facility] OR;  Service: Pain Management;  Laterality: Bilateral;  L3,4,5    TONSILLECTOMY         Current Outpatient Medications   Medication Sig    albuterol (PROAIR HFA) 90 mcg/actuation inhaler Inhale 2 puffs into the lungs every 6 (six) hours as needed for Wheezing. Rescue    ALPRAZolam (XANAX) 2 MG Tab TAKE 1 TABLET BY MOUTH TWICE DAILY    amitriptyline (ELAVIL) 10 MG tablet Take 10 mg by mouth nightly as needed for Insomnia.    amLODIPine (NORVASC) 5 MG tablet TAKE 1 TABLET BY MOUTH EVERY DAY    biotin 10,000 mcg Cap Take 13,000 mcg by mouth once daily.    celecoxib (CELEBREX) 200 MG capsule Take 200 mg by mouth once daily.    fluticasone-umeclidin-vilanter (TRELEGY ELLIPTA) 100-62.5-25 mcg DsDv Inhale 1 puff into the lungs once daily.    furosemide (LASIX) 20 MG tablet Take 1 tablet (20 mg total) by mouth once daily.    hydrochlorothiazide (HYDRODIURIL) 25 MG tablet Take 25 mg by mouth once daily.    levothyroxine (SYNTHROID) 88 MCG tablet TK 1 T PO QD    losartan (COZAAR) 50 MG tablet TAKE 1 TABLET(50 MG) BY MOUTH EVERY DAY.    lovastatin (ALTOPREV) 40 mg 24 hr tablet Take 40 mg by mouth every evening.    memantine (NAMENDA) 10 MG Tab Take 1 tablet by mouth 2 (two) times a day.    methylPREDNISolone (MEDROL DOSEPACK) 4 mg tablet use as directed on pack    metoprolol succinate (TOPROL-XL) 50 MG 24  hr tablet TAKE 1 TABLET BY MOUTH EVERY DAY    MULTIVIT-IRON-MIN-FOLIC ACID 3,500-18-0.4 UNIT-MG-MG ORAL CHEW Take 1 tablet by mouth.    omeprazole (PRILOSEC) 20 MG capsule TK ONE C PO ONCE D    potassium chloride (MICRO-K) 10 MEQ CpSR Take 10 mEq by mouth every evening.    potassium chloride SA (K-DUR,KLOR-CON) 10 MEQ tablet TAKE 1 TABLET BY MOUTH EVERY DAY    pramipexole (MIRAPEX) 0.5 MG tablet TAKE 1 TO 2 TABLETS BY MOUTH ONCE A DAY AT BEDTIME    promethazine-codeine 6.25-10 mg/5 ml (PHENERGAN WITH CODEINE) 6.25-10 mg/5 mL syrup TK 5 ML PO Q 6 H PRF COUGH    rivastigmine tartrate 4.5 mg capsule 1 capsule with food    valacyclovir HCl (VALACYCLOVIR ORAL) Take by mouth.    venlafaxine (EFFEXOR-XR) 150 MG Cp24 TK 1 C PO QD     No current facility-administered medications for this visit.        Review of patient's allergies indicates:   Allergen Reactions    Keflex [cephalexin]     Latex, natural rubber Anaphylaxis     bandaids     Pcn [penicillins]     Adhesive Other (See Comments)     bandainds redness at skin       Family History   Problem Relation Age of Onset    Heart disease Mother        Social History     Socioeconomic History    Marital status:      Spouse name: Not on file    Number of children: Not on file    Years of education: Not on file    Highest education level: Not on file   Occupational History    Not on file   Social Needs    Financial resource strain: Not on file    Food insecurity     Worry: Not on file     Inability: Not on file    Transportation needs     Medical: Not on file     Non-medical: Not on file   Tobacco Use    Smoking status: Former Smoker     Quit date: 1996     Years since quittin.3    Smokeless tobacco: Never Used   Substance and Sexual Activity    Alcohol use: Yes     Comment: very little     Drug use: No    Sexual activity: Not on file   Lifestyle    Physical activity     Days per week: Not on file     Minutes per session: Not on file     Stress: Not on file   Relationships    Social connections     Talks on phone: Not on file     Gets together: Not on file     Attends Confucianism service: Not on file     Active member of club or organization: Not on file     Attends meetings of clubs or organizations: Not on file     Relationship status: Not on file   Other Topics Concern    Not on file   Social History Narrative    Not on file       Chief Complaint:   Chief Complaint   Patient presents with    Left Knee - Pain       History of present illness:  This is a 70-year-old female seen for left knee and shoulder pain.  Left knee feels like it wants to give out.  She has a history of problems with that knee and has had it scoped in the past.  Pain is along the medial aspect of the knee.  Sharp pains for the last 6 weeks.  No new injury or trauma.  I injected her left knee with cortisone back in May.  We finished a Euflexxa series back in August.  It did not really last very long.  Pain is an 8/10.    Review of Systems:    Musculoskeletal:  See HPI    Neurological:  Positive for numbness.    Physical Examination:    Vital Signs:    Vitals:    09/24/20 1023   Resp: 17       Body mass index is 44.14 kg/m².    This a well-developed, well nourished patient in no acute distress.  They are alert and oriented and cooperative to examination.  Pt. walks without an antalgic gait.      Examination of the left knee shows no rashes or erythema. There are no masses ecchymosis or effusion. Patient has full range of motion from 0-130°. Patient is nontender to palpation over lateral joint line and moderately tender to palpation over the medial joint line. Patient has a - Lachman exam, - anterior drawer exam, and - posterior drawer exam. - Kevin's exam. Knee is stable to varus and valgus stress. 5 out of 5 motor strength. Palpable distal pulses. Intact light touch sensation. Negative Patellofemoral crepitus    Examination of the left shoulder shows no rashes or  erythema. There are no masses, ecchymosis, or atrophy. The patient has full range of motion in forward flexion, external rotation, and internal rotation to the mid T-spine. The patient has - impingement signs. - Auburn's test. - Speeds test. Nontender to palpation over a.c. joint. Normal stability anteriorly, posteriorly, and negative sulcus sign. Passive range of motion: Forward flexion of 180°, external rotation at 90° of 90°, internal rotation of 50°, and external rotation at 0° of 50°. 2+ radial pulse. Intact axillary, radial, median and ulnar sensation. 5 out of 5 resisted forward flexion, external rotation, and negative lift off test.        X-rays:  X-rays of left knee is  reviewed which show moderate to severe medial compartment narrowing of both knees.  X-rays of the left shoulder available for review which show some sclerosis near the greater tuberosity consistent with chronic rotator cuff syndrome    MRI of the left shoulder:Small partial-thickness tear of the supraspinatus tendon.  Rotator cuff tendinosis ranging from mild to moderate.  Small glenohumeral joint effusion.  Mild subacromial subdeltoid bursitis.     Assessment::  Left moderate to severe knee arthritis  Small left rotator cuff tear    Plan:  I reviewed the options for for as far as her knee goes.  She has tried both cortisone and Visco without any real relief.  I recommended consultation with Dr. Escudero for possible nerve ablation or cryotherapy of the left knee.  The only thing left would be knee replacement if that does not work.    This note was created using [employer] voice recognition software that occasionally misinterpreted phrases or words.    Consult note is delivered via Epic messaging service.

## 2020-10-01 PROBLEM — G35 MULTIPLE SCLEROSIS: Status: ACTIVE | Noted: 2020-10-01

## 2020-10-06 ENCOUNTER — OFFICE VISIT (OUTPATIENT)
Dept: PHYSICAL MEDICINE AND REHAB | Facility: CLINIC | Age: 70
End: 2020-10-06
Payer: MEDICARE

## 2020-10-06 VITALS
WEIGHT: 229 LBS | DIASTOLIC BLOOD PRESSURE: 68 MMHG | HEIGHT: 61 IN | HEART RATE: 67 BPM | BODY MASS INDEX: 43.23 KG/M2 | SYSTOLIC BLOOD PRESSURE: 134 MMHG

## 2020-10-06 DIAGNOSIS — E66.01 CLASS 3 SEVERE OBESITY WITH BODY MASS INDEX (BMI) OF 40.0 TO 44.9 IN ADULT, UNSPECIFIED OBESITY TYPE, UNSPECIFIED WHETHER SERIOUS COMORBIDITY PRESENT: ICD-10-CM

## 2020-10-06 DIAGNOSIS — M25.562 CHRONIC KNEE PAIN AFTER TOTAL REPLACEMENT OF LEFT KNEE JOINT: Primary | ICD-10-CM

## 2020-10-06 DIAGNOSIS — M25.562 LEFT KNEE PAIN, UNSPECIFIED CHRONICITY: ICD-10-CM

## 2020-10-06 DIAGNOSIS — G89.29 CHRONIC KNEE PAIN AFTER TOTAL REPLACEMENT OF LEFT KNEE JOINT: Primary | ICD-10-CM

## 2020-10-06 DIAGNOSIS — Z96.652 CHRONIC KNEE PAIN AFTER TOTAL REPLACEMENT OF LEFT KNEE JOINT: Primary | ICD-10-CM

## 2020-10-06 DIAGNOSIS — R26.89 IMPAIRED GAIT AND MOBILITY: ICD-10-CM

## 2020-10-06 DIAGNOSIS — M17.12 OSTEOARTHRITIS OF LEFT KNEE, UNSPECIFIED OSTEOARTHRITIS TYPE: ICD-10-CM

## 2020-10-06 PROCEDURE — 3078F DIAST BP <80 MM HG: CPT | Mod: CPTII,S$GLB,, | Performed by: PHYSICAL MEDICINE & REHABILITATION

## 2020-10-06 PROCEDURE — 3008F PR BODY MASS INDEX (BMI) DOCUMENTED: ICD-10-PCS | Mod: CPTII,S$GLB,, | Performed by: PHYSICAL MEDICINE & REHABILITATION

## 2020-10-06 PROCEDURE — 3008F BODY MASS INDEX DOCD: CPT | Mod: CPTII,S$GLB,, | Performed by: PHYSICAL MEDICINE & REHABILITATION

## 2020-10-06 PROCEDURE — 1159F PR MEDICATION LIST DOCUMENTED IN MEDICAL RECORD: ICD-10-PCS | Mod: S$GLB,,, | Performed by: PHYSICAL MEDICINE & REHABILITATION

## 2020-10-06 PROCEDURE — 1159F MED LIST DOCD IN RCRD: CPT | Mod: S$GLB,,, | Performed by: PHYSICAL MEDICINE & REHABILITATION

## 2020-10-06 PROCEDURE — 3075F PR MOST RECENT SYSTOLIC BLOOD PRESS GE 130-139MM HG: ICD-10-PCS | Mod: CPTII,S$GLB,, | Performed by: PHYSICAL MEDICINE & REHABILITATION

## 2020-10-06 PROCEDURE — 99203 OFFICE O/P NEW LOW 30 MIN: CPT | Mod: 25,S$GLB,, | Performed by: PHYSICAL MEDICINE & REHABILITATION

## 2020-10-06 PROCEDURE — 3075F SYST BP GE 130 - 139MM HG: CPT | Mod: CPTII,S$GLB,, | Performed by: PHYSICAL MEDICINE & REHABILITATION

## 2020-10-06 PROCEDURE — 1125F PR PAIN SEVERITY QUANTIFIED, PAIN PRESENT: ICD-10-PCS | Mod: S$GLB,,, | Performed by: PHYSICAL MEDICINE & REHABILITATION

## 2020-10-06 PROCEDURE — 3078F PR MOST RECENT DIASTOLIC BLOOD PRESSURE < 80 MM HG: ICD-10-PCS | Mod: CPTII,S$GLB,, | Performed by: PHYSICAL MEDICINE & REHABILITATION

## 2020-10-06 PROCEDURE — 1101F PR PT FALLS ASSESS DOC 0-1 FALLS W/OUT INJ PAST YR: ICD-10-PCS | Mod: CPTII,S$GLB,, | Performed by: PHYSICAL MEDICINE & REHABILITATION

## 2020-10-06 PROCEDURE — 99999 PR PBB SHADOW E&M-EST. PATIENT-LVL V: CPT | Mod: PBBFAC,,, | Performed by: PHYSICAL MEDICINE & REHABILITATION

## 2020-10-06 PROCEDURE — 64454 NJX AA&/STRD GNCLR NRV BRNCH: CPT | Mod: LT,S$GLB,, | Performed by: PHYSICAL MEDICINE & REHABILITATION

## 2020-10-06 PROCEDURE — 1101F PT FALLS ASSESS-DOCD LE1/YR: CPT | Mod: CPTII,S$GLB,, | Performed by: PHYSICAL MEDICINE & REHABILITATION

## 2020-10-06 PROCEDURE — 1125F AMNT PAIN NOTED PAIN PRSNT: CPT | Mod: S$GLB,,, | Performed by: PHYSICAL MEDICINE & REHABILITATION

## 2020-10-06 PROCEDURE — 99999 PR PBB SHADOW E&M-EST. PATIENT-LVL V: ICD-10-PCS | Mod: PBBFAC,,, | Performed by: PHYSICAL MEDICINE & REHABILITATION

## 2020-10-06 PROCEDURE — 64454 PR NERVE BLOCK INJ, ANES/STEROID, GENICULAR NERVE, W/IMG: ICD-10-PCS | Mod: LT,S$GLB,, | Performed by: PHYSICAL MEDICINE & REHABILITATION

## 2020-10-06 PROCEDURE — 99203 PR OFFICE/OUTPT VISIT, NEW, LEVL III, 30-44 MIN: ICD-10-PCS | Mod: 25,S$GLB,, | Performed by: PHYSICAL MEDICINE & REHABILITATION

## 2020-10-06 NOTE — PROCEDURES
Procedures     Procedure: LEFT knee 1. Superolateral genicular branch from the vastus lateralis2. Superomedial genicular branch from the vastus medialis3. Inferomedial genicular branch from the saphenous nerve block with ultrasound guidance    Preprocedure diagnosis:  Knee osteoarthritis and chronic knee pain    Postprocedure diagnosis:  Same    Anesthetic local    Assistants none    Equipment used:  Qool HS60 with a curvilinear transducer    Pre-procedure pain score: 6    Postprocedure pain score: 0    Procedure in detail:  Risks and benefits were discussed and written informed consent was obtained.  The patient was placed in a prone position on the exam table.  Using ultrasound, the superolateral genicular brach at the superolateral portion of the femoral condyle of the LEFT  knee was identified.  A 27 gauge 1.5 in needle was used to anesthetize the skin and track with approximately 1 cc of 1% lidocaine.  This needle was removed and replaced with a 2 in 21 gauge needle which was advanced to os in the area of the superolateral genicular branch and 1 cc of 0.50% bupivacaine was injected under live ultrasound showing excellent spread.  The procedure was then repeated at the superomedial femoral condyle in the area of the superomedial genicular nerve and at the medial tibial condyle in the area of the inferomedial genicular branch in a similar fashion.

## 2020-10-06 NOTE — LETTER
October 6, 2020      Rio Pitts MD  [address] [place] [address] [address]  [place] [address]           [facility]  [address] [place] [address] [address]  [place] [address]  Phone: [phone]  Fax: [phone]          Patient: Genoveva Gilbert   MR Number: [id number]   YOB: 1950   Date of Visit: 10/6/2020       Dear Dr. Rio Pitts:    Thank you for referring Genoveva Gilbert to me for evaluation. Attached you will find relevant portions of my assessment and plan of care.    If you have questions, please do not hesitate to call me. I look forward to following Genoveva Gilbert along with you.    Sincerely,    Andrew Escudero MD    Enclosure  CC:  No Recipients    If you would like to receive this communication electronically, please contact [email or web address] or [phone] to request more information on [employer] access.    For providers and/or their staff who would like to refer a patient to [facility], please contact us through our one-stop-shop provider referral line, [facility], at [phone].    If you feel you have received this communication in error or would no longer like to receive these types of communications, please e-mail [email or web address]

## 2020-10-06 NOTE — PROGRESS NOTES
Today's Date: 10/06/2020     Referring Provider: Dr. Rio Freeman*     Chief Complaint:   Chief Complaint   Patient presents with    Knee Pain     (L)        HPI: Genoveva Gilbert is a 70 y.o. female  who presents today for evaluation and treatment of left knee pain.  She states that she has had left knee pain for many years, however it has been getting progressively worse over the last 3 months.  She states that she fell and on her side.  She has had Euflexxa injections which provided her with about 6 weeks of excellent relief.  She states that the pain is over the left knee.  Pain is over the medial and lateral joint lines.  She describes her pain as an excruciating pain worse with standing and walking and better with sitting and lying.  Pain on average is a 6/10, but it can be a 10/10 with prolonged standing or walking.  Her pain severely limits her activities of daily living and functional mobility.  She is not interested in a left knee arthroplasty at this time.    Review of Systems   Constitutional: Negative for chills and fever.   HENT: Negative for congestion and tinnitus.    Eyes: Negative for blurred vision and photophobia.   Respiratory: Negative for shortness of breath and wheezing.    Cardiovascular: Negative for chest pain and palpitations.   Gastrointestinal: Negative for nausea and vomiting.   Genitourinary: Negative for dysuria and frequency.   Musculoskeletal: Positive for joint pain and myalgias.   Skin: Negative for itching and rash.   Neurological: Negative for sensory change and speech change.   Endo/Heme/Allergies: Negative for environmental allergies. Does not bruise/bleed easily.   Psychiatric/Behavioral: Negative for depression. The patient is not nervous/anxious.         Social History     Socioeconomic History    Marital status:      Spouse name: Not on file    Number of children: Not on file    Years of education: Not on file    Highest education level: Not on file    Occupational History    Not on file   Social Needs    Financial resource strain: Not on file    Food insecurity     Worry: Not on file     Inability: Not on file    Transportation needs     Medical: Not on file     Non-medical: Not on file   Tobacco Use    Smoking status: Former Smoker     Quit date: 1996     Years since quittin.3    Smokeless tobacco: Never Used   Substance and Sexual Activity    Alcohol use: Yes     Comment: very little     Drug use: No    Sexual activity: Not on file   Lifestyle    Physical activity     Days per week: Not on file     Minutes per session: Not on file    Stress: Not on file   Relationships    Social connections     Talks on phone: Not on file     Gets together: Not on file     Attends [employer]ist service: Not on file     Active member of club or organization: Not on file     Attends meetings of clubs or organizations: Not on file     Relationship status: Not on file   Other Topics Concern    Not on file   Social History Narrative    Not on file       Family History   Problem Relation Age of Onset    Heart disease Mother        Current Outpatient Medications on File Prior to Visit   Medication Sig Dispense Refill    albuterol (PROAIR HFA) 90 mcg/actuation inhaler Inhale 2 puffs into the lungs every 6 (six) hours as needed for Wheezing. Rescue 18 g 6    ALPRAZolam (XANAX) 2 MG Tab TAKE 1 TABLET BY MOUTH TWICE DAILY 60 tablet 4    amitriptyline (ELAVIL) 10 MG tablet Take 50 mg by mouth nightly as needed for Insomnia.       amLODIPine (NORVASC) 5 MG tablet TAKE 1 TABLET BY MOUTH EVERY DAY 90 tablet 3    biotin 10,000 mcg Cap Take 13,000 mcg by mouth once daily.      celecoxib (CELEBREX) 200 MG capsule Take 200 mg by mouth once daily.      fluticasone-umeclidin-vilanter (TRELEGY ELLIPTA) 100-62.5-25 mcg DsDv Inhale 1 puff into the lungs once daily. 60 each 6    furosemide (LASIX) 20 MG tablet Take 1 tablet (20 mg total) by mouth once daily. 30 tablet 6     hydrochlorothiazide (HYDRODIURIL) 25 MG tablet Take 25 mg by mouth once daily.      HYDROcodone-acetaminophen (NORCO) 7.5-325 mg per tablet Take 1 tablet by mouth every 12 (twelve) hours as needed for Pain. 60 tablet 0    levothyroxine (SYNTHROID) 88 MCG tablet TK 1 T PO QD  2    losartan (COZAAR) 50 MG tablet TAKE 1 TABLET(50 MG) BY MOUTH EVERY DAY. 90 tablet 1    lovastatin (ALTOPREV) 40 mg 24 hr tablet Take 40 mg by mouth every evening.      memantine (NAMENDA) 10 MG Tab Take 1 tablet by mouth 2 (two) times a day.  3    metoprolol succinate (TOPROL-XL) 50 MG 24 hr tablet TAKE 1 TABLET BY MOUTH EVERY DAY 90 tablet 3    MULTIVIT-IRON-MIN-FOLIC ACID 3,500-18-0.4 UNIT-MG-MG ORAL CHEW Take 1 tablet by mouth.      omeprazole (PRILOSEC) 40 MG capsule Take 1 capsule (40 mg total) by mouth once daily. 90 capsule 3    potassium chloride (MICRO-K) 10 MEQ CpSR Take 10 mEq by mouth every evening.      pramipexole (MIRAPEX) 0.5 MG tablet TAKE 1 TO 2 TABLETS BY MOUTH ONCE A DAY AT BEDTIME 180 tablet 3    rivastigmine tartrate 4.5 mg capsule 6 mg.       valacyclovir HCl (VALACYCLOVIR ORAL) Take by mouth.      venlafaxine (EFFEXOR-XR) 150 MG Cp24 TK 1 C PO QD      [DISCONTINUED] metoprolol succinate (TOPROL-XL) 50 MG 24 hr tablet Take 50 mg by mouth once daily.      [DISCONTINUED] pramipexole (MIRAPEX) 0.5 MG tablet Take 0.5 mg by mouth once daily. Patient takes 2 at bedtime       No current facility-administered medications on file prior to visit.         Review of patient's allergies indicates:   Allergen Reactions    Keflex [cephalexin]     Latex, natural rubber Anaphylaxis     bandaids     Pcn [penicillins]     Adhesive Other (See Comments)     bandainds redness at skin       Past Surgical History:   Procedure Laterality Date    APPENDECTOMY      BREAST LUMPECTOMY      bilateral, benign    BREAST SURGERY      reduction    EPIDURAL STEROID INJECTION INTO LUMBAR SPINE N/A 6/12/2019    Procedure:  Injection-steroid-epidural-lumbar;  Surgeon: Thad Manning MD;  Location: [facility] OR;  Service: Pain Management;  Laterality: N/A;  L5-S1    EPIDURAL STEROID INJECTION INTO LUMBAR SPINE N/A 9/16/2019    Procedure: Injection-steroid-epidural-lumbar;  Surgeon: Thad Manning MD;  Location: [facility] OR;  Service: Pain Management;  Laterality: N/A;  L5-S1    HYSTERECTOMY      partial - fibroids    INJECTION OF ANESTHETIC AGENT AROUND MEDIAL BRANCH NERVES INNERVATING LUMBAR FACET JOINT Bilateral 2/28/2019    Procedure: Block-nerve-medial branch-lumbar;  Surgeon: Thad Manning MD;  Location: [facility];  Service: Pain Management;  Laterality: Bilateral;  L3, 4,5     INJECTION OF ANESTHETIC AGENT AROUND MEDIAL BRANCH NERVES INNERVATING LUMBAR FACET JOINT Bilateral 3/21/2019    Procedure: Block-nerve-medial branch-lumbar L3,4,5;  Surgeon: Thad Manning MD;  Location: [facility];  Service: Pain Management;  Laterality: Bilateral;    RADIOFREQUENCY ABLATION OF LUMBAR MEDIAL BRANCH NERVE AT SINGLE LEVEL Bilateral 4/12/2019    Procedure: Radiofrequency Ablation, Nerve, Spinal, Lumbar, Medial Branch, 1 Level;  Surgeon: Thad Manning MD;  Location: [facility];  Service: Pain Management;  Laterality: Bilateral;  L3, 4, 5  lumbar  [employer] pain management generator  SN [id number]  80 degrees for 75 seconds x2    RADIOFREQUENCY ABLATION OF LUMBAR MEDIAL BRANCH NERVE AT SINGLE LEVEL Bilateral 10/22/2019    Procedure: Radiofrequency Ablation, Nerve, Spinal, Lumbar, Medial Branch, L3,4,5;  Surgeon: Thad Manning MD;  Location: [facility] OR;  Service: Pain Management;  Laterality: Bilateral;  burned at 80 degrees C X 60 seconds X 2 each site    RADIOFREQUENCY ABLATION OF LUMBAR MEDIAL BRANCH NERVE AT SINGLE LEVEL Bilateral 5/27/2020    Procedure: Radiofrequency Ablation, Nerve, Spinal, Lumbar, Medial Branch, 1 Level;  Surgeon: Thad Manning MD;  Location: [facility] OR;  Service: Pain Management;  Laterality: Bilateral;  L3,4,5    TONSILLECTOMY         Past  Medical History:   Diagnosis Date    Allergy     Anxiety     Arthritis, rheumatoid     Back pain     Depression     Fibromyalgia     GERD (gastroesophageal reflux disease)     High cholesterol     Hypertension     Incontinence of urine     MS (multiple sclerosis)     benign; another MD stated she has no MS    Neuropathy     Restless leg syndrome     Sciatica of left side 1/5/2018    Seasonal allergies     Sinus congestion     Thyroid disease     Wheezing        Vitals:    10/06/20 1257   BP: 134/68   Pulse: 67       Physical Exam  Constitutional:       Appearance: Normal appearance. She is obese.   HENT:      Head: Normocephalic and atraumatic.      Nose: Nose normal. No congestion.      Mouth/Throat:      Mouth: Mucous membranes are moist.      Pharynx: Oropharynx is clear.   Eyes:      Extraocular Movements: Extraocular movements intact.      Pupils: Pupils are equal, round, and reactive to light.   Pulmonary:      Effort: Pulmonary effort is normal. No respiratory distress.   Abdominal:      General: There is no distension.   Neurological:      General: No focal deficit present.      Mental Status: She is alert and oriented to person, place, and time.      Gait: Gait abnormal.   Psychiatric:         Mood and Affect: Mood and affect normal.        Left Knee Exam     Tenderness   The patient is experiencing tenderness in the medial joint line and lateral joint line.    Range of Motion   Extension: normal     Other   Swelling: mild               EXAMINATION:  XR KNEE ORTHO LEFT WITH FLEXION     CLINICAL HISTORY:  . Pain in left knee     TECHNIQUE:  AP standing view of both knees, PA flexion standing views of both knees, and Merchant views of both knees were performed. A lateral view of the left knee was also performed.     COMPARISON:  11/04/2016     FINDINGS:  No acute fracture or malalignment.  Left knee degenerative changes mild in the medial and patellofemoral compartments and minimal in the  lateral compartment.  Left knee joint effusion.     Impression:     Mild left knee degenerative changes, worst in the medial compartment.  Small left knee joint effusion.     Chronic knee pain after total replacement of left knee joint    Left knee pain, unspecified chronicity  -     Ambulatory referral/consult to Physical Medicine Rehab    Osteoarthritis of left knee, unspecified osteoarthritis type    Impaired gait and mobility    Class 3 severe obesity with body mass index (BMI) of 40.0 to 44.9 in adult, unspecified obesity type, unspecified whether serious comorbidity present           PLAN:   Genoveva Gilbert is a 70 y.o. female with chronic left knee pain.  History and physical examination along with x-ray findings are consistent with left knee osteoarthritis greatest at the medial joint line.  Most recently, she has had hyaluronic acid injections which provided her with about 6 weeks of excellent relief.  Her pain is severe limiting her activities of daily living and functional mobility.  Her pain on average is greater than a 6/10 and up to a 10/10 with standing or walking.  She is not interested in a total knee arthroplasty at this time.  At this time, I will proceed with peripheral nerve blocks to the left knee.  Should this provide her with greater than 60% reduction in pain and improvement in function for the duration of the anesthetic, we will proceed with cooled radiofrequency ablation.      Andrew Escudero D.O.  Physical Medicine and Rehabilitation          CC: Patients PCP: Tariq Crews MD  CC: Referring Provider: Dr. Rio Freeman*

## 2020-10-06 NOTE — H&P (VIEW-ONLY)
Today's Date: 10/06/2020     Referring Provider: Dr. Rio Freeman*     Chief Complaint:   Chief Complaint   Patient presents with    Knee Pain     (L)        HPI: Genoveva Gilbert is a 70 y.o. female  who presents today for evaluation and treatment of left knee pain.  She states that she has had left knee pain for many years, however it has been getting progressively worse over the last 3 months.  She states that she fell and on her side.  She has had Euflexxa injections which provided her with about 6 weeks of excellent relief.  She states that the pain is over the left knee.  Pain is over the medial and lateral joint lines.  She describes her pain as an excruciating pain worse with standing and walking and better with sitting and lying.  Pain on average is a 6/10, but it can be a 10/10 with prolonged standing or walking.  Her pain severely limits her activities of daily living and functional mobility.  She is not interested in a left knee arthroplasty at this time.    Review of Systems   Constitutional: Negative for chills and fever.   HENT: Negative for congestion and tinnitus.    Eyes: Negative for blurred vision and photophobia.   Respiratory: Negative for shortness of breath and wheezing.    Cardiovascular: Negative for chest pain and palpitations.   Gastrointestinal: Negative for nausea and vomiting.   Genitourinary: Negative for dysuria and frequency.   Musculoskeletal: Positive for joint pain and myalgias.   Skin: Negative for itching and rash.   Neurological: Negative for sensory change and speech change.   Endo/Heme/Allergies: Negative for environmental allergies. Does not bruise/bleed easily.   Psychiatric/Behavioral: Negative for depression. The patient is not nervous/anxious.         Social History     Socioeconomic History    Marital status:      Spouse name: Not on file    Number of children: Not on file    Years of education: Not on file    Highest education level: Not on file    Occupational History    Not on file   Social Needs    Financial resource strain: Not on file    Food insecurity     Worry: Not on file     Inability: Not on file    Transportation needs     Medical: Not on file     Non-medical: Not on file   Tobacco Use    Smoking status: Former Smoker     Quit date: 1996     Years since quittin.3    Smokeless tobacco: Never Used   Substance and Sexual Activity    Alcohol use: Yes     Comment: very little     Drug use: No    Sexual activity: Not on file   Lifestyle    Physical activity     Days per week: Not on file     Minutes per session: Not on file    Stress: Not on file   Relationships    Social connections     Talks on phone: Not on file     Gets together: Not on file     Attends Yazidi service: Not on file     Active member of club or organization: Not on file     Attends meetings of clubs or organizations: Not on file     Relationship status: Not on file   Other Topics Concern    Not on file   Social History Narrative    Not on file       Family History   Problem Relation Age of Onset    Heart disease Mother        Current Outpatient Medications on File Prior to Visit   Medication Sig Dispense Refill    albuterol (PROAIR HFA) 90 mcg/actuation inhaler Inhale 2 puffs into the lungs every 6 (six) hours as needed for Wheezing. Rescue 18 g 6    ALPRAZolam (XANAX) 2 MG Tab TAKE 1 TABLET BY MOUTH TWICE DAILY 60 tablet 4    amitriptyline (ELAVIL) 10 MG tablet Take 50 mg by mouth nightly as needed for Insomnia.       amLODIPine (NORVASC) 5 MG tablet TAKE 1 TABLET BY MOUTH EVERY DAY 90 tablet 3    biotin 10,000 mcg Cap Take 13,000 mcg by mouth once daily.      celecoxib (CELEBREX) 200 MG capsule Take 200 mg by mouth once daily.      fluticasone-umeclidin-vilanter (TRELEGY ELLIPTA) 100-62.5-25 mcg DsDv Inhale 1 puff into the lungs once daily. 60 each 6    furosemide (LASIX) 20 MG tablet Take 1 tablet (20 mg total) by mouth once daily. 30 tablet 6     hydrochlorothiazide (HYDRODIURIL) 25 MG tablet Take 25 mg by mouth once daily.      HYDROcodone-acetaminophen (NORCO) 7.5-325 mg per tablet Take 1 tablet by mouth every 12 (twelve) hours as needed for Pain. 60 tablet 0    levothyroxine (SYNTHROID) 88 MCG tablet TK 1 T PO QD  2    losartan (COZAAR) 50 MG tablet TAKE 1 TABLET(50 MG) BY MOUTH EVERY DAY. 90 tablet 1    lovastatin (ALTOPREV) 40 mg 24 hr tablet Take 40 mg by mouth every evening.      memantine (NAMENDA) 10 MG Tab Take 1 tablet by mouth 2 (two) times a day.  3    metoprolol succinate (TOPROL-XL) 50 MG 24 hr tablet TAKE 1 TABLET BY MOUTH EVERY DAY 90 tablet 3    MULTIVIT-IRON-MIN-FOLIC ACID 3,500-18-0.4 UNIT-MG-MG ORAL CHEW Take 1 tablet by mouth.      omeprazole (PRILOSEC) 40 MG capsule Take 1 capsule (40 mg total) by mouth once daily. 90 capsule 3    potassium chloride (MICRO-K) 10 MEQ CpSR Take 10 mEq by mouth every evening.      pramipexole (MIRAPEX) 0.5 MG tablet TAKE 1 TO 2 TABLETS BY MOUTH ONCE A DAY AT BEDTIME 180 tablet 3    rivastigmine tartrate 4.5 mg capsule 6 mg.       valacyclovir HCl (VALACYCLOVIR ORAL) Take by mouth.      venlafaxine (EFFEXOR-XR) 150 MG Cp24 TK 1 C PO QD      [DISCONTINUED] metoprolol succinate (TOPROL-XL) 50 MG 24 hr tablet Take 50 mg by mouth once daily.      [DISCONTINUED] pramipexole (MIRAPEX) 0.5 MG tablet Take 0.5 mg by mouth once daily. Patient takes 2 at bedtime       No current facility-administered medications on file prior to visit.         Review of patient's allergies indicates:   Allergen Reactions    Keflex [cephalexin]     Latex, natural rubber Anaphylaxis     bandaids     Pcn [penicillins]     Adhesive Other (See Comments)     bandainds redness at skin       Past Surgical History:   Procedure Laterality Date    APPENDECTOMY      BREAST LUMPECTOMY      bilateral, benign    BREAST SURGERY      reduction    EPIDURAL STEROID INJECTION INTO LUMBAR SPINE N/A 6/12/2019    Procedure:  Injection-steroid-epidural-lumbar;  Surgeon: Thad Manning MD;  Location: [facility];  Service: Pain Management;  Laterality: N/A;  L5-S1    EPIDURAL STEROID INJECTION INTO LUMBAR SPINE N/A 9/16/2019    Procedure: Injection-steroid-epidural-lumbar;  Surgeon: Thad Manning MD;  Location: [facility];  Service: Pain Management;  Laterality: N/A;  L5-S1    HYSTERECTOMY      partial - fibroids    INJECTION OF ANESTHETIC AGENT AROUND MEDIAL BRANCH NERVES INNERVATING LUMBAR FACET JOINT Bilateral 2/28/2019    Procedure: Block-nerve-medial branch-lumbar;  Surgeon: Thad Manning MD;  Location: [facility];  Service: Pain Management;  Laterality: Bilateral;  L3, 4,5     INJECTION OF ANESTHETIC AGENT AROUND MEDIAL BRANCH NERVES INNERVATING LUMBAR FACET JOINT Bilateral 3/21/2019    Procedure: Block-nerve-medial branch-lumbar L3,4,5;  Surgeon: Thad Manning MD;  Location: [facility];  Service: Pain Management;  Laterality: Bilateral;    RADIOFREQUENCY ABLATION OF LUMBAR MEDIAL BRANCH NERVE AT SINGLE LEVEL Bilateral 4/12/2019    Procedure: Radiofrequency Ablation, Nerve, Spinal, Lumbar, Medial Branch, 1 Level;  Surgeon: Thad Manning MD;  Location: [facility];  Service: Pain Management;  Laterality: Bilateral;  L3, 4, 5  lumbar  Act[employer] Digital pain management generator  SN [id number]  80 degrees for 75 seconds x2    RADIOFREQUENCY ABLATION OF LUMBAR MEDIAL BRANCH NERVE AT SINGLE LEVEL Bilateral 10/22/2019    Procedure: Radiofrequency Ablation, Nerve, Spinal, Lumbar, Medial Branch, L3,4,5;  Surgeon: Thad Manning MD;  Location: [facility];  Service: Pain Management;  Laterality: Bilateral;  burned at 80 degrees C X 60 seconds X 2 each site    RADIOFREQUENCY ABLATION OF LUMBAR MEDIAL BRANCH NERVE AT SINGLE LEVEL Bilateral 5/27/2020    Procedure: Radiofrequency Ablation, Nerve, Spinal, Lumbar, Medial Branch, 1 Level;  Surgeon: Thad Manning MD;  Location: [facility];  Service: Pain Management;  Laterality: Bilateral;  L3,4,5    TONSILLECTOMY         Past  Medical History:   Diagnosis Date    Allergy     Anxiety     Arthritis, rheumatoid     Back pain     Depression     Fibromyalgia     GERD (gastroesophageal reflux disease)     High cholesterol     Hypertension     Incontinence of urine     MS (multiple sclerosis)     benign; another MD stated she has no MS    Neuropathy     Restless leg syndrome     Sciatica of left side 1/5/2018    Seasonal allergies     Sinus congestion     Thyroid disease     Wheezing        Vitals:    10/06/20 1257   BP: 134/68   Pulse: 67       Physical Exam  Constitutional:       Appearance: Normal appearance. She is obese.   HENT:      Head: Normocephalic and atraumatic.      Nose: Nose normal. No congestion.      Mouth/Throat:      Mouth: Mucous membranes are moist.      Pharynx: Oropharynx is clear.   Eyes:      Extraocular Movements: Extraocular movements intact.      Pupils: Pupils are equal, round, and reactive to light.   Pulmonary:      Effort: Pulmonary effort is normal. No respiratory distress.   Abdominal:      General: There is no distension.   Neurological:      General: No focal deficit present.      Mental Status: She is alert and oriented to person, place, and time.      Gait: Gait abnormal.   Psychiatric:         Mood and Affect: Mood and affect normal.        Left Knee Exam     Tenderness   The patient is experiencing tenderness in the medial joint line and lateral joint line.    Range of Motion   Extension: normal     Other   Swelling: mild               EXAMINATION:  XR KNEE ORTHO LEFT WITH FLEXION     CLINICAL HISTORY:  . Pain in left knee     TECHNIQUE:  AP standing view of both knees, PA flexion standing views of both knees, and Merchant views of both knees were performed. A lateral view of the left knee was also performed.     COMPARISON:  11/04/2016     FINDINGS:  No acute fracture or malalignment.  Left knee degenerative changes mild in the medial and patellofemoral compartments and minimal in the  lateral compartment.  Left knee joint effusion.     Impression:     Mild left knee degenerative changes, worst in the medial compartment.  Small left knee joint effusion.     Chronic knee pain after total replacement of left knee joint    Left knee pain, unspecified chronicity  -     Ambulatory referral/consult to Physical Medicine Rehab    Osteoarthritis of left knee, unspecified osteoarthritis type    Impaired gait and mobility    Class 3 severe obesity with body mass index (BMI) of 40.0 to 44.9 in adult, unspecified obesity type, unspecified whether serious comorbidity present           PLAN:   Genoveva Gilbert is a 70 y.o. female with chronic left knee pain.  History and physical examination along with x-ray findings are consistent with left knee osteoarthritis greatest at the medial joint line.  Most recently, she has had hyaluronic acid injections which provided her with about 6 weeks of excellent relief.  Her pain is severe limiting her activities of daily living and functional mobility.  Her pain on average is greater than a 6/10 and up to a 10/10 with standing or walking.  She is not interested in a total knee arthroplasty at this time.  At this time, I will proceed with peripheral nerve blocks to the left knee.  Should this provide her with greater than 60% reduction in pain and improvement in function for the duration of the anesthetic, we will proceed with cooled radiofrequency ablation.      Andrew Escudero D.O.  Physical Medicine and Rehabilitation          CC: Patients PCP: Tariq Crews MD  CC: Referring Provider: Dr. Rio Freeman*

## 2020-10-12 ENCOUNTER — OFFICE VISIT (OUTPATIENT)
Dept: URGENT CARE | Facility: CLINIC | Age: 70
End: 2020-10-12
Payer: MEDICARE

## 2020-10-12 VITALS
TEMPERATURE: 99 F | WEIGHT: 228.19 LBS | SYSTOLIC BLOOD PRESSURE: 119 MMHG | HEART RATE: 73 BPM | OXYGEN SATURATION: 98 % | RESPIRATION RATE: 16 BRPM | BODY MASS INDEX: 43.12 KG/M2 | DIASTOLIC BLOOD PRESSURE: 70 MMHG

## 2020-10-12 DIAGNOSIS — S62.662A CLOSED NONDISPLACED FRACTURE OF DISTAL PHALANX OF RIGHT MIDDLE FINGER, INITIAL ENCOUNTER: Primary | ICD-10-CM

## 2020-10-12 PROCEDURE — 99214 OFFICE O/P EST MOD 30 MIN: CPT | Mod: S$GLB,,, | Performed by: NURSE PRACTITIONER

## 2020-10-12 PROCEDURE — 73140 X-RAY EXAM OF FINGER(S): CPT | Mod: RT,S$GLB,, | Performed by: NURSE PRACTITIONER

## 2020-10-12 PROCEDURE — 73140 PR  X-RAY EXAM OF FINGER(S): ICD-10-PCS | Mod: RT,S$GLB,, | Performed by: NURSE PRACTITIONER

## 2020-10-12 PROCEDURE — 99214 PR OFFICE/OUTPT VISIT, EST, LEVL IV, 30-39 MIN: ICD-10-PCS | Mod: S$GLB,,, | Performed by: NURSE PRACTITIONER

## 2020-10-12 NOTE — PROGRESS NOTES
Subjective:       Patient ID: Genoveva Gilbert is a 70 y.o. female.    Vitals:  weight is 103.5 kg (228 lb 3.2 oz). Her temperature is 98.9 °F (37.2 °C). Her blood pressure is 119/70 and her pulse is 73. Her respiration is 16 and oxygen saturation is 98%.     Chief Complaint: Fall    Fall  The accident occurred 12 to 24 hours ago. She fell from a height of 1 to 2 ft. She landed on concrete (garage ). Point of impact: hand and head  Pain location: right 3rd finger  The pain is at a severity of 7/10. The pain is moderate. The symptoms are aggravated by movement. Pertinent negatives include no abdominal pain, hematuria or loss of consciousness. She has tried nothing for the symptoms. The treatment provided no relief.       Constitution: Negative for fatigue.   HENT: Negative for facial swelling and facial trauma.    Neck: Negative for neck stiffness.   Cardiovascular: Negative for chest trauma.   Eyes: Negative for eye trauma, double vision and blurred vision.   Gastrointestinal: Negative for abdominal trauma, abdominal pain and rectal bleeding.   Genitourinary: Negative for hematuria, missed menses, genital trauma and pelvic pain.   Musculoskeletal: Negative for pain, trauma, joint swelling and abnormal ROM of joint.   Skin: Negative for color change, wound, abrasion, laceration and bruising.   Neurological: Negative for dizziness, history of vertigo, light-headedness, coordination disturbances, altered mental status and loss of consciousness.   Hematologic/Lymphatic: Negative for history of bleeding disorder.   Psychiatric/Behavioral: Negative for altered mental status.       Objective:      Physical Exam   Constitutional: She is oriented to person, place, and time.  Non-toxic appearance. She does not appear ill. No distress.   HENT:   Head: Normocephalic and atraumatic.   Cardiovascular: Normal rate, regular rhythm and normal heart sounds.   Pulmonary/Chest: Effort normal and breath sounds normal.   Abdominal: Normal  appearance.   Musculoskeletal:        Hands:    Neurological: She is alert and oriented to person, place, and time.   Skin: Capillary refill takes less than 2 seconds. Psychiatric: Her behavior is normal. Mood, judgment and thought content normal.         Assessment:       1. Displaced fracture of distal phalanx of right middle finger, initial encounter for closed fracture    2. Contusion of right middle finger without damage to nail, initial encounter        Plan:       Xray noted with non displaced distal fracture   Finger splint placed  RICE  Displaced fracture of distal phalanx of right middle finger, initial encounter for closed fracture  -     Ambulatory referral/consult to Orthopedics    Contusion of right middle finger without damage to nail, initial encounter

## 2020-10-16 ENCOUNTER — TELEPHONE (OUTPATIENT)
Dept: URGENT CARE | Facility: CLINIC | Age: 70
End: 2020-10-16

## 2020-10-16 ENCOUNTER — TELEPHONE (OUTPATIENT)
Dept: ORTHOPEDICS | Facility: CLINIC | Age: 70
End: 2020-10-16

## 2020-10-16 DIAGNOSIS — M79.641 RIGHT HAND PAIN: Primary | ICD-10-CM

## 2020-10-16 NOTE — TELEPHONE ENCOUNTER
Patient contacted. Requested to see Dr. Pitts, as she has an established relationship with him. Appointment scheduled. Albina Hernandez LPN

## 2020-10-16 NOTE — TELEPHONE ENCOUNTER
----- Message from Thad Alfaro sent at 10/16/2020  3:46 PM CDT -----  Regarding: Est pt has a Fx finger  Contact: pt   call

## 2020-10-19 ENCOUNTER — HOSPITAL ENCOUNTER (OUTPATIENT)
Dept: RADIOLOGY | Facility: HOSPITAL | Age: 70
Discharge: HOME | End: 2020-10-19
Attending: ORTHOPAEDIC SURGERY
Payer: MEDICARE

## 2020-10-19 ENCOUNTER — OFFICE VISIT (OUTPATIENT)
Dept: ORTHOPEDICS | Facility: CLINIC | Age: 70
End: 2020-10-19
Payer: MEDICARE

## 2020-10-19 VITALS — HEIGHT: 61 IN | RESPIRATION RATE: 16 BRPM | BODY MASS INDEX: 43.05 KG/M2 | WEIGHT: 228 LBS

## 2020-10-19 DIAGNOSIS — S62.639A CLOSED MALLET FRACTURE OF DISTAL PHALANX OF FINGER OF RIGHT HAND: Primary | ICD-10-CM

## 2020-10-19 DIAGNOSIS — M79.641 RIGHT HAND PAIN: ICD-10-CM

## 2020-10-19 DIAGNOSIS — M20.011 CLOSED MALLET FRACTURE OF DISTAL PHALANX OF FINGER OF RIGHT HAND: Primary | ICD-10-CM

## 2020-10-19 PROCEDURE — 99999 PR PBB SHADOW E&M-EST. PATIENT-LVL III: CPT | Mod: PBBFAC,,, | Performed by: ORTHOPAEDIC SURGERY

## 2020-10-19 PROCEDURE — 1159F MED LIST DOCD IN RCRD: CPT | Mod: S$GLB,,, | Performed by: ORTHOPAEDIC SURGERY

## 2020-10-19 PROCEDURE — 73130 XR HAND COMPLETE 3 VIEW RIGHT: ICD-10-PCS | Mod: 26,RT,, | Performed by: RADIOLOGY

## 2020-10-19 PROCEDURE — 73130 X-RAY EXAM OF HAND: CPT | Mod: 26,RT,, | Performed by: RADIOLOGY

## 2020-10-19 PROCEDURE — 99999 PR PBB SHADOW E&M-EST. PATIENT-LVL III: ICD-10-PCS | Mod: PBBFAC,,, | Performed by: ORTHOPAEDIC SURGERY

## 2020-10-19 PROCEDURE — 1159F PR MEDICATION LIST DOCUMENTED IN MEDICAL RECORD: ICD-10-PCS | Mod: S$GLB,,, | Performed by: ORTHOPAEDIC SURGERY

## 2020-10-19 PROCEDURE — 26740 PR CLOSE RX FINGR ARTICULAR FX: ICD-10-PCS | Mod: F7,S$GLB,, | Performed by: ORTHOPAEDIC SURGERY

## 2020-10-19 PROCEDURE — 3008F BODY MASS INDEX DOCD: CPT | Mod: CPTII,S$GLB,, | Performed by: ORTHOPAEDIC SURGERY

## 2020-10-19 PROCEDURE — 1101F PR PT FALLS ASSESS DOC 0-1 FALLS W/OUT INJ PAST YR: ICD-10-PCS | Mod: CPTII,S$GLB,, | Performed by: ORTHOPAEDIC SURGERY

## 2020-10-19 PROCEDURE — 99214 OFFICE O/P EST MOD 30 MIN: CPT | Mod: 57,S$GLB,, | Performed by: ORTHOPAEDIC SURGERY

## 2020-10-19 PROCEDURE — 1101F PT FALLS ASSESS-DOCD LE1/YR: CPT | Mod: CPTII,S$GLB,, | Performed by: ORTHOPAEDIC SURGERY

## 2020-10-19 PROCEDURE — 3008F PR BODY MASS INDEX (BMI) DOCUMENTED: ICD-10-PCS | Mod: CPTII,S$GLB,, | Performed by: ORTHOPAEDIC SURGERY

## 2020-10-19 PROCEDURE — 1126F AMNT PAIN NOTED NONE PRSNT: CPT | Mod: S$GLB,,, | Performed by: ORTHOPAEDIC SURGERY

## 2020-10-19 PROCEDURE — 1126F PR PAIN SEVERITY QUANTIFIED, NO PAIN PRESENT: ICD-10-PCS | Mod: S$GLB,,, | Performed by: ORTHOPAEDIC SURGERY

## 2020-10-19 PROCEDURE — 73130 X-RAY EXAM OF HAND: CPT | Mod: TC,PN,RT

## 2020-10-19 PROCEDURE — 26740 TREAT FINGER FRACTURE EACH: CPT | Mod: F7,S$GLB,, | Performed by: ORTHOPAEDIC SURGERY

## 2020-10-19 PROCEDURE — 99214 PR OFFICE/OUTPT VISIT, EST, LEVL IV, 30-39 MIN: ICD-10-PCS | Mod: 57,S$GLB,, | Performed by: ORTHOPAEDIC SURGERY

## 2020-10-19 NOTE — PROGRESS NOTES
Past Medical History:   Diagnosis Date    Allergy     Anxiety     Arthritis, rheumatoid     Back pain     Depression     Fibromyalgia     GERD (gastroesophageal reflux disease)     High cholesterol     Hypertension     Incontinence of urine     MS (multiple sclerosis)     benign; another MD stated she has no MS    Neuropathy     Restless leg syndrome     Sciatica of left side 1/5/2018    Seasonal allergies     Sinus congestion     Thyroid disease     Type 2 diabetes mellitus with polyneuropathy     Wheezing        Past Surgical History:   Procedure Laterality Date    APPENDECTOMY      BREAST LUMPECTOMY      bilateral, benign    BREAST SURGERY      reduction    EPIDURAL STEROID INJECTION INTO LUMBAR SPINE N/A 6/12/2019    Procedure: Injection-steroid-epidural-lumbar;  Surgeon: Thad Manning MD;  Location: [facility] OR;  Service: Pain Management;  Laterality: N/A;  L5-S1    EPIDURAL STEROID INJECTION INTO LUMBAR SPINE N/A 9/16/2019    Procedure: Injection-steroid-epidural-lumbar;  Surgeon: Thad Manning MD;  Location: [facility];  Service: Pain Management;  Laterality: N/A;  L5-S1    HYSTERECTOMY      partial - fibroids    INJECTION OF ANESTHETIC AGENT AROUND MEDIAL BRANCH NERVES INNERVATING LUMBAR FACET JOINT Bilateral 2/28/2019    Procedure: Block-nerve-medial branch-lumbar;  Surgeon: Thad Manning MD;  Location: [facility] OR;  Service: Pain Management;  Laterality: Bilateral;  L3, 4,5     INJECTION OF ANESTHETIC AGENT AROUND MEDIAL BRANCH NERVES INNERVATING LUMBAR FACET JOINT Bilateral 3/21/2019    Procedure: Block-nerve-medial branch-lumbar L3,4,5;  Surgeon: Thad Manning MD;  Location: [facility] OR;  Service: Pain Management;  Laterality: Bilateral;    RADIOFREQUENCY ABLATION OF LUMBAR MEDIAL BRANCH NERVE AT SINGLE LEVEL Bilateral 4/12/2019    Procedure: Radiofrequency Ablation, Nerve, Spinal, Lumbar, Medial Branch, 1 Level;  Surgeon: Thad Manning MD;  Location: [facility];  Service: Pain Management;  Laterality:  Bilateral;  L3, 4, 5  lumbar  [facility] pain [facility] generator  SN [id number]  80 degrees for 75 seconds x2    RADIOFREQUENCY ABLATION OF LUMBAR MEDIAL BRANCH NERVE AT SINGLE LEVEL Bilateral 10/22/2019    Procedure: Radiofrequency Ablation, Nerve, Spinal, Lumbar, Medial Branch, L3,4,5;  Surgeon: Thad Manning MD;  Location: [facility] OR;  Service: Pain Management;  Laterality: Bilateral;  burned at 80 degrees C X 60 seconds X 2 each site    RADIOFREQUENCY ABLATION OF LUMBAR MEDIAL BRANCH NERVE AT SINGLE LEVEL Bilateral 5/27/2020    Procedure: Radiofrequency Ablation, Nerve, Spinal, Lumbar, Medial Branch, 1 Level;  Surgeon: Thad Manning MD;  Location: [facility] OR;  Service: Pain Management;  Laterality: Bilateral;  L3,4,5    TONSILLECTOMY         Current Outpatient Medications   Medication Sig    albuterol (PROAIR HFA) 90 mcg/actuation inhaler Inhale 2 puffs into the lungs every 6 (six) hours as needed for Wheezing. Rescue    ALPRAZolam (XANAX) 2 MG Tab TAKE 1 TABLET BY MOUTH TWICE DAILY    amitriptyline (ELAVIL) 10 MG tablet Take 50 mg by mouth nightly as needed for Insomnia.     amLODIPine (NORVASC) 5 MG tablet TAKE 1 TABLET BY MOUTH EVERY DAY    biotin 10,000 mcg Cap Take 13,000 mcg by mouth once daily.    celecoxib (CELEBREX) 200 MG capsule Take 200 mg by mouth once daily.    fluticasone-umeclidin-vilanter (TRELEGY ELLIPTA) 100-62.5-25 mcg DsDv Inhale 1 puff into the lungs once daily.    furosemide (LASIX) 20 MG tablet Take 1 tablet (20 mg total) by mouth once daily.    hydrochlorothiazide (HYDRODIURIL) 25 MG tablet Take 25 mg by mouth once daily.    HYDROcodone-acetaminophen (NORCO) 7.5-325 mg per tablet Take 1 tablet by mouth every 12 (twelve) hours as needed for Pain.    levothyroxine (SYNTHROID) 88 MCG tablet TK 1 T PO QD    losartan (COZAAR) 50 MG tablet TAKE 1 TABLET(50 MG) BY MOUTH EVERY DAY.    lovastatin (ALTOPREV) 40 mg 24 hr tablet Take 40 mg by mouth every evening.    memantine (NAMENDA)  10 MG Tab Take 1 tablet by mouth 2 (two) times a day.    metoprolol succinate (TOPROL-XL) 50 MG 24 hr tablet TAKE 1 TABLET BY MOUTH EVERY DAY    MULTIVIT-IRON-MIN-FOLIC ACID 3,500-18-0.4 UNIT-MG-MG ORAL CHEW Take 1 tablet by mouth.    omeprazole (PRILOSEC) 40 MG capsule Take 1 capsule (40 mg total) by mouth once daily.    potassium chloride (MICRO-K) 10 MEQ CpSR Take 10 mEq by mouth every evening.    pramipexole (MIRAPEX) 0.5 MG tablet TAKE 1 TO 2 TABLETS BY MOUTH ONCE A DAY AT BEDTIME    rivastigmine tartrate 4.5 mg capsule 6 mg.     valacyclovir HCl (VALACYCLOVIR ORAL) Take by mouth.    venlafaxine (EFFEXOR-XR) 150 MG Cp24 TK 1 C PO QD     No current facility-administered medications for this visit.        Review of patient's allergies indicates:   Allergen Reactions    Keflex [cephalexin]     Latex, natural rubber Anaphylaxis     bandaids     Pcn [penicillins]     Adhesive Other (See Comments)     bandainds redness at skin       Family History   Problem Relation Age of Onset    Heart disease Mother        Social History     Socioeconomic History    Marital status:      Spouse name: Not on file    Number of children: Not on file    Years of education: Not on file    Highest education level: Not on file   Occupational History    Not on file   Social Needs    Financial resource strain: Not on file    Food insecurity     Worry: Not on file     Inability: Not on file    Transportation needs     Medical: Not on file     Non-medical: Not on file   Tobacco Use    Smoking status: Former Smoker     Quit date: 1996     Years since quittin.4    Smokeless tobacco: Never Used   Substance and Sexual Activity    Alcohol use: Yes     Comment: very little     Drug use: No    Sexual activity: Not on file   Lifestyle    Physical activity     Days per week: Not on file     Minutes per session: Not on file    Stress: Not on file   Relationships    Social connections     Talks on phone: Not  on file     Gets together: Not on file     Attends Spiritism service: Not on file     Active member of club or organization: Not on file     Attends meetings of clubs or organizations: Not on file     Relationship status: Not on file   Other Topics Concern    Not on file   Social History Narrative    Not on file       Chief Complaint:   Chief Complaint   Patient presents with    Hand Injury     right hand finger fracture       History of present illness:  This 70-year-old patient of mine a comes in with a new injury.  Patient tripped over foot onto an outstretched right arm.  Date of injury was October 8, 2020.  Has some swelling and deformity of the middle finger on her right hand.  No pain at rest but up to with 4/10 with activity.      Review of Systems:    Constitution: Negative for chills, fever, and sweats.  Negative for unexplained weight loss.    HENT:  Negative for headaches and blurry vision.    Cardiovascular:Negative for chest pain or irregular heart beat. Negative for hypertension.    Respiratory:  Negative for cough and shortness of breath.    Gastrointestinal: Negative for abdominal pain, heartburn, melena, nausea, and vomitting.    Genitourinary:  Negative bladder incontinence and dysuria.    Musculoskeletal:  See HPI    Neurological: Negative for numbness.    Psychiatric/Behavioral: Negative for depression.  The patient is not nervous/anxious.      Endocrine: Negative for polyuria    Hematologic/Lymphatic: Negative for bleeding problem.  Does not bruise/bleed easily.    Skin: Negative for poor would healing and rash      Physical Examination:    Vital Signs:    Vitals:    10/19/20 0821   Resp: 16       Body mass index is 43.08 kg/m².    This a well-developed, well nourished patient in no acute distress.  They are alert and oriented and cooperative to examination.  Pt. walks without an antalgic gait.      Examination of the right hand and wrist shows no signs of rashes or erythema. Patient has  some swelling in a flexion posture the DIP joint of the middle finger.  Does have some arthritic change of most of her hand.. Patient has full range of motion of the wrist in flexion and extension as well as ulnar and radial deviation.  There are 2+ radial pulse and intact light touch sensation in all 5 digits. Nontender over the anatomic snuffbox. Negative Tinel's. Negative Finkelstein's test.     Examination of the left hand and wrist shows no signs of rashes or erythema. Patient has no masses ecchymosis or effusions. Patient has full range of motion of the wrist in flexion and extension as well as ulnar and radial deviation. The patient also has full range of motion of all joints in the hand. There are 2+ radial pulse and intact light touch sensation in all 5 digits. Nontender over the anatomic snuffbox. Negative Tinel's. Negative Finkelstein's test.         X-rays:  X-rays of the right hand are ordered and reviewed which show a bony mallet finger of the middle finger     Assessment::  Right middle bony mallet finger    Plan:  Reviewed the findings with her today.  Recommended a mallet finger splint for 6 weeks full-time followed by night-time splinting for another 6 weeks.  Follow-up in 4 weeks with an x-ray of the right hand.    This note was created using [employer] voice recognition software that occasionally misinterpreted phrases or words.    Consult note is delivered via Epic messaging service.

## 2020-10-20 ENCOUNTER — HOSPITAL ENCOUNTER (EMERGENCY)
Facility: HOSPITAL | Age: 70
Discharge: HOME | End: 2020-10-20
Attending: EMERGENCY MEDICINE
Payer: MEDICARE

## 2020-10-20 VITALS
RESPIRATION RATE: 20 BRPM | BODY MASS INDEX: 45.82 KG/M2 | WEIGHT: 242.5 LBS | DIASTOLIC BLOOD PRESSURE: 71 MMHG | HEART RATE: 67 BPM | TEMPERATURE: 99 F | SYSTOLIC BLOOD PRESSURE: 123 MMHG | OXYGEN SATURATION: 95 %

## 2020-10-20 DIAGNOSIS — I89.0 LYMPHEDEMA: Primary | ICD-10-CM

## 2020-10-20 DIAGNOSIS — M79.89 LEFT LEG SWELLING: ICD-10-CM

## 2020-10-20 LAB
ALBUMIN SERPL BCP-MCNC: 3.6 G/DL (ref 3.5–5.2)
ALP SERPL-CCNC: 101 U/L (ref 55–135)
ALT SERPL W/O P-5'-P-CCNC: 25 U/L (ref 10–44)
ANION GAP SERPL CALC-SCNC: 12 MMOL/L (ref 8–16)
AST SERPL-CCNC: 27 U/L (ref 10–40)
BASOPHILS # BLD AUTO: 0.06 K/UL (ref 0–0.2)
BASOPHILS NFR BLD: 0.8 % (ref 0–1.9)
BILIRUB SERPL-MCNC: 0.3 MG/DL (ref 0.1–1)
BNP SERPL-MCNC: 16 PG/ML (ref 0–99)
BUN SERPL-MCNC: 13 MG/DL (ref 8–23)
CALCIUM SERPL-MCNC: 8.7 MG/DL (ref 8.7–10.5)
CHLORIDE SERPL-SCNC: 98 MMOL/L (ref 95–110)
CO2 SERPL-SCNC: 30 MMOL/L (ref 23–29)
CREAT SERPL-MCNC: 0.9 MG/DL (ref 0.5–1.4)
DIFFERENTIAL METHOD: ABNORMAL
EOSINOPHIL # BLD AUTO: 0.2 K/UL (ref 0–0.5)
EOSINOPHIL NFR BLD: 2.5 % (ref 0–8)
ERYTHROCYTE [DISTWIDTH] IN BLOOD BY AUTOMATED COUNT: 13.5 % (ref 11.5–14.5)
EST. GFR  (AFRICAN AMERICAN): >60 ML/MIN/1.73 M^2
EST. GFR  (NON AFRICAN AMERICAN): >60 ML/MIN/1.73 M^2
GLUCOSE SERPL-MCNC: 121 MG/DL (ref 70–110)
HCT VFR BLD AUTO: 37.6 % (ref 37–48.5)
HGB BLD-MCNC: 12.2 G/DL (ref 12–16)
IMM GRANULOCYTES # BLD AUTO: 0.03 K/UL (ref 0–0.04)
IMM GRANULOCYTES NFR BLD AUTO: 0.4 % (ref 0–0.5)
LYMPHOCYTES # BLD AUTO: 2.6 K/UL (ref 1–4.8)
LYMPHOCYTES NFR BLD: 35.6 % (ref 18–48)
MAGNESIUM SERPL-MCNC: 2 MG/DL (ref 1.6–2.6)
MCH RBC QN AUTO: 29.8 PG (ref 27–31)
MCHC RBC AUTO-ENTMCNC: 32.4 G/DL (ref 32–36)
MCV RBC AUTO: 92 FL (ref 82–98)
MONOCYTES # BLD AUTO: 0.6 K/UL (ref 0.3–1)
MONOCYTES NFR BLD: 8.3 % (ref 4–15)
NEUTROPHILS # BLD AUTO: 3.8 K/UL (ref 1.8–7.7)
NEUTROPHILS NFR BLD: 52.4 % (ref 38–73)
NRBC BLD-RTO: 0 /100 WBC
PLATELET # BLD AUTO: 257 K/UL (ref 150–350)
PMV BLD AUTO: 8.8 FL (ref 9.2–12.9)
POTASSIUM SERPL-SCNC: 2.6 MMOL/L (ref 3.5–5.1)
PROT SERPL-MCNC: 6.5 G/DL (ref 6–8.4)
RBC # BLD AUTO: 4.1 M/UL (ref 4–5.4)
SODIUM SERPL-SCNC: 140 MMOL/L (ref 136–145)
WBC # BLD AUTO: 7.2 K/UL (ref 3.9–12.7)

## 2020-10-20 PROCEDURE — 83735 ASSAY OF MAGNESIUM: CPT

## 2020-10-20 PROCEDURE — 80053 COMPREHEN METABOLIC PANEL: CPT

## 2020-10-20 PROCEDURE — 85025 COMPLETE CBC W/AUTO DIFF WBC: CPT

## 2020-10-20 PROCEDURE — 36415 COLL VENOUS BLD VENIPUNCTURE: CPT

## 2020-10-20 PROCEDURE — 99284 EMERGENCY DEPT VISIT MOD MDM: CPT | Mod: 25

## 2020-10-20 PROCEDURE — 83880 ASSAY OF NATRIURETIC PEPTIDE: CPT

## 2020-10-20 PROCEDURE — 25000003 PHARM REV CODE 250: Performed by: EMERGENCY MEDICINE

## 2020-10-20 RX ORDER — DICLOFENAC SODIUM 50 MG/1
50 TABLET, DELAYED RELEASE ORAL 3 TIMES DAILY PRN
Qty: 15 TABLET | Refills: 0 | Status: SHIPPED | OUTPATIENT
Start: 2020-10-20 | End: 2020-12-09

## 2020-10-20 RX ORDER — POTASSIUM CHLORIDE 20 MEQ/1
20 TABLET, EXTENDED RELEASE ORAL 2 TIMES DAILY
Qty: 14 TABLET | Refills: 0 | Status: SHIPPED | OUTPATIENT
Start: 2020-10-20 | End: 2020-10-27

## 2020-10-20 RX ORDER — POTASSIUM CHLORIDE 1.5 G/1.58G
40 POWDER, FOR SOLUTION ORAL
Status: COMPLETED | OUTPATIENT
Start: 2020-10-20 | End: 2020-10-20

## 2020-10-20 RX ADMIN — POTASSIUM CHLORIDE 40 MEQ: 1.5 POWDER, FOR SOLUTION ORAL at 03:10

## 2020-10-20 NOTE — ED PROVIDER NOTES
Encounter Date: 10/20/2020    SCRIBE #1 NOTE: I, Christy Guadalupe, am scribing for, and in the presence of, Zeferino Mtz MD.       History     Chief Complaint   Patient presents with    Leg Swelling     bilateral lower leg swelling. worse on left.      Time seen by provider: 1:55 PM on 10/20/2020    Genoveva Gilbert is a 70 y.o. female with a PMHx of thyroid disease, HTN, arthritis, lymphedema (x8 months), MS, and DM type 2 who presents to the ED with an onset of leg swelling. The patient reports chronic swelling in her left leg, but woke up this morning to her leg doubled in size and right leg swollen as well. The patient reports she is able to walk on her legs. Patient endorses using her lymphedema machine at home which compresses her legs, without improvement. The patient is on a fluid pill, and is compliant with medication. The patient denies kidney problems. The patient denies history of cancer. The patient denies cough, runny nose, diarrhea, dysuria, rashes or any other symptoms at this time. PSHx of radiofrequency ablation of lumbar medial branch nerve at single level.    The history is provided by the patient.     Review of patient's allergies indicates:   Allergen Reactions    Keflex [cephalexin]     Latex, natural rubber Anaphylaxis     bandaids     Pcn [penicillins]     Adhesive Other (See Comments)     bandainds redness at skin     Past Medical History:   Diagnosis Date    Allergy     Anxiety     Arthritis, rheumatoid     Back pain     Depression     Fibromyalgia     GERD (gastroesophageal reflux disease)     High cholesterol     Hypertension     Incontinence of urine     MS (multiple sclerosis)     benign; another MD stated she has no MS    Neuropathy     Restless leg syndrome     Sciatica of left side 1/5/2018    Seasonal allergies     Sinus congestion     Thyroid disease     Type 2 diabetes mellitus with polyneuropathy     Wheezing      Past Surgical History:   Procedure  Laterality Date    APPENDECTOMY      BREAST LUMPECTOMY      bilateral, benign    BREAST SURGERY      reduction    EPIDURAL STEROID INJECTION INTO LUMBAR SPINE N/A 6/12/2019    Procedure: Injection-steroid-epidural-lumbar;  Surgeon: Thad Manning MD;  Location: [facility] OR;  Service: Pain Management;  Laterality: N/A;  L5-S1    EPIDURAL STEROID INJECTION INTO LUMBAR SPINE N/A 9/16/2019    Procedure: Injection-steroid-epidural-lumbar;  Surgeon: Thad Manning MD;  Location: [facility] OR;  Service: Pain Management;  Laterality: N/A;  L5-S1    HYSTERECTOMY      partial - fibroids    INJECTION OF ANESTHETIC AGENT AROUND MEDIAL BRANCH NERVES INNERVATING LUMBAR FACET JOINT Bilateral 2/28/2019    Procedure: Block-nerve-medial branch-lumbar;  Surgeon: Thad Manning MD;  Location: [facility] OR;  Service: Pain Management;  Laterality: Bilateral;  L3, 4,5     INJECTION OF ANESTHETIC AGENT AROUND MEDIAL BRANCH NERVES INNERVATING LUMBAR FACET JOINT Bilateral 3/21/2019    Procedure: Block-nerve-medial branch-lumbar L3,4,5;  Surgeon: Thad Manning MD;  Location: [facility] OR;  Service: Pain Management;  Laterality: Bilateral;    RADIOFREQUENCY ABLATION OF LUMBAR MEDIAL BRANCH NERVE AT SINGLE LEVEL Bilateral 4/12/2019    Procedure: Radiofrequency Ablation, Nerve, Spinal, Lumbar, Medial Branch, 1 Level;  Surgeon: Thad Manning MD;  Location: [facility] OR;  Service: Pain Management;  Laterality: Bilateral;  L3, 4, 5  lumbar  [employer] pain management generator  SN [id number]  80 degrees for 75 seconds x2    RADIOFREQUENCY ABLATION OF LUMBAR MEDIAL BRANCH NERVE AT SINGLE LEVEL Bilateral 10/22/2019    Procedure: Radiofrequency Ablation, Nerve, Spinal, Lumbar, Medial Branch, L3,4,5;  Surgeon: Thad Manning MD;  Location: [facility] OR;  Service: Pain Management;  Laterality: Bilateral;  burned at 80 degrees C X 60 seconds X 2 each site    RADIOFREQUENCY ABLATION OF LUMBAR MEDIAL BRANCH NERVE AT SINGLE LEVEL Bilateral 5/27/2020    Procedure: Radiofrequency  Ablation, Nerve, Spinal, Lumbar, Medial Branch, 1 Level;  Surgeon: Thad Manning MD;  Location: [facility] OR;  Service: Pain Management;  Laterality: Bilateral;  L3,4,5    TONSILLECTOMY       Family History   Problem Relation Age of Onset    Heart disease Mother      Social History     Tobacco Use    Smoking status: Former Smoker     Quit date: 1996     Years since quittin.4    Smokeless tobacco: Never Used   Substance Use Topics    Alcohol use: Yes     Comment: very little     Drug use: No     Review of Systems   Constitutional: Negative for fever.   HENT: Negative for rhinorrhea and sore throat.    Respiratory: Negative for cough and shortness of breath.    Cardiovascular: Positive for leg swelling. Negative for chest pain.   Gastrointestinal: Negative for diarrhea and nausea.   Genitourinary: Negative for dysuria.   Musculoskeletal: Negative for back pain.   Skin: Negative for rash.   Neurological: Negative for weakness.   Hematological: Does not bruise/bleed easily.       Physical Exam     Initial Vitals [10/20/20 1216]   BP Pulse Resp Temp SpO2   (!) 147/70 77 20 99 °F (37.2 °C) 95 %      MAP       --         Physical Exam    Nursing note and vitals reviewed.  Constitutional: She appears well-developed and well-nourished. She is not diaphoretic. No distress.   HENT:   Head: Normocephalic and atraumatic.   Eyes: EOM are normal. Pupils are equal, round, and reactive to light.   Neck: Normal range of motion. Neck supple.   Cardiovascular: Normal rate, regular rhythm, normal heart sounds and intact distal pulses. Exam reveals no gallop and no friction rub.    No murmur heard.  Pulmonary/Chest: Breath sounds normal. No respiratory distress. She has no wheezes. She has no rhonchi. She has no rales.   Abdominal: Soft. Bowel sounds are normal. There is no abdominal tenderness. There is no rebound and no guarding.   Musculoskeletal: Normal range of motion. Edema present.      Comments: Swelling to BLE's. Left  greater than right.   Neurological: She is alert and oriented to person, place, and time.   5/5 strength with intact sensation to BUE's and BLE's.   Skin: Skin is warm and dry.   Psychiatric: She has a normal mood and affect. Her behavior is normal. Judgment and thought content normal.       ED Course   Procedures  Labs Reviewed   CBC W/ AUTO DIFFERENTIAL - Abnormal; Notable for the following components:       Result Value    MPV 8.8 (*)     All other components within normal limits   COMPREHENSIVE METABOLIC PANEL - Abnormal; Notable for the following components:    Potassium 2.6 (*)     CO2 30 (*)     Glucose 121 (*)     All other components within normal limits    Narrative:        critical Potassium result(s) called and verbal readback obtained   from Thad Macias at 1436 10/20/20  by [facility] 10/20/2020 14:37   B-TYPE NATRIURETIC PEPTIDE   MAGNESIUM          Imaging Results          US Lower Extremity Veins Left (Final result)  Result time 10/20/20 14:12:15    Final result by Melissa Cerda MD (10/20/20 14:12:15)                 Impression:      No evidence of deep venous thrombosis in the left lower extremity.      Electronically signed by: Melissa Cerda  Date:    10/20/2020  Time:    14:12             Narrative:    EXAMINATION:  US LOWER EXTREMITY VEINS LEFT    CLINICAL HISTORY:  Other specified soft tissue disorders    TECHNIQUE:  Duplex and color flow Doppler evaluation and graded compression of the left lower extremity veins was performed.    COMPARISON:  05/14/2019    FINDINGS:  Left thigh veins: The common femoral, femoral, popliteal, upper greater saphenous, and deep femoral veins are patent and free of thrombus. The veins are normally compressible and have normal phasic flow and augmentation response.    Left calf veins: The visualized calf veins are patent.    Contralateral CFV: The contralateral (right) common femoral vein is patent and free of thrombus.    Miscellaneous: Edema noted throughout  calf.                                 Medical Decision Making:   History:   Old Medical Records: I decided to obtain old medical records.  Initial Assessment:   70-year-old female presents with lower extremity edema.  Differential Diagnosis:   Initial differential diagnosis included but not limited to cellulitis, DVT, and lymphedema.  Clinical Tests:   Lab Tests: Ordered and Reviewed  Radiological Study: Ordered and Reviewed  ED Management:  The patient was emergently evaluated in the emergency department, her evaluation was significant for an elderly female with lower extremity edema, left leg greater than right leg.  There is no skin color changes or warmth noted to the legs.  At this point time, I doubt an acute cellulitis.  The patient's ultrasound showed evidence of DVT.  The patient's labs were significant for hypokalemia.  The patient's potassium was replaced p.o. here in the emergency department.  The etiology of the patient's lower extremity edema is likely her chronic lymphedema.  The patient is stable for discharge to home.  She is to continue her home medication regimen as previously prescribed, including fluid pills.  Additionally I will send her home with p.o. potassium as well.  She is otherwise follow up with PCP for further care and treatment.            Scribe Attestation:   Scribe #1: I performed the above scribed service and the documentation accurately describes the services I performed. I attest to the accuracy of the note.           I, Dr. Zeferino Mtz, personally performed the services described in this documentation. All medical record entries made by the scribe were at my direction and in my presence.  I have reviewed the chart and agree that the record reflects my personal performance and is accurate and complete. Zeferino Mtz MD.  4:32 PM 10/20/2020              Clinical Impression:       ICD-10-CM ICD-9-CM   1. Lymphedema  I89.0 457.1   2. Left leg swelling  M79.89 729.81                       Disposition:   Disposition: Discharged  Condition: Stable     ED Disposition Condition    Discharge Stable        ED Prescriptions     None        Follow-up Information    None                                      Zeferino Mtz MD  10/20/20 0108

## 2020-10-20 NOTE — PROGRESS NOTES
I have evaluated and performed a medical screening assessment on this patient while awaiting a thorough evaluation and treatment. All of the emergency department beds are occupied at this time. When appropriate, laboratory studies will be ordered from triage. The patient has been advised of this process and care plan. Patient complains of bilateral lower leg swelling, left greater than right with associated left posterior leg pain.  No injury.  No recent surgery, prolonged immobilization.  No history of blood clots.  Takes lasix daily.  No SOB, difficulty breathing.     Orders pending:  CBC, CMP, BNP, US left leg  Disposition: stable

## 2020-10-22 DIAGNOSIS — Z01.818 PRE-OP TESTING: ICD-10-CM

## 2020-10-22 DIAGNOSIS — M25.562 LEFT KNEE PAIN, UNSPECIFIED CHRONICITY: Primary | ICD-10-CM

## 2020-11-01 ENCOUNTER — LAB VISIT (OUTPATIENT)
Dept: PRIMARY CARE CLINIC | Facility: CLINIC | Age: 70
End: 2020-11-01
Payer: MEDICARE

## 2020-11-01 DIAGNOSIS — Z01.818 PRE-OP TESTING: ICD-10-CM

## 2020-11-01 PROCEDURE — U0003 INFECTIOUS AGENT DETECTION BY NUCLEIC ACID (DNA OR RNA); SEVERE ACUTE RESPIRATORY SYNDROME CORONAVIRUS 2 (SARS-COV-2) (CORONAVIRUS DISEASE [COVID-19]), AMPLIFIED PROBE TECHNIQUE, MAKING USE OF HIGH THROUGHPUT TECHNOLOGIES AS DESCRIBED BY CMS-2020-01-R: HCPCS

## 2020-11-01 NOTE — PROGRESS NOTES
Pt presented for Pre-procedure drive thru testing. Patient identified using two patient identifiers prior to specimen collection. Specimen collected pt tolerated well.  Questions answered prior to departure and education given.

## 2020-11-02 LAB — SARS-COV-2 RNA RESP QL NAA+PROBE: NOT DETECTED

## 2020-11-03 ENCOUNTER — TELEPHONE (OUTPATIENT)
Dept: PHYSICAL MEDICINE AND REHAB | Facility: CLINIC | Age: 70
End: 2020-11-03

## 2020-11-03 ENCOUNTER — HOSPITAL ENCOUNTER (OUTPATIENT)
Dept: PREADMISSION TESTING | Facility: HOSPITAL | Age: 70
Discharge: HOME | End: 2020-11-03
Attending: PHYSICAL MEDICINE & REHABILITATION
Payer: MEDICARE

## 2020-11-03 ENCOUNTER — NURSE TRIAGE (OUTPATIENT)
Dept: ADMINISTRATIVE | Facility: CLINIC | Age: 70
End: 2020-11-03

## 2020-11-03 PROCEDURE — 99900104 DSU ONLY-NO CHARGE-EA ADD'L HR (STAT)

## 2020-11-03 PROCEDURE — 99900103 DSU ONLY-NO CHARGE-INITIAL HR (STAT)

## 2020-11-03 NOTE — TELEPHONE ENCOUNTER
Pt advised to continue potassium. Pt also asked if she has to do the pre op. Pt advised that she must do the pre op. Pt verbalized understanding.

## 2020-11-03 NOTE — TELEPHONE ENCOUNTER
----- Message from Chris House sent at 11/3/2020 12:37 PM CST -----  Regarding: Advice  Contact: patient  Patient want to speak with a nurse regarding if she suppose to be taking potassium or not please call back at [phone]

## 2020-11-04 ENCOUNTER — HOSPITAL ENCOUNTER (OUTPATIENT)
Facility: HOSPITAL | Age: 70
Discharge: HOME | End: 2020-11-04
Attending: PHYSICAL MEDICINE & REHABILITATION | Admitting: PHYSICAL MEDICINE & REHABILITATION
Payer: MEDICARE

## 2020-11-04 VITALS
BODY MASS INDEX: 46.26 KG/M2 | OXYGEN SATURATION: 95 % | WEIGHT: 245 LBS | HEIGHT: 61 IN | TEMPERATURE: 98 F | SYSTOLIC BLOOD PRESSURE: 162 MMHG | HEART RATE: 64 BPM | RESPIRATION RATE: 16 BRPM | DIASTOLIC BLOOD PRESSURE: 73 MMHG

## 2020-11-04 DIAGNOSIS — G89.29 CHRONIC PAIN OF LEFT KNEE: Primary | ICD-10-CM

## 2020-11-04 DIAGNOSIS — M25.562 CHRONIC PAIN OF LEFT KNEE: Primary | ICD-10-CM

## 2020-11-04 PROCEDURE — 64624 DSTRJ NULYT AGT GNCLR NRV: CPT | Mod: LT,,, | Performed by: PHYSICAL MEDICINE & REHABILITATION

## 2020-11-04 PROCEDURE — 63600175 PHARM REV CODE 636 W HCPCS: Performed by: PHYSICAL MEDICINE & REHABILITATION

## 2020-11-04 PROCEDURE — 71000015 HC POSTOP RECOV 1ST HR: Performed by: PHYSICAL MEDICINE & REHABILITATION

## 2020-11-04 PROCEDURE — 99900104 DSU ONLY-NO CHARGE-EA ADD'L HR (STAT): Performed by: PHYSICAL MEDICINE & REHABILITATION

## 2020-11-04 PROCEDURE — 36000704 HC OR TIME LEV I 1ST 15 MIN: Performed by: PHYSICAL MEDICINE & REHABILITATION

## 2020-11-04 PROCEDURE — 25000003 PHARM REV CODE 250: Performed by: PHYSICAL MEDICINE & REHABILITATION

## 2020-11-04 PROCEDURE — 99900103 DSU ONLY-NO CHARGE-INITIAL HR (STAT): Performed by: PHYSICAL MEDICINE & REHABILITATION

## 2020-11-04 PROCEDURE — 27201423 OPTIME MED/SURG SUP & DEVICES STERILE SUPPLY: Performed by: PHYSICAL MEDICINE & REHABILITATION

## 2020-11-04 PROCEDURE — 64624 PR DESTRUCT, NEUROLYTIC AGENT, GENICULAR NERVE, W/IMG: ICD-10-PCS | Mod: LT,,, | Performed by: PHYSICAL MEDICINE & REHABILITATION

## 2020-11-04 PROCEDURE — 36000705 HC OR TIME LEV I EA ADD 15 MIN: Performed by: PHYSICAL MEDICINE & REHABILITATION

## 2020-11-04 RX ORDER — FENTANYL CITRATE 50 UG/ML
INJECTION, SOLUTION INTRAMUSCULAR; INTRAVENOUS
Status: DISCONTINUED | OUTPATIENT
Start: 2020-11-04 | End: 2020-11-04 | Stop reason: HOSPADM

## 2020-11-04 RX ORDER — SODIUM CHLORIDE 9 MG/ML
INJECTION, SOLUTION INTRAVENOUS CONTINUOUS
Status: DISCONTINUED | OUTPATIENT
Start: 2020-11-04 | End: 2021-03-16

## 2020-11-04 RX ORDER — MIDAZOLAM HYDROCHLORIDE 1 MG/ML
INJECTION INTRAMUSCULAR; INTRAVENOUS
Status: DISCONTINUED | OUTPATIENT
Start: 2020-11-04 | End: 2020-11-04 | Stop reason: HOSPADM

## 2020-11-04 RX ORDER — LIDOCAINE HYDROCHLORIDE 10 MG/ML
1 INJECTION, SOLUTION EPIDURAL; INFILTRATION; INTRACAUDAL; PERINEURAL ONCE
Status: COMPLETED | OUTPATIENT
Start: 2020-11-04 | End: 2020-11-04

## 2020-11-04 RX ADMIN — SODIUM CHLORIDE: 0.9 INJECTION, SOLUTION INTRAVENOUS at 10:11

## 2020-11-04 NOTE — TELEPHONE ENCOUNTER
Caller states she is scheduled for surgery tomorrow and has not received call with instructions on what time to be there.  Attempted to contact Dr. Andrew Escudero, through [facility] operator. Advised they have no information to reach after hours.  Chat sent through [facility] to msg [facility] routed. Unable to reach pre-op desk or outpatient department at [facility]. Was told depts were closed.  Advised caller to call Dr. Escudero's ofc first thing in the morning.

## 2020-11-04 NOTE — PLAN OF CARE
Discharge instructions given to pt and spouse who voice understanding.  Taking po fluids without nausea.  Denies pain.  Injection sites to L knee clean and intact.  All questions answered, all personal belongings returned to pt

## 2020-11-04 NOTE — OP NOTE
Today's Date: 11/04/2020     [facility] History: Genoveva Gilbert is a 70 y.o. female with chronic left knee pain.  History and physical examination along with x-ray findings are consistent with left knee osteoarthritis greatest at the medial joint line.  Most recently, she has had hyaluronic acid injections which provided her with about 6 weeks of excellent relief.  Her pain is severe limiting her activities of daily living and functional mobility.  Her pain on average is greater than a 6/10 and up to a 10/10 with standing or walking.  She is not interested in a total knee arthroplasty at this time.  I performed peripheral nerve blocks to the left knee. This provide her with greater than 60% reduction in pain and improvement in function for the duration of the anesthetic. We will proceed with cooled radiofrequency ablation.     PREOPERATIVE DIAGNOSIS: Chronic left knee pain, left knee osteoarthritis     POSTOPERATIVE DIAGNOSIS: Same     PROCEDURE:   1. LEFT Superolateral genicular branch from the vastus lateralis  2. LEFT Superomedial genicular branch from the vastus medialis  3. LEFT Inferomedial genicular branch from the saphenous nerve    ANESTHESIA: RN IV sedation 2mg IV versed, 75 mcg IV fentanyl    COMPLICATIONS: N/A    CONSENT: Risks and benefits were discussed and informed consent was obtained      PROCEDURE IN DETAIL:   The patient was brought to the procedure room and placed on the exam table in a comfortable supine position. The place for needle placement was obtained by manual palpation with radiographic confirmation. The sterile field was prepared by chloroprep and sterile drapes. Local anesthesia superficial and deep was provided by local infiltration of 1cc of 1% lidocaine using a 27 gauge 1.5 inch needle. A 17g 75mm radiofrequency introducerneedle with a 4mm active tip was placed overlying the left knee joint and using fluoroscopic guidance the needle was advanced to a bony endpoint on the superiolateral  portion of the femoral condyle of the left knee. A second needle was advanced to a bony endpoint on the superiomedial portion of the femoral condyle. A third needle was then placed over the inferiomedial portion of the tibial condyle until a bony endpoint was met. Attempted aspiration yielded no blood. Lateral x-ray views showed all the needles at 50% depth of the femur and tibia. Motor stimulation was tested ad 2.0 volts with no leg movement. Images were saved in AP and lateral. 1 cc of 0.50% bupivacaine was slowly injected. Then a radiofrequency ablation of each of the geniculate nerves were done a t80 degrees Celsius for 2 minutes and 30 seconds each. The needles were withdrawn.     MONITORS  Prior to and during the procedure, the patient was monitored with pulse oximetry, EKG, and blood pressure cuff.  The procedure was tolerated well.  Oxygenation, blood pressure, and pulse rate were maintained within normal limits during the procedure.  The patient was awake, alert, and able to respond to all questions appropriately throughout the entire procedure.    The patient was monitored after the procedure.  On exam 30, minutes after the procedure, the patient was noted to be neurovascularly intact in BLE.  The patient was discharged home with family/responsible adult in stable condition.  The patient was given post procedure and discharge instructions to follow at home.  Diet on discharge is to be the same as prior to the procedure.  Activity on discharge is as per instruction sheet given to the patient.  The patient will return pain diary to clinic once completed.

## 2020-11-04 NOTE — INTERVAL H&P NOTE
The patient has been examined and the H&P has been reviewed:    I concur with the findings and changes have been noted since the H&P was written: Patient received medial branch block with >80% reduction in pain and improvement in function for the duration of the anesthetic    Anesthesia/Surgery risks, benefits and alternative options discussed and understood by patient/family.    ASA 3, Mallampati 2      Active Hospital Problems    Diagnosis  POA    Chronic pain of left knee [M25.562, G89.29]  Yes      Resolved Hospital Problems   No resolved problems to display.

## 2020-11-04 NOTE — TELEPHONE ENCOUNTER
Spoke with patient this morning she advised that the surgery nurses did call her back and tell her what time to be at the surgery center today

## 2020-11-04 NOTE — DISCHARGE INSTRUCTIONS
Post Procedure Instructions    -For the first 24 hours after procedure apply an ice pack to the site for 20 minute periods and repeating can help relieve pain at the site.    -If you have stopped taking Aspirin prior to the procedure, please restart taking it from the day after your procedure. You may restart your Plavix or Coumadin the day after your procedure unless otherwise instructed.     Keep clear dressing on for 48 hrs, then remove. Leave paper strips on until they fall off.    -Avoid submerging area in water for 5 days so no bathtub, hot tub, swimming pool     May wet in shower after 48 hours    -If numbing medication was administered , be careful getting around .      Elbow and Shoulder     -Rest arm or shoulder the day of procedure    -Wear sling for 48 hours after procedure     -Light activity for 3  weeks then progress as tolerated      Hip and Foot     -Rest hip or foot the day of procedure    -Use crutches for 48 hours after procedure    -Light activity for 3 weeks then progress as tolerated    Please seek medical help immediately if any of the following symptoms occur:    1. Severe increase in your usual pain or appearance of new pain.  2. Prolonged or increasing weakness or numbness in the legs or arms-longer than 6 hours.  3. Drainage, redness, active bleeding , or increasing swelling at the injection site  4. Temperature over 100.0 degrees F    Please contact Rina at [phone] in the event that your physical condition changes prior to your procedure, if you have to delay or to cancel your procedure or if you have any questions about your procedure or these instructions.

## 2020-11-11 DIAGNOSIS — M20.011 CLOSED MALLET FRACTURE OF DISTAL PHALANX OF FINGER OF RIGHT HAND: Primary | ICD-10-CM

## 2020-11-11 DIAGNOSIS — S62.639A CLOSED MALLET FRACTURE OF DISTAL PHALANX OF FINGER OF RIGHT HAND: Primary | ICD-10-CM

## 2020-11-16 ENCOUNTER — TELEPHONE (OUTPATIENT)
Dept: ORTHOPEDICS | Facility: CLINIC | Age: 70
End: 2020-11-16

## 2020-11-16 NOTE — TELEPHONE ENCOUNTER
----- Message from Thad Alfaro sent at 11/16/2020  9:56 AM CST -----  Regarding: appt access  Contact: pt   call

## 2020-12-07 ENCOUNTER — OFFICE VISIT (OUTPATIENT)
Dept: ORTHOPEDICS | Facility: CLINIC | Age: 70
End: 2020-12-07
Payer: MEDICARE

## 2020-12-07 ENCOUNTER — HOSPITAL ENCOUNTER (OUTPATIENT)
Dept: RADIOLOGY | Facility: HOSPITAL | Age: 70
Discharge: HOME | End: 2020-12-07
Attending: ORTHOPAEDIC SURGERY
Payer: MEDICARE

## 2020-12-07 VITALS — WEIGHT: 245 LBS | RESPIRATION RATE: 16 BRPM | HEIGHT: 61 IN | BODY MASS INDEX: 46.26 KG/M2

## 2020-12-07 DIAGNOSIS — M75.100 TEAR OF ROTATOR CUFF, UNSPECIFIED LATERALITY, UNSPECIFIED TEAR EXTENT, UNSPECIFIED WHETHER TRAUMATIC: ICD-10-CM

## 2020-12-07 DIAGNOSIS — M17.12 PRIMARY OSTEOARTHRITIS OF LEFT KNEE: ICD-10-CM

## 2020-12-07 DIAGNOSIS — M20.011 CLOSED MALLET FRACTURE OF DISTAL PHALANX OF FINGER OF RIGHT HAND: ICD-10-CM

## 2020-12-07 DIAGNOSIS — M20.011 CLOSED MALLET FRACTURE OF DISTAL PHALANX OF FINGER OF RIGHT HAND: Primary | ICD-10-CM

## 2020-12-07 DIAGNOSIS — S62.639A CLOSED MALLET FRACTURE OF DISTAL PHALANX OF FINGER OF RIGHT HAND: ICD-10-CM

## 2020-12-07 DIAGNOSIS — S62.639A CLOSED MALLET FRACTURE OF DISTAL PHALANX OF FINGER OF RIGHT HAND: Primary | ICD-10-CM

## 2020-12-07 PROCEDURE — 99213 PR OFFICE/OUTPT VISIT, EST, LEVL III, 20-29 MIN: ICD-10-PCS | Mod: 25,S$GLB,, | Performed by: ORTHOPAEDIC SURGERY

## 2020-12-07 PROCEDURE — 1159F MED LIST DOCD IN RCRD: CPT | Mod: S$GLB,,, | Performed by: ORTHOPAEDIC SURGERY

## 2020-12-07 PROCEDURE — 1101F PR PT FALLS ASSESS DOC 0-1 FALLS W/OUT INJ PAST YR: ICD-10-PCS | Mod: CPTII,S$GLB,, | Performed by: ORTHOPAEDIC SURGERY

## 2020-12-07 PROCEDURE — 99999 PR PBB SHADOW E&M-EST. PATIENT-LVL IV: ICD-10-PCS | Mod: PBBFAC,,, | Performed by: ORTHOPAEDIC SURGERY

## 2020-12-07 PROCEDURE — 73130 X-RAY EXAM OF HAND: CPT | Mod: TC,PN,RT

## 2020-12-07 PROCEDURE — 99213 OFFICE O/P EST LOW 20 MIN: CPT | Mod: 25,S$GLB,, | Performed by: ORTHOPAEDIC SURGERY

## 2020-12-07 PROCEDURE — 1126F PR PAIN SEVERITY QUANTIFIED, NO PAIN PRESENT: ICD-10-PCS | Mod: S$GLB,,, | Performed by: ORTHOPAEDIC SURGERY

## 2020-12-07 PROCEDURE — 3288F FALL RISK ASSESSMENT DOCD: CPT | Mod: CPTII,S$GLB,, | Performed by: ORTHOPAEDIC SURGERY

## 2020-12-07 PROCEDURE — 1126F AMNT PAIN NOTED NONE PRSNT: CPT | Mod: S$GLB,,, | Performed by: ORTHOPAEDIC SURGERY

## 2020-12-07 PROCEDURE — 99999 PR PBB SHADOW E&M-EST. PATIENT-LVL IV: CPT | Mod: PBBFAC,,, | Performed by: ORTHOPAEDIC SURGERY

## 2020-12-07 PROCEDURE — 3008F PR BODY MASS INDEX (BMI) DOCUMENTED: ICD-10-PCS | Mod: CPTII,S$GLB,, | Performed by: ORTHOPAEDIC SURGERY

## 2020-12-07 PROCEDURE — 3008F BODY MASS INDEX DOCD: CPT | Mod: CPTII,S$GLB,, | Performed by: ORTHOPAEDIC SURGERY

## 2020-12-07 PROCEDURE — 3288F PR FALLS RISK ASSESSMENT DOCUMENTED: ICD-10-PCS | Mod: CPTII,S$GLB,, | Performed by: ORTHOPAEDIC SURGERY

## 2020-12-07 PROCEDURE — 73130 XR HAND COMPLETE 3 VIEW RIGHT: ICD-10-PCS | Mod: 26,RT,, | Performed by: RADIOLOGY

## 2020-12-07 PROCEDURE — 20610 DRAIN/INJ JOINT/BURSA W/O US: CPT | Mod: LT,S$GLB,, | Performed by: ORTHOPAEDIC SURGERY

## 2020-12-07 PROCEDURE — 20610 LARGE JOINT ASPIRATION/INJECTION: L SUBACROMIAL BURSA: ICD-10-PCS | Mod: LT,S$GLB,, | Performed by: ORTHOPAEDIC SURGERY

## 2020-12-07 PROCEDURE — 73130 X-RAY EXAM OF HAND: CPT | Mod: 26,RT,, | Performed by: RADIOLOGY

## 2020-12-07 PROCEDURE — 1159F PR MEDICATION LIST DOCUMENTED IN MEDICAL RECORD: ICD-10-PCS | Mod: S$GLB,,, | Performed by: ORTHOPAEDIC SURGERY

## 2020-12-07 PROCEDURE — 1101F PT FALLS ASSESS-DOCD LE1/YR: CPT | Mod: CPTII,S$GLB,, | Performed by: ORTHOPAEDIC SURGERY

## 2020-12-07 RX ORDER — TRIAMCINOLONE ACETONIDE 40 MG/ML
40 INJECTION, SUSPENSION INTRA-ARTICULAR; INTRAMUSCULAR
Status: DISCONTINUED | OUTPATIENT
Start: 2020-12-07 | End: 2020-12-07 | Stop reason: HOSPADM

## 2020-12-07 RX ORDER — PREGABALIN 25 MG/1
CAPSULE ORAL
COMMUNITY
Start: 2020-11-28 | End: 2020-12-23 | Stop reason: SDUPTHER

## 2020-12-07 RX ADMIN — TRIAMCINOLONE ACETONIDE 40 MG: 40 INJECTION, SUSPENSION INTRA-ARTICULAR; INTRAMUSCULAR at 11:12

## 2020-12-07 NOTE — PROGRESS NOTES
Past Medical History:   Diagnosis Date    Allergy     Anxiety     Arthritis, rheumatoid     Back pain     Depression     Fibromyalgia     GERD (gastroesophageal reflux disease)     High cholesterol     [place] (hard of hearing)     aid right ear    Hypertension     Incontinence of urine     MS (multiple sclerosis)     benign; another MD stated she has no MS    Neuropathy     Restless leg syndrome     Sciatica of left side 1/5/2018    Seasonal allergies     Sinus congestion     Sleep apnea     DOES NOT USE MACHINE    Thyroid disease     Type 2 diabetes mellitus with polyneuropathy     no med. was borderline    Wheezing        Past Surgical History:   Procedure Laterality Date    APPENDECTOMY      BREAST LUMPECTOMY      bilateral, benign    BREAST SURGERY      reduction    EPIDURAL STEROID INJECTION INTO LUMBAR SPINE N/A 6/12/2019    Procedure: Injection-steroid-epidural-lumbar;  Surgeon: Thad Manning MD;  Location: [facility] OR;  Service: Pain Management;  Laterality: N/A;  L5-S1    EPIDURAL STEROID INJECTION INTO LUMBAR SPINE N/A 9/16/2019    Procedure: Injection-steroid-epidural-lumbar;  Surgeon: Thad Manning MD;  Location: [facility] OR;  Service: Pain Management;  Laterality: N/A;  L5-S1    HYSTERECTOMY      partial - fibroids    INJECTION OF ANESTHETIC AGENT AROUND MEDIAL BRANCH NERVES INNERVATING LUMBAR FACET JOINT Bilateral 2/28/2019    Procedure: Block-nerve-medial branch-lumbar;  Surgeon: Thad Manning MD;  Location: [facility] OR;  Service: Pain Management;  Laterality: Bilateral;  L3, 4,5     INJECTION OF ANESTHETIC AGENT AROUND MEDIAL BRANCH NERVES INNERVATING LUMBAR FACET JOINT Bilateral 3/21/2019    Procedure: Block-nerve-medial branch-lumbar L3,4,5;  Surgeon: Thad Manning MD;  Location: [facility] OR;  Service: Pain Management;  Laterality: Bilateral;    RADIOFREQUENCY ABLATION Left 11/4/2020    Procedure: Radiofrequency Ablation left knee;  Surgeon: Andrew Escudero MD;  Location: [facility] OR;  Service: Pain  Management;  Laterality: Left;    RADIOFREQUENCY ABLATION OF LUMBAR MEDIAL BRANCH NERVE AT SINGLE LEVEL Bilateral 4/12/2019    Procedure: Radiofrequency Ablation, Nerve, Spinal, Lumbar, Medial Branch, 1 Level;  Surgeon: Thad Manning MD;  Location: [facility] OR;  Service: Pain Management;  Laterality: Bilateral;  L3, 4, 5  lumbar  [employer] pain management generator  SN [id number]  80 degrees for 75 seconds x2    RADIOFREQUENCY ABLATION OF LUMBAR MEDIAL BRANCH NERVE AT SINGLE LEVEL Bilateral 10/22/2019    Procedure: Radiofrequency Ablation, Nerve, Spinal, Lumbar, Medial Branch, L3,4,5;  Surgeon: Thad Manning MD;  Location: [facility] OR;  Service: Pain Management;  Laterality: Bilateral;  burned at 80 degrees C X 60 seconds X 2 each site    RADIOFREQUENCY ABLATION OF LUMBAR MEDIAL BRANCH NERVE AT SINGLE LEVEL Bilateral 5/27/2020    Procedure: Radiofrequency Ablation, Nerve, Spinal, Lumbar, Medial Branch, 1 Level;  Surgeon: Thad Manning MD;  Location: [facility] OR;  Service: Pain Management;  Laterality: Bilateral;  L3,4,5    TONSILLECTOMY         Current Outpatient Medications   Medication Sig    albuterol (PROAIR HFA) 90 mcg/actuation inhaler Inhale 2 puffs into the lungs every 6 (six) hours as needed for Wheezing. Rescue    ALPRAZolam (XANAX) 2 MG Tab Take 1 tablet (2 mg total) by mouth 2 (two) times daily as needed (anxiety).    amitriptyline (ELAVIL) 50 MG tablet Take 1 tablet (50 mg total) by mouth nightly as needed for Insomnia.    amLODIPine (NORVASC) 5 MG tablet TAKE 1 TABLET BY MOUTH EVERY DAY    biotin 10,000 mcg Cap Take 13,000 mcg by mouth once daily.    celecoxib (CELEBREX) 200 MG capsule Take 200 mg by mouth once daily.    diclofenac (VOLTAREN) 50 MG EC tablet Take 1 tablet (50 mg total) by mouth 3 (three) times daily as needed (pain).    fluticasone-umeclidin-vilanter (TRELEGY ELLIPTA) 100-62.5-25 mcg DsDv Inhale 1 puff into the lungs once daily.    hydrochlorothiazide (HYDRODIURIL) 25 MG tablet Take 25  mg by mouth once daily.    HYDROcodone-acetaminophen (NORCO) 7.5-325 mg per tablet Take 1 tablet by mouth every 12 (twelve) hours as needed for Pain.    levothyroxine (SYNTHROID) 88 MCG tablet TAKE 1 TABLET BY MOUTH EVERY DAY    losartan (COZAAR) 50 MG tablet Take 1 tablet (50 mg total) by mouth once daily.    lovastatin (ALTOPREV) 40 mg 24 hr tablet Take 40 mg by mouth every evening.    memantine (NAMENDA) 10 MG Tab Take 1 tablet by mouth 2 (two) times a day.    metoprolol succinate (TOPROL-XL) 50 MG 24 hr tablet TAKE 1 TABLET BY MOUTH EVERY DAY    MULTIVIT-IRON-MIN-FOLIC ACID 3,500-18-0.4 UNIT-MG-MG ORAL CHEW Take 1 tablet by mouth.    omeprazole (PRILOSEC) 40 MG capsule Take 1 capsule (40 mg total) by mouth once daily.    potassium chloride (MICRO-K) 10 MEQ CpSR Take 10 mEq by mouth every evening.    pramipexole (MIRAPEX) 0.5 MG tablet TAKE 1 TO 2 TABLETS BY MOUTH ONCE A DAY AT BEDTIME    pregabalin (LYRICA) 25 MG capsule TK ONE C PO  TID    rivastigmine tartrate (EXELON) 6 mg capsule TAKE 1 CAPSULE BY MOUTH TWICE DAILY WITH FOOD    rivastigmine tartrate 4.5 mg capsule 6 mg.     traMADoL (ULTRAM) 50 mg tablet Take 1 tablet (50 mg total) by mouth every 8 (eight) hours as needed for Pain.    valacyclovir HCl (VALACYCLOVIR ORAL) Take by mouth.    venlafaxine (EFFEXOR-XR) 150 MG Cp24 TK 1 C PO QD     No current facility-administered medications for this visit.      Facility-Administered Medications Ordered in Other Visits   Medication    0.9%  NaCl infusion       Review of patient's allergies indicates:   Allergen Reactions    Keflex [cephalexin]     Latex, natural rubber Anaphylaxis     bandaids     Pcn [penicillins]     Adhesive Other (See Comments)     bandainds redness at skin       Family History   Problem Relation Age of Onset    Heart disease Mother        Social History     Socioeconomic History    Marital status:      Spouse name: Not on file    Number of children: Not on file     Years of education: Not on file    Highest education level: Not on file   Occupational History    Not on file   Social Needs    Financial resource strain: Not on file    Food insecurity     Worry: Not on file     Inability: Not on file    Transportation needs     Medical: Not on file     Non-medical: Not on file   Tobacco Use    Smoking status: Former Smoker     Quit date: 1996     Years since quittin.5    Smokeless tobacco: Never Used   Substance and Sexual Activity    Alcohol use: Yes     Comment: very little     Drug use: No    Sexual activity: Not on file   Lifestyle    Physical activity     Days per week: Not on file     Minutes per session: Not on file    Stress: Not on file   Relationships    Social connections     Talks on phone: Not on file     Gets together: Not on file     Attends Christianity service: Not on file     Active member of club or organization: Not on file     Attends meetings of clubs or organizations: Not on file     Relationship status: Not on file   Other Topics Concern    Not on file   Social History Narrative    Not on file       Chief Complaint:   Chief Complaint   Patient presents with    Right Hand - Pain, Injury       History of present illness:  This 70-year-old patient of mine a comes in with a new injury.  Patient tripped over foot onto an outstretched right arm.  Date of injury was 2020.  Has some swelling and deformity of the middle finger on her right hand.  No pain at rest but up to with 4/10 with activity.  Has not been wearing the splint as instructed.  Her left shoulder is also giving her a lot of trouble again.  Last injection was back in August with good relief for about 3 months.      Review of Systems:  Musculoskeletal:  See HPI    Physical Examination:    Vital Signs:    Vitals:    20 1046   Resp: 16       Body mass index is 46.29 kg/m².    This a well-developed, well nourished patient in no acute distress.  They are alert and  oriented and cooperative to examination.  Pt. walks without an antalgic gait.      Examination of the right hand and wrist shows no signs of rashes or erythema. Patient has some swelling and a flexion posture the DIP joint of the middle finger as well as with an extension posture of the PIP joint.  Does have some arthritic change of most of her hand.. Patient has full range of motion of the wrist in flexion and extension as well as ulnar and radial deviation.  There are 2+ radial pulse and intact light touch sensation in all 5 digits. Nontender over the anatomic snuffbox.     Examination of the left shoulder shows no rashes or erythema. There are no masses, ecchymosis, or atrophy. The patient has full range of motion in forward flexion, external rotation, and internal rotation to the mid T-spine. The patient has moderately positive impingement signs.  Passive range of motion: Forward flexion of 180°, external rotation at 90° of 90°, internal rotation of 50°, and external rotation at 0° of 50°. 2+ radial pulse. Intact axillary, radial, median and ulnar sensation. 5 out of 5 resisted forward flexion, external rotation, and negative lift off test.      X-rays:  X-rays of the right hand are ordered and reviewed which show a bony mallet finger of the middle finger with good healing.     Assessment::  Right middle swan neck deformity  Left small rotator cuff tear  Left knee arthritis    Plan:  Reviewed the findings with her today.  Recommended getting her in with hand therapy to fabricate a splint for her swan-neck deformity.  Offered her an injection for her left shoulder to help with pain.  We talked about her severe left knee arthritis and that the only real treatment left would be knee replacement.  She has tried and failed cortisone, viscosupplementation as well as a nerve block.    This note was created using M [employer] voice recognition software that occasionally misinterpreted phrases or words.    Consult note is  delivered via Epic messaging service.

## 2020-12-07 NOTE — PROCEDURES
Large Joint Aspiration/Injection: L subacromial bursa    Date/Time: 12/7/2020 11:15 AM  Performed by: Rio Pitts MD  Authorized by: Rio Pitts MD     Consent Done?:  Yes (Verbal)  Indications:  Pain  Site marked: the procedure site was marked    Timeout: prior to procedure the correct patient, procedure, and site was verified    Local anesthetic:  Lidocaine 1% without epinephrine and bupivacaine 0.25% without epinephrine  Anesthetic total (ml):  6      Details:  Needle Size:  20 G  Ultrasonic Guidance for needle placement?: No    Approach:  Posterior  Location:  Shoulder  Site:  L subacromial bursa  Medications:  40 mg triamcinolone acetonide 40 mg/mL  Patient tolerance:  Patient tolerated the procedure well with no immediate complications

## 2020-12-09 ENCOUNTER — OFFICE VISIT (OUTPATIENT)
Dept: FAMILY MEDICINE | Facility: CLINIC | Age: 70
End: 2020-12-09
Payer: MEDICARE

## 2020-12-09 VITALS
DIASTOLIC BLOOD PRESSURE: 72 MMHG | SYSTOLIC BLOOD PRESSURE: 102 MMHG | BODY MASS INDEX: 43.43 KG/M2 | HEIGHT: 61 IN | HEART RATE: 80 BPM | WEIGHT: 230 LBS

## 2020-12-09 DIAGNOSIS — M17.12 ARTHRITIS OF KNEE, LEFT: ICD-10-CM

## 2020-12-09 DIAGNOSIS — M54.16 LUMBAR RADICULITIS: ICD-10-CM

## 2020-12-09 DIAGNOSIS — Z12.31 ENCOUNTER FOR SCREENING MAMMOGRAM FOR MALIGNANT NEOPLASM OF BREAST: ICD-10-CM

## 2020-12-09 DIAGNOSIS — N18.31 CHRONIC RENAL IMPAIRMENT, STAGE 3A: ICD-10-CM

## 2020-12-09 DIAGNOSIS — K21.9 GASTROESOPHAGEAL REFLUX DISEASE, UNSPECIFIED WHETHER ESOPHAGITIS PRESENT: ICD-10-CM

## 2020-12-09 DIAGNOSIS — K76.0 NAFLD (NONALCOHOLIC FATTY LIVER DISEASE): ICD-10-CM

## 2020-12-09 DIAGNOSIS — E11.42 TYPE 2 DIABETES MELLITUS WITH POLYNEUROPATHY: ICD-10-CM

## 2020-12-09 DIAGNOSIS — M15.9 PRIMARY OSTEOARTHRITIS INVOLVING MULTIPLE JOINTS: ICD-10-CM

## 2020-12-09 DIAGNOSIS — I10 ESSENTIAL HYPERTENSION: Primary | ICD-10-CM

## 2020-12-09 DIAGNOSIS — I89.0 LYMPHEDEMA OF BOTH LOWER EXTREMITIES: ICD-10-CM

## 2020-12-09 DIAGNOSIS — E78.2 MIXED HYPERLIPIDEMIA: ICD-10-CM

## 2020-12-09 PROBLEM — R79.89 ELEVATED LFTS: Status: RESOLVED | Noted: 2017-06-21 | Resolved: 2020-12-09

## 2020-12-09 PROBLEM — G47.62 LEG CRAMPS, SLEEP RELATED: Status: RESOLVED | Noted: 2018-01-05 | Resolved: 2020-12-09

## 2020-12-09 PROCEDURE — 3008F PR BODY MASS INDEX (BMI) DOCUMENTED: ICD-10-PCS | Mod: S$GLB,,, | Performed by: FAMILY MEDICINE

## 2020-12-09 PROCEDURE — 3288F PR FALLS RISK ASSESSMENT DOCUMENTED: ICD-10-PCS | Mod: S$GLB,,, | Performed by: FAMILY MEDICINE

## 2020-12-09 PROCEDURE — 99204 PR OFFICE/OUTPT VISIT, NEW, LEVL IV, 45-59 MIN: ICD-10-PCS | Mod: S$GLB,,, | Performed by: FAMILY MEDICINE

## 2020-12-09 PROCEDURE — 3078F PR MOST RECENT DIASTOLIC BLOOD PRESSURE < 80 MM HG: ICD-10-PCS | Mod: S$GLB,,, | Performed by: FAMILY MEDICINE

## 2020-12-09 PROCEDURE — 3074F SYST BP LT 130 MM HG: CPT | Mod: S$GLB,,, | Performed by: FAMILY MEDICINE

## 2020-12-09 PROCEDURE — 3074F PR MOST RECENT SYSTOLIC BLOOD PRESSURE < 130 MM HG: ICD-10-PCS | Mod: S$GLB,,, | Performed by: FAMILY MEDICINE

## 2020-12-09 PROCEDURE — 99204 OFFICE O/P NEW MOD 45 MIN: CPT | Mod: S$GLB,,, | Performed by: FAMILY MEDICINE

## 2020-12-09 PROCEDURE — 3078F DIAST BP <80 MM HG: CPT | Mod: S$GLB,,, | Performed by: FAMILY MEDICINE

## 2020-12-09 PROCEDURE — 3288F FALL RISK ASSESSMENT DOCD: CPT | Mod: S$GLB,,, | Performed by: FAMILY MEDICINE

## 2020-12-09 PROCEDURE — 1101F PR PT FALLS ASSESS DOC 0-1 FALLS W/OUT INJ PAST YR: ICD-10-PCS | Mod: S$GLB,,, | Performed by: FAMILY MEDICINE

## 2020-12-09 PROCEDURE — 1159F MED LIST DOCD IN RCRD: CPT | Mod: S$GLB,,, | Performed by: FAMILY MEDICINE

## 2020-12-09 PROCEDURE — 1159F PR MEDICATION LIST DOCUMENTED IN MEDICAL RECORD: ICD-10-PCS | Mod: S$GLB,,, | Performed by: FAMILY MEDICINE

## 2020-12-09 PROCEDURE — 3008F BODY MASS INDEX DOCD: CPT | Mod: S$GLB,,, | Performed by: FAMILY MEDICINE

## 2020-12-09 PROCEDURE — 1101F PT FALLS ASSESS-DOCD LE1/YR: CPT | Mod: S$GLB,,, | Performed by: FAMILY MEDICINE

## 2020-12-09 RX ORDER — FUROSEMIDE 20 MG/1
20 TABLET ORAL DAILY
COMMUNITY
End: 2021-03-08

## 2020-12-09 NOTE — PROGRESS NOTES
SUBJECTIVE:    Patient ID: Genoveva Gilbert is a 70 y.o. female.    Chief Complaint: Establish Care and discuss weight loss    New patient establishing care with me today.  She has history of hypertension, fibromyalgia, rheumatoid arthritis and degenerative joint disease in her left knee.  Reviewing some her records also demonstrates diet-controlled diabetes.  There is a questionable concern of multiple sclerosis but she has not had any treatment for this in many years and does not appear to have any symptoms.  She has seasonal allergies and sleep apnea but does not use her CPAP machine  Because she was unable to tolerate mask all night  Has HLD as well   Uses compression vacumms for lymphedmea  Has been seeing ortho for finger fracture. Sees Dr Escudero for her knees. She has h/o lumbar stenosis and disc herniation      Lab Visit on 11/03/2020   Component Date Value Ref Range Status    Sodium 11/03/2020 140  136 - 145 mmol/L Final    Potassium 11/03/2020 3.5  3.5 - 5.1 mmol/L Final    Chloride 11/03/2020 103  95 - 110 mmol/L Final    CO2 11/03/2020 30* 23 - 29 mmol/L Final    Glucose 11/03/2020 109  70 - 110 mg/dL Final    BUN 11/03/2020 17  8 - 23 mg/dL Final    Creatinine 11/03/2020 0.9  0.5 - 1.4 mg/dL Final    Calcium 11/03/2020 9.5  8.7 - 10.5 mg/dL Final    Anion Gap 11/03/2020 7* 8 - 16 mmol/L Final    eGFR if African American 11/03/2020 >60  >60 mL/min/1.73 m^2 Final    eGFR if non African American 11/03/2020 >60  >60 mL/min/1.73 m^2 Final   Lab Visit on 11/01/2020   Component Date Value Ref Range Status    SARS-CoV2 (COVID-19) Qualitative P* 11/01/2020 Not Detected  Not Detected Final   Admission on 10/20/2020, Discharged on 10/20/2020   Component Date Value Ref Range Status    WBC 10/20/2020 7.20  3.90 - 12.70 K/uL Final    RBC 10/20/2020 4.10  4.00 - 5.40 M/uL Final    Hemoglobin 10/20/2020 12.2  12.0 - 16.0 g/dL Final    Hematocrit 10/20/2020 37.6  37.0 - 48.5 % Final    MCV 10/20/2020 92   82 - 98 fL Final    MCH 10/20/2020 29.8  27.0 - 31.0 pg Final    MCHC 10/20/2020 32.4  32.0 - 36.0 g/dL Final    RDW 10/20/2020 13.5  11.5 - 14.5 % Final    Platelets 10/20/2020 257  150 - 350 K/uL Final    MPV 10/20/2020 8.8* 9.2 - 12.9 fL Final    Immature Granulocytes 10/20/2020 0.4  0.0 - 0.5 % Final    Gran # (ANC) 10/20/2020 3.8  1.8 - 7.7 K/uL Final    Immature Grans (Abs) 10/20/2020 0.03  0.00 - 0.04 K/uL Final    Lymph # 10/20/2020 2.6  1.0 - 4.8 K/uL Final    Mono # 10/20/2020 0.6  0.3 - 1.0 K/uL Final    Eos # 10/20/2020 0.2  0.0 - 0.5 K/uL Final    Baso # 10/20/2020 0.06  0.00 - 0.20 K/uL Final    nRBC 10/20/2020 0  0 /100 WBC Final    Gran % 10/20/2020 52.4  38.0 - 73.0 % Final    Lymph % 10/20/2020 35.6  18.0 - 48.0 % Final    Mono % 10/20/2020 8.3  4.0 - 15.0 % Final    Eosinophil % 10/20/2020 2.5  0.0 - 8.0 % Final    Basophil % 10/20/2020 0.8  0.0 - 1.9 % Final    Differential Method 10/20/2020 Automated   Final    Sodium 10/20/2020 140  136 - 145 mmol/L Final    Potassium 10/20/2020 2.6* 3.5 - 5.1 mmol/L Final    Chloride 10/20/2020 98  95 - 110 mmol/L Final    CO2 10/20/2020 30* 23 - 29 mmol/L Final    Glucose 10/20/2020 121* 70 - 110 mg/dL Final    BUN 10/20/2020 13  8 - 23 mg/dL Final    Creatinine 10/20/2020 0.9  0.5 - 1.4 mg/dL Final    Calcium 10/20/2020 8.7  8.7 - 10.5 mg/dL Final    Total Protein 10/20/2020 6.5  6.0 - 8.4 g/dL Final    Albumin 10/20/2020 3.6  3.5 - 5.2 g/dL Final    Total Bilirubin 10/20/2020 0.3  0.1 - 1.0 mg/dL Final    Alkaline Phosphatase 10/20/2020 101  55 - 135 U/L Final    AST 10/20/2020 27  10 - 40 U/L Final    ALT 10/20/2020 25  10 - 44 U/L Final    Anion Gap 10/20/2020 12  8 - 16 mmol/L Final    eGFR if African American 10/20/2020 >60  >60 mL/min/1.73 m^2 Final    eGFR if non African American 10/20/2020 >60  >60 mL/min/1.73 m^2 Final    BNP 10/20/2020 16  0 - 99 pg/mL Final    Magnesium 10/20/2020 2.0  1.6 - 2.6 mg/dL Final        Past Medical History:   Diagnosis Date    Allergy     Anxiety     Arthritis, rheumatoid     Back pain     Depression     Fibromyalgia     GERD (gastroesophageal reflux disease)     High cholesterol     Hilar mass 3/27/2014    [place] (hard of hearing)     aid right ear    Hypertension     Incontinence of urine     Lymphedema     MS (multiple sclerosis)     benign; another MD stated she has no MS    Neuropathy     Restless leg syndrome     Rotator cuff tear 4/16/2015    Sciatica of left side 1/5/2018    Seasonal allergies     Sinus congestion     Sleep apnea     DOES NOT USE MACHINE    Spondylolysis     Thyroid disease     Type 2 diabetes mellitus with polyneuropathy     no med. was borderline    Wheezing      Past Surgical History:   Procedure Laterality Date    APPENDECTOMY      BREAST SURGERY      reduction    EPIDURAL STEROID INJECTION INTO LUMBAR SPINE N/A 6/12/2019    Procedure: Injection-steroid-epidural-lumbar;  Surgeon: Thad Manning MD;  Location: [facility] OR;  Service: Pain Management;  Laterality: N/A;  L5-S1    EPIDURAL STEROID INJECTION INTO LUMBAR SPINE N/A 9/16/2019    Procedure: Injection-steroid-epidural-lumbar;  Surgeon: Thad Manning MD;  Location: [facility] OR;  Service: Pain Management;  Laterality: N/A;  L5-S1    EYE SURGERY Bilateral     HYSTERECTOMY      partial - fibroids    INJECTION OF ANESTHETIC AGENT AROUND MEDIAL BRANCH NERVES INNERVATING LUMBAR FACET JOINT Bilateral 2/28/2019    Procedure: Block-nerve-medial branch-lumbar;  Surgeon: Thad Manning MD;  Location: [facility] OR;  Service: Pain Management;  Laterality: Bilateral;  L3, 4,5     INJECTION OF ANESTHETIC AGENT AROUND MEDIAL BRANCH NERVES INNERVATING LUMBAR FACET JOINT Bilateral 3/21/2019    Procedure: Block-nerve-medial branch-lumbar L3,4,5;  Surgeon: Thad Manning MD;  Location: [facility] OR;  Service: Pain Management;  Laterality: Bilateral;    LIPOSUCTION  1985    RADIOFREQUENCY ABLATION Left 11/4/2020    Procedure:  Radiofrequency Ablation left knee;  Surgeon: Andrew Escudero MD;  Location: [facility] OR;  Service: Pain Management;  Laterality: Left;    RADIOFREQUENCY ABLATION OF LUMBAR MEDIAL BRANCH NERVE AT SINGLE LEVEL Bilateral 4/12/2019    Procedure: Radiofrequency Ablation, Nerve, Spinal, Lumbar, Medial Branch, 1 Level;  Surgeon: Thad Manning MD;  Location: [facility] OR;  Service: Pain Management;  Laterality: Bilateral;  L3, 4, 5  lumbar  [employer] pain management generator  SN [id number]  80 degrees for 75 seconds x2    RADIOFREQUENCY ABLATION OF LUMBAR MEDIAL BRANCH NERVE AT SINGLE LEVEL Bilateral 10/22/2019    Procedure: Radiofrequency Ablation, Nerve, Spinal, Lumbar, Medial Branch, L3,4,5;  Surgeon: Thad Manning MD;  Location: [facility] OR;  Service: Pain Management;  Laterality: Bilateral;  burned at 80 degrees C X 60 seconds X 2 each site    RADIOFREQUENCY ABLATION OF LUMBAR MEDIAL BRANCH NERVE AT SINGLE LEVEL Bilateral 5/27/2020    Procedure: Radiofrequency Ablation, Nerve, Spinal, Lumbar, Medial Branch, 1 Level;  Surgeon: Thad Manning MD;  Location: [facility] OR;  Service: Pain Management;  Laterality: Bilateral;  L3,4,5    TONSILLECTOMY      TOTAL REDUCTION MAMMOPLASTY       Family History   Problem Relation Age of Onset    Heart disease Mother        Marital Status:   Alcohol History:  reports current alcohol use.  Tobacco History:  reports that she quit smoking about 24 years ago. She has never used smokeless tobacco.  Drug History:  reports no history of drug use.    Review of patient's allergies indicates:   Allergen Reactions    Keflex [cephalexin]     Latex, natural rubber Anaphylaxis     bandaids     Pcn [penicillins]     Adhesive Other (See Comments)     bandainds redness at skin       Current Outpatient Medications:     albuterol (PROAIR HFA) 90 mcg/actuation inhaler, Inhale 2 puffs into the lungs every 6 (six) hours as needed for Wheezing. Rescue, Disp: 18 g, Rfl: 6    ALPRAZolam (XANAX) 2 MG Tab, Take 1  tablet (2 mg total) by mouth 2 (two) times daily as needed (anxiety)., Disp: 60 tablet, Rfl: 2    amLODIPine (NORVASC) 5 MG tablet, TAKE 1 TABLET BY MOUTH EVERY DAY, Disp: 90 tablet, Rfl: 3    biotin 10,000 mcg Cap, Take 13,000 mcg by mouth once daily., Disp: , Rfl:     celecoxib (CELEBREX) 200 MG capsule, Take 200 mg by mouth once daily., Disp: , Rfl:     fluticasone-umeclidin-vilanter (TRELEGY ELLIPTA) 100-62.5-25 mcg DsDv, Inhale 1 puff into the lungs once daily., Disp: 60 each, Rfl: 6    furosemide (LASIX) 20 MG tablet, Take 20 mg by mouth once daily., Disp: , Rfl:     hydrochlorothiazide (HYDRODIURIL) 25 MG tablet, Take 25 mg by mouth once daily., Disp: , Rfl:     HYDROcodone-acetaminophen (NORCO) 7.5-325 mg per tablet, Take 1 tablet by mouth every 12 (twelve) hours as needed for Pain., Disp: 60 tablet, Rfl: 0    levothyroxine (SYNTHROID) 88 MCG tablet, TAKE 1 TABLET BY MOUTH EVERY DAY, Disp: 90 tablet, Rfl: 3    losartan (COZAAR) 50 MG tablet, Take 1 tablet (50 mg total) by mouth once daily., Disp: 90 tablet, Rfl: 1    lovastatin (ALTOPREV) 40 mg 24 hr tablet, Take 40 mg by mouth every evening., Disp: , Rfl:     metoprolol succinate (TOPROL-XL) 50 MG 24 hr tablet, TAKE 1 TABLET BY MOUTH EVERY DAY, Disp: 90 tablet, Rfl: 3    MULTIVIT-IRON-MIN-FOLIC ACID 3,500-18-0.4 UNIT-MG-MG ORAL CHEW, Take 1 tablet by mouth., Disp: , Rfl:     omeprazole (PRILOSEC) 40 MG capsule, Take 1 capsule (40 mg total) by mouth once daily., Disp: 90 capsule, Rfl: 3    potassium chloride (MICRO-K) 10 MEQ CpSR, Take 10 mEq by mouth every evening., Disp: , Rfl:     pramipexole (MIRAPEX) 0.5 MG tablet, TAKE 1 TO 2 TABLETS BY MOUTH ONCE A DAY AT BEDTIME, Disp: 180 tablet, Rfl: 3    rivastigmine tartrate (EXELON) 6 mg capsule, TAKE 1 CAPSULE BY MOUTH TWICE DAILY WITH FOOD, Disp: 60 capsule, Rfl: 11    venlafaxine (EFFEXOR-XR) 150 MG Cp24, TK 1 C PO QD, Disp: , Rfl:     pregabalin (LYRICA) 25 MG capsule, Take 1 capsule (25 mg  "total) by mouth 3 (three) times daily., Disp: 90 capsule, Rfl: 2    rivastigmine tartrate 4.5 mg capsule, 6 mg. , Disp: , Rfl:     traMADoL (ULTRAM) 50 mg tablet, Take 1 tablet (50 mg total) by mouth every 8 (eight) hours as needed for Pain., Disp: 90 tablet, Rfl: 4    valacyclovir HCl (VALACYCLOVIR ORAL), Take by mouth., Disp: , Rfl:   No current facility-administered medications for this visit.     Facility-Administered Medications Ordered in Other Visits:     0.9%  NaCl infusion, , Intravenous, Continuous, Andrew Escudero MD, Last Rate: 20 mL/hr at 11/04/20 1052    Review of Systems   All other systems reviewed and are negative.         Objective:      Vitals:    12/09/20 1525   BP: 102/72   Pulse: 80   Weight: 104.3 kg (230 lb)   Height: 5' 1" (1.549 m)     Physical Exam  Vitals signs reviewed.   Constitutional:       General: She is not in acute distress.     Appearance: She is well-developed. She is morbidly obese.   HENT:      Head: Normocephalic and atraumatic.      Right Ear: External ear normal.      Left Ear: External ear normal.      Nose: Nose normal.      Mouth/Throat:      Mouth: Mucous membranes are moist.   Eyes:      General: Lids are normal.      Conjunctiva/sclera: Conjunctivae normal.      Pupils: Pupils are equal, round, and reactive to light.   Neck:      Musculoskeletal: Full passive range of motion without pain and neck supple.      Thyroid: No thyromegaly.      Vascular: No JVD.      Trachea: No tracheal deviation.   Cardiovascular:      Rate and Rhythm: Normal rate and regular rhythm.      Chest Wall: PMI is not displaced.      Pulses: Normal pulses.      Heart sounds: Normal heart sounds.   Pulmonary:      Effort: Pulmonary effort is normal.      Breath sounds: Normal breath sounds.   Abdominal:      General: Bowel sounds are normal.      Palpations: Abdomen is soft.      Tenderness: There is no abdominal tenderness. There is no guarding or rebound.   Musculoskeletal:      Left knee: She " exhibits decreased range of motion. Tenderness found. Medial joint line tenderness noted.      Right lower leg: Edema present.      Left lower leg: Edema present.   Skin:     General: Skin is warm and dry.      Findings: No rash.   Neurological:      General: No focal deficit present.      Mental Status: She is alert and oriented to person, place, and time.   Psychiatric:         Mood and Affect: Mood normal.         Behavior: Behavior normal.           Assessment:       1. Essential hypertension    2. Mixed hyperlipidemia    3. Gastroesophageal reflux disease, unspecified whether esophagitis present    4. NAFLD (nonalcoholic fatty liver disease)    5. Chronic renal impairment, stage 3a    6. Primary osteoarthritis involving multiple joints    7. Arthritis of knee, left    8. Lymphedema of both lower extremities    9. Lumbar radiculitis    10. Type 2 diabetes mellitus with polyneuropathy    11. Encounter for screening mammogram for malignant neoplasm of breast         Plan:       Essential hypertension    Mixed hyperlipidemia    Gastroesophageal reflux disease, unspecified whether esophagitis present    NAFLD (nonalcoholic fatty liver disease)    Chronic renal impairment, stage 3a    Primary osteoarthritis involving multiple joints    Arthritis of knee, left    Lymphedema of both lower extremities    Lumbar radiculitis    Type 2 diabetes mellitus with polyneuropathy    Encounter for screening mammogram for malignant neoplasm of breast  -     Mammo Digital Screening Bilat w/ Tito; Future; Expected date: 12/09/2020      Follow up in about 3 months (around 3/9/2021) for Diabetic Check-Up, HTN, chronic pain.

## 2020-12-11 ENCOUNTER — HOSPITAL ENCOUNTER (OUTPATIENT)
Dept: RADIOLOGY | Facility: HOSPITAL | Age: 70
Discharge: HOME | End: 2020-12-11
Attending: FAMILY MEDICINE
Payer: MEDICARE

## 2020-12-11 DIAGNOSIS — Z12.31 ENCOUNTER FOR SCREENING MAMMOGRAM FOR MALIGNANT NEOPLASM OF BREAST: ICD-10-CM

## 2020-12-11 PROCEDURE — 77067 SCR MAMMO BI INCL CAD: CPT | Mod: TC,PO

## 2020-12-16 ENCOUNTER — TELEPHONE (OUTPATIENT)
Dept: FAMILY MEDICINE | Facility: CLINIC | Age: 70
End: 2020-12-16

## 2020-12-16 ENCOUNTER — CLINICAL SUPPORT (OUTPATIENT)
Dept: REHABILITATION | Facility: HOSPITAL | Age: 70
End: 2020-12-16
Attending: ORTHOPAEDIC SURGERY
Payer: MEDICARE

## 2020-12-16 DIAGNOSIS — S62.639A CLOSED MALLET FRACTURE OF DISTAL PHALANX OF FINGER OF RIGHT HAND: Primary | ICD-10-CM

## 2020-12-16 DIAGNOSIS — M20.011 CLOSED MALLET FRACTURE OF DISTAL PHALANX OF FINGER OF RIGHT HAND: Primary | ICD-10-CM

## 2020-12-16 PROCEDURE — 97760 ORTHOTIC MGMT&TRAING 1ST ENC: CPT

## 2020-12-16 PROCEDURE — 97165 OT EVAL LOW COMPLEX 30 MIN: CPT

## 2020-12-16 NOTE — TELEPHONE ENCOUNTER
----- Message from Tank Wilson sent at 12/15/2020  9:08 AM CST -----  Regarding: Needs call back from nurse  Contact: Genoveva logan  Pt would like to know the results of her mammogram . Please give her a call back # [phone]

## 2020-12-16 NOTE — PLAN OF CARE
"[facility] Outpatient Occupational Therapy  Initial Evaluation     Date: 12/16/2020  Name: Genoveva Gilbert  Clinic Number: [id number]    Therapy Diagnosis: swan neck deformity of right long finger  Physician: Rio Pitts MD    Physician Orders: swan neck deformity splint  Medical Diagnosis:S62.639A,M20.011 (ICD-10-CM) - Closed mallet fracture of distal phalanx of finger of right hand   Surgical Procedure and Date: N/A  Evaluation Date: 12/16/2020  Insurance Authorization Period Expiration: 2/11/2021  Plan of Care Certification Period: 12/16/2020-12/31/2020  Date of Return to MD:     Visit # / Visits authorized: 1/1  Time In:10:07  Time Out: 10:48  Total Billable Time: 41 minutes    Precautions:  Standard    Subjective   Patient states: "  Involved Side: right long  Dominant Side: Right  Date of Onset: 10/20/2020  History of Current Condition/Mechanism of Injury: Tripped and fell on 10/08/2020 placed in splint in ER for fracture of distal phalanx.  Didn't see ortho until 12/07 when being seen for her shoulder  Noted didn't wear splint much because too big.  Imaging: per MD note on 12/07 X-rays of the right hand are ordered and reviewed which show a bony mallet finger of the middle finger with good healing  Previous Therapy: none for this injury    Past Medical History/Physical Systems Review:    has a past medical history of Allergy, Anxiety, Arthritis, rheumatoid, Back pain, Depression, Fibromyalgia, GERD (gastroesophageal reflux disease), High cholesterol, Hilar mass, [place] (hard of hearing), Hypertension, Incontinence of urine, Lymphedema, MS (multiple sclerosis), Neuropathy, Restless leg syndrome, Rotator cuff tear, Sciatica of left side, Seasonal allergies, Sinus congestion, Sleep apnea, Spondylolysis, Thyroid disease, Type 2 diabetes mellitus with polyneuropathy, and Wheezing.     has a past surgical history that includes Hysterectomy; Breast surgery; Tonsillectomy; Appendectomy; Injection " of anesthetic agent around medial branch nerves innervating lumbar facet joint (Bilateral, 2/28/2019); Injection of anesthetic agent around medial branch nerves innervating lumbar facet joint (Bilateral, 3/21/2019); Radiofrequency ablation of lumbar medial branch nerve at single level (Bilateral, 4/12/2019); Epidural steroid injection into lumbar spine (N/A, 6/12/2019); Epidural steroid injection into lumbar spine (N/A, 9/16/2019); Radiofrequency ablation of lumbar medial branch nerve at single level (Bilateral, 10/22/2019); Radiofrequency ablation of lumbar medial branch nerve at single level (Bilateral, 5/27/2020); Radiofrequency ablation (Left, 11/4/2020); Liposuction (1985); Eye surgery (Bilateral); and Total Reduction Mammoplasty.    has a current medication list which includes the following prescription(s): albuterol, alprazolam, amlodipine, biotin, celecoxib, fluticasone-umeclidin-vilanter, furosemide, hydrochlorothiazide, hydrocodone-acetaminophen, levothyroxine, losartan, lovastatin, metoprolol succinate, multivit-iron-min-folic acid, omeprazole, potassium chloride, pramipexole, pregabalin, rivastigmine tartrate, rivastigmine tartrate, tramadol, valacyclovir hcl, venlafaxine, metoprolol succinate, and pramipexole, and the following Facility-Administered Medications: sodium chloride 0.9%.    Review of patient's allergies indicates:   Allergen Reactions    Keflex [cephalexin]     Latex, natural rubber Anaphylaxis     bandaids     Pcn [penicillins]     Adhesive Other (See Comments)     bandainds redness at skin        Patient's Goals for Therapy: straighten my finger    Pain:  Functional Pain Scale Rating 0-10:    No pain  Occupation:  retired    Functional Limitations/Social History:  Previous functional status includes: Independent with all self care and home management.  Current Functional Status   Home/Living environment : lives with her       ADL Assessment  ADL    Dressing independent   Eating  independent   Toileting independent   Cooking independent   Bathing/Grooming independent   Household tasks independent   By patient report      Objective      Palpation: no tenderness throughout finger     Observation:  Redness noted throughout distal phalanx, arthritic deformities                                         12/16/2020                                                                                                 RIGHT  AROM                                                                                       RING       MCP             Full extension and flexion  PIP              hyperextension of 30 dgs  Full flexion  DIP              Extension lag of 50 dgs with full finger extension; blocking of PIP at 0 dgs extension able to extend to 30 dg lag                           Passive DIP extension to ~ 10 dg ext lag    Treatment     Treatment Time In: 10:20  Treatment Time Out: 10:48  Total Treatment time separate from Evaluation time: 28 mins    Orthotic Fabrication:  Figure 8 orthotic fabricated for right long finger to be worn throughout the day to block PIP hyperextension and to facilitate active extension of DIP with daily activities.  Finger gutter splint holding PIP at 0 dgs and DIP at ~ 10 dg extension lag to be worn qhs and during the day on/off if unable to tolerate figure 8 until she is able to see the therapist again.  Home Exercise Program/Education:  Instruction given on don/doff of both orthotics, care (keep away from heat source, may wash with soap and water), wear ( as noted above), precautions-pressure areas and pain or swelling (discontinue wearing and contact therapist) . Estela was able to demonstrate don/doff and expressed good knowledge of use of orthotics. prior to the end of the session.   Patient/Family Education: role of OT, goals for OT, scheduling/cancellations - pt verbalized understanding. Discussed insurance limitations with patient.    Additional Education provided:  Therapist explained importance of use of both orthotics-she appeared hesitant to wear figure 8 but once explained purpose of it was to keep finger in better position to function, decreasing pressure on volar structures stop weakening them and to allow the tendon to try to extend distal phalanx with activity. She is to wear figure 8 at least on/off throughout the day if unable to tolerate the whole day.  Use of static gutter splint to be worn to try to allow structures to tighten up-not actually using the mm.    Assessment     Patient is a 70 y.o. right handed female presenting to skilled OT with diagnosis of swan neck deformity of right long finger post distal phalanx fracture on 10/07/2020  sustained when she tripped and fell. Patient with no pain or tenderness.  Her DIP extension is with severe limitation.  She reports no deficits with daily activities. A brief history was obtained from patient and MD note. During the evaluation, the patient required no modification or assistance.  There are no co-morbid conditions/personal factors may affect the plan of care.  Patient's cultural, spiritual and educational needs were considered.        Goals:   Independent with don/doff of orthotics and knowledgeable of care, wear, and precautions of orthotics in 1 visit-met    Plan   Will leave chart open for 2 weeks for any adjustments that may be needed of orthotics.  Patient to contact therapist if needed.    Mindy Chavez, OT

## 2020-12-23 DIAGNOSIS — E11.42 TYPE 2 DIABETES MELLITUS WITH POLYNEUROPATHY: Primary | ICD-10-CM

## 2020-12-23 RX ORDER — PREGABALIN 25 MG/1
25 CAPSULE ORAL 3 TIMES DAILY
Qty: 90 CAPSULE | Refills: 2 | Status: SHIPPED | OUTPATIENT
Start: 2020-12-23 | End: 2021-03-11 | Stop reason: SDUPTHER

## 2020-12-23 NOTE — TELEPHONE ENCOUNTER
----- Message from Loreta Wilder sent at 12/23/2020 12:17 PM CST -----   12:02   Refill     pt's # [phone]

## 2020-12-23 NOTE — TELEPHONE ENCOUNTER
prescription sent to   [id number] - [place] - [address] AT [facility] [address]  [address] [address]  Phone: [phone] Fax: [phone]

## 2021-01-14 ENCOUNTER — TELEPHONE (OUTPATIENT)
Dept: NEUROLOGY | Facility: CLINIC | Age: 71
End: 2021-01-14

## 2021-01-15 ENCOUNTER — TELEPHONE (OUTPATIENT)
Dept: FAMILY MEDICINE | Facility: CLINIC | Age: 71
End: 2021-01-15

## 2021-01-19 ENCOUNTER — TELEPHONE (OUTPATIENT)
Dept: FAMILY MEDICINE | Facility: CLINIC | Age: 71
End: 2021-01-19

## 2021-01-25 ENCOUNTER — TELEPHONE (OUTPATIENT)
Dept: FAMILY MEDICINE | Facility: CLINIC | Age: 71
End: 2021-01-25

## 2021-01-25 ENCOUNTER — PATIENT MESSAGE (OUTPATIENT)
Dept: ORTHOPEDICS | Facility: CLINIC | Age: 71
End: 2021-01-25

## 2021-01-25 ENCOUNTER — OFFICE VISIT (OUTPATIENT)
Dept: ORTHOPEDICS | Facility: CLINIC | Age: 71
End: 2021-01-25
Payer: MEDICARE

## 2021-01-25 VITALS — BODY MASS INDEX: 43.43 KG/M2 | WEIGHT: 230 LBS | HEIGHT: 61 IN | RESPIRATION RATE: 18 BRPM

## 2021-01-25 DIAGNOSIS — M20.011 CLOSED MALLET FRACTURE OF DISTAL PHALANX OF FINGER OF RIGHT HAND: ICD-10-CM

## 2021-01-25 DIAGNOSIS — M75.100 TEAR OF ROTATOR CUFF, UNSPECIFIED LATERALITY, UNSPECIFIED TEAR EXTENT, UNSPECIFIED WHETHER TRAUMATIC: ICD-10-CM

## 2021-01-25 DIAGNOSIS — M17.12 PRIMARY OSTEOARTHRITIS OF LEFT KNEE: Primary | ICD-10-CM

## 2021-01-25 DIAGNOSIS — S62.639A CLOSED MALLET FRACTURE OF DISTAL PHALANX OF FINGER OF RIGHT HAND: ICD-10-CM

## 2021-01-25 PROCEDURE — 1101F PR PT FALLS ASSESS DOC 0-1 FALLS W/OUT INJ PAST YR: ICD-10-PCS | Mod: CPTII,S$GLB,, | Performed by: ORTHOPAEDIC SURGERY

## 2021-01-25 PROCEDURE — 1125F PR PAIN SEVERITY QUANTIFIED, PAIN PRESENT: ICD-10-PCS | Mod: S$GLB,,, | Performed by: ORTHOPAEDIC SURGERY

## 2021-01-25 PROCEDURE — 99999 PR PBB SHADOW E&M-EST. PATIENT-LVL IV: CPT | Mod: PBBFAC,,, | Performed by: ORTHOPAEDIC SURGERY

## 2021-01-25 PROCEDURE — 3008F BODY MASS INDEX DOCD: CPT | Mod: CPTII,S$GLB,, | Performed by: ORTHOPAEDIC SURGERY

## 2021-01-25 PROCEDURE — 1159F PR MEDICATION LIST DOCUMENTED IN MEDICAL RECORD: ICD-10-PCS | Mod: S$GLB,,, | Performed by: ORTHOPAEDIC SURGERY

## 2021-01-25 PROCEDURE — 1101F PT FALLS ASSESS-DOCD LE1/YR: CPT | Mod: CPTII,S$GLB,, | Performed by: ORTHOPAEDIC SURGERY

## 2021-01-25 PROCEDURE — 99999 PR PBB SHADOW E&M-EST. PATIENT-LVL IV: ICD-10-PCS | Mod: PBBFAC,,, | Performed by: ORTHOPAEDIC SURGERY

## 2021-01-25 PROCEDURE — 1125F AMNT PAIN NOTED PAIN PRSNT: CPT | Mod: S$GLB,,, | Performed by: ORTHOPAEDIC SURGERY

## 2021-01-25 PROCEDURE — 3288F FALL RISK ASSESSMENT DOCD: CPT | Mod: CPTII,S$GLB,, | Performed by: ORTHOPAEDIC SURGERY

## 2021-01-25 PROCEDURE — 3288F PR FALLS RISK ASSESSMENT DOCUMENTED: ICD-10-PCS | Mod: CPTII,S$GLB,, | Performed by: ORTHOPAEDIC SURGERY

## 2021-01-25 PROCEDURE — 99213 PR OFFICE/OUTPT VISIT, EST, LEVL III, 20-29 MIN: ICD-10-PCS | Mod: 25,S$GLB,, | Performed by: ORTHOPAEDIC SURGERY

## 2021-01-25 PROCEDURE — 20610 DRAIN/INJ JOINT/BURSA W/O US: CPT | Mod: LT,S$GLB,, | Performed by: ORTHOPAEDIC SURGERY

## 2021-01-25 PROCEDURE — 99213 OFFICE O/P EST LOW 20 MIN: CPT | Mod: 25,S$GLB,, | Performed by: ORTHOPAEDIC SURGERY

## 2021-01-25 PROCEDURE — 1159F MED LIST DOCD IN RCRD: CPT | Mod: S$GLB,,, | Performed by: ORTHOPAEDIC SURGERY

## 2021-01-25 PROCEDURE — 20610 LARGE JOINT ASPIRATION/INJECTION: L SUBACROMIAL BURSA: ICD-10-PCS | Mod: LT,S$GLB,, | Performed by: ORTHOPAEDIC SURGERY

## 2021-01-25 PROCEDURE — 3008F PR BODY MASS INDEX (BMI) DOCUMENTED: ICD-10-PCS | Mod: CPTII,S$GLB,, | Performed by: ORTHOPAEDIC SURGERY

## 2021-01-25 RX ORDER — TRIAMCINOLONE ACETONIDE 40 MG/ML
40 INJECTION, SUSPENSION INTRA-ARTICULAR; INTRAMUSCULAR
Status: DISCONTINUED | OUTPATIENT
Start: 2021-01-25 | End: 2021-01-25 | Stop reason: HOSPADM

## 2021-01-25 RX ADMIN — TRIAMCINOLONE ACETONIDE 40 MG: 40 INJECTION, SUSPENSION INTRA-ARTICULAR; INTRAMUSCULAR at 11:01

## 2021-01-26 RX ORDER — NYSTATIN 100000 U/G
CREAM TOPICAL 2 TIMES DAILY
Qty: 60 G | Refills: 1 | Status: SHIPPED | OUTPATIENT
Start: 2021-01-26 | End: 2022-01-24 | Stop reason: SDUPTHER

## 2021-02-04 ENCOUNTER — HOSPITAL ENCOUNTER (OUTPATIENT)
Dept: PREADMISSION TESTING | Facility: HOSPITAL | Age: 71
Discharge: HOME | End: 2021-02-04
Attending: ORTHOPAEDIC SURGERY
Payer: MEDICARE

## 2021-02-04 ENCOUNTER — PATIENT MESSAGE (OUTPATIENT)
Dept: ORTHOPEDICS | Facility: CLINIC | Age: 71
End: 2021-02-04

## 2021-02-04 ENCOUNTER — TELEPHONE (OUTPATIENT)
Dept: ORTHOPEDICS | Facility: CLINIC | Age: 71
End: 2021-02-04

## 2021-02-05 DIAGNOSIS — Z01.818 PRE-OP TESTING: ICD-10-CM

## 2021-02-08 DIAGNOSIS — Z01.818 PRE-OP TESTING: ICD-10-CM

## 2021-02-08 DIAGNOSIS — M17.12 PRIMARY OSTEOARTHRITIS OF LEFT KNEE: Primary | ICD-10-CM

## 2021-02-08 RX ORDER — MUPIROCIN 20 MG/G
OINTMENT TOPICAL
Status: CANCELLED | OUTPATIENT
Start: 2021-02-08

## 2021-02-09 ENCOUNTER — TELEPHONE (OUTPATIENT)
Dept: FAMILY MEDICINE | Facility: CLINIC | Age: 71
End: 2021-02-09

## 2021-02-09 ENCOUNTER — TELEPHONE (OUTPATIENT)
Dept: ORTHOPEDICS | Facility: CLINIC | Age: 71
End: 2021-02-09

## 2021-02-17 ENCOUNTER — TELEPHONE (OUTPATIENT)
Dept: ORTHOPEDICS | Facility: CLINIC | Age: 71
End: 2021-02-17

## 2021-02-18 NOTE — TELEPHONE ENCOUNTER
Patient called in reference to questions about pre-surgery diet. We reviewed the expected changes to diet prior to clearance for surgery. Discussed meal ideas/sample meal plan. Encouraged patient to call with any other questions or concerns.    Patient due for Pap and HPV.    Reminder done today via telephone call.    Pt switched clinics and no longer sees [facility]. Tracking discontinued.

## 2021-03-01 ENCOUNTER — TELEPHONE (OUTPATIENT)
Dept: FAMILY MEDICINE | Facility: CLINIC | Age: 71
End: 2021-03-01

## 2021-03-04 ENCOUNTER — HOSPITAL ENCOUNTER (OUTPATIENT)
Dept: PREADMISSION TESTING | Facility: HOSPITAL | Age: 71
Discharge: HOME | End: 2021-03-04
Payer: MEDICARE

## 2021-03-04 DIAGNOSIS — M17.12 PRIMARY OSTEOARTHRITIS OF LEFT KNEE: ICD-10-CM

## 2021-03-04 DIAGNOSIS — Z01.818 PREOP TESTING: Primary | ICD-10-CM

## 2021-03-08 ENCOUNTER — HOSPITAL ENCOUNTER (OUTPATIENT)
Dept: RADIOLOGY | Facility: HOSPITAL | Age: 71
Discharge: HOME | End: 2021-03-08
Attending: ORTHOPAEDIC SURGERY
Payer: MEDICARE

## 2021-03-08 ENCOUNTER — HOSPITAL ENCOUNTER (OUTPATIENT)
Dept: PREADMISSION TESTING | Facility: HOSPITAL | Age: 71
Discharge: HOME | End: 2021-03-08
Attending: ORTHOPAEDIC SURGERY
Payer: MEDICARE

## 2021-03-08 VITALS — WEIGHT: 232 LBS | HEIGHT: 61 IN | BODY MASS INDEX: 43.8 KG/M2

## 2021-03-08 DIAGNOSIS — Z01.818 PREOP TESTING: ICD-10-CM

## 2021-03-08 DIAGNOSIS — Z01.818 PRE-OP TESTING: ICD-10-CM

## 2021-03-08 DIAGNOSIS — M17.12 PRIMARY OSTEOARTHRITIS OF LEFT KNEE: Primary | ICD-10-CM

## 2021-03-08 DIAGNOSIS — M17.12 ARTHRITIS OF KNEE, LEFT: ICD-10-CM

## 2021-03-08 LAB
ABO + RH BLD: NORMAL
ANION GAP SERPL CALC-SCNC: 7 MMOL/L (ref 8–16)
BASOPHILS # BLD AUTO: 0.05 K/UL (ref 0–0.2)
BASOPHILS NFR BLD: 0.8 % (ref 0–1.9)
BLD GP AB SCN CELLS X3 SERPL QL: NORMAL
BUN SERPL-MCNC: 13 MG/DL (ref 8–23)
CALCIUM SERPL-MCNC: 9.2 MG/DL (ref 8.7–10.5)
CHLORIDE SERPL-SCNC: 103 MMOL/L (ref 95–110)
CO2 SERPL-SCNC: 32 MMOL/L (ref 23–29)
CREAT SERPL-MCNC: 0.8 MG/DL (ref 0.5–1.4)
DIFFERENTIAL METHOD: ABNORMAL
EOSINOPHIL # BLD AUTO: 0.2 K/UL (ref 0–0.5)
EOSINOPHIL NFR BLD: 2.8 % (ref 0–8)
ERYTHROCYTE [DISTWIDTH] IN BLOOD BY AUTOMATED COUNT: 14.2 % (ref 11.5–14.5)
EST. GFR  (AFRICAN AMERICAN): >60 ML/MIN/1.73 M^2
EST. GFR  (NON AFRICAN AMERICAN): >60 ML/MIN/1.73 M^2
GLUCOSE SERPL-MCNC: 138 MG/DL (ref 70–110)
HCT VFR BLD AUTO: 40.7 % (ref 37–48.5)
HGB BLD-MCNC: 12.7 G/DL (ref 12–16)
IMM GRANULOCYTES # BLD AUTO: 0.03 K/UL (ref 0–0.04)
IMM GRANULOCYTES NFR BLD AUTO: 0.5 % (ref 0–0.5)
LYMPHOCYTES # BLD AUTO: 2.7 K/UL (ref 1–4.8)
LYMPHOCYTES NFR BLD: 44.7 % (ref 18–48)
MCH RBC QN AUTO: 29.3 PG (ref 27–31)
MCHC RBC AUTO-ENTMCNC: 31.2 G/DL (ref 32–36)
MCV RBC AUTO: 94 FL (ref 82–98)
MONOCYTES # BLD AUTO: 0.5 K/UL (ref 0.3–1)
MONOCYTES NFR BLD: 8.3 % (ref 4–15)
NEUTROPHILS # BLD AUTO: 2.6 K/UL (ref 1.8–7.7)
NEUTROPHILS NFR BLD: 42.9 % (ref 38–73)
NRBC BLD-RTO: 0 /100 WBC
PLATELET # BLD AUTO: 254 K/UL (ref 150–350)
PMV BLD AUTO: 8.8 FL (ref 9.2–12.9)
POTASSIUM SERPL-SCNC: 4.4 MMOL/L (ref 3.5–5.1)
RBC # BLD AUTO: 4.34 M/UL (ref 4–5.4)
SODIUM SERPL-SCNC: 142 MMOL/L (ref 136–145)
WBC # BLD AUTO: 6.11 K/UL (ref 3.9–12.7)

## 2021-03-08 PROCEDURE — 93005 ELECTROCARDIOGRAM TRACING: CPT

## 2021-03-08 PROCEDURE — 99900103 DSU ONLY-NO CHARGE-INITIAL HR (STAT)

## 2021-03-08 PROCEDURE — 36415 COLL VENOUS BLD VENIPUNCTURE: CPT | Performed by: ORTHOPAEDIC SURGERY

## 2021-03-08 PROCEDURE — 71046 XR CHEST PA AND LATERAL: ICD-10-PCS | Mod: 26,,, | Performed by: RADIOLOGY

## 2021-03-08 PROCEDURE — 86900 BLOOD TYPING SEROLOGIC ABO: CPT | Performed by: ORTHOPAEDIC SURGERY

## 2021-03-08 PROCEDURE — 93010 EKG 12-LEAD: ICD-10-PCS | Mod: ,,, | Performed by: INTERNAL MEDICINE

## 2021-03-08 PROCEDURE — 71046 X-RAY EXAM CHEST 2 VIEWS: CPT | Mod: 26,,, | Performed by: RADIOLOGY

## 2021-03-08 PROCEDURE — 99900104 DSU ONLY-NO CHARGE-EA ADD'L HR (STAT)

## 2021-03-08 PROCEDURE — 93010 ELECTROCARDIOGRAM REPORT: CPT | Mod: ,,, | Performed by: INTERNAL MEDICINE

## 2021-03-08 PROCEDURE — 85025 COMPLETE CBC W/AUTO DIFF WBC: CPT | Performed by: ORTHOPAEDIC SURGERY

## 2021-03-08 PROCEDURE — 71046 X-RAY EXAM CHEST 2 VIEWS: CPT | Mod: TC,FY

## 2021-03-08 PROCEDURE — 80048 BASIC METABOLIC PNL TOTAL CA: CPT | Performed by: ORTHOPAEDIC SURGERY

## 2021-03-08 PROCEDURE — 87081 CULTURE SCREEN ONLY: CPT | Performed by: ORTHOPAEDIC SURGERY

## 2021-03-10 DIAGNOSIS — M17.12 PRIMARY OSTEOARTHRITIS OF LEFT KNEE: Primary | ICD-10-CM

## 2021-03-10 LAB — MRSA SPEC QL CULT: NORMAL

## 2021-03-11 ENCOUNTER — OFFICE VISIT (OUTPATIENT)
Dept: FAMILY MEDICINE | Facility: CLINIC | Age: 71
End: 2021-03-11
Payer: MEDICARE

## 2021-03-11 VITALS
HEART RATE: 65 BPM | DIASTOLIC BLOOD PRESSURE: 70 MMHG | HEIGHT: 61 IN | SYSTOLIC BLOOD PRESSURE: 124 MMHG | WEIGHT: 242 LBS | BODY MASS INDEX: 45.69 KG/M2

## 2021-03-11 DIAGNOSIS — I10 ESSENTIAL HYPERTENSION: ICD-10-CM

## 2021-03-11 DIAGNOSIS — Z01.818 PREOPERATIVE CLEARANCE: Primary | ICD-10-CM

## 2021-03-11 DIAGNOSIS — E11.42 TYPE 2 DIABETES MELLITUS WITH POLYNEUROPATHY: ICD-10-CM

## 2021-03-11 DIAGNOSIS — R49.0 HOARSENESS: ICD-10-CM

## 2021-03-11 DIAGNOSIS — N18.31 CHRONIC RENAL IMPAIRMENT, STAGE 3A: ICD-10-CM

## 2021-03-11 DIAGNOSIS — M06.9 RHEUMATOID ARTHRITIS, INVOLVING UNSPECIFIED SITE, UNSPECIFIED WHETHER RHEUMATOID FACTOR PRESENT: ICD-10-CM

## 2021-03-11 DIAGNOSIS — I89.0 LYMPHEDEMA OF BOTH LOWER EXTREMITIES: ICD-10-CM

## 2021-03-11 DIAGNOSIS — E87.6 LOW POTASSIUM SYNDROME: ICD-10-CM

## 2021-03-11 DIAGNOSIS — I50.9 HEART FAILURE, UNSPECIFIED HF CHRONICITY, UNSPECIFIED HEART FAILURE TYPE: ICD-10-CM

## 2021-03-11 DIAGNOSIS — M17.12 ARTHRITIS OF KNEE, LEFT: ICD-10-CM

## 2021-03-11 DIAGNOSIS — F51.01 PRIMARY INSOMNIA: ICD-10-CM

## 2021-03-11 DIAGNOSIS — J41.0 SIMPLE CHRONIC BRONCHITIS: ICD-10-CM

## 2021-03-11 DIAGNOSIS — E66.01 CLASS 3 SEVERE OBESITY WITH BODY MASS INDEX (BMI) OF 40.0 TO 44.9 IN ADULT, UNSPECIFIED OBESITY TYPE, UNSPECIFIED WHETHER SERIOUS COMORBIDITY PRESENT: ICD-10-CM

## 2021-03-11 DIAGNOSIS — K21.9 GASTROESOPHAGEAL REFLUX DISEASE, UNSPECIFIED WHETHER ESOPHAGITIS PRESENT: ICD-10-CM

## 2021-03-11 DIAGNOSIS — F32.1 MODERATE MAJOR DEPRESSION, SINGLE EPISODE: ICD-10-CM

## 2021-03-11 PROCEDURE — 99214 PR OFFICE/OUTPT VISIT, EST, LEVL IV, 30-39 MIN: ICD-10-PCS | Mod: S$GLB,,, | Performed by: FAMILY MEDICINE

## 2021-03-11 PROCEDURE — 3078F PR MOST RECENT DIASTOLIC BLOOD PRESSURE < 80 MM HG: ICD-10-PCS | Mod: S$GLB,,, | Performed by: FAMILY MEDICINE

## 2021-03-11 PROCEDURE — 1159F MED LIST DOCD IN RCRD: CPT | Mod: S$GLB,,, | Performed by: FAMILY MEDICINE

## 2021-03-11 PROCEDURE — 99214 OFFICE O/P EST MOD 30 MIN: CPT | Mod: S$GLB,,, | Performed by: FAMILY MEDICINE

## 2021-03-11 PROCEDURE — 3288F PR FALLS RISK ASSESSMENT DOCUMENTED: ICD-10-PCS | Mod: S$GLB,,, | Performed by: FAMILY MEDICINE

## 2021-03-11 PROCEDURE — 3078F DIAST BP <80 MM HG: CPT | Mod: S$GLB,,, | Performed by: FAMILY MEDICINE

## 2021-03-11 PROCEDURE — 3008F BODY MASS INDEX DOCD: CPT | Mod: S$GLB,,, | Performed by: FAMILY MEDICINE

## 2021-03-11 PROCEDURE — 1101F PR PT FALLS ASSESS DOC 0-1 FALLS W/OUT INJ PAST YR: ICD-10-PCS | Mod: S$GLB,,, | Performed by: FAMILY MEDICINE

## 2021-03-11 PROCEDURE — 3074F PR MOST RECENT SYSTOLIC BLOOD PRESSURE < 130 MM HG: ICD-10-PCS | Mod: S$GLB,,, | Performed by: FAMILY MEDICINE

## 2021-03-11 PROCEDURE — 3008F PR BODY MASS INDEX (BMI) DOCUMENTED: ICD-10-PCS | Mod: S$GLB,,, | Performed by: FAMILY MEDICINE

## 2021-03-11 PROCEDURE — 3288F FALL RISK ASSESSMENT DOCD: CPT | Mod: S$GLB,,, | Performed by: FAMILY MEDICINE

## 2021-03-11 PROCEDURE — 3074F SYST BP LT 130 MM HG: CPT | Mod: S$GLB,,, | Performed by: FAMILY MEDICINE

## 2021-03-11 PROCEDURE — 1159F PR MEDICATION LIST DOCUMENTED IN MEDICAL RECORD: ICD-10-PCS | Mod: S$GLB,,, | Performed by: FAMILY MEDICINE

## 2021-03-11 PROCEDURE — 1101F PT FALLS ASSESS-DOCD LE1/YR: CPT | Mod: S$GLB,,, | Performed by: FAMILY MEDICINE

## 2021-03-11 RX ORDER — LOSARTAN POTASSIUM 50 MG/1
50 TABLET ORAL DAILY
Qty: 90 TABLET | Refills: 1 | Status: SHIPPED | OUTPATIENT
Start: 2021-03-11 | End: 2021-05-10 | Stop reason: SDUPTHER

## 2021-03-11 RX ORDER — MINERAL OIL
ENEMA (ML) RECTAL
COMMUNITY
Start: 1983-06-22 | End: 2023-11-14

## 2021-03-11 RX ORDER — METOPROLOL SUCCINATE 50 MG/1
50 TABLET, EXTENDED RELEASE ORAL DAILY
Qty: 90 TABLET | Refills: 3 | Status: SHIPPED | OUTPATIENT
Start: 2021-03-11 | End: 2021-05-10 | Stop reason: SDUPTHER

## 2021-03-11 RX ORDER — PREGABALIN 25 MG/1
25 CAPSULE ORAL 3 TIMES DAILY
Qty: 90 CAPSULE | Refills: 0 | Status: SHIPPED | OUTPATIENT
Start: 2021-03-11 | End: 2021-05-10 | Stop reason: SDUPTHER

## 2021-03-11 RX ORDER — AMITRIPTYLINE HYDROCHLORIDE 50 MG/1
TABLET, FILM COATED ORAL
COMMUNITY
Start: 2021-02-22 | End: 2021-03-11 | Stop reason: SDUPTHER

## 2021-03-11 RX ORDER — HYDROCHLOROTHIAZIDE 25 MG/1
25 TABLET ORAL DAILY
Qty: 90 TABLET | Refills: 3 | Status: SHIPPED | OUTPATIENT
Start: 2021-03-11 | End: 2022-03-15 | Stop reason: SDUPTHER

## 2021-03-11 RX ORDER — MECLIZINE HYDROCHLORIDE 25 MG/1
25 TABLET ORAL DAILY PRN
COMMUNITY
Start: 2021-03-01 | End: 2022-06-09

## 2021-03-11 RX ORDER — AMITRIPTYLINE HYDROCHLORIDE 50 MG/1
50 TABLET, FILM COATED ORAL NIGHTLY PRN
Qty: 90 TABLET | Refills: 1 | Status: SHIPPED | OUTPATIENT
Start: 2021-03-11 | End: 2021-05-10

## 2021-03-11 RX ORDER — OMEPRAZOLE 20 MG/1
20 CAPSULE, DELAYED RELEASE ORAL EVERY OTHER DAY
Qty: 90 CAPSULE | Refills: 3 | Status: SHIPPED | OUTPATIENT
Start: 2021-03-11 | End: 2021-11-10 | Stop reason: SDUPTHER

## 2021-03-11 RX ORDER — BUDESONIDE AND FORMOTEROL FUMARATE DIHYDRATE 160; 4.5 UG/1; UG/1
2 AEROSOL RESPIRATORY (INHALATION) 2 TIMES DAILY
COMMUNITY
Start: 2021-02-15 | End: 2021-05-10

## 2021-03-11 RX ORDER — CELECOXIB 200 MG/1
200 CAPSULE ORAL DAILY PRN
Qty: 90 CAPSULE | Refills: 1 | Status: SHIPPED | OUTPATIENT
Start: 2021-03-11 | End: 2021-09-15 | Stop reason: SDUPTHER

## 2021-03-11 RX ORDER — AMLODIPINE BESYLATE 5 MG/1
5 TABLET ORAL DAILY
Qty: 90 TABLET | Refills: 3 | Status: SHIPPED | OUTPATIENT
Start: 2021-03-11 | End: 2021-05-10 | Stop reason: SDUPTHER

## 2021-03-13 ENCOUNTER — LAB VISIT (OUTPATIENT)
Dept: PRIMARY CARE CLINIC | Facility: CLINIC | Age: 71
End: 2021-03-13
Payer: MEDICARE

## 2021-03-13 DIAGNOSIS — Z01.818 PRE-OP TESTING: ICD-10-CM

## 2021-03-13 PROCEDURE — U0005 INFEC AGEN DETEC AMPLI PROBE: HCPCS | Performed by: ORTHOPAEDIC SURGERY

## 2021-03-13 PROCEDURE — U0003 INFECTIOUS AGENT DETECTION BY NUCLEIC ACID (DNA OR RNA); SEVERE ACUTE RESPIRATORY SYNDROME CORONAVIRUS 2 (SARS-COV-2) (CORONAVIRUS DISEASE [COVID-19]), AMPLIFIED PROBE TECHNIQUE, MAKING USE OF HIGH THROUGHPUT TECHNOLOGIES AS DESCRIBED BY CMS-2020-01-R: HCPCS | Performed by: ORTHOPAEDIC SURGERY

## 2021-03-14 LAB — SARS-COV-2 RNA RESP QL NAA+PROBE: NOT DETECTED

## 2021-03-15 ENCOUNTER — LAB VISIT (OUTPATIENT)
Dept: LAB | Facility: HOSPITAL | Age: 71
End: 2021-03-15
Attending: ORTHOPAEDIC SURGERY
Payer: MEDICARE

## 2021-03-15 ENCOUNTER — ANESTHESIA EVENT (OUTPATIENT)
Dept: SURGERY | Facility: HOSPITAL | Age: 71
End: 2021-03-15
Payer: MEDICARE

## 2021-03-15 DIAGNOSIS — M17.12 ARTHRITIS OF KNEE, LEFT: ICD-10-CM

## 2021-03-15 DIAGNOSIS — M17.12 PRIMARY OSTEOARTHRITIS OF LEFT KNEE: ICD-10-CM

## 2021-03-15 DIAGNOSIS — Z01.818 PREOP TESTING: ICD-10-CM

## 2021-03-15 LAB
ABO + RH BLD: NORMAL
BLD GP AB SCN CELLS X3 SERPL QL: NORMAL
BLOOD GROUP ANTIBODIES SERPL: NORMAL

## 2021-03-15 PROCEDURE — 86902 BLOOD TYPE ANTIGEN DONOR EA: CPT | Performed by: ORTHOPAEDIC SURGERY

## 2021-03-15 PROCEDURE — 86870 RBC ANTIBODY IDENTIFICATION: CPT | Performed by: ORTHOPAEDIC SURGERY

## 2021-03-15 PROCEDURE — 86900 BLOOD TYPING SEROLOGIC ABO: CPT | Performed by: ORTHOPAEDIC SURGERY

## 2021-03-15 PROCEDURE — 86922 COMPATIBILITY TEST ANTIGLOB: CPT | Mod: 59 | Performed by: ORTHOPAEDIC SURGERY

## 2021-03-15 PROCEDURE — 86905 BLOOD TYPING RBC ANTIGENS: CPT | Performed by: ORTHOPAEDIC SURGERY

## 2021-03-15 PROCEDURE — 36415 COLL VENOUS BLD VENIPUNCTURE: CPT | Performed by: ORTHOPAEDIC SURGERY

## 2021-03-16 ENCOUNTER — HOSPITAL ENCOUNTER (OUTPATIENT)
Facility: HOSPITAL | Age: 71
Discharge: HOME HEALTH | End: 2021-03-17
Attending: ORTHOPAEDIC SURGERY | Admitting: ORTHOPAEDIC SURGERY
Payer: MEDICARE

## 2021-03-16 ENCOUNTER — ANESTHESIA (OUTPATIENT)
Dept: SURGERY | Facility: HOSPITAL | Age: 71
End: 2021-03-16
Payer: MEDICARE

## 2021-03-16 DIAGNOSIS — M17.12 PRIMARY OSTEOARTHRITIS OF LEFT KNEE: ICD-10-CM

## 2021-03-16 DIAGNOSIS — M17.12 ARTHRITIS OF KNEE, LEFT: ICD-10-CM

## 2021-03-16 DIAGNOSIS — M25.562 CHRONIC PAIN OF LEFT KNEE: Primary | ICD-10-CM

## 2021-03-16 DIAGNOSIS — G89.29 CHRONIC PAIN OF LEFT KNEE: Primary | ICD-10-CM

## 2021-03-16 PROBLEM — E11.9 DIET-CONTROLLED TYPE 2 DIABETES MELLITUS: Status: ACTIVE | Noted: 2021-03-16

## 2021-03-16 PROCEDURE — 27000221 HC OXYGEN, UP TO 24 HOURS

## 2021-03-16 PROCEDURE — D9220A PRA ANESTHESIA: Mod: CRNA,,, | Performed by: NURSE ANESTHETIST, CERTIFIED REGISTERED

## 2021-03-16 PROCEDURE — 25000003 PHARM REV CODE 250: Performed by: NURSE ANESTHETIST, CERTIFIED REGISTERED

## 2021-03-16 PROCEDURE — 25000003 PHARM REV CODE 250: Performed by: ANESTHESIOLOGY

## 2021-03-16 PROCEDURE — 25000003 PHARM REV CODE 250: Performed by: ORTHOPAEDIC SURGERY

## 2021-03-16 PROCEDURE — C9290 INJ, BUPIVACAINE LIPOSOME: HCPCS | Performed by: ANESTHESIOLOGY

## 2021-03-16 PROCEDURE — 63600175 PHARM REV CODE 636 W HCPCS: Performed by: NURSE ANESTHETIST, CERTIFIED REGISTERED

## 2021-03-16 PROCEDURE — 71000033 HC RECOVERY, INTIAL HOUR: Performed by: ORTHOPAEDIC SURGERY

## 2021-03-16 PROCEDURE — 97110 THERAPEUTIC EXERCISES: CPT

## 2021-03-16 PROCEDURE — 63600175 PHARM REV CODE 636 W HCPCS: Performed by: ORTHOPAEDIC SURGERY

## 2021-03-16 PROCEDURE — 71000039 HC RECOVERY, EACH ADD'L HOUR: Performed by: ORTHOPAEDIC SURGERY

## 2021-03-16 PROCEDURE — 27447 PR TOTAL KNEE ARTHROPLASTY: ICD-10-PCS | Mod: LT,,, | Performed by: ORTHOPAEDIC SURGERY

## 2021-03-16 PROCEDURE — 76942 PR U/S GUIDANCE FOR NEEDLE GUIDANCE: ICD-10-PCS | Mod: 26,,, | Performed by: ANESTHESIOLOGY

## 2021-03-16 PROCEDURE — C1776 JOINT DEVICE (IMPLANTABLE): HCPCS | Performed by: ORTHOPAEDIC SURGERY

## 2021-03-16 PROCEDURE — 94761 N-INVAS EAR/PLS OXIMETRY MLT: CPT

## 2021-03-16 PROCEDURE — 94799 UNLISTED PULMONARY SVC/PX: CPT

## 2021-03-16 PROCEDURE — 83036 HEMOGLOBIN GLYCOSYLATED A1C: CPT | Performed by: HOSPITALIST

## 2021-03-16 PROCEDURE — 63600175 PHARM REV CODE 636 W HCPCS: Performed by: ANESTHESIOLOGY

## 2021-03-16 PROCEDURE — 64447 PR NERVE BLOCK INJ, ANES/STEROID, FEMORAL, INCL IMAG GUIDANCE: ICD-10-PCS | Mod: 59,LT,, | Performed by: ANESTHESIOLOGY

## 2021-03-16 PROCEDURE — 76942 ECHO GUIDE FOR BIOPSY: CPT | Performed by: ANESTHESIOLOGY

## 2021-03-16 PROCEDURE — 37000008 HC ANESTHESIA 1ST 15 MINUTES: Performed by: ORTHOPAEDIC SURGERY

## 2021-03-16 PROCEDURE — 25000003 PHARM REV CODE 250: Performed by: HOSPITALIST

## 2021-03-16 PROCEDURE — 76942 ECHO GUIDE FOR BIOPSY: CPT | Mod: 26,,, | Performed by: ANESTHESIOLOGY

## 2021-03-16 PROCEDURE — 64447 NJX AA&/STRD FEMORAL NRV IMG: CPT | Mod: 59,LT,, | Performed by: ANESTHESIOLOGY

## 2021-03-16 PROCEDURE — 36000711: Performed by: ORTHOPAEDIC SURGERY

## 2021-03-16 PROCEDURE — 37000009 HC ANESTHESIA EA ADD 15 MINS: Performed by: ORTHOPAEDIC SURGERY

## 2021-03-16 PROCEDURE — D9220A PRA ANESTHESIA: Mod: ANES,,, | Performed by: ANESTHESIOLOGY

## 2021-03-16 PROCEDURE — 64447 NJX AA&/STRD FEMORAL NRV IMG: CPT | Performed by: ANESTHESIOLOGY

## 2021-03-16 PROCEDURE — 99900104 DSU ONLY-NO CHARGE-EA ADD'L HR (STAT): Performed by: ORTHOPAEDIC SURGERY

## 2021-03-16 PROCEDURE — 99900103 DSU ONLY-NO CHARGE-INITIAL HR (STAT): Performed by: ORTHOPAEDIC SURGERY

## 2021-03-16 PROCEDURE — 63600175 PHARM REV CODE 636 W HCPCS

## 2021-03-16 PROCEDURE — 27447 TOTAL KNEE ARTHROPLASTY: CPT | Mod: LT,,, | Performed by: ORTHOPAEDIC SURGERY

## 2021-03-16 PROCEDURE — D9220A PRA ANESTHESIA: ICD-10-PCS | Mod: CRNA,,, | Performed by: NURSE ANESTHETIST, CERTIFIED REGISTERED

## 2021-03-16 PROCEDURE — 36000710: Performed by: ORTHOPAEDIC SURGERY

## 2021-03-16 PROCEDURE — 36415 COLL VENOUS BLD VENIPUNCTURE: CPT | Performed by: HOSPITALIST

## 2021-03-16 PROCEDURE — C1713 ANCHOR/SCREW BN/BN,TIS/BN: HCPCS | Performed by: ORTHOPAEDIC SURGERY

## 2021-03-16 PROCEDURE — D9220A PRA ANESTHESIA: ICD-10-PCS | Mod: ANES,,, | Performed by: ANESTHESIOLOGY

## 2021-03-16 PROCEDURE — 97161 PT EVAL LOW COMPLEX 20 MIN: CPT

## 2021-03-16 DEVICE — COMPONENT FEM CR TRI SZ3 L: Type: IMPLANTABLE DEVICE | Site: KNEE | Status: FUNCTIONAL

## 2021-03-16 DEVICE — BASEPLATE TIB CEM PRIM SZ 3: Type: IMPLANTABLE DEVICE | Site: KNEE | Status: FUNCTIONAL

## 2021-03-16 DEVICE — CEMENT BONE ANTIBIO SIMPLEX P: Type: IMPLANTABLE DEVICE | Site: KNEE | Status: FUNCTIONAL

## 2021-03-16 DEVICE — PATELLA TRIATHLON 29X8 SYMTRC: Type: IMPLANTABLE DEVICE | Site: KNEE | Status: FUNCTIONAL

## 2021-03-16 DEVICE — INSERT TIBIAL SZ 3 9MM: Type: IMPLANTABLE DEVICE | Site: KNEE | Status: FUNCTIONAL

## 2021-03-16 RX ORDER — ONDANSETRON 2 MG/ML
4 INJECTION INTRAMUSCULAR; INTRAVENOUS EVERY 12 HOURS PRN
Status: DISCONTINUED | OUTPATIENT
Start: 2021-03-16 | End: 2021-03-17 | Stop reason: HOSPADM

## 2021-03-16 RX ORDER — FENTANYL CITRATE 50 UG/ML
INJECTION, SOLUTION INTRAMUSCULAR; INTRAVENOUS
Status: DISCONTINUED | OUTPATIENT
Start: 2021-03-16 | End: 2021-03-16

## 2021-03-16 RX ORDER — OXYCODONE HYDROCHLORIDE 10 MG/1
10 TABLET ORAL
Status: DISCONTINUED | OUTPATIENT
Start: 2021-03-16 | End: 2021-03-17 | Stop reason: HOSPADM

## 2021-03-16 RX ORDER — DEXAMETHASONE SODIUM PHOSPHATE 4 MG/ML
INJECTION, SOLUTION INTRA-ARTICULAR; INTRALESIONAL; INTRAMUSCULAR; INTRAVENOUS; SOFT TISSUE
Status: DISCONTINUED | OUTPATIENT
Start: 2021-03-16 | End: 2021-03-16

## 2021-03-16 RX ORDER — FENTANYL CITRATE 50 UG/ML
25 INJECTION, SOLUTION INTRAMUSCULAR; INTRAVENOUS EVERY 5 MIN PRN
Status: DISCONTINUED | OUTPATIENT
Start: 2021-03-16 | End: 2021-03-16 | Stop reason: HOSPADM

## 2021-03-16 RX ORDER — VENLAFAXINE HYDROCHLORIDE 150 MG/1
150 CAPSULE, EXTENDED RELEASE ORAL DAILY
Status: DISCONTINUED | OUTPATIENT
Start: 2021-03-17 | End: 2021-03-17 | Stop reason: HOSPADM

## 2021-03-16 RX ORDER — FAMOTIDINE 20 MG/1
20 TABLET, FILM COATED ORAL 2 TIMES DAILY
Status: DISCONTINUED | OUTPATIENT
Start: 2021-03-16 | End: 2021-03-17 | Stop reason: HOSPADM

## 2021-03-16 RX ORDER — ACETAMINOPHEN 10 MG/ML
INJECTION, SOLUTION INTRAVENOUS
Status: DISCONTINUED | OUTPATIENT
Start: 2021-03-16 | End: 2021-03-16

## 2021-03-16 RX ORDER — PANTOPRAZOLE SODIUM 40 MG/1
40 TABLET, DELAYED RELEASE ORAL DAILY
Refills: 3 | Status: DISCONTINUED | OUTPATIENT
Start: 2021-03-17 | End: 2021-03-16

## 2021-03-16 RX ORDER — AMLODIPINE BESYLATE 5 MG/1
5 TABLET ORAL DAILY
Status: DISCONTINUED | OUTPATIENT
Start: 2021-03-17 | End: 2021-03-17 | Stop reason: HOSPADM

## 2021-03-16 RX ORDER — PREGABALIN 75 MG/1
75 CAPSULE ORAL ONCE
Status: COMPLETED | OUTPATIENT
Start: 2021-03-16 | End: 2021-03-16

## 2021-03-16 RX ORDER — CLINDAMYCIN PHOSPHATE 900 MG/50ML
900 INJECTION, SOLUTION INTRAVENOUS
Status: COMPLETED | OUTPATIENT
Start: 2021-03-16 | End: 2021-03-17

## 2021-03-16 RX ORDER — MUPIROCIN 20 MG/G
OINTMENT TOPICAL
Status: DISCONTINUED | OUTPATIENT
Start: 2021-03-16 | End: 2021-03-16 | Stop reason: HOSPADM

## 2021-03-16 RX ORDER — PROPOFOL 10 MG/ML
VIAL (ML) INTRAVENOUS
Status: DISCONTINUED | OUTPATIENT
Start: 2021-03-16 | End: 2021-03-16

## 2021-03-16 RX ORDER — SODIUM CHLORIDE 0.9 % (FLUSH) 0.9 %
10 SYRINGE (ML) INJECTION
Status: DISCONTINUED | OUTPATIENT
Start: 2021-03-16 | End: 2021-03-17 | Stop reason: HOSPADM

## 2021-03-16 RX ORDER — LOVASTATIN 10 MG/1
40 TABLET ORAL NIGHTLY
Status: DISCONTINUED | OUTPATIENT
Start: 2021-03-16 | End: 2021-03-17 | Stop reason: HOSPADM

## 2021-03-16 RX ORDER — BUPIVACAINE HYDROCHLORIDE 5 MG/ML
INJECTION, SOLUTION EPIDURAL; INTRACAUDAL
Status: DISCONTINUED | OUTPATIENT
Start: 2021-03-16 | End: 2021-03-16

## 2021-03-16 RX ORDER — CELECOXIB 100 MG/1
100 CAPSULE ORAL 2 TIMES DAILY
Status: DISCONTINUED | OUTPATIENT
Start: 2021-03-17 | End: 2021-03-17 | Stop reason: HOSPADM

## 2021-03-16 RX ORDER — CELECOXIB 100 MG/1
200 CAPSULE ORAL ONCE
Status: COMPLETED | OUTPATIENT
Start: 2021-03-16 | End: 2021-03-16

## 2021-03-16 RX ORDER — SUCCINYLCHOLINE CHLORIDE 20 MG/ML
INJECTION INTRAMUSCULAR; INTRAVENOUS
Status: DISCONTINUED | OUTPATIENT
Start: 2021-03-16 | End: 2021-03-16

## 2021-03-16 RX ORDER — IBUPROFEN 200 MG
24 TABLET ORAL
Status: DISCONTINUED | OUTPATIENT
Start: 2021-03-16 | End: 2021-03-16

## 2021-03-16 RX ORDER — IBUPROFEN 200 MG
16 TABLET ORAL
Status: DISCONTINUED | OUTPATIENT
Start: 2021-03-16 | End: 2021-03-16

## 2021-03-16 RX ORDER — GLUCAGON 1 MG
1 KIT INJECTION
Status: DISCONTINUED | OUTPATIENT
Start: 2021-03-16 | End: 2021-03-16

## 2021-03-16 RX ORDER — OXYCODONE HYDROCHLORIDE 5 MG/1
5 TABLET ORAL ONCE AS NEEDED
Status: COMPLETED | OUTPATIENT
Start: 2021-03-16 | End: 2021-03-16

## 2021-03-16 RX ORDER — HYDROCHLOROTHIAZIDE 25 MG/1
25 TABLET ORAL DAILY
Status: DISCONTINUED | OUTPATIENT
Start: 2021-03-17 | End: 2021-03-17 | Stop reason: HOSPADM

## 2021-03-16 RX ORDER — NAPROXEN SODIUM 220 MG/1
81 TABLET, FILM COATED ORAL 2 TIMES DAILY
Status: DISCONTINUED | OUTPATIENT
Start: 2021-03-16 | End: 2021-03-17 | Stop reason: HOSPADM

## 2021-03-16 RX ORDER — PRAMIPEXOLE DIHYDROCHLORIDE 1 MG/1
1 TABLET ORAL NIGHTLY
Status: DISCONTINUED | OUTPATIENT
Start: 2021-03-16 | End: 2021-03-17 | Stop reason: HOSPADM

## 2021-03-16 RX ORDER — INSULIN ASPART 100 [IU]/ML
0-5 INJECTION, SOLUTION INTRAVENOUS; SUBCUTANEOUS
Status: DISCONTINUED | OUTPATIENT
Start: 2021-03-16 | End: 2021-03-16

## 2021-03-16 RX ORDER — SODIUM CHLORIDE 9 MG/ML
INJECTION, SOLUTION INTRAVENOUS CONTINUOUS
Status: DISCONTINUED | OUTPATIENT
Start: 2021-03-16 | End: 2021-03-17 | Stop reason: HOSPADM

## 2021-03-16 RX ORDER — LEVOTHYROXINE SODIUM 88 UG/1
88 TABLET ORAL
Status: DISCONTINUED | OUTPATIENT
Start: 2021-03-17 | End: 2021-03-17 | Stop reason: HOSPADM

## 2021-03-16 RX ORDER — METOPROLOL SUCCINATE 50 MG/1
50 TABLET, EXTENDED RELEASE ORAL DAILY
Status: DISCONTINUED | OUTPATIENT
Start: 2021-03-17 | End: 2021-03-17 | Stop reason: HOSPADM

## 2021-03-16 RX ORDER — ROCURONIUM BROMIDE 10 MG/ML
INJECTION, SOLUTION INTRAVENOUS
Status: DISCONTINUED | OUTPATIENT
Start: 2021-03-16 | End: 2021-03-16

## 2021-03-16 RX ORDER — LIDOCAINE HYDROCHLORIDE 10 MG/ML
1 INJECTION, SOLUTION EPIDURAL; INFILTRATION; INTRACAUDAL; PERINEURAL ONCE
Status: COMPLETED | OUTPATIENT
Start: 2021-03-16 | End: 2021-03-16

## 2021-03-16 RX ORDER — CLINDAMYCIN PHOSPHATE 900 MG/50ML
900 INJECTION, SOLUTION INTRAVENOUS
Status: COMPLETED | OUTPATIENT
Start: 2021-03-16 | End: 2021-03-16

## 2021-03-16 RX ORDER — SODIUM CHLORIDE, SODIUM LACTATE, POTASSIUM CHLORIDE, CALCIUM CHLORIDE 600; 310; 30; 20 MG/100ML; MG/100ML; MG/100ML; MG/100ML
INJECTION, SOLUTION INTRAVENOUS CONTINUOUS
Status: DISCONTINUED | OUTPATIENT
Start: 2021-03-16 | End: 2021-03-16

## 2021-03-16 RX ORDER — ONDANSETRON 2 MG/ML
INJECTION INTRAMUSCULAR; INTRAVENOUS
Status: DISCONTINUED | OUTPATIENT
Start: 2021-03-16 | End: 2021-03-16

## 2021-03-16 RX ORDER — MUPIROCIN 20 MG/G
1 OINTMENT TOPICAL 2 TIMES DAILY
Status: DISCONTINUED | OUTPATIENT
Start: 2021-03-16 | End: 2021-03-17 | Stop reason: HOSPADM

## 2021-03-16 RX ORDER — CLINDAMYCIN PHOSPHATE 900 MG/50ML
900 INJECTION, SOLUTION INTRAVENOUS
Status: DISCONTINUED | OUTPATIENT
Start: 2021-03-16 | End: 2021-03-16

## 2021-03-16 RX ORDER — DOCUSATE SODIUM 100 MG/1
100 CAPSULE, LIQUID FILLED ORAL EVERY 12 HOURS
Status: DISCONTINUED | OUTPATIENT
Start: 2021-03-16 | End: 2021-03-17 | Stop reason: HOSPADM

## 2021-03-16 RX ORDER — OXYCODONE HYDROCHLORIDE 5 MG/1
5 TABLET ORAL
Status: DISCONTINUED | OUTPATIENT
Start: 2021-03-16 | End: 2021-03-17 | Stop reason: HOSPADM

## 2021-03-16 RX ORDER — AMITRIPTYLINE HYDROCHLORIDE 50 MG/1
50 TABLET, FILM COATED ORAL NIGHTLY PRN
Status: DISCONTINUED | OUTPATIENT
Start: 2021-03-16 | End: 2021-03-17 | Stop reason: HOSPADM

## 2021-03-16 RX ORDER — MORPHINE SULFATE 2 MG/ML
2 INJECTION, SOLUTION INTRAMUSCULAR; INTRAVENOUS
Status: DISCONTINUED | OUTPATIENT
Start: 2021-03-16 | End: 2021-03-17 | Stop reason: HOSPADM

## 2021-03-16 RX ORDER — ALPRAZOLAM 1 MG/1
2 TABLET ORAL 2 TIMES DAILY PRN
Status: DISCONTINUED | OUTPATIENT
Start: 2021-03-16 | End: 2021-03-17 | Stop reason: HOSPADM

## 2021-03-16 RX ORDER — LIDOCAINE HYDROCHLORIDE 20 MG/ML
INJECTION INTRAVENOUS
Status: DISCONTINUED | OUTPATIENT
Start: 2021-03-16 | End: 2021-03-16

## 2021-03-16 RX ORDER — ACETAMINOPHEN 325 MG/1
650 TABLET ORAL EVERY 4 HOURS
Status: DISCONTINUED | OUTPATIENT
Start: 2021-03-16 | End: 2021-03-17 | Stop reason: HOSPADM

## 2021-03-16 RX ORDER — LOSARTAN POTASSIUM 25 MG/1
50 TABLET ORAL DAILY
Status: DISCONTINUED | OUTPATIENT
Start: 2021-03-17 | End: 2021-03-17 | Stop reason: HOSPADM

## 2021-03-16 RX ORDER — MIDAZOLAM HYDROCHLORIDE 1 MG/ML
INJECTION, SOLUTION INTRAMUSCULAR; INTRAVENOUS
Status: DISCONTINUED | OUTPATIENT
Start: 2021-03-16 | End: 2021-03-16

## 2021-03-16 RX ORDER — DEXTROSE MONOHYDRATE AND SODIUM CHLORIDE 5; .9 G/100ML; G/100ML
INJECTION, SOLUTION INTRAVENOUS CONTINUOUS
Status: DISCONTINUED | OUTPATIENT
Start: 2021-03-16 | End: 2021-03-16

## 2021-03-16 RX ORDER — OXYCODONE HCL 10 MG/1
10 TABLET, FILM COATED, EXTENDED RELEASE ORAL ONCE
Status: COMPLETED | OUTPATIENT
Start: 2021-03-16 | End: 2021-03-16

## 2021-03-16 RX ORDER — LOPERAMIDE HYDROCHLORIDE 2 MG/1
2 CAPSULE ORAL CONTINUOUS PRN
Status: DISCONTINUED | OUTPATIENT
Start: 2021-03-16 | End: 2021-03-17 | Stop reason: HOSPADM

## 2021-03-16 RX ORDER — CELECOXIB 100 MG/1
200 CAPSULE ORAL ONCE
Status: DISCONTINUED | OUTPATIENT
Start: 2021-03-16 | End: 2021-03-17 | Stop reason: HOSPADM

## 2021-03-16 RX ORDER — PREGABALIN 25 MG/1
25 CAPSULE ORAL 3 TIMES DAILY
Status: DISCONTINUED | OUTPATIENT
Start: 2021-03-16 | End: 2021-03-17 | Stop reason: HOSPADM

## 2021-03-16 RX ORDER — PHENYLEPHRINE HYDROCHLORIDE 10 MG/ML
INJECTION INTRAVENOUS
Status: DISCONTINUED | OUTPATIENT
Start: 2021-03-16 | End: 2021-03-16

## 2021-03-16 RX ADMIN — PRAMIPEXOLE DIHYDROCHLORIDE 1 MG: 1 TABLET ORAL at 08:03

## 2021-03-16 RX ADMIN — MUPIROCIN 1 G: 20 OINTMENT TOPICAL at 01:03

## 2021-03-16 RX ADMIN — AMITRIPTYLINE HYDROCHLORIDE 50 MG: 50 TABLET, FILM COATED ORAL at 11:03

## 2021-03-16 RX ADMIN — SUCCINYLCHOLINE CHLORIDE 140 MG: 20 INJECTION, SOLUTION INTRAMUSCULAR; INTRAVENOUS; PARENTERAL at 09:03

## 2021-03-16 RX ADMIN — ACETAMINOPHEN 1000 MG: 10 INJECTION, SOLUTION INTRAVENOUS at 09:03

## 2021-03-16 RX ADMIN — DOCUSATE SODIUM 100 MG: 100 CAPSULE, LIQUID FILLED ORAL at 01:03

## 2021-03-16 RX ADMIN — MIDAZOLAM 2 MG: 1 INJECTION INTRAMUSCULAR; INTRAVENOUS at 08:03

## 2021-03-16 RX ADMIN — FENTANYL CITRATE 50 MCG: 50 INJECTION, SOLUTION INTRAMUSCULAR; INTRAVENOUS at 09:03

## 2021-03-16 RX ADMIN — ROCURONIUM BROMIDE 10 MG: 10 INJECTION, SOLUTION INTRAVENOUS at 09:03

## 2021-03-16 RX ADMIN — OXYCODONE 5 MG: 5 TABLET ORAL at 11:03

## 2021-03-16 RX ADMIN — OXYCODONE HYDROCHLORIDE 10 MG: 10 TABLET, FILM COATED, EXTENDED RELEASE ORAL at 08:03

## 2021-03-16 RX ADMIN — LIDOCAINE HYDROCHLORIDE 50 MG: 20 INJECTION, SOLUTION INTRAVENOUS at 09:03

## 2021-03-16 RX ADMIN — PREGABALIN 25 MG: 25 CAPSULE ORAL at 08:03

## 2021-03-16 RX ADMIN — ACETAMINOPHEN 650 MG: 325 TABLET ORAL at 01:03

## 2021-03-16 RX ADMIN — LIDOCAINE HYDROCHLORIDE 10 MG: 10 INJECTION, SOLUTION EPIDURAL; INFILTRATION; INTRACAUDAL; PERINEURAL at 08:03

## 2021-03-16 RX ADMIN — FENTANYL CITRATE 25 MCG: 50 INJECTION INTRAMUSCULAR; INTRAVENOUS at 12:03

## 2021-03-16 RX ADMIN — LOVASTATIN 40 MG: 10 TABLET ORAL at 08:03

## 2021-03-16 RX ADMIN — BUPIVACAINE 20 ML: 13.3 INJECTION, SUSPENSION, LIPOSOMAL INFILTRATION at 08:03

## 2021-03-16 RX ADMIN — CLINDAMYCIN IN 5 PERCENT DEXTROSE 900 MG: 18 INJECTION, SOLUTION INTRAVENOUS at 05:03

## 2021-03-16 RX ADMIN — ONDANSETRON 4 MG: 2 INJECTION, SOLUTION INTRAMUSCULAR; INTRAVENOUS at 09:03

## 2021-03-16 RX ADMIN — ACETAMINOPHEN 650 MG: 325 TABLET ORAL at 05:03

## 2021-03-16 RX ADMIN — MUPIROCIN: 20 OINTMENT TOPICAL at 08:03

## 2021-03-16 RX ADMIN — CELECOXIB 200 MG: 100 CAPSULE ORAL at 08:03

## 2021-03-16 RX ADMIN — BUPIVACAINE HYDROCHLORIDE 10 ML: 5 INJECTION, SOLUTION EPIDURAL; INTRACAUDAL; PERINEURAL at 08:03

## 2021-03-16 RX ADMIN — ASPIRIN 81 MG CHEWABLE TABLET 81 MG: 81 TABLET CHEWABLE at 08:03

## 2021-03-16 RX ADMIN — FAMOTIDINE 20 MG: 20 TABLET ORAL at 08:03

## 2021-03-16 RX ADMIN — DEXTROSE AND SODIUM CHLORIDE: 5; .9 INJECTION, SOLUTION INTRAVENOUS at 12:03

## 2021-03-16 RX ADMIN — FENTANYL CITRATE 25 MCG: 50 INJECTION INTRAMUSCULAR; INTRAVENOUS at 11:03

## 2021-03-16 RX ADMIN — SODIUM CHLORIDE, SODIUM GLUCONATE, SODIUM ACETATE, POTASSIUM CHLORIDE, MAGNESIUM CHLORIDE, SODIUM PHOSPHATE, DIBASIC, AND POTASSIUM PHOSPHATE: .53; .5; .37; .037; .03; .012; .00082 INJECTION, SOLUTION INTRAVENOUS at 09:03

## 2021-03-16 RX ADMIN — SODIUM CHLORIDE: 0.9 INJECTION, SOLUTION INTRAVENOUS at 05:03

## 2021-03-16 RX ADMIN — FAMOTIDINE 20 MG: 20 TABLET ORAL at 01:03

## 2021-03-16 RX ADMIN — FENTANYL CITRATE 50 MCG: 50 INJECTION, SOLUTION INTRAMUSCULAR; INTRAVENOUS at 10:03

## 2021-03-16 RX ADMIN — PROPOFOL 150 MG: 10 INJECTION, EMULSION INTRAVENOUS at 09:03

## 2021-03-16 RX ADMIN — MUPIROCIN 1 G: 20 OINTMENT TOPICAL at 08:03

## 2021-03-16 RX ADMIN — PHENYLEPHRINE HYDROCHLORIDE 100 MCG: 10 INJECTION INTRAVENOUS at 10:03

## 2021-03-16 RX ADMIN — CLINDAMYCIN PHOSPHATE 900 MG: 18 INJECTION, SOLUTION INTRAVENOUS at 09:03

## 2021-03-16 RX ADMIN — DEXAMETHASONE SODIUM PHOSPHATE 4 MG: 4 INJECTION, SOLUTION INTRA-ARTICULAR; INTRALESIONAL; INTRAMUSCULAR; INTRAVENOUS; SOFT TISSUE at 09:03

## 2021-03-16 RX ADMIN — ACETAMINOPHEN 650 MG: 325 TABLET ORAL at 09:03

## 2021-03-16 RX ADMIN — KETOROLAC TROMETHAMINE: 30 INJECTION, SOLUTION INTRAMUSCULAR; INTRAVENOUS at 09:03

## 2021-03-16 RX ADMIN — SODIUM CHLORIDE, SODIUM GLUCONATE, SODIUM ACETATE, POTASSIUM CHLORIDE, MAGNESIUM CHLORIDE, SODIUM PHOSPHATE, DIBASIC, AND POTASSIUM PHOSPHATE: .53; .5; .37; .037; .03; .012; .00082 INJECTION, SOLUTION INTRAVENOUS at 08:03

## 2021-03-16 RX ADMIN — PHENYLEPHRINE HYDROCHLORIDE 200 MCG: 10 INJECTION INTRAVENOUS at 10:03

## 2021-03-16 RX ADMIN — FENTANYL CITRATE 50 MCG: 50 INJECTION, SOLUTION INTRAMUSCULAR; INTRAVENOUS at 08:03

## 2021-03-16 RX ADMIN — PREGABALIN 75 MG: 75 CAPSULE ORAL at 08:03

## 2021-03-17 ENCOUNTER — TELEPHONE (OUTPATIENT)
Dept: ORTHOPEDICS | Facility: CLINIC | Age: 71
End: 2021-03-17

## 2021-03-17 VITALS
HEIGHT: 61 IN | HEART RATE: 72 BPM | OXYGEN SATURATION: 94 % | DIASTOLIC BLOOD PRESSURE: 71 MMHG | RESPIRATION RATE: 18 BRPM | BODY MASS INDEX: 44.37 KG/M2 | SYSTOLIC BLOOD PRESSURE: 157 MMHG | WEIGHT: 235 LBS | TEMPERATURE: 99 F

## 2021-03-17 LAB
ANION GAP SERPL CALC-SCNC: 8 MMOL/L (ref 8–16)
BASOPHILS # BLD AUTO: 0.02 K/UL (ref 0–0.2)
BASOPHILS NFR BLD: 0.2 % (ref 0–1.9)
BUN SERPL-MCNC: 10 MG/DL (ref 8–23)
CALCIUM SERPL-MCNC: 7.8 MG/DL (ref 8.7–10.5)
CHLORIDE SERPL-SCNC: 107 MMOL/L (ref 95–110)
CO2 SERPL-SCNC: 27 MMOL/L (ref 23–29)
CREAT SERPL-MCNC: 0.8 MG/DL (ref 0.5–1.4)
DIFFERENTIAL METHOD: ABNORMAL
EOSINOPHIL # BLD AUTO: 0 K/UL (ref 0–0.5)
EOSINOPHIL NFR BLD: 0 % (ref 0–8)
ERYTHROCYTE [DISTWIDTH] IN BLOOD BY AUTOMATED COUNT: 14.3 % (ref 11.5–14.5)
EST. GFR  (AFRICAN AMERICAN): >60 ML/MIN/1.73 M^2
EST. GFR  (NON AFRICAN AMERICAN): >60 ML/MIN/1.73 M^2
ESTIMATED AVG GLUCOSE: 143 MG/DL (ref 68–131)
GLUCOSE SERPL-MCNC: 170 MG/DL (ref 70–110)
HBA1C MFR BLD: 6.6 % (ref 4–5.6)
HCT VFR BLD AUTO: 33.4 % (ref 37–48.5)
HGB BLD-MCNC: 10.5 G/DL (ref 12–16)
IMM GRANULOCYTES # BLD AUTO: 0.06 K/UL (ref 0–0.04)
IMM GRANULOCYTES NFR BLD AUTO: 0.6 % (ref 0–0.5)
LYMPHOCYTES # BLD AUTO: 1.5 K/UL (ref 1–4.8)
LYMPHOCYTES NFR BLD: 15.6 % (ref 18–48)
MCH RBC QN AUTO: 29.2 PG (ref 27–31)
MCHC RBC AUTO-ENTMCNC: 31.4 G/DL (ref 32–36)
MCV RBC AUTO: 93 FL (ref 82–98)
MONOCYTES # BLD AUTO: 0.9 K/UL (ref 0.3–1)
MONOCYTES NFR BLD: 8.7 % (ref 4–15)
NEUTROPHILS # BLD AUTO: 7.4 K/UL (ref 1.8–7.7)
NEUTROPHILS NFR BLD: 74.9 % (ref 38–73)
NRBC BLD-RTO: 0 /100 WBC
PLATELET # BLD AUTO: 227 K/UL (ref 150–350)
PMV BLD AUTO: 9 FL (ref 9.2–12.9)
POTASSIUM SERPL-SCNC: 3.8 MMOL/L (ref 3.5–5.1)
RBC # BLD AUTO: 3.6 M/UL (ref 4–5.4)
SODIUM SERPL-SCNC: 142 MMOL/L (ref 136–145)
WBC # BLD AUTO: 9.88 K/UL (ref 3.9–12.7)

## 2021-03-17 PROCEDURE — 97530 THERAPEUTIC ACTIVITIES: CPT

## 2021-03-17 PROCEDURE — 85025 COMPLETE CBC W/AUTO DIFF WBC: CPT | Performed by: ORTHOPAEDIC SURGERY

## 2021-03-17 PROCEDURE — 97116 GAIT TRAINING THERAPY: CPT

## 2021-03-17 PROCEDURE — 25000003 PHARM REV CODE 250: Performed by: HOSPITALIST

## 2021-03-17 PROCEDURE — 25000003 PHARM REV CODE 250: Performed by: ANESTHESIOLOGY

## 2021-03-17 PROCEDURE — 27000221 HC OXYGEN, UP TO 24 HOURS

## 2021-03-17 PROCEDURE — 97110 THERAPEUTIC EXERCISES: CPT

## 2021-03-17 PROCEDURE — 36415 COLL VENOUS BLD VENIPUNCTURE: CPT | Performed by: ORTHOPAEDIC SURGERY

## 2021-03-17 PROCEDURE — 25000003 PHARM REV CODE 250: Performed by: ORTHOPAEDIC SURGERY

## 2021-03-17 PROCEDURE — 97165 OT EVAL LOW COMPLEX 30 MIN: CPT

## 2021-03-17 PROCEDURE — 94761 N-INVAS EAR/PLS OXIMETRY MLT: CPT

## 2021-03-17 PROCEDURE — 80048 BASIC METABOLIC PNL TOTAL CA: CPT | Performed by: ORTHOPAEDIC SURGERY

## 2021-03-17 PROCEDURE — 97535 SELF CARE MNGMENT TRAINING: CPT

## 2021-03-17 PROCEDURE — 94799 UNLISTED PULMONARY SVC/PX: CPT

## 2021-03-17 RX ORDER — OXYCODONE AND ACETAMINOPHEN 5; 325 MG/1; MG/1
1 TABLET ORAL EVERY 6 HOURS PRN
Qty: 28 TABLET | Refills: 0 | Status: SHIPPED | OUTPATIENT
Start: 2021-03-17 | End: 2021-03-29 | Stop reason: SDUPTHER

## 2021-03-17 RX ADMIN — LEVOTHYROXINE SODIUM 88 MCG: 0.09 TABLET ORAL at 05:03

## 2021-03-17 RX ADMIN — DOCUSATE SODIUM 100 MG: 100 CAPSULE, LIQUID FILLED ORAL at 08:03

## 2021-03-17 RX ADMIN — PREGABALIN 25 MG: 25 CAPSULE ORAL at 02:03

## 2021-03-17 RX ADMIN — METOPROLOL SUCCINATE 50 MG: 50 TABLET, EXTENDED RELEASE ORAL at 08:03

## 2021-03-17 RX ADMIN — FAMOTIDINE 20 MG: 20 TABLET ORAL at 08:03

## 2021-03-17 RX ADMIN — ACETAMINOPHEN 650 MG: 325 TABLET ORAL at 02:03

## 2021-03-17 RX ADMIN — AMLODIPINE BESYLATE 5 MG: 5 TABLET ORAL at 08:03

## 2021-03-17 RX ADMIN — HYDROCHLOROTHIAZIDE 25 MG: 25 TABLET ORAL at 08:03

## 2021-03-17 RX ADMIN — CLINDAMYCIN IN 5 PERCENT DEXTROSE 900 MG: 18 INJECTION, SOLUTION INTRAVENOUS at 01:03

## 2021-03-17 RX ADMIN — CELECOXIB 100 MG: 100 CAPSULE ORAL at 08:03

## 2021-03-17 RX ADMIN — ACETAMINOPHEN 650 MG: 325 TABLET ORAL at 10:03

## 2021-03-17 RX ADMIN — OXYCODONE 5 MG: 5 TABLET ORAL at 10:03

## 2021-03-17 RX ADMIN — PREGABALIN 25 MG: 25 CAPSULE ORAL at 10:03

## 2021-03-17 RX ADMIN — VENLAFAXINE HYDROCHLORIDE 150 MG: 150 CAPSULE, EXTENDED RELEASE ORAL at 08:03

## 2021-03-17 RX ADMIN — LOSARTAN POTASSIUM 50 MG: 25 TABLET, FILM COATED ORAL at 08:03

## 2021-03-17 RX ADMIN — SODIUM CHLORIDE 75 ML/HR: 0.9 INJECTION, SOLUTION INTRAVENOUS at 10:03

## 2021-03-17 RX ADMIN — MUPIROCIN 1 G: 20 OINTMENT TOPICAL at 08:03

## 2021-03-17 RX ADMIN — ACETAMINOPHEN 650 MG: 325 TABLET ORAL at 01:03

## 2021-03-17 RX ADMIN — ASPIRIN 81 MG CHEWABLE TABLET 81 MG: 81 TABLET CHEWABLE at 08:03

## 2021-03-17 RX ADMIN — ACETAMINOPHEN 650 MG: 325 TABLET ORAL at 05:03

## 2021-03-18 ENCOUNTER — TELEPHONE (OUTPATIENT)
Dept: ORTHOPEDICS | Facility: CLINIC | Age: 71
End: 2021-03-18

## 2021-03-18 ENCOUNTER — TELEPHONE (OUTPATIENT)
Dept: MEDSURG UNIT | Facility: HOSPITAL | Age: 71
End: 2021-03-18

## 2021-03-18 PROCEDURE — G0180 PR HOME HEALTH MD CERTIFICATION: ICD-10-PCS | Mod: ,,, | Performed by: ORTHOPAEDIC SURGERY

## 2021-03-18 PROCEDURE — G0180 MD CERTIFICATION HHA PATIENT: HCPCS | Mod: ,,, | Performed by: ORTHOPAEDIC SURGERY

## 2021-03-19 LAB
BLD PROD TYP BPU: NORMAL
BLD PROD TYP BPU: NORMAL
BLOOD UNIT EXPIRATION DATE: NORMAL
BLOOD UNIT EXPIRATION DATE: NORMAL
BLOOD UNIT TYPE CODE: 6200
BLOOD UNIT TYPE CODE: 6200
BLOOD UNIT TYPE: NORMAL
BLOOD UNIT TYPE: NORMAL
CODING SYSTEM: NORMAL
CODING SYSTEM: NORMAL
DISPENSE STATUS: NORMAL
DISPENSE STATUS: NORMAL
NUM UNITS TRANS PACKED RBC: NORMAL
NUM UNITS TRANS PACKED RBC: NORMAL

## 2021-03-22 ENCOUNTER — HOSPITAL ENCOUNTER (EMERGENCY)
Facility: HOSPITAL | Age: 71
Discharge: HOME | End: 2021-03-22
Attending: EMERGENCY MEDICINE
Payer: MEDICARE

## 2021-03-22 ENCOUNTER — TELEPHONE (OUTPATIENT)
Dept: ORTHOPEDICS | Facility: CLINIC | Age: 71
End: 2021-03-22

## 2021-03-22 VITALS
WEIGHT: 235 LBS | HEART RATE: 75 BPM | HEIGHT: 61 IN | SYSTOLIC BLOOD PRESSURE: 136 MMHG | OXYGEN SATURATION: 96 % | DIASTOLIC BLOOD PRESSURE: 65 MMHG | TEMPERATURE: 99 F | BODY MASS INDEX: 44.37 KG/M2 | RESPIRATION RATE: 20 BRPM

## 2021-03-22 DIAGNOSIS — M79.89 LEG SWELLING: ICD-10-CM

## 2021-03-22 DIAGNOSIS — L03.116 CELLULITIS OF LEFT LEG: Primary | ICD-10-CM

## 2021-03-22 DIAGNOSIS — R05.9 COUGH: ICD-10-CM

## 2021-03-22 DIAGNOSIS — R09.02 HYPOXIA: ICD-10-CM

## 2021-03-22 LAB
ALBUMIN SERPL BCP-MCNC: 3.7 G/DL (ref 3.5–5.2)
ALP SERPL-CCNC: 93 U/L (ref 55–135)
ALT SERPL W/O P-5'-P-CCNC: 24 U/L (ref 10–44)
ANION GAP SERPL CALC-SCNC: 13 MMOL/L (ref 8–16)
AST SERPL-CCNC: 26 U/L (ref 10–40)
BASOPHILS # BLD AUTO: 0.07 K/UL (ref 0–0.2)
BASOPHILS NFR BLD: 0.6 % (ref 0–1.9)
BILIRUB SERPL-MCNC: 0.9 MG/DL (ref 0.1–1)
BNP SERPL-MCNC: 17 PG/ML (ref 0–99)
BUN SERPL-MCNC: 25 MG/DL (ref 8–23)
CALCIUM SERPL-MCNC: 9.4 MG/DL (ref 8.7–10.5)
CHLORIDE SERPL-SCNC: 97 MMOL/L (ref 95–110)
CO2 SERPL-SCNC: 31 MMOL/L (ref 23–29)
CREAT SERPL-MCNC: 0.9 MG/DL (ref 0.5–1.4)
DIFFERENTIAL METHOD: ABNORMAL
EOSINOPHIL # BLD AUTO: 0.3 K/UL (ref 0–0.5)
EOSINOPHIL NFR BLD: 3.1 % (ref 0–8)
ERYTHROCYTE [DISTWIDTH] IN BLOOD BY AUTOMATED COUNT: 14.2 % (ref 11.5–14.5)
EST. GFR  (AFRICAN AMERICAN): >60 ML/MIN/1.73 M^2
EST. GFR  (NON AFRICAN AMERICAN): >60 ML/MIN/1.73 M^2
GLUCOSE SERPL-MCNC: 133 MG/DL (ref 70–110)
HCT VFR BLD AUTO: 39 % (ref 37–48.5)
HGB BLD-MCNC: 12.2 G/DL (ref 12–16)
IMM GRANULOCYTES # BLD AUTO: 0.08 K/UL (ref 0–0.04)
IMM GRANULOCYTES NFR BLD AUTO: 0.7 % (ref 0–0.5)
LYMPHOCYTES # BLD AUTO: 2.4 K/UL (ref 1–4.8)
LYMPHOCYTES NFR BLD: 21.7 % (ref 18–48)
MCH RBC QN AUTO: 29 PG (ref 27–31)
MCHC RBC AUTO-ENTMCNC: 31.3 G/DL (ref 32–36)
MCV RBC AUTO: 93 FL (ref 82–98)
MONOCYTES # BLD AUTO: 1 K/UL (ref 0.3–1)
MONOCYTES NFR BLD: 8.6 % (ref 4–15)
NEUTROPHILS # BLD AUTO: 7.3 K/UL (ref 1.8–7.7)
NEUTROPHILS NFR BLD: 65.3 % (ref 38–73)
NRBC BLD-RTO: 0 /100 WBC
PLATELET # BLD AUTO: 331 K/UL (ref 150–350)
PLATELET BLD QL SMEAR: ABNORMAL
PMV BLD AUTO: 8.7 FL (ref 9.2–12.9)
POTASSIUM SERPL-SCNC: 4.5 MMOL/L (ref 3.5–5.1)
PROT SERPL-MCNC: 7.1 G/DL (ref 6–8.4)
RBC # BLD AUTO: 4.2 M/UL (ref 4–5.4)
SARS-COV-2 RDRP RESP QL NAA+PROBE: NEGATIVE
SODIUM SERPL-SCNC: 141 MMOL/L (ref 136–145)
WBC # BLD AUTO: 11.1 K/UL (ref 3.9–12.7)

## 2021-03-22 PROCEDURE — 80053 COMPREHEN METABOLIC PANEL: CPT | Performed by: EMERGENCY MEDICINE

## 2021-03-22 PROCEDURE — 93005 ELECTROCARDIOGRAM TRACING: CPT

## 2021-03-22 PROCEDURE — 93010 ELECTROCARDIOGRAM REPORT: CPT | Mod: ,,, | Performed by: SPECIALIST

## 2021-03-22 PROCEDURE — 36415 COLL VENOUS BLD VENIPUNCTURE: CPT | Performed by: EMERGENCY MEDICINE

## 2021-03-22 PROCEDURE — 25000003 PHARM REV CODE 250: Performed by: EMERGENCY MEDICINE

## 2021-03-22 PROCEDURE — 99285 EMERGENCY DEPT VISIT HI MDM: CPT | Mod: 25

## 2021-03-22 PROCEDURE — U0002 COVID-19 LAB TEST NON-CDC: HCPCS | Performed by: EMERGENCY MEDICINE

## 2021-03-22 PROCEDURE — 85025 COMPLETE CBC W/AUTO DIFF WBC: CPT | Performed by: EMERGENCY MEDICINE

## 2021-03-22 PROCEDURE — 93010 EKG 12-LEAD: ICD-10-PCS | Mod: ,,, | Performed by: SPECIALIST

## 2021-03-22 PROCEDURE — 83880 ASSAY OF NATRIURETIC PEPTIDE: CPT | Performed by: EMERGENCY MEDICINE

## 2021-03-22 RX ORDER — DOXYCYCLINE HYCLATE 100 MG
100 TABLET ORAL
Status: COMPLETED | OUTPATIENT
Start: 2021-03-22 | End: 2021-03-22

## 2021-03-22 RX ORDER — DOXYCYCLINE 100 MG/1
100 CAPSULE ORAL 2 TIMES DAILY
Qty: 20 CAPSULE | Refills: 0 | Status: SHIPPED | OUTPATIENT
Start: 2021-03-22 | End: 2021-04-01

## 2021-03-22 RX ADMIN — DOXYCYCLINE HYCLATE 100 MG: 100 TABLET, FILM COATED ORAL at 02:03

## 2021-03-23 ENCOUNTER — PES CALL (OUTPATIENT)
Dept: ADMINISTRATIVE | Facility: CLINIC | Age: 71
End: 2021-03-23

## 2021-03-24 ENCOUNTER — TELEPHONE (OUTPATIENT)
Dept: FAMILY MEDICINE | Facility: CLINIC | Age: 71
End: 2021-03-24

## 2021-03-24 DIAGNOSIS — M17.12 PRIMARY OSTEOARTHRITIS OF LEFT KNEE: Primary | ICD-10-CM

## 2021-03-25 ENCOUNTER — OFFICE VISIT (OUTPATIENT)
Dept: ORTHOPEDICS | Facility: CLINIC | Age: 71
End: 2021-03-25
Payer: MEDICARE

## 2021-03-25 ENCOUNTER — HOSPITAL ENCOUNTER (OUTPATIENT)
Dept: RADIOLOGY | Facility: HOSPITAL | Age: 71
Discharge: HOME | End: 2021-03-25
Attending: ORTHOPAEDIC SURGERY
Payer: MEDICARE

## 2021-03-25 VITALS — HEIGHT: 61 IN | RESPIRATION RATE: 18 BRPM | BODY MASS INDEX: 44.37 KG/M2 | WEIGHT: 235 LBS

## 2021-03-25 DIAGNOSIS — L03.116 CELLULITIS OF LEFT LEG: Primary | ICD-10-CM

## 2021-03-25 DIAGNOSIS — M17.12 PRIMARY OSTEOARTHRITIS OF LEFT KNEE: ICD-10-CM

## 2021-03-25 DIAGNOSIS — Z96.652 STATUS POST TOTAL KNEE REPLACEMENT NOT USING CEMENT, LEFT: ICD-10-CM

## 2021-03-25 DIAGNOSIS — Z96.652 STATUS POST LEFT KNEE REPLACEMENT: Primary | ICD-10-CM

## 2021-03-25 PROCEDURE — 99024 PR POST-OP FOLLOW-UP VISIT: ICD-10-PCS | Mod: S$GLB,,, | Performed by: ORTHOPAEDIC SURGERY

## 2021-03-25 PROCEDURE — 3008F PR BODY MASS INDEX (BMI) DOCUMENTED: ICD-10-PCS | Mod: CPTII,S$GLB,, | Performed by: ORTHOPAEDIC SURGERY

## 2021-03-25 PROCEDURE — 3288F FALL RISK ASSESSMENT DOCD: CPT | Mod: CPTII,S$GLB,, | Performed by: ORTHOPAEDIC SURGERY

## 2021-03-25 PROCEDURE — 99999 PR PBB SHADOW E&M-EST. PATIENT-LVL V: CPT | Mod: PBBFAC,,, | Performed by: ORTHOPAEDIC SURGERY

## 2021-03-25 PROCEDURE — 99999 PR PBB SHADOW E&M-EST. PATIENT-LVL V: ICD-10-PCS | Mod: PBBFAC,,, | Performed by: ORTHOPAEDIC SURGERY

## 2021-03-25 PROCEDURE — 1101F PT FALLS ASSESS-DOCD LE1/YR: CPT | Mod: CPTII,S$GLB,, | Performed by: ORTHOPAEDIC SURGERY

## 2021-03-25 PROCEDURE — 73560 XR KNEE 1 OR 2 VIEW LEFT: ICD-10-PCS | Mod: 26,LT,, | Performed by: RADIOLOGY

## 2021-03-25 PROCEDURE — 1101F PR PT FALLS ASSESS DOC 0-1 FALLS W/OUT INJ PAST YR: ICD-10-PCS | Mod: CPTII,S$GLB,, | Performed by: ORTHOPAEDIC SURGERY

## 2021-03-25 PROCEDURE — 99024 POSTOP FOLLOW-UP VISIT: CPT | Mod: S$GLB,,, | Performed by: ORTHOPAEDIC SURGERY

## 2021-03-25 PROCEDURE — 3288F PR FALLS RISK ASSESSMENT DOCUMENTED: ICD-10-PCS | Mod: CPTII,S$GLB,, | Performed by: ORTHOPAEDIC SURGERY

## 2021-03-25 PROCEDURE — 1126F PR PAIN SEVERITY QUANTIFIED, NO PAIN PRESENT: ICD-10-PCS | Mod: S$GLB,,, | Performed by: ORTHOPAEDIC SURGERY

## 2021-03-25 PROCEDURE — 73560 X-RAY EXAM OF KNEE 1 OR 2: CPT | Mod: 26,LT,, | Performed by: RADIOLOGY

## 2021-03-25 PROCEDURE — 3008F BODY MASS INDEX DOCD: CPT | Mod: CPTII,S$GLB,, | Performed by: ORTHOPAEDIC SURGERY

## 2021-03-25 PROCEDURE — 1126F AMNT PAIN NOTED NONE PRSNT: CPT | Mod: S$GLB,,, | Performed by: ORTHOPAEDIC SURGERY

## 2021-03-25 PROCEDURE — 73560 X-RAY EXAM OF KNEE 1 OR 2: CPT | Mod: TC,PN,LT

## 2021-03-25 RX ORDER — CLINDAMYCIN HYDROCHLORIDE 300 MG/1
300 CAPSULE ORAL 3 TIMES DAILY
Qty: 63 CAPSULE | Refills: 0 | Status: SHIPPED | OUTPATIENT
Start: 2021-03-25 | End: 2021-04-15

## 2021-03-26 ENCOUNTER — TELEPHONE (OUTPATIENT)
Dept: ORTHOPEDICS | Facility: CLINIC | Age: 71
End: 2021-03-26

## 2021-03-26 ENCOUNTER — PATIENT OUTREACH (OUTPATIENT)
Dept: EMERGENCY MEDICINE | Facility: HOSPITAL | Age: 71
End: 2021-03-26

## 2021-03-26 DIAGNOSIS — Z96.652 STATUS POST TOTAL KNEE REPLACEMENT NOT USING CEMENT, LEFT: Primary | ICD-10-CM

## 2021-03-29 ENCOUNTER — TELEPHONE (OUTPATIENT)
Dept: FAMILY MEDICINE | Facility: CLINIC | Age: 71
End: 2021-03-29

## 2021-03-29 ENCOUNTER — OFFICE VISIT (OUTPATIENT)
Dept: ORTHOPEDICS | Facility: CLINIC | Age: 71
End: 2021-03-29
Payer: MEDICARE

## 2021-03-29 DIAGNOSIS — L03.116 CELLULITIS OF LEFT LEG: Primary | ICD-10-CM

## 2021-03-29 DIAGNOSIS — Z96.652 STATUS POST TOTAL KNEE REPLACEMENT NOT USING CEMENT, LEFT: Primary | ICD-10-CM

## 2021-03-29 DIAGNOSIS — Z96.652 STATUS POST TOTAL KNEE REPLACEMENT NOT USING CEMENT, LEFT: ICD-10-CM

## 2021-03-29 PROCEDURE — 99024 POSTOP FOLLOW-UP VISIT: CPT | Mod: S$GLB,,, | Performed by: ORTHOPAEDIC SURGERY

## 2021-03-29 PROCEDURE — 99999 PR PBB SHADOW E&M-EST. PATIENT-LVL II: CPT | Mod: PBBFAC,,, | Performed by: ORTHOPAEDIC SURGERY

## 2021-03-29 PROCEDURE — 99024 PR POST-OP FOLLOW-UP VISIT: ICD-10-PCS | Mod: S$GLB,,, | Performed by: ORTHOPAEDIC SURGERY

## 2021-03-29 PROCEDURE — 99999 PR PBB SHADOW E&M-EST. PATIENT-LVL II: ICD-10-PCS | Mod: PBBFAC,,, | Performed by: ORTHOPAEDIC SURGERY

## 2021-03-29 RX ORDER — FUROSEMIDE 20 MG/1
TABLET ORAL
Qty: 90 TABLET | Refills: 0 | Status: SHIPPED | OUTPATIENT
Start: 2021-03-29 | End: 2021-06-18 | Stop reason: SDUPTHER

## 2021-03-29 RX ORDER — FUROSEMIDE 20 MG/1
20 TABLET ORAL DAILY
Qty: 30 TABLET | Refills: 0 | Status: SHIPPED | OUTPATIENT
Start: 2021-03-29 | End: 2021-03-29

## 2021-03-30 RX ORDER — OXYCODONE AND ACETAMINOPHEN 5; 325 MG/1; MG/1
1 TABLET ORAL EVERY 6 HOURS PRN
Qty: 28 TABLET | Refills: 0 | Status: SHIPPED | OUTPATIENT
Start: 2021-03-30 | End: 2021-04-22 | Stop reason: SDUPTHER

## 2021-04-01 ENCOUNTER — EXTERNAL HOME HEALTH (OUTPATIENT)
Dept: HOME HEALTH SERVICES | Facility: HOSPITAL | Age: 71
End: 2021-04-01
Payer: MEDICARE

## 2021-04-08 ENCOUNTER — DOCUMENT SCAN (OUTPATIENT)
Dept: HOME HEALTH SERVICES | Facility: HOSPITAL | Age: 71
End: 2021-04-08
Payer: MEDICARE

## 2021-04-20 ENCOUNTER — TELEPHONE (OUTPATIENT)
Dept: FAMILY MEDICINE | Facility: CLINIC | Age: 71
End: 2021-04-20

## 2021-04-22 ENCOUNTER — OFFICE VISIT (OUTPATIENT)
Dept: ORTHOPEDICS | Facility: CLINIC | Age: 71
End: 2021-04-22
Payer: MEDICARE

## 2021-04-22 VITALS — BODY MASS INDEX: 44.37 KG/M2 | WEIGHT: 235 LBS | HEIGHT: 61 IN | RESPIRATION RATE: 17 BRPM

## 2021-04-22 DIAGNOSIS — Z96.652 STATUS POST TOTAL KNEE REPLACEMENT NOT USING CEMENT, LEFT: ICD-10-CM

## 2021-04-22 DIAGNOSIS — Z96.652 STATUS POST TOTAL KNEE REPLACEMENT NOT USING CEMENT, LEFT: Primary | ICD-10-CM

## 2021-04-22 PROCEDURE — 3288F PR FALLS RISK ASSESSMENT DOCUMENTED: ICD-10-PCS | Mod: CPTII,S$GLB,, | Performed by: ORTHOPAEDIC SURGERY

## 2021-04-22 PROCEDURE — 3008F PR BODY MASS INDEX (BMI) DOCUMENTED: ICD-10-PCS | Mod: CPTII,S$GLB,, | Performed by: ORTHOPAEDIC SURGERY

## 2021-04-22 PROCEDURE — 1101F PR PT FALLS ASSESS DOC 0-1 FALLS W/OUT INJ PAST YR: ICD-10-PCS | Mod: CPTII,S$GLB,, | Performed by: ORTHOPAEDIC SURGERY

## 2021-04-22 PROCEDURE — 1125F PR PAIN SEVERITY QUANTIFIED, PAIN PRESENT: ICD-10-PCS | Mod: S$GLB,,, | Performed by: ORTHOPAEDIC SURGERY

## 2021-04-22 PROCEDURE — 3008F BODY MASS INDEX DOCD: CPT | Mod: CPTII,S$GLB,, | Performed by: ORTHOPAEDIC SURGERY

## 2021-04-22 PROCEDURE — 1101F PT FALLS ASSESS-DOCD LE1/YR: CPT | Mod: CPTII,S$GLB,, | Performed by: ORTHOPAEDIC SURGERY

## 2021-04-22 PROCEDURE — 99024 POSTOP FOLLOW-UP VISIT: CPT | Mod: S$GLB,,, | Performed by: ORTHOPAEDIC SURGERY

## 2021-04-22 PROCEDURE — 1125F AMNT PAIN NOTED PAIN PRSNT: CPT | Mod: S$GLB,,, | Performed by: ORTHOPAEDIC SURGERY

## 2021-04-22 PROCEDURE — 3288F FALL RISK ASSESSMENT DOCD: CPT | Mod: CPTII,S$GLB,, | Performed by: ORTHOPAEDIC SURGERY

## 2021-04-22 PROCEDURE — 99999 PR PBB SHADOW E&M-EST. PATIENT-LVL V: CPT | Mod: PBBFAC,,, | Performed by: ORTHOPAEDIC SURGERY

## 2021-04-22 PROCEDURE — 99999 PR PBB SHADOW E&M-EST. PATIENT-LVL V: ICD-10-PCS | Mod: PBBFAC,,, | Performed by: ORTHOPAEDIC SURGERY

## 2021-04-22 PROCEDURE — 99024 PR POST-OP FOLLOW-UP VISIT: ICD-10-PCS | Mod: S$GLB,,, | Performed by: ORTHOPAEDIC SURGERY

## 2021-04-22 RX ORDER — OXYCODONE AND ACETAMINOPHEN 5; 325 MG/1; MG/1
1 TABLET ORAL EVERY 6 HOURS PRN
Qty: 28 TABLET | Refills: 0 | Status: SHIPPED | OUTPATIENT
Start: 2021-04-22 | End: 2021-06-15 | Stop reason: SDUPTHER

## 2021-04-23 ENCOUNTER — TELEPHONE (OUTPATIENT)
Dept: ORTHOPEDICS | Facility: CLINIC | Age: 71
End: 2021-04-23

## 2021-04-28 ENCOUNTER — TELEPHONE (OUTPATIENT)
Dept: FAMILY MEDICINE | Facility: CLINIC | Age: 71
End: 2021-04-28

## 2021-04-29 ENCOUNTER — TELEPHONE (OUTPATIENT)
Dept: ORTHOPEDICS | Facility: CLINIC | Age: 71
End: 2021-04-29

## 2021-04-30 ENCOUNTER — TELEPHONE (OUTPATIENT)
Dept: ORTHOPEDICS | Facility: CLINIC | Age: 71
End: 2021-04-30

## 2021-05-02 NOTE — PROCEDURES
Large Joint Aspiration/Injection: L subacromial bursa    Date/Time: 8/6/2020 11:00 AM  Performed by: Rio Pitts MD  Authorized by: Rio Pitts MD     Consent Done?:  Yes (Verbal)  Indications:  Pain  Site marked: the procedure site was marked    Timeout: prior to procedure the correct patient, procedure, and site was verified    Local anesthetic:  Lidocaine 1% without epinephrine and bupivacaine 0.25% without epinephrine  Anesthetic total (ml):  6      Details:  Needle Size:  20 G  Ultrasonic Guidance for needle placement?: No    Approach:  Posterior  Location:  Shoulder  Site:  L subacromial bursa  Medications:  40 mg triamcinolone acetonide 40 mg/mL  Patient tolerance:  Patient tolerated the procedure well with no immediate complications       negative...

## 2021-05-10 ENCOUNTER — OFFICE VISIT (OUTPATIENT)
Dept: FAMILY MEDICINE | Facility: CLINIC | Age: 71
End: 2021-05-10
Payer: MEDICARE

## 2021-05-10 VITALS
DIASTOLIC BLOOD PRESSURE: 70 MMHG | SYSTOLIC BLOOD PRESSURE: 118 MMHG | WEIGHT: 242 LBS | HEART RATE: 57 BPM | HEIGHT: 61 IN | BODY MASS INDEX: 45.69 KG/M2

## 2021-05-10 DIAGNOSIS — M79.7 FIBROMYALGIA: ICD-10-CM

## 2021-05-10 DIAGNOSIS — Z96.652 HISTORY OF TOTAL LEFT KNEE REPLACEMENT: ICD-10-CM

## 2021-05-10 DIAGNOSIS — I50.9 HEART FAILURE, UNSPECIFIED HF CHRONICITY, UNSPECIFIED HEART FAILURE TYPE: ICD-10-CM

## 2021-05-10 DIAGNOSIS — F32.1 MODERATE MAJOR DEPRESSION, SINGLE EPISODE: ICD-10-CM

## 2021-05-10 DIAGNOSIS — M06.9 RHEUMATOID ARTHRITIS, INVOLVING UNSPECIFIED SITE, UNSPECIFIED WHETHER RHEUMATOID FACTOR PRESENT: ICD-10-CM

## 2021-05-10 DIAGNOSIS — J41.0 SIMPLE CHRONIC BRONCHITIS: ICD-10-CM

## 2021-05-10 DIAGNOSIS — E11.42 TYPE 2 DIABETES MELLITUS WITH POLYNEUROPATHY: Primary | ICD-10-CM

## 2021-05-10 DIAGNOSIS — E66.01 CLASS 3 SEVERE OBESITY WITH BODY MASS INDEX (BMI) OF 40.0 TO 44.9 IN ADULT, UNSPECIFIED OBESITY TYPE, UNSPECIFIED WHETHER SERIOUS COMORBIDITY PRESENT: ICD-10-CM

## 2021-05-10 DIAGNOSIS — I10 ESSENTIAL HYPERTENSION: ICD-10-CM

## 2021-05-10 DIAGNOSIS — E78.2 MIXED HYPERLIPIDEMIA: ICD-10-CM

## 2021-05-10 DIAGNOSIS — E11.9 DIET-CONTROLLED TYPE 2 DIABETES MELLITUS: ICD-10-CM

## 2021-05-10 DIAGNOSIS — E03.9 ACQUIRED HYPOTHYROIDISM: ICD-10-CM

## 2021-05-10 PROCEDURE — 3288F FALL RISK ASSESSMENT DOCD: CPT | Mod: S$GLB,,, | Performed by: FAMILY MEDICINE

## 2021-05-10 PROCEDURE — 3078F PR MOST RECENT DIASTOLIC BLOOD PRESSURE < 80 MM HG: ICD-10-PCS | Mod: S$GLB,,, | Performed by: FAMILY MEDICINE

## 2021-05-10 PROCEDURE — 1159F MED LIST DOCD IN RCRD: CPT | Mod: S$GLB,,, | Performed by: FAMILY MEDICINE

## 2021-05-10 PROCEDURE — 1101F PT FALLS ASSESS-DOCD LE1/YR: CPT | Mod: S$GLB,,, | Performed by: FAMILY MEDICINE

## 2021-05-10 PROCEDURE — 3008F BODY MASS INDEX DOCD: CPT | Mod: S$GLB,,, | Performed by: FAMILY MEDICINE

## 2021-05-10 PROCEDURE — 99214 PR OFFICE/OUTPT VISIT, EST, LEVL IV, 30-39 MIN: ICD-10-PCS | Mod: S$GLB,,, | Performed by: FAMILY MEDICINE

## 2021-05-10 PROCEDURE — 99214 OFFICE O/P EST MOD 30 MIN: CPT | Mod: S$GLB,,, | Performed by: FAMILY MEDICINE

## 2021-05-10 PROCEDURE — 3288F PR FALLS RISK ASSESSMENT DOCUMENTED: ICD-10-PCS | Mod: S$GLB,,, | Performed by: FAMILY MEDICINE

## 2021-05-10 PROCEDURE — 1159F PR MEDICATION LIST DOCUMENTED IN MEDICAL RECORD: ICD-10-PCS | Mod: S$GLB,,, | Performed by: FAMILY MEDICINE

## 2021-05-10 PROCEDURE — 3008F PR BODY MASS INDEX (BMI) DOCUMENTED: ICD-10-PCS | Mod: S$GLB,,, | Performed by: FAMILY MEDICINE

## 2021-05-10 PROCEDURE — 3074F SYST BP LT 130 MM HG: CPT | Mod: S$GLB,,, | Performed by: FAMILY MEDICINE

## 2021-05-10 PROCEDURE — 3074F PR MOST RECENT SYSTOLIC BLOOD PRESSURE < 130 MM HG: ICD-10-PCS | Mod: S$GLB,,, | Performed by: FAMILY MEDICINE

## 2021-05-10 PROCEDURE — 3044F HG A1C LEVEL LT 7.0%: CPT | Mod: S$GLB,,, | Performed by: FAMILY MEDICINE

## 2021-05-10 PROCEDURE — 1101F PR PT FALLS ASSESS DOC 0-1 FALLS W/OUT INJ PAST YR: ICD-10-PCS | Mod: S$GLB,,, | Performed by: FAMILY MEDICINE

## 2021-05-10 PROCEDURE — 3078F DIAST BP <80 MM HG: CPT | Mod: S$GLB,,, | Performed by: FAMILY MEDICINE

## 2021-05-10 PROCEDURE — 3044F PR MOST RECENT HEMOGLOBIN A1C LEVEL <7.0%: ICD-10-PCS | Mod: S$GLB,,, | Performed by: FAMILY MEDICINE

## 2021-05-10 RX ORDER — LOVASTATIN 40 MG/1
40 TABLET ORAL NIGHTLY
Qty: 90 TABLET | Refills: 3 | Status: SHIPPED | OUTPATIENT
Start: 2021-05-10 | End: 2022-03-15 | Stop reason: SDUPTHER

## 2021-05-10 RX ORDER — UMECLIDINIUM BROMIDE AND VILANTEROL TRIFENATATE 62.5; 25 UG/1; UG/1
1 POWDER RESPIRATORY (INHALATION) DAILY
Qty: 60 EACH | Refills: 11 | Status: SHIPPED | OUTPATIENT
Start: 2021-05-10 | End: 2022-06-23 | Stop reason: SDUPTHER

## 2021-05-10 RX ORDER — PREGABALIN 25 MG/1
25 CAPSULE ORAL 3 TIMES DAILY
Qty: 270 CAPSULE | Refills: 1 | Status: SHIPPED | OUTPATIENT
Start: 2021-05-10 | End: 2021-09-15 | Stop reason: SDUPTHER

## 2021-05-10 RX ORDER — METOPROLOL SUCCINATE 50 MG/1
50 TABLET, EXTENDED RELEASE ORAL DAILY
Qty: 90 TABLET | Refills: 3 | Status: SHIPPED | OUTPATIENT
Start: 2021-05-10 | End: 2022-03-15 | Stop reason: SDUPTHER

## 2021-05-10 RX ORDER — VENLAFAXINE HYDROCHLORIDE 150 MG/1
150 CAPSULE, EXTENDED RELEASE ORAL DAILY
Qty: 90 CAPSULE | Refills: 3 | Status: SHIPPED | OUTPATIENT
Start: 2021-05-10 | End: 2022-06-09 | Stop reason: SDUPTHER

## 2021-05-10 RX ORDER — AMLODIPINE BESYLATE 5 MG/1
5 TABLET ORAL DAILY
Qty: 90 TABLET | Refills: 3 | Status: SHIPPED | OUTPATIENT
Start: 2021-05-10 | End: 2022-03-15 | Stop reason: SDUPTHER

## 2021-05-10 RX ORDER — LEVOTHYROXINE SODIUM 88 UG/1
88 TABLET ORAL DAILY
Qty: 90 TABLET | Refills: 3 | Status: SHIPPED | OUTPATIENT
Start: 2021-05-10 | End: 2022-03-15 | Stop reason: SDUPTHER

## 2021-05-10 RX ORDER — LOSARTAN POTASSIUM 50 MG/1
50 TABLET ORAL DAILY
Qty: 90 TABLET | Refills: 1 | Status: SHIPPED | OUTPATIENT
Start: 2021-05-10 | End: 2021-09-15 | Stop reason: SDUPTHER

## 2021-05-17 ENCOUNTER — TELEPHONE (OUTPATIENT)
Dept: FAMILY MEDICINE | Facility: CLINIC | Age: 71
End: 2021-05-17

## 2021-05-17 DIAGNOSIS — I89.0 LYMPHEDEMA OF BOTH LOWER EXTREMITIES: Primary | ICD-10-CM

## 2021-05-27 ENCOUNTER — TELEPHONE (OUTPATIENT)
Dept: FAMILY MEDICINE | Facility: CLINIC | Age: 71
End: 2021-05-27

## 2021-05-28 ENCOUNTER — TELEPHONE (OUTPATIENT)
Dept: FAMILY MEDICINE | Facility: CLINIC | Age: 71
End: 2021-05-28

## 2021-06-04 DIAGNOSIS — Z96.652 STATUS POST TOTAL KNEE REPLACEMENT NOT USING CEMENT, LEFT: Primary | ICD-10-CM

## 2021-06-08 ENCOUNTER — TELEPHONE (OUTPATIENT)
Dept: FAMILY MEDICINE | Facility: CLINIC | Age: 71
End: 2021-06-08

## 2021-06-08 DIAGNOSIS — I89.0 LYMPHEDEMA OF BOTH LOWER EXTREMITIES: Primary | ICD-10-CM

## 2021-06-10 ENCOUNTER — HOSPITAL ENCOUNTER (OUTPATIENT)
Dept: RADIOLOGY | Facility: HOSPITAL | Age: 71
Discharge: HOME | End: 2021-06-10
Attending: ORTHOPAEDIC SURGERY
Payer: MEDICARE

## 2021-06-10 ENCOUNTER — OFFICE VISIT (OUTPATIENT)
Dept: ORTHOPEDICS | Facility: CLINIC | Age: 71
End: 2021-06-10
Payer: MEDICARE

## 2021-06-10 VITALS — WEIGHT: 242 LBS | RESPIRATION RATE: 18 BRPM | HEIGHT: 61 IN | BODY MASS INDEX: 45.69 KG/M2

## 2021-06-10 DIAGNOSIS — Z96.652 STATUS POST TOTAL KNEE REPLACEMENT NOT USING CEMENT, LEFT: Primary | ICD-10-CM

## 2021-06-10 DIAGNOSIS — Z96.652 STATUS POST TOTAL KNEE REPLACEMENT NOT USING CEMENT, LEFT: ICD-10-CM

## 2021-06-10 PROCEDURE — 73564 X-RAY EXAM KNEE 4 OR MORE: CPT | Mod: 26,LT,, | Performed by: RADIOLOGY

## 2021-06-10 PROCEDURE — 1126F AMNT PAIN NOTED NONE PRSNT: CPT | Mod: S$GLB,,, | Performed by: ORTHOPAEDIC SURGERY

## 2021-06-10 PROCEDURE — 1101F PR PT FALLS ASSESS DOC 0-1 FALLS W/OUT INJ PAST YR: ICD-10-PCS | Mod: CPTII,S$GLB,, | Performed by: ORTHOPAEDIC SURGERY

## 2021-06-10 PROCEDURE — 73564 XR KNEE ORTHO LEFT WITH FLEXION: ICD-10-PCS | Mod: 26,LT,, | Performed by: RADIOLOGY

## 2021-06-10 PROCEDURE — 99999 PR PBB SHADOW E&M-EST. PATIENT-LVL III: CPT | Mod: PBBFAC,,, | Performed by: ORTHOPAEDIC SURGERY

## 2021-06-10 PROCEDURE — 73562 X-RAY EXAM OF KNEE 3: CPT | Mod: 26,RT,, | Performed by: RADIOLOGY

## 2021-06-10 PROCEDURE — 99024 POSTOP FOLLOW-UP VISIT: CPT | Mod: S$GLB,,, | Performed by: ORTHOPAEDIC SURGERY

## 2021-06-10 PROCEDURE — 73562 XR KNEE ORTHO LEFT WITH FLEXION: ICD-10-PCS | Mod: 26,RT,, | Performed by: RADIOLOGY

## 2021-06-10 PROCEDURE — 3288F FALL RISK ASSESSMENT DOCD: CPT | Mod: CPTII,S$GLB,, | Performed by: ORTHOPAEDIC SURGERY

## 2021-06-10 PROCEDURE — 99024 PR POST-OP FOLLOW-UP VISIT: ICD-10-PCS | Mod: S$GLB,,, | Performed by: ORTHOPAEDIC SURGERY

## 2021-06-10 PROCEDURE — 1101F PT FALLS ASSESS-DOCD LE1/YR: CPT | Mod: CPTII,S$GLB,, | Performed by: ORTHOPAEDIC SURGERY

## 2021-06-10 PROCEDURE — 99999 PR PBB SHADOW E&M-EST. PATIENT-LVL III: ICD-10-PCS | Mod: PBBFAC,,, | Performed by: ORTHOPAEDIC SURGERY

## 2021-06-10 PROCEDURE — 3008F BODY MASS INDEX DOCD: CPT | Mod: CPTII,S$GLB,, | Performed by: ORTHOPAEDIC SURGERY

## 2021-06-10 PROCEDURE — 73564 X-RAY EXAM KNEE 4 OR MORE: CPT | Mod: TC,PN,LT

## 2021-06-10 PROCEDURE — 3288F PR FALLS RISK ASSESSMENT DOCUMENTED: ICD-10-PCS | Mod: CPTII,S$GLB,, | Performed by: ORTHOPAEDIC SURGERY

## 2021-06-10 PROCEDURE — 3008F PR BODY MASS INDEX (BMI) DOCUMENTED: ICD-10-PCS | Mod: CPTII,S$GLB,, | Performed by: ORTHOPAEDIC SURGERY

## 2021-06-10 PROCEDURE — 1126F PR PAIN SEVERITY QUANTIFIED, NO PAIN PRESENT: ICD-10-PCS | Mod: S$GLB,,, | Performed by: ORTHOPAEDIC SURGERY

## 2021-06-15 ENCOUNTER — CLINICAL SUPPORT (OUTPATIENT)
Dept: REHABILITATION | Facility: HOSPITAL | Age: 71
End: 2021-06-15
Payer: MEDICARE

## 2021-06-15 DIAGNOSIS — Z96.652 STATUS POST TOTAL KNEE REPLACEMENT NOT USING CEMENT, LEFT: ICD-10-CM

## 2021-06-15 DIAGNOSIS — I89.0 LYMPHEDEMA OF BOTH LOWER EXTREMITIES: ICD-10-CM

## 2021-06-15 PROCEDURE — 97166 OT EVAL MOD COMPLEX 45 MIN: CPT | Mod: PN

## 2021-06-15 RX ORDER — OXYCODONE AND ACETAMINOPHEN 5; 325 MG/1; MG/1
1 TABLET ORAL EVERY 6 HOURS PRN
Qty: 28 TABLET | Refills: 0 | Status: SHIPPED | OUTPATIENT
Start: 2021-06-15 | End: 2021-09-15

## 2021-06-18 ENCOUNTER — TELEPHONE (OUTPATIENT)
Dept: FAMILY MEDICINE | Facility: CLINIC | Age: 71
End: 2021-06-18

## 2021-06-18 DIAGNOSIS — F51.01 PRIMARY INSOMNIA: ICD-10-CM

## 2021-06-18 DIAGNOSIS — I89.0 LYMPHEDEMA OF BOTH LOWER EXTREMITIES: Primary | ICD-10-CM

## 2021-06-18 DIAGNOSIS — F41.9 ANXIETY: ICD-10-CM

## 2021-06-18 RX ORDER — AMITRIPTYLINE HYDROCHLORIDE 50 MG/1
50 TABLET, FILM COATED ORAL NIGHTLY PRN
Qty: 90 TABLET | Refills: 1 | Status: CANCELLED | OUTPATIENT
Start: 2021-06-18

## 2021-06-22 ENCOUNTER — CLINICAL SUPPORT (OUTPATIENT)
Dept: REHABILITATION | Facility: HOSPITAL | Age: 71
End: 2021-06-22
Payer: MEDICARE

## 2021-06-22 DIAGNOSIS — I89.0 LYMPHEDEMA OF BOTH LOWER EXTREMITIES: Primary | ICD-10-CM

## 2021-06-22 PROCEDURE — 97140 MANUAL THERAPY 1/> REGIONS: CPT | Mod: PN

## 2021-06-22 RX ORDER — ALPRAZOLAM 2 MG/1
2 TABLET ORAL 2 TIMES DAILY PRN
Qty: 60 TABLET | Refills: 2 | Status: SHIPPED | OUTPATIENT
Start: 2021-06-22 | End: 2021-09-15 | Stop reason: SDUPTHER

## 2021-06-22 RX ORDER — FUROSEMIDE 20 MG/1
TABLET ORAL
Qty: 90 TABLET | Refills: 0 | Status: SHIPPED | OUTPATIENT
Start: 2021-06-22 | End: 2021-09-15 | Stop reason: SDUPTHER

## 2021-06-23 ENCOUNTER — CLINICAL SUPPORT (OUTPATIENT)
Dept: REHABILITATION | Facility: HOSPITAL | Age: 71
End: 2021-06-23
Payer: MEDICARE

## 2021-06-23 DIAGNOSIS — I89.0 LYMPHEDEMA OF BOTH LOWER EXTREMITIES: Primary | ICD-10-CM

## 2021-06-23 PROCEDURE — 97140 MANUAL THERAPY 1/> REGIONS: CPT | Mod: PN

## 2021-06-24 ENCOUNTER — CLINICAL SUPPORT (OUTPATIENT)
Dept: REHABILITATION | Facility: HOSPITAL | Age: 71
End: 2021-06-24
Payer: MEDICARE

## 2021-06-24 DIAGNOSIS — I89.0 LYMPHEDEMA OF BOTH LOWER EXTREMITIES: Primary | ICD-10-CM

## 2021-06-24 PROCEDURE — 97140 MANUAL THERAPY 1/> REGIONS: CPT | Mod: PN

## 2021-06-25 ENCOUNTER — CLINICAL SUPPORT (OUTPATIENT)
Dept: REHABILITATION | Facility: HOSPITAL | Age: 71
End: 2021-06-25
Attending: FAMILY MEDICINE
Payer: MEDICARE

## 2021-06-25 DIAGNOSIS — I89.0 LYMPHEDEMA OF BOTH LOWER EXTREMITIES: Primary | ICD-10-CM

## 2021-06-25 PROCEDURE — 97140 MANUAL THERAPY 1/> REGIONS: CPT | Mod: PN

## 2021-06-28 ENCOUNTER — HOSPITAL ENCOUNTER (OUTPATIENT)
Dept: RADIOLOGY | Facility: HOSPITAL | Age: 71
Discharge: HOME | End: 2021-06-28
Attending: ORTHOPAEDIC SURGERY
Payer: MEDICARE

## 2021-06-28 ENCOUNTER — CLINICAL SUPPORT (OUTPATIENT)
Dept: REHABILITATION | Facility: HOSPITAL | Age: 71
End: 2021-06-28
Payer: MEDICARE

## 2021-06-28 ENCOUNTER — OFFICE VISIT (OUTPATIENT)
Dept: ORTHOPEDICS | Facility: CLINIC | Age: 71
End: 2021-06-28
Payer: MEDICARE

## 2021-06-28 ENCOUNTER — TELEPHONE (OUTPATIENT)
Dept: FAMILY MEDICINE | Facility: CLINIC | Age: 71
End: 2021-06-28

## 2021-06-28 VITALS — WEIGHT: 242 LBS | BODY MASS INDEX: 45.69 KG/M2 | HEIGHT: 61 IN | RESPIRATION RATE: 18 BRPM

## 2021-06-28 DIAGNOSIS — M75.100 TEAR OF ROTATOR CUFF, UNSPECIFIED LATERALITY, UNSPECIFIED TEAR EXTENT, UNSPECIFIED WHETHER TRAUMATIC: Primary | ICD-10-CM

## 2021-06-28 DIAGNOSIS — M75.100 TEAR OF ROTATOR CUFF, UNSPECIFIED LATERALITY, UNSPECIFIED TEAR EXTENT, UNSPECIFIED WHETHER TRAUMATIC: ICD-10-CM

## 2021-06-28 DIAGNOSIS — I89.0 LYMPHEDEMA OF BOTH LOWER EXTREMITIES: Primary | ICD-10-CM

## 2021-06-28 PROCEDURE — 3008F PR BODY MASS INDEX (BMI) DOCUMENTED: ICD-10-PCS | Mod: CPTII,S$GLB,, | Performed by: ORTHOPAEDIC SURGERY

## 2021-06-28 PROCEDURE — 1159F PR MEDICATION LIST DOCUMENTED IN MEDICAL RECORD: ICD-10-PCS | Mod: S$GLB,,, | Performed by: ORTHOPAEDIC SURGERY

## 2021-06-28 PROCEDURE — 99999 PR PBB SHADOW E&M-EST. PATIENT-LVL III: ICD-10-PCS | Mod: PBBFAC,,, | Performed by: ORTHOPAEDIC SURGERY

## 2021-06-28 PROCEDURE — 1125F AMNT PAIN NOTED PAIN PRSNT: CPT | Mod: S$GLB,,, | Performed by: ORTHOPAEDIC SURGERY

## 2021-06-28 PROCEDURE — 20610 DRAIN/INJ JOINT/BURSA W/O US: CPT | Mod: LT,S$GLB,, | Performed by: ORTHOPAEDIC SURGERY

## 2021-06-28 PROCEDURE — 99213 OFFICE O/P EST LOW 20 MIN: CPT | Mod: 25,S$GLB,, | Performed by: ORTHOPAEDIC SURGERY

## 2021-06-28 PROCEDURE — 1125F PR PAIN SEVERITY QUANTIFIED, PAIN PRESENT: ICD-10-PCS | Mod: S$GLB,,, | Performed by: ORTHOPAEDIC SURGERY

## 2021-06-28 PROCEDURE — 73030 XR SHOULDER TRAUMA 3 VIEW LEFT: ICD-10-PCS | Mod: 26,LT,, | Performed by: RADIOLOGY

## 2021-06-28 PROCEDURE — 73030 X-RAY EXAM OF SHOULDER: CPT | Mod: 26,LT,, | Performed by: RADIOLOGY

## 2021-06-28 PROCEDURE — 20610 LARGE JOINT ASPIRATION/INJECTION: L SUBACROMIAL BURSA: ICD-10-PCS | Mod: LT,S$GLB,, | Performed by: ORTHOPAEDIC SURGERY

## 2021-06-28 PROCEDURE — 97140 MANUAL THERAPY 1/> REGIONS: CPT | Mod: PN

## 2021-06-28 PROCEDURE — 3008F BODY MASS INDEX DOCD: CPT | Mod: CPTII,S$GLB,, | Performed by: ORTHOPAEDIC SURGERY

## 2021-06-28 PROCEDURE — 1159F MED LIST DOCD IN RCRD: CPT | Mod: S$GLB,,, | Performed by: ORTHOPAEDIC SURGERY

## 2021-06-28 PROCEDURE — 73030 X-RAY EXAM OF SHOULDER: CPT | Mod: TC,PN,LT

## 2021-06-28 PROCEDURE — 99213 PR OFFICE/OUTPT VISIT, EST, LEVL III, 20-29 MIN: ICD-10-PCS | Mod: 25,S$GLB,, | Performed by: ORTHOPAEDIC SURGERY

## 2021-06-28 PROCEDURE — 99999 PR PBB SHADOW E&M-EST. PATIENT-LVL III: CPT | Mod: PBBFAC,,, | Performed by: ORTHOPAEDIC SURGERY

## 2021-06-28 RX ORDER — TRIAMCINOLONE ACETONIDE 40 MG/ML
40 INJECTION, SUSPENSION INTRA-ARTICULAR; INTRAMUSCULAR
Status: DISCONTINUED | OUTPATIENT
Start: 2021-06-28 | End: 2021-06-28 | Stop reason: HOSPADM

## 2021-06-28 RX ADMIN — TRIAMCINOLONE ACETONIDE 40 MG: 40 INJECTION, SUSPENSION INTRA-ARTICULAR; INTRAMUSCULAR at 03:06

## 2021-06-30 ENCOUNTER — CLINICAL SUPPORT (OUTPATIENT)
Dept: REHABILITATION | Facility: HOSPITAL | Age: 71
End: 2021-06-30
Attending: FAMILY MEDICINE
Payer: MEDICARE

## 2021-06-30 DIAGNOSIS — I89.0 LYMPHEDEMA OF BOTH LOWER EXTREMITIES: Primary | ICD-10-CM

## 2021-06-30 PROCEDURE — 97140 MANUAL THERAPY 1/> REGIONS: CPT | Mod: PN

## 2021-07-02 ENCOUNTER — CLINICAL SUPPORT (OUTPATIENT)
Dept: REHABILITATION | Facility: HOSPITAL | Age: 71
End: 2021-07-02
Attending: FAMILY MEDICINE
Payer: MEDICARE

## 2021-07-02 DIAGNOSIS — I89.0 LYMPHEDEMA OF BOTH LOWER EXTREMITIES: Primary | ICD-10-CM

## 2021-07-02 PROCEDURE — 97140 MANUAL THERAPY 1/> REGIONS: CPT | Mod: PN

## 2021-07-06 ENCOUNTER — CLINICAL SUPPORT (OUTPATIENT)
Dept: REHABILITATION | Facility: HOSPITAL | Age: 71
End: 2021-07-06
Attending: FAMILY MEDICINE
Payer: MEDICARE

## 2021-07-06 DIAGNOSIS — I89.0 LYMPHEDEMA OF BOTH LOWER EXTREMITIES: Primary | ICD-10-CM

## 2021-07-06 PROCEDURE — 97140 MANUAL THERAPY 1/> REGIONS: CPT | Mod: PN

## 2021-07-07 ENCOUNTER — CLINICAL SUPPORT (OUTPATIENT)
Dept: REHABILITATION | Facility: HOSPITAL | Age: 71
End: 2021-07-07
Attending: FAMILY MEDICINE
Payer: MEDICARE

## 2021-07-07 DIAGNOSIS — I89.0 LYMPHEDEMA OF BOTH LOWER EXTREMITIES: Primary | ICD-10-CM

## 2021-07-07 PROCEDURE — 97140 MANUAL THERAPY 1/> REGIONS: CPT | Mod: PN

## 2021-07-09 ENCOUNTER — CLINICAL SUPPORT (OUTPATIENT)
Dept: REHABILITATION | Facility: HOSPITAL | Age: 71
End: 2021-07-09
Attending: FAMILY MEDICINE
Payer: MEDICARE

## 2021-07-09 ENCOUNTER — TELEPHONE (OUTPATIENT)
Dept: FAMILY MEDICINE | Facility: CLINIC | Age: 71
End: 2021-07-09

## 2021-07-09 DIAGNOSIS — I89.0 LYMPHEDEMA OF BOTH LOWER EXTREMITIES: Primary | ICD-10-CM

## 2021-07-09 PROCEDURE — 97140 MANUAL THERAPY 1/> REGIONS: CPT | Mod: PN

## 2021-07-12 ENCOUNTER — CLINICAL SUPPORT (OUTPATIENT)
Dept: REHABILITATION | Facility: HOSPITAL | Age: 71
End: 2021-07-12
Payer: MEDICARE

## 2021-07-12 DIAGNOSIS — I89.0 LYMPHEDEMA OF BOTH LOWER EXTREMITIES: Primary | ICD-10-CM

## 2021-07-12 PROCEDURE — 97140 MANUAL THERAPY 1/> REGIONS: CPT | Mod: PN

## 2021-07-14 ENCOUNTER — CLINICAL SUPPORT (OUTPATIENT)
Dept: REHABILITATION | Facility: HOSPITAL | Age: 71
End: 2021-07-14
Payer: MEDICARE

## 2021-07-14 DIAGNOSIS — I89.0 LYMPHEDEMA OF BOTH LOWER EXTREMITIES: Primary | ICD-10-CM

## 2021-07-14 PROCEDURE — 97140 MANUAL THERAPY 1/> REGIONS: CPT | Mod: PN

## 2021-07-16 ENCOUNTER — CLINICAL SUPPORT (OUTPATIENT)
Dept: REHABILITATION | Facility: HOSPITAL | Age: 71
End: 2021-07-16
Payer: MEDICARE

## 2021-07-16 DIAGNOSIS — I89.0 LYMPHEDEMA OF BOTH LOWER EXTREMITIES: Primary | ICD-10-CM

## 2021-07-16 PROCEDURE — 97140 MANUAL THERAPY 1/> REGIONS: CPT | Mod: PN

## 2021-07-19 ENCOUNTER — CLINICAL SUPPORT (OUTPATIENT)
Dept: REHABILITATION | Facility: HOSPITAL | Age: 71
End: 2021-07-19
Payer: MEDICARE

## 2021-07-19 DIAGNOSIS — I89.0 LYMPHEDEMA OF BOTH LOWER EXTREMITIES: Primary | ICD-10-CM

## 2021-07-19 PROCEDURE — 97140 MANUAL THERAPY 1/> REGIONS: CPT | Mod: PN

## 2021-07-23 ENCOUNTER — CLINICAL SUPPORT (OUTPATIENT)
Dept: REHABILITATION | Facility: HOSPITAL | Age: 71
End: 2021-07-23
Payer: MEDICARE

## 2021-07-23 DIAGNOSIS — I89.0 LYMPHEDEMA OF BOTH LOWER EXTREMITIES: Primary | ICD-10-CM

## 2021-07-23 PROCEDURE — 97140 MANUAL THERAPY 1/> REGIONS: CPT | Mod: PN

## 2021-07-29 ENCOUNTER — TELEPHONE (OUTPATIENT)
Dept: FAMILY MEDICINE | Facility: CLINIC | Age: 71
End: 2021-07-29

## 2021-08-23 ENCOUNTER — TELEPHONE (OUTPATIENT)
Dept: ORTHOPEDICS | Facility: CLINIC | Age: 71
End: 2021-08-23

## 2021-08-24 RX ORDER — DONEPEZIL HYDROCHLORIDE 5 MG/1
5 TABLET, FILM COATED ORAL DAILY
Qty: 7 TABLET | Refills: 0 | Status: SHIPPED | OUTPATIENT
Start: 2021-08-24 | End: 2021-08-31

## 2021-08-24 RX ORDER — DONEPEZIL HYDROCHLORIDE 10 MG/1
10 TABLET, FILM COATED ORAL DAILY
Qty: 90 TABLET | Refills: 1 | Status: SHIPPED | OUTPATIENT
Start: 2021-08-24 | End: 2022-01-25

## 2021-09-15 ENCOUNTER — OFFICE VISIT (OUTPATIENT)
Dept: FAMILY MEDICINE | Facility: CLINIC | Age: 71
End: 2021-09-15
Payer: COMMERCIAL

## 2021-09-15 VITALS
DIASTOLIC BLOOD PRESSURE: 76 MMHG | SYSTOLIC BLOOD PRESSURE: 114 MMHG | BODY MASS INDEX: 43.05 KG/M2 | HEART RATE: 76 BPM | WEIGHT: 228 LBS | HEIGHT: 61 IN

## 2021-09-15 DIAGNOSIS — M79.7 FIBROMYALGIA: ICD-10-CM

## 2021-09-15 DIAGNOSIS — F41.9 ANXIETY: ICD-10-CM

## 2021-09-15 DIAGNOSIS — E11.9 DIET-CONTROLLED TYPE 2 DIABETES MELLITUS: ICD-10-CM

## 2021-09-15 DIAGNOSIS — S40.011A CONTUSION OF RIGHT SHOULDER, INITIAL ENCOUNTER: ICD-10-CM

## 2021-09-15 DIAGNOSIS — I89.0 LYMPHEDEMA OF BOTH LOWER EXTREMITIES: ICD-10-CM

## 2021-09-15 DIAGNOSIS — Z23 NEEDS FLU SHOT: ICD-10-CM

## 2021-09-15 DIAGNOSIS — E11.42 TYPE 2 DIABETES MELLITUS WITH POLYNEUROPATHY: Primary | ICD-10-CM

## 2021-09-15 DIAGNOSIS — E03.9 ACQUIRED HYPOTHYROIDISM: ICD-10-CM

## 2021-09-15 DIAGNOSIS — M06.9 RHEUMATOID ARTHRITIS, INVOLVING UNSPECIFIED SITE, UNSPECIFIED WHETHER RHEUMATOID FACTOR PRESENT: ICD-10-CM

## 2021-09-15 DIAGNOSIS — Z79.899 POLYPHARMACY: ICD-10-CM

## 2021-09-15 DIAGNOSIS — G25.81 RESTLESS LEG SYNDROME: ICD-10-CM

## 2021-09-15 DIAGNOSIS — I10 ESSENTIAL HYPERTENSION: ICD-10-CM

## 2021-09-15 LAB — HBA1C MFR BLD: 6 %

## 2021-09-15 PROCEDURE — 83036 POCT HEMOGLOBIN A1C: ICD-10-PCS | Mod: QW,,, | Performed by: FAMILY MEDICINE

## 2021-09-15 PROCEDURE — G0008 ADMIN INFLUENZA VIRUS VAC: HCPCS | Mod: S$GLB,,, | Performed by: FAMILY MEDICINE

## 2021-09-15 PROCEDURE — 90662 FLU VACCINE - QUADRIVALENT - HIGH DOSE (65+) PRESERVATIVE FREE IM: ICD-10-PCS | Mod: S$GLB,,, | Performed by: FAMILY MEDICINE

## 2021-09-15 PROCEDURE — 83036 HEMOGLOBIN GLYCOSYLATED A1C: CPT | Mod: QW,,, | Performed by: FAMILY MEDICINE

## 2021-09-15 PROCEDURE — 99214 PR OFFICE/OUTPT VISIT, EST, LEVL IV, 30-39 MIN: ICD-10-PCS | Mod: 25,S$GLB,, | Performed by: FAMILY MEDICINE

## 2021-09-15 PROCEDURE — 99214 OFFICE O/P EST MOD 30 MIN: CPT | Mod: 25,S$GLB,, | Performed by: FAMILY MEDICINE

## 2021-09-15 PROCEDURE — 90662 IIV NO PRSV INCREASED AG IM: CPT | Mod: S$GLB,,, | Performed by: FAMILY MEDICINE

## 2021-09-15 PROCEDURE — G0008 FLU VACCINE - QUADRIVALENT - HIGH DOSE (65+) PRESERVATIVE FREE IM: ICD-10-PCS | Mod: S$GLB,,, | Performed by: FAMILY MEDICINE

## 2021-09-15 RX ORDER — ALPRAZOLAM 2 MG/1
2 TABLET ORAL 2 TIMES DAILY PRN
Qty: 60 TABLET | Refills: 2 | Status: SHIPPED | OUTPATIENT
Start: 2021-09-15 | End: 2022-02-21 | Stop reason: SDUPTHER

## 2021-09-15 RX ORDER — PREDNISONE 20 MG/1
40 TABLET ORAL DAILY
Qty: 10 TABLET | Refills: 0 | Status: SHIPPED | OUTPATIENT
Start: 2021-09-15 | End: 2021-09-20

## 2021-09-15 RX ORDER — FUROSEMIDE 20 MG/1
TABLET ORAL
Qty: 90 TABLET | Refills: 3 | Status: SHIPPED | OUTPATIENT
Start: 2021-09-15 | End: 2022-06-09 | Stop reason: SDUPTHER

## 2021-09-15 RX ORDER — PREGABALIN 25 MG/1
25 CAPSULE ORAL 3 TIMES DAILY
Qty: 270 CAPSULE | Refills: 1 | Status: SHIPPED | OUTPATIENT
Start: 2021-09-15 | End: 2022-04-04

## 2021-09-15 RX ORDER — DEXAMETHASONE SODIUM PHOSPHATE 4 MG/ML
4 INJECTION, SOLUTION INTRA-ARTICULAR; INTRALESIONAL; INTRAMUSCULAR; INTRAVENOUS; SOFT TISSUE ONCE
Status: DISCONTINUED | OUTPATIENT
Start: 2021-09-15 | End: 2021-09-15

## 2021-09-15 RX ORDER — LOSARTAN POTASSIUM 50 MG/1
50 TABLET ORAL DAILY
Qty: 90 TABLET | Refills: 1 | Status: SHIPPED | OUTPATIENT
Start: 2021-09-15 | End: 2022-03-15 | Stop reason: SDUPTHER

## 2021-09-15 RX ORDER — POTASSIUM CHLORIDE 750 MG/1
10 CAPSULE, EXTENDED RELEASE ORAL NIGHTLY
Qty: 90 CAPSULE | Refills: 3 | Status: SHIPPED | OUTPATIENT
Start: 2021-09-15 | End: 2022-08-09 | Stop reason: SDUPTHER

## 2021-09-15 RX ORDER — PRAMIPEXOLE DIHYDROCHLORIDE 0.5 MG/1
0.5 TABLET ORAL NIGHTLY
Qty: 90 TABLET | Refills: 3 | Status: SHIPPED | OUTPATIENT
Start: 2021-09-15 | End: 2022-01-13 | Stop reason: SDUPTHER

## 2021-09-15 RX ORDER — CELECOXIB 200 MG/1
200 CAPSULE ORAL DAILY PRN
Qty: 90 CAPSULE | Refills: 1 | Status: SHIPPED | OUTPATIENT
Start: 2021-09-15 | End: 2022-06-09

## 2021-09-18 ENCOUNTER — HOSPITAL ENCOUNTER (OUTPATIENT)
Facility: HOSPITAL | Age: 71
Discharge: HOME | End: 2021-09-19
Attending: EMERGENCY MEDICINE | Admitting: HOSPITALIST
Payer: COMMERCIAL

## 2021-09-18 DIAGNOSIS — R52 PAIN: ICD-10-CM

## 2021-09-18 DIAGNOSIS — M25.511 ACUTE PAIN OF RIGHT SHOULDER: ICD-10-CM

## 2021-09-18 DIAGNOSIS — T14.8XXA DISLOCATION CLOSED: Primary | ICD-10-CM

## 2021-09-18 DIAGNOSIS — M25.519 SHOULDER PAIN: ICD-10-CM

## 2021-09-18 LAB — SARS-COV-2 RDRP RESP QL NAA+PROBE: NEGATIVE

## 2021-09-18 PROCEDURE — 63600175 PHARM REV CODE 636 W HCPCS: Performed by: NURSE PRACTITIONER

## 2021-09-18 PROCEDURE — 96374 THER/PROPH/DIAG INJ IV PUSH: CPT

## 2021-09-18 PROCEDURE — 96376 TX/PRO/DX INJ SAME DRUG ADON: CPT

## 2021-09-18 PROCEDURE — 99900035 HC TECH TIME PER 15 MIN (STAT)

## 2021-09-18 PROCEDURE — G0378 HOSPITAL OBSERVATION PER HR: HCPCS

## 2021-09-18 PROCEDURE — 94761 N-INVAS EAR/PLS OXIMETRY MLT: CPT

## 2021-09-18 PROCEDURE — 23650 CLTX SHO DSLC W/MNPJ WO ANES: CPT | Mod: RT

## 2021-09-18 PROCEDURE — 63600175 PHARM REV CODE 636 W HCPCS: Performed by: EMERGENCY MEDICINE

## 2021-09-18 PROCEDURE — 25000003 PHARM REV CODE 250: Performed by: NURSE PRACTITIONER

## 2021-09-18 PROCEDURE — U0002 COVID-19 LAB TEST NON-CDC: HCPCS | Performed by: EMERGENCY MEDICINE

## 2021-09-18 PROCEDURE — 25000003 PHARM REV CODE 250: Performed by: EMERGENCY MEDICINE

## 2021-09-18 PROCEDURE — 99285 EMERGENCY DEPT VISIT HI MDM: CPT | Mod: 25

## 2021-09-18 PROCEDURE — 96375 TX/PRO/DX INJ NEW DRUG ADDON: CPT | Mod: 59

## 2021-09-18 RX ORDER — AMLODIPINE BESYLATE 5 MG/1
5 TABLET ORAL DAILY
Status: DISCONTINUED | OUTPATIENT
Start: 2021-09-19 | End: 2021-09-19 | Stop reason: HOSPADM

## 2021-09-18 RX ORDER — LANOLIN ALCOHOL/MO/W.PET/CERES
800 CREAM (GRAM) TOPICAL
Status: DISCONTINUED | OUTPATIENT
Start: 2021-09-18 | End: 2021-09-19 | Stop reason: HOSPADM

## 2021-09-18 RX ORDER — ATORVASTATIN CALCIUM 10 MG/1
10 TABLET, FILM COATED ORAL NIGHTLY
Status: DISCONTINUED | OUTPATIENT
Start: 2021-09-19 | End: 2021-09-19 | Stop reason: HOSPADM

## 2021-09-18 RX ORDER — SODIUM,POTASSIUM PHOSPHATES 280-250MG
2 POWDER IN PACKET (EA) ORAL
Status: DISCONTINUED | OUTPATIENT
Start: 2021-09-18 | End: 2021-09-19 | Stop reason: HOSPADM

## 2021-09-18 RX ORDER — POTASSIUM CHLORIDE 750 MG/1
10 TABLET, EXTENDED RELEASE ORAL NIGHTLY
Status: DISCONTINUED | OUTPATIENT
Start: 2021-09-18 | End: 2021-09-19 | Stop reason: HOSPADM

## 2021-09-18 RX ORDER — ONDANSETRON 2 MG/ML
4 INJECTION INTRAMUSCULAR; INTRAVENOUS EVERY 8 HOURS PRN
Status: DISCONTINUED | OUTPATIENT
Start: 2021-09-18 | End: 2021-09-19 | Stop reason: HOSPADM

## 2021-09-18 RX ORDER — ACETAMINOPHEN 325 MG/1
650 TABLET ORAL EVERY 8 HOURS PRN
Status: DISCONTINUED | OUTPATIENT
Start: 2021-09-18 | End: 2021-09-19 | Stop reason: HOSPADM

## 2021-09-18 RX ORDER — SODIUM CHLORIDE 9 MG/ML
INJECTION, SOLUTION INTRAVENOUS CONTINUOUS
Status: DISCONTINUED | OUTPATIENT
Start: 2021-09-18 | End: 2021-09-19 | Stop reason: HOSPADM

## 2021-09-18 RX ORDER — LOSARTAN POTASSIUM 25 MG/1
50 TABLET ORAL DAILY
Status: DISCONTINUED | OUTPATIENT
Start: 2021-09-19 | End: 2021-09-19 | Stop reason: HOSPADM

## 2021-09-18 RX ORDER — AMOXICILLIN 250 MG
1 CAPSULE ORAL 2 TIMES DAILY
Status: DISCONTINUED | OUTPATIENT
Start: 2021-09-18 | End: 2021-09-19 | Stop reason: HOSPADM

## 2021-09-18 RX ORDER — HYDROMORPHONE HYDROCHLORIDE 2 MG/ML
1 INJECTION, SOLUTION INTRAMUSCULAR; INTRAVENOUS; SUBCUTANEOUS EVERY 4 HOURS PRN
Status: DISCONTINUED | OUTPATIENT
Start: 2021-09-18 | End: 2021-09-19 | Stop reason: HOSPADM

## 2021-09-18 RX ORDER — PROPOFOL 10 MG/ML
100 VIAL (ML) INTRAVENOUS ONCE
Status: COMPLETED | OUTPATIENT
Start: 2021-09-18 | End: 2021-09-18

## 2021-09-18 RX ORDER — HYDROCODONE BITARTRATE AND ACETAMINOPHEN 5; 325 MG/1; MG/1
1 TABLET ORAL EVERY 6 HOURS PRN
Status: DISCONTINUED | OUTPATIENT
Start: 2021-09-18 | End: 2021-09-19 | Stop reason: SDUPTHER

## 2021-09-18 RX ORDER — NALOXONE HCL 0.4 MG/ML
0.02 VIAL (ML) INJECTION
Status: DISCONTINUED | OUTPATIENT
Start: 2021-09-18 | End: 2021-09-19 | Stop reason: HOSPADM

## 2021-09-18 RX ORDER — MORPHINE SULFATE 4 MG/ML
4 INJECTION, SOLUTION INTRAMUSCULAR; INTRAVENOUS
Status: COMPLETED | OUTPATIENT
Start: 2021-09-18 | End: 2021-09-18

## 2021-09-18 RX ORDER — MORPHINE SULFATE 2 MG/ML
6 INJECTION, SOLUTION INTRAMUSCULAR; INTRAVENOUS
Status: COMPLETED | OUTPATIENT
Start: 2021-09-18 | End: 2021-09-18

## 2021-09-18 RX ORDER — PROPOFOL 10 MG/ML
INJECTION, EMULSION INTRAVENOUS
Status: DISCONTINUED
Start: 2021-09-18 | End: 2021-09-18 | Stop reason: WASHOUT

## 2021-09-18 RX ORDER — DONEPEZIL HYDROCHLORIDE 5 MG/1
10 TABLET, FILM COATED ORAL DAILY
Status: DISCONTINUED | OUTPATIENT
Start: 2021-09-19 | End: 2021-09-19 | Stop reason: HOSPADM

## 2021-09-18 RX ORDER — ONDANSETRON 8 MG/1
8 TABLET, ORALLY DISINTEGRATING ORAL EVERY 8 HOURS PRN
Status: DISCONTINUED | OUTPATIENT
Start: 2021-09-18 | End: 2021-09-19 | Stop reason: HOSPADM

## 2021-09-18 RX ORDER — METOPROLOL SUCCINATE 50 MG/1
50 TABLET, EXTENDED RELEASE ORAL DAILY
Status: DISCONTINUED | OUTPATIENT
Start: 2021-09-19 | End: 2021-09-19 | Stop reason: HOSPADM

## 2021-09-18 RX ORDER — ALBUTEROL SULFATE 2.5 MG/.5ML
2.5 SOLUTION RESPIRATORY (INHALATION) EVERY 4 HOURS PRN
Status: DISCONTINUED | OUTPATIENT
Start: 2021-09-18 | End: 2021-09-19 | Stop reason: HOSPADM

## 2021-09-18 RX ORDER — SODIUM CHLORIDE 0.9 % (FLUSH) 0.9 %
10 SYRINGE (ML) INJECTION EVERY 12 HOURS PRN
Status: DISCONTINUED | OUTPATIENT
Start: 2021-09-18 | End: 2021-09-19 | Stop reason: HOSPADM

## 2021-09-18 RX ORDER — PROPOFOL 10 MG/ML
VIAL (ML) INTRAVENOUS CODE/TRAUMA/SEDATION MEDICATION
Status: COMPLETED | OUTPATIENT
Start: 2021-09-18 | End: 2021-09-18

## 2021-09-18 RX ORDER — SODIUM CHLORIDE 9 MG/ML
INJECTION, SOLUTION INTRAVENOUS
Status: COMPLETED | OUTPATIENT
Start: 2021-09-18 | End: 2021-09-18

## 2021-09-18 RX ORDER — LEVOTHYROXINE SODIUM 88 UG/1
88 TABLET ORAL
Status: DISCONTINUED | OUTPATIENT
Start: 2021-09-19 | End: 2021-09-19 | Stop reason: HOSPADM

## 2021-09-18 RX ADMIN — PROPOFOL 10 MG: 10 INJECTION, EMULSION INTRAVENOUS at 03:09

## 2021-09-18 RX ADMIN — PROPOFOL 60 MG: 10 INJECTION, EMULSION INTRAVENOUS at 02:09

## 2021-09-18 RX ADMIN — PROPOFOL 30 MG: 10 INJECTION, EMULSION INTRAVENOUS at 02:09

## 2021-09-18 RX ADMIN — PROPOFOL 30 MG: 10 INJECTION, EMULSION INTRAVENOUS at 03:09

## 2021-09-18 RX ADMIN — SODIUM CHLORIDE: 0.9 INJECTION, SOLUTION INTRAVENOUS at 09:09

## 2021-09-18 RX ADMIN — HYDROMORPHONE HYDROCHLORIDE 1 MG: 2 INJECTION INTRAMUSCULAR; INTRAVENOUS; SUBCUTANEOUS at 10:09

## 2021-09-18 RX ADMIN — SODIUM CHLORIDE 1000 ML: 0.9 INJECTION, SOLUTION INTRAVENOUS at 02:09

## 2021-09-18 RX ADMIN — HYDROCODONE BITARTRATE AND ACETAMINOPHEN 1 TABLET: 5; 325 TABLET ORAL at 08:09

## 2021-09-18 RX ADMIN — DOCUSATE SODIUM 50 MG AND SENNOSIDES 8.6 MG 1 TABLET: 8.6; 5 TABLET, FILM COATED ORAL at 09:09

## 2021-09-18 RX ADMIN — PROPOFOL 40 MG: 10 INJECTION, EMULSION INTRAVENOUS at 03:09

## 2021-09-18 RX ADMIN — MORPHINE SULFATE 4 MG: 4 INJECTION INTRAVENOUS at 03:09

## 2021-09-18 RX ADMIN — HYDROMORPHONE HYDROCHLORIDE 1 MG: 2 INJECTION INTRAMUSCULAR; INTRAVENOUS; SUBCUTANEOUS at 05:09

## 2021-09-18 RX ADMIN — POTASSIUM CHLORIDE 10 MEQ: 750 TABLET, EXTENDED RELEASE ORAL at 09:09

## 2021-09-18 RX ADMIN — MORPHINE SULFATE 6 MG: 2 INJECTION, SOLUTION INTRAMUSCULAR; INTRAVENOUS at 02:09

## 2021-09-19 ENCOUNTER — ANESTHESIA (OUTPATIENT)
Dept: SURGERY | Facility: HOSPITAL | Age: 71
End: 2021-09-19
Payer: COMMERCIAL

## 2021-09-19 ENCOUNTER — ANESTHESIA EVENT (OUTPATIENT)
Dept: SURGERY | Facility: HOSPITAL | Age: 71
End: 2021-09-19
Payer: COMMERCIAL

## 2021-09-19 VITALS
RESPIRATION RATE: 16 BRPM | OXYGEN SATURATION: 93 % | HEART RATE: 61 BPM | DIASTOLIC BLOOD PRESSURE: 62 MMHG | WEIGHT: 244.81 LBS | SYSTOLIC BLOOD PRESSURE: 104 MMHG | BODY MASS INDEX: 48.06 KG/M2 | TEMPERATURE: 98 F | HEIGHT: 60 IN

## 2021-09-19 PROBLEM — R52 PAIN: Status: ACTIVE | Noted: 2021-09-19

## 2021-09-19 LAB
ALBUMIN SERPL BCP-MCNC: 3.4 G/DL (ref 3.5–5.2)
ALP SERPL-CCNC: 76 U/L (ref 55–135)
ALT SERPL W/O P-5'-P-CCNC: 24 U/L (ref 10–44)
ANION GAP SERPL CALC-SCNC: 11 MMOL/L (ref 8–16)
AST SERPL-CCNC: 30 U/L (ref 10–40)
BASOPHILS # BLD AUTO: 0.07 K/UL (ref 0–0.2)
BASOPHILS NFR BLD: 1.2 % (ref 0–1.9)
BILIRUB SERPL-MCNC: 0.3 MG/DL (ref 0.1–1)
BUN SERPL-MCNC: 11 MG/DL (ref 8–23)
CALCIUM SERPL-MCNC: 8.8 MG/DL (ref 8.7–10.5)
CHLORIDE SERPL-SCNC: 104 MMOL/L (ref 95–110)
CO2 SERPL-SCNC: 28 MMOL/L (ref 23–29)
CREAT SERPL-MCNC: 0.8 MG/DL (ref 0.5–1.4)
DIFFERENTIAL METHOD: ABNORMAL
EOSINOPHIL # BLD AUTO: 0.3 K/UL (ref 0–0.5)
EOSINOPHIL NFR BLD: 4.9 % (ref 0–8)
ERYTHROCYTE [DISTWIDTH] IN BLOOD BY AUTOMATED COUNT: 15.3 % (ref 11.5–14.5)
EST. GFR  (AFRICAN AMERICAN): >60 ML/MIN/1.73 M^2
EST. GFR  (NON AFRICAN AMERICAN): >60 ML/MIN/1.73 M^2
GLUCOSE SERPL-MCNC: 103 MG/DL (ref 70–110)
HCT VFR BLD AUTO: 37.6 % (ref 37–48.5)
HGB BLD-MCNC: 11.5 G/DL (ref 12–16)
IMM GRANULOCYTES # BLD AUTO: 0.02 K/UL (ref 0–0.04)
IMM GRANULOCYTES NFR BLD AUTO: 0.3 % (ref 0–0.5)
LYMPHOCYTES # BLD AUTO: 2.9 K/UL (ref 1–4.8)
LYMPHOCYTES NFR BLD: 48.8 % (ref 18–48)
MAGNESIUM SERPL-MCNC: 2.1 MG/DL (ref 1.6–2.6)
MCH RBC QN AUTO: 28.8 PG (ref 27–31)
MCHC RBC AUTO-ENTMCNC: 30.6 G/DL (ref 32–36)
MCV RBC AUTO: 94 FL (ref 82–98)
MONOCYTES # BLD AUTO: 0.6 K/UL (ref 0.3–1)
MONOCYTES NFR BLD: 10.4 % (ref 4–15)
NEUTROPHILS # BLD AUTO: 2 K/UL (ref 1.8–7.7)
NEUTROPHILS NFR BLD: 34.4 % (ref 38–73)
NRBC BLD-RTO: 0 /100 WBC
PHOSPHATE SERPL-MCNC: 3.8 MG/DL (ref 2.7–4.5)
PLATELET # BLD AUTO: 246 K/UL (ref 150–450)
PMV BLD AUTO: 9 FL (ref 9.2–12.9)
POTASSIUM SERPL-SCNC: 3.4 MMOL/L (ref 3.5–5.1)
PROT SERPL-MCNC: 6.1 G/DL (ref 6–8.4)
RBC # BLD AUTO: 4 M/UL (ref 4–5.4)
SODIUM SERPL-SCNC: 143 MMOL/L (ref 136–145)
WBC # BLD AUTO: 5.88 K/UL (ref 3.9–12.7)

## 2021-09-19 PROCEDURE — 36000711: Performed by: ORTHOPAEDIC SURGERY

## 2021-09-19 PROCEDURE — 84100 ASSAY OF PHOSPHORUS: CPT | Performed by: NURSE PRACTITIONER

## 2021-09-19 PROCEDURE — 99223 1ST HOSP IP/OBS HIGH 75: CPT | Mod: 57,,, | Performed by: ORTHOPAEDIC SURGERY

## 2021-09-19 PROCEDURE — 25000003 PHARM REV CODE 250: Performed by: NURSE PRACTITIONER

## 2021-09-19 PROCEDURE — 64415 NJX AA&/STRD BRCH PLXS IMG: CPT | Mod: 59,RT,, | Performed by: ANESTHESIOLOGY

## 2021-09-19 PROCEDURE — C9290 INJ, BUPIVACAINE LIPOSOME: HCPCS | Performed by: ANESTHESIOLOGY

## 2021-09-19 PROCEDURE — D9220A PRA ANESTHESIA: Mod: ANES,,, | Performed by: ANESTHESIOLOGY

## 2021-09-19 PROCEDURE — 23655 CLTX SHO DSLC W/MNPJ W/ANES: CPT | Mod: 51,RT,, | Performed by: ORTHOPAEDIC SURGERY

## 2021-09-19 PROCEDURE — 63600175 PHARM REV CODE 636 W HCPCS: Performed by: NURSE PRACTITIONER

## 2021-09-19 PROCEDURE — 29822 PR SHLDR ARTHROSCOP,PART DEBRIDE: ICD-10-PCS | Mod: RT,,, | Performed by: ORTHOPAEDIC SURGERY

## 2021-09-19 PROCEDURE — 23655 PR CLOSED RX SHLDR DISLOC,ANESTHESIA: ICD-10-PCS | Mod: 51,RT,, | Performed by: ORTHOPAEDIC SURGERY

## 2021-09-19 PROCEDURE — 99900035 HC TECH TIME PER 15 MIN (STAT)

## 2021-09-19 PROCEDURE — 94799 UNLISTED PULMONARY SVC/PX: CPT

## 2021-09-19 PROCEDURE — 80053 COMPREHEN METABOLIC PANEL: CPT | Performed by: NURSE PRACTITIONER

## 2021-09-19 PROCEDURE — 37000009 HC ANESTHESIA EA ADD 15 MINS: Performed by: ORTHOPAEDIC SURGERY

## 2021-09-19 PROCEDURE — 37000008 HC ANESTHESIA 1ST 15 MINUTES: Performed by: ORTHOPAEDIC SURGERY

## 2021-09-19 PROCEDURE — D9220A PRA ANESTHESIA: ICD-10-PCS | Mod: ANES,,, | Performed by: ANESTHESIOLOGY

## 2021-09-19 PROCEDURE — 99223 PR INITIAL HOSPITAL CARE,LEVL III: ICD-10-PCS | Mod: 57,,, | Performed by: ORTHOPAEDIC SURGERY

## 2021-09-19 PROCEDURE — 63600175 PHARM REV CODE 636 W HCPCS: Performed by: NURSE ANESTHETIST, CERTIFIED REGISTERED

## 2021-09-19 PROCEDURE — 94761 N-INVAS EAR/PLS OXIMETRY MLT: CPT

## 2021-09-19 PROCEDURE — 85025 COMPLETE CBC W/AUTO DIFF WBC: CPT | Performed by: NURSE PRACTITIONER

## 2021-09-19 PROCEDURE — 63600175 PHARM REV CODE 636 W HCPCS: Performed by: ANESTHESIOLOGY

## 2021-09-19 PROCEDURE — D9220A PRA ANESTHESIA: Mod: CRNA,,, | Performed by: NURSE ANESTHETIST, CERTIFIED REGISTERED

## 2021-09-19 PROCEDURE — 64415 NJX AA&/STRD BRCH PLXS IMG: CPT | Mod: 59,RT | Performed by: ANESTHESIOLOGY

## 2021-09-19 PROCEDURE — G0378 HOSPITAL OBSERVATION PER HR: HCPCS

## 2021-09-19 PROCEDURE — 64415 PR NERVE BLOCK INJ, ANES/STEROID, BRACHIAL PLEXUS, INCL IMAG GUIDANCE: ICD-10-PCS | Mod: 59,RT,, | Performed by: ANESTHESIOLOGY

## 2021-09-19 PROCEDURE — 76942 PR U/S GUIDANCE FOR NEEDLE GUIDANCE: ICD-10-PCS | Mod: 26,,, | Performed by: ANESTHESIOLOGY

## 2021-09-19 PROCEDURE — 71000033 HC RECOVERY, INTIAL HOUR: Performed by: ORTHOPAEDIC SURGERY

## 2021-09-19 PROCEDURE — 27200750 HC INSULATED NEEDLE/ STIMUPLEX: Performed by: ANESTHESIOLOGY

## 2021-09-19 PROCEDURE — 29822 SHO ARTHRS SRG LMTD DBRDMT: CPT | Mod: RT,,, | Performed by: ORTHOPAEDIC SURGERY

## 2021-09-19 PROCEDURE — 71000039 HC RECOVERY, EACH ADD'L HOUR: Performed by: ORTHOPAEDIC SURGERY

## 2021-09-19 PROCEDURE — 83735 ASSAY OF MAGNESIUM: CPT | Performed by: NURSE PRACTITIONER

## 2021-09-19 PROCEDURE — 27201423 OPTIME MED/SURG SUP & DEVICES STERILE SUPPLY: Performed by: ORTHOPAEDIC SURGERY

## 2021-09-19 PROCEDURE — D9220A PRA ANESTHESIA: ICD-10-PCS | Mod: CRNA,,, | Performed by: NURSE ANESTHETIST, CERTIFIED REGISTERED

## 2021-09-19 PROCEDURE — 36415 COLL VENOUS BLD VENIPUNCTURE: CPT | Performed by: NURSE PRACTITIONER

## 2021-09-19 PROCEDURE — 36000710: Performed by: ORTHOPAEDIC SURGERY

## 2021-09-19 PROCEDURE — 25000003 PHARM REV CODE 250: Performed by: ANESTHESIOLOGY

## 2021-09-19 PROCEDURE — 25000003 PHARM REV CODE 250: Performed by: NURSE ANESTHETIST, CERTIFIED REGISTERED

## 2021-09-19 PROCEDURE — 76942 ECHO GUIDE FOR BIOPSY: CPT | Mod: 26,,, | Performed by: ANESTHESIOLOGY

## 2021-09-19 RX ORDER — HYDROCODONE BITARTRATE AND ACETAMINOPHEN 5; 325 MG/1; MG/1
1 TABLET ORAL EVERY 4 HOURS PRN
Status: DISCONTINUED | OUTPATIENT
Start: 2021-09-19 | End: 2021-09-19 | Stop reason: HOSPADM

## 2021-09-19 RX ORDER — HYDROCODONE BITARTRATE AND ACETAMINOPHEN 10; 325 MG/1; MG/1
1 TABLET ORAL EVERY 4 HOURS PRN
Status: DISCONTINUED | OUTPATIENT
Start: 2021-09-19 | End: 2021-09-19 | Stop reason: HOSPADM

## 2021-09-19 RX ORDER — FENTANYL CITRATE 50 UG/ML
INJECTION, SOLUTION INTRAMUSCULAR; INTRAVENOUS
Status: DISCONTINUED | OUTPATIENT
Start: 2021-09-19 | End: 2021-09-19

## 2021-09-19 RX ORDER — HYDROCODONE BITARTRATE AND ACETAMINOPHEN 5; 325 MG/1; MG/1
1 TABLET ORAL EVERY 6 HOURS PRN
Qty: 15 TABLET | Refills: 0 | Status: SHIPPED | OUTPATIENT
Start: 2021-09-19 | End: 2021-09-19

## 2021-09-19 RX ORDER — EPHEDRINE SULFATE 50 MG/ML
INJECTION, SOLUTION INTRAVENOUS
Status: DISCONTINUED | OUTPATIENT
Start: 2021-09-19 | End: 2021-09-19

## 2021-09-19 RX ORDER — FENTANYL CITRATE 50 UG/ML
25 INJECTION, SOLUTION INTRAMUSCULAR; INTRAVENOUS EVERY 5 MIN PRN
Status: DISCONTINUED | OUTPATIENT
Start: 2021-09-19 | End: 2021-09-19 | Stop reason: HOSPADM

## 2021-09-19 RX ORDER — PHENYLEPHRINE HYDROCHLORIDE 10 MG/ML
INJECTION INTRAVENOUS
Status: DISCONTINUED | OUTPATIENT
Start: 2021-09-19 | End: 2021-09-19

## 2021-09-19 RX ORDER — ROCURONIUM BROMIDE 10 MG/ML
INJECTION, SOLUTION INTRAVENOUS
Status: DISCONTINUED | OUTPATIENT
Start: 2021-09-19 | End: 2021-09-19

## 2021-09-19 RX ORDER — ONDANSETRON HYDROCHLORIDE 2 MG/ML
INJECTION, SOLUTION INTRAMUSCULAR; INTRAVENOUS
Status: DISCONTINUED | OUTPATIENT
Start: 2021-09-19 | End: 2021-09-19

## 2021-09-19 RX ORDER — SODIUM CHLORIDE, SODIUM LACTATE, POTASSIUM CHLORIDE, CALCIUM CHLORIDE 600; 310; 30; 20 MG/100ML; MG/100ML; MG/100ML; MG/100ML
500 INJECTION, SOLUTION INTRAVENOUS ONCE
Status: CANCELLED | OUTPATIENT
Start: 2021-09-19 | End: 2021-09-19

## 2021-09-19 RX ORDER — LIDOCAINE HCL/PF 100 MG/5ML
SYRINGE (ML) INTRAVENOUS
Status: DISCONTINUED | OUTPATIENT
Start: 2021-09-19 | End: 2021-09-19

## 2021-09-19 RX ORDER — SUCCINYLCHOLINE CHLORIDE 20 MG/ML
INJECTION INTRAMUSCULAR; INTRAVENOUS
Status: DISCONTINUED | OUTPATIENT
Start: 2021-09-19 | End: 2021-09-19

## 2021-09-19 RX ORDER — IBUPROFEN 400 MG/1
800 TABLET ORAL 3 TIMES DAILY
Status: DISCONTINUED | OUTPATIENT
Start: 2021-09-19 | End: 2021-09-19 | Stop reason: HOSPADM

## 2021-09-19 RX ORDER — SODIUM CHLORIDE 0.9 % (FLUSH) 0.9 %
3 SYRINGE (ML) INJECTION EVERY 8 HOURS
Status: CANCELLED | OUTPATIENT
Start: 2021-09-19

## 2021-09-19 RX ORDER — CLINDAMYCIN PHOSPHATE 900 MG/50ML
INJECTION, SOLUTION INTRAVENOUS
Status: DISCONTINUED | OUTPATIENT
Start: 2021-09-19 | End: 2021-09-19

## 2021-09-19 RX ORDER — MEPERIDINE HYDROCHLORIDE 50 MG/ML
12.5 INJECTION INTRAMUSCULAR; INTRAVENOUS; SUBCUTANEOUS ONCE AS NEEDED
Status: DISCONTINUED | OUTPATIENT
Start: 2021-09-19 | End: 2021-09-19 | Stop reason: HOSPADM

## 2021-09-19 RX ORDER — HYDROCODONE BITARTRATE AND ACETAMINOPHEN 5; 325 MG/1; MG/1
1 TABLET ORAL EVERY 6 HOURS PRN
Qty: 15 TABLET | Refills: 0 | Status: SHIPPED | OUTPATIENT
Start: 2021-09-19 | End: 2021-10-25 | Stop reason: SDUPTHER

## 2021-09-19 RX ORDER — LIDOCAINE HYDROCHLORIDE 10 MG/ML
1 INJECTION, SOLUTION EPIDURAL; INFILTRATION; INTRACAUDAL; PERINEURAL ONCE
Status: CANCELLED | OUTPATIENT
Start: 2021-09-19 | End: 2021-09-19

## 2021-09-19 RX ORDER — SODIUM CHLORIDE, SODIUM LACTATE, POTASSIUM CHLORIDE, CALCIUM CHLORIDE 600; 310; 30; 20 MG/100ML; MG/100ML; MG/100ML; MG/100ML
10 INJECTION, SOLUTION INTRAVENOUS CONTINUOUS
Status: CANCELLED | OUTPATIENT
Start: 2021-09-19

## 2021-09-19 RX ORDER — OXYCODONE HYDROCHLORIDE 5 MG/1
5 TABLET ORAL ONCE AS NEEDED
Status: DISCONTINUED | OUTPATIENT
Start: 2021-09-19 | End: 2021-09-19 | Stop reason: HOSPADM

## 2021-09-19 RX ORDER — BUPIVACAINE HYDROCHLORIDE 5 MG/ML
INJECTION, SOLUTION EPIDURAL; INTRACAUDAL
Status: DISCONTINUED | OUTPATIENT
Start: 2021-09-19 | End: 2021-09-19

## 2021-09-19 RX ORDER — HYDROMORPHONE HYDROCHLORIDE 2 MG/ML
0.2 INJECTION, SOLUTION INTRAMUSCULAR; INTRAVENOUS; SUBCUTANEOUS EVERY 5 MIN PRN
Status: DISCONTINUED | OUTPATIENT
Start: 2021-09-19 | End: 2021-09-19 | Stop reason: HOSPADM

## 2021-09-19 RX ORDER — LORAZEPAM 2 MG/ML
0.25 INJECTION INTRAMUSCULAR ONCE AS NEEDED
Status: DISCONTINUED | OUTPATIENT
Start: 2021-09-19 | End: 2021-09-19 | Stop reason: HOSPADM

## 2021-09-19 RX ORDER — ONDANSETRON 2 MG/ML
4 INJECTION INTRAMUSCULAR; INTRAVENOUS ONCE AS NEEDED
Status: COMPLETED | OUTPATIENT
Start: 2021-09-19 | End: 2021-09-19

## 2021-09-19 RX ORDER — ACETAMINOPHEN 10 MG/ML
INJECTION, SOLUTION INTRAVENOUS
Status: DISCONTINUED | OUTPATIENT
Start: 2021-09-19 | End: 2021-09-19

## 2021-09-19 RX ORDER — PROCHLORPERAZINE EDISYLATE 5 MG/ML
5 INJECTION INTRAMUSCULAR; INTRAVENOUS EVERY 30 MIN PRN
Status: DISCONTINUED | OUTPATIENT
Start: 2021-09-19 | End: 2021-09-19 | Stop reason: HOSPADM

## 2021-09-19 RX ORDER — PROPOFOL 10 MG/ML
VIAL (ML) INTRAVENOUS
Status: DISCONTINUED | OUTPATIENT
Start: 2021-09-19 | End: 2021-09-19

## 2021-09-19 RX ADMIN — HYDROMORPHONE HYDROCHLORIDE 0.2 MG: 2 INJECTION INTRAMUSCULAR; INTRAVENOUS; SUBCUTANEOUS at 11:09

## 2021-09-19 RX ADMIN — GLYCOPYRROLATE 0.2 MG: 0.2 INJECTION, SOLUTION INTRAMUSCULAR; INTRAVITREAL at 09:09

## 2021-09-19 RX ADMIN — HYDROMORPHONE HYDROCHLORIDE 0.2 MG: 2 INJECTION INTRAMUSCULAR; INTRAVENOUS; SUBCUTANEOUS at 12:09

## 2021-09-19 RX ADMIN — HYDROMORPHONE HYDROCHLORIDE 1 MG: 2 INJECTION INTRAMUSCULAR; INTRAVENOUS; SUBCUTANEOUS at 04:09

## 2021-09-19 RX ADMIN — DONEPEZIL HYDROCHLORIDE 10 MG: 5 TABLET, FILM COATED ORAL at 08:09

## 2021-09-19 RX ADMIN — BUPIVACAINE HYDROCHLORIDE 10 ML: 5 INJECTION, SOLUTION EPIDURAL; INTRACAUDAL; PERINEURAL at 12:09

## 2021-09-19 RX ADMIN — PHENYLEPHRINE HYDROCHLORIDE 100 MCG: 10 INJECTION INTRAVENOUS at 10:09

## 2021-09-19 RX ADMIN — LIDOCAINE HYDROCHLORIDE 75 MG: 20 INJECTION INTRAVENOUS at 09:09

## 2021-09-19 RX ADMIN — ONDANSETRON 4 MG: 2 INJECTION INTRAMUSCULAR; INTRAVENOUS at 11:09

## 2021-09-19 RX ADMIN — BUPIVACAINE 10 ML: 13.3 INJECTION, SUSPENSION, LIPOSOMAL INFILTRATION at 12:09

## 2021-09-19 RX ADMIN — ACETAMINOPHEN 1000 MG: 10 INJECTION, SOLUTION INTRAVENOUS at 10:09

## 2021-09-19 RX ADMIN — PHENYLEPHRINE HYDROCHLORIDE 100 MCG: 10 INJECTION INTRAVENOUS at 11:09

## 2021-09-19 RX ADMIN — PROPOFOL 100 MG: 10 INJECTION, EMULSION INTRAVENOUS at 09:09

## 2021-09-19 RX ADMIN — FENTANYL CITRATE 50 MCG: 50 INJECTION, SOLUTION INTRAMUSCULAR; INTRAVENOUS at 09:09

## 2021-09-19 RX ADMIN — ONDANSETRON 4 MG: 2 INJECTION, SOLUTION INTRAMUSCULAR; INTRAVENOUS at 09:09

## 2021-09-19 RX ADMIN — FENTANYL CITRATE 50 MCG: 50 INJECTION, SOLUTION INTRAMUSCULAR; INTRAVENOUS at 10:09

## 2021-09-19 RX ADMIN — CLINDAMYCIN PHOSPHATE 900 MG: 18 INJECTION, SOLUTION INTRAVENOUS at 10:09

## 2021-09-19 RX ADMIN — AMLODIPINE BESYLATE 5 MG: 5 TABLET ORAL at 08:09

## 2021-09-19 RX ADMIN — PHENYLEPHRINE HYDROCHLORIDE 100 MCG: 10 INJECTION INTRAVENOUS at 09:09

## 2021-09-19 RX ADMIN — DOCUSATE SODIUM 50 MG AND SENNOSIDES 8.6 MG 1 TABLET: 8.6; 5 TABLET, FILM COATED ORAL at 08:09

## 2021-09-19 RX ADMIN — EPHEDRINE SULFATE 25 MG: 50 INJECTION, SOLUTION INTRAMUSCULAR; INTRAVENOUS; SUBCUTANEOUS at 10:09

## 2021-09-19 RX ADMIN — METOPROLOL SUCCINATE 50 MG: 50 TABLET, EXTENDED RELEASE ORAL at 08:09

## 2021-09-19 RX ADMIN — LOSARTAN POTASSIUM 50 MG: 25 TABLET, FILM COATED ORAL at 08:09

## 2021-09-19 RX ADMIN — ROCURONIUM BROMIDE 5 MG: 10 INJECTION, SOLUTION INTRAVENOUS at 09:09

## 2021-09-19 RX ADMIN — SUCCINYLCHOLINE CHLORIDE 120 MG: 20 INJECTION, SOLUTION INTRAMUSCULAR; INTRAVENOUS; PARENTERAL at 09:09

## 2021-09-20 ENCOUNTER — TELEPHONE (OUTPATIENT)
Dept: MEDSURG UNIT | Facility: HOSPITAL | Age: 71
End: 2021-09-20

## 2021-09-20 ENCOUNTER — TELEPHONE (OUTPATIENT)
Dept: ORTHOPEDICS | Facility: CLINIC | Age: 71
End: 2021-09-20

## 2021-09-24 ENCOUNTER — HOSPITAL ENCOUNTER (OUTPATIENT)
Dept: RADIOLOGY | Facility: HOSPITAL | Age: 71
Discharge: HOME | End: 2021-09-24
Attending: NURSE PRACTITIONER
Payer: COMMERCIAL

## 2021-09-24 DIAGNOSIS — T14.8XXA DISLOCATION CLOSED: ICD-10-CM

## 2021-09-24 PROCEDURE — 73221 MRI JOINT UPR EXTREM W/O DYE: CPT | Mod: TC,RT

## 2021-09-24 PROCEDURE — 73221 MRI JOINT UPR EXTREM W/O DYE: CPT | Mod: 26,RT,, | Performed by: RADIOLOGY

## 2021-09-24 PROCEDURE — 73221 MRI SHOULDER WITHOUT CONTRAST RIGHT: ICD-10-PCS | Mod: 26,RT,, | Performed by: RADIOLOGY

## 2021-09-27 ENCOUNTER — OFFICE VISIT (OUTPATIENT)
Dept: ORTHOPEDICS | Facility: CLINIC | Age: 71
End: 2021-09-27
Payer: COMMERCIAL

## 2021-09-27 VITALS — RESPIRATION RATE: 16 BRPM | HEIGHT: 60 IN | WEIGHT: 244 LBS | BODY MASS INDEX: 47.91 KG/M2

## 2021-09-27 DIAGNOSIS — S43.014D ANTERIOR DISLOCATION OF RIGHT SHOULDER, SUBSEQUENT ENCOUNTER: ICD-10-CM

## 2021-09-27 DIAGNOSIS — S46.011D TRAUMATIC COMPLETE TEAR OF RIGHT ROTATOR CUFF, SUBSEQUENT ENCOUNTER: Primary | ICD-10-CM

## 2021-09-27 PROCEDURE — 99999 PR PBB SHADOW E&M-EST. PATIENT-LVL III: ICD-10-PCS | Mod: PBBFAC,,, | Performed by: ORTHOPAEDIC SURGERY

## 2021-09-27 PROCEDURE — 99999 PR PBB SHADOW E&M-EST. PATIENT-LVL III: CPT | Mod: PBBFAC,,, | Performed by: ORTHOPAEDIC SURGERY

## 2021-09-27 PROCEDURE — 99024 PR POST-OP FOLLOW-UP VISIT: ICD-10-PCS | Mod: S$GLB,,, | Performed by: ORTHOPAEDIC SURGERY

## 2021-09-27 PROCEDURE — 99024 POSTOP FOLLOW-UP VISIT: CPT | Mod: S$GLB,,, | Performed by: ORTHOPAEDIC SURGERY

## 2021-10-05 RX ORDER — TRAMADOL HYDROCHLORIDE 50 MG/1
50 TABLET ORAL EVERY 8 HOURS PRN
Qty: 90 TABLET | Refills: 4 | Status: SHIPPED | OUTPATIENT
Start: 2021-10-05 | End: 2022-01-06 | Stop reason: ALTCHOICE

## 2021-10-07 ENCOUNTER — TELEPHONE (OUTPATIENT)
Dept: ORTHOPEDICS | Facility: CLINIC | Age: 71
End: 2021-10-07

## 2021-10-11 ENCOUNTER — TELEPHONE (OUTPATIENT)
Dept: FAMILY MEDICINE | Facility: CLINIC | Age: 71
End: 2021-10-11

## 2021-10-13 ENCOUNTER — TELEPHONE (OUTPATIENT)
Dept: FAMILY MEDICINE | Facility: CLINIC | Age: 71
End: 2021-10-13

## 2021-10-22 DIAGNOSIS — M25.511 RIGHT SHOULDER PAIN, UNSPECIFIED CHRONICITY: Primary | ICD-10-CM

## 2021-10-25 ENCOUNTER — HOSPITAL ENCOUNTER (OUTPATIENT)
Dept: RADIOLOGY | Facility: HOSPITAL | Age: 71
Discharge: HOME | End: 2021-10-25
Attending: ORTHOPAEDIC SURGERY
Payer: COMMERCIAL

## 2021-10-25 ENCOUNTER — OFFICE VISIT (OUTPATIENT)
Dept: ORTHOPEDICS | Facility: CLINIC | Age: 71
End: 2021-10-25
Payer: COMMERCIAL

## 2021-10-25 VITALS — BODY MASS INDEX: 47.91 KG/M2 | RESPIRATION RATE: 16 BRPM | HEIGHT: 60 IN | WEIGHT: 244 LBS

## 2021-10-25 DIAGNOSIS — S46.011D TRAUMATIC COMPLETE TEAR OF RIGHT ROTATOR CUFF, SUBSEQUENT ENCOUNTER: Primary | ICD-10-CM

## 2021-10-25 DIAGNOSIS — M25.511 RIGHT SHOULDER PAIN, UNSPECIFIED CHRONICITY: ICD-10-CM

## 2021-10-25 DIAGNOSIS — S43.014D ANTERIOR DISLOCATION OF RIGHT SHOULDER, SUBSEQUENT ENCOUNTER: ICD-10-CM

## 2021-10-25 PROCEDURE — 99999 PR PBB SHADOW E&M-EST. PATIENT-LVL III: CPT | Mod: PBBFAC,,, | Performed by: ORTHOPAEDIC SURGERY

## 2021-10-25 PROCEDURE — 73030 X-RAY EXAM OF SHOULDER: CPT | Mod: TC,PN,RT

## 2021-10-25 PROCEDURE — 99024 POSTOP FOLLOW-UP VISIT: CPT | Mod: S$GLB,,, | Performed by: ORTHOPAEDIC SURGERY

## 2021-10-25 PROCEDURE — 99024 PR POST-OP FOLLOW-UP VISIT: ICD-10-PCS | Mod: S$GLB,,, | Performed by: ORTHOPAEDIC SURGERY

## 2021-10-25 PROCEDURE — 99999 PR PBB SHADOW E&M-EST. PATIENT-LVL III: ICD-10-PCS | Mod: PBBFAC,,, | Performed by: ORTHOPAEDIC SURGERY

## 2021-10-25 PROCEDURE — 73030 XR SHOULDER COMPLETE 2 OR MORE VIEWS RIGHT: ICD-10-PCS | Mod: 26,RT,, | Performed by: RADIOLOGY

## 2021-10-25 PROCEDURE — 73030 X-RAY EXAM OF SHOULDER: CPT | Mod: 26,RT,, | Performed by: RADIOLOGY

## 2021-11-04 DIAGNOSIS — K21.9 GASTROESOPHAGEAL REFLUX DISEASE, UNSPECIFIED WHETHER ESOPHAGITIS PRESENT: Primary | ICD-10-CM

## 2021-11-05 RX ORDER — SUCRALFATE 1 G/1
1 TABLET ORAL
Qty: 56 TABLET | Refills: 0 | Status: SHIPPED | OUTPATIENT
Start: 2021-11-05 | End: 2021-11-19

## 2021-11-10 DIAGNOSIS — R49.0 HOARSENESS: ICD-10-CM

## 2021-11-10 DIAGNOSIS — K21.9 GASTROESOPHAGEAL REFLUX DISEASE, UNSPECIFIED WHETHER ESOPHAGITIS PRESENT: ICD-10-CM

## 2021-11-11 RX ORDER — OMEPRAZOLE 20 MG/1
20 CAPSULE, DELAYED RELEASE ORAL EVERY OTHER DAY
Qty: 90 CAPSULE | Refills: 3 | Status: SHIPPED | OUTPATIENT
Start: 2021-11-11 | End: 2022-01-13 | Stop reason: SDUPTHER

## 2021-12-01 DIAGNOSIS — Z98.890 S/P ARTHROSCOPY OF RIGHT SHOULDER: Primary | ICD-10-CM

## 2021-12-02 RX ORDER — HYDROCODONE BITARTRATE AND ACETAMINOPHEN 5; 325 MG/1; MG/1
1 TABLET ORAL EVERY 6 HOURS PRN
Qty: 28 TABLET | Refills: 0 | OUTPATIENT
Start: 2021-12-02

## 2021-12-07 DIAGNOSIS — M25.511 RIGHT SHOULDER PAIN, UNSPECIFIED CHRONICITY: Primary | ICD-10-CM

## 2021-12-08 ENCOUNTER — HOSPITAL ENCOUNTER (OUTPATIENT)
Dept: RADIOLOGY | Facility: HOSPITAL | Age: 71
Discharge: HOME | End: 2021-12-08
Attending: ORTHOPAEDIC SURGERY
Payer: MEDICARE

## 2021-12-08 ENCOUNTER — OFFICE VISIT (OUTPATIENT)
Dept: ORTHOPEDICS | Facility: CLINIC | Age: 71
End: 2021-12-08
Payer: MEDICARE

## 2021-12-08 VITALS — WEIGHT: 244 LBS | BODY MASS INDEX: 47.91 KG/M2 | HEIGHT: 60 IN | RESPIRATION RATE: 18 BRPM

## 2021-12-08 DIAGNOSIS — S46.011D TRAUMATIC COMPLETE TEAR OF RIGHT ROTATOR CUFF, SUBSEQUENT ENCOUNTER: Primary | ICD-10-CM

## 2021-12-08 DIAGNOSIS — M25.511 RIGHT SHOULDER PAIN, UNSPECIFIED CHRONICITY: ICD-10-CM

## 2021-12-08 PROCEDURE — 4010F ACE/ARB THERAPY RXD/TAKEN: CPT | Mod: CPTII,S$GLB,, | Performed by: ORTHOPAEDIC SURGERY

## 2021-12-08 PROCEDURE — 4010F PR ACE/ARB THEARPY RXD/TAKEN: ICD-10-PCS | Mod: CPTII,S$GLB,, | Performed by: ORTHOPAEDIC SURGERY

## 2021-12-08 PROCEDURE — 99024 PR POST-OP FOLLOW-UP VISIT: ICD-10-PCS | Mod: S$GLB,,, | Performed by: ORTHOPAEDIC SURGERY

## 2021-12-08 PROCEDURE — 99024 POSTOP FOLLOW-UP VISIT: CPT | Mod: S$GLB,,, | Performed by: ORTHOPAEDIC SURGERY

## 2021-12-08 PROCEDURE — 73030 X-RAY EXAM OF SHOULDER: CPT | Mod: 26,RT,, | Performed by: RADIOLOGY

## 2021-12-08 PROCEDURE — 99999 PR PBB SHADOW E&M-EST. PATIENT-LVL V: ICD-10-PCS | Mod: PBBFAC,,, | Performed by: ORTHOPAEDIC SURGERY

## 2021-12-08 PROCEDURE — 73030 XR SHOULDER COMPLETE 2 OR MORE VIEWS RIGHT: ICD-10-PCS | Mod: 26,RT,, | Performed by: RADIOLOGY

## 2021-12-08 PROCEDURE — 99999 PR PBB SHADOW E&M-EST. PATIENT-LVL V: CPT | Mod: PBBFAC,,, | Performed by: ORTHOPAEDIC SURGERY

## 2021-12-08 PROCEDURE — 73030 X-RAY EXAM OF SHOULDER: CPT | Mod: TC,PN,RT

## 2021-12-09 ENCOUNTER — TELEPHONE (OUTPATIENT)
Dept: ORTHOPEDICS | Facility: CLINIC | Age: 71
End: 2021-12-09
Payer: MEDICARE

## 2021-12-13 ENCOUNTER — TELEPHONE (OUTPATIENT)
Dept: ORTHOPEDICS | Facility: CLINIC | Age: 71
End: 2021-12-13
Payer: MEDICARE

## 2021-12-20 ENCOUNTER — OFFICE VISIT (OUTPATIENT)
Dept: ORTHOPEDICS | Facility: CLINIC | Age: 71
End: 2021-12-20
Payer: MEDICARE

## 2021-12-20 VITALS — WEIGHT: 244 LBS | RESPIRATION RATE: 16 BRPM | HEIGHT: 60 IN | BODY MASS INDEX: 47.91 KG/M2

## 2021-12-20 DIAGNOSIS — S46.011D TRAUMATIC COMPLETE TEAR OF RIGHT ROTATOR CUFF, SUBSEQUENT ENCOUNTER: Primary | ICD-10-CM

## 2021-12-20 DIAGNOSIS — M75.100 TEAR OF ROTATOR CUFF, UNSPECIFIED LATERALITY, UNSPECIFIED TEAR EXTENT, UNSPECIFIED WHETHER TRAUMATIC: ICD-10-CM

## 2021-12-20 DIAGNOSIS — M25.512 LEFT SHOULDER PAIN, UNSPECIFIED CHRONICITY: ICD-10-CM

## 2021-12-20 PROCEDURE — 99999 PR PBB SHADOW E&M-EST. PATIENT-LVL IV: CPT | Mod: PBBFAC,,, | Performed by: ORTHOPAEDIC SURGERY

## 2021-12-20 PROCEDURE — 99213 PR OFFICE/OUTPT VISIT, EST, LEVL III, 20-29 MIN: ICD-10-PCS | Mod: S$GLB,,, | Performed by: ORTHOPAEDIC SURGERY

## 2021-12-20 PROCEDURE — 4010F PR ACE/ARB THEARPY RXD/TAKEN: ICD-10-PCS | Mod: CPTII,S$GLB,, | Performed by: ORTHOPAEDIC SURGERY

## 2021-12-20 PROCEDURE — 4010F ACE/ARB THERAPY RXD/TAKEN: CPT | Mod: CPTII,S$GLB,, | Performed by: ORTHOPAEDIC SURGERY

## 2021-12-20 PROCEDURE — 99213 OFFICE O/P EST LOW 20 MIN: CPT | Mod: S$GLB,,, | Performed by: ORTHOPAEDIC SURGERY

## 2021-12-20 PROCEDURE — 99999 PR PBB SHADOW E&M-EST. PATIENT-LVL IV: ICD-10-PCS | Mod: PBBFAC,,, | Performed by: ORTHOPAEDIC SURGERY

## 2021-12-21 ENCOUNTER — PATIENT MESSAGE (OUTPATIENT)
Dept: NEUROLOGY | Facility: CLINIC | Age: 71
End: 2021-12-21
Payer: MEDICARE

## 2022-01-04 ENCOUNTER — HOSPITAL ENCOUNTER (OUTPATIENT)
Dept: RADIOLOGY | Facility: HOSPITAL | Age: 72
Discharge: HOME | End: 2022-01-04
Attending: ORTHOPAEDIC SURGERY
Payer: MEDICARE

## 2022-01-04 DIAGNOSIS — M25.512 LEFT SHOULDER PAIN, UNSPECIFIED CHRONICITY: ICD-10-CM

## 2022-01-04 PROCEDURE — 73221 MRI SHOULDER WITHOUT CONTRAST LEFT: ICD-10-PCS | Mod: 26,LT,, | Performed by: RADIOLOGY

## 2022-01-04 PROCEDURE — 73221 MRI JOINT UPR EXTREM W/O DYE: CPT | Mod: 26,LT,, | Performed by: RADIOLOGY

## 2022-01-04 PROCEDURE — 73221 MRI JOINT UPR EXTREM W/O DYE: CPT | Mod: TC,LT

## 2022-01-05 ENCOUNTER — TELEPHONE (OUTPATIENT)
Dept: FAMILY MEDICINE | Facility: CLINIC | Age: 72
End: 2022-01-05
Payer: MEDICARE

## 2022-01-05 NOTE — TELEPHONE ENCOUNTER
----- Message from Bria Garduno sent at 1/5/2022  3:37 PM CST -----  - pt needs 90 day omeprazole with refills   [phone]

## 2022-01-06 ENCOUNTER — OFFICE VISIT (OUTPATIENT)
Dept: ORTHOPEDICS | Facility: CLINIC | Age: 72
End: 2022-01-06
Payer: MEDICARE

## 2022-01-06 ENCOUNTER — TELEPHONE (OUTPATIENT)
Dept: FAMILY MEDICINE | Facility: CLINIC | Age: 72
End: 2022-01-06
Payer: MEDICARE

## 2022-01-06 VITALS — BODY MASS INDEX: 47.91 KG/M2 | RESPIRATION RATE: 18 BRPM | HEIGHT: 60 IN | WEIGHT: 244 LBS

## 2022-01-06 DIAGNOSIS — S46.011D TRAUMATIC COMPLETE TEAR OF RIGHT ROTATOR CUFF, SUBSEQUENT ENCOUNTER: Primary | ICD-10-CM

## 2022-01-06 DIAGNOSIS — M75.100 TEAR OF ROTATOR CUFF, UNSPECIFIED LATERALITY, UNSPECIFIED TEAR EXTENT, UNSPECIFIED WHETHER TRAUMATIC: ICD-10-CM

## 2022-01-06 PROCEDURE — 1101F PT FALLS ASSESS-DOCD LE1/YR: CPT | Mod: CPTII,S$GLB,, | Performed by: ORTHOPAEDIC SURGERY

## 2022-01-06 PROCEDURE — 99214 OFFICE O/P EST MOD 30 MIN: CPT | Mod: 57,S$GLB,, | Performed by: ORTHOPAEDIC SURGERY

## 2022-01-06 PROCEDURE — 3288F PR FALLS RISK ASSESSMENT DOCUMENTED: ICD-10-PCS | Mod: CPTII,S$GLB,, | Performed by: ORTHOPAEDIC SURGERY

## 2022-01-06 PROCEDURE — 3288F FALL RISK ASSESSMENT DOCD: CPT | Mod: CPTII,S$GLB,, | Performed by: ORTHOPAEDIC SURGERY

## 2022-01-06 PROCEDURE — 1126F PR PAIN SEVERITY QUANTIFIED, NO PAIN PRESENT: ICD-10-PCS | Mod: CPTII,S$GLB,, | Performed by: ORTHOPAEDIC SURGERY

## 2022-01-06 PROCEDURE — 1160F RVW MEDS BY RX/DR IN RCRD: CPT | Mod: CPTII,S$GLB,, | Performed by: ORTHOPAEDIC SURGERY

## 2022-01-06 PROCEDURE — 1159F PR MEDICATION LIST DOCUMENTED IN MEDICAL RECORD: ICD-10-PCS | Mod: CPTII,S$GLB,, | Performed by: ORTHOPAEDIC SURGERY

## 2022-01-06 PROCEDURE — 1160F PR REVIEW ALL MEDS BY PRESCRIBER/CLIN PHARMACIST DOCUMENTED: ICD-10-PCS | Mod: CPTII,S$GLB,, | Performed by: ORTHOPAEDIC SURGERY

## 2022-01-06 PROCEDURE — 99999 PR PBB SHADOW E&M-EST. PATIENT-LVL IV: CPT | Mod: PBBFAC,,, | Performed by: ORTHOPAEDIC SURGERY

## 2022-01-06 PROCEDURE — 3008F PR BODY MASS INDEX (BMI) DOCUMENTED: ICD-10-PCS | Mod: CPTII,S$GLB,, | Performed by: ORTHOPAEDIC SURGERY

## 2022-01-06 PROCEDURE — 1126F AMNT PAIN NOTED NONE PRSNT: CPT | Mod: CPTII,S$GLB,, | Performed by: ORTHOPAEDIC SURGERY

## 2022-01-06 PROCEDURE — 1159F MED LIST DOCD IN RCRD: CPT | Mod: CPTII,S$GLB,, | Performed by: ORTHOPAEDIC SURGERY

## 2022-01-06 PROCEDURE — 3008F BODY MASS INDEX DOCD: CPT | Mod: CPTII,S$GLB,, | Performed by: ORTHOPAEDIC SURGERY

## 2022-01-06 PROCEDURE — 1101F PR PT FALLS ASSESS DOC 0-1 FALLS W/OUT INJ PAST YR: ICD-10-PCS | Mod: CPTII,S$GLB,, | Performed by: ORTHOPAEDIC SURGERY

## 2022-01-06 PROCEDURE — 99999 PR PBB SHADOW E&M-EST. PATIENT-LVL IV: ICD-10-PCS | Mod: PBBFAC,,, | Performed by: ORTHOPAEDIC SURGERY

## 2022-01-06 PROCEDURE — 99214 PR OFFICE/OUTPT VISIT, EST, LEVL IV, 30-39 MIN: ICD-10-PCS | Mod: 57,S$GLB,, | Performed by: ORTHOPAEDIC SURGERY

## 2022-01-06 RX ORDER — HYDROCODONE BITARTRATE AND ACETAMINOPHEN 5; 325 MG/1; MG/1
1 TABLET ORAL EVERY 6 HOURS PRN
Qty: 56 TABLET | Refills: 0 | Status: SHIPPED | OUTPATIENT
Start: 2022-01-06 | End: 2022-01-20

## 2022-01-06 NOTE — LETTER
(date: 01/12/2022)        Name: Genoveva Gilbert  YOB: 1950        To Whom It May Concern:       It is my medical opinion that the above patient is medically stable and cleared for right rotator cuff surgery when she has completed the required time of quarantine for her positive COVID-19 screening.     If you have any questions or concerns, please don't hesitate to contact my office.       Sincerely,    Ross Marquez MD

## 2022-01-06 NOTE — PROGRESS NOTES
Patient, Genoveva Gilbert (MRN #[id number]), presented with a recorded BMI of 47.65 kg/m^2 consistent with the definition of morbid obesity (ICD-10 E66.01). The patient's morbid obesity was monitored, evaluated, addressed and/or treated. This addendum to the medical record is made on 01/06/2022.

## 2022-01-06 NOTE — TELEPHONE ENCOUNTER
----- Message from Taina Eaton LPN sent at 1/6/2022 10:10 AM CST -----  Good morning,  The attached patient is having a Rotator cuff repair surgery with Dr. Pitts on 01/20/2022. We need to obtain surgical clearance from your office to proceed with the procedure as scheduled. Please provide medical clearance per request. The patient is scheduled for a preoperative apt on 01/25/2022 at 9:45 am. It would be helpful if we had the clearance prior to the appointment. Please let us know if you need anything from our office to provide clearance. When clearance is obtained please send us a message or fax it to us at [phone].     Thank you,  Taina ARANDA LPN

## 2022-01-06 NOTE — PROGRESS NOTES
Past Medical History:   Diagnosis Date    Allergy     Anxiety     Arthritis, rheumatoid     Back pain     Depression     Fibromyalgia     GERD (gastroesophageal reflux disease)     High cholesterol     Hilar mass 3/27/2014    [place] (hard of hearing)     aid right ear    Hypertension     Incontinence of urine     Lymphedema     MS (multiple sclerosis)     benign; another MD stated she has no MS    Neuropathy     Restless leg syndrome     Rotator cuff tear 4/16/2015    Sciatica of left side 1/5/2018    Seasonal allergies     Sinus congestion     Sleep apnea     DOES NOT USE MACHINE    Spondylolysis     Thyroid disease     Type 2 diabetes mellitus with polyneuropathy     no med. was borderline    Wheezing        Past Surgical History:   Procedure Laterality Date    APPENDECTOMY      BREAST SURGERY      reduction    CLOSED REDUCTION OF INJURY OF SHOULDER Right 9/19/2021    Procedure: CLOSED REDUCTION, SHOULDER;  Surgeon: Antonio Irwin MD;  Location: [facility] OR;  Service: Orthopedics;  Laterality: Right;    EPIDURAL STEROID INJECTION INTO LUMBAR SPINE N/A 6/12/2019    Procedure: Injection-steroid-epidural-lumbar;  Surgeon: Thad Manning MD;  Location: [facility] OR;  Service: Pain Management;  Laterality: N/A;  L5-S1    EPIDURAL STEROID INJECTION INTO LUMBAR SPINE N/A 9/16/2019    Procedure: Injection-steroid-epidural-lumbar;  Surgeon: Thad Manning MD;  Location: [facility] OR;  Service: Pain Management;  Laterality: N/A;  L5-S1    EYE SURGERY Bilateral     HYSTERECTOMY      partial - fibroids    INJECTION OF ANESTHETIC AGENT AROUND MEDIAL BRANCH NERVES INNERVATING LUMBAR FACET JOINT Bilateral 2/28/2019    Procedure: Block-nerve-medial branch-lumbar;  Surgeon: Thad Manning MD;  Location[facility];  Service: Pain Management;  Laterality: Bilateral;  L3, 4,5     INJECTION OF ANESTHETIC AGENT AROUND MEDIAL BRANCH NERVES INNERVATING LUMBAR FACET JOINT Bilateral 3/21/2019    Procedure: Block-nerve-medial  branch-lumbar L3,4,5;  Surgeon: Thad Manning MD;  Location: [facility] OR;  Service: Pain Management;  Laterality: Bilateral;    KNEE ARTHROPLASTY Left 3/16/2021    Procedure: ARTHROPLASTY, KNEE;  Surgeon: Rio Pitts MD;  Location: [facility] OR;  Service: Orthopedics;  Laterality: Left;  GENERAL AND BLOCK    LIPOSUCTION  1985    RADIOFREQUENCY ABLATION Left 11/4/2020    Procedure: Radiofrequency Ablation left knee;  Surgeon: Andrew Escudero MD;  Location: [facility] OR;  Service: Pain Management;  Laterality: Left;    RADIOFREQUENCY ABLATION OF LUMBAR MEDIAL BRANCH NERVE AT SINGLE LEVEL Bilateral 4/12/2019    Procedure: Radiofrequency Ablation, Nerve, Spinal, Lumbar, Medial Branch, 1 Level;  Surgeon: Thad Manning MD;  Location: [facility] OR;  Service: Pain Management;  Laterality: Bilateral;  L3, 4, 5  lumbar  Base CRM pain management generator  SN [id number]  80 degrees for 75 seconds x2    RADIOFREQUENCY ABLATION OF LUMBAR MEDIAL BRANCH NERVE AT SINGLE LEVEL Bilateral 10/22/2019    Procedure: Radiofrequency Ablation, Nerve, Spinal, Lumbar, Medial Branch, L3,4,5;  Surgeon: Thad Manning MD;  Location: [facility];  Service: Pain Management;  Laterality: Bilateral;  burned at 80 degrees C X 60 seconds X 2 each site    RADIOFREQUENCY ABLATION OF LUMBAR MEDIAL BRANCH NERVE AT SINGLE LEVEL Bilateral 5/27/2020    Procedure: Radiofrequency Ablation, Nerve, Spinal, Lumbar, Medial Branch, 1 Level;  Surgeon: Thad Manning MD;  Location: [facility];  Service: Pain Management;  Laterality: Bilateral;  L3,4,5    SHOULDER ARTHROSCOPY Right 9/19/2021    Procedure: ARTHROSCOPY, SHOULDER;  Surgeon: Antonio Irwin MD;  Location: [facility];  Service: Orthopedics;  Laterality: Right;  LIMITED DEBRIDMENT    TONSILLECTOMY      TOTAL REDUCTION MAMMOPLASTY         Current Outpatient Medications   Medication Sig    albuterol (PROAIR HFA) 90 mcg/actuation inhaler Inhale 2 puffs into the lungs every 6 (six) hours as needed for Wheezing. Rescue     ALPRAZolam (XANAX) 2 MG Tab Take 1 tablet (2 mg total) by mouth 2 (two) times daily as needed (anxiety).    amitriptyline (ELAVIL) 50 MG tablet TAKE 1 TABLET(50 MG) BY MOUTH EVERY NIGHT AS NEEDED FOR INSOMNIA    amLODIPine (NORVASC) 5 MG tablet Take 1 tablet (5 mg total) by mouth once daily. For blood pressure    biotin 10,000 mcg Cap Take 13,000 mcg by mouth once daily.    celecoxib (CELEBREX) 200 MG capsule Take 1 capsule (200 mg total) by mouth daily as needed (arthritis pain).    donepeziL (ARICEPT) 10 MG tablet Take 1 tablet (10 mg total) by mouth once daily.    fexofenadine (ALLEGRA) 180 MG tablet     furosemide (LASIX) 20 MG tablet TAKE 1 TABLET BY MOUTH DAILY AS NEEDED FOR LEF SWELLING    hydroCHLOROthiazide (HYDRODIURIL) 25 MG tablet Take 1 tablet (25 mg total) by mouth once daily.    HYDROcodone-acetaminophen (NORCO) 5-325 mg per tablet Take 1 tablet by mouth every 6 (six) hours as needed for Pain.    levothyroxine (SYNTHROID) 88 MCG tablet Take 1 tablet (88 mcg total) by mouth once daily.    losartan (COZAAR) 50 MG tablet Take 1 tablet (50 mg total) by mouth once daily. For blood pressure    lovastatin (MEVACOR) 40 MG tablet Take 1 tablet (40 mg total) by mouth nightly. at bedtime. For cholesterol    meclizine (ANTIVERT) 25 mg tablet Take 25 mg by mouth daily as needed.    metoprolol succinate (TOPROL-XL) 50 MG 24 hr tablet Take 1 tablet (50 mg total) by mouth once daily. For blood pressure and heart    MULTIVIT-IRON-MIN-FOLIC ACID 3,500-18-0.4 UNIT-MG-MG ORAL CHEW Take 1 tablet by mouth.    nystatin (MYCOSTATIN) cream Apply topically 2 (two) times daily.    omeprazole (PRILOSEC) 20 MG capsule Take 1 capsule (20 mg total) by mouth every other day.    potassium chloride (MICRO-K) 10 MEQ CpSR Take 1 capsule (10 mEq total) by mouth every evening.    pramipexole (MIRAPEX) 0.5 MG tablet Take 1 tablet (0.5 mg total) by mouth every evening. For restless legs    pregabalin (LYRICA) 25 MG  capsule Take 1 capsule (25 mg total) by mouth 3 (three) times daily.    traMADoL (ULTRAM) 50 mg tablet Take 1 tablet (50 mg total) by mouth every 8 (eight) hours as needed for Pain.    umeclidinium-vilanteroL (ANORO ELLIPTA) 62.5-25 mcg/actuation DsDv Inhale 1 puff into the lungs once daily. Controller    valacyclovir HCl (VALACYCLOVIR ORAL) Take by mouth.    venlafaxine (EFFEXOR-XR) 150 MG Cp24 Take 1 capsule (150 mg total) by mouth once daily. For mood     No current facility-administered medications for this visit.       Review of patient's allergies indicates:   Allergen Reactions    Keflex [cephalexin]     Latex, natural rubber Anaphylaxis     bandaids     Pcn [penicillins]     Adhesive Other (See Comments)     bandainds redness at skin    Trelegy ellipta [fluticasone-umeclidin-vilanter]      Vision disturbance       Family History   Problem Relation Age of Onset    Heart disease Mother        Social History     Socioeconomic History    Marital status:    Tobacco Use    Smoking status: Former Smoker     Quit date: 1996     Years since quittin.6    Smokeless tobacco: Never Used   Substance and Sexual Activity    Alcohol use: Yes     Comment: very little     Drug use: No     Social Determinants of Health     Financial Resource Strain: Low Risk     Difficulty of Paying Living Expenses: Not very hard   Food Insecurity: No Food Insecurity    Worried About Running Out of Food in the Last Year: Never true    Ran Out of Food in the Last Year: Never true   Transportation Needs: No Transportation Needs    Lack of Transportation (Medical): No    Lack of Transportation (Non-Medical): No   Physical Activity: Inactive    Days of Exercise per Week: 0 days    Minutes of Exercise per Session: 0 min   Stress: Stress Concern Present    Feeling of Stress : Very much   Social Connections: Moderately Isolated    Frequency of Communication with Friends and Family: More than three times a week     Frequency of Social Gatherings with Friends and Family: Once a week    Attends Judaism Services: Never    Active Member of Clubs or Organizations: No    Attends Club or Organization Meetings: Never    Marital Status:    Housing Stability: Low Risk     Unable to Pay for Housing in the Last Year: No    Number of Places Lived in the Last Year: 1    Unstable Housing in the Last Year: No       Chief Complaint:   Chief Complaint   Patient presents with    Follow-up     MRI L shoulder       History of present illness:  This is a 71-year-old female seen for bilateral shoulder pain.  We injected her about a year ago.  MRI just showed a very small rotator cuff tear.  Pain has gotten much worse over the last few months.  Patient had a fall back in September and suffered a dislocation with greater tuberosity fracture.  Patient was treated with closed reduction.  It was then talk about going back in and repairing her rotator cuff.     Review of Systems:    Constitution: Negative for chills, fever, and sweats.  Negative for unexplained weight loss.    HENT:  Negative for headaches and blurry vision.    Cardiovascular:Negative for chest pain or irregular heart beat. Negative for hypertension.    Respiratory:  Negative for cough and positive for shortness of breath.    Gastrointestinal: Negative for abdominal pain, heartburn, melena, nausea, and vomitting.    Genitourinary:  Negative bladder incontinence and dysuria.  Positive for urgency.    Musculoskeletal:  See HPI    Neurological:  Positive for numbness.    Psychiatric/Behavioral:  Positive for depression and anxiety    Endocrine: Negative for polyuria    Hematologic/Lymphatic: Negative for bleeding problem.  Does  bruise/bleed easily.    Skin: Negative for poor would healing and rash      Physical Examination:    Vital Signs:    Vitals:    01/06/22 0938   Resp: 18       Body mass index is 47.65 kg/m².    This a well-developed, well nourished patient in no  acute distress.  They are alert and oriented and cooperative to examination.  Pt. walks without an antalgic gait.        Examination of the right shoulder shows no rashes or erythema. There are no masses, ecchymosis, or atrophy. The patient has full range of motion in forward flexion, external rotation, and internal rotation to the mid T-spine. The patient has - impingement signs. - [place]'s test. - Speeds test. Nontender to palpation over a.c. joint. Normal stability anteriorly, posteriorly, and negative sulcus sign. Passive range of motion: Forward flexion of 180°, external rotation at 90° of 90°, internal rotation of 50°, and external rotation at 0° of 50°. 2+ radial pulse. Intact axillary, radial, median and ulnar sensation. 4+ out of 5 resisted forward flexion, external rotation, and negative lift off test.    Heart is regular rate without obvious murmurs   Normal respiratory effort without audible wheezing  Abdomen is soft and nontender         X-rays:  X-rays of left knee is  reviewed which show moderate to severe medial compartment narrowing of both knees.  X-rays of the left shoulder is  review which show some sclerosis near the greater tuberosity consistent with chronic rotator cuff syndrome    MRI of the left shoulder:Full-thickness tear and short distance retraction of the anterior supraspinatus tendon.  Possible partial articular surface tear of the more posterior supraspinatus tendon.  Subchondral cyst, geodes noted in the femoral head.  Small inferior spur from the distal head of the clavicle with mild impingement.     Assessment::    Right rotator cuff tear after dislocation    Plan:  I reviewed the findings with her today.  We talked about arthroscopic rotator cuff repair.  Patient would like to go ahead and have this done.  She hurts all the time and has very little function with the right shoulder.  Risks, benefits, and alternatives to the procedure were explained to the patient including but not  limited to damage to nerves, arteries, blood vessels, bones, tendons, ligaments, stiffness, instability, infection, DVT, PE, as well as general anesthetic complications including seizure, stroke, heart attack and even death. The patient understood these risks and wished to proceed and signed the informed consent.       This note was created using [employer] voice recognition software that occasionally misinterpreted phrases or words.    Consult note is delivered via Epic messaging service.

## 2022-01-07 DIAGNOSIS — Z01.818 PRE-OP TESTING: ICD-10-CM

## 2022-01-07 DIAGNOSIS — M75.101 RIGHT ROTATOR CUFF TEAR: ICD-10-CM

## 2022-01-07 DIAGNOSIS — M75.100 TEAR OF ROTATOR CUFF, UNSPECIFIED LATERALITY, UNSPECIFIED TEAR EXTENT, UNSPECIFIED WHETHER TRAUMATIC: Primary | ICD-10-CM

## 2022-01-07 RX ORDER — MUPIROCIN 20 MG/G
OINTMENT TOPICAL
Status: CANCELLED | OUTPATIENT
Start: 2022-01-07

## 2022-01-07 RX ORDER — CLINDAMYCIN PHOSPHATE 900 MG/50ML
900 INJECTION, SOLUTION INTRAVENOUS
Status: CANCELLED | OUTPATIENT
Start: 2022-01-07

## 2022-01-09 ENCOUNTER — LAB VISIT (OUTPATIENT)
Dept: PRIMARY CARE CLINIC | Facility: OTHER | Age: 72
End: 2022-01-09
Attending: INTERNAL MEDICINE
Payer: MEDICARE

## 2022-01-09 DIAGNOSIS — Z20.822 ENCOUNTER FOR LABORATORY TESTING FOR COVID-19 VIRUS: ICD-10-CM

## 2022-01-09 PROCEDURE — U0003 INFECTIOUS AGENT DETECTION BY NUCLEIC ACID (DNA OR RNA); SEVERE ACUTE RESPIRATORY SYNDROME CORONAVIRUS 2 (SARS-COV-2) (CORONAVIRUS DISEASE [COVID-19]), AMPLIFIED PROBE TECHNIQUE, MAKING USE OF HIGH THROUGHPUT TECHNOLOGIES AS DESCRIBED BY CMS-2020-01-R: HCPCS | Performed by: INTERNAL MEDICINE

## 2022-01-11 ENCOUNTER — TELEPHONE (OUTPATIENT)
Dept: FAMILY MEDICINE | Facility: CLINIC | Age: 72
End: 2022-01-11
Payer: MEDICARE

## 2022-01-11 NOTE — TELEPHONE ENCOUNTER
----- Message from Bria Garduno sent at 1/11/2022 11:22 AM CST -----  Vm- the directions on her 2 rx's are not correct and she has not picked them up yet. Please send in new ones.   [phone]

## 2022-01-12 ENCOUNTER — TELEPHONE (OUTPATIENT)
Dept: ORTHOPEDICS | Facility: CLINIC | Age: 72
End: 2022-01-12
Payer: MEDICARE

## 2022-01-12 DIAGNOSIS — U07.1 COVID-19 VIRUS DETECTED: ICD-10-CM

## 2022-01-12 LAB — SARS-COV-2 RNA RESP QL NAA+PROBE: DETECTED

## 2022-01-12 NOTE — TELEPHONE ENCOUNTER
Called pt and confirmed with her that her surgery is going to be on her right shoulder. Pt verbalized understanding

## 2022-01-12 NOTE — TELEPHONE ENCOUNTER
----- Message from Lian Valentin MA sent at 1/12/2022 10:09 AM CST -----  Patient would like to know is she still having surgery on the January 28, 2022, because she tested positive for Covid. Please call back to schedule [phone].

## 2022-01-12 NOTE — TELEPHONE ENCOUNTER
----- Message from Genny Murphy MA sent at 1/12/2022  9:06 AM CST -----  Contact: pt  Wants to discuss surgery   Unsure of what side he is working on   Call back

## 2022-01-12 NOTE — TELEPHONE ENCOUNTER
Patient has refills available at the pharmacy for those medications. Clearance letter provided to surgeon

## 2022-01-12 NOTE — TELEPHONE ENCOUNTER
Spoke with patient notified per dr atwood will clear.  Pt notified need to contact dr savage regarding covid status.  Patient verbalized understanding.  States she also needs refills on omeprazole and pramipexole.  Refills pended -DN

## 2022-01-12 NOTE — TELEPHONE ENCOUNTER
----- Message from Yane Kaye sent at 1/12/2022  9:28 AM CST -----  Pt calling said she needs a surgery clearance appt said she has surgery scheduled for the 28th she also had a Covid test at [facility] and has not yet been given her results.  Pt wants a call back [phone] or [phone]

## 2022-01-13 ENCOUNTER — TELEPHONE (OUTPATIENT)
Dept: FAMILY MEDICINE | Facility: CLINIC | Age: 72
End: 2022-01-13
Payer: MEDICARE

## 2022-01-13 DIAGNOSIS — R49.0 HOARSENESS: ICD-10-CM

## 2022-01-13 DIAGNOSIS — K21.9 GASTROESOPHAGEAL REFLUX DISEASE, UNSPECIFIED WHETHER ESOPHAGITIS PRESENT: ICD-10-CM

## 2022-01-13 DIAGNOSIS — G25.81 RESTLESS LEG SYNDROME: ICD-10-CM

## 2022-01-13 NOTE — TELEPHONE ENCOUNTER
----- Message from Bria Garduno sent at 1/13/2022  3:01 PM CST -----  - pt would like to discuss if she needs to schedule an appt to be cleared for surgery she is having on the 28th.   [phone]

## 2022-01-13 NOTE — TELEPHONE ENCOUNTER
----- Message from Manuel Esteban MA sent at 1/13/2022  3:25 PM CST -----  Pt calling regarding omeprazole and pramipexole refills that she called in yesterday and she needs it for 90 days. # [phone]

## 2022-01-14 NOTE — PROGRESS NOTES
PHYSICAL THERAPY DISCHARGE SUMMARY    Name: Genoveva Gilbert  Referring Provider: Randall Lester MD  PT Order: PT evaluate and treat   Clinical #: [id number]  Discharge Summary Date: 9/21/2017  Diagnosis:   1. Chronic right shoulder pain     2. Muscle weakness         Patient was seen for 5 OP PT visits from 8-7-17 to 8-30-17. Pt cancelled/no show visit 5 scheduled sessions. Treatment included: evaluation, HEP, pt education, joint and soft tissue mobilizations, ther ex, and therapeutic taping and ice. Pt called and left voice mail that her fibromyalgia and neuropathy were giving her a lot of trouble.  Pt states shoulder is feeling better overall and after discussion with MD decided to hold off PT for now. PT unable to fully assess goal achievement as pt did not return for follow up sessions/did not reschedule follow up visits. This patient is discharged from OP PT Services.    
Hide Include Location In Plan Question?: No
Detail Level: Generalized

## 2022-01-15 ENCOUNTER — LAB VISIT (OUTPATIENT)
Dept: PRIMARY CARE CLINIC | Facility: OTHER | Age: 72
End: 2022-01-15
Attending: INTERNAL MEDICINE
Payer: MEDICARE

## 2022-01-15 DIAGNOSIS — Z20.822 ENCOUNTER FOR LABORATORY TESTING FOR COVID-19 VIRUS: ICD-10-CM

## 2022-01-15 PROCEDURE — U0003 INFECTIOUS AGENT DETECTION BY NUCLEIC ACID (DNA OR RNA); SEVERE ACUTE RESPIRATORY SYNDROME CORONAVIRUS 2 (SARS-COV-2) (CORONAVIRUS DISEASE [COVID-19]), AMPLIFIED PROBE TECHNIQUE, MAKING USE OF HIGH THROUGHPUT TECHNOLOGIES AS DESCRIBED BY CMS-2020-01-R: HCPCS | Performed by: INTERNAL MEDICINE

## 2022-01-18 ENCOUNTER — TELEPHONE (OUTPATIENT)
Dept: FAMILY MEDICINE | Facility: CLINIC | Age: 72
End: 2022-01-18
Payer: MEDICARE

## 2022-01-18 ENCOUNTER — TELEPHONE (OUTPATIENT)
Dept: ORTHOPEDICS | Facility: CLINIC | Age: 72
End: 2022-01-18
Payer: MEDICARE

## 2022-01-18 LAB
SARS-COV-2 RNA RESP QL NAA+PROBE: DETECTED
SARS-COV-2- CYCLE NUMBER: 27

## 2022-01-18 RX ORDER — PRAMIPEXOLE DIHYDROCHLORIDE 0.5 MG/1
1 TABLET ORAL NIGHTLY
Qty: 180 TABLET | Refills: 3 | Status: SHIPPED | OUTPATIENT
Start: 2022-01-18 | End: 2023-04-03 | Stop reason: SDUPTHER

## 2022-01-18 RX ORDER — OMEPRAZOLE 20 MG/1
20 CAPSULE, DELAYED RELEASE ORAL EVERY OTHER DAY
Qty: 90 CAPSULE | Refills: 3 | Status: SHIPPED | OUTPATIENT
Start: 2022-01-18 | End: 2022-06-09 | Stop reason: SDUPTHER

## 2022-01-18 NOTE — TELEPHONE ENCOUNTER
----- Message from Genny Murphy MA sent at 1/18/2022  2:04 PM CST -----  Contact: pt  Asking to speak to nurse regarding Covid testing,   Scheduled for surgery 01/28  Covid test is postiive     Call back

## 2022-01-18 NOTE — TELEPHONE ENCOUNTER
Tested positive 01/09/2022, she should be fine by 01/28/20222, correct? It is a 10 day quarantine now. Please advise.

## 2022-01-18 NOTE — TELEPHONE ENCOUNTER
----- Message from Bibiana Montes De Oca sent at 1/18/2022  1:25 PM CST -----  Contact: Genoveva  Patient would like a call back at [phone] in regards to having someone explain her covid results to her.    Thanks

## 2022-01-19 ENCOUNTER — TELEPHONE (OUTPATIENT)
Dept: ORTHOPEDICS | Facility: CLINIC | Age: 72
End: 2022-01-19
Payer: MEDICARE

## 2022-01-19 NOTE — TELEPHONE ENCOUNTER
----- Message from Genny Murphy MA sent at 1/19/2022  9:42 AM CST -----  Contact: pt  Tested positive covid   Please advise  [phone]

## 2022-01-19 NOTE — TELEPHONE ENCOUNTER
Confirmed with patient that she is good to go for surgery that she can test positive even after she has done her quarantine that the CDC does not recommend getting retested pt verbalized understanding

## 2022-01-24 RX ORDER — NYSTATIN 100000 U/G
CREAM TOPICAL 2 TIMES DAILY
Qty: 60 G | Refills: 1 | Status: SHIPPED | OUTPATIENT
Start: 2022-01-24 | End: 2022-03-25 | Stop reason: SDUPTHER

## 2022-01-24 NOTE — TELEPHONE ENCOUNTER
----- Message from Bria Garduno sent at 1/24/2022  2:24 PM CST -----  - pt needs her ointment refilled.   [place]   [phone]

## 2022-01-25 ENCOUNTER — HOSPITAL ENCOUNTER (OUTPATIENT)
Dept: PREADMISSION TESTING | Facility: HOSPITAL | Age: 72
Discharge: HOME | End: 2022-01-25
Attending: ORTHOPAEDIC SURGERY
Payer: MEDICARE

## 2022-01-25 ENCOUNTER — LAB VISIT (OUTPATIENT)
Dept: PRIMARY CARE CLINIC | Facility: CLINIC | Age: 72
End: 2022-01-25
Payer: MEDICARE

## 2022-01-25 ENCOUNTER — HOSPITAL ENCOUNTER (OUTPATIENT)
Dept: RADIOLOGY | Facility: HOSPITAL | Age: 72
Discharge: HOME | End: 2022-01-25
Attending: ORTHOPAEDIC SURGERY
Payer: MEDICARE

## 2022-01-25 VITALS — HEIGHT: 60 IN | BODY MASS INDEX: 45.94 KG/M2 | WEIGHT: 234 LBS

## 2022-01-25 DIAGNOSIS — S46.011D TRAUMATIC COMPLETE TEAR OF RIGHT ROTATOR CUFF, SUBSEQUENT ENCOUNTER: ICD-10-CM

## 2022-01-25 DIAGNOSIS — Z01.818 PREOP TESTING: ICD-10-CM

## 2022-01-25 DIAGNOSIS — M75.100 TEAR OF ROTATOR CUFF, UNSPECIFIED LATERALITY, UNSPECIFIED TEAR EXTENT, UNSPECIFIED WHETHER TRAUMATIC: ICD-10-CM

## 2022-01-25 DIAGNOSIS — Z01.818 PRE-OP TESTING: ICD-10-CM

## 2022-01-25 LAB
ANION GAP SERPL CALC-SCNC: 11 MMOL/L (ref 8–16)
BASOPHILS # BLD AUTO: 0.04 K/UL (ref 0–0.2)
BASOPHILS NFR BLD: 0.6 % (ref 0–1.9)
BUN SERPL-MCNC: 19 MG/DL (ref 8–23)
CALCIUM SERPL-MCNC: 9.7 MG/DL (ref 8.7–10.5)
CHLORIDE SERPL-SCNC: 102 MMOL/L (ref 95–110)
CO2 SERPL-SCNC: 27 MMOL/L (ref 23–29)
CREAT SERPL-MCNC: 0.8 MG/DL (ref 0.5–1.4)
DIFFERENTIAL METHOD: ABNORMAL
EOSINOPHIL # BLD AUTO: 0.2 K/UL (ref 0–0.5)
EOSINOPHIL NFR BLD: 3.3 % (ref 0–8)
ERYTHROCYTE [DISTWIDTH] IN BLOOD BY AUTOMATED COUNT: 13.8 % (ref 11.5–14.5)
EST. GFR  (AFRICAN AMERICAN): >60 ML/MIN/1.73 M^2
EST. GFR  (NON AFRICAN AMERICAN): >60 ML/MIN/1.73 M^2
GLUCOSE SERPL-MCNC: 111 MG/DL (ref 70–110)
HCT VFR BLD AUTO: 39.7 % (ref 37–48.5)
HGB BLD-MCNC: 12.7 G/DL (ref 12–16)
IMM GRANULOCYTES # BLD AUTO: 0.03 K/UL (ref 0–0.04)
IMM GRANULOCYTES NFR BLD AUTO: 0.5 % (ref 0–0.5)
LYMPHOCYTES # BLD AUTO: 2.2 K/UL (ref 1–4.8)
LYMPHOCYTES NFR BLD: 35 % (ref 18–48)
MCH RBC QN AUTO: 28.9 PG (ref 27–31)
MCHC RBC AUTO-ENTMCNC: 32 G/DL (ref 32–36)
MCV RBC AUTO: 90 FL (ref 82–98)
MONOCYTES # BLD AUTO: 0.6 K/UL (ref 0.3–1)
MONOCYTES NFR BLD: 8.6 % (ref 4–15)
NEUTROPHILS # BLD AUTO: 3.3 K/UL (ref 1.8–7.7)
NEUTROPHILS NFR BLD: 52 % (ref 38–73)
NRBC BLD-RTO: 0 /100 WBC
PLATELET # BLD AUTO: 289 K/UL (ref 150–450)
PMV BLD AUTO: 9.1 FL (ref 9.2–12.9)
POTASSIUM SERPL-SCNC: 3.4 MMOL/L (ref 3.5–5.1)
RBC # BLD AUTO: 4.39 M/UL (ref 4–5.4)
SODIUM SERPL-SCNC: 140 MMOL/L (ref 136–145)
WBC # BLD AUTO: 6.38 K/UL (ref 3.9–12.7)

## 2022-01-25 PROCEDURE — 93010 ELECTROCARDIOGRAM REPORT: CPT | Mod: ,,, | Performed by: SPECIALIST

## 2022-01-25 PROCEDURE — 80048 BASIC METABOLIC PNL TOTAL CA: CPT | Performed by: ORTHOPAEDIC SURGERY

## 2022-01-25 PROCEDURE — 99900103 DSU ONLY-NO CHARGE-INITIAL HR (STAT)

## 2022-01-25 PROCEDURE — 71046 X-RAY EXAM CHEST 2 VIEWS: CPT | Mod: TC,FY

## 2022-01-25 PROCEDURE — 71046 X-RAY EXAM CHEST 2 VIEWS: CPT | Mod: 26,,, | Performed by: RADIOLOGY

## 2022-01-25 PROCEDURE — 93010 EKG 12-LEAD: ICD-10-PCS | Mod: ,,, | Performed by: SPECIALIST

## 2022-01-25 PROCEDURE — 36415 COLL VENOUS BLD VENIPUNCTURE: CPT | Performed by: ORTHOPAEDIC SURGERY

## 2022-01-25 PROCEDURE — 71046 XR CHEST PA AND LATERAL: ICD-10-PCS | Mod: 26,,, | Performed by: RADIOLOGY

## 2022-01-25 PROCEDURE — 93005 ELECTROCARDIOGRAM TRACING: CPT

## 2022-01-25 PROCEDURE — 85025 COMPLETE CBC W/AUTO DIFF WBC: CPT | Performed by: ORTHOPAEDIC SURGERY

## 2022-01-25 PROCEDURE — 99900104 DSU ONLY-NO CHARGE-EA ADD'L HR (STAT)

## 2022-01-25 PROCEDURE — U0003 INFECTIOUS AGENT DETECTION BY NUCLEIC ACID (DNA OR RNA); SEVERE ACUTE RESPIRATORY SYNDROME CORONAVIRUS 2 (SARS-COV-2) (CORONAVIRUS DISEASE [COVID-19]), AMPLIFIED PROBE TECHNIQUE, MAKING USE OF HIGH THROUGHPUT TECHNOLOGIES AS DESCRIBED BY CMS-2020-01-R: HCPCS | Performed by: ORTHOPAEDIC SURGERY

## 2022-01-25 PROCEDURE — U0005 INFEC AGEN DETEC AMPLI PROBE: HCPCS | Performed by: ORTHOPAEDIC SURGERY

## 2022-01-25 NOTE — DISCHARGE INSTRUCTIONS
To confirm, Your doctor has instructed you that surgery is scheduled for: 1/28/22   Sarina [phone]    Please report to [facility] the morning of surgery. You must check-in and receive a wristband before going to your procedure.    Pre-Op will call the afternoon prior to surgery between 1:00 and 6:00 PM with the final arrival time.  Phone number: [phone]    PLEASE NOTE:  The surgery schedule has many variables which may affect the time of your surgery case.  Family members should be available if your surgery time changes.  Plan to be here the day of your procedure between 4-6 hours.    MEDICATIONS:  TAKE ONLY THESE MEDICATIONS WITH A SMALL SIP OF WATER THE MORNING OF YOUR PROCEDURE:    See list    DO NOT TAKE THESE MEDICATIONS 5-7 DAYS PRIOR to your procedure or per your surgeon's request:   ASPIRIN, ALEVE, ADVIL, IBUPROFEN, FISH OIL VITAMIN E, HERBALS, CELEBREX    (May take Tylenol)    ONLY if you are prescribed any types of blood thinners such as:  Aspirin, Coumadin, Plavix, Pradaxa, Xarelto, Aggrenox, Effient, Eliquis, Savasya, Brilinta, or any other, ask your surgeon whether you should stop taking them and how long before surgery you should stop.  You may also need to verify with the prescribing physician if it is ok to stop your medication.      INSTRUCTIONS IMPORTANT!!  · Do not eat or drink anything between midnight and the time of your procedure- this includes gum, mints, and candy.  · Do not smoke or drink alcoholic beverages 24 hours prior to your procedure.  · Shower the night before AND the morning of your procedure with a Chlorhexidine wash such as Hibiclens or Dial antibacterial soap from the neck down.  Do not get it on your face or in your eyes.  You may use your own shampoo and face wash. This helps your skin to be as bacteria free as possible.    · If you wear contact lenses, dentures, hearing aids or glasses, bring a container to put them in during  surgery and give to a family member for safe keeping.  Please leave all jewelry, piercing's and valuables at home.   · DO NOT remove hair from the surgery site.  Do not shave the incision site unless you are given specific instructions to do so.    · ONLY if you have been diagnosed with sleep apnea please bring your C-PAP machine.  · ONLY if you wear home oxygen please bring your portable oxygen tank the day of your procedure.  · ONLY if you have a history of OPEN HEART SURGERY you will need a clearance from your Cardiologist per Anesthesia.      · ONLY for patients requiring bowel prep, written instructions will be given by your doctor's office.  · ONLY if you have a neuro stimulator, please bring the controller with you the morning of surgery  · ONLY if a type and screen test is needed before surgery, please return:  · If your doctor has scheduled you for an overnight stay, bring a small overnight bag with any personal items you need.  · Make arrangements in advance for transportation home by a responsible adult.  It is not safe to drive a vehicle during the 24 hours after anesthesia.         Procedural/Surgical areas (regardless of time of day): Patients with procedures can have visitors as outlined below pre and  post procedure. The support person may remain with the patient prior to their surgery/procedure and must then wait in a  socially distant manner until a member of the medical team provides an update at the conclusion of the procedure/surgery. If  the patient is being admitted post procedure/surgery visitors will adhere to the current visitation policy and hours of visitation.     Adult patients may have one adult support person, pre and post procedure   Minor patients will be allowed to have both parents/legal guardians accompany them to and from the procedural area  Inpatient units (including NICU)     COVID-19 Negative Adult Patients: Patients are allowed to have two visitors per day, with one  allowed 24/7.   COVID-19 Negative Pediatric Patients: Patients are allowed to have two visitors at a time, with one allowed 24/7.   COVID-19 Positive Patients and COVID Units: Patients are allowed to have one visitor per day between the hours of. 9  a.m. to 6 p.m. Inpatient visitors must remain in the patients room at all times.    All [facility] facilities and properties are tobacco free.  Smoking is NOT allowed.   If you have any questions about these instructions, call Pre-Op Admit  Nursing at [phone] or the Pre-Op Day Surgery Unit at [phone].

## 2022-01-26 LAB
SARS-COV-2 RNA RESP QL NAA+PROBE: DETECTED
SARS-COV-2- CYCLE NUMBER: 39

## 2022-01-26 NOTE — PROGRESS NOTES
Results noted. not sure what this means. I assume as long as she has been asymptomatic for a couple of days we are ok to proceed??

## 2022-01-27 ENCOUNTER — ANESTHESIA EVENT (OUTPATIENT)
Dept: SURGERY | Facility: HOSPITAL | Age: 72
End: 2022-01-27
Payer: MEDICARE

## 2022-01-27 ENCOUNTER — TELEPHONE (OUTPATIENT)
Dept: SURGERY | Facility: HOSPITAL | Age: 72
End: 2022-01-27
Payer: MEDICARE

## 2022-01-27 NOTE — TELEPHONE ENCOUNTER
Ms. Gilbert was unable to find a ride tomorrow for surgery. She had to cancel and would like you to contact her to reschedule. Thank you.

## 2022-01-28 ENCOUNTER — ANESTHESIA (OUTPATIENT)
Dept: SURGERY | Facility: HOSPITAL | Age: 72
End: 2022-01-28
Payer: MEDICARE

## 2022-01-28 ENCOUNTER — HOSPITAL ENCOUNTER (EMERGENCY)
Facility: HOSPITAL | Age: 72
Discharge: HOME | End: 2022-01-28
Attending: EMERGENCY MEDICINE
Payer: MEDICARE

## 2022-01-28 VITALS
SYSTOLIC BLOOD PRESSURE: 136 MMHG | HEIGHT: 60 IN | TEMPERATURE: 99 F | BODY MASS INDEX: 43.59 KG/M2 | HEART RATE: 68 BPM | OXYGEN SATURATION: 99 % | RESPIRATION RATE: 18 BRPM | WEIGHT: 222 LBS | DIASTOLIC BLOOD PRESSURE: 82 MMHG

## 2022-01-28 DIAGNOSIS — S43.014A ANTERIOR DISLOCATION OF RIGHT SHOULDER: ICD-10-CM

## 2022-01-28 DIAGNOSIS — S43.006A SHOULDER DISLOCATION: ICD-10-CM

## 2022-01-28 DIAGNOSIS — S49.91XA RIGHT SHOULDER INJURY, INITIAL ENCOUNTER: ICD-10-CM

## 2022-01-28 PROCEDURE — 99285 EMERGENCY DEPT VISIT HI MDM: CPT | Mod: 25

## 2022-01-28 PROCEDURE — 63600175 PHARM REV CODE 636 W HCPCS: Performed by: EMERGENCY MEDICINE

## 2022-01-28 PROCEDURE — 99900035 HC TECH TIME PER 15 MIN (STAT)

## 2022-01-28 PROCEDURE — 27000221 HC OXYGEN, UP TO 24 HOURS

## 2022-01-28 PROCEDURE — 23650 CLTX SHO DSLC W/MNPJ WO ANES: CPT | Mod: 25

## 2022-01-28 PROCEDURE — 25000003 PHARM REV CODE 250: Performed by: EMERGENCY MEDICINE

## 2022-01-28 RX ORDER — PROPOFOL 10 MG/ML
50 VIAL (ML) INTRAVENOUS ONCE
Status: COMPLETED | OUTPATIENT
Start: 2022-01-28 | End: 2022-01-28

## 2022-01-28 RX ORDER — HYDROCODONE BITARTRATE AND ACETAMINOPHEN 5; 325 MG/1; MG/1
1 TABLET ORAL EVERY 6 HOURS PRN
Qty: 10 TABLET | Refills: 0 | Status: SHIPPED | OUTPATIENT
Start: 2022-01-28 | End: 2022-03-09 | Stop reason: ALTCHOICE

## 2022-01-28 RX ORDER — HYDROCODONE BITARTRATE AND ACETAMINOPHEN 5; 325 MG/1; MG/1
1 TABLET ORAL
Status: COMPLETED | OUTPATIENT
Start: 2022-01-28 | End: 2022-01-28

## 2022-01-28 RX ORDER — KETAMINE HYDROCHLORIDE 10 MG/ML
50 INJECTION, SOLUTION INTRAMUSCULAR; INTRAVENOUS
Status: COMPLETED | OUTPATIENT
Start: 2022-01-28 | End: 2022-01-28

## 2022-01-28 RX ADMIN — PROPOFOL 50 MG: 10 INJECTION, EMULSION INTRAVENOUS at 10:01

## 2022-01-28 RX ADMIN — HYDROCODONE BITARTRATE AND ACETAMINOPHEN 1 TABLET: 5; 325 TABLET ORAL at 09:01

## 2022-01-28 RX ADMIN — KETAMINE HYDROCHLORIDE 50 MG: 10 INJECTION INTRAMUSCULAR; INTRAVENOUS at 10:01

## 2022-01-29 NOTE — ED PROVIDER NOTES
"Encounter Date: 1/28/2022    SCRIBE #1 NOTE: I, Sarah Johnson, am scribing for, and in the presence of, Ramón Mckeon MD.       History     Chief Complaint   Patient presents with    Shoulder Injury     R shoulder dislocation. Pt was suppose to have surgery today for it, but no one would take them      Time seen by provider: 8:36 PM on 01/28/2022    Genoveva Gilbert is a 71 y.o. female who presents to the ED with right shoulder pain that began PTA. Pt was supposed to have surgery on her right shoulder today, but pt tested positive for COVID-19 3 days ago. Pt states she was walking tonight when she heard a "pop". Pt is unable to move shoulder secondary to pain. Pt denies recent known trauma or falls. Pt denies having symptoms from COVID-19. The patient denies fever, congestion, cough, chest pain, abdominal pain, or any other symptoms at this time. PMHx of HTN, DM, seasonal allergies, MS, neuropathy, rotator cuff tear, and RA. PSHx of left knee arthroplasty, right closed reduction of injury of shoulder, and right shoulder arthroscopy.     The history is provided by the patient and medical records.     Review of patient's allergies indicates:   Allergen Reactions    Keflex [cephalexin]     Latex, natural rubber Anaphylaxis     bandaids     Pcn [penicillins]     Adhesive Other (See Comments)     bandainds redness at skin    Trelegy ellipta [fluticasone-umeclidin-vilanter]      Vision disturbance     Past Medical History:   Diagnosis Date    Allergy     Anxiety     Arthritis, rheumatoid     Back pain     Depression     Fibromyalgia     GERD (gastroesophageal reflux disease)     High cholesterol     Hilar mass 3/27/2014    Council (hard of hearing)     aid right ear    Council (hard of hearing)     Hypertension     Incontinence of urine     Lymphedema     MS (multiple sclerosis)     benign; another MD stated she has no MS    Neuropathy     Restless leg syndrome     Rotator cuff tear 4/16/2015    " Sciatica of left side 1/5/2018    Seasonal allergies     Sinus congestion     Sleep apnea     DOES NOT USE MACHINE    Spondylolysis     Thyroid disease     Type 2 diabetes mellitus with polyneuropathy     no med. was borderline    Wheezing      Past Surgical History:   Procedure Laterality Date    APPENDECTOMY      BREAST SURGERY      reduction    CLOSED REDUCTION OF INJURY OF SHOULDER Right 9/19/2021    Procedure: CLOSED REDUCTION, SHOULDER;  Surgeon: Antonio Irwin MD;  [facility]  Service: [facility]opedics;  Laterality: Right;    EPIDURAL STEROID INJECTION INTO LUMBAR SPINE N/A 6/12/2019    Procedure: Injection-steroid-epidural-lumbar;  Surgeon: Thad Manning MD;  Location: [facility];  Service: Pain Management;  Laterality: N/A;  L5-S1    EPIDURAL STEROID INJECTION INTO LUMBAR SPINE N/A 9/16/2019    Procedure: Injection-steroid-epidural-lumbar;  Surgeon: Thad Manning MD;  Location: [facility];  Service: Pain Management;  Laterality: N/A;  L5-S1    EYE SURGERY Bilateral     HYSTERECTOMY      partial - fibroids    INJECTION OF ANESTHETIC AGENT AROUND MEDIAL BRANCH NERVES INNERVATING LUMBAR FACET JOINT Bilateral 2/28/2019    Procedure: Block-nerve-medial branch-lumbar;  Surgeon: Thad Manning MD;  Location: [facility];  Service: Pain Management;  Laterality: Bilateral;  L3, 4,5     INJECTION OF ANESTHETIC AGENT AROUND MEDIAL BRANCH NERVES INNERVATING LUMBAR FACET JOINT Bilateral 3/21/2019    Procedure: Block-nerve-medial branch-lumbar L3,4,5;  Surgeon: Thad Manning MD;  Location: [facility];  Service: Pain Management;  Laterality: Bilateral;    KNEE ARTHROPLASTY Left 3/16/2021    Procedure: ARTHROPLASTY, KNEE;  Surgeon: Rio Pitts MD;  Location: [facility] OR;  Service: Orthopedics;  Laterality: Left;  GENERAL AND BLOCK    LIPOSUCTION  1985    RADIOFREQUENCY ABLATION Left 11/4/2020    Procedure: Radiofrequency Ablation left knee;  Surgeon: Andrew Escudero MD;  Location: [facility] OR;  Service: Pain Management;   Laterality: Left;    RADIOFREQUENCY ABLATION OF LUMBAR MEDIAL BRANCH NERVE AT SINGLE LEVEL Bilateral 2019    Procedure: Radiofrequency Ablation, Nerve, Spinal, Lumbar, Medial Branch, 1 Level;  Surgeon: Thad Manning MD;  Location: [facility] OR;  Service: Pain Management;  Laterality: Bilateral;  L3, 4, 5  lumbar  [employer] pain management generator  SN [id number]  80 degrees for 75 seconds x2    RADIOFREQUENCY ABLATION OF LUMBAR MEDIAL BRANCH NERVE AT SINGLE LEVEL Bilateral 10/22/2019    Procedure: Radiofrequency Ablation, Nerve, Spinal, Lumbar, Medial Branch, L3,4,5;  Surgeon: Thad Manning MD;  Location: [facility] OR;  Service: Pain Management;  Laterality: Bilateral;  burned at 80 degrees C X 60 seconds X 2 each site    RADIOFREQUENCY ABLATION OF LUMBAR MEDIAL BRANCH NERVE AT SINGLE LEVEL Bilateral 2020    Procedure: Radiofrequency Ablation, Nerve, Spinal, Lumbar, Medial Branch, 1 Level;  Surgeon: Thad Manning MD;  Location: [facility] OR;  Service: Pain Management;  Laterality: Bilateral;  L3,4,5    SHOULDER ARTHROSCOPY Right 2021    Procedure: ARTHROSCOPY, SHOULDER;  Surgeon: Antonio Irwin MD;  Location: [facility] OR;  Service: Orthopedics;  Laterality: Right;  LIMITED DEBRIDMENT    TONSILLECTOMY      TOTAL REDUCTION MAMMOPLASTY       Family History   Problem Relation Age of Onset    Heart disease Mother      Social History     Tobacco Use    Smoking status: Former Smoker     Quit date: 1996     Years since quittin.6    Smokeless tobacco: Never Used   Substance Use Topics    Alcohol use: Yes     Comment: very little     Drug use: No     Review of Systems   Constitutional: Negative for fever.   HENT: Negative for congestion.    Eyes: Negative for visual disturbance.   Respiratory: Negative for cough.    Cardiovascular: Negative for chest pain.   Gastrointestinal: Negative for abdominal pain.   Musculoskeletal: Positive for arthralgias.   Skin: Negative for pallor.   Hematological: Does not  bruise/bleed easily.   Psychiatric/Behavioral: Negative for agitation.       Physical Exam     Initial Vitals [01/28/22 2024]   BP Pulse Resp Temp SpO2   125/84 76 20 98.9 °F (37.2 °C) 95 %      MAP       --         Physical Exam    Nursing note and vitals reviewed.  Constitutional: She appears well-developed and well-nourished. She is not diaphoretic. No distress.   HENT:   Head: Normocephalic and atraumatic.   Mouth/Throat: Oropharynx is clear and moist.   Eyes: Conjunctivae are normal.   Neck: Neck supple.   Cardiovascular: Normal rate, regular rhythm, normal heart sounds and intact distal pulses. Exam reveals no gallop and no friction rub.    No murmur heard.  Pulses:       Radial pulses are 2+ on the right side.   Pulmonary/Chest: Breath sounds normal. She has no wheezes. She has no rhonchi. She has no rales.   Abdominal: Abdomen is soft. She exhibits no distension. There is no abdominal tenderness.   Musculoskeletal:      Right shoulder: Tenderness present. Decreased range of motion.      Right upper arm: No tenderness.      Right elbow: No tenderness.      Right forearm: No tenderness.      Cervical back: Neck supple.      Comments: Pt is only able to abduct right arm and shoulder 30° secondary to pain. Pt has mild right lateral shoulder tenderness. No other arm tenderness.        Neurological: She is alert and oriented to person, place, and time.   5/5 strength with intact sensation to BUE's and BLE's.     Skin: No rash noted. No erythema.   Psychiatric: Her speech is normal.         ED Course   Procedures  Labs Reviewed - No data to display       Imaging Results          X-ray Shoulder 2 or More Views Right (Final result)  Result time 01/28/22 23:02:13   Procedure changed from X-Ray Shoulder Trauma 3 view Right     Final result by Denton Eaton DO (01/28/22 23:02:13)                 Impression:      Normal, anatomic alignment of the right shoulder on these post reduction radiographs.      Electronically  signed by: Denton Eaton  Date:    01/28/2022  Time:    23:02             Narrative:    EXAMINATION:  XR SHOULDER COMPLETE 2 OR MORE VIEWS RIGHT    CLINICAL HISTORY:  post reduction;    TECHNIQUE:  Two or three views of the right shoulder were performed.    COMPARISON:  Radiographs from earlier the same date.    FINDINGS:  Postreduction radiographs of the right shoulder demonstrate normal anatomic alignment.  Again seen is a deformity of the superolateral aspect of the humeral head compatible with a Hill-Sachs fracture, similar in comparison with radiograph from 12/08/2021 and likely chronic.  No definite large osseous [place].  The remaining visualized osseous structures are intact.                               X-Ray Shoulder Trauma Right (Final result)  Result time 01/28/22 21:31:08    Final result by Denton Eaton DO (01/28/22 21:31:08)                 Impression:      Continued anterior dislocation of the right shoulder      Electronically signed by: Denton Eaton  Date:    01/28/2022  Time:    21:31             Narrative:    EXAMINATION:  XR SHOULDER TRAUMA 3 VIEW RIGHT    CLINICAL HISTORY:  Anterior dislocation of right humerus, initial encounter    TECHNIQUE:  Three or four views of the right shoulder were performed.    COMPARISON:  Radiograph from earlier the same date.    FINDINGS:  There is continued anterior dislocation of the right shoulder.  Remote Hill-Sachs fracture deformity again noted.  Remaining visualized osseous structures are intact.                               X-Ray Shoulder Trauma Right (Final result)  Result time 01/28/22 21:22:03    Final result by Denton Eaton DO (01/28/22 21:22:03)                 Impression:      1. Anterior dislocation of the right shoulder.  2. Follow-up, post reduction radiographs recommended.      Electronically signed by: Denton Eaton  Date:    01/28/2022  Time:    21:22             Narrative:    EXAMINATION:  XR SHOULDER TRAUMA 3 VIEW RIGHT    CLINICAL  HISTORY:  Unspecified injury of right shoulder and upper arm, initial encounter    TECHNIQUE:  Three or four views of the right shoulder were performed.    COMPARISON:  12/08/2021.    FINDINGS:  There is anterior dislocation of the right shoulder.  There is an irregularity of the superolateral humeral head compatible with a Hill-Sachs deformity, similar appearance to prior and likely chronic.  The remaining visualized osseous structures are intact.                                 Medications   HYDROcodone-acetaminophen 5-325 mg per tablet 1 tablet (1 tablet Oral Given 1/28/22 2119)   ketamine injection 50 mg (50 mg Intravenous Given 1/28/22 2233)   propofol (DIPRIVAN) 10 mg/mL IVP (50 mg Intravenous Given 1/28/22 2236)     Medical Decision Making:   History:   Old Medical Records: I decided to obtain old medical records.  Clinical Tests:   Radiological Study: Ordered and Reviewed          Scribe Attestation:   Scribe #1: I performed the above scribed service and the documentation accurately describes the services I performed. I attest to the accuracy of the note.        ED Course as of 01/28/22 2327 Fri Jan 28, 2022 2114 Closed right shoulder reduction:  Verbal consent obtained prior to procedure.  No sedation is used.  Gentle manipulation in modified [place] technique with external rotation followed by abduction of the shoulder in passive range of motion applied yielding palpable and visible reduction of the dislocation.  Patient tolerated well with no recognized complications.  X-ray ordered to confirm. [MR]   2243 Deep Procedural Sedation:  Written consent was obtained from the patient or family as applicable prior to the start of the procedure.  Determined prior to start of procedure:    --Last solid or liquid oral intake (NPO status):  6 hours  --ASA Class:  1 (1-healthy, 2-mild systemic disease, 3-severe systemic disease, 4-severe disease that is a constant threat to life, 5-moribund and will not survive  without procedure, 6-brain death)  --Mallampati:  1 (1-soft palate, uvula, fauces, pillars; 2-soft palate, uvula, fauces; 3-soft palate, base of uvula; 4-hard palate only)  Equipment employed includes continuous cardiac monitoring, pulse oximetry, capnometry. Continuous supplemental oxygen was provided. Airway equipment including bag-valve-mask was available at the bedside.  Sedation agents administered by MD were ketamine and propofol  Start time:  2230  End time:  2244  Vital signs were continuously monitored without occurrence of hypoxia or hypotension.  There were no recognized complications and the patient tolerated the sedation well. [MR]   2244 Closed reduction of right shoulder with deep sedation:  Traction, countertraction technique used with palpable seeming reduction of the right shoulder with x-ray ordered to confirm.  One attempt required with no recognized complications.  Patient tolerated well.  No change in distal vascular exam following procedure. [MR]      ED Course User Index  [MR] Ramón Mckeon MD          I, Dr. Ramón Mckeon, personally performed the services described in this documentation. All medical record entries made by the scribe were at my direction and in my presence.  I have reviewed the chart and agree that the record reflects my personal performance and is accurate and complete. Ramón Mckeon MD.  11:25 PM 01/28/2022    Genoveva Gilbert is a 71 y.o. female presenting with right shoulder dislocation status post reduction here.  She is to follow-up with orthopedic surgery.  There is no distal neurovascular compromise.  There is no sign of fracture.  Return precautions reviewed.  She may follow-up as outpatient with PCP for COVID although no sign of significant end-organ dysfunction with this.      Clinical Impression:   Final diagnoses:  [S49.91XA] Right shoulder injury, initial encounter  [S43.014A] Anterior dislocation of right shoulder  [S43.014A] Anterior  dislocation of right shoulder - s/p reduction  [S43.006A] Shoulder dislocation          ED Disposition Condition    Discharge Stable        ED Prescriptions     Medication Sig Dispense Start Date End Date Auth. Provider    HYDROcodone-acetaminophen (NORCO) 5-325 mg per tablet Take 1 tablet by mouth every 6 (six) hours as needed for Pain. 10 tablet 1/28/2022  Ramón Mckeon MD        Follow-up Information     Follow up With Specialties Details Why Contact Info    Rio Pitts MD Sports Medicine, Orthopedic Surgery  Orthopedics, next week [address] [place] [address] [address]  [facility] [address]  [phone]             Ramón Mckeon MD  01/28/22 3306

## 2022-01-29 NOTE — ED NOTES
Pt is alert and able to stand up independently, denies increasing pain or discomfort, expressed relief, awaiting for her daughter to come pick her up to take her home.

## 2022-01-31 ENCOUNTER — TELEPHONE (OUTPATIENT)
Dept: ORTHOPEDICS | Facility: CLINIC | Age: 72
End: 2022-01-31
Payer: MEDICARE

## 2022-01-31 NOTE — TELEPHONE ENCOUNTER
Called patient and scheduled appointment to come in the office and discuss right shoulder with Dr. Pitts prior to rescheduling surgery.     Asa

## 2022-01-31 NOTE — TELEPHONE ENCOUNTER
----- Message from Lian Valentin MA sent at 1/31/2022 11:48 AM CST -----  Contact: Patient  Asa, the patient was calling you back.  Please call her back at [phone]

## 2022-01-31 NOTE — TELEPHONE ENCOUNTER
----- Message from Genny Murphy MA sent at 1/31/2022 11:05 AM CST -----  Contact: pt  In pain   Wants to reschedule surgery   Call back

## 2022-02-10 ENCOUNTER — OFFICE VISIT (OUTPATIENT)
Dept: ORTHOPEDICS | Facility: CLINIC | Age: 72
End: 2022-02-10
Payer: MEDICARE

## 2022-02-10 VITALS — HEIGHT: 60 IN | RESPIRATION RATE: 18 BRPM | WEIGHT: 222 LBS | BODY MASS INDEX: 43.59 KG/M2

## 2022-02-10 DIAGNOSIS — S43.014D ANTERIOR DISLOCATION OF RIGHT SHOULDER, SUBSEQUENT ENCOUNTER: ICD-10-CM

## 2022-02-10 DIAGNOSIS — S46.011D TRAUMATIC COMPLETE TEAR OF RIGHT ROTATOR CUFF, SUBSEQUENT ENCOUNTER: Primary | ICD-10-CM

## 2022-02-10 PROCEDURE — 1159F MED LIST DOCD IN RCRD: CPT | Mod: CPTII,S$GLB,, | Performed by: ORTHOPAEDIC SURGERY

## 2022-02-10 PROCEDURE — 1160F PR REVIEW ALL MEDS BY PRESCRIBER/CLIN PHARMACIST DOCUMENTED: ICD-10-PCS | Mod: CPTII,S$GLB,, | Performed by: ORTHOPAEDIC SURGERY

## 2022-02-10 PROCEDURE — 1125F AMNT PAIN NOTED PAIN PRSNT: CPT | Mod: CPTII,S$GLB,, | Performed by: ORTHOPAEDIC SURGERY

## 2022-02-10 PROCEDURE — 1160F RVW MEDS BY RX/DR IN RCRD: CPT | Mod: CPTII,S$GLB,, | Performed by: ORTHOPAEDIC SURGERY

## 2022-02-10 PROCEDURE — 99214 PR OFFICE/OUTPT VISIT, EST, LEVL IV, 30-39 MIN: ICD-10-PCS | Mod: 57,S$GLB,, | Performed by: ORTHOPAEDIC SURGERY

## 2022-02-10 PROCEDURE — 99214 OFFICE O/P EST MOD 30 MIN: CPT | Mod: 57,S$GLB,, | Performed by: ORTHOPAEDIC SURGERY

## 2022-02-10 PROCEDURE — 3008F BODY MASS INDEX DOCD: CPT | Mod: CPTII,S$GLB,, | Performed by: ORTHOPAEDIC SURGERY

## 2022-02-10 PROCEDURE — 1125F PR PAIN SEVERITY QUANTIFIED, PAIN PRESENT: ICD-10-PCS | Mod: CPTII,S$GLB,, | Performed by: ORTHOPAEDIC SURGERY

## 2022-02-10 PROCEDURE — 3288F FALL RISK ASSESSMENT DOCD: CPT | Mod: CPTII,S$GLB,, | Performed by: ORTHOPAEDIC SURGERY

## 2022-02-10 PROCEDURE — 3288F PR FALLS RISK ASSESSMENT DOCUMENTED: ICD-10-PCS | Mod: CPTII,S$GLB,, | Performed by: ORTHOPAEDIC SURGERY

## 2022-02-10 PROCEDURE — 99999 PR PBB SHADOW E&M-EST. PATIENT-LVL IV: ICD-10-PCS | Mod: PBBFAC,,, | Performed by: ORTHOPAEDIC SURGERY

## 2022-02-10 PROCEDURE — 1159F PR MEDICATION LIST DOCUMENTED IN MEDICAL RECORD: ICD-10-PCS | Mod: CPTII,S$GLB,, | Performed by: ORTHOPAEDIC SURGERY

## 2022-02-10 PROCEDURE — 1101F PT FALLS ASSESS-DOCD LE1/YR: CPT | Mod: CPTII,S$GLB,, | Performed by: ORTHOPAEDIC SURGERY

## 2022-02-10 PROCEDURE — 1101F PR PT FALLS ASSESS DOC 0-1 FALLS W/OUT INJ PAST YR: ICD-10-PCS | Mod: CPTII,S$GLB,, | Performed by: ORTHOPAEDIC SURGERY

## 2022-02-10 PROCEDURE — 4010F PR ACE/ARB THEARPY RXD/TAKEN: ICD-10-PCS | Mod: CPTII,S$GLB,, | Performed by: ORTHOPAEDIC SURGERY

## 2022-02-10 PROCEDURE — 4010F ACE/ARB THERAPY RXD/TAKEN: CPT | Mod: CPTII,S$GLB,, | Performed by: ORTHOPAEDIC SURGERY

## 2022-02-10 PROCEDURE — 3008F PR BODY MASS INDEX (BMI) DOCUMENTED: ICD-10-PCS | Mod: CPTII,S$GLB,, | Performed by: ORTHOPAEDIC SURGERY

## 2022-02-10 PROCEDURE — 99999 PR PBB SHADOW E&M-EST. PATIENT-LVL IV: CPT | Mod: PBBFAC,,, | Performed by: ORTHOPAEDIC SURGERY

## 2022-02-10 NOTE — H&P (VIEW-ONLY)
Past Medical History:   Diagnosis Date    Allergy     Anxiety     Arthritis, rheumatoid     Back pain     Depression     Fibromyalgia     GERD (gastroesophageal reflux disease)     High cholesterol     Hilar mass 3/27/2014    [place]     aid right ear    [place]     Hypertension     Incontinence of urine     Lymphedema     MS (multiple sclerosis)     benign; another MD stated she has no MS    Neuropathy     Restless leg syndrome     Rotator cuff tear 4/16/2015    Sciatica of left side 1/5/2018    Seasonal allergies     Sinus congestion     Sleep apnea     DOES NOT USE MACHINE    Spondylolysis     Thyroid disease     Type 2 diabetes mellitus with polyneuropathy     no med. was borderline    Wheezing        Past Surgical History:   Procedure Laterality Date    APPENDECTOMY      BREAST SURGERY      reduction    CLOSED REDUCTION OF INJURY OF SHOULDER Right 9/19/2021    Procedure: CLOSED REDUCTION, SHOULDER;  Surgeon: Antonio Irwin MD;  Location: [facility] OR;  Service: Orthopedics;  Laterality: Right;    EPIDURAL STEROID INJECTION INTO LUMBAR SPINE N/A 6/12/2019    Procedure: Injection-steroid-epidural-lumbar;  Surgeon: Thad Manning MD;  Location: [facility] OR;  Service: Pain Management;  Laterality: N/A;  L5-S1    EPIDURAL STEROID INJECTION INTO LUMBAR SPINE N/A 9/16/2019    Procedure: Injection-steroid-epidural-lumbar;  Surgeon: Thad Manning MD;  Location: [facility] OR;  Service: Pain Management;  Laterality: N/A;  L5-S1    EYE SURGERY Bilateral     HYSTERECTOMY      partial - fibroids    INJECTION OF ANESTHETIC AGENT AROUND MEDIAL BRANCH NERVES INNERVATING LUMBAR FACET JOINT Bilateral 2/28/2019    Procedure: Block-nerve-medial branch-lumbar;  Surgeon: Thad Manning MD;  Location: [facility] OR;  Service: Pain Management;  Laterality: Bilateral;  L3, 4,5     INJECTION OF ANESTHETIC AGENT AROUND MEDIAL BRANCH NERVES INNERVATING LUMBAR FACET JOINT Bilateral 3/21/2019     Procedure: Block-nerve-medial branch-lumbar L3,4,5;  Surgeon: Thad Manning MD;  Location: [facility] OR;  Service: Pain Management;  Laterality: Bilateral;    KNEE ARTHROPLASTY Left 3/16/2021    Procedure: ARTHROPLASTY, KNEE;  Surgeon: Rio Pitts MD;  Location: [facility] OR;  Service: Orthopedics;  Laterality: Left;  GENERAL AND BLOCK    LIPOSUCTION  1985    RADIOFREQUENCY ABLATION Left 11/4/2020    Procedure: Radiofrequency Ablation left knee;  Surgeon: Andrew Escudero MD;  Location: [facility] OR;  Service: Pain Management;  Laterality: Left;    RADIOFREQUENCY ABLATION OF LUMBAR MEDIAL BRANCH NERVE AT SINGLE LEVEL Bilateral 4/12/2019    Procedure: Radiofrequency Ablation, Nerve, Spinal, Lumbar, Medial Branch, 1 Level;  Surgeon: Thad Manning MD;  Location: [facility] OR;  Service: Pain Management;  Laterality: Bilateral;  L3, 4, 5  lumbar  [employer] pain management generator  SN [id number]  80 degrees for 75 seconds x2    RADIOFREQUENCY ABLATION OF LUMBAR MEDIAL BRANCH NERVE AT SINGLE LEVEL Bilateral 10/22/2019    Procedure: Radiofrequency Ablation, Nerve, Spinal, Lumbar, Medial Branch, L3,4,5;  Surgeon: Thad Manning MD;  [facility];  Service: Pain Management;  Laterality: Bilateral;  burned at 80 degrees C X 60 seconds X 2 each site    RADIOFREQUENCY ABLATION OF LUMBAR MEDIAL BRANCH NERVE AT SINGLE LEVEL Bilateral 5/27/2020    Procedure: Radiofrequency Ablation, Nerve, Spinal, Lumbar, Medial Branch, 1 Level;  Surgeon: Thad Manning MD;  [facility];  Service: Pain Management;  Laterality: Bilateral;  L3,4,5    SHOULDER ARTHROSCOPY Right 9/19/2021    Procedure: ARTHROSCOPY, SHOULDER;  Surgeon: Antonio Irwin MD;  Location: [facility];  Service: Orthopedics;  Laterality: Right;  LIMITED DEBRIDMENT    TONSILLECTOMY      TOTAL REDUCTION MAMMOPLASTY         Current Outpatient Medications   Medication Sig    albuterol (PROAIR HFA) 90 mcg/actuation inhaler Inhale 2 puffs into the lungs every 6 (six) hours as  needed for Wheezing. Rescue    ALPRAZolam (XANAX) 2 MG Tab Take 1 tablet (2 mg total) by mouth 2 (two) times daily as needed (anxiety).    amitriptyline (ELAVIL) 50 MG tablet TAKE 1 TABLET(50 MG) BY MOUTH EVERY NIGHT AS NEEDED FOR INSOMNIA    amLODIPine (NORVASC) 5 MG tablet Take 1 tablet (5 mg total) by mouth once daily. For blood pressure    biotin 10,000 mcg Cap Take 13,000 mcg by mouth once daily.    celecoxib (CELEBREX) 200 MG capsule Take 1 capsule (200 mg total) by mouth daily as needed (arthritis pain).    fexofenadine (ALLEGRA) 180 MG tablet     furosemide (LASIX) 20 MG tablet TAKE 1 TABLET BY MOUTH DAILY AS NEEDED FOR LEF SWELLING    hydroCHLOROthiazide (HYDRODIURIL) 25 MG tablet Take 1 tablet (25 mg total) by mouth once daily.    HYDROcodone-acetaminophen (NORCO) 5-325 mg per tablet Take 1 tablet by mouth every 6 (six) hours as needed for Pain.    levothyroxine (SYNTHROID) 88 MCG tablet Take 1 tablet (88 mcg total) by mouth once daily.    losartan (COZAAR) 50 MG tablet Take 1 tablet (50 mg total) by mouth once daily. For blood pressure    lovastatin (MEVACOR) 40 MG tablet Take 1 tablet (40 mg total) by mouth nightly. at bedtime. For cholesterol    meclizine (ANTIVERT) 25 mg tablet Take 25 mg by mouth daily as needed.    metoprolol succinate (TOPROL-XL) 50 MG 24 hr tablet Take 1 tablet (50 mg total) by mouth once daily. For blood pressure and heart    MULTIVIT-IRON-MIN-FOLIC ACID 3,500-18-0.4 UNIT-MG-MG ORAL CHEW Take 1 tablet by mouth.    nystatin (MYCOSTATIN) cream Apply topically 2 (two) times daily.    omeprazole (PRILOSEC) 20 MG capsule Take 1 capsule (20 mg total) by mouth every other day.    potassium chloride (MICRO-K) 10 MEQ CpSR Take 1 capsule (10 mEq total) by mouth every evening.    pramipexole (MIRAPEX) 0.5 MG tablet Take 2 tablets (1 mg total) by mouth every evening. For restless legs    pregabalin (LYRICA) 25 MG capsule Take 1 capsule (25 mg total) by mouth 3 (three)  times daily.    umeclidinium-vilanteroL (ANORO ELLIPTA) 62.5-25 mcg/actuation DsDv Inhale 1 puff into the lungs once daily. Controller    valacyclovir HCl (VALACYCLOVIR ORAL) Take by mouth.    venlafaxine (EFFEXOR-XR) 150 MG Cp24 Take 1 capsule (150 mg total) by mouth once daily. For mood     No current facility-administered medications for this visit.       Review of patient's allergies indicates:   Allergen Reactions    Keflex [cephalexin]     Latex, natural rubber Anaphylaxis     bandaids     Pcn [penicillins]     Adhesive Other (See Comments)     bandainds redness at skin    Trelegy ellipta [fluticasone-umeclidin-vilanter]      Vision disturbance       Family History   Problem Relation Age of Onset    Heart disease Mother        Social History     Socioeconomic History    Marital status:    Tobacco Use    Smoking status: Former Smoker     Quit date: 1996     Years since quittin.7    Smokeless tobacco: Never Used   Substance and Sexual Activity    Alcohol use: Yes     Comment: very little     Drug use: No     Social Determinants of Health     Financial Resource Strain: Low Risk     Difficulty of Paying Living Expenses: Not very hard   Food Insecurity: No Food Insecurity    Worried About Running Out of Food in the Last Year: Never true    Ran Out of Food in the Last Year: Never true   Transportation Needs: No Transportation Needs    Lack of Transportation (Medical): No    Lack of Transportation (Non-Medical): No   Physical Activity: Inactive    Days of Exercise per Week: 0 days    Minutes of Exercise per Session: 0 min   Stress: Stress Concern Present    Feeling of Stress : Very much   Social Connections: Moderately Isolated    Frequency of Communication with Friends and Family: More than three times a week    Frequency of Social Gatherings with Friends and Family: Once a week    Attends Church Services: Never    Active Member of Clubs or Organizations: No    Attends  Club or Organization Meetings: Never    Marital Status:    Housing Stability: Low Risk     Unable to Pay for Housing in the Last Year: No    Number of Places Lived in the Last Year: 1    Unstable Housing in the Last Year: No       Chief Complaint:   Chief Complaint   Patient presents with    Right Shoulder - Pain       History of present illness:  This is a 72-year-old female seen for bilateral shoulder pain.  We injected her about a year ago.  MRI just showed a very small rotator cuff tear.  Pain has gotten much worse over the last few months.  Patient had a fall back in September and suffered a dislocation with greater tuberosity fracture.  Patient was treated with closed reduction.  It was then talk about going back in and repairing her rotator cuff.  We were supposed to fix her last week and she could not get a ride to surgery.  Ironically, she then had another shoulder dislocation that day requiring closed reduction.  Pain is back up to 6/10 now.    Review of Systems:    Constitution: Negative for chills, fever, and sweats.  Negative for unexplained weight loss.    HENT:  Negative for headaches and blurry vision.    Cardiovascular:Negative for chest pain or irregular heart beat. Negative for hypertension.    Respiratory:  Negative for cough and positive for shortness of breath.    Gastrointestinal: Negative for abdominal pain, heartburn, melena, nausea, and vomitting.    Genitourinary:  Negative bladder incontinence and dysuria.  Positive for urgency.    Musculoskeletal:  See HPI    Neurological:  Positive for numbness.    Psychiatric/Behavioral:  Positive for depression and anxiety    Endocrine: Negative for polyuria    Hematologic/Lymphatic: Negative for bleeding problem.  Does  bruise/bleed easily.    Skin: Negative for poor would healing and rash      Physical Examination:    Vital Signs:    Vitals:    02/10/22 1032   Resp: 18       Body mass index is 43.36 kg/m².    This a well-developed, well  nourished patient in no acute distress.  They are alert and oriented and cooperative to examination.  Pt. walks without an antalgic gait.        Examination of the right shoulder shows no rashes or erythema. There are no masses, ecchymosis, or atrophy. The patient has full range of motion in forward flexion, external rotation, and internal rotation to the mid T-spine. The patient has - impingement signs. - Lane's test. - Speeds test. Nontender to palpation over a.c. joint. Normal stability anteriorly, posteriorly, and negative sulcus sign. Passive range of motion: Forward flexion of 180°, external rotation at 90° of 90°, internal rotation of 50°, and external rotation at 0° of 50°. 2+ radial pulse. Intact axillary, radial, median and ulnar sensation. 4+ out of 5 resisted forward flexion, external rotation, and negative lift off test.    Heart is regular rate without obvious murmurs   Normal respiratory effort without audible wheezing  Abdomen is soft and nontender         X-rays:  X-rays of left knee is  reviewed which show moderate to severe medial compartment narrowing of both knees.  X-rays of the left shoulder is  review which show some sclerosis near the greater tuberosity consistent with chronic rotator cuff syndrome    MRI of the left shoulder:Full-thickness tear and short distance retraction of the anterior supraspinatus tendon.  Possible partial articular surface tear of the more posterior supraspinatus tendon.  Subchondral cyst, geodes noted in the femoral head.  Small inferior spur from the distal head of the clavicle with mild impingement.     Assessment::    Right rotator cuff tear after dislocation    Plan:  I reviewed the findings with her today.  We talked about arthroscopic rotator cuff repair with likely labral repair.  Will need to pay particular attention to the subscapularis also.  Patient would like to go ahead and have this done.  She hurts all the time and has very little function with the  right shoulder.  Risks, benefits, and alternatives to the procedure were explained to the patient including but not limited to damage to nerves, arteries, blood vessels, bones, tendons, ligaments, stiffness, instability, infection, DVT, PE, as well as general anesthetic complications including seizure, stroke, heart attack and even death. The patient understood these risks and wished to proceed and signed the informed consent.       This note was created using [employer] voice recognition software that occasionally misinterpreted phrases or words.    Consult note is delivered via Epic messaging service.

## 2022-02-10 NOTE — PROGRESS NOTES
Past Medical History:   Diagnosis Date    Allergy     Anxiety     Arthritis, rheumatoid     Back pain     Depression     Fibromyalgia     GERD (gastroesophageal reflux disease)     High cholesterol     Hilar mass 3/27/2014    [place] (hard of hearing)     aid right ear    [place] (hard of hearing)     Hypertension     Incontinence of urine     Lymphedema     MS (multiple sclerosis)     benign; another MD stated she has no MS    Neuropathy     Restless leg syndrome     Rotator cuff tear 4/16/2015    Sciatica of left side 1/5/2018    Seasonal allergies     Sinus congestion     Sleep apnea     DOES NOT USE MACHINE    Spondylolysis     Thyroid disease     Type 2 diabetes mellitus with polyneuropathy     no med. was borderline    Wheezing        Past Surgical History:   Procedure Laterality Date    APPENDECTOMY      BREAST SURGERY      reduction    CLOSED REDUCTION OF INJURY OF SHOULDER Right 9/19/2021    Procedure: CLOSED REDUCTION, SHOULDER;  Surgeon: Antonio Irwin MD;  Location: [facility] OR;  Service: Orthopedics;  Laterality: Right;    EPIDURAL STEROID INJECTION INTO LUMBAR SPINE N/A 6/12/2019    Procedure: Injection-steroid-epidural-lumbar;  Surgeon: Thad Manning MD;  Location: [facility] OR;  Service: Pain Management;  Laterality: N/A;  L5-S1    EPIDURAL STEROID INJECTION INTO LUMBAR SPINE N/A 9/16/2019    Procedure: Injection-steroid-epidural-lumbar;  Surgeon: Thad Manning MD;  Location: [facility] OR;  Service: Pain Management;  Laterality: N/A;  L5-S1    EYE SURGERY Bilateral     HYSTERECTOMY      partial - fibroids    INJECTION OF ANESTHETIC AGENT AROUND MEDIAL BRANCH NERVES INNERVATING LUMBAR FACET JOINT Bilateral 2/28/2019    Procedure: Block-nerve-medial branch-lumbar;  Surgeon: Thad Manning MD;  Location: [facility] OR;  Service: Pain Management;  Laterality: Bilateral;  L3, 4,5     INJECTION OF ANESTHETIC AGENT AROUND MEDIAL BRANCH NERVES INNERVATING LUMBAR FACET JOINT Bilateral 3/21/2019     Procedure: Block-nerve-medial branch-lumbar L3,4,5;  Surgeon: Thad Manning MD;  Location: [facility] OR;  Service: Pain Management;  Laterality: Bilateral;    KNEE ARTHROPLASTY Left 3/16/2021    Procedure: ARTHROPLASTY, KNEE;  Surgeon: Rio Pitts MD;  Location: [facility] OR;  Service: Orthopedics;  Laterality: Left;  GENERAL AND BLOCK    LIPOSUCTION  1985    RADIOFREQUENCY ABLATION Left 11/4/2020    Procedure: Radiofrequency Ablation left knee;  Surgeon: Andrew Escudero MD;  Location: [facility] OR;  Service: Pain Management;  Laterality: Left;    RADIOFREQUENCY ABLATION OF LUMBAR MEDIAL BRANCH NERVE AT SINGLE LEVEL Bilateral 4/12/2019    Procedure: Radiofrequency Ablation, Nerve, Spinal, Lumbar, Medial Branch, 1 Level;  Surgeon: Thad Manning MD;  Location: [facility] OR;  Service: Pain Management;  Laterality: Bilateral;  L3, 4, 5  lumbar  [email or web address] pain management generator  SN [id number]  80 degrees for 75 seconds x2    RADIOFREQUENCY ABLATION OF LUMBAR MEDIAL BRANCH NERVE AT SINGLE LEVEL Bilateral 10/22/2019    Procedure: Radiofrequency Ablation, Nerve, Spinal, Lumbar, Medial Branch, L3,4,5;  Surgeon: Thad Manning MD;  Location: [facility];  Service: Pain Management;  Laterality: Bilateral;  burned at 80 degrees C X 60 seconds X 2 each site    RADIOFREQUENCY ABLATION OF LUMBAR MEDIAL BRANCH NERVE AT SINGLE LEVEL Bilateral 5/27/2020    Procedure: Radiofrequency Ablation, Nerve, Spinal, Lumbar, Medial Branch, 1 Level;  Surgeon: Thad Manning MD;  Location: [facility];  Service: Pain Management;  Laterality: Bilateral;  L3,4,5    SHOULDER ARTHROSCOPY Right 9/19/2021    Procedure: ARTHROSCOPY, SHOULDER;  Surgeon: Antonio Irwin MD;  Location: [facility];  Service: Orthopedics;  Laterality: Right;  LIMITED DEBRIDMENT    TONSILLECTOMY      TOTAL REDUCTION MAMMOPLASTY         Current Outpatient Medications   Medication Sig    albuterol (PROAIR HFA) 90 mcg/actuation inhaler Inhale 2 puffs into the lungs every 6 (six) hours as  needed for Wheezing. Rescue    ALPRAZolam (XANAX) 2 MG Tab Take 1 tablet (2 mg total) by mouth 2 (two) times daily as needed (anxiety).    amitriptyline (ELAVIL) 50 MG tablet TAKE 1 TABLET(50 MG) BY MOUTH EVERY NIGHT AS NEEDED FOR INSOMNIA    amLODIPine (NORVASC) 5 MG tablet Take 1 tablet (5 mg total) by mouth once daily. For blood pressure    biotin 10,000 mcg Cap Take 13,000 mcg by mouth once daily.    celecoxib (CELEBREX) 200 MG capsule Take 1 capsule (200 mg total) by mouth daily as needed (arthritis pain).    fexofenadine (ALLEGRA) 180 MG tablet     furosemide (LASIX) 20 MG tablet TAKE 1 TABLET BY MOUTH DAILY AS NEEDED FOR LEF SWELLING    hydroCHLOROthiazide (HYDRODIURIL) 25 MG tablet Take 1 tablet (25 mg total) by mouth once daily.    HYDROcodone-acetaminophen (NORCO) 5-325 mg per tablet Take 1 tablet by mouth every 6 (six) hours as needed for Pain.    levothyroxine (SYNTHROID) 88 MCG tablet Take 1 tablet (88 mcg total) by mouth once daily.    losartan (COZAAR) 50 MG tablet Take 1 tablet (50 mg total) by mouth once daily. For blood pressure    lovastatin (MEVACOR) 40 MG tablet Take 1 tablet (40 mg total) by mouth nightly. at bedtime. For cholesterol    meclizine (ANTIVERT) 25 mg tablet Take 25 mg by mouth daily as needed.    metoprolol succinate (TOPROL-XL) 50 MG 24 hr tablet Take 1 tablet (50 mg total) by mouth once daily. For blood pressure and heart    MULTIVIT-IRON-MIN-FOLIC ACID 3,500-18-0.4 UNIT-MG-MG ORAL CHEW Take 1 tablet by mouth.    nystatin (MYCOSTATIN) cream Apply topically 2 (two) times daily.    omeprazole (PRILOSEC) 20 MG capsule Take 1 capsule (20 mg total) by mouth every other day.    potassium chloride (MICRO-K) 10 MEQ CpSR Take 1 capsule (10 mEq total) by mouth every evening.    pramipexole (MIRAPEX) 0.5 MG tablet Take 2 tablets (1 mg total) by mouth every evening. For restless legs    pregabalin (LYRICA) 25 MG capsule Take 1 capsule (25 mg total) by mouth 3 (three)  times daily.    umeclidinium-vilanteroL (ANORO ELLIPTA) 62.5-25 mcg/actuation DsDv Inhale 1 puff into the lungs once daily. Controller    valacyclovir HCl (VALACYCLOVIR ORAL) Take by mouth.    venlafaxine (EFFEXOR-XR) 150 MG Cp24 Take 1 capsule (150 mg total) by mouth once daily. For mood     No current facility-administered medications for this visit.       Review of patient's allergies indicates:   Allergen Reactions    Keflex [cephalexin]     Latex, natural rubber Anaphylaxis     bandaids     Pcn [penicillins]     Adhesive Other (See Comments)     bandainds redness at skin    Trelegy ellipta [fluticasone-umeclidin-vilanter]      Vision disturbance       Family History   Problem Relation Age of Onset    Heart disease Mother        Social History     Socioeconomic History    Marital status:    Tobacco Use    Smoking status: Former Smoker     Quit date: 1996     Years since quittin.7    Smokeless tobacco: Never Used   Substance and Sexual Activity    Alcohol use: Yes     Comment: very little     Drug use: No     Social Determinants of Health     Financial Resource Strain: Low Risk     Difficulty of Paying Living Expenses: Not very hard   Food Insecurity: No Food Insecurity    Worried About Running Out of Food in the Last Year: Never true    Ran Out of Food in the Last Year: Never true   Transportation Needs: No Transportation Needs    Lack of Transportation (Medical): No    Lack of Transportation (Non-Medical): No   Physical Activity: Inactive    Days of Exercise per Week: 0 days    Minutes of Exercise per Session: 0 min   Stress: Stress Concern Present    Feeling of Stress : Very much   Social Connections: Moderately Isolated    Frequency of Communication with Friends and Family: More than three times a week    Frequency of Social Gatherings with Friends and Family: Once a week    Attends Hoahaoism Services: Never    Active Member of Clubs or Organizations: No    Attends  Club or Organization Meetings: Never    Marital Status:    Housing Stability: Low Risk     Unable to Pay for Housing in the Last Year: No    Number of Places Lived in the Last Year: 1    Unstable Housing in the Last Year: No       Chief Complaint:   Chief Complaint   Patient presents with    Right Shoulder - Pain       History of present illness:  This is a 72-year-old female seen for bilateral shoulder pain.  We injected her about a year ago.  MRI just showed a very small rotator cuff tear.  Pain has gotten much worse over the last few months.  Patient had a fall back in September and suffered a dislocation with greater tuberosity fracture.  Patient was treated with closed reduction.  It was then talk about going back in and repairing her rotator cuff.  We were supposed to fix her last week and she could not get a ride to surgery.  Ironically, she then had another shoulder dislocation that day requiring closed reduction.  Pain is back up to 6/10 now.    Review of Systems:    Constitution: Negative for chills, fever, and sweats.  Negative for unexplained weight loss.    HENT:  Negative for headaches and blurry vision.    Cardiovascular:Negative for chest pain or irregular heart beat. Negative for hypertension.    Respiratory:  Negative for cough and positive for shortness of breath.    Gastrointestinal: Negative for abdominal pain, heartburn, melena, nausea, and vomitting.    Genitourinary:  Negative bladder incontinence and dysuria.  Positive for urgency.    Musculoskeletal:  See HPI    Neurological:  Positive for numbness.    Psychiatric/Behavioral:  Positive for depression and anxiety    Endocrine: Negative for polyuria    Hematologic/Lymphatic: Negative for bleeding problem.  Does  bruise/bleed easily.    Skin: Negative for poor would healing and rash      Physical Examination:    Vital Signs:    Vitals:    02/10/22 1032   Resp: 18       Body mass index is 43.36 kg/m².    This a well-developed, well  nourished patient in no acute distress.  They are alert and oriented and cooperative to examination.  Pt. walks without an antalgic gait.        Examination of the right shoulder shows no rashes or erythema. There are no masses, ecchymosis, or atrophy. The patient has full range of motion in forward flexion, external rotation, and internal rotation to the mid T-spine. The patient has - impingement signs. - [place]'s test. - Speeds test. Nontender to palpation over a.c. joint. Normal stability anteriorly, posteriorly, and negative sulcus sign. Passive range of motion: Forward flexion of 180°, external rotation at 90° of 90°, internal rotation of 50°, and external rotation at 0° of 50°. 2+ radial pulse. Intact axillary, radial, median and ulnar sensation. 4+ out of 5 resisted forward flexion, external rotation, and negative lift off test.    Heart is regular rate without obvious murmurs   Normal respiratory effort without audible wheezing  Abdomen is soft and nontender         X-rays:  X-rays of left knee is  reviewed which show moderate to severe medial compartment narrowing of both knees.  X-rays of the left shoulder is  review which show some sclerosis near the greater tuberosity consistent with chronic rotator cuff syndrome    MRI of the left shoulder:Full-thickness tear and short distance retraction of the anterior supraspinatus tendon.  Possible partial articular surface tear of the more posterior supraspinatus tendon.  Subchondral cyst, geodes noted in the femoral head.  Small inferior spur from the distal head of the clavicle with mild impingement.     Assessment::    Right rotator cuff tear after dislocation    Plan:  I reviewed the findings with her today.  We talked about arthroscopic rotator cuff repair with likely labral repair.  Will need to pay particular attention to the subscapularis also.  Patient would like to go ahead and have this done.  She hurts all the time and has very little function with the  right shoulder.  Risks, benefits, and alternatives to the procedure were explained to the patient including but not limited to damage to nerves, arteries, blood vessels, bones, tendons, ligaments, stiffness, instability, infection, DVT, PE, as well as general anesthetic complications including seizure, stroke, heart attack and even death. The patient understood these risks and wished to proceed and signed the informed consent.       This note was created using "[employer] voice recognition software that occasionally misinterpreted phrases or words.    Consult note is delivered via Epic messaging service.

## 2022-02-11 LAB — BCS RECOMMENDATION EXT: NORMAL

## 2022-02-16 NOTE — ANESTHESIA PREPROCEDURE EVALUATION
02/16/2022  Genoveva Gilbert is a 72 y.o., female.    Anesthesia Evaluation    I have reviewed the Patient Summary Reports.    I have reviewed the Nursing Notes. I have reviewed the NPO Status.   I have reviewed the Medications.     Review of Systems  Anesthesia Hx:  No problems with previous Anesthesia Denies Hx of Anesthetic complications    Social:  Non-Smoker    Cardiovascular:   Hypertension Denies MI.  Denies CAD.    Denies CABG/stent.   Denies Angina.    Pulmonary:   Denies COPD.  Denies Asthma.  Denies Recent URI. Sleep Apnea    Renal/:   Chronic Renal Disease    Hepatic/GI:   GERD, poorly controlled Liver Disease,    Musculoskeletal:   Arthritis     Neurological:   Denies TIA. Denies CVA. Neuromuscular Disease,  Denies Seizures.    Endocrine:   Diabetes, poorly controlled, type 2 Hypothyroidism    Psych:   Psychiatric History          Physical Exam  General:  Morbid Obesity    Airway/Jaw/Neck:  Airway Findings: Mouth Opening: Normal Tongue: Normal  General Airway Assessment: Adult, Good  Mallampati: II  Improves to II with phonation.  TM Distance: 4-6 cm      Dental:  Dental Findings: In tact   Chest/Lungs:  Chest/Lungs Findings: Clear to auscultation, Normal Respiratory Rate     Heart/Vascular:  Heart Findings: Rate: Normal  Rhythm: Regular Rhythm  Sounds: Normal  Heart murmur: negative       Mental Status:  Mental Status Findings:  Cooperative, Alert and Oriented         Anesthesia Plan  Type of Anesthesia, risks & benefits discussed:  Anesthesia Type:  general    Patient's Preference:   Plan Factors:          Intra-op Monitoring Plan: standard ASA monitors  Intra-op Monitoring Plan Comments:   Post Op Pain Control Plan: multimodal analgesia, IV/PO Opioids PRN and peripheral nerve block  Post Op Pain Control Plan Comments:     Induction:   IV  Beta Blocker:  Patient is on a Beta-Blocker and has  received one dose within the past 24 hours (No further documentation required).       Informed Consent: Patient understands risks and agrees with Anesthesia plan.  Questions answered. Anesthesia consent signed with patient.  ASA Score: 3     Day of Surgery Review of History & Physical:    H&P update referred to the surgeon.         Ready For Surgery From Anesthesia Perspective.

## 2022-02-18 ENCOUNTER — TELEPHONE (OUTPATIENT)
Dept: ORTHOPEDICS | Facility: CLINIC | Age: 72
End: 2022-02-18

## 2022-02-18 ENCOUNTER — HOSPITAL ENCOUNTER (OUTPATIENT)
Facility: HOSPITAL | Age: 72
Discharge: HOME | End: 2022-02-18
Attending: ORTHOPAEDIC SURGERY | Admitting: ORTHOPAEDIC SURGERY
Payer: MEDICARE

## 2022-02-18 DIAGNOSIS — Z01.818 PRE-OP TESTING: ICD-10-CM

## 2022-02-18 DIAGNOSIS — M75.100 TEAR OF ROTATOR CUFF, UNSPECIFIED LATERALITY, UNSPECIFIED TEAR EXTENT, UNSPECIFIED WHETHER TRAUMATIC: ICD-10-CM

## 2022-02-18 DIAGNOSIS — M75.101 RIGHT ROTATOR CUFF TEAR: ICD-10-CM

## 2022-02-18 PROCEDURE — 36000711: Performed by: ORTHOPAEDIC SURGERY

## 2022-02-18 PROCEDURE — 25000003 PHARM REV CODE 250: Performed by: ORTHOPAEDIC SURGERY

## 2022-02-18 PROCEDURE — 99900104 DSU ONLY-NO CHARGE-EA ADD'L HR (STAT): Performed by: ORTHOPAEDIC SURGERY

## 2022-02-18 PROCEDURE — 76942 ECHO GUIDE FOR BIOPSY: CPT | Mod: 26,,, | Performed by: ANESTHESIOLOGY

## 2022-02-18 PROCEDURE — 37000009 HC ANESTHESIA EA ADD 15 MINS: Performed by: ORTHOPAEDIC SURGERY

## 2022-02-18 PROCEDURE — 29823 SHO ARTHRS SRG XTNSV DBRDMT: CPT | Mod: RT,,, | Performed by: ORTHOPAEDIC SURGERY

## 2022-02-18 PROCEDURE — 64415 NJX AA&/STRD BRCH PLXS IMG: CPT | Performed by: ANESTHESIOLOGY

## 2022-02-18 PROCEDURE — C9290 INJ, BUPIVACAINE LIPOSOME: HCPCS | Performed by: ANESTHESIOLOGY

## 2022-02-18 PROCEDURE — D9220A PRA ANESTHESIA: ICD-10-PCS | Mod: CRNA,,, | Performed by: NURSE ANESTHETIST, CERTIFIED REGISTERED

## 2022-02-18 PROCEDURE — 27201423 OPTIME MED/SURG SUP & DEVICES STERILE SUPPLY: Performed by: ORTHOPAEDIC SURGERY

## 2022-02-18 PROCEDURE — 64415 NJX AA&/STRD BRCH PLXS IMG: CPT | Mod: 59,RT,, | Performed by: ANESTHESIOLOGY

## 2022-02-18 PROCEDURE — 76942 PERIPHERAL BLOCK: ICD-10-PCS | Mod: 26,,, | Performed by: ANESTHESIOLOGY

## 2022-02-18 PROCEDURE — D9220A PRA ANESTHESIA: Mod: CRNA,,, | Performed by: NURSE ANESTHETIST, CERTIFIED REGISTERED

## 2022-02-18 PROCEDURE — 25000003 PHARM REV CODE 250: Performed by: NURSE ANESTHETIST, CERTIFIED REGISTERED

## 2022-02-18 PROCEDURE — 71000039 HC RECOVERY, EACH ADD'L HOUR: Performed by: ORTHOPAEDIC SURGERY

## 2022-02-18 PROCEDURE — 37000008 HC ANESTHESIA 1ST 15 MINUTES: Performed by: ORTHOPAEDIC SURGERY

## 2022-02-18 PROCEDURE — 63600175 PHARM REV CODE 636 W HCPCS: Performed by: NURSE ANESTHETIST, CERTIFIED REGISTERED

## 2022-02-18 PROCEDURE — D9220A PRA ANESTHESIA: Mod: ANES,,, | Performed by: ANESTHESIOLOGY

## 2022-02-18 PROCEDURE — 36000710: Performed by: ORTHOPAEDIC SURGERY

## 2022-02-18 PROCEDURE — 71000033 HC RECOVERY, INTIAL HOUR: Performed by: ORTHOPAEDIC SURGERY

## 2022-02-18 PROCEDURE — 29823 PR SHLDR ARTHROSCOP,EXTEN DEBRIDE: ICD-10-PCS | Mod: RT,,, | Performed by: ORTHOPAEDIC SURGERY

## 2022-02-18 PROCEDURE — 27200750 HC INSULATED NEEDLE/ STIMUPLEX: Performed by: ANESTHESIOLOGY

## 2022-02-18 PROCEDURE — 64415 PERIPHERAL BLOCK: ICD-10-PCS | Mod: 59,RT,, | Performed by: ANESTHESIOLOGY

## 2022-02-18 PROCEDURE — 25000003 PHARM REV CODE 250: Performed by: ANESTHESIOLOGY

## 2022-02-18 PROCEDURE — D9220A PRA ANESTHESIA: ICD-10-PCS | Mod: ANES,,, | Performed by: ANESTHESIOLOGY

## 2022-02-18 PROCEDURE — 99900103 DSU ONLY-NO CHARGE-INITIAL HR (STAT): Performed by: ORTHOPAEDIC SURGERY

## 2022-02-18 PROCEDURE — 63600175 PHARM REV CODE 636 W HCPCS: Performed by: ANESTHESIOLOGY

## 2022-02-18 PROCEDURE — 63600175 PHARM REV CODE 636 W HCPCS

## 2022-02-18 PROCEDURE — 63600175 PHARM REV CODE 636 W HCPCS: Performed by: ORTHOPAEDIC SURGERY

## 2022-02-18 PROCEDURE — 71000015 HC POSTOP RECOV 1ST HR: Performed by: ORTHOPAEDIC SURGERY

## 2022-02-18 RX ORDER — MUPIROCIN 20 MG/G
OINTMENT TOPICAL
Status: DISCONTINUED | OUTPATIENT
Start: 2022-02-18 | End: 2022-02-18 | Stop reason: HOSPADM

## 2022-02-18 RX ORDER — SODIUM CHLORIDE, SODIUM LACTATE, POTASSIUM CHLORIDE, CALCIUM CHLORIDE 600; 310; 30; 20 MG/100ML; MG/100ML; MG/100ML; MG/100ML
10 INJECTION, SOLUTION INTRAVENOUS CONTINUOUS
Status: DISCONTINUED | OUTPATIENT
Start: 2022-02-18 | End: 2022-02-18

## 2022-02-18 RX ORDER — ROCURONIUM BROMIDE 10 MG/ML
INJECTION, SOLUTION INTRAVENOUS
Status: DISCONTINUED | OUTPATIENT
Start: 2022-02-18 | End: 2022-02-18

## 2022-02-18 RX ORDER — HYDROMORPHONE HYDROCHLORIDE 2 MG/ML
0.2 INJECTION, SOLUTION INTRAMUSCULAR; INTRAVENOUS; SUBCUTANEOUS EVERY 5 MIN PRN
Status: DISCONTINUED | OUTPATIENT
Start: 2022-02-18 | End: 2022-06-09

## 2022-02-18 RX ORDER — SODIUM CHLORIDE, SODIUM LACTATE, POTASSIUM CHLORIDE, CALCIUM CHLORIDE 600; 310; 30; 20 MG/100ML; MG/100ML; MG/100ML; MG/100ML
500 INJECTION, SOLUTION INTRAVENOUS ONCE
Status: DISCONTINUED | OUTPATIENT
Start: 2022-02-18 | End: 2022-02-18

## 2022-02-18 RX ORDER — DIPHENHYDRAMINE HYDROCHLORIDE 50 MG/ML
12.5 INJECTION INTRAMUSCULAR; INTRAVENOUS ONCE AS NEEDED
Status: DISCONTINUED | OUTPATIENT
Start: 2022-02-18 | End: 2022-06-09

## 2022-02-18 RX ORDER — ACETAMINOPHEN 10 MG/ML
INJECTION, SOLUTION INTRAVENOUS
Status: DISCONTINUED | OUTPATIENT
Start: 2022-02-18 | End: 2022-02-18

## 2022-02-18 RX ORDER — HYDROCODONE BITARTRATE AND ACETAMINOPHEN 5; 325 MG/1; MG/1
1 TABLET ORAL EVERY 4 HOURS PRN
Status: DISCONTINUED | OUTPATIENT
Start: 2022-02-18 | End: 2022-02-18 | Stop reason: HOSPADM

## 2022-02-18 RX ORDER — EPHEDRINE SULFATE 50 MG/ML
INJECTION, SOLUTION INTRAVENOUS
Status: DISCONTINUED | OUTPATIENT
Start: 2022-02-18 | End: 2022-02-18

## 2022-02-18 RX ORDER — FENTANYL CITRATE 50 UG/ML
25 INJECTION, SOLUTION INTRAMUSCULAR; INTRAVENOUS EVERY 5 MIN PRN
Status: DISCONTINUED | OUTPATIENT
Start: 2022-02-18 | End: 2022-06-09

## 2022-02-18 RX ORDER — MIDAZOLAM HYDROCHLORIDE 1 MG/ML
INJECTION, SOLUTION INTRAMUSCULAR; INTRAVENOUS
Status: DISCONTINUED | OUTPATIENT
Start: 2022-02-18 | End: 2022-02-18

## 2022-02-18 RX ORDER — PROCHLORPERAZINE EDISYLATE 5 MG/ML
5 INJECTION INTRAMUSCULAR; INTRAVENOUS EVERY 30 MIN PRN
Status: DISCONTINUED | OUTPATIENT
Start: 2022-02-18 | End: 2022-06-09

## 2022-02-18 RX ORDER — PROPOFOL 10 MG/ML
VIAL (ML) INTRAVENOUS
Status: DISCONTINUED | OUTPATIENT
Start: 2022-02-18 | End: 2022-02-18

## 2022-02-18 RX ORDER — LIDOCAINE HYDROCHLORIDE 20 MG/ML
INJECTION INTRAVENOUS
Status: DISCONTINUED | OUTPATIENT
Start: 2022-02-18 | End: 2022-02-18

## 2022-02-18 RX ORDER — PROMETHAZINE HYDROCHLORIDE 25 MG/ML
INJECTION, SOLUTION INTRAMUSCULAR; INTRAVENOUS
Status: DISCONTINUED | OUTPATIENT
Start: 2022-02-18 | End: 2022-02-18

## 2022-02-18 RX ORDER — CLINDAMYCIN PHOSPHATE 900 MG/50ML
900 INJECTION, SOLUTION INTRAVENOUS
Status: COMPLETED | OUTPATIENT
Start: 2022-02-18 | End: 2022-02-18

## 2022-02-18 RX ORDER — EPINEPHRINE 1 MG/ML
INJECTION, SOLUTION INTRACARDIAC; INTRAMUSCULAR; INTRAVENOUS; SUBCUTANEOUS
Status: DISCONTINUED | OUTPATIENT
Start: 2022-02-18 | End: 2022-02-18 | Stop reason: HOSPADM

## 2022-02-18 RX ORDER — DEXAMETHASONE SODIUM PHOSPHATE 4 MG/ML
INJECTION, SOLUTION INTRA-ARTICULAR; INTRALESIONAL; INTRAMUSCULAR; INTRAVENOUS; SOFT TISSUE
Status: DISCONTINUED | OUTPATIENT
Start: 2022-02-18 | End: 2022-02-18

## 2022-02-18 RX ORDER — ONDANSETRON 2 MG/ML
4 INJECTION INTRAMUSCULAR; INTRAVENOUS ONCE AS NEEDED
Status: DISCONTINUED | OUTPATIENT
Start: 2022-02-18 | End: 2022-06-09

## 2022-02-18 RX ORDER — SUCCINYLCHOLINE CHLORIDE 20 MG/ML
INJECTION INTRAMUSCULAR; INTRAVENOUS
Status: DISCONTINUED | OUTPATIENT
Start: 2022-02-18 | End: 2022-02-18

## 2022-02-18 RX ORDER — ONDANSETRON 4 MG/1
4 TABLET, ORALLY DISINTEGRATING ORAL EVERY 8 HOURS PRN
Qty: 30 TABLET | Refills: 0 | Status: SHIPPED | OUTPATIENT
Start: 2022-02-18 | End: 2022-04-07

## 2022-02-18 RX ORDER — OXYCODONE AND ACETAMINOPHEN 5; 325 MG/1; MG/1
1 TABLET ORAL EVERY 6 HOURS PRN
Qty: 28 TABLET | Refills: 0 | Status: SHIPPED | OUTPATIENT
Start: 2022-02-18 | End: 2022-03-09 | Stop reason: SDUPTHER

## 2022-02-18 RX ORDER — ONDANSETRON 2 MG/ML
INJECTION INTRAMUSCULAR; INTRAVENOUS
Status: DISCONTINUED | OUTPATIENT
Start: 2022-02-18 | End: 2022-02-18

## 2022-02-18 RX ORDER — BUPIVACAINE HYDROCHLORIDE 5 MG/ML
INJECTION, SOLUTION EPIDURAL; INTRACAUDAL
Status: COMPLETED | OUTPATIENT
Start: 2022-02-18 | End: 2022-02-18

## 2022-02-18 RX ORDER — LIDOCAINE HYDROCHLORIDE 10 MG/ML
1 INJECTION, SOLUTION EPIDURAL; INFILTRATION; INTRACAUDAL; PERINEURAL ONCE
Status: COMPLETED | OUTPATIENT
Start: 2022-02-18 | End: 2022-02-18

## 2022-02-18 RX ORDER — FENTANYL CITRATE 50 UG/ML
INJECTION, SOLUTION INTRAMUSCULAR; INTRAVENOUS
Status: DISCONTINUED | OUTPATIENT
Start: 2022-02-18 | End: 2022-02-18

## 2022-02-18 RX ORDER — LORAZEPAM 2 MG/ML
0.25 INJECTION INTRAMUSCULAR ONCE AS NEEDED
Status: DISCONTINUED | OUTPATIENT
Start: 2022-02-18 | End: 2022-06-09

## 2022-02-18 RX ORDER — MEPERIDINE HYDROCHLORIDE 50 MG/ML
12.5 INJECTION INTRAMUSCULAR; INTRAVENOUS; SUBCUTANEOUS ONCE AS NEEDED
Status: ACTIVE | OUTPATIENT
Start: 2022-02-18 | End: 2022-02-19

## 2022-02-18 RX ORDER — OXYCODONE HYDROCHLORIDE 5 MG/1
5 TABLET ORAL ONCE AS NEEDED
Status: DISCONTINUED | OUTPATIENT
Start: 2022-02-18 | End: 2022-06-09

## 2022-02-18 RX ORDER — SODIUM CHLORIDE 0.9 % (FLUSH) 0.9 %
3 SYRINGE (ML) INJECTION EVERY 8 HOURS
Status: DISCONTINUED | OUTPATIENT
Start: 2022-02-18 | End: 2022-02-18

## 2022-02-18 RX ADMIN — BUPIVACAINE 10 ML: 13.3 INJECTION, SUSPENSION, LIPOSOMAL INFILTRATION at 10:02

## 2022-02-18 RX ADMIN — LIDOCAINE HYDROCHLORIDE 100 MG: 20 INJECTION, SOLUTION INTRAVENOUS at 10:02

## 2022-02-18 RX ADMIN — SODIUM CHLORIDE, SODIUM GLUCONATE, SODIUM ACETATE, POTASSIUM CHLORIDE, MAGNESIUM CHLORIDE, SODIUM PHOSPHATE, DIBASIC, AND POTASSIUM PHOSPHATE: .53; .5; .37; .037; .03; .012; .00082 INJECTION, SOLUTION INTRAVENOUS at 09:02

## 2022-02-18 RX ADMIN — PROMETHAZINE HYDROCHLORIDE 6.25 MG: 25 INJECTION INTRAMUSCULAR; INTRAVENOUS at 10:02

## 2022-02-18 RX ADMIN — FENTANYL CITRATE 50 MCG: 50 INJECTION, SOLUTION INTRAMUSCULAR; INTRAVENOUS at 10:02

## 2022-02-18 RX ADMIN — ACETAMINOPHEN 1000 MG: 10 INJECTION, SOLUTION INTRAVENOUS at 10:02

## 2022-02-18 RX ADMIN — DEXAMETHASONE SODIUM PHOSPHATE 8 MG: 4 INJECTION, SOLUTION INTRA-ARTICULAR; INTRALESIONAL; INTRAMUSCULAR; INTRAVENOUS; SOFT TISSUE at 10:02

## 2022-02-18 RX ADMIN — EPHEDRINE SULFATE 15 MG: 50 INJECTION, SOLUTION INTRAMUSCULAR; INTRAVENOUS; SUBCUTANEOUS at 11:02

## 2022-02-18 RX ADMIN — ROCURONIUM BROMIDE 5 MG: 10 INJECTION, SOLUTION INTRAVENOUS at 10:02

## 2022-02-18 RX ADMIN — EPHEDRINE SULFATE 10 MG: 50 INJECTION, SOLUTION INTRAMUSCULAR; INTRAVENOUS; SUBCUTANEOUS at 11:02

## 2022-02-18 RX ADMIN — BUPIVACAINE HYDROCHLORIDE 5 ML: 5 INJECTION, SOLUTION EPIDURAL; INTRACAUDAL; PERINEURAL at 10:02

## 2022-02-18 RX ADMIN — MIDAZOLAM 1 MG: 1 INJECTION INTRAMUSCULAR; INTRAVENOUS at 10:02

## 2022-02-18 RX ADMIN — ONDANSETRON 8 MG: 2 INJECTION INTRAMUSCULAR; INTRAVENOUS at 10:02

## 2022-02-18 RX ADMIN — SUCCINYLCHOLINE CHLORIDE 120 MG: 20 INJECTION, SOLUTION INTRAMUSCULAR; INTRAVENOUS; PARENTERAL at 10:02

## 2022-02-18 RX ADMIN — CLINDAMYCIN PHOSPHATE 900 MG: 18 INJECTION, SOLUTION INTRAVENOUS at 10:02

## 2022-02-18 RX ADMIN — PROPOFOL 140 MG: 10 INJECTION, EMULSION INTRAVENOUS at 10:02

## 2022-02-18 RX ADMIN — MUPIROCIN: 20 OINTMENT TOPICAL at 09:02

## 2022-02-18 RX ADMIN — GLYCOPYRROLATE 0.2 MG: 0.2 INJECTION, SOLUTION INTRAMUSCULAR; INTRAVITREAL at 10:02

## 2022-02-18 RX ADMIN — LIDOCAINE HYDROCHLORIDE 10 MG: 10 INJECTION, SOLUTION EPIDURAL; INFILTRATION; INTRACAUDAL; PERINEURAL at 09:02

## 2022-02-18 NOTE — OP NOTE
[facility]  Orthopedic Surgery  Operative Note    SUMMARY     Date of Procedure: 2/18/2022     Procedure: Procedure(s) (LRB):  EXTENSIVE DEBRIDEMENT, SHOULDER, ARTHROSCOPIC (Right)  REMOVAL, FOREIGN BODY (Right)   old sutures.    Surgeon(s) and Role:     * Rio Pitts MD - Primary    Assistant: Ranjit HAYES    Pre-Operative Diagnosis: Tear of rotator cuff, unspecified laterality, unspecified tear extent, unspecified whether traumatic [M75.100]  Right shoulder instability    Post-Operative Diagnosis: Post-Op Diagnosis Codes:     * Tear of rotator cuff, unspecified laterality, unspecified tear extent, unspecified whether traumatic [M75.100]   Right shoulder instability    Anesthesia: General      Description of the Findings of the Procedure: Diagnostic arthroscopy findings on the patient's Right shoulder showed severe degenerative labral fraying.  Patient had significant arthritis noted.  The patient was essentially sitting and dislocated along the anterior inferior aspect.  Old sutures were noted from prior rotator cuff repair that were removed.  Patient had a full-thickness tear of the supraspinatus as well as of the subscapularis.  Patient had some arthritic change both on the humerus and the glenoid as well.  Patient had what appeared to be significant bone loss along the anterior inferior glenoid.  Estimate from looking appeared to be about 30%.  Also a fairly significant Hill-Sachs lesion was noted as well.    Complications: No    Estimated Blood Loss (EBL): 20 mL           Implants: * No implants in log *    Specimens:   Specimen (24h ago, onward)            None                  Condition: Good    Disposition: PACU - hemodynamically stable.    Attestation: I was present and scrubbed for the entire procedure.      Indications for the procedure:A 72 year old female with a history ofRight shoulder pain that failed to resolve with physical therapy, activity modification,  injections, and rest.  Patient desired the procedure listed above after MRI was obtained.    PROCEDURE IN DETAIL: Risks, benefits and alternatives of the procedure were   explained to the patient including, but not limited to damage to nerves,   arteries and blood vessels. Also explained risk of re-rupture, nonhealing, infection, DVT,   PE as well as anesthetic complications including seizure, stroke, heart attack   and death. They understood this, signed informed consent. The patient's Right  shoulder was marked prior to coming to the Operating Room. Once there a formal   timeout was done in which correct patient, procedure and operative site were all   correctly identified and confirmed by the entire operating team. Ancef 2 g was   given prior to surgical incision. General anesthesia was induced. The   patient was placed in lateral decubitus position on a bean bag with his Right upper extremity   hanging in balanced suspension.The arm was suspended with 10 lbs of weight for balanced traction. The extremity was prepped and draped in normal   sterile fashion.A standard posterior portal was then made. A spinal   needle was used to localize an anterior portal within the rotator interval and   this was made as well. We then performed a diagnostic arthroscopy of the   glenohumeral joint through both the anterior and posterior viewing portals,with the findings listed above.  Shaver was used to perform an extensive debridement within the joint.  Loose chondral flaps were debrided.  Old suture was removed with a suture cutter and a grasper.  Rotator cuff tear was debrided.  We took our time evaluating whether a soft tissue procedure would suffice for addressing her myriad of problems including pain from the rotator cuff as well as instability.  The rotator cuff was definitely repairable in regards to both the supraspinatus and subscapularis tears.  We viewed the shoulder after putting appropriate tension on the  subscapularis and the shoulder was still grossly unstable due to bony insufficiency.  It was determined to not do any kind of extensive soft tissue procedure and that the patient would best be served with a reverse total shoulder arthroplasty to address both the instability from bone loss and rotator cuff tears.    After this was done, we then proceeded up in the subacromial space   where a spinal needle was used to localize a lateral portal and then this was   made as well. A 4.0 shaver was used to perform a subtotal bursectomy. We then   used the ablator to remove the soft tissue off the undersurface of the acromion.We then thoroughly inspected the cuff on the rotator cuff side. We shaved away any additional adhesions in the anterior, lateral on posterior gutters.        All excess fluid was removed from the shoulder. The portals were closed using nylon. Sterile dressing applied.  They were then placed in a cryotherapy cuff with   UltraSling, extubated, awakened and transferred from the Operating Room to   Recovery Room in stable condition.     Postoperative rehab course will be for extensive debridement and old suture removal

## 2022-02-18 NOTE — TELEPHONE ENCOUNTER
----- Message from Genny Murphy MA sent at 2/18/2022  3:58 PM CST -----  Contact: pt  Lost jennifre cadena this last week   Still looking   Call back

## 2022-02-18 NOTE — TRANSFER OF CARE
Anesthesia Transfer of Care Note    Patient: Genoveva Gilbert    Procedure(s) Performed: Procedure(s) (LRB):  EXTENSIVE DEBRIDEMENT, SHOULDER, ARTHROSCOPIC (Right)  REMOVAL, FOREIGN BODY (Right)    Patient location: PACU    Anesthesia Type: general    Transport from OR: Transported from OR on 6-10 L/min O2 by face mask with adequate spontaneous ventilation    Post pain: adequate analgesia    Post assessment: no apparent anesthetic complications    Post vital signs: stable    Level of consciousness: responds to stimulation and sedated    Nausea/Vomiting: no nausea/vomiting    Complications: none    Transfer of care protocol was followed      Last vitals:   Visit Vitals  BP (!) 163/63   Pulse 63   Temp 36.8 °C (98.2 °F) (Skin)   Resp 18   Ht 5' (1.524 m)   Wt 101.6 kg (224 lb)   SpO2 98%   Breastfeeding No   BMI 43.75 kg/m²

## 2022-02-18 NOTE — ANESTHESIA PROCEDURE NOTES
Intubation    Date/Time: 2/18/2022 10:40 AM  Performed by: Virgie Caro CRNA  Authorized by: Precious Corrigan MD     Intubation:     Induction:  Intravenous    Intubated:  Postinduction    Mask Ventilation:  Easy with oral airway    Attempts:  1    Attempted By:  CRNA    Method of Intubation:  Video laryngoscopy    Blade:  Meyer 3    Laryngeal View Grade: Grade I - full view of cords      Difficult Airway Encountered?: No      Complications:  None    Airway Device:  Oral endotracheal tube    Airway Device Size:  7.0    Style/Cuff Inflation:  Cuffed (inflated to minimal occlusive pressure)    Tube secured:  22    Secured at:  The lips    Placement Verified By:  Capnometry    Complicating Factors:  Small mouth, large/floppy epiglottis, obesity, short neck and oropharyngeal edema or fat    Findings Post-Intubation:  BS equal bilateral and atraumatic/condition of teeth unchanged

## 2022-02-18 NOTE — PATIENT INSTRUCTIONS
1.Diet: Ice chips, clear liquids, and then diet as tolerated. Drink plenty of liquids.  2.Ice the area at least three times a day (20 minutes per session).  3.Elevate the extremity above the level of the heart to help reduce swelling.  4.Pain medication can be taken every four to six hours as needed. It is helpful to take pain medication prior to physical therapy.  Use Tylenol or anti-inflammatories for pain as much as possible.  Pain medication is for pain not responding to over-the-counter medications only.  5.Any activity that requires precise thinking or accuracy should be avoided for a minimum of 72 hours after surgery and while on narcotic pain medication. This includes operating machinery and/or driving a vehicle.  6.All sutures/staples will be removed approximately 14 days from the time of surgery. Leave steri-strips (skin tapes) in place until sutures are removed.  7. If skin glue is used instead of stitches, do not apply ointments or solutions to the incision. Keep the incision dry. The skin glue will peel off in 3-4 weeks.  8. Change dressing on the 2nd post-op day. Use gauze for the first 3 days, then start using Band-Aids over the incision sites.   9. All casts, splints, braces, slings, crutches, abduction pillows, etc... Are to be worn as instructed. Use sling at all times except for showering and exercises.  10. Keep the incision dry for 10-14 days. A waterproof dressing (purchase at [employer], [employer], etc) can be used to shower. No bath, pool, hot tub until instructed.  11. Start PT in 14 days. Call office to help with scheduling.  12. Call [phone] with any questions or concerns.

## 2022-02-18 NOTE — DISCHARGE SUMMARY
[facility]  Discharge Note  Short Stay    Procedure(s) (LRB):  EXTENSIVE DEBRIDEMENT, SHOULDER, ARTHROSCOPIC (Right)  REMOVAL, FOREIGN BODY (Right)    OUTCOME: Patient tolerated treatment/procedure well without complication and is now ready for discharge.    DISPOSITION: Home or Self Care    FINAL DIAGNOSIS:  <principal problem not specified>    FOLLOWUP: In clinic    DISCHARGE INSTRUCTIONS:    Discharge Procedure Orders   Diet general     Ice to affected area     Lifting restrictions     No driving, operating heavy equipment or signing legal documents while taking pain medication     Call MD for:  temperature >100.4     Call MD for:  persistent nausea and vomiting     Call MD for:  severe uncontrolled pain     Call MD for:  difficulty breathing, headache or visual disturbances     Call MD for:  redness, tenderness, or signs of infection (pain, swelling, redness, odor or green/yellow discharge around incision site)     Call MD for:  hives     Call MD for:  persistent dizziness or light-headedness     Call MD for:  extreme fatigue     Remove dressing in 48 hours     Change dressing (specify)   Order Comments: Dressing change: 1 times per day using waterproof bandaids.        TIME SPENT ON DISCHARGE: 5 minutes

## 2022-02-18 NOTE — ANESTHESIA POSTPROCEDURE EVALUATION
Anesthesia Post Evaluation    Patient: Genoveva Gilbert    Procedure(s) Performed: Procedure(s) (LRB):  EXTENSIVE DEBRIDEMENT, SHOULDER, ARTHROSCOPIC (Right)  REMOVAL, FOREIGN BODY (Right)    Final Anesthesia Type: general      Patient location during evaluation: PACU  Patient participation: Yes- Able to Participate  Level of consciousness: awake and alert and oriented  Post-procedure vital signs: reviewed and stable  Pain management: adequate  Airway patency: patent  ESHA mitigation strategies: Use of major conduction anesthesia (spinal/epidural) or peripheral nerve block, Multimodal analgesia, Extubation while patient is awake, Verification of full reversal of neuromuscular block and Extubation and recovery carried out in lateral, semiupright, or other nonsupine position  PONV status at discharge: No PONV  Anesthetic complications: no      Cardiovascular status: blood pressure returned to baseline  Respiratory status: unassisted, spontaneous ventilation and room air  Hydration status: euvolemic  Follow-up not needed.          Vitals Value Taken Time   /54 02/18/22 1245   Temp 36.2 °C (97.2 °F) 02/18/22 1245   Pulse 69 02/18/22 1245   Resp 16 02/18/22 1245   SpO2 93 % 02/18/22 1245         Event Time   Out of Recovery 12:45:00         Pain/Alexey Score: Alexey Score: 10 (2/18/2022 12:30 PM)  Modified Alexey Score: 20 (2/18/2022 12:45 PM)

## 2022-02-18 NOTE — ANESTHESIA PROCEDURE NOTES
Peripheral Block    Patient location during procedure: pre-op   Block not for primary anesthetic.  Reason for block: at surgeon's request and post-op pain management   Post-op Pain Location: right shoulder  Start time: 2/18/2022 10:06 AM  Timeout: 2/18/2022 10:05 AM   End time: 2/18/2022 10:10 AM    Staffing  Authorizing Provider: Precious Corrigan MD  Performing Provider: Precious Corrigan MD    Preanesthetic Checklist  Completed: patient identified, IV checked, site marked, risks and benefits discussed, surgical consent, monitors and equipment checked, pre-op evaluation and timeout performed  Peripheral Block  Patient position: sitting  Prep: ChloraPrep  Patient monitoring: heart rate, cardiac monitor, continuous pulse ox, continuous capnometry and frequent blood pressure checks  Block type: interscalene  Laterality: right  Injection technique: single shot  Needle  Needle type: Stimuplex   Needle gauge: 22 G  Needle length: 2 in  Needle localization: anatomical landmarks and ultrasound guidance   -ultrasound image captured on disc.  Assessment  Injection assessment: negative aspiration, negative parasthesia and local visualized surrounding nerve  Paresthesia pain: none  Heart rate change: no  Slow fractionated injection: yes  Pain Tolerance: comfortable throughout block and no complaints  Medications:  Medication Administration Time: 2/18/2022 10:10 AM  Medications: bupivacaine (pf) (MARCAINE) injection 0.5%, 5 mL    Medications:  Bolus administered: 20 mL of 0.25 ropivacaine  Epinephrine added: 3.75 mcg/mL (1/300,000)  Additional Notes  VSS.  [facility] RN monitoring vitals throughout procedure.  Patient tolerated procedure well.     Exparel 10mL

## 2022-02-21 VITALS
WEIGHT: 224 LBS | HEART RATE: 69 BPM | HEIGHT: 60 IN | BODY MASS INDEX: 43.98 KG/M2 | SYSTOLIC BLOOD PRESSURE: 116 MMHG | RESPIRATION RATE: 16 BRPM | TEMPERATURE: 97 F | DIASTOLIC BLOOD PRESSURE: 54 MMHG | OXYGEN SATURATION: 93 %

## 2022-02-21 DIAGNOSIS — M79.7 FIBROMYALGIA: ICD-10-CM

## 2022-02-21 DIAGNOSIS — F41.9 ANXIETY: ICD-10-CM

## 2022-02-21 DIAGNOSIS — S46.011D TRAUMATIC COMPLETE TEAR OF RIGHT ROTATOR CUFF, SUBSEQUENT ENCOUNTER: Primary | ICD-10-CM

## 2022-02-21 RX ORDER — HYDROCODONE BITARTRATE AND ACETAMINOPHEN 5; 325 MG/1; MG/1
1 TABLET ORAL EVERY 6 HOURS PRN
Qty: 10 TABLET | Refills: 0 | Status: CANCELLED | OUTPATIENT
Start: 2022-02-21

## 2022-02-21 NOTE — TELEPHONE ENCOUNTER
----- Message from Suzanne Neri sent at 2/21/2022  2:22 PM CST -----  Patient called and stated that she need a refill of her amitriptyline,alprazolam, and HYDROcodone-acetaminophen called into [facility] on [address] if any questions please give her a call at [phone]

## 2022-02-24 RX ORDER — AMITRIPTYLINE HYDROCHLORIDE 25 MG/1
25 TABLET, FILM COATED ORAL NIGHTLY PRN
Qty: 90 TABLET | Refills: 1 | Status: SHIPPED | OUTPATIENT
Start: 2022-02-24 | End: 2022-06-09

## 2022-02-24 RX ORDER — ALPRAZOLAM 2 MG/1
2 TABLET ORAL 2 TIMES DAILY PRN
Qty: 60 TABLET | Refills: 2 | Status: SHIPPED | OUTPATIENT
Start: 2022-02-24 | End: 2022-06-09

## 2022-02-25 ENCOUNTER — HOSPITAL ENCOUNTER (OUTPATIENT)
Dept: RADIOLOGY | Facility: HOSPITAL | Age: 72
Discharge: HOME | End: 2022-02-25
Attending: ORTHOPAEDIC SURGERY
Payer: MEDICARE

## 2022-02-25 DIAGNOSIS — S46.011D TRAUMATIC COMPLETE TEAR OF RIGHT ROTATOR CUFF, SUBSEQUENT ENCOUNTER: ICD-10-CM

## 2022-02-25 PROCEDURE — 73200 CT UPPER EXTREMITY W/O DYE: CPT | Mod: TC,RT

## 2022-02-25 PROCEDURE — 73200 CT SHOULDER WITHOUT CONTRAST RIGHT: ICD-10-PCS | Mod: 26,RT,, | Performed by: RADIOLOGY

## 2022-02-25 PROCEDURE — 73200 CT UPPER EXTREMITY W/O DYE: CPT | Mod: 26,RT,, | Performed by: RADIOLOGY

## 2022-02-28 ENCOUNTER — PATIENT MESSAGE (OUTPATIENT)
Dept: PSYCHIATRY | Facility: CLINIC | Age: 72
End: 2022-02-28
Payer: MEDICARE

## 2022-03-03 ENCOUNTER — OFFICE VISIT (OUTPATIENT)
Dept: ORTHOPEDICS | Facility: CLINIC | Age: 72
End: 2022-03-03
Payer: MEDICARE

## 2022-03-03 VITALS — HEIGHT: 60 IN | WEIGHT: 224 LBS | RESPIRATION RATE: 18 BRPM | BODY MASS INDEX: 43.98 KG/M2

## 2022-03-03 DIAGNOSIS — M19.011 ARTHRITIS OF SHOULDER REGION, RIGHT: ICD-10-CM

## 2022-03-03 DIAGNOSIS — S43.014D ANTERIOR DISLOCATION OF RIGHT SHOULDER, SUBSEQUENT ENCOUNTER: Primary | ICD-10-CM

## 2022-03-03 DIAGNOSIS — Z01.818 PRE-OP TESTING: ICD-10-CM

## 2022-03-03 DIAGNOSIS — S46.011D TRAUMATIC COMPLETE TEAR OF RIGHT ROTATOR CUFF, SUBSEQUENT ENCOUNTER: ICD-10-CM

## 2022-03-03 PROCEDURE — 99999 PR PBB SHADOW E&M-EST. PATIENT-LVL IV: ICD-10-PCS | Mod: PBBFAC,,, | Performed by: ORTHOPAEDIC SURGERY

## 2022-03-03 PROCEDURE — 3008F BODY MASS INDEX DOCD: CPT | Mod: CPTII,S$GLB,, | Performed by: ORTHOPAEDIC SURGERY

## 2022-03-03 PROCEDURE — 99999 PR PBB SHADOW E&M-EST. PATIENT-LVL IV: CPT | Mod: PBBFAC,,, | Performed by: ORTHOPAEDIC SURGERY

## 2022-03-03 PROCEDURE — 3288F PR FALLS RISK ASSESSMENT DOCUMENTED: ICD-10-PCS | Mod: CPTII,S$GLB,, | Performed by: ORTHOPAEDIC SURGERY

## 2022-03-03 PROCEDURE — 1159F MED LIST DOCD IN RCRD: CPT | Mod: CPTII,S$GLB,, | Performed by: ORTHOPAEDIC SURGERY

## 2022-03-03 PROCEDURE — 1159F PR MEDICATION LIST DOCUMENTED IN MEDICAL RECORD: ICD-10-PCS | Mod: CPTII,S$GLB,, | Performed by: ORTHOPAEDIC SURGERY

## 2022-03-03 PROCEDURE — 1101F PR PT FALLS ASSESS DOC 0-1 FALLS W/OUT INJ PAST YR: ICD-10-PCS | Mod: CPTII,S$GLB,, | Performed by: ORTHOPAEDIC SURGERY

## 2022-03-03 PROCEDURE — 1160F PR REVIEW ALL MEDS BY PRESCRIBER/CLIN PHARMACIST DOCUMENTED: ICD-10-PCS | Mod: CPTII,S$GLB,, | Performed by: ORTHOPAEDIC SURGERY

## 2022-03-03 PROCEDURE — 99214 OFFICE O/P EST MOD 30 MIN: CPT | Mod: 57,S$GLB,, | Performed by: ORTHOPAEDIC SURGERY

## 2022-03-03 PROCEDURE — 3288F FALL RISK ASSESSMENT DOCD: CPT | Mod: CPTII,S$GLB,, | Performed by: ORTHOPAEDIC SURGERY

## 2022-03-03 PROCEDURE — 99214 PR OFFICE/OUTPT VISIT, EST, LEVL IV, 30-39 MIN: ICD-10-PCS | Mod: 57,S$GLB,, | Performed by: ORTHOPAEDIC SURGERY

## 2022-03-03 PROCEDURE — 4010F ACE/ARB THERAPY RXD/TAKEN: CPT | Mod: CPTII,S$GLB,, | Performed by: ORTHOPAEDIC SURGERY

## 2022-03-03 PROCEDURE — 3008F PR BODY MASS INDEX (BMI) DOCUMENTED: ICD-10-PCS | Mod: CPTII,S$GLB,, | Performed by: ORTHOPAEDIC SURGERY

## 2022-03-03 PROCEDURE — 1101F PT FALLS ASSESS-DOCD LE1/YR: CPT | Mod: CPTII,S$GLB,, | Performed by: ORTHOPAEDIC SURGERY

## 2022-03-03 PROCEDURE — 4010F PR ACE/ARB THEARPY RXD/TAKEN: ICD-10-PCS | Mod: CPTII,S$GLB,, | Performed by: ORTHOPAEDIC SURGERY

## 2022-03-03 PROCEDURE — 1160F RVW MEDS BY RX/DR IN RCRD: CPT | Mod: CPTII,S$GLB,, | Performed by: ORTHOPAEDIC SURGERY

## 2022-03-03 RX ORDER — MUPIROCIN 20 MG/G
OINTMENT TOPICAL
Status: CANCELLED | OUTPATIENT
Start: 2022-03-03

## 2022-03-03 RX ORDER — CLINDAMYCIN PHOSPHATE 900 MG/50ML
900 INJECTION, SOLUTION INTRAVENOUS
Status: CANCELLED | OUTPATIENT
Start: 2022-03-03

## 2022-03-03 RX ORDER — TRANEXAMIC ACID 100 MG/ML
1000 INJECTION, SOLUTION INTRAVENOUS
Status: CANCELLED | OUTPATIENT
Start: 2022-03-03

## 2022-03-03 NOTE — PROGRESS NOTES
Past Medical History:   Diagnosis Date    Allergy     Anxiety     Arthritis, rheumatoid     Back pain     Depression     Fibromyalgia     GERD (gastroesophageal reflux disease)     High cholesterol     Hilar mass 3/27/2014    [place]     aid right ear    [place]     Hypertension     Incontinence of urine     Lymphedema     MS (multiple sclerosis)     benign; another MD stated she has no MS    Neuropathy     Restless leg syndrome     Rotator cuff tear 4/16/2015    Sciatica of left side 1/5/2018    Seasonal allergies     Sinus congestion     Sleep apnea     DOES NOT USE MACHINE    Spondylolysis     Thyroid disease     Type 2 diabetes mellitus with polyneuropathy     no med. was borderline    Wheezing        Past Surgical History:   Procedure Laterality Date    APPENDECTOMY      ARTHROSCOPIC DEBRIDEMENT OF SHOULDER Right 2/18/2022    Procedure: EXTENSIVE DEBRIDEMENT, SHOULDER, ARTHROSCOPIC;  Surgeon: Rio Pitts MD;  Location: [facility] OR;  Service: Orthopedics;  Laterality: Right;    BREAST SURGERY      reduction    CLOSED REDUCTION OF INJURY OF SHOULDER Right 9/19/2021    Procedure: CLOSED REDUCTION, SHOULDER;  Surgeon: Antonio Irwin MD;  Location: [facility] OR;  Service: Orthopedics;  Laterality: Right;    EPIDURAL STEROID INJECTION INTO LUMBAR SPINE N/A 6/12/2019    Procedure: Injection-steroid-epidural-lumbar;  Surgeon: Thad Manning MD;  Location: [facility] OR;  Service: Pain Management;  Laterality: N/A;  L5-S1    EPIDURAL STEROID INJECTION INTO LUMBAR SPINE N/A 9/16/2019    Procedure: Injection-steroid-epidural-lumbar;  Surgeon: Thad Manning MD;  Location: [facility] OR;  Service: Pain Management;  Laterality: N/A;  L5-S1    EYE SURGERY Bilateral     HYSTERECTOMY      partial - fibroids    INJECTION OF ANESTHETIC AGENT AROUND MEDIAL BRANCH NERVES INNERVATING LUMBAR FACET JOINT Bilateral 2/28/2019    Procedure: Block-nerve-medial branch-lumbar;  Surgeon:  Thad Manning MD;  Location: [facility] OR;  Service: Pain Management;  Laterality: Bilateral;  L3, 4,5     INJECTION OF ANESTHETIC AGENT AROUND MEDIAL BRANCH NERVES INNERVATING LUMBAR FACET JOINT Bilateral 3/21/2019    Procedure: Block-nerve-medial branch-lumbar L3,4,5;  Surgeon: Thad Manning MD;  Location: [facility] OR;  Service: Pain Management;  Laterality: Bilateral;    KNEE ARTHROPLASTY Left 3/16/2021    Procedure: ARTHROPLASTY, KNEE;  Surgeon: Rio Pitts MD;  Location: [facility] OR;  Service: Orthopedics;  Laterality: Left;  GENERAL AND BLOCK    LIPOSUCTION  1985    RADIOFREQUENCY ABLATION Left 11/4/2020    Procedure: Radiofrequency Ablation left knee;  Surgeon: Andrew Escudero MD;  Location: [facility] OR;  Service: Pain Management;  Laterality: Left;    RADIOFREQUENCY ABLATION OF LUMBAR MEDIAL BRANCH NERVE AT SINGLE LEVEL Bilateral 4/12/2019    Procedure: Radiofrequency Ablation, Nerve, Spinal, Lumbar, Medial Branch, 1 Level;  Surgeon: Thad Manning MD;  Location: [facility] OR;  Service: Pain Management;  Laterality: Bilateral;  L3, 4, 5  lumbar  [employer] pain management generator  SN [id number]  80 degrees for 75 seconds x2    RADIOFREQUENCY ABLATION OF LUMBAR MEDIAL BRANCH NERVE AT SINGLE LEVEL Bilateral 10/22/2019    Procedure: Radiofrequency Ablation, Nerve, Spinal, Lumbar, Medial Branch, L3,4,5;  Surgeon: Thad Manning MD;  Location: [facility] OR;  Service: Pain Management;  Laterality: Bilateral;  burned at 80 degrees C X 60 seconds X 2 each site    RADIOFREQUENCY ABLATION OF LUMBAR MEDIAL BRANCH NERVE AT SINGLE LEVEL Bilateral 5/27/2020    Procedure: Radiofrequency Ablation, Nerve, Spinal, Lumbar, Medial Branch, 1 Level;  Surgeon: Thad Manning MD;  Location: [facility] OR;  Service: Pain Management;  Laterality: Bilateral;  L3,4,5    SHOULDER ARTHROSCOPY Right 9/19/2021    Procedure: ARTHROSCOPY, SHOULDER;  Surgeon: Antonio Irwin MD;  Location: [facility] OR;  Service: Orthopedics;  Laterality: Right;  LIMITED DEBRIDMENT     TONSILLECTOMY      TOTAL REDUCTION MAMMOPLASTY         Current Outpatient Medications   Medication Sig    albuterol (PROAIR HFA) 90 mcg/actuation inhaler Inhale 2 puffs into the lungs every 6 (six) hours as needed for Wheezing. Rescue    ALPRAZolam (XANAX) 2 MG Tab Take 1 tablet (2 mg total) by mouth 2 (two) times daily as needed (anxiety).    amitriptyline (ELAVIL) 25 MG tablet Take 1 tablet (25 mg total) by mouth nightly as needed for Insomnia.    amLODIPine (NORVASC) 5 MG tablet Take 1 tablet (5 mg total) by mouth once daily. For blood pressure    biotin 10,000 mcg Cap Take 13,000 mcg by mouth once daily.    celecoxib (CELEBREX) 200 MG capsule Take 1 capsule (200 mg total) by mouth daily as needed (arthritis pain).    fexofenadine (ALLEGRA) 180 MG tablet     furosemide (LASIX) 20 MG tablet TAKE 1 TABLET BY MOUTH DAILY AS NEEDED FOR LEF SWELLING    hydroCHLOROthiazide (HYDRODIURIL) 25 MG tablet Take 1 tablet (25 mg total) by mouth once daily.    HYDROcodone-acetaminophen (NORCO) 5-325 mg per tablet Take 1 tablet by mouth every 6 (six) hours as needed for Pain.    levothyroxine (SYNTHROID) 88 MCG tablet Take 1 tablet (88 mcg total) by mouth once daily.    losartan (COZAAR) 50 MG tablet Take 1 tablet (50 mg total) by mouth once daily. For blood pressure    lovastatin (MEVACOR) 40 MG tablet Take 1 tablet (40 mg total) by mouth nightly. at bedtime. For cholesterol    meclizine (ANTIVERT) 25 mg tablet Take 25 mg by mouth daily as needed.    metoprolol succinate (TOPROL-XL) 50 MG 24 hr tablet Take 1 tablet (50 mg total) by mouth once daily. For blood pressure and heart    MULTIVIT-IRON-MIN-FOLIC ACID 3,500-18-0.4 UNIT-MG-MG ORAL CHEW Take 1 tablet by mouth.    nystatin (MYCOSTATIN) cream Apply topically 2 (two) times daily.    omeprazole (PRILOSEC) 20 MG capsule Take 1 capsule (20 mg total) by mouth every other day.    ondansetron (ZOFRAN-ODT) 4 MG TbDL Take 1 tablet (4 mg total) by mouth every 8  (eight) hours as needed (nausea).    oxyCODONE-acetaminophen (PERCOCET) 5-325 mg per tablet Take 1 tablet by mouth every 6 (six) hours as needed for Pain (not controlled by tylenol).    potassium chloride (MICRO-K) 10 MEQ CpSR Take 1 capsule (10 mEq total) by mouth every evening.    pramipexole (MIRAPEX) 0.5 MG tablet Take 2 tablets (1 mg total) by mouth every evening. For restless legs    pregabalin (LYRICA) 25 MG capsule Take 1 capsule (25 mg total) by mouth 3 (three) times daily.    umeclidinium-vilanteroL (ANORO ELLIPTA) 62.5-25 mcg/actuation DsDv Inhale 1 puff into the lungs once daily. Controller    valacyclovir HCl (VALACYCLOVIR ORAL) Take by mouth.    venlafaxine (EFFEXOR-XR) 150 MG Cp24 Take 1 capsule (150 mg total) by mouth once daily. For mood     No current facility-administered medications for this visit.     Facility-Administered Medications Ordered in Other Visits   Medication    diphenhydrAMINE injection 12.5 mg    electrolyte-S (ISOLYTE)    fentaNYL 50 mcg/mL injection 25 mcg    HYDROmorphone (PF) injection 0.2 mg    lorazepam injection 0.25 mg    ondansetron injection 4 mg    oxyCODONE immediate release tablet 5 mg    prochlorperazine injection Soln 5 mg       Review of patient's allergies indicates:   Allergen Reactions    Keflex [cephalexin]     Latex, natural rubber Anaphylaxis     bandaids     Pcn [penicillins]     Adhesive Other (See Comments)     bandainds redness at skin    Trelegy ellipta [fluticasone-umeclidin-vilanter]      Vision disturbance       Family History   Problem Relation Age of Onset    Heart disease Mother        Social History     Socioeconomic History    Marital status:    Tobacco Use    Smoking status: Former Smoker     Quit date: 1996     Years since quittin.7    Smokeless tobacco: Never Used   Substance and Sexual Activity    Alcohol use: Yes     Comment: very little     Drug use: No     Social Determinants of Health     Financial  Resource Strain: Low Risk     Difficulty of Paying Living Expenses: Not very hard   Food Insecurity: No Food Insecurity    Worried About Running Out of Food in the Last Year: Never true    Ran Out of Food in the Last Year: Never true   Transportation Needs: No Transportation Needs    Lack of Transportation (Medical): No    Lack of Transportation (Non-Medical): No   Physical Activity: Inactive    Days of Exercise per Week: 0 days    Minutes of Exercise per Session: 0 min   Stress: Stress Concern Present    Feeling of Stress : Very much   Social Connections: Moderately Isolated    Frequency of Communication with Friends and Family: More than three times a week    Frequency of Social Gatherings with Friends and Family: Once a week    Attends Adventist Services: Never    Active Member of Clubs or Organizations: No    Attends Club or Organization Meetings: Never    Marital Status:    Housing Stability: Low Risk     Unable to Pay for Housing in the Last Year: No    Number of Places Lived in the Last Year: 1    Unstable Housing in the Last Year: No       Chief Complaint:   Chief Complaint   Patient presents with    Right Shoulder - Post-op Evaluation       History of present illness:  This is a 72-year-old female seen for bilateral shoulder pain.  We injected her about a year ago.  MRI just showed a very small rotator cuff tear.  Pain has gotten much worse over the last few months.  Patient had a fall back in September and suffered a dislocation with greater tuberosity fracture.  Patient was treated with closed reduction.  It was then talk about going back in and repairing her rotator cuff.  We were supposed to fix her last week and she could not get a ride to surgery.  Ironically, she then had another shoulder dislocation that day requiring closed reduction.  In surgery, the patient was dislocating out the front.  She had pretty significant bony wear both of the glenoid as well as a significant  Hill-Sachs lesion.  Anything done soft tissue wise, was likely to fail and she had pretty significant arthritis already.    Review of Systems:    Constitution: Negative for chills, fever, and sweats.  Negative for unexplained weight loss.    HENT:  Negative for headaches and blurry vision.    Cardiovascular:Negative for chest pain or irregular heart beat. Negative for hypertension.    Respiratory:  Negative for cough and positive for shortness of breath.    Gastrointestinal: Negative for abdominal pain, heartburn, melena, nausea, and vomitting.    Genitourinary:  Negative bladder incontinence and dysuria.  Positive for urgency.    Musculoskeletal:  See HPI    Neurological:  Positive for numbness.    Psychiatric/Behavioral:  Positive for depression and anxiety    Endocrine: Negative for polyuria    Hematologic/Lymphatic: Negative for bleeding problem.  Does  bruise/bleed easily.    Skin: Negative for poor would healing and rash      Physical Examination:    Vital Signs:    Vitals:    03/03/22 0933   Resp: 18       Body mass index is 43.75 kg/m².    This a well-developed, well nourished patient in no acute distress.  They are alert and oriented and cooperative to examination.  Pt. walks without an antalgic gait.        Examination of the right shoulder shows no rashes or erythema. There are no masses, ecchymosis, or atrophy. The patient has full range of motion in forward flexion, external rotation, and internal rotation to the mid T-spine. The patient has - impingement signs. - Verona's test. - Speeds test. Nontender to palpation over a.c. joint. Normal stability anteriorly, posteriorly, and negative sulcus sign. Passive range of motion: Forward flexion of 180°, external rotation at 90° of 90°, internal rotation of 50°, and external rotation at 0° of 50°. 2+ radial pulse. Intact axillary, radial, median and ulnar sensation. 4+ out of 5 resisted forward flexion, external rotation, and negative lift off test.    Heart  is regular rate without obvious murmurs   Normal respiratory effort without audible wheezing  Abdomen is soft and nontender         X-rays:  X-rays of left knee is  reviewed which show moderate to severe medial compartment narrowing of both knees.  X-rays of the left shoulder is  review which show some sclerosis near the greater tuberosity consistent with chronic rotator cuff syndrome    MRI of the left shoulder:Full-thickness tear and short distance retraction of the anterior supraspinatus tendon.  Possible partial articular surface tear of the more posterior supraspinatus tendon.  Subchondral cyst, geodes noted in the femoral head.  Small inferior spur from the distal head of the clavicle with mild impingement.     Assessment::    Right rotator cuff tear with continued shoulder instability    Plan:  I reviewed the findings with her today.  I think the best option would be a reverse total shoulder arthroplasty.  I think anything short of this would not treat all the causes of her pain including the rotator cuff tear, the instability, and the arthritis.  Patient would like to go ahead and have this done.  She hurts all the time and has very little function with the right shoulder.  Risks, benefits, and alternatives to the procedure were explained to the patient including but not limited to damage to nerves, arteries, blood vessels, bones, tendons, ligaments, stiffness, instability, infection, DVT, PE, as well as general anesthetic complications including seizure, stroke, heart attack and even death. The patient understood these risks and wished to proceed and signed the informed consent.       This note was created using [employer] voice recognition software that occasionally misinterpreted phrases or words.    Consult note is delivered via Epic messaging service.

## 2022-03-09 DIAGNOSIS — S43.014D ANTERIOR DISLOCATION OF RIGHT SHOULDER, SUBSEQUENT ENCOUNTER: Primary | ICD-10-CM

## 2022-03-09 RX ORDER — OXYCODONE AND ACETAMINOPHEN 5; 325 MG/1; MG/1
1 TABLET ORAL EVERY 6 HOURS PRN
Qty: 28 TABLET | Refills: 0 | Status: SHIPPED | OUTPATIENT
Start: 2022-03-09 | End: 2022-04-25 | Stop reason: SDUPTHER

## 2022-03-09 NOTE — TELEPHONE ENCOUNTER
----- Message from Thad Alfaro sent at 3/9/2022 11:11 AM CST -----  Regarding: pt wants Rx for pain, call pt   Contact: pt   pt wants Rx for pain, call pt

## 2022-03-10 ENCOUNTER — TELEPHONE (OUTPATIENT)
Dept: ORTHOPEDICS | Facility: CLINIC | Age: 72
End: 2022-03-10
Payer: MEDICARE

## 2022-03-10 NOTE — TELEPHONE ENCOUNTER
----- Message from Thad Alfaro sent at 3/10/2022 10:52 AM CST -----  Regarding: FW: pt wants Rx for pain, call pt   Contact: pt   2nd call pt is in pain and wants to speak to staff,  call pt     ----- Message -----  From: Thad Alfaro  Sent: 3/9/2022  11:13 AM CST  To: [facility] Staff  Subject: pt wants Rx for pain, call pt        pt wants Rx for pain, call pt       
Called and informed pt that we sent her medication yesterday   
To get better and follow your care plan as instructed.

## 2022-03-15 ENCOUNTER — OFFICE VISIT (OUTPATIENT)
Dept: FAMILY MEDICINE | Facility: CLINIC | Age: 72
End: 2022-03-15
Payer: MEDICARE

## 2022-03-15 VITALS
HEIGHT: 60 IN | HEART RATE: 75 BPM | SYSTOLIC BLOOD PRESSURE: 120 MMHG | DIASTOLIC BLOOD PRESSURE: 82 MMHG | BODY MASS INDEX: 41.03 KG/M2 | WEIGHT: 209 LBS

## 2022-03-15 DIAGNOSIS — Z12.11 COLON CANCER SCREENING: ICD-10-CM

## 2022-03-15 DIAGNOSIS — I89.0 LYMPHEDEMA OF BOTH LOWER EXTREMITIES: ICD-10-CM

## 2022-03-15 DIAGNOSIS — E78.2 MIXED HYPERLIPIDEMIA: ICD-10-CM

## 2022-03-15 DIAGNOSIS — Z12.31 VISIT FOR SCREENING MAMMOGRAM: ICD-10-CM

## 2022-03-15 DIAGNOSIS — E03.9 ACQUIRED HYPOTHYROIDISM: ICD-10-CM

## 2022-03-15 DIAGNOSIS — I50.9 HEART FAILURE, UNSPECIFIED HF CHRONICITY, UNSPECIFIED HEART FAILURE TYPE: ICD-10-CM

## 2022-03-15 DIAGNOSIS — I10 ESSENTIAL HYPERTENSION: ICD-10-CM

## 2022-03-15 DIAGNOSIS — E11.42 TYPE 2 DIABETES MELLITUS WITH POLYNEUROPATHY: Primary | ICD-10-CM

## 2022-03-15 PROCEDURE — 3288F FALL RISK ASSESSMENT DOCD: CPT | Mod: S$GLB,,, | Performed by: FAMILY MEDICINE

## 2022-03-15 PROCEDURE — 3288F PR FALLS RISK ASSESSMENT DOCUMENTED: ICD-10-PCS | Mod: S$GLB,,, | Performed by: FAMILY MEDICINE

## 2022-03-15 PROCEDURE — 3079F DIAST BP 80-89 MM HG: CPT | Mod: S$GLB,,, | Performed by: FAMILY MEDICINE

## 2022-03-15 PROCEDURE — 3044F PR MOST RECENT HEMOGLOBIN A1C LEVEL <7.0%: ICD-10-PCS | Mod: S$GLB,,, | Performed by: FAMILY MEDICINE

## 2022-03-15 PROCEDURE — 99214 PR OFFICE/OUTPT VISIT, EST, LEVL IV, 30-39 MIN: ICD-10-PCS | Mod: S$GLB,,, | Performed by: FAMILY MEDICINE

## 2022-03-15 PROCEDURE — 3079F PR MOST RECENT DIASTOLIC BLOOD PRESSURE 80-89 MM HG: ICD-10-PCS | Mod: S$GLB,,, | Performed by: FAMILY MEDICINE

## 2022-03-15 PROCEDURE — 4010F PR ACE/ARB THEARPY RXD/TAKEN: ICD-10-PCS | Mod: S$GLB,,, | Performed by: FAMILY MEDICINE

## 2022-03-15 PROCEDURE — 3074F PR MOST RECENT SYSTOLIC BLOOD PRESSURE < 130 MM HG: ICD-10-PCS | Mod: S$GLB,,, | Performed by: FAMILY MEDICINE

## 2022-03-15 PROCEDURE — 3008F PR BODY MASS INDEX (BMI) DOCUMENTED: ICD-10-PCS | Mod: S$GLB,,, | Performed by: FAMILY MEDICINE

## 2022-03-15 PROCEDURE — 1159F PR MEDICATION LIST DOCUMENTED IN MEDICAL RECORD: ICD-10-PCS | Mod: S$GLB,,, | Performed by: FAMILY MEDICINE

## 2022-03-15 PROCEDURE — 3044F HG A1C LEVEL LT 7.0%: CPT | Mod: S$GLB,,, | Performed by: FAMILY MEDICINE

## 2022-03-15 PROCEDURE — 99214 OFFICE O/P EST MOD 30 MIN: CPT | Mod: S$GLB,,, | Performed by: FAMILY MEDICINE

## 2022-03-15 PROCEDURE — 1101F PT FALLS ASSESS-DOCD LE1/YR: CPT | Mod: S$GLB,,, | Performed by: FAMILY MEDICINE

## 2022-03-15 PROCEDURE — 1159F MED LIST DOCD IN RCRD: CPT | Mod: S$GLB,,, | Performed by: FAMILY MEDICINE

## 2022-03-15 PROCEDURE — 3008F BODY MASS INDEX DOCD: CPT | Mod: S$GLB,,, | Performed by: FAMILY MEDICINE

## 2022-03-15 PROCEDURE — 1101F PR PT FALLS ASSESS DOC 0-1 FALLS W/OUT INJ PAST YR: ICD-10-PCS | Mod: S$GLB,,, | Performed by: FAMILY MEDICINE

## 2022-03-15 PROCEDURE — 4010F ACE/ARB THERAPY RXD/TAKEN: CPT | Mod: S$GLB,,, | Performed by: FAMILY MEDICINE

## 2022-03-15 PROCEDURE — 3074F SYST BP LT 130 MM HG: CPT | Mod: S$GLB,,, | Performed by: FAMILY MEDICINE

## 2022-03-15 RX ORDER — LOVASTATIN 40 MG/1
40 TABLET ORAL NIGHTLY
Qty: 90 TABLET | Refills: 3 | Status: SHIPPED | OUTPATIENT
Start: 2022-03-15 | End: 2022-06-09 | Stop reason: SDUPTHER

## 2022-03-15 RX ORDER — HYDROCHLOROTHIAZIDE 25 MG/1
25 TABLET ORAL DAILY
Qty: 90 TABLET | Refills: 3 | Status: SHIPPED | OUTPATIENT
Start: 2022-03-15 | End: 2022-06-09 | Stop reason: SINTOL

## 2022-03-15 RX ORDER — LEVOTHYROXINE SODIUM 88 UG/1
88 TABLET ORAL DAILY
Qty: 90 TABLET | Refills: 3 | Status: SHIPPED | OUTPATIENT
Start: 2022-03-15 | End: 2023-03-09 | Stop reason: SDUPTHER

## 2022-03-15 RX ORDER — LOSARTAN POTASSIUM 50 MG/1
50 TABLET ORAL DAILY
Qty: 90 TABLET | Refills: 1 | Status: SHIPPED | OUTPATIENT
Start: 2022-03-15 | End: 2022-08-09 | Stop reason: SDUPTHER

## 2022-03-15 RX ORDER — AMLODIPINE BESYLATE 5 MG/1
5 TABLET ORAL DAILY
Qty: 90 TABLET | Refills: 3 | Status: SHIPPED | OUTPATIENT
Start: 2022-03-15 | End: 2023-08-02 | Stop reason: SDUPTHER

## 2022-03-15 RX ORDER — METOPROLOL SUCCINATE 50 MG/1
50 TABLET, EXTENDED RELEASE ORAL DAILY
Qty: 90 TABLET | Refills: 3 | Status: ON HOLD | OUTPATIENT
Start: 2022-03-15 | End: 2022-10-06 | Stop reason: SDUPTHER

## 2022-03-15 NOTE — PROGRESS NOTES
SUBJECTIVE:    Patient ID: Genoveva Gilbert is a 72 y.o. female.    Chief Complaint: 4M HTN / DM Check Up  /  DM Foot Exam; Covid / Shingles vaccinations due; mammogram order / colonoscopy order; c/o bothersome/echo L ear; and check spot on leg, wart?    Patient with HTN and OA in for visit. Has been having issues with ehr right shoulder that has not improved with arthroscopic surgery. Dr. Thao plans to do shoulder replacmemtn  Had positive COVID screen in January but patient reports she was asymptomatic.  Continues on her medications with no problems.  Colon cancer and breast cancer screenings are due.  Reports a 4 day history of discomfort in her right ear.  Felt this of water was present.  No drainage.  No pain.  Symptoms are improving.       Office Visit on 03/15/2022   Component Date Value Ref Range Status    Hemoglobin A1C 03/15/2022 6.3 (A) <5.7 % of total Hgb Final    Glucose 03/15/2022 104 (A) 65 - 99 mg/dL Final    BUN 03/15/2022 21  7 - 25 mg/dL Final    Creatinine 03/15/2022 0.87  0.60 - 0.93 mg/dL Final    eGFR if non  03/15/2022 66  > OR = 60 mL/min/1.73m2 Final    eGFR if  03/15/2022 76  > OR = 60 mL/min/1.73m2 Final    BUN/Creatinine Ratio 03/15/2022 NOT APPLICABLE  6 - 22 (calc) Final    Sodium 03/15/2022 143  135 - 146 mmol/L Final    Potassium 03/15/2022 3.7  3.5 - 5.3 mmol/L Final    Chloride 03/15/2022 99  98 - 110 mmol/L Final    CO2 03/15/2022 32  20 - 32 mmol/L Final    Calcium 03/15/2022 9.7  8.6 - 10.4 mg/dL Final    Total Protein 03/15/2022 7.0  6.1 - 8.1 g/dL Final    Albumin 03/15/2022 4.4  3.6 - 5.1 g/dL Final    Globulin, Total 03/15/2022 2.6  1.9 - 3.7 g/dL (calc) Final    Albumin/Globulin Ratio 03/15/2022 1.7  1.0 - 2.5 (calc) Final    Total Bilirubin 03/15/2022 0.5  0.2 - 1.2 mg/dL Final    Alkaline Phosphatase 03/15/2022 102  37 - 153 U/L Final    AST 03/15/2022 25  10 - 35 U/L Final    ALT 03/15/2022 18  6 - 29 U/L Final     TSH w/reflex to FT4 03/15/2022 6.60 (A) 0.40 - 4.50 mIU/L Final    T4, Free 03/15/2022 1.3  0.8 - 1.8 ng/dL Final    Cholesterol 03/15/2022 225 (A) <200 mg/dL Final    HDL 03/15/2022 44 (A) > OR = 50 mg/dL Final    Triglycerides 03/15/2022 212 (A) <150 mg/dL Final    LDL Cholesterol 03/15/2022 146 (A) mg/dL (calc) Final    HDL/Cholesterol Ratio 03/15/2022 5.1 (A) <5.0 (calc) Final    Non HDL Chol. (LDL+VLDL) 03/15/2022 181 (A) <130 mg/dL (calc) Final   Hospital Outpatient Visit on 01/25/2022   Component Date Value Ref Range Status    Sodium 01/25/2022 140  136 - 145 mmol/L Final    Potassium 01/25/2022 3.4 (A) 3.5 - 5.1 mmol/L Final    Chloride 01/25/2022 102  95 - 110 mmol/L Final    CO2 01/25/2022 27  23 - 29 mmol/L Final    Glucose 01/25/2022 111 (A) 70 - 110 mg/dL Final    BUN 01/25/2022 19  8 - 23 mg/dL Final    Creatinine 01/25/2022 0.8  0.5 - 1.4 mg/dL Final    Calcium 01/25/2022 9.7  8.7 - 10.5 mg/dL Final    Anion Gap 01/25/2022 11  8 - 16 mmol/L Final    eGFR if African American 01/25/2022 >60  >60 mL/min/1.73 m^2 Final    eGFR if non African American 01/25/2022 >60  >60 mL/min/1.73 m^2 Final    WBC 01/25/2022 6.38  3.90 - 12.70 K/uL Final    RBC 01/25/2022 4.39  4.00 - 5.40 M/uL Final    Hemoglobin 01/25/2022 12.7  12.0 - 16.0 g/dL Final    Hematocrit 01/25/2022 39.7  37.0 - 48.5 % Final    MCV 01/25/2022 90  82 - 98 fL Final    MCH 01/25/2022 28.9  27.0 - 31.0 pg Final    MCHC 01/25/2022 32.0  32.0 - 36.0 g/dL Final    RDW 01/25/2022 13.8  11.5 - 14.5 % Final    Platelets 01/25/2022 289  150 - 450 K/uL Final    MPV 01/25/2022 9.1 (A) 9.2 - 12.9 fL Final    Immature Granulocytes 01/25/2022 0.5  0.0 - 0.5 % Final    Gran # (ANC) 01/25/2022 3.3  1.8 - 7.7 K/uL Final    Immature Grans (Abs) 01/25/2022 0.03  0.00 - 0.04 K/uL Final    Lymph # 01/25/2022 2.2  1.0 - 4.8 K/uL Final    Mono # 01/25/2022 0.6  0.3 - 1.0 K/uL Final    Eos # 01/25/2022 0.2  0.0 - 0.5 K/uL Final     Baso # 01/25/2022 0.04  0.00 - 0.20 K/uL Final    nRBC 01/25/2022 0  0 /100 WBC Final    Gran % 01/25/2022 52.0  38.0 - 73.0 % Final    Lymph % 01/25/2022 35.0  18.0 - 48.0 % Final    Mono % 01/25/2022 8.6  4.0 - 15.0 % Final    Eosinophil % 01/25/2022 3.3  0.0 - 8.0 % Final    Basophil % 01/25/2022 0.6  0.0 - 1.9 % Final    Differential Method 01/25/2022 Automated   Final   Lab Visit on 01/25/2022   Component Date Value Ref Range Status    SARS-CoV2 (COVID-19) Qualitative P* 01/25/2022 Detected (A) Not Detected Final    SARS-COV-2- Cycle Number 01/25/2022 39   Final   Lab Visit on 01/15/2022   Component Date Value Ref Range Status    SARS-CoV2 (COVID-19) Qualitative P* 01/15/2022 Detected (A) Not Detected Final    SARS-COV-2- Cycle Number 01/15/2022 27   Final   Lab Visit on 01/09/2022   Component Date Value Ref Range Status    SARS-CoV2 (COVID-19) Qualitative P* 01/09/2022 DETECTED (A) Not detected Final   Admission on 09/18/2021, Discharged on 09/19/2021   Component Date Value Ref Range Status    SARS-CoV-2 RNA, Amplification, Qual 09/18/2021 Negative  Negative Final    Sodium 09/19/2021 143  136 - 145 mmol/L Final    Potassium 09/19/2021 3.4 (A) 3.5 - 5.1 mmol/L Final    Chloride 09/19/2021 104  95 - 110 mmol/L Final    CO2 09/19/2021 28  23 - 29 mmol/L Final    Glucose 09/19/2021 103  70 - 110 mg/dL Final    BUN 09/19/2021 11  8 - 23 mg/dL Final    Creatinine 09/19/2021 0.8  0.5 - 1.4 mg/dL Final    Calcium 09/19/2021 8.8  8.7 - 10.5 mg/dL Final    Total Protein 09/19/2021 6.1  6.0 - 8.4 g/dL Final    Albumin 09/19/2021 3.4 (A) 3.5 - 5.2 g/dL Final    Total Bilirubin 09/19/2021 0.3  0.1 - 1.0 mg/dL Final    Alkaline Phosphatase 09/19/2021 76  55 - 135 U/L Final    AST 09/19/2021 30  10 - 40 U/L Final    ALT 09/19/2021 24  10 - 44 U/L Final    Anion Gap 09/19/2021 11  8 - 16 mmol/L Final    eGFR if African American 09/19/2021 >60  >60 mL/min/1.73 m^2 Final    eGFR if non African  American 09/19/2021 >60  >60 mL/min/1.73 m^2 Final    Magnesium 09/19/2021 2.1  1.6 - 2.6 mg/dL Final    Phosphorus 09/19/2021 3.8  2.7 - 4.5 mg/dL Final    WBC 09/19/2021 5.88  3.90 - 12.70 K/uL Final    RBC 09/19/2021 4.00  4.00 - 5.40 M/uL Final    Hemoglobin 09/19/2021 11.5 (A) 12.0 - 16.0 g/dL Final    Hematocrit 09/19/2021 37.6  37.0 - 48.5 % Final    MCV 09/19/2021 94  82 - 98 fL Final    MCH 09/19/2021 28.8  27.0 - 31.0 pg Final    MCHC 09/19/2021 30.6 (A) 32.0 - 36.0 g/dL Final    RDW 09/19/2021 15.3 (A) 11.5 - 14.5 % Final    Platelets 09/19/2021 246  150 - 450 K/uL Final    MPV 09/19/2021 9.0 (A) 9.2 - 12.9 fL Final    Immature Granulocytes 09/19/2021 0.3  0.0 - 0.5 % Final    Gran # (ANC) 09/19/2021 2.0  1.8 - 7.7 K/uL Final    Immature Grans (Abs) 09/19/2021 0.02  0.00 - 0.04 K/uL Final    Lymph # 09/19/2021 2.9  1.0 - 4.8 K/uL Final    Mono # 09/19/2021 0.6  0.3 - 1.0 K/uL Final    Eos # 09/19/2021 0.3  0.0 - 0.5 K/uL Final    Baso # 09/19/2021 0.07  0.00 - 0.20 K/uL Final    nRBC 09/19/2021 0  0 /100 WBC Final    Gran % 09/19/2021 34.4 (A) 38.0 - 73.0 % Final    Lymph % 09/19/2021 48.8 (A) 18.0 - 48.0 % Final    Mono % 09/19/2021 10.4  4.0 - 15.0 % Final    Eosinophil % 09/19/2021 4.9  0.0 - 8.0 % Final    Basophil % 09/19/2021 1.2  0.0 - 1.9 % Final    Differential Method 09/19/2021 Automated   Final       Past Medical History:   Diagnosis Date    Allergy     Anxiety     Arthritis, rheumatoid     Back pain     Depression     Fibromyalgia     GERD (gastroesophageal reflux disease)     High cholesterol     Hilar mass 3/27/2014    Huslia (hard of hearing)     aid right ear    [place] (hard of hearing)     Hypertension     Incontinence of urine     Lymphedema     MS (multiple sclerosis)     benign; another MD stated she has no MS    Neuropathy     Restless leg syndrome     Rotator cuff tear 4/16/2015    Sciatica of left side 1/5/2018    Seasonal allergies     Sinus  congestion     Sleep apnea     DOES NOT USE MACHINE    Spondylolysis     Thyroid disease     Type 2 diabetes mellitus with polyneuropathy     no med. was borderline    Wheezing      Past Surgical History:   Procedure Laterality Date    APPENDECTOMY      ARTHROSCOPIC DEBRIDEMENT OF SHOULDER Right 2/18/2022    Procedure: EXTENSIVE DEBRIDEMENT, SHOULDER, ARTHROSCOPIC;  Surgeon: Rio Pitts MD;  Location: [facility] OR;  Service: Orthopedics;  Laterality: Right;    BREAST SURGERY      reduction    CLOSED REDUCTION OF INJURY OF SHOULDER Right 9/19/2021    Procedure: CLOSED REDUCTION, SHOULDER;  Surgeon: Antonio Irwin MD;  Location: [facility] OR;  Service: Orthopedics;  Laterality: Right;    EPIDURAL STEROID INJECTION INTO LUMBAR SPINE N/A 6/12/2019    Procedure: Injection-steroid-epidural-lumbar;  Surgeon: Thad Manning MD;  Location: [facility] OR;  Service: Pain Management;  Laterality: N/A;  L5-S1    EPIDURAL STEROID INJECTION INTO LUMBAR SPINE N/A 9/16/2019    Procedure: Injection-steroid-epidural-lumbar;  Surgeon: Thad Manning MD;  Location: [facility] OR;  Service: Pain Management;  Laterality: N/A;  L5-S1    EYE SURGERY Bilateral     HYSTERECTOMY      partial - fibroids    INJECTION OF ANESTHETIC AGENT AROUND MEDIAL BRANCH NERVES INNERVATING LUMBAR FACET JOINT Bilateral 2/28/2019    Procedure: Block-nerve-medial branch-lumbar;  Surgeon: Thad Manning MD;  Location: [facility] OR;  Service: Pain Management;  Laterality: Bilateral;  L3, 4,5     INJECTION OF ANESTHETIC AGENT AROUND MEDIAL BRANCH NERVES INNERVATING LUMBAR FACET JOINT Bilateral 3/21/2019    Procedure: Block-nerve-medial branch-lumbar L3,4,5;  Surgeon: Thad Manning MD;  Location: [facility] OR;  Service: Pain Management;  Laterality: Bilateral;    KNEE ARTHROPLASTY Left 3/16/2021    Procedure: ARTHROPLASTY, KNEE;  Surgeon: Rio Pitts MD;  Location: [facility] OR;  Service: Orthopedics;  Laterality: Left;  GENERAL AND BLOCK    LIPOSUCTION  1985     RADIOFREQUENCY ABLATION Left 11/4/2020    Procedure: Radiofrequency Ablation left knee;  Surgeon: Andrew Escudero MD;  Location: [facility] OR;  Service: Pain Management;  Laterality: Left;    RADIOFREQUENCY ABLATION OF LUMBAR MEDIAL BRANCH NERVE AT SINGLE LEVEL Bilateral 4/12/2019    Procedure: Radiofrequency Ablation, Nerve, Spinal, Lumbar, Medial Branch, 1 Level;  Surgeon: Thad Manning MD;  Location: [facility] OR;  Service: Pain Management;  Laterality: Bilateral;  L3, 4, 5  lumbar  [employer] pain management generator  SN [id number]  80 degrees for 75 seconds x2    RADIOFREQUENCY ABLATION OF LUMBAR MEDIAL BRANCH NERVE AT SINGLE LEVEL Bilateral 10/22/2019    Procedure: Radiofrequency Ablation, Nerve, Spinal, Lumbar, Medial Branch, L3,4,5;  Surgeon: Thad Manning MD;  Location: [facility] OR;  Service: Pain Management;  Laterality: Bilateral;  burned at 80 degrees C X 60 seconds X 2 each site    RADIOFREQUENCY ABLATION OF LUMBAR MEDIAL BRANCH NERVE AT SINGLE LEVEL Bilateral 5/27/2020    Procedure: Radiofrequency Ablation, Nerve, Spinal, Lumbar, Medial Branch, 1 Level;  Surgeon: Thad Manning MD;  Location: [facility] OR;  Service: Pain Management;  Laterality: Bilateral;  L3,4,5    SHOULDER ARTHROSCOPY Right 9/19/2021    Procedure: ARTHROSCOPY, SHOULDER;  Surgeon: Antonio Irwin MD;  Location: [facility] OR;  Service: Orthopedics;  Laterality: Right;  LIMITED DEBRIDMENT    TONSILLECTOMY      TOTAL REDUCTION MAMMOPLASTY       Family History   Problem Relation Age of Onset    Heart disease Mother        Marital Status:   Alcohol History:  reports current alcohol use.  Tobacco History:  reports that she quit smoking about 25 years ago. She has never used smokeless tobacco.  Drug History:  reports no history of drug use.    Review of patient's allergies indicates:   Allergen Reactions    Keflex [cephalexin]     Latex, natural rubber Anaphylaxis     bandaids     Pcn [penicillins]     Adhesive Other (See Comments)     bandainds  redness at skin    Trelegy ellipta [fluticasone-umeclidin-vilanter]      Vision disturbance       Current Outpatient Medications:     albuterol (PROAIR HFA) 90 mcg/actuation inhaler, Inhale 2 puffs into the lungs every 6 (six) hours as needed for Wheezing. Rescue, Disp: 18 g, Rfl: 6    ALPRAZolam (XANAX) 2 MG Tab, Take 1 tablet (2 mg total) by mouth 2 (two) times daily as needed (anxiety)., Disp: 60 tablet, Rfl: 2    amitriptyline (ELAVIL) 25 MG tablet, Take 1 tablet (25 mg total) by mouth nightly as needed for Insomnia., Disp: 90 tablet, Rfl: 1    biotin 10,000 mcg Cap, Take 13,000 mcg by mouth once daily., Disp: , Rfl:     celecoxib (CELEBREX) 200 MG capsule, Take 1 capsule (200 mg total) by mouth daily as needed (arthritis pain)., Disp: 90 capsule, Rfl: 1    fexofenadine (ALLEGRA) 180 MG tablet, , Disp: , Rfl:     furosemide (LASIX) 20 MG tablet, TAKE 1 TABLET BY MOUTH DAILY AS NEEDED FOR LEF SWELLING, Disp: 90 tablet, Rfl: 3    MULTIVIT-IRON-MIN-FOLIC ACID 3,500-18-0.4 UNIT-MG-MG ORAL CHEW, Take 1 tablet by mouth., Disp: , Rfl:     omeprazole (PRILOSEC) 20 MG capsule, Take 1 capsule (20 mg total) by mouth every other day., Disp: 90 capsule, Rfl: 3    potassium chloride (MICRO-K) 10 MEQ CpSR, Take 1 capsule (10 mEq total) by mouth every evening., Disp: 90 capsule, Rfl: 3    pramipexole (MIRAPEX) 0.5 MG tablet, Take 2 tablets (1 mg total) by mouth every evening. For restless legs, Disp: 180 tablet, Rfl: 3    pregabalin (LYRICA) 25 MG capsule, Take 1 capsule (25 mg total) by mouth 3 (three) times daily., Disp: 270 capsule, Rfl: 1    umeclidinium-vilanteroL (ANORO ELLIPTA) 62.5-25 mcg/actuation DsDv, Inhale 1 puff into the lungs once daily. Controller, Disp: 60 each, Rfl: 11    valacyclovir HCl (VALACYCLOVIR ORAL), Take by mouth., Disp: , Rfl:     venlafaxine (EFFEXOR-XR) 150 MG Cp24, Take 1 capsule (150 mg total) by mouth once daily. For mood, Disp: 90 capsule, Rfl: 3    amLODIPine (NORVASC) 5 MG  tablet, Take 1 tablet (5 mg total) by mouth once daily. For blood pressure, Disp: 90 tablet, Rfl: 3    fluconazole (DIFLUCAN) 150 MG Tab, Take 1 tablet (150 mg total) by mouth every 72 hours. for 6 days, Disp: 2 tablet, Rfl: 0    hydroCHLOROthiazide (HYDRODIURIL) 25 MG tablet, Take 1 tablet (25 mg total) by mouth once daily., Disp: 90 tablet, Rfl: 3    levothyroxine (SYNTHROID) 88 MCG tablet, Take 1 tablet (88 mcg total) by mouth once daily., Disp: 90 tablet, Rfl: 3    losartan (COZAAR) 50 MG tablet, Take 1 tablet (50 mg total) by mouth once daily. For blood pressure, Disp: 90 tablet, Rfl: 1    lovastatin (MEVACOR) 40 MG tablet, Take 1 tablet (40 mg total) by mouth nightly. at bedtime. For cholesterol, Disp: 90 tablet, Rfl: 3    meclizine (ANTIVERT) 25 mg tablet, Take 25 mg by mouth daily as needed., Disp: , Rfl:     metoprolol succinate (TOPROL-XL) 50 MG 24 hr tablet, Take 1 tablet (50 mg total) by mouth once daily. For blood pressure and heart, Disp: 90 tablet, Rfl: 3    nystatin (MYCOSTATIN) cream, Apply topically 2 (two) times daily., Disp: 60 g, Rfl: 1    ondansetron (ZOFRAN-ODT) 4 MG TbDL, Take 1 tablet (4 mg total) by mouth every 8 (eight) hours as needed (nausea)., Disp: 30 tablet, Rfl: 0    oxyCODONE-acetaminophen (PERCOCET) 5-325 mg per tablet, Take 1 tablet by mouth every 6 (six) hours as needed for Pain (not controlled by tylenol)., Disp: 28 tablet, Rfl: 0  No current facility-administered medications for this visit.    Facility-Administered Medications Ordered in Other Visits:     diphenhydrAMINE injection 12.5 mg, 12.5 mg, Intravenous, Once PRN, Chris Mcdaniel MD    electrolyte-S (ISOLYTE), , Intravenous, Continuous, Chris Mcdaniel MD, Stopped at 02/18/22 1245    fentaNYL 50 mcg/mL injection 25 mcg, 25 mcg, Intravenous, Q5 Min PRN, Chris Mcdaniel MD    HYDROmorphone (PF) injection 0.2 mg, 0.2 mg, Intravenous, Q5 Min PRN, Chris Mcdaniel MD    lorazepam injection  0.25 mg, 0.25 mg, Intravenous, Once PRN, Chris Mcdaniel MD    ondansetron injection 4 mg, 4 mg, Intravenous, Once PRN, Chris Mcdaniel MD    oxyCODONE immediate release tablet 5 mg, 5 mg, Oral, Once PRN, Chris Mcdaniel MD    prochlorperazine injection Soln 5 mg, 5 mg, Intravenous, Q30 Min PRN, Chris Mcdaniel MD    Review of Systems   All other systems reviewed and are negative.         Objective:      Vitals:    03/15/22 1041   BP: 120/82   Pulse: 75   Weight: 94.8 kg (209 lb)   Height: 5' (1.524 m)     Physical Exam  Vitals reviewed.   Constitutional:       General: She is not in acute distress.     Appearance: Normal appearance. She is well-developed. She is morbidly obese.   HENT:      Head: Normocephalic and atraumatic.      Right Ear: External ear normal.      Left Ear: External ear normal.      Nose: Nose normal.      Mouth/Throat:      Mouth: Mucous membranes are moist.   Eyes:      General: Lids are normal.      Conjunctiva/sclera: Conjunctivae normal.      Pupils: Pupils are equal, round, and reactive to light.   Neck:      Thyroid: No thyromegaly.      Vascular: No JVD.      Trachea: No tracheal deviation.   Cardiovascular:      Rate and Rhythm: Normal rate and regular rhythm.      Chest Wall: PMI is not displaced.      Pulses: Normal pulses.      Heart sounds: Normal heart sounds.   Pulmonary:      Effort: Pulmonary effort is normal.      Breath sounds: Normal breath sounds.   Abdominal:      General: Bowel sounds are normal.      Palpations: Abdomen is soft.      Tenderness: There is no abdominal tenderness. There is no guarding or rebound.   Musculoskeletal:         General: No tenderness. Normal range of motion.      Cervical back: Full passive range of motion without pain and neck supple.   Skin:     General: Skin is warm and dry.      Findings: No rash.      Comments: Chronic venous stasis changes  Pea-sized raised lesion to left leg distal to knee, slighlty verroucous     Neurological:      General: No focal deficit present.      Mental Status: She is alert and oriented to person, place, and time.   Psychiatric:         Mood and Affect: Mood normal.         Behavior: Behavior normal.           Assessment:       1. Type 2 diabetes mellitus with polyneuropathy    2. Acquired hypothyroidism    3. Heart failure, unspecified HF chronicity, unspecified heart failure type    4. Essential hypertension    5. Mixed hyperlipidemia    6. Lymphedema of both lower extremities    7. Visit for screening mammogram    8. Colon cancer screening         Plan:       Type 2 diabetes mellitus with polyneuropathy  -     Foot Exam Performed  -     Hemoglobin A1C; Future; Expected date: 03/15/2022    Acquired hypothyroidism  -     levothyroxine (SYNTHROID) 88 MCG tablet; Take 1 tablet (88 mcg total) by mouth once daily.  Dispense: 90 tablet; Refill: 3  -     TSH w/reflex to FT4; Future; Expected date: 03/15/2022    Heart failure, unspecified HF chronicity, unspecified heart failure type  -     metoprolol succinate (TOPROL-XL) 50 MG 24 hr tablet; Take 1 tablet (50 mg total) by mouth once daily. For blood pressure and heart  Dispense: 90 tablet; Refill: 3    Essential hypertension  -     losartan (COZAAR) 50 MG tablet; Take 1 tablet (50 mg total) by mouth once daily. For blood pressure  Dispense: 90 tablet; Refill: 1  -     amLODIPine (NORVASC) 5 MG tablet; Take 1 tablet (5 mg total) by mouth once daily. For blood pressure  Dispense: 90 tablet; Refill: 3  -     Comprehensive Metabolic Panel; Future; Expected date: 03/15/2022  -     Lipid Panel; Future; Expected date: 03/15/2022    Mixed hyperlipidemia  -     lovastatin (MEVACOR) 40 MG tablet; Take 1 tablet (40 mg total) by mouth nightly. at bedtime. For cholesterol  Dispense: 90 tablet; Refill: 3  -     Lipid Panel; Future; Expected date: 03/15/2022    Lymphedema of both lower extremities  -     hydroCHLOROthiazide (HYDRODIURIL) 25 MG tablet; Take 1 tablet  (25 mg total) by mouth once daily.  Dispense: 90 tablet; Refill: 3    Visit for screening mammogram  -     Cancel: Mammo Digital Screening Bilat; Future; Expected date: 03/15/2022    Colon cancer screening  -     Ambulatory referral/consult to Gastroenterology; Future; Expected date: 03/22/2022      Follow up in about 6 months (around 9/15/2022) for HTN.

## 2022-03-16 ENCOUNTER — TELEPHONE (OUTPATIENT)
Dept: FAMILY MEDICINE | Facility: CLINIC | Age: 72
End: 2022-03-16
Payer: MEDICARE

## 2022-03-16 LAB
ALBUMIN SERPL-MCNC: 4.4 G/DL (ref 3.6–5.1)
ALBUMIN/GLOB SERPL: 1.7 (CALC) (ref 1–2.5)
ALP SERPL-CCNC: 102 U/L (ref 37–153)
ALT SERPL-CCNC: 18 U/L (ref 6–29)
AST SERPL-CCNC: 25 U/L (ref 10–35)
BILIRUB SERPL-MCNC: 0.5 MG/DL (ref 0.2–1.2)
BUN SERPL-MCNC: 21 MG/DL (ref 7–25)
BUN/CREAT SERPL: ABNORMAL (CALC) (ref 6–22)
CALCIUM SERPL-MCNC: 9.7 MG/DL (ref 8.6–10.4)
CHLORIDE SERPL-SCNC: 99 MMOL/L (ref 98–110)
CHOLEST SERPL-MCNC: 225 MG/DL
CHOLEST/HDLC SERPL: 5.1 (CALC)
CO2 SERPL-SCNC: 32 MMOL/L (ref 20–32)
CREAT SERPL-MCNC: 0.87 MG/DL (ref 0.6–0.93)
GLOBULIN SER CALC-MCNC: 2.6 G/DL (CALC) (ref 1.9–3.7)
GLUCOSE SERPL-MCNC: 104 MG/DL (ref 65–99)
HBA1C MFR BLD: 6.3 % OF TOTAL HGB
HDLC SERPL-MCNC: 44 MG/DL
LDLC SERPL CALC-MCNC: 146 MG/DL (CALC)
NONHDLC SERPL-MCNC: 181 MG/DL (CALC)
POTASSIUM SERPL-SCNC: 3.7 MMOL/L (ref 3.5–5.3)
PROT SERPL-MCNC: 7 G/DL (ref 6.1–8.1)
SODIUM SERPL-SCNC: 143 MMOL/L (ref 135–146)
T4 FREE SERPL-MCNC: 1.3 NG/DL (ref 0.8–1.8)
TRIGL SERPL-MCNC: 212 MG/DL
TSH SERPL-ACNC: 6.6 MIU/L (ref 0.4–4.5)

## 2022-03-16 NOTE — TELEPHONE ENCOUNTER
----- Message from Suzanne Neri sent at 3/15/2022  4:22 PM CDT -----  Patient called and stated that she tried taking the omeprazole every other day but it does not work she stated that she has to take it everyday because the days she does not take it she get heart burn please give her a call at [phone]

## 2022-03-25 RX ORDER — NYSTATIN 100000 U/G
CREAM TOPICAL 2 TIMES DAILY
Qty: 60 G | Refills: 1 | Status: SHIPPED | OUTPATIENT
Start: 2022-03-25 | End: 2022-05-26 | Stop reason: SDUPTHER

## 2022-03-25 RX ORDER — FLUCONAZOLE 150 MG/1
150 TABLET ORAL
Qty: 2 TABLET | Refills: 0 | Status: SHIPPED | OUTPATIENT
Start: 2022-03-25 | End: 2022-03-31

## 2022-03-25 NOTE — TELEPHONE ENCOUNTER
The patient's prescription has been approved and sent to   [id number] - [place] - [address] AT [facility] OF [address]  [address] [address]  Phone: [phone] Fax: [phone]

## 2022-03-25 NOTE — TELEPHONE ENCOUNTER
----- Message from Bria Garduno sent at 3/25/2022 10:30 AM CDT -----  Pt would like to talk to janice   [phone]

## 2022-03-25 NOTE — TELEPHONE ENCOUNTER
Spoke with patient c/o red itchy rash under breasts and on stomach.  Smells as well.  Has been there for awhile.  Per dr zayas nystatin and fluconazole pended.  Patient notified of rxs to pharmacy, patient verbalized understanding -DN

## 2022-04-04 ENCOUNTER — TELEPHONE (OUTPATIENT)
Dept: FAMILY MEDICINE | Facility: CLINIC | Age: 72
End: 2022-04-04
Payer: MEDICARE

## 2022-04-04 DIAGNOSIS — E11.42 TYPE 2 DIABETES MELLITUS WITH POLYNEUROPATHY: ICD-10-CM

## 2022-04-04 RX ORDER — PREGABALIN 25 MG/1
75 CAPSULE ORAL 2 TIMES DAILY
Qty: 270 CAPSULE | Refills: 1
Start: 2022-04-04 | End: 2022-04-07 | Stop reason: SDUPTHER

## 2022-04-04 NOTE — TELEPHONE ENCOUNTER
Spoke with patient c/o fibromyalgia flare/pain x3 days.  States she has been taking pain rx (percocet 5/325) for her shoulder, which eases fibro pain some but not much.  Requests rx for pain.  Also states she is experiencing increased depression.  States she would like to see about changing her current medication -DN

## 2022-04-04 NOTE — TELEPHONE ENCOUNTER
Narcotic therapy is not the appropriate treatment for fibromyalgia pain.  We will increase her Lyrica to 75 mg twice daily this will help with her pain.  She can take her current 25 mg tablet 3 pills twice a day on this should help her symptoms we can call in a new prescription if it helps.  Also I recommend she continue on her current antidepressant for now.  We can discuss changes after she has had her shoulder surgery and completed therapy.  Her current issues with pain are probably making her depression worse

## 2022-04-04 NOTE — TELEPHONE ENCOUNTER
----- Message from Bria Garduno sent at 4/4/2022  2:20 PM CDT -----  Vm- pt has been in extreme pain for 3 days and would like to get something called in   [phone]

## 2022-04-05 NOTE — TELEPHONE ENCOUNTER
Spoke with patient, notified of below message per dr atwood.  Patient verbalized understanding, informed to return call with questions/concerns -DN

## 2022-04-07 ENCOUNTER — HOSPITAL ENCOUNTER (OUTPATIENT)
Dept: RADIOLOGY | Facility: HOSPITAL | Age: 72
Discharge: HOME | End: 2022-04-07
Attending: ORTHOPAEDIC SURGERY
Payer: MEDICARE

## 2022-04-07 ENCOUNTER — HOSPITAL ENCOUNTER (OUTPATIENT)
Dept: PREADMISSION TESTING | Facility: HOSPITAL | Age: 72
Discharge: HOME | End: 2022-04-07
Attending: ORTHOPAEDIC SURGERY
Payer: MEDICARE

## 2022-04-07 VITALS
HEART RATE: 88 BPM | OXYGEN SATURATION: 99 % | SYSTOLIC BLOOD PRESSURE: 126 MMHG | DIASTOLIC BLOOD PRESSURE: 58 MMHG | RESPIRATION RATE: 20 BRPM

## 2022-04-07 DIAGNOSIS — Z01.818 PRE-OP TESTING: ICD-10-CM

## 2022-04-07 DIAGNOSIS — S46.011D TRAUMATIC COMPLETE TEAR OF RIGHT ROTATOR CUFF, SUBSEQUENT ENCOUNTER: ICD-10-CM

## 2022-04-07 DIAGNOSIS — E11.42 TYPE 2 DIABETES MELLITUS WITH POLYNEUROPATHY: ICD-10-CM

## 2022-04-07 DIAGNOSIS — S43.014D ANTERIOR DISLOCATION OF RIGHT SHOULDER, SUBSEQUENT ENCOUNTER: ICD-10-CM

## 2022-04-07 DIAGNOSIS — Z01.818 PREOP TESTING: Primary | ICD-10-CM

## 2022-04-07 LAB
ABO + RH BLD: NORMAL
ANION GAP SERPL CALC-SCNC: 10 MMOL/L (ref 8–16)
BASOPHILS # BLD AUTO: 0.03 K/UL (ref 0–0.2)
BASOPHILS NFR BLD: 0.2 % (ref 0–1.9)
BLD GP AB SCN CELLS X3 SERPL QL: NORMAL
BUN SERPL-MCNC: 17 MG/DL (ref 8–23)
CALCIUM SERPL-MCNC: 9.9 MG/DL (ref 8.7–10.5)
CHLORIDE SERPL-SCNC: 102 MMOL/L (ref 95–110)
CO2 SERPL-SCNC: 30 MMOL/L (ref 23–29)
CREAT SERPL-MCNC: 0.8 MG/DL (ref 0.5–1.4)
DIFFERENTIAL METHOD: ABNORMAL
EOSINOPHIL # BLD AUTO: 0.1 K/UL (ref 0–0.5)
EOSINOPHIL NFR BLD: 0.6 % (ref 0–8)
ERYTHROCYTE [DISTWIDTH] IN BLOOD BY AUTOMATED COUNT: 13.6 % (ref 11.5–14.5)
EST. GFR  (AFRICAN AMERICAN): >60 ML/MIN/1.73 M^2
EST. GFR  (NON AFRICAN AMERICAN): >60 ML/MIN/1.73 M^2
GLUCOSE SERPL-MCNC: 167 MG/DL (ref 70–110)
HCT VFR BLD AUTO: 38.9 % (ref 37–48.5)
HGB BLD-MCNC: 12.4 G/DL (ref 12–16)
IMM GRANULOCYTES # BLD AUTO: 0.07 K/UL (ref 0–0.04)
IMM GRANULOCYTES NFR BLD AUTO: 0.5 % (ref 0–0.5)
LYMPHOCYTES # BLD AUTO: 1.7 K/UL (ref 1–4.8)
LYMPHOCYTES NFR BLD: 12 % (ref 18–48)
MCH RBC QN AUTO: 29.1 PG (ref 27–31)
MCHC RBC AUTO-ENTMCNC: 31.9 G/DL (ref 32–36)
MCV RBC AUTO: 91 FL (ref 82–98)
MONOCYTES # BLD AUTO: 0.5 K/UL (ref 0.3–1)
MONOCYTES NFR BLD: 3.3 % (ref 4–15)
NEUTROPHILS # BLD AUTO: 11.5 K/UL (ref 1.8–7.7)
NEUTROPHILS NFR BLD: 83.4 % (ref 38–73)
NRBC BLD-RTO: 0 /100 WBC
PLATELET # BLD AUTO: 238 K/UL (ref 150–450)
PMV BLD AUTO: 8.7 FL (ref 9.2–12.9)
POTASSIUM SERPL-SCNC: 4.7 MMOL/L (ref 3.5–5.1)
RBC # BLD AUTO: 4.26 M/UL (ref 4–5.4)
SODIUM SERPL-SCNC: 142 MMOL/L (ref 136–145)
WBC # BLD AUTO: 13.8 K/UL (ref 3.9–12.7)

## 2022-04-07 PROCEDURE — 36415 COLL VENOUS BLD VENIPUNCTURE: CPT | Performed by: ORTHOPAEDIC SURGERY

## 2022-04-07 PROCEDURE — 71046 X-RAY EXAM CHEST 2 VIEWS: CPT | Mod: TC,FY

## 2022-04-07 PROCEDURE — 86905 BLOOD TYPING RBC ANTIGENS: CPT | Performed by: ORTHOPAEDIC SURGERY

## 2022-04-07 PROCEDURE — 71046 XR CHEST PA AND LATERAL: ICD-10-PCS | Mod: 26,,, | Performed by: RADIOLOGY

## 2022-04-07 PROCEDURE — 71046 X-RAY EXAM CHEST 2 VIEWS: CPT | Mod: 26,,, | Performed by: RADIOLOGY

## 2022-04-07 PROCEDURE — 93010 ELECTROCARDIOGRAM REPORT: CPT | Mod: ,,, | Performed by: GENERAL PRACTICE

## 2022-04-07 PROCEDURE — 93010 EKG 12-LEAD: ICD-10-PCS | Mod: ,,, | Performed by: GENERAL PRACTICE

## 2022-04-07 PROCEDURE — 80048 BASIC METABOLIC PNL TOTAL CA: CPT | Performed by: ORTHOPAEDIC SURGERY

## 2022-04-07 PROCEDURE — 87081 CULTURE SCREEN ONLY: CPT | Performed by: ORTHOPAEDIC SURGERY

## 2022-04-07 PROCEDURE — 85025 COMPLETE CBC W/AUTO DIFF WBC: CPT | Performed by: ORTHOPAEDIC SURGERY

## 2022-04-07 PROCEDURE — 99900104 DSU ONLY-NO CHARGE-EA ADD'L HR (STAT)

## 2022-04-07 PROCEDURE — 86901 BLOOD TYPING SEROLOGIC RH(D): CPT | Performed by: ORTHOPAEDIC SURGERY

## 2022-04-07 PROCEDURE — 93005 ELECTROCARDIOGRAM TRACING: CPT

## 2022-04-07 PROCEDURE — 99900103 DSU ONLY-NO CHARGE-INITIAL HR (STAT)

## 2022-04-07 RX ORDER — PREGABALIN 75 MG/1
75 CAPSULE ORAL 2 TIMES DAILY
Qty: 180 CAPSULE | Refills: 1 | Status: SHIPPED | OUTPATIENT
Start: 2022-04-07 | End: 2022-12-08

## 2022-04-07 NOTE — DISCHARGE INSTRUCTIONS
To confirm, Your doctor has instructed you that surgery is scheduled for: 4/12/22    Please report to [facility], Registration the morning of surgery. You must check-in and receive a wristband before going to your procedure.    Pre-Op will call the afternoon prior to surgery between 1:00 and 6:00 PM with the final arrival time.  Phone number: [phone]    PLEASE NOTE:  The surgery schedule has many variables which may affect the time of your surgery case.  Family members should be available if your surgery time changes.  Plan to be here the day of your procedure between 4-6 hours.    MEDICATIONS:  TAKE ONLY THESE MEDICATIONS WITH A SMALL SIP OF WATER THE MORNING OF YOUR PROCEDURE:  VENLAFAXINE, ANORO ELLIPTA, PRO AIR, OXYCODONE IF NEEDED, XANAX IF NEEDED, OMEPRAZOLE, PREGABALIN, AMLODIPINE, ALLEGRA, LEVOTHYROXINE, METOPROLOL    NO LASIX, HYDROCHLOROTHIAZIDE, OR LOSARTAN MORNING OF SURGERY BUT DO NOT STOP DAYS BEFORE.      DO NOT TAKE THESE MEDICATIONS 5-7 DAYS PRIOR to your procedure or per your surgeon's request:   ASPIRIN, ALEVE, ADVIL, IBUPROFEN, FISH OIL VITAMIN E, HERBALS, CELEBREX  (May take Tylenol)    ONLY if you are prescribed any types of blood thinners such as:  Aspirin, Coumadin, Plavix, Pradaxa, Xarelto, Aggrenox, Effient, Eliquis, Savasya, Brilinta, or any other, ask your surgeon whether you should stop taking them and how long before surgery you should stop.  You may also need to verify with the prescribing physician if it is ok to stop your medication.      INSTRUCTIONS IMPORTANT!!  Do not eat or drink anything between midnight and the time of your procedure- this includes gum, mints, and candy.  Do not smoke or drink alcoholic beverages 24 hours prior to your procedure.  Shower the night before AND the morning of your procedure with a Chlorhexidine wash such as Hibiclens or Dial antibacterial soap from the neck down.  Do not get it on your face or in your eyes.  You may use  your own shampoo and face wash. This helps your skin to be as bacteria free as possible.    If you wear contact lenses, dentures, hearing aids or glasses, bring a container to put them in during surgery and give to a family member for safe keeping.  Please leave all jewelry, piercing's and valuables at home.   DO NOT remove hair from the surgery site.  Do not shave the incision site unless you are given specific instructions to do so.    ONLY if you have been diagnosed with sleep apnea please bring your C-PAP machine.  ONLY if you wear home oxygen please bring your portable oxygen tank the day of your procedure.  ONLY if you have a history of OPEN HEART SURGERY you will need a clearance from your Cardiologist per Anesthesia.      ONLY for patients requiring bowel prep, written instructions will be given by your doctor's office.  ONLY if you have a neuro stimulator, please bring the controller with you the morning of surgery  ONLY if a type and screen test is needed before surgery, please return: DONE TODAY  If your doctor has scheduled you for an overnight stay, bring a small overnight bag with any personal items you need.  Make arrangements in advance for transportation home by a responsible adult.  It is not safe to drive a vehicle during the 24 hours after anesthesia.       [facility] will resume routine visitation for COVID-19 negative patients, including inpatients, outpatients, and  procedural areas. Visitors are still required to practice social distancing in waiting rooms, cafeterias, and common  areas. Visitors and patients should maintain six feet distance between those not in their group. Visitors will be  asked to leave if they exhibit symptoms of respiratory infection, have tested positive for COVID -19 in the last  ten days, have a pending COVID-19 test for symptoms, are unable or refuse to wear a mask OR do not comply  with current policy.       All [facility] facilities and properties are tobacco free.   Smoking is NOT allowed.   If you have any questions about these instructions, call Pre-Op Admit  Nursing at [phone] or the Pre-Op Day Surgery Unit at [phone].

## 2022-04-07 NOTE — TELEPHONE ENCOUNTER
----- Message from Bria Garduno sent at 4/7/2022  3:40 PM CDT -----  - pt went to the pharmacy and the did not have anything for her. She is out of the gabapentin   [phone]

## 2022-04-08 NOTE — OR NURSING
Latest Reference Range & Units 04/07/22 11:15   WBC 3.90 - 12.70 K/uL 13.80 (H)   (H): Data is abnormally high    Informed Dr. Pitts, new orders given to repeat on 4/11.

## 2022-04-09 ENCOUNTER — LAB VISIT (OUTPATIENT)
Dept: PRIMARY CARE CLINIC | Facility: CLINIC | Age: 72
End: 2022-04-09
Payer: MEDICARE

## 2022-04-09 DIAGNOSIS — S43.014D ANTERIOR DISLOCATION OF RIGHT SHOULDER, SUBSEQUENT ENCOUNTER: ICD-10-CM

## 2022-04-09 LAB — MRSA SPEC QL CULT: NORMAL

## 2022-04-09 PROCEDURE — U0003 INFECTIOUS AGENT DETECTION BY NUCLEIC ACID (DNA OR RNA); SEVERE ACUTE RESPIRATORY SYNDROME CORONAVIRUS 2 (SARS-COV-2) (CORONAVIRUS DISEASE [COVID-19]), AMPLIFIED PROBE TECHNIQUE, MAKING USE OF HIGH THROUGHPUT TECHNOLOGIES AS DESCRIBED BY CMS-2020-01-R: HCPCS | Performed by: ORTHOPAEDIC SURGERY

## 2022-04-09 PROCEDURE — U0005 INFEC AGEN DETEC AMPLI PROBE: HCPCS | Performed by: ORTHOPAEDIC SURGERY

## 2022-04-10 LAB — SARS-COV-2 RNA RESP QL NAA+PROBE: NOT DETECTED

## 2022-04-11 ENCOUNTER — ANESTHESIA EVENT (OUTPATIENT)
Dept: SURGERY | Facility: HOSPITAL | Age: 72
End: 2022-04-11
Payer: MEDICARE

## 2022-04-11 ENCOUNTER — HOSPITAL ENCOUNTER (OUTPATIENT)
Dept: PREADMISSION TESTING | Facility: HOSPITAL | Age: 72
Discharge: HOME | End: 2022-04-11
Payer: MEDICARE

## 2022-04-11 DIAGNOSIS — Z01.818 PREOP TESTING: Primary | ICD-10-CM

## 2022-04-12 ENCOUNTER — HOSPITAL ENCOUNTER (OUTPATIENT)
Facility: HOSPITAL | Age: 72
Discharge: HOME | End: 2022-04-13
Attending: ORTHOPAEDIC SURGERY | Admitting: ORTHOPAEDIC SURGERY
Payer: MEDICARE

## 2022-04-12 ENCOUNTER — ANESTHESIA (OUTPATIENT)
Dept: SURGERY | Facility: HOSPITAL | Age: 72
End: 2022-04-12
Payer: MEDICARE

## 2022-04-12 DIAGNOSIS — M19.011 ARTHRITIS OF SHOULDER REGION, RIGHT: Primary | ICD-10-CM

## 2022-04-12 DIAGNOSIS — Z01.818 PRE-OP TESTING: ICD-10-CM

## 2022-04-12 DIAGNOSIS — S43.014D ANTERIOR DISLOCATION OF RIGHT SHOULDER, SUBSEQUENT ENCOUNTER: ICD-10-CM

## 2022-04-12 DIAGNOSIS — S46.011D TRAUMATIC COMPLETE TEAR OF RIGHT ROTATOR CUFF, SUBSEQUENT ENCOUNTER: ICD-10-CM

## 2022-04-12 PROCEDURE — 27201423 OPTIME MED/SURG SUP & DEVICES STERILE SUPPLY: Performed by: ORTHOPAEDIC SURGERY

## 2022-04-12 PROCEDURE — 25000003 PHARM REV CODE 250: Performed by: ANESTHESIOLOGY

## 2022-04-12 PROCEDURE — 63600175 PHARM REV CODE 636 W HCPCS: Performed by: NURSE ANESTHETIST, CERTIFIED REGISTERED

## 2022-04-12 PROCEDURE — 37000009 HC ANESTHESIA EA ADD 15 MINS: Performed by: ORTHOPAEDIC SURGERY

## 2022-04-12 PROCEDURE — 99900103 DSU ONLY-NO CHARGE-INITIAL HR (STAT): Performed by: ORTHOPAEDIC SURGERY

## 2022-04-12 PROCEDURE — C9290 INJ, BUPIVACAINE LIPOSOME: HCPCS | Performed by: ANESTHESIOLOGY

## 2022-04-12 PROCEDURE — C1776 JOINT DEVICE (IMPLANTABLE): HCPCS | Performed by: ORTHOPAEDIC SURGERY

## 2022-04-12 PROCEDURE — D9220A PRA ANESTHESIA: ICD-10-PCS | Mod: ANES,,, | Performed by: ANESTHESIOLOGY

## 2022-04-12 PROCEDURE — 27000221 HC OXYGEN, UP TO 24 HOURS

## 2022-04-12 PROCEDURE — D9220A PRA ANESTHESIA: Mod: ANES,,, | Performed by: ANESTHESIOLOGY

## 2022-04-12 PROCEDURE — 25000003 PHARM REV CODE 250: Performed by: ORTHOPAEDIC SURGERY

## 2022-04-12 PROCEDURE — 23472 RECONSTRUCT SHOULDER JOINT: CPT | Mod: 58,RT,, | Performed by: ORTHOPAEDIC SURGERY

## 2022-04-12 PROCEDURE — 94799 UNLISTED PULMONARY SVC/PX: CPT

## 2022-04-12 PROCEDURE — 27200750 HC INSULATED NEEDLE/ STIMUPLEX: Performed by: ANESTHESIOLOGY

## 2022-04-12 PROCEDURE — 64415 PR NERVE BLOCK INJ, ANES/STEROID, BRACHIAL PLEXUS, INCL IMAG GUIDANCE: ICD-10-PCS | Mod: 59,RT,, | Performed by: ANESTHESIOLOGY

## 2022-04-12 PROCEDURE — 63600175 PHARM REV CODE 636 W HCPCS: Performed by: ANESTHESIOLOGY

## 2022-04-12 PROCEDURE — 36000710: Performed by: ORTHOPAEDIC SURGERY

## 2022-04-12 PROCEDURE — 76942 PR U/S GUIDANCE FOR NEEDLE GUIDANCE: ICD-10-PCS | Mod: 26,,, | Performed by: ANESTHESIOLOGY

## 2022-04-12 PROCEDURE — C1713 ANCHOR/SCREW BN/BN,TIS/BN: HCPCS | Performed by: ORTHOPAEDIC SURGERY

## 2022-04-12 PROCEDURE — D9220A PRA ANESTHESIA: Mod: CRNA,,, | Performed by: NURSE ANESTHETIST, CERTIFIED REGISTERED

## 2022-04-12 PROCEDURE — 94761 N-INVAS EAR/PLS OXIMETRY MLT: CPT

## 2022-04-12 PROCEDURE — 76942 ECHO GUIDE FOR BIOPSY: CPT | Mod: 26,,, | Performed by: ANESTHESIOLOGY

## 2022-04-12 PROCEDURE — 25000003 PHARM REV CODE 250: Performed by: HOSPITALIST

## 2022-04-12 PROCEDURE — 37000008 HC ANESTHESIA 1ST 15 MINUTES: Performed by: ORTHOPAEDIC SURGERY

## 2022-04-12 PROCEDURE — 64415 NJX AA&/STRD BRCH PLXS IMG: CPT | Performed by: ANESTHESIOLOGY

## 2022-04-12 PROCEDURE — 99900104 DSU ONLY-NO CHARGE-EA ADD'L HR (STAT): Performed by: ORTHOPAEDIC SURGERY

## 2022-04-12 PROCEDURE — 25000003 PHARM REV CODE 250: Performed by: NURSE ANESTHETIST, CERTIFIED REGISTERED

## 2022-04-12 PROCEDURE — P9045 ALBUMIN (HUMAN), 5%, 250 ML: HCPCS | Performed by: NURSE ANESTHETIST, CERTIFIED REGISTERED

## 2022-04-12 PROCEDURE — 23472 PR RECONSTR TOTAL SHOULDER IMPLANT: ICD-10-PCS | Mod: 58,RT,, | Performed by: ORTHOPAEDIC SURGERY

## 2022-04-12 PROCEDURE — 71000039 HC RECOVERY, EACH ADD'L HOUR: Performed by: ORTHOPAEDIC SURGERY

## 2022-04-12 PROCEDURE — 63600175 PHARM REV CODE 636 W HCPCS: Performed by: HOSPITALIST

## 2022-04-12 PROCEDURE — 63600175 PHARM REV CODE 636 W HCPCS

## 2022-04-12 PROCEDURE — 36000711: Performed by: ORTHOPAEDIC SURGERY

## 2022-04-12 PROCEDURE — 64415 NJX AA&/STRD BRCH PLXS IMG: CPT | Mod: 59,RT,, | Performed by: ANESTHESIOLOGY

## 2022-04-12 PROCEDURE — 71000033 HC RECOVERY, INTIAL HOUR: Performed by: ORTHOPAEDIC SURGERY

## 2022-04-12 PROCEDURE — C1769 GUIDE WIRE: HCPCS | Performed by: ORTHOPAEDIC SURGERY

## 2022-04-12 PROCEDURE — D9220A PRA ANESTHESIA: ICD-10-PCS | Mod: CRNA,,, | Performed by: NURSE ANESTHETIST, CERTIFIED REGISTERED

## 2022-04-12 DEVICE — SCREW BONE TSA 4.5X32MM: Type: IMPLANTABLE DEVICE | Site: SHOULDER | Status: FUNCTIONAL

## 2022-04-12 DEVICE — WIRE FIXATION REUN NIT PILT: Type: IMPLANTABLE DEVICE | Site: SHOULDER | Status: FUNCTIONAL

## 2022-04-12 DEVICE — SCREW PERIPH REUNION 4.5X16MM: Type: IMPLANTABLE DEVICE | Site: SHOULDER | Status: FUNCTIONAL

## 2022-04-12 DEVICE — IMPLANTABLE DEVICE: Type: IMPLANTABLE DEVICE | Site: SHOULDER | Status: FUNCTIONAL

## 2022-04-12 DEVICE — CUP HUM REUNION RFX TRAIL ADPT: Type: IMPLANTABLE DEVICE | Site: SHOULDER | Status: FUNCTIONAL

## 2022-04-12 DEVICE — INSERT HUMERAL STD 32X4MM: Type: IMPLANTABLE DEVICE | Site: SHOULDER | Status: FUNCTIONAL

## 2022-04-12 DEVICE — SCREW BONE TSA 6.5X36MM: Type: IMPLANTABLE DEVICE | Site: SHOULDER | Status: FUNCTIONAL

## 2022-04-12 DEVICE — SCREW BONE TSA 4.5X36MM: Type: IMPLANTABLE DEVICE | Site: SHOULDER | Status: FUNCTIONAL

## 2022-04-12 DEVICE — STEM REUNION HUM PRESSFIT 95MM: Type: IMPLANTABLE DEVICE | Site: SHOULDER | Status: FUNCTIONAL

## 2022-04-12 DEVICE — SCREW BONE TSA 4.5X20MM: Type: IMPLANTABLE DEVICE | Site: SHOULDER | Status: FUNCTIONAL

## 2022-04-12 DEVICE — COMPONENT GLENOSPHERE 32X2MM: Type: IMPLANTABLE DEVICE | Site: SHOULDER | Status: FUNCTIONAL

## 2022-04-12 RX ORDER — LIDOCAINE HYDROCHLORIDE 10 MG/ML
1 INJECTION, SOLUTION EPIDURAL; INFILTRATION; INTRACAUDAL; PERINEURAL ONCE
Status: COMPLETED | OUTPATIENT
Start: 2022-04-12 | End: 2022-04-12

## 2022-04-12 RX ORDER — LOPERAMIDE HYDROCHLORIDE 2 MG/1
2 CAPSULE ORAL CONTINUOUS PRN
Status: DISCONTINUED | OUTPATIENT
Start: 2022-04-12 | End: 2022-04-13 | Stop reason: HOSPADM

## 2022-04-12 RX ORDER — CLINDAMYCIN PHOSPHATE 900 MG/50ML
900 INJECTION, SOLUTION INTRAVENOUS
Status: COMPLETED | OUTPATIENT
Start: 2022-04-12 | End: 2022-04-12

## 2022-04-12 RX ORDER — FENTANYL CITRATE 50 UG/ML
INJECTION, SOLUTION INTRAMUSCULAR; INTRAVENOUS
Status: DISCONTINUED | OUTPATIENT
Start: 2022-04-12 | End: 2022-04-12

## 2022-04-12 RX ORDER — VENLAFAXINE HYDROCHLORIDE 150 MG/1
150 CAPSULE, EXTENDED RELEASE ORAL DAILY
Status: DISCONTINUED | OUTPATIENT
Start: 2022-04-12 | End: 2022-04-13 | Stop reason: HOSPADM

## 2022-04-12 RX ORDER — OXYCODONE HYDROCHLORIDE 5 MG/1
5 TABLET ORAL
Status: DISCONTINUED | OUTPATIENT
Start: 2022-04-12 | End: 2022-04-13

## 2022-04-12 RX ORDER — HYDROCODONE BITARTRATE AND ACETAMINOPHEN 5; 325 MG/1; MG/1
1 TABLET ORAL EVERY 4 HOURS PRN
Status: DISCONTINUED | OUTPATIENT
Start: 2022-04-12 | End: 2022-04-13 | Stop reason: HOSPADM

## 2022-04-12 RX ORDER — TRANEXAMIC ACID 100 MG/ML
INJECTION, SOLUTION INTRAVENOUS
Status: DISCONTINUED | OUTPATIENT
Start: 2022-04-12 | End: 2022-04-12

## 2022-04-12 RX ORDER — MUPIROCIN 20 MG/G
OINTMENT TOPICAL 2 TIMES DAILY
Status: DISCONTINUED | OUTPATIENT
Start: 2022-04-12 | End: 2022-04-13 | Stop reason: HOSPADM

## 2022-04-12 RX ORDER — MIDAZOLAM HYDROCHLORIDE 1 MG/ML
0.5 INJECTION INTRAMUSCULAR; INTRAVENOUS
Status: DISCONTINUED | OUTPATIENT
Start: 2022-04-12 | End: 2022-04-12

## 2022-04-12 RX ORDER — DEXAMETHASONE SODIUM PHOSPHATE 4 MG/ML
INJECTION, SOLUTION INTRA-ARTICULAR; INTRALESIONAL; INTRAMUSCULAR; INTRAVENOUS; SOFT TISSUE
Status: DISCONTINUED | OUTPATIENT
Start: 2022-04-12 | End: 2022-04-12

## 2022-04-12 RX ORDER — FENTANYL CITRATE 50 UG/ML
25 INJECTION, SOLUTION INTRAMUSCULAR; INTRAVENOUS EVERY 5 MIN PRN
Status: DISCONTINUED | OUTPATIENT
Start: 2022-04-12 | End: 2022-04-13

## 2022-04-12 RX ORDER — HYDROCHLOROTHIAZIDE 25 MG/1
25 TABLET ORAL DAILY
Status: DISCONTINUED | OUTPATIENT
Start: 2022-04-12 | End: 2022-04-13 | Stop reason: HOSPADM

## 2022-04-12 RX ORDER — HYDROMORPHONE HYDROCHLORIDE 2 MG/ML
0.2 INJECTION, SOLUTION INTRAMUSCULAR; INTRAVENOUS; SUBCUTANEOUS EVERY 5 MIN PRN
Status: DISCONTINUED | OUTPATIENT
Start: 2022-04-12 | End: 2022-04-13

## 2022-04-12 RX ORDER — TRANEXAMIC ACID 100 MG/ML
1000 INJECTION, SOLUTION INTRAVENOUS
Status: DISCONTINUED | OUTPATIENT
Start: 2022-04-12 | End: 2022-04-12 | Stop reason: HOSPADM

## 2022-04-12 RX ORDER — AMITRIPTYLINE HYDROCHLORIDE 25 MG/1
25 TABLET, FILM COATED ORAL NIGHTLY PRN
Status: DISCONTINUED | OUTPATIENT
Start: 2022-04-12 | End: 2022-04-13 | Stop reason: HOSPADM

## 2022-04-12 RX ORDER — ALBUMIN HUMAN 50 G/1000ML
SOLUTION INTRAVENOUS CONTINUOUS PRN
Status: DISCONTINUED | OUTPATIENT
Start: 2022-04-12 | End: 2022-04-12

## 2022-04-12 RX ORDER — EPHEDRINE SULFATE 50 MG/ML
INJECTION, SOLUTION INTRAVENOUS
Status: DISCONTINUED | OUTPATIENT
Start: 2022-04-12 | End: 2022-04-12

## 2022-04-12 RX ORDER — FENTANYL CITRATE 50 UG/ML
25 INJECTION, SOLUTION INTRAMUSCULAR; INTRAVENOUS EVERY 5 MIN PRN
Status: DISCONTINUED | OUTPATIENT
Start: 2022-04-12 | End: 2022-04-12

## 2022-04-12 RX ORDER — MIDAZOLAM HYDROCHLORIDE 1 MG/ML
INJECTION INTRAMUSCULAR; INTRAVENOUS
Status: DISCONTINUED | OUTPATIENT
Start: 2022-04-12 | End: 2022-04-12

## 2022-04-12 RX ORDER — METOPROLOL SUCCINATE 50 MG/1
50 TABLET, EXTENDED RELEASE ORAL DAILY
Status: DISCONTINUED | OUTPATIENT
Start: 2022-04-12 | End: 2022-04-13 | Stop reason: HOSPADM

## 2022-04-12 RX ORDER — ACETAMINOPHEN 10 MG/ML
INJECTION, SOLUTION INTRAVENOUS
Status: DISCONTINUED | OUTPATIENT
Start: 2022-04-12 | End: 2022-04-12

## 2022-04-12 RX ORDER — AMLODIPINE BESYLATE 5 MG/1
5 TABLET ORAL DAILY
Status: DISCONTINUED | OUTPATIENT
Start: 2022-04-13 | End: 2022-04-13 | Stop reason: HOSPADM

## 2022-04-12 RX ORDER — MUPIROCIN 20 MG/G
OINTMENT TOPICAL
Status: DISCONTINUED | OUTPATIENT
Start: 2022-04-12 | End: 2022-04-12 | Stop reason: HOSPADM

## 2022-04-12 RX ORDER — ENOXAPARIN SODIUM 100 MG/ML
40 INJECTION SUBCUTANEOUS EVERY 24 HOURS
Status: DISCONTINUED | OUTPATIENT
Start: 2022-04-13 | End: 2022-04-12

## 2022-04-12 RX ORDER — PHENYLEPHRINE HYDROCHLORIDE 10 MG/ML
INJECTION INTRAVENOUS
Status: DISCONTINUED | OUTPATIENT
Start: 2022-04-12 | End: 2022-04-12

## 2022-04-12 RX ORDER — AMOXICILLIN 250 MG
1 CAPSULE ORAL 2 TIMES DAILY
Status: DISCONTINUED | OUTPATIENT
Start: 2022-04-12 | End: 2022-04-13 | Stop reason: HOSPADM

## 2022-04-12 RX ORDER — SODIUM CHLORIDE 0.9 % (FLUSH) 0.9 %
10 SYRINGE (ML) INJECTION
Status: DISCONTINUED | OUTPATIENT
Start: 2022-04-12 | End: 2022-04-13 | Stop reason: HOSPADM

## 2022-04-12 RX ORDER — CLINDAMYCIN PHOSPHATE 900 MG/50ML
900 INJECTION, SOLUTION INTRAVENOUS
Status: COMPLETED | OUTPATIENT
Start: 2022-04-12 | End: 2022-04-13

## 2022-04-12 RX ORDER — LEVOTHYROXINE SODIUM 88 UG/1
88 TABLET ORAL
Status: DISCONTINUED | OUTPATIENT
Start: 2022-04-13 | End: 2022-04-13 | Stop reason: HOSPADM

## 2022-04-12 RX ORDER — BUPIVACAINE HYDROCHLORIDE 5 MG/ML
INJECTION, SOLUTION EPIDURAL; INTRACAUDAL
Status: COMPLETED | OUTPATIENT
Start: 2022-04-12 | End: 2022-04-12

## 2022-04-12 RX ORDER — ONDANSETRON HYDROCHLORIDE 2 MG/ML
INJECTION, SOLUTION INTRAMUSCULAR; INTRAVENOUS
Status: DISCONTINUED | OUTPATIENT
Start: 2022-04-12 | End: 2022-04-12

## 2022-04-12 RX ORDER — PROPOFOL 10 MG/ML
VIAL (ML) INTRAVENOUS
Status: DISCONTINUED | OUTPATIENT
Start: 2022-04-12 | End: 2022-04-12

## 2022-04-12 RX ORDER — CELECOXIB 100 MG/1
200 CAPSULE ORAL 2 TIMES DAILY
Status: DISCONTINUED | OUTPATIENT
Start: 2022-04-12 | End: 2022-04-13 | Stop reason: HOSPADM

## 2022-04-12 RX ORDER — ROCURONIUM BROMIDE 10 MG/ML
INJECTION, SOLUTION INTRAVENOUS
Status: DISCONTINUED | OUTPATIENT
Start: 2022-04-12 | End: 2022-04-12

## 2022-04-12 RX ORDER — LIDOCAINE HCL/PF 100 MG/5ML
SYRINGE (ML) INTRAVENOUS
Status: DISCONTINUED | OUTPATIENT
Start: 2022-04-12 | End: 2022-04-12

## 2022-04-12 RX ORDER — ENOXAPARIN SODIUM 100 MG/ML
40 INJECTION SUBCUTANEOUS EVERY 12 HOURS
Status: DISCONTINUED | OUTPATIENT
Start: 2022-04-12 | End: 2022-04-13 | Stop reason: HOSPADM

## 2022-04-12 RX ADMIN — BUPIVACAINE HYDROCHLORIDE 5 ML: 5 INJECTION, SOLUTION EPIDURAL; INTRACAUDAL; PERINEURAL at 06:04

## 2022-04-12 RX ADMIN — MUPIROCIN: 20 OINTMENT TOPICAL at 09:04

## 2022-04-12 RX ADMIN — CLINDAMYCIN IN 5 PERCENT DEXTROSE 900 MG: 18 INJECTION, SOLUTION INTRAVENOUS at 04:04

## 2022-04-12 RX ADMIN — DOCUSATE SODIUM AND SENNOSIDES 1 TABLET: 8.6; 5 TABLET, FILM COATED ORAL at 09:04

## 2022-04-12 RX ADMIN — PHENYLEPHRINE HYDROCHLORIDE 200 MCG: 10 INJECTION INTRAVENOUS at 07:04

## 2022-04-12 RX ADMIN — ROCURONIUM BROMIDE 5 MG: 10 INJECTION, SOLUTION INTRAVENOUS at 07:04

## 2022-04-12 RX ADMIN — OXYCODONE 5 MG: 5 TABLET ORAL at 10:04

## 2022-04-12 RX ADMIN — GLYCOPYRROLATE 0.2 MG: 0.2 INJECTION, SOLUTION INTRAMUSCULAR; INTRAVENOUS at 08:04

## 2022-04-12 RX ADMIN — MUPIROCIN: 20 OINTMENT TOPICAL at 06:04

## 2022-04-12 RX ADMIN — PHENYLEPHRINE HYDROCHLORIDE 0.2 MCG/KG/MIN: 10 INJECTION INTRAVENOUS at 07:04

## 2022-04-12 RX ADMIN — TRANEXAMIC ACID 800 MG: 100 INJECTION, SOLUTION INTRAVENOUS at 08:04

## 2022-04-12 RX ADMIN — FENTANYL CITRATE 50 MCG: 50 INJECTION, SOLUTION INTRAMUSCULAR; INTRAVENOUS at 07:04

## 2022-04-12 RX ADMIN — EPHEDRINE SULFATE 10 MG: 50 INJECTION, SOLUTION INTRAMUSCULAR; INTRAVENOUS; SUBCUTANEOUS at 07:04

## 2022-04-12 RX ADMIN — VENLAFAXINE HYDROCHLORIDE 150 MG: 150 CAPSULE, EXTENDED RELEASE ORAL at 01:04

## 2022-04-12 RX ADMIN — LIDOCAINE HYDROCHLORIDE 10 MG: 10 INJECTION, SOLUTION EPIDURAL; INFILTRATION; INTRACAUDAL; PERINEURAL at 06:04

## 2022-04-12 RX ADMIN — BUPIVACAINE 10 ML: 13.3 INJECTION, SUSPENSION, LIPOSOMAL INFILTRATION at 06:04

## 2022-04-12 RX ADMIN — ONDANSETRON 4 MG: 2 INJECTION, SOLUTION INTRAMUSCULAR; INTRAVENOUS at 07:04

## 2022-04-12 RX ADMIN — FENTANYL CITRATE 50 MCG: 50 INJECTION, SOLUTION INTRAMUSCULAR; INTRAVENOUS at 06:04

## 2022-04-12 RX ADMIN — HYDROMORPHONE HYDROCHLORIDE 0.2 MG: 2 INJECTION, SOLUTION INTRAMUSCULAR; INTRAVENOUS; SUBCUTANEOUS at 10:04

## 2022-04-12 RX ADMIN — ALBUMIN (HUMAN): 12.5 SOLUTION INTRAVENOUS at 09:04

## 2022-04-12 RX ADMIN — CELECOXIB 200 MG: 100 CAPSULE ORAL at 10:04

## 2022-04-12 RX ADMIN — ENOXAPARIN SODIUM 40 MG: 100 INJECTION SUBCUTANEOUS at 09:04

## 2022-04-12 RX ADMIN — CELECOXIB 200 MG: 100 CAPSULE ORAL at 09:04

## 2022-04-12 RX ADMIN — EPHEDRINE SULFATE 15 MG: 50 INJECTION, SOLUTION INTRAMUSCULAR; INTRAVENOUS; SUBCUTANEOUS at 07:04

## 2022-04-12 RX ADMIN — MIDAZOLAM HYDROCHLORIDE 2 MG: 1 INJECTION, SOLUTION INTRAMUSCULAR; INTRAVENOUS at 06:04

## 2022-04-12 RX ADMIN — CLINDAMYCIN PHOSPHATE 900 MG: 18 INJECTION, SOLUTION INTRAVENOUS at 07:04

## 2022-04-12 RX ADMIN — LIDOCAINE HYDROCHLORIDE 75 MG: 20 INJECTION INTRAVENOUS at 07:04

## 2022-04-12 RX ADMIN — PROPOFOL 140 MG: 10 INJECTION, EMULSION INTRAVENOUS at 07:04

## 2022-04-12 RX ADMIN — SODIUM CHLORIDE, SODIUM GLUCONATE, SODIUM ACETATE, POTASSIUM CHLORIDE, MAGNESIUM CHLORIDE, SODIUM PHOSPHATE, DIBASIC, AND POTASSIUM PHOSPHATE: .53; .5; .37; .037; .03; .012; .00082 INJECTION, SOLUTION INTRAVENOUS at 06:04

## 2022-04-12 RX ADMIN — SODIUM CHLORIDE, SODIUM GLUCONATE, SODIUM ACETATE, POTASSIUM CHLORIDE, MAGNESIUM CHLORIDE, SODIUM PHOSPHATE, DIBASIC, AND POTASSIUM PHOSPHATE: .53; .5; .37; .037; .03; .012; .00082 INJECTION, SOLUTION INTRAVENOUS at 08:04

## 2022-04-12 RX ADMIN — EPHEDRINE SULFATE 25 MG: 50 INJECTION, SOLUTION INTRAMUSCULAR; INTRAVENOUS; SUBCUTANEOUS at 07:04

## 2022-04-12 RX ADMIN — TRANEXAMIC ACID 800 MG: 100 INJECTION, SOLUTION INTRAVENOUS at 09:04

## 2022-04-12 RX ADMIN — CLINDAMYCIN IN 5 PERCENT DEXTROSE 900 MG: 18 INJECTION, SOLUTION INTRAVENOUS at 11:04

## 2022-04-12 RX ADMIN — HYDROCHLOROTHIAZIDE 25 MG: 25 TABLET ORAL at 01:04

## 2022-04-12 RX ADMIN — ACETAMINOPHEN 1000 MG: 10 INJECTION, SOLUTION INTRAVENOUS at 07:04

## 2022-04-12 RX ADMIN — GLYCOPYRROLATE 0.2 MG: 0.2 INJECTION, SOLUTION INTRAMUSCULAR; INTRAVENOUS at 07:04

## 2022-04-12 RX ADMIN — DEXAMETHASONE SODIUM PHOSPHATE 4 MG: 4 INJECTION, SOLUTION INTRA-ARTICULAR; INTRALESIONAL; INTRAMUSCULAR; INTRAVENOUS; SOFT TISSUE at 07:04

## 2022-04-12 NOTE — PLAN OF CARE
[facility]  Initial Discharge Assessment       Primary Care Provider: Ross Marquez MD    Admission Diagnosis: Anterior dislocation of right shoulder, subsequent encounter [S43.014D]  Traumatic complete tear of right rotator cuff, subsequent encounter [S46.011D]  Pre-op testing [Z01.818]  Arthritis of shoulder region, right [M19.011]    Admission Date: 4/12/2022  Expected Discharge Date: Pt confirmed home address and that she lives at home with spouse- Adrian Adams [phone]. Pt has a home walker and stated that she has been trying to get HH . Pt uses [facility] on [address] and verified Dr. Marquez as her PCP. Pt does drive. Pts insurance is PHN. Pts discharge plan is home with family and new HH. CM following.     Discharge Barriers Identified: None    Payor: [employer] MEDICARE / Plan: [employer] / Product Type: Medicare Advantage /     Extended Emergency Contact Information  Primary Emergency Contact: Adrian Adams   [facility]  Home Phone: [phone]  Mobile Phone: [phone]  Relation: Spouse    Discharge Plan A: Home with family, Home Health  Discharge Plan B: Home with family      [id number] - [place] - [address] AT [facility] [address]  [address] [address]  Phone: [phone] Fax: [phone]      Initial Assessment (most recent)     Adult Discharge Assessment - 04/12/22 1312        Discharge Assessment    Assessment Type Discharge Planning Assessment     Confirmed/corrected address, phone number and insurance Yes     Confirmed Demographics Correct on Facesheet     Source of Information patient     Communicated MATILDA with patient/caregiver Yes     Reason For Admission shoulder repair     Lives With spouse     Facility Arrived From: home     Do you expect to return to your current living situation? Yes     Do you have help at home or someone to help you manage your  care at home? Yes     Who are your caregiver(s) and their phone number(s)? spouse- Adrian Adams [phone]     Prior to hospitilization cognitive status: Alert/Oriented     Current cognitive status: Alert/Oriented     Walking or Climbing Stairs Difficulty none     Dressing/Bathing Difficulty none     Equipment Currently Used at Home walker, standard     Readmission within 30 days? No     Patient currently being followed by outpatient case management? No     Do you currently have service(s) that help you manage your care at home? No     Do you take prescription medications? Yes     Do you have any problems affording any of your prescribed medications? No     Is the patient taking medications as prescribed? yes     Who is going to help you get home at discharge? spouse- Adrian Adams [phone]     How do you get to doctors appointments? car, drives self     Are you on dialysis? No     Do you take coumadin? No     Discharge Plan A Home with family;Home Health     Discharge Plan B Home with family     DME Needed Upon Discharge  none     Discharge Plan discussed with: Patient     Discharge Barriers Identified None        Relationship/Environment    Name(s) of Who Lives With Patient spouse- Adrian Adams [phone]

## 2022-04-12 NOTE — H&P
Past Medical History:   Diagnosis Date    Allergy      Anxiety      Arthritis, rheumatoid      Back pain      Depression      Fibromyalgia      GERD (gastroesophageal reflux disease)      High cholesterol      Hilar mass 3/27/2014    [place] (hard of hearing)       aid right ear    [place] (hard of hearing)      Hypertension      Incontinence of urine      Lymphedema      MS (multiple sclerosis)       benign; another MD stated she has no MS    Neuropathy      Restless leg syndrome      Rotator cuff tear 4/16/2015    Sciatica of left side 1/5/2018    Seasonal allergies      Sinus congestion      Sleep apnea       DOES NOT USE MACHINE    Spondylolysis      Thyroid disease      Type 2 diabetes mellitus with polyneuropathy       no med. was borderline    Wheezing                 Past Surgical History:   Procedure Laterality Date    APPENDECTOMY        ARTHROSCOPIC DEBRIDEMENT OF SHOULDER Right 2/18/2022     Procedure: EXTENSIVE DEBRIDEMENT, SHOULDER, ARTHROSCOPIC;  Surgeon: Rio Pitts MD;  Location: [facility] OR;  Service: Orthopedics;  Laterality: Right;    BREAST SURGERY         reduction    CLOSED REDUCTION OF INJURY OF SHOULDER Right 9/19/2021     Procedure: CLOSED REDUCTION, SHOULDER;  Surgeon: Antonio Irwin MD;  Location: [facility] OR;  Service: Orthopedics;  Laterality: Right;    EPIDURAL STEROID INJECTION INTO LUMBAR SPINE N/A 6/12/2019     Procedure: Injection-steroid-epidural-lumbar;  Surgeon: Thad Manning MD;  Location: [facility] OR;  Service: Pain Management;  Laterality: N/A;  L5-S1    EPIDURAL STEROID INJECTION INTO LUMBAR SPINE N/A 9/16/2019     Procedure: Injection-steroid-epidural-lumbar;  Surgeon: Thad Manning MD;  Location: [facility] OR;  Service: Pain Management;  Laterality: N/A;  L5-S1    EYE SURGERY Bilateral      HYSTERECTOMY         partial - fibroids    INJECTION OF ANESTHETIC AGENT AROUND MEDIAL BRANCH NERVES INNERVATING LUMBAR FACET JOINT Bilateral 2/28/2019      Procedure: Block-nerve-medial branch-lumbar;  Surgeon: Thad Manning MD;  Location: [facility];  Service: Pain Management;  Laterality: Bilateral;  L3, 4,5     INJECTION OF ANESTHETIC AGENT AROUND MEDIAL BRANCH NERVES INNERVATING LUMBAR FACET JOINT Bilateral 3/21/2019     Procedure: Block-nerve-medial branch-lumbar L3,4,5;  Surgeon: Thad Manning MD;  Location: [facility];  Service: Pain Management;  Laterality: Bilateral;    KNEE ARTHROPLASTY Left 3/16/2021     Procedure: ARTHROPLASTY, KNEE;  Surgeon: Rio Pitts MD;  Location: [facility] OR;  Service: Orthopedics;  Laterality: Left;  GENERAL AND BLOCK    LIPOSUCTION   1985    RADIOFREQUENCY ABLATION Left 11/4/2020     Procedure: Radiofrequency Ablation left knee;  Surgeon: Andrew Escudero MD;  Location: [facility];  Service: Pain Management;  Laterality: Left;    RADIOFREQUENCY ABLATION OF LUMBAR MEDIAL BRANCH NERVE AT SINGLE LEVEL Bilateral 4/12/2019     Procedure: Radiofrequency Ablation, Nerve, Spinal, Lumbar, Medial Branch, 1 Level;  Surgeon: Thad Manning MD;  Location: [facility];  Service: Pain Management;  Laterality: Bilateral;  L3, 4, 5  lumbar  [employer] pain management generator  SN [id number]  80 degrees for 75 seconds x2    RADIOFREQUENCY ABLATION OF LUMBAR MEDIAL BRANCH NERVE AT SINGLE LEVEL Bilateral 10/22/2019     Procedure: Radiofrequency Ablation, Nerve, Spinal, Lumbar, Medial Branch, L3,4,5;  Surgeon: Thad Manning MD;  Location: [facility];  Service: Pain Management;  Laterality: Bilateral;  burned at 80 degrees C X 60 seconds X 2 each site    RADIOFREQUENCY ABLATION OF LUMBAR MEDIAL BRANCH NERVE AT SINGLE LEVEL Bilateral 5/27/2020     Procedure: Radiofrequency Ablation, Nerve, Spinal, Lumbar, Medial Branch, 1 Level;  Surgeon: Thad Manning MD;  Location: [facility];  Service: Pain Management;  Laterality: Bilateral;  L3,4,5    SHOULDER ARTHROSCOPY Right 9/19/2021     Procedure: ARTHROSCOPY, SHOULDER;  Surgeon: Antonio Irwin MD;  Location: [facility];   Service: Orthopedics;  Laterality: Right;  LIMITED DEBRIDMENT    TONSILLECTOMY        TOTAL REDUCTION MAMMOPLASTY                  Current Outpatient Medications   Medication Sig    albuterol (PROAIR HFA) 90 mcg/actuation inhaler Inhale 2 puffs into the lungs every 6 (six) hours as needed for Wheezing. Rescue    ALPRAZolam (XANAX) 2 MG Tab Take 1 tablet (2 mg total) by mouth 2 (two) times daily as needed (anxiety).    amitriptyline (ELAVIL) 25 MG tablet Take 1 tablet (25 mg total) by mouth nightly as needed for Insomnia.    amLODIPine (NORVASC) 5 MG tablet Take 1 tablet (5 mg total) by mouth once daily. For blood pressure    biotin 10,000 mcg Cap Take 13,000 mcg by mouth once daily.    celecoxib (CELEBREX) 200 MG capsule Take 1 capsule (200 mg total) by mouth daily as needed (arthritis pain).    fexofenadine (ALLEGRA) 180 MG tablet      furosemide (LASIX) 20 MG tablet TAKE 1 TABLET BY MOUTH DAILY AS NEEDED FOR LEF SWELLING    hydroCHLOROthiazide (HYDRODIURIL) 25 MG tablet Take 1 tablet (25 mg total) by mouth once daily.    HYDROcodone-acetaminophen (NORCO) 5-325 mg per tablet Take 1 tablet by mouth every 6 (six) hours as needed for Pain.    levothyroxine (SYNTHROID) 88 MCG tablet Take 1 tablet (88 mcg total) by mouth once daily.    losartan (COZAAR) 50 MG tablet Take 1 tablet (50 mg total) by mouth once daily. For blood pressure    lovastatin (MEVACOR) 40 MG tablet Take 1 tablet (40 mg total) by mouth nightly. at bedtime. For cholesterol    meclizine (ANTIVERT) 25 mg tablet Take 25 mg by mouth daily as needed.    metoprolol succinate (TOPROL-XL) 50 MG 24 hr tablet Take 1 tablet (50 mg total) by mouth once daily. For blood pressure and heart    MULTIVIT-IRON-MIN-FOLIC ACID 3,500-18-0.4 UNIT-MG-MG ORAL CHEW Take 1 tablet by mouth.    nystatin (MYCOSTATIN) cream Apply topically 2 (two) times daily.    omeprazole (PRILOSEC) 20 MG capsule Take 1 capsule (20 mg total) by mouth every other day.     ondansetron (ZOFRAN-ODT) 4 MG TbDL Take 1 tablet (4 mg total) by mouth every 8 (eight) hours as needed (nausea).    oxyCODONE-acetaminophen (PERCOCET) 5-325 mg per tablet Take 1 tablet by mouth every 6 (six) hours as needed for Pain (not controlled by tylenol).    potassium chloride (MICRO-K) 10 MEQ CpSR Take 1 capsule (10 mEq total) by mouth every evening.    pramipexole (MIRAPEX) 0.5 MG tablet Take 2 tablets (1 mg total) by mouth every evening. For restless legs    pregabalin (LYRICA) 25 MG capsule Take 1 capsule (25 mg total) by mouth 3 (three) times daily.    umeclidinium-vilanteroL (ANORO ELLIPTA) 62.5-25 mcg/actuation DsDv Inhale 1 puff into the lungs once daily. Controller    valacyclovir HCl (VALACYCLOVIR ORAL) Take by mouth.    venlafaxine (EFFEXOR-XR) 150 MG Cp24 Take 1 capsule (150 mg total) by mouth once daily. For mood      No current facility-administered medications for this visit.          Facility-Administered Medications Ordered in Other Visits   Medication    diphenhydrAMINE injection 12.5 mg    electrolyte-S (ISOLYTE)    fentaNYL 50 mcg/mL injection 25 mcg    HYDROmorphone (PF) injection 0.2 mg    lorazepam injection 0.25 mg    ondansetron injection 4 mg    oxyCODONE immediate release tablet 5 mg    prochlorperazine injection Soln 5 mg               Review of patient's allergies indicates:   Allergen Reactions    Keflex [cephalexin]      Latex, natural rubber Anaphylaxis       bandaids     Pcn [penicillins]      Adhesive Other (See Comments)       bandainds redness at skin    Trelegy ellipta [fluticasone-umeclidin-vilanter]         Vision disturbance               Family History   Problem Relation Age of Onset    Heart disease Mother           Social History               Socioeconomic History    Marital status:    Tobacco Use    Smoking status: Former Smoker       Quit date: 1996       Years since quittin.7    Smokeless tobacco: Never Used   Substance and  Sexual Activity    Alcohol use: Yes       Comment: very little     Drug use: No      Social Determinants of Health          Financial Resource Strain: Low Risk     Difficulty of Paying Living Expenses: Not very hard   Food Insecurity: No Food Insecurity    Worried About Running Out of Food in the Last Year: Never true    Ran Out of Food in the Last Year: Never true   Transportation Needs: No Transportation Needs    Lack of Transportation (Medical): No    Lack of Transportation (Non-Medical): No   Physical Activity: Inactive    Days of Exercise per Week: 0 days    Minutes of Exercise per Session: 0 min   Stress: Stress Concern Present    Feeling of Stress : Very much   Social Connections: Moderately Isolated    Frequency of Communication with Friends and Family: More than three times a week    Frequency of Social Gatherings with Friends and Family: Once a week    Attends Rastafari Services: Never    Active Member of Clubs or Organizations: No    Attends Club or Organization Meetings: Never    Marital Status:    Housing Stability: Low Risk     Unable to Pay for Housing in the Last Year: No    Number of Places Lived in the Last Year: 1    Unstable Housing in the Last Year: No            Chief Complaint:       Chief Complaint   Patient presents with    Right Shoulder - Post-op Evaluation         History of present illness:  This is a 72-year-old female seen for bilateral shoulder pain.  We injected her about a year ago.  MRI just showed a very small rotator cuff tear.  Pain has gotten much worse over the last few months.  Patient had a fall back in September and suffered a dislocation with greater tuberosity fracture.  Patient was treated with closed reduction.  It was then talk about going back in and repairing her rotator cuff.  We were supposed to fix her last week and she could not get a ride to surgery.  Ironically, she then had another shoulder dislocation that day requiring closed  reduction.  In surgery, the patient was dislocating out the front.  She had pretty significant bony wear both of the glenoid as well as a significant Hill-Sachs lesion.  Anything done soft tissue wise, was likely to fail and she had pretty significant arthritis already.     Review of Systems:     Constitution: Negative for chills, fever, and sweats.  Negative for unexplained weight loss.     HENT:  Negative for headaches and blurry vision.     Cardiovascular:Negative for chest pain or irregular heart beat. Negative for hypertension.     Respiratory:  Negative for cough and positive for shortness of breath.     Gastrointestinal: Negative for abdominal pain, heartburn, melena, nausea, and vomitting.     Genitourinary:  Negative bladder incontinence and dysuria.  Positive for urgency.     Musculoskeletal:  See HPI     Neurological:  Positive for numbness.     Psychiatric/Behavioral:  Positive for depression and anxiety     Endocrine: Negative for polyuria     Hematologic/Lymphatic: Negative for bleeding problem.  Does  bruise/bleed easily.     Skin: Negative for poor would healing and rash        Physical Examination:     Vital Signs:        Vitals:     03/03/22 0933   Resp: 18         Body mass index is 43.75 kg/m².     This a well-developed, well nourished patient in no acute distress.  They are alert and oriented and cooperative to examination.  Pt. walks without an antalgic gait.          Examination of the right shoulder shows no rashes or erythema. There are no masses, ecchymosis, or atrophy. The patient has full range of motion in forward flexion, external rotation, and internal rotation to the mid T-spine. The patient has - impingement signs. - [place]open's test. - Speeds test. Nontender to palpation over a.c. joint. Normal stability anteriorly, posteriorly, and negative sulcus sign. Passive range of motion: Forward flexion of 180°, external rotation at 90° of 90°, internal rotation of 50°, and external rotation  at 0° of 50°. 2+ radial pulse. Intact axillary, radial, median and ulnar sensation. 4+ out of 5 resisted forward flexion, external rotation, and negative lift off test.     Heart is regular rate without obvious murmurs   Normal respiratory effort without audible wheezing  Abdomen is soft and nontender            X-rays:  X-rays of left knee is  reviewed which show moderate to severe medial compartment narrowing of both knees.  X-rays of the left shoulder is  review which show some sclerosis near the greater tuberosity consistent with chronic rotator cuff syndrome     MRI of the left shoulder:Full-thickness tear and short distance retraction of the anterior supraspinatus tendon.  Possible partial articular surface tear of the more posterior supraspinatus tendon.  Subchondral cyst, geodes noted in the femoral head.  Small inferior spur from the distal head of the clavicle with mild impingement.     Assessment::    Right rotator cuff tear with continued shoulder instability     Plan:  I reviewed the findings with her today.  I think the best option would be a reverse total shoulder arthroplasty.  I think anything short of this would not treat all the causes of her pain including the rotator cuff tear, the instability, and the arthritis.  Patient would like to go ahead and have this done.  She hurts all the time and has very little function with the right shoulder.  Risks, benefits, and alternatives to the procedure were explained to the patient including but not limited to damage to nerves, arteries, blood vessels, bones, tendons, ligaments, stiffness, instability, infection, DVT, PE, as well as general anesthetic complications including seizure, stroke, heart attack and even death. The patient understood these risks and wished to proceed and signed the informed consent.         This note was created using QuickProNotes voice recognition software that occasionally misinterpreted phrases or words.     Consult note is  delivered via Epic messaging service.

## 2022-04-12 NOTE — OP NOTE
[facility]  Orthopedic Surgery  Operative Note    SUMMARY     Date of Procedure: 4/12/2022     Procedure: Procedure(s) (LRB):  ARTHROPLASTY, SHOULDER, TOTAL, REVERSE (Right)       Surgeon(s) and Role:     * Rio Pitts MD - Primary    Assistant: SMA aBrbra    Pre-Operative Diagnosis: Anterior dislocation of right shoulder, subsequent encounter [S43.014D]  Traumatic complete tear of right rotator cuff, subsequent encounter [S46.011D]  Pre-op testing [Z01.818]    Post-Operative Diagnosis: Post-Op Diagnosis Codes:     * Anterior dislocation of right shoulder, subsequent encounter [S43.014D]     * Traumatic complete tear of right rotator cuff, subsequent encounter [S46.011D]     * Pre-op testing [Z01.818]    Anesthesia: General    Description of the Findings of the Procedure:  Patient had severe anterior inferior glenoid bone loss from prior dislocations.  Patient's bone quality was very poor with significant osteopenia.  Purchase and the glenoid was less than ideal.    Complications: No    Estimated Blood Loss (EBL): 650 mL           Implants:   Implant Name Type Inv. Item Serial No.  Lot No. LRB No. Used Action   ReUnion rsa GLENOID BASEPLATE     RAY [id number] Right 1 Implanted   SCREW BONE TSA 6.5X36MM - [id number] Screw SCREW BONE TSA 6.5X36MM  [employer]. [id number] Right 1 Implanted   CUP HUM REUNION RFX TRAIL ADPT - [id number]  CUP HUM REUNION RFX TRAIL ADPT  RAY [employer] STEPHANI. [id number] Right 1 Implanted   WIRE FIXATION REUN NIT PILT - [id number]  WIRE FIXATION REUN NIT PILT  RAY [employer] STEPHANI. [id number] Right 1 Implanted   GUIDE TRUESIGHT RSA - [id number]  GUIDE TRUESIGHT RSA  RAY [employer] STEPHANI. [id number] Right 1 Implanted   SCREW BONE TSA 4.5X36MM - [id number]  SCREW BONE TSA 4.5X36MM  [employer] STEPHANI. [id number] Right 1 Implanted   SCREW BONE TSA 4.5X20MM - [id number] Screw SCREW BONE TSA 4.5X20MM  [employer] STEPHANI. [id number] Right 1 Implanted   SCREW  BONE TSA 4.5X32MM - [id number] Screw SCREW BONE TSA 4.5X32MM  RAY [employer] STEPHANI. [id number] Right 1 Implanted   SCREW PERIPH REUNION 4.5X16MM - [id number] Screw SCREW PERIPH REUNION 4.5X16MM  [employer] STEPHANI. [id number] Right 1 Implanted   COMPONENT GLENOSPHERE 32X2MM - [id number]  COMPONENT GLENOSPHERE 32X2MM  [employer] STEPHANI. [id number] Right 1 Implanted   STEM REUNION HUM PRESSFIT 95MM - [id number]  STEM REUNION HUM PRESSFIT 95MM  [employer] STEPHANI. [id number] Right 1 Implanted   INSERT HUMERAL STD 32X4MM - [id number]  INSERT HUMERAL STD 32X4MM  RAY [employer]ius STEPHANI. [id number] Right 1 Implanted   REUNION RSA HUMERAL CUP    RAY [id number] Right 1 Implanted       Specimens:   Specimen (24h ago, onward)            None                  Condition: Good    Disposition: PACU - hemodynamically stable.    Attestation: I was present and scrubbed for the entire procedure.    INDICATIONS FOR THE PROCEDURE: A 72-year-old female with history of Right  Shoulder dislocations and rotator cuff tearing. The patient had failed conservative management including   cortisone injections, physical therapy and after a long discussion, the patient   elected for the procedure listed above.      PROCEDURE IN DETAIL: Risks, benefits and alternatives of the procedure were   explained to the patient including, but not limited to damage to nerves,   arteries or blood vessels. Also, explained risk of instability, infection,   hardware failure, polyware infection, DVT, PE as well as anesthetic   complications including seizure, stroke, heart attack and death. They understood  this and signed informed consent. The patient's Right shoulder was marked   prior to coming to the Operating Room. Once there a formal timeout was done in which   correct patient, procedure and operating site were all correctly identified and   confirmed by the entire operating team, 2 g of Ancef given prior to surgical   incision. General endotracheal anesthesia was induced. The  patient was placed   in a relaxed beach chair type position. The patient's Right upper extremity was  prepped and draped in normal sterile fashion. Standard deltopectoral incision   was made. This was taken down through the skin and some fat. Cephalic vein was  identified and retracted laterally with the deltoid. Some clavipectoral fascia  was dissected and the conjoint tendon was retracted medially, protecting the   musculocutaneous nerve. The subdeltoid and subacromial spaces were then bluntly  dissected, so the retractor could be placed and full exposure of the proximal   humerus was obtained. The biceps tendon was identified and was tracked   proximally into the rotator interval. This was then released and tenodesed at   the pectoralis major tendon, which was released about 0.5 cm. After this was   done, we then did our subscapularis tenotomy tagging with some Ethibond at the   superior portion and the inferior portion. This was then fully released   dislocating the head. After this was done, we then planned for a humeral neck   cut, 30 degrees of retroversion was placed. Cutting guide was placed and pinned in   place, so the top of the cut was right of the edge of the rotator cuff   insertion. This was then cut. After this was done, we   then used a rongeur and osteotomes to remove inferior humeral osteophytes circumferentially. We then turned our attention   to the glenoid. Bat-wing retractors were placed posteriorly and anteriorly,   exposing the glenoid and the remaining labrum well. The labrum and the   remainder of the biceps tendon was circumferentially removed for full exposure.    We held the guide on the inferior rim of the glenoid and drilled our hole. After this   was done, we then removed the guide and examined our hole and liked our position, we then drilled   for the central hole bicortically. We then placed our initial reamer,   reaming to match our plan and just reaming about 1 to 2 mm  inferiorly, getting good bleeding bone. We then measured for our screw and decided on a 36 since I was our planned.  I inserted our size 28 baseplate with a 36 screw. We got adequate purchase t and lined up our screw hole so that 1 was superiorly and 1 was inferiorly since the glenoid was so narrow and there was some overhang both anteriorly and posterior. We then placed the screw guide and drilled our 4 peripheral screws. The guide was removed and the screws were placed. We then planned for final glenosphere implantation.The Pulsavac was used to freshen up the bone and baseplate. It was then thoroughly dried. We then placed the 32 +2 glenosphere on and impacted it down.     After this was done, we started preparing the stem. The stem was reamed up from  an 8 to 11 and then broached up to a 11 as well. After this was done, we did our planar reamer. We then trialed, seeing good   appropriate shuck with good stability, no impingement, and good ROM. We then implanted final components. The final 11 stem was  then placed in with a 32 cup and  +4 poly was placed  as well.  After   this was done, we then thoroughly irrigated the joint, reduced the shoulder and   then placed a drain in the subacromial space.   We then closed the deltopectoral interval with 0 Vicryl, subcutaneous tissue with 2-0 Vicryl and skin   was closed using a Stratafix and Dermabond. Sterile dressing was applied. She   was placed in a sling, extubated, awakened, transferred from the Operating Room   to the Recovery Room in stable condition.

## 2022-04-12 NOTE — ANESTHESIA PROCEDURE NOTES
Peripheral Block    Patient location during procedure: pre-op   Block not for primary anesthetic.  Reason for block: at surgeon's request and post-op pain management   Post-op Pain Location: shoulder right   Start time: 4/12/2022 6:51 AM  Timeout: 4/12/2022 6:50 AM   End time: 4/12/2022 6:55 AM    Staffing  Authorizing Provider: Gume Sullivan MD  Performing Provider: Gume Sullivan MD    Preanesthetic Checklist  Completed: patient identified, IV checked, site marked, risks and benefits discussed, surgical consent, monitors and equipment checked, pre-op evaluation and timeout performed  Peripheral Block  Patient position: sitting  Prep: ChloraPrep  Patient monitoring: heart rate, cardiac monitor, continuous pulse ox, continuous capnometry and frequent blood pressure checks  Block type: interscalene  Laterality: right  Injection technique: single shot  Needle  Needle type: Stimuplex   Needle gauge: 22 G  Needle length: 2 in  Needle localization: anatomical landmarks and ultrasound guidance   -ultrasound image captured on disc.  Assessment  Injection assessment: negative aspiration, negative parasthesia and local visualized surrounding nerve  Paresthesia pain: none  Heart rate change: no  Slow fractionated injection: yes  Pain Tolerance: comfortable throughout block and no complaints  Medications:    Medications: bupivacaine (pf) (MARCAINE) injection 0.5% - Perineural   5 mL - 4/12/2022 6:54:00 AM    Additional Notes  VSS.  [facility] RN monitoring vitals throughout procedure.  Patient tolerated procedure well.

## 2022-04-12 NOTE — HPI
Patient is a 72-year-old female with history of hypertension, hypothyroidism, hyperlipidemia who is seen today after reverse right total shoulder arthroplasty for traumatic complete tear of right rotator cuff with Dr. Pitts.  She reports removed complained prior to surgery was shoulder pain.  Denies fever, chills, shortness breath, chest pain, abdominal pain, bowel or bladder complaints, weakness prior to surgery or at this time.  She reports her pain is controlled at this time.  Denies any other symptoms.

## 2022-04-12 NOTE — PLAN OF CARE
Patient prepared for surgery. Surgical and anesthesia consents signed. Performed incentive spirometer teaching. Belongings in pre-op locker. Text message alerts set up with daughter Queta.

## 2022-04-12 NOTE — TRANSFER OF CARE
Anesthesia Transfer of Care Note    Patient: Genoveva Gilbert    Procedure(s) Performed: Procedure(s) (LRB):  ARTHROPLASTY, SHOULDER, TOTAL, REVERSE (Right)    Patient location: PACU    Anesthesia Type: general    Transport from OR: Transported from OR on 2-3 L/min O2 by NC with adequate spontaneous ventilation    Post pain: adequate analgesia    Post assessment: no apparent anesthetic complications and tolerated procedure well    Post vital signs: stable    Level of consciousness: responds to stimulation and sedated    Nausea/Vomiting: no nausea/vomiting    Complications: none    Transfer of care protocol was followed      Last vitals:   Visit Vitals  /65   Pulse 71   Resp 12   Ht 5' (1.524 m)   Wt 98.9 kg (218 lb)   SpO2 100%   Breastfeeding No   BMI 42.58 kg/m²

## 2022-04-12 NOTE — PROGRESS NOTES
The enoxaparin dose has been adjusted for Genoveva WHITEHEAD Vladimir [id number] according to the pharmacy practice protocol.      Based on the patient's Estimated Creatinine Clearance: 65.2 mL/min (based on SCr of 0.8 mg/dL)., Body mass index is 42.58 kg/m²., and weight= 98.9 kg (218 lb), the enoxaparin dose has been adjusted to 40 mg every 12 hours for BMI > 40.     Thank you,  Bhumi Michelle

## 2022-04-12 NOTE — PLAN OF CARE
Released from Pacu when criteria met pain controlled skin w+d No nausea No emesis dsg dry intact aaox4 encouraged deep breaths Pt has all belongings   D/c art line held pressure 5 min no bleeding at site vs stable needs 02 at 2-3 l nc encouraged to do Is  Often ice man on and running 0 out of accordan drain pure wic on and running +2 radial pulse noted

## 2022-04-12 NOTE — ANESTHESIA POSTPROCEDURE EVALUATION
Anesthesia Post Evaluation    Patient: Genoveva Gilbert    Procedure(s) Performed: Procedure(s) (LRB):  ARTHROPLASTY, SHOULDER, TOTAL, REVERSE (Right)    Final Anesthesia Type: general      Patient location during evaluation: PACU  Patient participation: Yes- Able to Participate  Level of consciousness: awake and alert  Post-procedure vital signs: reviewed and stable  Pain management: adequate  Airway patency: patent    PONV status at discharge: No PONV  Anesthetic complications: no      Cardiovascular status: blood pressure returned to baseline  Respiratory status: unassisted  Hydration status: euvolemic  Follow-up not needed.          Vitals Value Taken Time   /59 04/12/22 1118   Temp 36.6 °C (97.8 °F) 04/12/22 1118   Pulse 74 04/12/22 1118   Resp 16 04/12/22 1118   SpO2 100 % 04/12/22 1420         Event Time   Out of Recovery 11:13:00         Pain/Alexey Score: Pain Rating Prior to Med Admin: 3 (4/12/2022 11:28 AM)  Pain Rating Post Med Admin: 5 (4/12/2022 11:28 AM)  Alexey Score: 10 (4/12/2022 11:28 AM)

## 2022-04-12 NOTE — ASSESSMENT & PLAN NOTE
Patient has chronic hypothyroidism. TFTs reviewed-   Lab Results   Component Value Date    TSH 1.728 05/14/2019   . Will continue chronic levothyroxine and adjust for and clinical changes.

## 2022-04-12 NOTE — ANESTHESIA PREPROCEDURE EVALUATION
04/12/2022  Genoveva Gilbert is a 72 y.o., female.    Pre-op Assessment    I have reviewed the Patient Summary Reports.    I have reviewed the Nursing Notes. I have reviewed the NPO Status.   I have reviewed the Medications.     Review of Systems  Anesthesia Hx:  No problems with previous Anesthesia  Denies Family Hx of Anesthesia complications.   Denies Personal Hx of Anesthesia complications.   Social:  Non-Smoker    Cardiovascular:   Hypertension ECG has been reviewed.    Pulmonary:   Sleep Apnea    Renal/:   Chronic Renal Disease    Hepatic/GI:   GERD, poorly controlled Liver Disease,    Musculoskeletal:   Arthritis   Spine Disorders: lumbar Chronic Pain    Neurological:   Neuromuscular Disease,    Endocrine:   Diabetes, poorly controlled, type 2 Hypothyroidism    Psych:   Psychiatric History          Physical Exam  General:  Morbid Obesity      Airway/Jaw/Neck:  Airway Findings: Mouth Opening: Normal   Tongue: Normal   General Airway Assessment: Adult, Good Mallampati: II  Improves to II with phonation.  TM Distance: 4-6 cm       Dental:  Dental Findings: In tact     Chest/Lungs:  Chest/Lungs Findings: Clear to auscultation, Normal Respiratory Rate      Heart/Vascular:  Heart Findings: Rate: Normal  Rhythm: Regular Rhythm        Mental Status:  Mental Status Findings:  Cooperative, Alert and Oriented         Anesthesia Plan  Type of Anesthesia, risks & benefits discussed:  Anesthesia Type:  Gen ETT    Patient's Preference:   Plan Factors:          Intra-op Monitoring Plan: standard ASA monitors  Intra-op Monitoring Plan Comments:   Post Op Pain Control Plan: multimodal analgesia and peripheral nerve block  Post Op Pain Control Plan Comments:     Induction:   IV  Beta Blocker:  Patient is on a Beta-Blocker and has received one dose within the past 24 hours (No further documentation required).        Informed Consent: Informed consent signed with the Patient and all parties understand the risks and agree with anesthesia plan.  All questions answered.  Anesthesia consent signed with patient.  ASA Score: 3     Day of Surgery Review of History & Physical:              Ready For Surgery From Anesthesia Perspective.           Physical Exam  General: Morbid Obesity    Airway:  Mallampati: II / II  Mouth Opening: Normal  TM Distance: 4-6 cm  Tongue: Normal    Dental:  In tact    Chest/Lungs:  Clear to auscultation, Normal Respiratory Rate    Heart:  Rate: Normal  Rhythm: Regular Rhythm          Anesthesia Plan  Type of Anesthesia, risks & benefits discussed:    Anesthesia Type: Gen ETT  Intra-op Monitoring Plan: standard ASA monitors  Post Op Pain Control Plan: multimodal analgesia and peripheral nerve block  Induction:  IV  Airway Plan: , Post-Induction  Informed Consent: Informed consent signed with the Patient and all parties understand the risks and agree with anesthesia plan.  All questions answered.   ASA Score: 3    Ready For Surgery From Anesthesia Perspective.       .

## 2022-04-12 NOTE — ANESTHESIA PROCEDURE NOTES
Intubation    Date/Time: 4/12/2022 7:43 AM  Performed by: Thad Frye CRNA  Authorized by: Thad Frye CRNA     Intubation:     Induction:  Intravenous    Intubated:  Postinduction    Mask Ventilation:  Easy mask    Attempts:  1    Attempted By:  CRNA    Method of Intubation:  Video laryngoscopy    Blade:  Meyer 3    Laryngeal View Grade: Grade I - full view of cords      Difficult Airway Encountered?: No      Complications:  None    Airway Device:  Oral endotracheal tube    Airway Device Size:  7.0    Style/Cuff Inflation:  Cuffed (inflated to minimal occlusive pressure)    Tube secured:  19    Secured at:  The lips    Placement Verified By:  Capnometry    Complicating Factors:  Small mouth, obesity, short neck and poor neck/head extension    Findings Post-Intubation:  BS equal bilateral

## 2022-04-12 NOTE — SUBJECTIVE & OBJECTIVE
Past Medical History:   Diagnosis Date    Allergy     Anxiety     Arthritis, rheumatoid     Back pain     Chronic bronchiolitis     Depression     Fibromyalgia     GERD (gastroesophageal reflux disease)     High cholesterol     Hilar mass 3/27/2014    [place]hard of hearing[place]     aid right ear    [place]hard of hearing)     Hypertension     Incontinence of urine     Lymphedema     MS (multiple sclerosis)     benign; another MD stated she has no MS    Neuropathy     Restless leg syndrome     Rotator cuff tear 4/16/2015    Sciatica of left side 1/5/2018    Seasonal allergies     Sinus congestion     Sleep apnea     DOES NOT USE MACHINE    Spondylolysis     Thyroid disease     Type 2 diabetes mellitus with polyneuropathy     no med. was borderline    Wheezing        Past Surgical History:   Procedure Laterality Date    APPENDECTOMY      ARTHROSCOPIC DEBRIDEMENT OF SHOULDER Right 2/18/2022    Procedure: EXTENSIVE DEBRIDEMENT, SHOULDER, ARTHROSCOPIC;  Surgeon: Rio Pitts MD;  Location: [facility] OR;  Service: Orthopedics;  Laterality: Right;    BREAST SURGERY      reduction    CLOSED REDUCTION OF INJURY OF SHOULDER Right 9/19/2021    Procedure: CLOSED REDUCTION, SHOULDER;  Surgeon: Antonio Irwin MD;  Location: [facility] OR;  Service: Orthopedics;  Laterality: Right;    EPIDURAL STEROID INJECTION INTO LUMBAR SPINE N/A 6/12/2019    Procedure: Injection-steroid-epidural-lumbar;  Surgeon: Thad Manning MD;  Location: [facility] OR;  Service: Pain Management;  Laterality: N/A;  L5-S1    EPIDURAL STEROID INJECTION INTO LUMBAR SPINE N/A 9/16/2019    Procedure: Injection-steroid-epidural-lumbar;  Surgeon: Thad Manning MD;  Location: [facility] OR;  Service: Pain Management;  Laterality: N/A;  L5-S1    EYE SURGERY Bilateral     HYSTERECTOMY      partial - fibroids    INJECTION OF ANESTHETIC AGENT AROUND MEDIAL BRANCH NERVES INNERVATING LUMBAR FACET JOINT Bilateral 2/28/2019    Procedure: Block-nerve-medial branch-lumbar;  Surgeon: Thad Manning  MD;  Location: [facility];  Service: Pain Management;  Laterality: Bilateral;  L3, 4,5     INJECTION OF ANESTHETIC AGENT AROUND MEDIAL BRANCH NERVES INNERVATING LUMBAR FACET JOINT Bilateral 3/21/2019    Procedure: Block-nerve-medial branch-lumbar L3,4,5;  Surgeon: Thad Manning MD;  Location: [facility] OR;  Service: Pain Management;  Laterality: Bilateral;    KNEE ARTHROPLASTY Left 3/16/2021    Procedure: ARTHROPLASTY, KNEE;  Surgeon: Rio Pitts MD;  Location: [facility] OR;  Service: Orthopedics;  Laterality: Left;  GENERAL AND BLOCK    LIPOSUCTION  1985    RADIOFREQUENCY ABLATION Left 11/4/2020    Procedure: Radiofrequency Ablation left knee;  Surgeon: Anrdew Escudero MD;  Location: [facility] OR;  Service: Pain Management;  Laterality: Left;    RADIOFREQUENCY ABLATION OF LUMBAR MEDIAL BRANCH NERVE AT SINGLE LEVEL Bilateral 4/12/2019    Procedure: Radiofrequency Ablation, Nerve, Spinal, Lumbar, Medial Branch, 1 Level;  Surgeon: Thad Manning MD;  Location: [facility] OR;  Service: Pain Management;  Laterality: Bilateral;  L3, 4, 5  lumbar  [id number] pain management generator  SN [id number]  80 degrees for 75 seconds x2    RADIOFREQUENCY ABLATION OF LUMBAR MEDIAL BRANCH NERVE AT SINGLE LEVEL Bilateral 10/22/2019    Procedure: Radiofrequency Ablation, Nerve, Spinal, Lumbar, Medial Branch, L3,4,5;  Surgeon: Thad Manning MD;  Location: [facility];  Service: Pain Management;  Laterality: Bilateral;  burned at 80 degrees C X 60 seconds X 2 each site    RADIOFREQUENCY ABLATION OF LUMBAR MEDIAL BRANCH NERVE AT SINGLE LEVEL Bilateral 5/27/2020    Procedure: Radiofrequency Ablation, Nerve, Spinal, Lumbar, Medial Branch, 1 Level;  Surgeon: Thad Manning MD;  Location: [facility];  Service: Pain Management;  Laterality: Bilateral;  L3,4,5    SHOULDER ARTHROSCOPY Right 9/19/2021    Procedure: ARTHROSCOPY, SHOULDER;  Surgeon: Antonio Irwin MD;  Location: [facility] OR;  Service: Orthopedics;  Laterality: Right;  LIMITED DEBRIDMENT    TONSILLECTOMY       TOTAL REDUCTION MAMMOPLASTY         Review of patient's allergies indicates:   Allergen Reactions    Keflex [cephalexin]      Throat swelling    Pcn [penicillins]      Throat swelling    Latex, natural rubber Other (See Comments)     Redness with bandaids     Adhesive Other (See Comments)     bandainds redness at skin    Trelegy ellipta [fluticasone-umeclidin-vilanter]      Vision disturbance       Current Facility-Administered Medications on File Prior to Encounter   Medication    diphenhydrAMINE injection 12.5 mg    electrolyte-S (ISOLYTE)    fentaNYL 50 mcg/mL injection 25 mcg    HYDROmorphone (PF) injection 0.2 mg    lorazepam injection 0.25 mg    ondansetron injection 4 mg    oxyCODONE immediate release tablet 5 mg    prochlorperazine injection Soln 5 mg     Current Outpatient Medications on File Prior to Encounter   Medication Sig    albuterol (PROAIR HFA) 90 mcg/actuation inhaler Inhale 2 puffs into the lungs every 6 (six) hours as needed for Wheezing. Rescue    ALPRAZolam (XANAX) 2 MG Tab Take 1 tablet (2 mg total) by mouth 2 (two) times daily as needed (anxiety).    amitriptyline (ELAVIL) 25 MG tablet Take 1 tablet (25 mg total) by mouth nightly as needed for Insomnia.    biotin 10,000 mcg Cap Take 13,000 mcg by mouth once daily.    fexofenadine (ALLEGRA) 180 MG tablet     furosemide (LASIX) 20 MG tablet TAKE 1 TABLET BY MOUTH DAILY AS NEEDED FOR LEF SWELLING    meclizine (ANTIVERT) 25 mg tablet Take 25 mg by mouth daily as needed.    omeprazole (PRILOSEC) 20 MG capsule Take 1 capsule (20 mg total) by mouth every other day. (Patient taking differently: Take 20 mg by mouth once daily.)    potassium chloride (MICRO-K) 10 MEQ CpSR Take 1 capsule (10 mEq total) by mouth every evening.    pramipexole (MIRAPEX) 0.5 MG tablet Take 2 tablets (1 mg total) by mouth every evening. For restless legs    umeclidinium-vilanteroL (ANORO ELLIPTA) 62.5-25 mcg/actuation DsDv Inhale 1 puff into the lungs once daily. Controller     valacyclovir HCl (VALACYCLOVIR ORAL) Take by mouth.    venlafaxine (EFFEXOR-XR) 150 MG Cp24 Take 1 capsule (150 mg total) by mouth once daily. For mood    celecoxib (CELEBREX) 200 MG capsule Take 1 capsule (200 mg total) by mouth daily as needed (arthritis pain).    MULTIVIT-IRON-MIN-FOLIC ACID 3,500-18-0.4 UNIT-MG-MG ORAL CHEW Take 1 tablet by mouth.     Family History       Problem Relation (Age of Onset)    Heart disease Mother          Tobacco Use    Smoking status: Former Smoker     Quit date: 1996     Years since quittin.8    Smokeless tobacco: Never Used   Substance and Sexual Activity    Alcohol use: Yes     Comment: very little     Drug use: No    Sexual activity: Not on file     Review of Systems   Constitutional:  Negative for chills and fever.   HENT:  Negative for congestion and rhinorrhea.    Respiratory:  Negative for cough, shortness of breath and wheezing.    Cardiovascular:  Negative for chest pain, palpitations and leg swelling.   Gastrointestinal:  Negative for abdominal distention, abdominal pain, nausea and vomiting.   Endocrine: Negative for cold intolerance and heat intolerance.   Genitourinary:  Negative for dysuria and urgency.   Musculoskeletal:  Positive for arthralgias. Negative for neck stiffness.   Skin:  Negative for rash and wound.   Neurological:  Negative for dizziness and weakness.   Objective:     Vital Signs (Most Recent):  Temp: 97.8 °F (36.6 °C) (22 1118)  Pulse: 74 (22 1118)  Resp: 16 (22 1118)  BP: (!) 102/59 (22 1118)  SpO2: 100 % (22 1118)   Vital Signs (24h Range):  Temp:  [97.8 °F (36.6 °C)] 97.8 °F (36.6 °C)  Pulse:  [67-79] 74  Resp:  [11-16] 16  SpO2:  [97 %-100 %] 100 %  BP: (102-143)/(58-72) 102/59     Weight: 98.9 kg (218 lb)  Body mass index is 42.58 kg/m².    Physical Exam  Constitutional:       General: She is not in acute distress.     Appearance: She is well-developed.   HENT:      Head: Normocephalic and atraumatic.    Eyes:      Pupils: Pupils are equal, round, and reactive to light.   Cardiovascular:      Rate and Rhythm: Normal rate and regular rhythm.      Heart sounds: No murmur heard.  Pulmonary:      Effort: Pulmonary effort is normal. No respiratory distress.      Breath sounds: Normal breath sounds. No wheezing or rales.   Abdominal:      General: Bowel sounds are normal. There is no distension.      Palpations: Abdomen is soft.      Tenderness: There is no abdominal tenderness.   Musculoskeletal:      Comments: R shoulder in sling, drain in place   Skin:     General: Skin is warm and dry.      Findings: No rash.   Neurological:      Mental Status: She is alert and oriented to person, place, and time.      Cranial Nerves: No cranial nerve deficit.   Psychiatric:         Behavior: Behavior normal.         CRANIAL NERVES     CN III, IV, VI   Pupils are equal, round, and reactive to light.     Significant Labs: All pertinent labs within the past 24 hours have been reviewed.    Significant Imaging: I have reviewed all pertinent imaging results/findings within the past 24 hours.

## 2022-04-12 NOTE — H&P
[facility] Medicine  History & Physical    Patient Name: Genoveva Gilbert  MRN: [id number]  Patient Class: OP- Outpatient Recovery  Admission Date: 4/12/2022  Attending Physician: Eveline Gomez MD  Primary Care Provider: Ross Marquez MD         Patient information was obtained from patient and past medical records.     Subjective:     Principal Problem:Traumatic tear of right rotator cuff, subsequent encounter    Chief Complaint: No chief complaint on file.       HPI: Patient is a 72-year-old female with history of hypertension, hypothyroidism, hyperlipidemia who is seen today after reverse right total shoulder arthroplasty for traumatic complete tear of right rotator cuff with Dr. Pitts.  She reports removed complained prior to surgery was shoulder pain.  Denies fever, chills, shortness breath, chest pain, abdominal pain, bowel or bladder complaints, weakness prior to surgery or at this time.  She reports her pain is controlled at this time.  Denies any other symptoms.      Past Medical History:   Diagnosis Date    Allergy     Anxiety     Arthritis, rheumatoid     Back pain     Chronic bronchiolitis     Depression     Fibromyalgia     GERD (gastroesophageal reflux disease)     High cholesterol     Hilar mass 3/27/2014    [place] (hard of hearing)     aid right ear    [place] (hard of hearing)     Hypertension     Incontinence of urine     Lymphedema     MS (multiple sclerosis)     benign; another MD stated she has no MS    Neuropathy     Restless leg syndrome     Rotator cuff tear 4/16/2015    Sciatica of left side 1/5/2018    Seasonal allergies     Sinus congestion     Sleep apnea     DOES NOT USE MACHINE    Spondylolysis     Thyroid disease     Type 2 diabetes mellitus with polyneuropathy     no med. was borderline    Wheezing        Past Surgical History:   Procedure Laterality Date    APPENDECTOMY      ARTHROSCOPIC DEBRIDEMENT OF SHOULDER Right 2/18/2022     Procedure: EXTENSIVE DEBRIDEMENT, SHOULDER, ARTHROSCOPIC;  Surgeon: Rio Pitts MD;  Location: [facility] OR;  Service: Orthopedics;  Laterality: Right;    BREAST SURGERY      reduction    CLOSED REDUCTION OF INJURY OF SHOULDER Right 9/19/2021    Procedure: CLOSED REDUCTION, SHOULDER;  Surgeon: Antonio Irwin MD;  Location: [facility] OR;  Service: Orthopedics;  Laterality: Right;    EPIDURAL STEROID INJECTION INTO LUMBAR SPINE N/A 6/12/2019    Procedure: Injection-steroid-epidural-lumbar;  Surgeon: Thad Manning MD;  Location: [facility] OR;  Service: Pain Management;  Laterality: N/A;  L5-S1    EPIDURAL STEROID INJECTION INTO LUMBAR SPINE N/A 9/16/2019    Procedure: Injection-steroid-epidural-lumbar;  Surgeon: Thad Manning MD;  [facility]  Service: Pain Management;  Laterality: N/A;  L5-S1    EYE SURGERY Bilateral     HYSTERECTOMY      partial - fibroids    INJECTION OF ANESTHETIC AGENT AROUND MEDIAL BRANCH NERVES INNERVATING LUMBAR FACET JOINT Bilateral 2/28/2019    Procedure: Block-nerve-medial branch-lumbar;  Surgeon: Thad Manning MD;  [facility];  Service: Pain Management;  Laterality: Bilateral;  L3, 4,5     INJECTION OF ANESTHETIC AGENT AROUND MEDIAL BRANCH NERVES INNERVATING LUMBAR FACET JOINT Bilateral 3/21/2019    Procedure: Block-nerve-medial branch-lumbar L3,4,5;  Surgeon: Thad Manning MD;  Location: [facility] OR;  Service: Pain Management;  Laterality: Bilateral;    KNEE ARTHROPLASTY Left 3/16/2021    Procedure: ARTHROPLASTY, KNEE;  Surgeon: Rio Pitts MD;  Location: [facility] OR;  Service: Orthopedics;  Laterality: Left;  GENERAL AND BLOCK    LIPOSUCTION  1985    RADIOFREQUENCY ABLATION Left 11/4/2020    Procedure: Radiofrequency Ablation left knee;  Surgeon: Andrew Escudero MD;  Location: [facility] OR;  Service: Pain Management;  Laterality: Left;    RADIOFREQUENCY ABLATION OF LUMBAR MEDIAL BRANCH NERVE AT SINGLE LEVEL Bilateral 4/12/2019    Procedure: Radiofrequency Ablation, Nerve,  Spinal, Lumbar, Medial Branch, 1 Level;  Surgeon: Thad Manning MD;  Location: [facility] OR;  Service: Pain Management;  Laterality: Bilateral;  L3, 4, 5  lumbar  [employer] pain management generator  SN [id number]  80 degrees for 75 seconds x2    RADIOFREQUENCY ABLATION OF LUMBAR MEDIAL BRANCH NERVE AT SINGLE LEVEL Bilateral 10/22/2019    Procedure: Radiofrequency Ablation, Nerve, Spinal, Lumbar, Medial Branch, L3,4,5;  Surgeon: Thad Manning MD;  Location: [facility] OR;  Service: Pain Management;  Laterality: Bilateral;  burned at 80 degrees C X 60 seconds X 2 each site    RADIOFREQUENCY ABLATION OF LUMBAR MEDIAL BRANCH NERVE AT SINGLE LEVEL Bilateral 5/27/2020    Procedure: Radiofrequency Ablation, Nerve, Spinal, Lumbar, Medial Branch, 1 Level;  Surgeon: Thad Manning MD;  Location: [facility] OR;  Service: Pain Management;  Laterality: Bilateral;  L3,4,5    SHOULDER ARTHROSCOPY Right 9/19/2021    Procedure: ARTHROSCOPY, SHOULDER;  Surgeon: Antonio Irwin MD;  Location: [facility] OR;  Service: Orthopedics;  Laterality: Right;  LIMITED DEBRIDMENT    TONSILLECTOMY      TOTAL REDUCTION MAMMOPLASTY         Review of patient's allergies indicates:   Allergen Reactions    Keflex [cephalexin]      Throat swelling    Pcn [penicillins]      Throat swelling    Latex, natural rubber Other (See Comments)     Redness with bandaids     Adhesive Other (See Comments)     bandainds redness at skin    Trelegy ellipta [fluticasone-umeclidin-vilanter]      Vision disturbance       Current Facility-Administered Medications on File Prior to Encounter   Medication    diphenhydrAMINE injection 12.5 mg    electrolyte-S (ISOLYTE)    fentaNYL 50 mcg/mL injection 25 mcg    HYDROmorphone (PF) injection 0.2 mg    lorazepam injection 0.25 mg    ondansetron injection 4 mg    oxyCODONE immediate release tablet 5 mg    prochlorperazine injection Soln 5 mg     Current Outpatient Medications on File Prior to Encounter   Medication Sig    albuterol  (PROAIR HFA) 90 mcg/actuation inhaler Inhale 2 puffs into the lungs every 6 (six) hours as needed for Wheezing. Rescue    ALPRAZolam (XANAX) 2 MG Tab Take 1 tablet (2 mg total) by mouth 2 (two) times daily as needed (anxiety).    amitriptyline (ELAVIL) 25 MG tablet Take 1 tablet (25 mg total) by mouth nightly as needed for Insomnia.    biotin 10,000 mcg Cap Take 13,000 mcg by mouth once daily.    fexofenadine (ALLEGRA) 180 MG tablet     furosemide (LASIX) 20 MG tablet TAKE 1 TABLET BY MOUTH DAILY AS NEEDED FOR LEF SWELLING    meclizine (ANTIVERT) 25 mg tablet Take 25 mg by mouth daily as needed.    omeprazole (PRILOSEC) 20 MG capsule Take 1 capsule (20 mg total) by mouth every other day. (Patient taking differently: Take 20 mg by mouth once daily.)    potassium chloride (MICRO-K) 10 MEQ CpSR Take 1 capsule (10 mEq total) by mouth every evening.    pramipexole (MIRAPEX) 0.5 MG tablet Take 2 tablets (1 mg total) by mouth every evening. For restless legs    umeclidinium-vilanteroL (ANORO ELLIPTA) 62.5-25 mcg/actuation DsDv Inhale 1 puff into the lungs once daily. Controller    valacyclovir HCl (VALACYCLOVIR ORAL) Take by mouth.    venlafaxine (EFFEXOR-XR) 150 MG Cp24 Take 1 capsule (150 mg total) by mouth once daily. For mood    celecoxib (CELEBREX) 200 MG capsule Take 1 capsule (200 mg total) by mouth daily as needed (arthritis pain).    MULTIVIT-IRON-MIN-FOLIC ACID 3,500-18-0.4 UNIT-MG-MG ORAL CHEW Take 1 tablet by mouth.     Family History       Problem Relation (Age of Onset)    Heart disease Mother          Tobacco Use    Smoking status: Former Smoker     Quit date: 1996     Years since quittin.8    Smokeless tobacco: Never Used   Substance and Sexual Activity    Alcohol use: Yes     Comment: very little     Drug use: No    Sexual activity: Not on file     Review of Systems   Constitutional:  Negative for chills and fever.   HENT:  Negative for congestion and rhinorrhea.    Respiratory:   Negative for cough, shortness of breath and wheezing.    Cardiovascular:  Negative for chest pain, palpitations and leg swelling.   Gastrointestinal:  Negative for abdominal distention, abdominal pain, nausea and vomiting.   Endocrine: Negative for cold intolerance and heat intolerance.   Genitourinary:  Negative for dysuria and urgency.   Musculoskeletal:  Positive for arthralgias. Negative for neck stiffness.   Skin:  Negative for rash and wound.   Neurological:  Negative for dizziness and weakness.   Objective:     Vital Signs (Most Recent):  Temp: 97.8 °F (36.6 °C) (04/12/22 1118)  Pulse: 74 (04/12/22 1118)  Resp: 16 (04/12/22 1118)  BP: (!) 102/59 (04/12/22 1118)  SpO2: 100 % (04/12/22 1118)   Vital Signs (24h Range):  Temp:  [97.8 °F (36.6 °C)] 97.8 °F (36.6 °C)  Pulse:  [67-79] 74  Resp:  [11-16] 16  SpO2:  [97 %-100 %] 100 %  BP: (102-143)/(58-72) 102/59     Weight: 98.9 kg (218 lb)  Body mass index is 42.58 kg/m².    Physical Exam  Constitutional:       General: She is not in acute distress.     Appearance: She is well-developed.   HENT:      Head: Normocephalic and atraumatic.   Eyes:      Pupils: Pupils are equal, round, and reactive to light.   Cardiovascular:      Rate and Rhythm: Normal rate and regular rhythm.      Heart sounds: No murmur heard.  Pulmonary:      Effort: Pulmonary effort is normal. No respiratory distress.      Breath sounds: Normal breath sounds. No wheezing or rales.   Abdominal:      General: Bowel sounds are normal. There is no distension.      Palpations: Abdomen is soft.      Tenderness: There is no abdominal tenderness.   Musculoskeletal:      Comments: R shoulder in sling, drain in place   Skin:     General: Skin is warm and dry.      Findings: No rash.   Neurological:      Mental Status: She is alert and oriented to person, place, and time.      Cranial Nerves: No cranial nerve deficit.   Psychiatric:         Behavior: Behavior normal.         CRANIAL NERVES     CN III, IV,  VI   Pupils are equal, round, and reactive to light.     Significant Labs: All pertinent labs within the past 24 hours have been reviewed.    Significant Imaging: I have reviewed all pertinent imaging results/findings within the past 24 hours.    Assessment/Plan:     * Traumatic tear of right rotator cuff, subsequent encounter  S/p R total shoulder arthroplasty with Dr. Pitts on 4/12  Will follow postop surgical recommendations.  Early ambulation  As needed antiemetics, pain medication  Monitor H/H      Essential hypertension  Chronic, controlled.  Latest blood pressure and vitals reviewed-   Temp:  [97.8 °F (36.6 °C)]   Pulse:  [67-79]   Resp:  [11-16]   BP: (102-143)/(58-72)   SpO2:  [97 %-100 %] .   Home meds for hypertension were reviewed and noted below.   Hypertension Medications             amLODIPine (NORVASC) 5 MG tablet Take 1 tablet (5 mg total) by mouth once daily. For blood pressure    furosemide (LASIX) 20 MG tablet TAKE 1 TABLET BY MOUTH DAILY AS NEEDED FOR LEF SWELLING    hydroCHLOROthiazide (HYDRODIURIL) 25 MG tablet Take 1 tablet (25 mg total) by mouth once daily.    losartan (COZAAR) 50 MG tablet Take 1 tablet (50 mg total) by mouth once daily. For blood pressure    metoprolol succinate (TOPROL-XL) 50 MG 24 hr tablet Take 1 tablet (50 mg total) by mouth once daily. For blood pressure and heart          While in the hospital, will manage blood pressure as follows; Continue home antihypertensive regimen    Will utilize p.r.n. blood pressure medication only if patient's blood pressure greater than  180/110 and she develops symptoms such as worsening chest pain or shortness of breath.        Hypothyroidism  Patient has chronic hypothyroidism. TFTs reviewed-   Lab Results   Component Value Date    TSH 1.728 05/14/2019   . Will continue chronic levothyroxine and adjust for and clinical changes.        VTE Risk Mitigation (From admission, onward)         Ordered     enoxaparin injection 40 mg  Daily          04/12/22 1145     IP VTE HIGH RISK PATIENT  Once         04/12/22 1145     Place sequential compression device  Until discontinued         04/12/22 1145                   Eveline Gomez MD  Department of Hospital Medicine   [facility]

## 2022-04-12 NOTE — PLAN OF CARE
POC/Meds reviewed, pt verbalized understanding. Vitals stable. Afebrile. Remains on 2L O2. IVPB abx administered. Tele In place-NSR. Up with x1 assist to BSC. IS at bedside, instructed on use and return demonstration performed. No complaints of pain. Repositions self. Hourly/Q2hr rounding performed, safety maintained. Bed in lowest position, wheels locked, SR up x2, call light in easy reach. No complaints at this time. Will continue to monitor.

## 2022-04-12 NOTE — ASSESSMENT & PLAN NOTE
S/p R total shoulder arthroplasty with Dr. Pitts on 4/12  Will follow postop surgical recommendations.  Early ambulation  As needed antiemetics, pain medication  Monitor H/H

## 2022-04-12 NOTE — ASSESSMENT & PLAN NOTE
Chronic, controlled.  Latest blood pressure and vitals reviewed-   Temp:  [97.8 °F (36.6 °C)]   Pulse:  [67-79]   Resp:  [11-16]   BP: (102-143)/(58-72)   SpO2:  [97 %-100 %] .   Home meds for hypertension were reviewed and noted below.   Hypertension Medications             amLODIPine (NORVASC) 5 MG tablet Take 1 tablet (5 mg total) by mouth once daily. For blood pressure    furosemide (LASIX) 20 MG tablet TAKE 1 TABLET BY MOUTH DAILY AS NEEDED FOR LEF SWELLING    hydroCHLOROthiazide (HYDRODIURIL) 25 MG tablet Take 1 tablet (25 mg total) by mouth once daily.    losartan (COZAAR) 50 MG tablet Take 1 tablet (50 mg total) by mouth once daily. For blood pressure    metoprolol succinate (TOPROL-XL) 50 MG 24 hr tablet Take 1 tablet (50 mg total) by mouth once daily. For blood pressure and heart          While in the hospital, will manage blood pressure as follows; Continue home antihypertensive regimen    Will utilize p.r.n. blood pressure medication only if patient's blood pressure greater than  180/110 and she develops symptoms such as worsening chest pain or shortness of breath.

## 2022-04-13 DIAGNOSIS — S43.014D ANTERIOR DISLOCATION OF RIGHT SHOULDER, SUBSEQUENT ENCOUNTER: Primary | ICD-10-CM

## 2022-04-13 LAB
ANION GAP SERPL CALC-SCNC: 12 MMOL/L (ref 8–16)
BASOPHILS # BLD AUTO: 0.02 K/UL (ref 0–0.2)
BASOPHILS NFR BLD: 0.2 % (ref 0–1.9)
BUN SERPL-MCNC: 15 MG/DL (ref 8–23)
CALCIUM SERPL-MCNC: 8.7 MG/DL (ref 8.7–10.5)
CHLORIDE SERPL-SCNC: 105 MMOL/L (ref 95–110)
CO2 SERPL-SCNC: 24 MMOL/L (ref 23–29)
CREAT SERPL-MCNC: 0.9 MG/DL (ref 0.5–1.4)
DIFFERENTIAL METHOD: ABNORMAL
EOSINOPHIL # BLD AUTO: 0 K/UL (ref 0–0.5)
EOSINOPHIL NFR BLD: 0.1 % (ref 0–8)
ERYTHROCYTE [DISTWIDTH] IN BLOOD BY AUTOMATED COUNT: 13.4 % (ref 11.5–14.5)
EST. GFR  (AFRICAN AMERICAN): >60 ML/MIN/1.73 M^2
EST. GFR  (NON AFRICAN AMERICAN): >60 ML/MIN/1.73 M^2
GLUCOSE SERPL-MCNC: 113 MG/DL (ref 70–110)
HCT VFR BLD AUTO: 29.5 % (ref 37–48.5)
HGB BLD-MCNC: 9.4 G/DL (ref 12–16)
IMM GRANULOCYTES # BLD AUTO: 0.05 K/UL (ref 0–0.04)
IMM GRANULOCYTES NFR BLD AUTO: 0.5 % (ref 0–0.5)
LYMPHOCYTES # BLD AUTO: 2.1 K/UL (ref 1–4.8)
LYMPHOCYTES NFR BLD: 21 % (ref 18–48)
MCH RBC QN AUTO: 28.6 PG (ref 27–31)
MCHC RBC AUTO-ENTMCNC: 31.9 G/DL (ref 32–36)
MCV RBC AUTO: 90 FL (ref 82–98)
MONOCYTES # BLD AUTO: 1.1 K/UL (ref 0.3–1)
MONOCYTES NFR BLD: 10.9 % (ref 4–15)
NEUTROPHILS # BLD AUTO: 6.8 K/UL (ref 1.8–7.7)
NEUTROPHILS NFR BLD: 67.3 % (ref 38–73)
NRBC BLD-RTO: 0 /100 WBC
PLATELET # BLD AUTO: 243 K/UL (ref 150–450)
PMV BLD AUTO: 9.1 FL (ref 9.2–12.9)
POTASSIUM SERPL-SCNC: 3.8 MMOL/L (ref 3.5–5.1)
RBC # BLD AUTO: 3.29 M/UL (ref 4–5.4)
SODIUM SERPL-SCNC: 141 MMOL/L (ref 136–145)
WBC # BLD AUTO: 10.08 K/UL (ref 3.9–12.7)

## 2022-04-13 PROCEDURE — 97535 SELF CARE MNGMENT TRAINING: CPT

## 2022-04-13 PROCEDURE — 97165 OT EVAL LOW COMPLEX 30 MIN: CPT

## 2022-04-13 PROCEDURE — 25000003 PHARM REV CODE 250: Performed by: HOSPITALIST

## 2022-04-13 PROCEDURE — 63600175 PHARM REV CODE 636 W HCPCS: Performed by: HOSPITALIST

## 2022-04-13 PROCEDURE — 94761 N-INVAS EAR/PLS OXIMETRY MLT: CPT

## 2022-04-13 PROCEDURE — 85025 COMPLETE CBC W/AUTO DIFF WBC: CPT | Performed by: ORTHOPAEDIC SURGERY

## 2022-04-13 PROCEDURE — 25000003 PHARM REV CODE 250: Performed by: ORTHOPAEDIC SURGERY

## 2022-04-13 PROCEDURE — 94799 UNLISTED PULMONARY SVC/PX: CPT

## 2022-04-13 PROCEDURE — 36415 COLL VENOUS BLD VENIPUNCTURE: CPT | Performed by: ORTHOPAEDIC SURGERY

## 2022-04-13 PROCEDURE — 80048 BASIC METABOLIC PNL TOTAL CA: CPT | Performed by: ORTHOPAEDIC SURGERY

## 2022-04-13 RX ORDER — OXYCODONE AND ACETAMINOPHEN 5; 325 MG/1; MG/1
1 TABLET ORAL EVERY 6 HOURS PRN
Qty: 28 TABLET | Refills: 0 | Status: SHIPPED | OUTPATIENT
Start: 2022-04-13 | End: 2022-04-25 | Stop reason: ALTCHOICE

## 2022-04-13 RX ADMIN — METOPROLOL SUCCINATE 50 MG: 50 TABLET, EXTENDED RELEASE ORAL at 09:04

## 2022-04-13 RX ADMIN — HYDROCODONE BITARTRATE AND ACETAMINOPHEN 1 TABLET: 5; 325 TABLET ORAL at 08:04

## 2022-04-13 RX ADMIN — CELECOXIB 200 MG: 100 CAPSULE ORAL at 09:04

## 2022-04-13 RX ADMIN — ENOXAPARIN SODIUM 40 MG: 100 INJECTION SUBCUTANEOUS at 09:04

## 2022-04-13 RX ADMIN — LEVOTHYROXINE SODIUM 88 MCG: 0.09 TABLET ORAL at 06:04

## 2022-04-13 RX ADMIN — HYDROCHLOROTHIAZIDE 25 MG: 25 TABLET ORAL at 09:04

## 2022-04-13 RX ADMIN — MUPIROCIN: 20 OINTMENT TOPICAL at 09:04

## 2022-04-13 RX ADMIN — AMLODIPINE BESYLATE 5 MG: 5 TABLET ORAL at 09:04

## 2022-04-13 RX ADMIN — VENLAFAXINE HYDROCHLORIDE 150 MG: 150 CAPSULE, EXTENDED RELEASE ORAL at 09:04

## 2022-04-13 NOTE — PLAN OF CARE
04/13/22 1043   Final Note   Assessment Type Final Discharge Note   Anticipated Discharge Disposition Home-Health

## 2022-04-13 NOTE — PLAN OF CARE
Problem: Adult Inpatient Plan of Care  Goal: Plan of Care Review  Outcome: Ongoing, Progressing  Goal: Patient-Specific Goal (Individualized)  Outcome: Ongoing, Progressing  Goal: Absence of Hospital-Acquired Illness or Injury  Outcome: Ongoing, Progressing  Goal: Optimal Comfort and Wellbeing  Outcome: Ongoing, Progressing  Goal: Readiness for Transition of Care  Outcome: Ongoing, Progressing     Problem: Diabetes Comorbidity  Goal: Blood Glucose Level Within Targeted Range  Outcome: Ongoing, Progressing     Problem: Bariatric Environmental Safety  Goal: Safety Maintained with Care  Outcome: Ongoing, Progressing     Problem: Impaired Wound Healing  Goal: Optimal Wound Healing  Outcome: Ongoing, Progressing     Pt A/o, calm and cooperative during shift. Pt denied pain during shift. Ambulated with assist to BSC. Rounded on pt atleast every 2 hours ensuring pain assessed, call light and personal belongings within reach, side rails up x 2, and safety maintained. Right arm in sling with cryo to shoulder. No acute events observed or reported.

## 2022-04-13 NOTE — HOSPITAL COURSE
This patient was observed by hospital medicine after undergoing right reverse total shoulder arthroplasty with Dr. Pitts on 4/12/22.  Patient worked with OT post-operatively who recommended Home health which was ordered. Her pain was controlled with oral narcotics as prescribed by orthopedist.  She was discharged home after cleared by orthopedist on oral narcotics per orthopedic orders. Fall precautions were discussed with patient. Return precautions were discussed at length. Patient to follow up with primary care provider on discharge for any medical needs

## 2022-04-13 NOTE — PLAN OF CARE
Goals to be met by: 5/11/22    Patient will increase functional independence with ADLs by performing:    UE Dressing with Moderate Assistance.  LE Dressing with Minimal Assistance.  Grooming while standing at sink with Modified [place].  Toileting from toilet with Modified [place] for hygiene and clothing management.   Bathing from  shower chair/bench with Moderate Assistance.  Toilet transfer to toilet with Modified [place].  Increased strength and functional activity tolerance for ADL's/IADL's as evidenced by requiring less assistance with these tasks.

## 2022-04-13 NOTE — DISCHARGE INSTRUCTIONS
Discharge Instructions, [facility]    Thank you for choosing [facility] for your medical care. The primary doctor who is taking care of you at the time of your discharge is Eveline Gomez MD.     You were admitted to the hospital with Traumatic tear of right rotator cuff, subsequent encounter.     Please note your discharge instructions, including diet/activity restrictions, follow-up appointments, and medication changes.  If you have any questions about your medical issues, prescriptions, or any other questions, please feel free to contact the [facility] Medicine Dept at [phone] and we will help.    If you are previously with Home health, outpatient PT/OT or under a therapy program, you are cleared to return to those programs.    Please direct all long term medication refills and follow up to your primary care provider, Ross Marquez MD. Thank you again for letting us take care of your health care needs.    Please note the following discharge instructions per your discharging physician-  Follow up with Dr. Pitts

## 2022-04-13 NOTE — PLAN OF CARE
The pt is cleared for discharge home from case management with [facility] auth is [id number] and has follow up appointments added to her avs.    04/13/22 8070   Final Note   Assessment Type Final Discharge Note   Anticipated Discharge Disposition Home-Health   Hospital Resources/Appts/Education Provided Appointments scheduled and added to AVS   Post-Acute Status   Post-Acute Authorization Home Health   Home Health Status Set-up Complete/Auth obtained

## 2022-04-13 NOTE — PT/OT/SLP EVAL
Occupational Therapy   Evaluation    Name: Genoveva Gilbert  MRN: [id number]  Admitting Diagnosis:  Traumatic tear of right rotator cuff, subsequent encounter  Recent Surgery: Procedure(s) (LRB):  ARTHROPLASTY, SHOULDER, TOTAL, REVERSE (Right) 1 Day Post-Op    Recommendations:     Discharge Recommendations: home health OT  Discharge Equipment Recommendations:  bedside commode, grab bar, bath bench  Barriers to discharge:       Assessment:     Genoveva Gilbert is a 72 y.o. female with a medical diagnosis of Traumatic tear of right rotator cuff, subsequent encounter.  She presents with the following performance deficits affecting function: weakness, impaired endurance, impaired self care skills, decreased upper extremity function, pain, orthopedic precautions.  Pt was agreeable to OT.Pt demonstrates AROM/strength LUE WNL's.  Pt R hand dominant. Pt with RUE in shoulder immobilizer with abduction pillow. Pt demonstrates R wrist/hand AROM WFL's. OT provided instruction in AROM exercises R wrist/hand and  exercises with foam ball. Pt required Min assist with supine to sit EOB. OT provided instruction in RUE NWB precautions and positioning/use of/adjustment of immobilizer. Pt demonstrates understanding. Pt required Max assist with upper and lower body dressing. Pt required HHA with ambulation to/from bathroom to complete toileting. Pt required Min HHA. Pt was able to complete toileting hygiene with LUE. Pt required SBA with sit to supine. OT provided instruction in calling for assist. Pt verbalized understanding. Recommend HHOT at discharge.    Rehab Prognosis: Good; patient would benefit from acute skilled OT services to address these deficits and reach maximum level of function.       Plan:     Patient to be seen 4 x/week to address the above listed problems via self-care/home management, therapeutic activities, therapeutic exercises  · Plan of Care Expires: 05/11/22  · Plan of Care Reviewed with: patient    Subjective      Chief Complaint: Pain  Patient/Family Comments/goals: To go home    Occupational Profile:  Living Environment: Pt lives with spouse in condo with garage below and living quarters on 2nd floor. Pt has a chair lift. Pt has a tub/shower with tub transfer bench/grab bars and standard toilet. Pt has BSC and OT provided instruction in use of BSC over toilet as needed.  Previous level of function: Independent  Equipment Used at Home:   Pt has tub transfer bench, bedside commode, and grab bars  Assistance upon Discharge: Daughter lives 2 buildings over    Pain/Comfort:  · Pain Rating 1: 2/10  · Location - Side 1: Right  · Location 1: shoulder  · Pain Rating Post-Intervention 1: 1/10    Patients cultural, spiritual, Methodist conflicts given the current situation:      Objective:     Communicated with: Nurse Paige prior to session.  Patient found HOB elevated with cryotherapy, hemovac, peripheral IV, Other (comments) (R shoulder immobilizer with abduction pillow) upon OT entry to room.    General Precautions: Standard, fall   Orthopedic Precautions:RUE non weight bearing   Braces:  (shoulder immobilizer with abduction pillow)  Respiratory Status: Room air    Occupational Performance:    Bed Mobility:    · Patient completed Supine to Sit with minimum assistance  · Patient completed Sit to Supine with stand by assistance    Functional Mobility/Transfers:  · Patient completed Toilet Transfer Step Transfer technique with minimum assistance with  hand-held assist  · Functional Mobility: Pt was able to ambulate to/from bathroom with HHA and no AD.    Activities of Daily Living:  · Feeding:  modified independence with L non dominant UE  · Grooming: minimum assistance    · Bathing: moderate assistance    · Upper Body Dressing: maximal assistance    · Lower Body Dressing: maximal assistance    · Toileting: minimum assistance      Cognitive/Visual Perceptual:  Pt alert and oriented    Physical Exam:  Upper Extremity Strength:     -       Right Upper Extremity: Shoulder immobilizer with abduction pillow in place; Pt demonstrates AROM R wrist/hand WFL's  -       Left Upper Extremity: WFL    AMPAC 6 Click ADL:  AMPAC Total Score: 16    Treatment & Education:  OT provided education in role of OT. Pt verbalized understanding and was agreeable to OT.  OT provided instruction in RUE NWB precautions, positioning/use of/adjustment of immobilizer; adaptive techniques for ADL's/IADL's, and AROM R wrist/hand including  exercises with foam ball. Pt verbalized understanding.  OT provided instruction in home safety and RUE precautions with ADL's/IADL's including review of home set up and DME/AE. Pt verbalized understanding.  Education:    Patient left HOB elevated with all lines intact, call button in reach, bed alarm on and Nurse Grecia notified    GOALS:   Multidisciplinary Problems     Occupational Therapy Goals        Problem: Occupational Therapy    Goal Priority Disciplines Outcome Interventions   Occupational Therapy Goal     OT, PT/OT     Description: Goals to be met by: 5/11/22    Patient will increase functional independence with ADLs by performing:    UE Dressing with Moderate Assistance.  LE Dressing with Minimal Assistance.  Grooming while standing at sink with Modified [place].  Toileting from toilet with Modified [place] for hygiene and clothing management.   Bathing from  shower chair/bench with Moderate Assistance.  Toilet transfer to toilet with Modified [place].  Increased strength and functional activity tolerance for ADL's/IADL's as evidenced by requiring less assistance with these tasks.                     History:     Past Medical History:   Diagnosis Date    Allergy     Anxiety     Arthritis, rheumatoid     Back pain     Chronic bronchiolitis     Depression     Fibromyalgia     GERD (gastroesophageal reflux disease)     High cholesterol     Hilar mass 3/27/2014    [place] (hard of hearing)     aid  right ear    [place] (hard of hearing)     Hypertension     Incontinence of urine     Lymphedema     MS (multiple sclerosis)     benign; another MD stated she has no MS    Neuropathy     Restless leg syndrome     Rotator cuff tear 4/16/2015    Sciatica of left side 1/5/2018    Seasonal allergies     Sinus congestion     Sleep apnea     DOES NOT USE MACHINE    Spondylolysis     Thyroid disease     Type 2 diabetes mellitus with polyneuropathy     no med. was borderline    Wheezing        Past Surgical History:   Procedure Laterality Date    APPENDECTOMY      ARTHROSCOPIC DEBRIDEMENT OF SHOULDER Right 2/18/2022    Procedure: EXTENSIVE DEBRIDEMENT, SHOULDER, ARTHROSCOPIC;  Surgeon: Rio Pitts MD;  Location: [facility] OR;  Service: Orthopedics;  Laterality: Right;    BREAST SURGERY      reduction    CLOSED REDUCTION OF INJURY OF SHOULDER Right 9/19/2021    Procedure: CLOSED REDUCTION, SHOULDER;  Surgeon: Antonio Irwin MD;  Location: [facility] OR;  Service: Orthopedics;  Laterality: Right;    EPIDURAL STEROID INJECTION INTO LUMBAR SPINE N/A 6/12/2019    Procedure: Injection-steroid-epidural-lumbar;  Surgeon: Thad Manning MD;  Location: [facility] OR;  Service: Pain Management;  Laterality: N/A;  L5-S1    EPIDURAL STEROID INJECTION INTO LUMBAR SPINE N/A 9/16/2019    Procedure: Injection-steroid-epidural-lumbar;  Surgeon: Thad Manning MD;  Location: [facility] OR;  Service: Pain Management;  Laterality: N/A;  L5-S1    EYE SURGERY Bilateral     HYSTERECTOMY      partial - fibroids    INJECTION OF ANESTHETIC AGENT AROUND MEDIAL BRANCH NERVES INNERVATING LUMBAR FACET JOINT Bilateral 2/28/2019    Procedure: Block-nerve-medial branch-lumbar;  Surgeon: Thad Manning MD;  Location: [facility] OR;  Service: Pain Management;  Laterality: Bilateral;  L3, 4,5     INJECTION OF ANESTHETIC AGENT AROUND MEDIAL BRANCH NERVES INNERVATING LUMBAR FACET JOINT Bilateral 3/21/2019    Procedure: Block-nerve-medial branch-lumbar L3,4,5;   Surgeon: Thad Manning MD;  [facility]  Service: Pain Management;  Laterality: Bilateral;    KNEE ARTHROPLASTY Left 3/16/2021    Procedure: ARTHROPLASTY, KNEE;  Surgeon: Rio Pitts MD;  Location: [facility] OR;  Service: Orthopedics;  Laterality: Left;  GENERAL AND BLOCK    LIPOSUCTION  1985    RADIOFREQUENCY ABLATION Left 11/4/2020    Procedure: Radiofrequency Ablation left knee;  Surgeon: Andrew Escudero MD;  Location: [facility] OR;  Service: Pain Management;  Laterality: Left;    RADIOFREQUENCY ABLATION OF LUMBAR MEDIAL BRANCH NERVE AT SINGLE LEVEL Bilateral 4/12/2019    Procedure: Radiofrequency Ablation, Nerve, Spinal, Lumbar, Medial Branch, 1 Level;  Surgeon: Thad Manning MD;  [facility];  Service: Pain Management;  Laterality: Bilateral;  L3, 4, 5  lumbar  [place] pain management generator  SN [id number]  80 degrees for 75 seconds x2    RADIOFREQUENCY ABLATION OF LUMBAR MEDIAL BRANCH NERVE AT SINGLE LEVEL Bilateral 10/22/2019    Procedure: Radiofrequency Ablation, Nerve, Spinal, Lumbar, Medial Branch, L3,4,5;  Surgeon: Thad Manning MD;  [facility];  Service: Pain Management;  Laterality: Bilateral;  burned at 80 degrees C X 60 seconds X 2 each site    RADIOFREQUENCY ABLATION OF LUMBAR MEDIAL BRANCH NERVE AT SINGLE LEVEL Bilateral 5/27/2020    Procedure: Radiofrequency Ablation, Nerve, Spinal, Lumbar, Medial Branch, 1 Level;  Surgeon: Thad Manning MD;  [facility];  Service: Pain Management;  Laterality: Bilateral;  L3,4,5    SHOULDER ARTHROSCOPY Right 9/19/2021    Procedure: ARTHROSCOPY, SHOULDER;  Surgeon: Antonio Irwin MD;  Location: [facility] OR;  Service: Orthopedics;  Laterality: Right;  LIMITED DEBRIDMENT    TONSILLECTOMY      TOTAL REDUCTION MAMMOPLASTY         Time Tracking:     OT Date of Treatment: 04/13/22  OT Start Time: 0927  OT Stop Time: 1022  OT Total Time (min): 55 min    Billable Minutes:Evaluation 8  Self Care/Home Management 47    4/13/2022

## 2022-04-13 NOTE — CARE UPDATE
04/13/22 0750   PRE-TX-O2   O2 Device (Oxygen Therapy) room air   Oxygen Analyzed Concentration (%) 21 %   SpO2 95 %   Pulse Oximetry Type Intermittent   $ Pulse Oximetry - Multiple Charge Pulse Oximetry - Multiple   Probe Placed On (Pulse Ox) finger   Pulse 80   Resp 15   Incentive Spirometer   $ Incentive Spirometer Charges done with encouragement   Incentive Spirometer Predicted Level (mL) 1750   Administration (IS) proper technique demonstrated   Number of Repetitions (IS) 10   Level Incentive Spirometer (mL) 2500   Patient Tolerance (IS) good   POx, IS QS

## 2022-04-13 NOTE — PLAN OF CARE
CM met with pt at bedside and provided her with resources for the vet assist program due to therapy updating me that patient had concerns about being able to care for her spouse at home that is a . Pt was very appreciative and thanked me. I updated pt that she is also set up with [facility] for about 1 month after discharge. Patient is thankful for services being offered.    04/13/22 1042   Discharge Assessment   Assessment Type Discharge Planning Reassessment

## 2022-04-13 NOTE — DISCHARGE SUMMARY
[facility] Medicine  Discharge Summary      Patient Name: Genoveva Gilbert  MRN: [id number]  Patient Class: OP- Outpatient Recovery  Admission Date: 4/12/2022  Hospital Length of Stay: 0 days  Discharge Date and Time: 4/13/2022  1:04 PM  Attending Physician: No att. providers found   Discharging Provider: Eveline Gomez MD  Primary Care Provider: Ross Marquez MD      HPI:   Patient is a 72-year-old female with history of hypertension, hypothyroidism, hyperlipidemia who is seen today after reverse right total shoulder arthroplasty for traumatic complete tear of right rotator cuff with Dr. Pitts.  She reports removed complained prior to surgery was shoulder pain.  Denies fever, chills, shortness breath, chest pain, abdominal pain, bowel or bladder complaints, weakness prior to surgery or at this time.  She reports her pain is controlled at this time.  Denies any other symptoms.      Procedure(s) (LRB):  ARTHROPLASTY, SHOULDER, TOTAL, REVERSE (Right)      Hospital Course:   This patient was observed by hospital medicine after undergoing right reverse total shoulder arthroplasty with Dr. Pitts on 4/12/22.  Patient worked with OT post-operatively who recommended Home health which was ordered. Her pain was controlled with oral narcotics as prescribed by orthopedist.  She was discharged home after cleared by orthopedist on oral narcotics per orthopedic orders. Fall precautions were discussed with patient. Return precautions were discussed at length. Patient to follow up with primary care provider on discharge for any medical needs              Goals of Care Treatment Preferences:  Code Status: Full Code      Consults:     No new Assessment & Plan notes have been filed under this hospital service since the last note was generated.  Service: Hospital Medicine    Final Active Diagnoses:    Diagnosis Date Noted POA    PRINCIPAL PROBLEM:  Traumatic tear of right rotator cuff, subsequent  encounter [S46.011D] 04/12/2022 Not Applicable    Essential hypertension [I10] 03/27/2014 Yes    Hypothyroidism [E03.9] 03/27/2014 Yes      Problems Resolved During this Admission:       Discharged Condition: good    Disposition: Home or Self Care    Follow Up:   Follow-up Information     [facility] Follow up.    Specialty: Home Health Services  Why: Home Health  Contact information:  [address] [facility] [address]  [phone]                       Patient Instructions:      Ambulatory referral/consult to Home Health   Standing Status: Future   Referral Priority: Routine Referral Type: Home Health   Referral Reason: Specialty Services Required   Requested Specialty: Home Health Services   Number of Visits Requested: 1     Notify your health care provider if you experience any of the following:  temperature >100.4     Notify your health care provider if you experience any of the following:  persistent nausea and vomiting or diarrhea     Notify your health care provider if you experience any of the following:  severe uncontrolled pain     Notify your health care provider if you experience any of the following:  difficulty breathing or increased cough     Notify your health care provider if you experience any of the following:  persistent dizziness, light-headedness, or visual disturbances     Notify your health care provider if you experience any of the following:  increased confusion or weakness     Activity as tolerated       Significant Diagnostic Studies: Labs:   BMP:   Recent Labs   Lab 04/13/22  0444   *      K 3.8      CO2 24   BUN 15   CREATININE 0.9   CALCIUM 8.7    and CBC   Recent Labs   Lab 04/13/22  0444   WBC 10.08   HGB 9.4*   HCT 29.5*      Radiology Results (last 7 days)    Procedure Component Value Units Date/Time   X-Ray Shoulder 1 View Right [[id number]] Resulted: 04/12/22 1037   Order Status: Completed Updated: 04/12/22 1040   Narrative:      EXAMINATION:   XR SHOULDER 1 VIEW RIGHT     CLINICAL HISTORY:   R RTSA;     TECHNIQUE:   AP view of the right shoulder is obtained     COMPARISON:   Right shoulder x-ray of January 28, 2022.     FINDINGS:   Patient has a undergone a reverse right shoulder arthroplasty with glenoid ball in humeral cup prosthesis in position.  Dislocation or fracture is not seen.    Impression:       Status post right shoulder arthroplasty       Electronically signed by: Jay Corrigan MD   Date: 04/12/2022   Time: 10:37         Pending Diagnostic Studies:     None         Medications:  Reconciled Home Medications:      Medication List      CHANGE how you take these medications    omeprazole 20 MG capsule  Commonly known as: PRILOSEC  Take 1 capsule (20 mg total) by mouth every other day.  What changed: when to take this     * oxyCODONE-acetaminophen 5-325 mg per tablet  Commonly known as: PERCOCET  Take 1 tablet by mouth every 6 (six) hours as needed for Pain (not controlled by tylenol).  What changed: Another medication with the same name was added. Make sure you understand how and when to take each.     * oxyCODONE-acetaminophen 5-325 mg per tablet  Commonly known as: PERCOCET  Take 1 tablet by mouth every 6 (six) hours as needed for Pain.  What changed: You were already taking a medication with the same name, and this prescription was added. Make sure you understand how and when to take each.         * This list has 2 medication(s) that are the same as other medications prescribed for you. Read the directions carefully, and ask your doctor or other care provider to review them with you.            CONTINUE taking these medications    albuterol 90 mcg/actuation inhaler  Commonly known as: PROAIR HFA  Inhale 2 puffs into the lungs every 6 (six) hours as needed for Wheezing. Rescue     ALPRAZolam 2 MG Tab  Commonly known as: XANAX  Take 1 tablet (2 mg total) by mouth 2 (two) times daily as needed (anxiety).     amitriptyline 25  MG tablet  Commonly known as: ELAVIL  Take 1 tablet (25 mg total) by mouth nightly as needed for Insomnia.     amLODIPine 5 MG tablet  Commonly known as: NORVASC  Take 1 tablet (5 mg total) by mouth once daily. For blood pressure     ANORO ELLIPTA 62.5-25 mcg/actuation Dsdv  Generic drug: umeclidinium-vilanteroL  Inhale 1 puff into the lungs once daily. Controller     biotin 10,000 mcg Cap  Take 13,000 mcg by mouth once daily.     celecoxib 200 MG capsule  Commonly known as: CeleBREX  Take 1 capsule (200 mg total) by mouth daily as needed (arthritis pain).     fexofenadine 180 MG tablet  Commonly known as: ALLEGRA     furosemide 20 MG tablet  Commonly known as: LASIX  TAKE 1 TABLET BY MOUTH DAILY AS NEEDED FOR LEF SWELLING     hydroCHLOROthiazide 25 MG tablet  Commonly known as: HYDRODIURIL  Take 1 tablet (25 mg total) by mouth once daily.     levothyroxine 88 MCG tablet  Commonly known as: SYNTHROID  Take 1 tablet (88 mcg total) by mouth once daily.     losartan 50 MG tablet  Commonly known as: COZAAR  Take 1 tablet (50 mg total) by mouth once daily. For blood pressure     lovastatin 40 MG tablet  Commonly known as: MEVACOR  Take 1 tablet (40 mg total) by mouth nightly. at bedtime. For cholesterol     meclizine 25 mg tablet  Commonly known as: ANTIVERT  Take 25 mg by mouth daily as needed.     metoprolol succinate 50 MG 24 hr tablet  Commonly known as: TOPROL-XL  Take 1 tablet (50 mg total) by mouth once daily. For blood pressure and heart     multivit-iron-min-folic acid 3,500-18-0.4 unit-mg-mg Chew  Take 1 tablet by mouth.     nystatin cream  Commonly known as: MYCOSTATIN  Apply topically 2 (two) times daily.     potassium chloride 10 MEQ Cpsr  Commonly known as: MICRO-K  Take 1 capsule (10 mEq total) by mouth every evening.     pramipexole 0.5 MG tablet  Commonly known as: MIRAPEX  Take 2 tablets (1 mg total) by mouth every evening. For restless legs     pregabalin 75 MG capsule  Commonly known as: LYRICA  Take  1 capsule (75 mg total) by mouth 2 (two) times daily.     VALACYCLOVIR ORAL  Take by mouth.     venlafaxine 150 MG Cp24  Commonly known as: EFFEXOR-XR  Take 1 capsule (150 mg total) by mouth once daily. For mood            Indwelling Lines/Drains at time of discharge:   Lines/Drains/Airways     None                 Time spent on the discharge of patient: 35 minutes         Eveline Gomez MD  Department of Hospital Medicine  [facility]

## 2022-04-13 NOTE — PROGRESS NOTES
AAOX3  Denies any needs and no distress noted.  VSS.  Sling in place and pulled drain.  DC and Rx instructions reviewed with pt.  Pt verbalized understanding.  Waiting for family to arrive to DC pt home.

## 2022-04-13 NOTE — PLAN OF CARE
HH packet and orders sent to [facility] to review for acceptance. PT has an ortho follow up with Dr. Carrera added to her AVS. CM following.    04/13/22 0919   Post-Acute Status   Post-Acute Authorization Home Health   Home Health Status Referrals Sent

## 2022-04-13 NOTE — PLAN OF CARE
Pt is accepted by [facility]  pending PHN auth.N case ID [id number] AVS updated.    04/13/22 0932   Post-Acute Status   Post-Acute Authorization Home Health   Home Health Status Set-up Complete/Auth obtained

## 2022-04-14 ENCOUNTER — TELEPHONE (OUTPATIENT)
Dept: ORTHOPEDICS | Facility: CLINIC | Age: 72
End: 2022-04-14
Payer: MEDICARE

## 2022-04-14 ENCOUNTER — TELEPHONE (OUTPATIENT)
Dept: MEDSURG UNIT | Facility: HOSPITAL | Age: 72
End: 2022-04-14
Payer: MEDICARE

## 2022-04-14 VITALS
BODY MASS INDEX: 42.8 KG/M2 | WEIGHT: 218 LBS | RESPIRATION RATE: 16 BRPM | HEIGHT: 60 IN | SYSTOLIC BLOOD PRESSURE: 131 MMHG | HEART RATE: 80 BPM | DIASTOLIC BLOOD PRESSURE: 60 MMHG | TEMPERATURE: 99 F | OXYGEN SATURATION: 95 %

## 2022-04-14 NOTE — TELEPHONE ENCOUNTER
----- Message from Genny Murphy MA sent at 4/14/2022 10:10 AM CDT -----  Contact:    Batsheva   [facility]  Admitting to    Dressing look great , dry intact   PT Wants to keep dressing on till post op ?? It this ok with DR Farris back   [phone]

## 2022-04-14 NOTE — TELEPHONE ENCOUNTER
Called and advised nurse to keep the post op dressing on unless it becomes heavily saturated. Verbalized understanding

## 2022-04-14 NOTE — TELEPHONE ENCOUNTER
----- Message from Genny Murphy MA sent at 4/14/2022  1:07 PM CDT -----  Contact: [facility] JEANINE benjamin  ,    Bandage ?   Confirming PT  to be none   Call back

## 2022-04-14 NOTE — TELEPHONE ENCOUNTER
Called and spoke to Loreta at [facility].  Informed her that we are not doing any PT/OT for patient.  She understood and was thankful for the call.      Ranjit

## 2022-04-25 ENCOUNTER — OFFICE VISIT (OUTPATIENT)
Dept: ORTHOPEDICS | Facility: CLINIC | Age: 72
End: 2022-04-25
Payer: MEDICARE

## 2022-04-25 ENCOUNTER — HOSPITAL ENCOUNTER (OUTPATIENT)
Dept: RADIOLOGY | Facility: HOSPITAL | Age: 72
Discharge: HOME | End: 2022-04-25
Attending: ORTHOPAEDIC SURGERY
Payer: MEDICARE

## 2022-04-25 VITALS — HEIGHT: 60 IN | RESPIRATION RATE: 18 BRPM | WEIGHT: 218 LBS | BODY MASS INDEX: 42.8 KG/M2

## 2022-04-25 DIAGNOSIS — Z96.611 STATUS POST REVERSE ARTHROPLASTY OF SHOULDER, RIGHT: Primary | ICD-10-CM

## 2022-04-25 DIAGNOSIS — S43.014D ANTERIOR DISLOCATION OF RIGHT SHOULDER, SUBSEQUENT ENCOUNTER: ICD-10-CM

## 2022-04-25 PROCEDURE — 4010F ACE/ARB THERAPY RXD/TAKEN: CPT | Mod: CPTII,S$GLB,, | Performed by: ORTHOPAEDIC SURGERY

## 2022-04-25 PROCEDURE — 99999 PR PBB SHADOW E&M-EST. PATIENT-LVL IV: CPT | Mod: PBBFAC,,, | Performed by: ORTHOPAEDIC SURGERY

## 2022-04-25 PROCEDURE — 73030 X-RAY EXAM OF SHOULDER: CPT | Mod: 26,RT,, | Performed by: RADIOLOGY

## 2022-04-25 PROCEDURE — 99024 POSTOP FOLLOW-UP VISIT: CPT | Mod: S$GLB,,, | Performed by: ORTHOPAEDIC SURGERY

## 2022-04-25 PROCEDURE — 3044F PR MOST RECENT HEMOGLOBIN A1C LEVEL <7.0%: ICD-10-PCS | Mod: CPTII,S$GLB,, | Performed by: ORTHOPAEDIC SURGERY

## 2022-04-25 PROCEDURE — 4010F PR ACE/ARB THEARPY RXD/TAKEN: ICD-10-PCS | Mod: CPTII,S$GLB,, | Performed by: ORTHOPAEDIC SURGERY

## 2022-04-25 PROCEDURE — 1160F PR REVIEW ALL MEDS BY PRESCRIBER/CLIN PHARMACIST DOCUMENTED: ICD-10-PCS | Mod: CPTII,S$GLB,, | Performed by: ORTHOPAEDIC SURGERY

## 2022-04-25 PROCEDURE — 3008F PR BODY MASS INDEX (BMI) DOCUMENTED: ICD-10-PCS | Mod: CPTII,S$GLB,, | Performed by: ORTHOPAEDIC SURGERY

## 2022-04-25 PROCEDURE — 1125F PR PAIN SEVERITY QUANTIFIED, PAIN PRESENT: ICD-10-PCS | Mod: CPTII,S$GLB,, | Performed by: ORTHOPAEDIC SURGERY

## 2022-04-25 PROCEDURE — 1101F PT FALLS ASSESS-DOCD LE1/YR: CPT | Mod: CPTII,S$GLB,, | Performed by: ORTHOPAEDIC SURGERY

## 2022-04-25 PROCEDURE — 1101F PR PT FALLS ASSESS DOC 0-1 FALLS W/OUT INJ PAST YR: ICD-10-PCS | Mod: CPTII,S$GLB,, | Performed by: ORTHOPAEDIC SURGERY

## 2022-04-25 PROCEDURE — 1160F RVW MEDS BY RX/DR IN RCRD: CPT | Mod: CPTII,S$GLB,, | Performed by: ORTHOPAEDIC SURGERY

## 2022-04-25 PROCEDURE — 3288F PR FALLS RISK ASSESSMENT DOCUMENTED: ICD-10-PCS | Mod: CPTII,S$GLB,, | Performed by: ORTHOPAEDIC SURGERY

## 2022-04-25 PROCEDURE — 99999 PR PBB SHADOW E&M-EST. PATIENT-LVL IV: ICD-10-PCS | Mod: PBBFAC,,, | Performed by: ORTHOPAEDIC SURGERY

## 2022-04-25 PROCEDURE — 3044F HG A1C LEVEL LT 7.0%: CPT | Mod: CPTII,S$GLB,, | Performed by: ORTHOPAEDIC SURGERY

## 2022-04-25 PROCEDURE — 1159F MED LIST DOCD IN RCRD: CPT | Mod: CPTII,S$GLB,, | Performed by: ORTHOPAEDIC SURGERY

## 2022-04-25 PROCEDURE — 3288F FALL RISK ASSESSMENT DOCD: CPT | Mod: CPTII,S$GLB,, | Performed by: ORTHOPAEDIC SURGERY

## 2022-04-25 PROCEDURE — 99024 PR POST-OP FOLLOW-UP VISIT: ICD-10-PCS | Mod: S$GLB,,, | Performed by: ORTHOPAEDIC SURGERY

## 2022-04-25 PROCEDURE — 1125F AMNT PAIN NOTED PAIN PRSNT: CPT | Mod: CPTII,S$GLB,, | Performed by: ORTHOPAEDIC SURGERY

## 2022-04-25 PROCEDURE — 1159F PR MEDICATION LIST DOCUMENTED IN MEDICAL RECORD: ICD-10-PCS | Mod: CPTII,S$GLB,, | Performed by: ORTHOPAEDIC SURGERY

## 2022-04-25 PROCEDURE — 73030 XR SHOULDER TRAUMA 3 VIEW RIGHT: ICD-10-PCS | Mod: 26,RT,, | Performed by: RADIOLOGY

## 2022-04-25 PROCEDURE — 73030 X-RAY EXAM OF SHOULDER: CPT | Mod: TC,FY,RT

## 2022-04-25 PROCEDURE — 3008F BODY MASS INDEX DOCD: CPT | Mod: CPTII,S$GLB,, | Performed by: ORTHOPAEDIC SURGERY

## 2022-04-25 RX ORDER — OXYCODONE AND ACETAMINOPHEN 5; 325 MG/1; MG/1
1 TABLET ORAL EVERY 6 HOURS PRN
Qty: 28 TABLET | Refills: 0 | Status: SHIPPED | OUTPATIENT
Start: 2022-04-25 | End: 2022-06-16 | Stop reason: SDUPTHER

## 2022-04-25 NOTE — PROGRESS NOTES
Past Medical History:   Diagnosis Date    Allergy     Anxiety     Arthritis, rheumatoid     Back pain     Chronic bronchiolitis     Depression     Fibromyalgia     GERD (gastroesophageal reflux disease)     High cholesterol     Hilar mass 3/27/2014    [place] (hard of hearing)     aid right ear    [place] (hard of hearing)     Hypertension     Incontinence of urine     Lymphedema     MS (multiple sclerosis)     benign; another MD stated she has no MS    Neuropathy     Restless leg syndrome     Rotator cuff tear 4/16/2015    Sciatica of left side 1/5/2018    Seasonal allergies     Sinus congestion     Sleep apnea     DOES NOT USE MACHINE    Spondylolysis     Thyroid disease     Type 2 diabetes mellitus with polyneuropathy     no med. was borderline    Wheezing        Past Surgical History:   Procedure Laterality Date    APPENDECTOMY      ARTHROSCOPIC DEBRIDEMENT OF SHOULDER Right 2/18/2022    Procedure: EXTENSIVE DEBRIDEMENT, SHOULDER, ARTHROSCOPIC;  Surgeon: Rio Pitts MD;  Location: [facility] OR;  Service: Orthopedics;  Laterality: Right;    BREAST SURGERY      reduction    CLOSED REDUCTION OF INJURY OF SHOULDER Right 9/19/2021    Procedure: CLOSED REDUCTION, SHOULDER;  Surgeon: Antonio Irwin MD;  Location: [facility] OR;  Service: Orthopedics;  Laterality: Right;    EPIDURAL STEROID INJECTION INTO LUMBAR SPINE N/A 6/12/2019    Procedure: Injection-steroid-epidural-lumbar;  Surgeon: Thad Manning MD;  Location: [facility] OR;  Service: Pain Management;  Laterality: N/A;  L5-S1    EPIDURAL STEROID INJECTION INTO LUMBAR SPINE N/A 9/16/2019    Procedure: Injection-steroid-epidural-lumbar;  Surgeon: Thad Manning MD;  Location: [facility] OR;  Service: Pain Management;  Laterality: N/A;  L5-S1    EYE SURGERY Bilateral     HYSTERECTOMY      partial - fibroids    INJECTION OF ANESTHETIC AGENT AROUND MEDIAL BRANCH NERVES INNERVATING LUMBAR FACET JOINT Bilateral 2/28/2019    Procedure: Block-nerve-medial  branch-lumbar;  Surgeon: Thad Manning MD;  [facility];  Service: Pain Management;  Laterality: Bilateral;  L3, 4,5     INJECTION OF ANESTHETIC AGENT AROUND MEDIAL BRANCH NERVES INNERVATING LUMBAR FACET JOINT Bilateral 3/21/2019    Procedure: Block-nerve-medial branch-lumbar L3,4,5;  Surgeon: Thad Manning MD;  [facility];  Service: Pain Management;  Laterality: Bilateral;    KNEE ARTHROPLASTY Left 3/16/2021    Procedure: ARTHROPLASTY, KNEE;  Surgeon: Rio Pitts MD;  Location: [facility] OR;  Service: Orthopedics;  Laterality: Left;  GENERAL AND BLOCK    LIPOSUCTION  1985    RADIOFREQUENCY ABLATION Left 11/4/2020    Procedure: Radiofrequency Ablation left knee;  Surgeon: Andrew Escudero MD;  Location: [facility] OR;  Service: Pain Management;  Laterality: Left;    RADIOFREQUENCY ABLATION OF LUMBAR MEDIAL BRANCH NERVE AT SINGLE LEVEL Bilateral 4/12/2019    Procedure: Radiofrequency Ablation, Nerve, Spinal, Lumbar, Medial Branch, 1 Level;  Surgeon: Thad Manning MD;  [facility];  Service: Pain Management;  Laterality: Bilateral;  L3, 4, 5  lumbar  [employer] pain management generator  SN [id number]  80 degrees for 75 seconds x2    RADIOFREQUENCY ABLATION OF LUMBAR MEDIAL BRANCH NERVE AT SINGLE LEVEL Bilateral 10/22/2019    Procedure: Radiofrequency Ablation, Nerve, Spinal, Lumbar, Medial Branch, L3,4,5;  Surgeon: Thad Manning MD;  [facility];  Service: Pain Management;  Laterality: Bilateral;  burned at 80 degrees C X 60 seconds X 2 each site    RADIOFREQUENCY ABLATION OF LUMBAR MEDIAL BRANCH NERVE AT SINGLE LEVEL Bilateral 5/27/2020    Procedure: Radiofrequency Ablation, Nerve, Spinal, Lumbar, Medial Branch, 1 Level;  Surgeon: Thad Manning MD;  [facility];  Service: Pain Management;  Laterality: Bilateral;  L3,4,5    REVERSE TOTAL SHOULDER ARTHROPLASTY Right 4/12/2022    Procedure: ARTHROPLASTY, SHOULDER, TOTAL, REVERSE;  Surgeon: Rio Pitts MD;  Location[facility];   Service: Orthopedics;  Laterality: Right;    SHOULDER ARTHROSCOPY Right 9/19/2021    Procedure: ARTHROSCOPY, SHOULDER;  Surgeon: Antonio Irwin MD;  Location: [facility];  Service: Orthopedics;  Laterality: Right;  LIMITED DEBRIDMENT    TONSILLECTOMY      TOTAL REDUCTION MAMMOPLASTY         Current Outpatient Medications   Medication Sig    albuterol (PROAIR HFA) 90 mcg/actuation inhaler Inhale 2 puffs into the lungs every 6 (six) hours as needed for Wheezing. Rescue    ALPRAZolam (XANAX) 2 MG Tab Take 1 tablet (2 mg total) by mouth 2 (two) times daily as needed (anxiety).    amitriptyline (ELAVIL) 25 MG tablet Take 1 tablet (25 mg total) by mouth nightly as needed for Insomnia.    amLODIPine (NORVASC) 5 MG tablet Take 1 tablet (5 mg total) by mouth once daily. For blood pressure    biotin 10,000 mcg Cap Take 13,000 mcg by mouth once daily.    celecoxib (CELEBREX) 200 MG capsule Take 1 capsule (200 mg total) by mouth daily as needed (arthritis pain).    fexofenadine (ALLEGRA) 180 MG tablet     furosemide (LASIX) 20 MG tablet TAKE 1 TABLET BY MOUTH DAILY AS NEEDED FOR LEF SWELLING    hydroCHLOROthiazide (HYDRODIURIL) 25 MG tablet Take 1 tablet (25 mg total) by mouth once daily.    levothyroxine (SYNTHROID) 88 MCG tablet Take 1 tablet (88 mcg total) by mouth once daily.    losartan (COZAAR) 50 MG tablet Take 1 tablet (50 mg total) by mouth once daily. For blood pressure    lovastatin (MEVACOR) 40 MG tablet Take 1 tablet (40 mg total) by mouth nightly. at bedtime. For cholesterol    meclizine (ANTIVERT) 25 mg tablet Take 25 mg by mouth daily as needed.    metoprolol succinate (TOPROL-XL) 50 MG 24 hr tablet Take 1 tablet (50 mg total) by mouth once daily. For blood pressure and heart    MULTIVIT-IRON-MIN-FOLIC ACID 3,500-18-0.4 UNIT-MG-MG ORAL CHEW Take 1 tablet by mouth.    nystatin (MYCOSTATIN) cream Apply topically 2 (two) times daily.    omeprazole (PRILOSEC) 20 MG capsule Take 1 capsule (20 mg  total) by mouth every other day. (Patient taking differently: Take 20 mg by mouth once daily.)    oxyCODONE-acetaminophen (PERCOCET) 5-325 mg per tablet Take 1 tablet by mouth every 6 (six) hours as needed for Pain.    potassium chloride (MICRO-K) 10 MEQ CpSR Take 1 capsule (10 mEq total) by mouth every evening.    pramipexole (MIRAPEX) 0.5 MG tablet Take 2 tablets (1 mg total) by mouth every evening. For restless legs    pregabalin (LYRICA) 75 MG capsule Take 1 capsule (75 mg total) by mouth 2 (two) times daily.    umeclidinium-vilanteroL (ANORO ELLIPTA) 62.5-25 mcg/actuation DsDv Inhale 1 puff into the lungs once daily. Controller    valacyclovir HCl (VALACYCLOVIR ORAL) Take by mouth.    venlafaxine (EFFEXOR-XR) 150 MG Cp24 Take 1 capsule (150 mg total) by mouth once daily. For mood    oxyCODONE-acetaminophen (PERCOCET) 5-325 mg per tablet Take 1 tablet by mouth every 6 (six) hours as needed for Pain (not controlled by tylenol). (Patient not taking: Reported on 4/25/2022)     No current facility-administered medications for this visit.     Facility-Administered Medications Ordered in Other Visits   Medication    diphenhydrAMINE injection 12.5 mg    electrolyte-S (ISOLYTE)    fentaNYL 50 mcg/mL injection 25 mcg    HYDROmorphone (PF) injection 0.2 mg    lorazepam injection 0.25 mg    ondansetron injection 4 mg    oxyCODONE immediate release tablet 5 mg    prochlorperazine injection Soln 5 mg       Review of patient's allergies indicates:   Allergen Reactions    Keflex [cephalexin]      Throat swelling    Pcn [penicillins]      Throat swelling    Latex, natural rubber Other (See Comments)     Redness with bandaids     Adhesive Other (See Comments)     bandainds redness at skin    Trelegy ellipta [fluticasone-umeclidin-vilanter]      Vision disturbance       Family History   Problem Relation Age of Onset    Heart disease Mother        Social History     Socioeconomic History    Marital status:     Tobacco Use    Smoking status: Former Smoker     Quit date: 1996     Years since quittin.9    Smokeless tobacco: Never Used   Substance and Sexual Activity    Alcohol use: Yes     Comment: very little     Drug use: No       Chief Complaint:   Chief Complaint   Patient presents with    Post-op Evaluation     ARTHROPLASTY, SHOULDER, TOTAL, REVERSE (Right)       Date of surgery:  2022    History of present illness:  72-year-old female underwent right reverse total shoulder arthroplasty for a rotator cuff tear and significant glenoid bone loss with shoulder instability.  Patient had very poor bone quality and the fixation of the glenosphere was less than ideal.  Pain is a 4/10.  She has been compliant with the sling.  No wound issues.      Review of Systems:    Musculoskeletal:  See HPI        Physical Examination:    Vital Signs:    Vitals:    22 0928   Resp: 18       Body mass index is 42.58 kg/m².    This a well-developed, well nourished patient in no acute distress.  They are alert and oriented and cooperative to examination.  Pt. walks without an antalgic gait.      Examination of the right shoulder shows well-healing surgical incision.  No erythema drainage.  No bruising or swelling.  She is neurovascularly intact.  Wearing the sling as instructed.    X-rays:  X-rays of the right shoulder ordered and reviewed which show well-aligned reverse total shoulder arthroplasty without complications     Assessment::  Status post right reverse total shoulder arthroplasty    Plan:  Reviewed the findings with her today.  We are going to go slow given her bone quality.  I gave her a pendulum and table slide handout to work on a couple times a day.  Continue the sling.  Refilled her pain medicine.  Follow-up in a month.  No x-ray needed at that time    This note was created using [employer] voice recognition software that occasionally misinterpreted phrases or words.

## 2022-04-27 ENCOUNTER — EXTERNAL HOME HEALTH (OUTPATIENT)
Dept: HOME HEALTH SERVICES | Facility: HOSPITAL | Age: 72
End: 2022-04-27
Payer: MEDICARE

## 2022-04-28 ENCOUNTER — TELEPHONE (OUTPATIENT)
Dept: ORTHOPEDICS | Facility: CLINIC | Age: 72
End: 2022-04-28
Payer: MEDICARE

## 2022-04-28 NOTE — TELEPHONE ENCOUNTER
----- Message from Genny Murphy MA sent at 4/28/2022  2:44 PM CDT -----  Contact: pt  Knee pain want injection   Call back

## 2022-05-03 ENCOUNTER — DOCUMENT SCAN (OUTPATIENT)
Dept: HOME HEALTH SERVICES | Facility: HOSPITAL | Age: 72
End: 2022-05-03
Payer: MEDICARE

## 2022-05-03 ENCOUNTER — TELEPHONE (OUTPATIENT)
Dept: FAMILY MEDICINE | Facility: CLINIC | Age: 72
End: 2022-05-03

## 2022-05-03 ENCOUNTER — TELEPHONE (OUTPATIENT)
Dept: ORTHOPEDICS | Facility: CLINIC | Age: 72
End: 2022-05-03

## 2022-05-03 NOTE — TELEPHONE ENCOUNTER
----- Message from Bria Garduno sent at 5/3/2022  3:38 PM CDT -----  Vm- pt leg is swollen from mid calf down and would like to know what to do   [phone]

## 2022-05-03 NOTE — TELEPHONE ENCOUNTER
Called pt and informed her to elevate her legs to help with swelling and to use an ace bandage to help as well. Also advised pt to ice up to 20 minutes at a time to help with pain. Pt verbalized understanding and said she will call on Friday if not better.

## 2022-05-03 NOTE — TELEPHONE ENCOUNTER
Spoke with patient c/o bilateral leg/ankle edema.  States recent surgery last week.  Informed to contact and notify surgeon of swelling first.  Patient verbalized understanding -DN

## 2022-05-03 NOTE — TELEPHONE ENCOUNTER
----- Message from Thad Alfaro sent at 5/3/2022  4:16 PM CDT -----  Regarding: Pain and swelling in both legs, call pt   Contact: Pain and swelling in both legs, call pt   Pain and swelling in both legs, call Pain and swelling in both legs, call pt

## 2022-05-05 ENCOUNTER — TELEPHONE (OUTPATIENT)
Dept: ORTHOPEDICS | Facility: CLINIC | Age: 72
End: 2022-05-05
Payer: MEDICARE

## 2022-05-05 NOTE — TELEPHONE ENCOUNTER
"----- Message from Genny Murphy MA sent at 5/5/2022  1:21 PM CDT -----  Contact: pt  Knee in pain   "Don't know what to do"   Call back      "

## 2022-05-05 NOTE — TELEPHONE ENCOUNTER
----- Message from Thad Alfaro sent at 5/5/2022  2:31 PM CDT -----  Regarding: missed call try again, pt   Contact: pt   missed call try again, pt

## 2022-05-05 NOTE — TELEPHONE ENCOUNTER
Called and spoke to patient.  She reports her knee is improved today. She has been using ice which helps.  She will discuss this more with us at her upcoming follow up.     Asa

## 2022-05-05 NOTE — TELEPHONE ENCOUNTER
Called and left VM for patient.  Told her to call PCP again and ask them to evaluate B leg swelling.  If it is her knee that is painful we can not do injection since she is so close from her surgery.  Cont ice and anti inflammatories for knee pain.      Asa

## 2022-05-10 ENCOUNTER — TELEPHONE (OUTPATIENT)
Dept: FAMILY MEDICINE | Facility: CLINIC | Age: 72
End: 2022-05-10

## 2022-05-10 NOTE — TELEPHONE ENCOUNTER
Spoke with mindy states patient stating she is feeling unsteady.  Has reported falling twice since being in the care of home health.  Minyd states they would like an order for PT at home.  Also states feet remain swollen. Is currently taking two diuretics.  Just wants MD to be aware in case med changes are necessary.  -DN    PRINTED

## 2022-05-10 NOTE — TELEPHONE ENCOUNTER
----- Message from Suzanne Neri sent at 5/10/2022 12:19 PM CDT -----  Mindy with [facility] called and stated that the is on two diuretic medicine's and she still show signs of lymphedema and she stated that the patient is also unstable on her feet she stated that the she has fell twice please give her a call at [phone]

## 2022-05-11 NOTE — TELEPHONE ENCOUNTER
Ok for PT Per Dr atwood.     Spoke with Mindy notified of MD AU, Mindy verbalized understanding -DN

## 2022-05-26 RX ORDER — NYSTATIN 100000 U/G
CREAM TOPICAL 2 TIMES DAILY
Qty: 60 G | Refills: 1 | Status: ON HOLD | OUTPATIENT
Start: 2022-05-26 | End: 2023-09-27 | Stop reason: HOSPADM

## 2022-05-26 NOTE — TELEPHONE ENCOUNTER
"Spoke with patient c/o rash under breast and lower abdomen "where the skin overlaps" , rash comes and goes.  States it itches.  Requests rx for rash.  Also c/o legs staying swollen, especially her feet.  States she stays off her feet, keeps her feet elevated during the day.  Requests increase diuretic, also wants to know if she should be seeing a specialist for this?  Patient also states she has more depression due to recent surgeries.  States she still has to worry about her home and others, states this is the cause of her increased depression. -DN    PRINTED  "

## 2022-05-26 NOTE — TELEPHONE ENCOUNTER
Nystatin per dr atwood.     Patient notified verbalized understanding.  rx pended to send to pharmacy -DN

## 2022-05-26 NOTE — TELEPHONE ENCOUNTER
----- Message from Bria Garduno sent at 5/25/2022  2:55 PM CDT -----  Vm- pt is having problems with the rash. She is not sure it is not shingles. She is a total mess right now. Please call   [phone]

## 2022-05-30 ENCOUNTER — TELEPHONE (OUTPATIENT)
Dept: FAMILY MEDICINE | Facility: CLINIC | Age: 72
End: 2022-05-30

## 2022-05-30 ENCOUNTER — OFFICE VISIT (OUTPATIENT)
Dept: ORTHOPEDICS | Facility: CLINIC | Age: 72
End: 2022-05-30
Payer: MEDICARE

## 2022-05-30 VITALS — HEIGHT: 60 IN | WEIGHT: 218 LBS | RESPIRATION RATE: 18 BRPM | BODY MASS INDEX: 42.8 KG/M2

## 2022-05-30 DIAGNOSIS — Z96.611 STATUS POST REVERSE ARTHROPLASTY OF SHOULDER, RIGHT: Primary | ICD-10-CM

## 2022-05-30 PROCEDURE — 1160F RVW MEDS BY RX/DR IN RCRD: CPT | Mod: CPTII,S$GLB,, | Performed by: ORTHOPAEDIC SURGERY

## 2022-05-30 PROCEDURE — 1101F PR PT FALLS ASSESS DOC 0-1 FALLS W/OUT INJ PAST YR: ICD-10-PCS | Mod: CPTII,S$GLB,, | Performed by: ORTHOPAEDIC SURGERY

## 2022-05-30 PROCEDURE — 4010F ACE/ARB THERAPY RXD/TAKEN: CPT | Mod: CPTII,S$GLB,, | Performed by: ORTHOPAEDIC SURGERY

## 2022-05-30 PROCEDURE — 1160F PR REVIEW ALL MEDS BY PRESCRIBER/CLIN PHARMACIST DOCUMENTED: ICD-10-PCS | Mod: CPTII,S$GLB,, | Performed by: ORTHOPAEDIC SURGERY

## 2022-05-30 PROCEDURE — 3008F PR BODY MASS INDEX (BMI) DOCUMENTED: ICD-10-PCS | Mod: CPTII,S$GLB,, | Performed by: ORTHOPAEDIC SURGERY

## 2022-05-30 PROCEDURE — 1125F AMNT PAIN NOTED PAIN PRSNT: CPT | Mod: CPTII,S$GLB,, | Performed by: ORTHOPAEDIC SURGERY

## 2022-05-30 PROCEDURE — 99999 PR PBB SHADOW E&M-EST. PATIENT-LVL IV: ICD-10-PCS | Mod: PBBFAC,,, | Performed by: ORTHOPAEDIC SURGERY

## 2022-05-30 PROCEDURE — 1159F MED LIST DOCD IN RCRD: CPT | Mod: CPTII,S$GLB,, | Performed by: ORTHOPAEDIC SURGERY

## 2022-05-30 PROCEDURE — 4010F PR ACE/ARB THEARPY RXD/TAKEN: ICD-10-PCS | Mod: CPTII,S$GLB,, | Performed by: ORTHOPAEDIC SURGERY

## 2022-05-30 PROCEDURE — 1101F PT FALLS ASSESS-DOCD LE1/YR: CPT | Mod: CPTII,S$GLB,, | Performed by: ORTHOPAEDIC SURGERY

## 2022-05-30 PROCEDURE — 99024 POSTOP FOLLOW-UP VISIT: CPT | Mod: S$GLB,,, | Performed by: ORTHOPAEDIC SURGERY

## 2022-05-30 PROCEDURE — 3288F PR FALLS RISK ASSESSMENT DOCUMENTED: ICD-10-PCS | Mod: CPTII,S$GLB,, | Performed by: ORTHOPAEDIC SURGERY

## 2022-05-30 PROCEDURE — 1159F PR MEDICATION LIST DOCUMENTED IN MEDICAL RECORD: ICD-10-PCS | Mod: CPTII,S$GLB,, | Performed by: ORTHOPAEDIC SURGERY

## 2022-05-30 PROCEDURE — 3288F FALL RISK ASSESSMENT DOCD: CPT | Mod: CPTII,S$GLB,, | Performed by: ORTHOPAEDIC SURGERY

## 2022-05-30 PROCEDURE — 3008F BODY MASS INDEX DOCD: CPT | Mod: CPTII,S$GLB,, | Performed by: ORTHOPAEDIC SURGERY

## 2022-05-30 PROCEDURE — 3044F HG A1C LEVEL LT 7.0%: CPT | Mod: CPTII,S$GLB,, | Performed by: ORTHOPAEDIC SURGERY

## 2022-05-30 PROCEDURE — 99024 PR POST-OP FOLLOW-UP VISIT: ICD-10-PCS | Mod: S$GLB,,, | Performed by: ORTHOPAEDIC SURGERY

## 2022-05-30 PROCEDURE — 3044F PR MOST RECENT HEMOGLOBIN A1C LEVEL <7.0%: ICD-10-PCS | Mod: CPTII,S$GLB,, | Performed by: ORTHOPAEDIC SURGERY

## 2022-05-30 PROCEDURE — 99999 PR PBB SHADOW E&M-EST. PATIENT-LVL IV: CPT | Mod: PBBFAC,,, | Performed by: ORTHOPAEDIC SURGERY

## 2022-05-30 PROCEDURE — 1125F PR PAIN SEVERITY QUANTIFIED, PAIN PRESENT: ICD-10-PCS | Mod: CPTII,S$GLB,, | Performed by: ORTHOPAEDIC SURGERY

## 2022-05-30 NOTE — PROGRESS NOTES
Past Medical History:   Diagnosis Date    Allergy     Anxiety     Arthritis, rheumatoid     Back pain     Chronic bronchiolitis     Depression     Fibromyalgia     GERD (gastroesophageal reflux disease)     High cholesterol     Hilar mass 3/27/2014    [place] (hard of hearing)     aid right ear    [place] (hard of hearing)     Hypertension     Incontinence of urine     Lymphedema     MS (multiple sclerosis)     benign; another MD stated she has no MS    Neuropathy     Restless leg syndrome     Rotator cuff tear 4/16/2015    Sciatica of left side 1/5/2018    Seasonal allergies     Sinus congestion     Sleep apnea     DOES NOT USE MACHINE    Spondylolysis     Thyroid disease     Type 2 diabetes mellitus with polyneuropathy     no med. was borderline    Wheezing        Past Surgical History:   Procedure Laterality Date    APPENDECTOMY      ARTHROSCOPIC DEBRIDEMENT OF SHOULDER Right 2/18/2022    Procedure: EXTENSIVE DEBRIDEMENT, SHOULDER, ARTHROSCOPIC;  Surgeon: Rio Pitts MD;  Location: [facility] OR;  Service: Orthopedics;  Laterality: Right;    BREAST SURGERY      reduction    CLOSED REDUCTION OF INJURY OF SHOULDER Right 9/19/2021    Procedure: CLOSED REDUCTION, SHOULDER;  Surgeon: Antonio Irwin MD;  Location: [facility] OR;  Service: Orthopedics;  Laterality: Right;    EPIDURAL STEROID INJECTION INTO LUMBAR SPINE N/A 6/12/2019    Procedure: Injection-steroid-epidural-lumbar;  Surgeon: Thad Manning MD;  [facility];  Service: Pain Management;  Laterality: N/A;  L5-S1    EPIDURAL STEROID INJECTION INTO LUMBAR SPINE N/A 9/16/2019    Procedure: Injection-steroid-epidural-lumbar;  Surgeon: Thad Manning MD;  [facility];  Service: Pain Management;  Laterality: N/A;  L5-S1    EYE SURGERY Bilateral     HYSTERECTOMY      partial - fibroids    INJECTION OF ANESTHETIC AGENT AROUND MEDIAL BRANCH NERVES INNERVATING LUMBAR FACET JOINT Bilateral 2/28/2019    Procedure: Block-nerve-medial  branch-lumbar;  Surgeon: Thad Manning MD;  Location: [facility];  Service: Pain Management;  Laterality: Bilateral;  L3, 4,5     INJECTION OF ANESTHETIC AGENT AROUND MEDIAL BRANCH NERVES INNERVATING LUMBAR FACET JOINT Bilateral 3/21/2019    Procedure: Block-nerve-medial branch-lumbar L3,4,5;  Surgeon: Thad Manning MD;  Location: [facility] OR;  Service: Pain Management;  Laterality: Bilateral;    KNEE ARTHROPLASTY Left 3/16/2021    Procedure: ARTHROPLASTY, KNEE;  Surgeon: Rio Pitts MD;  Location: [facility] OR;  Service: Orthopedics;  Laterality: Left;  GENERAL AND BLOCK    LIPOSUCTION  1985    RADIOFREQUENCY ABLATION Left 11/4/2020    Procedure: Radiofrequency Ablation left knee;  Surgeon: Andrew Escudero MD;  Location: [facility] OR;  Service: Pain Management;  Laterality: Left;    RADIOFREQUENCY ABLATION OF LUMBAR MEDIAL BRANCH NERVE AT SINGLE LEVEL Bilateral 4/12/2019    Procedure: Radiofrequency Ablation, Nerve, Spinal, Lumbar, Medial Branch, 1 Level;  Surgeon: Thad Manning MD;  Location: [facility];  Service: Pain Management;  Laterality: Bilateral;  L3, 4, 5  lumbar  [employer] pain management generator  SN [id number]  80 degrees for 75 seconds x2    RADIOFREQUENCY ABLATION OF LUMBAR MEDIAL BRANCH NERVE AT SINGLE LEVEL Bilateral 10/22/2019    Procedure: Radiofrequency Ablation, Nerve, Spinal, Lumbar, Medial Branch, L3,4,5;  Surgeon: Thad Manning MD;  Location: [facility];  Service: Pain Management;  Laterality: Bilateral;  burned at 80 degrees C X 60 seconds X 2 each site    RADIOFREQUENCY ABLATION OF LUMBAR MEDIAL BRANCH NERVE AT SINGLE LEVEL Bilateral 5/27/2020    Procedure: Radiofrequency Ablation, Nerve, Spinal, Lumbar, Medial Branch, 1 Level;  Surgeon: Thad Manning MD;  Location: [facility];  Service: Pain Management;  Laterality: Bilateral;  L3,4,5    REVERSE TOTAL SHOULDER ARTHROPLASTY Right 4/12/2022    Procedure: ARTHROPLASTY, SHOULDER, TOTAL, REVERSE;  Surgeon: Rio Pitts MD;  Location: [facility];   Service: Orthopedics;  Laterality: Right;    SHOULDER ARTHROSCOPY Right 9/19/2021    Procedure: ARTHROSCOPY, SHOULDER;  Surgeon: Antonio Irwin MD;  Location: [facility];  Service: Orthopedics;  Laterality: Right;  LIMITED DEBRIDMENT    TONSILLECTOMY      TOTAL REDUCTION MAMMOPLASTY         Current Outpatient Medications   Medication Sig    albuterol (PROAIR HFA) 90 mcg/actuation inhaler Inhale 2 puffs into the lungs every 6 (six) hours as needed for Wheezing. Rescue    ALPRAZolam (XANAX) 2 MG Tab Take 1 tablet (2 mg total) by mouth 2 (two) times daily as needed (anxiety).    amitriptyline (ELAVIL) 25 MG tablet Take 1 tablet (25 mg total) by mouth nightly as needed for Insomnia.    amLODIPine (NORVASC) 5 MG tablet Take 1 tablet (5 mg total) by mouth once daily. For blood pressure    biotin 10,000 mcg Cap Take 13,000 mcg by mouth once daily.    celecoxib (CELEBREX) 200 MG capsule Take 1 capsule (200 mg total) by mouth daily as needed (arthritis pain).    fexofenadine (ALLEGRA) 180 MG tablet     furosemide (LASIX) 20 MG tablet TAKE 1 TABLET BY MOUTH DAILY AS NEEDED FOR LEF SWELLING    hydroCHLOROthiazide (HYDRODIURIL) 25 MG tablet Take 1 tablet (25 mg total) by mouth once daily.    levothyroxine (SYNTHROID) 88 MCG tablet Take 1 tablet (88 mcg total) by mouth once daily.    losartan (COZAAR) 50 MG tablet Take 1 tablet (50 mg total) by mouth once daily. For blood pressure    lovastatin (MEVACOR) 40 MG tablet Take 1 tablet (40 mg total) by mouth nightly. at bedtime. For cholesterol    meclizine (ANTIVERT) 25 mg tablet Take 25 mg by mouth daily as needed.    metoprolol succinate (TOPROL-XL) 50 MG 24 hr tablet Take 1 tablet (50 mg total) by mouth once daily. For blood pressure and heart    MULTIVIT-IRON-MIN-FOLIC ACID 3,500-18-0.4 UNIT-MG-MG ORAL CHEW Take 1 tablet by mouth.    nystatin (MYCOSTATIN) cream Apply topically 2 (two) times daily.    omeprazole (PRILOSEC) 20 MG capsule Take 1 capsule (20 mg  total) by mouth every other day. (Patient taking differently: Take 20 mg by mouth once daily.)    oxyCODONE-acetaminophen (PERCOCET) 5-325 mg per tablet Take 1 tablet by mouth every 6 (six) hours as needed for Pain (not controlled by tylenol).    potassium chloride (MICRO-K) 10 MEQ CpSR Take 1 capsule (10 mEq total) by mouth every evening.    pramipexole (MIRAPEX) 0.5 MG tablet Take 2 tablets (1 mg total) by mouth every evening. For restless legs    pregabalin (LYRICA) 75 MG capsule Take 1 capsule (75 mg total) by mouth 2 (two) times daily.    umeclidinium-vilanteroL (ANORO ELLIPTA) 62.5-25 mcg/actuation DsDv Inhale 1 puff into the lungs once daily. Controller    valacyclovir HCl (VALACYCLOVIR ORAL) Take by mouth.    venlafaxine (EFFEXOR-XR) 150 MG Cp24 Take 1 capsule (150 mg total) by mouth once daily. For mood     No current facility-administered medications for this visit.     Facility-Administered Medications Ordered in Other Visits   Medication    diphenhydrAMINE injection 12.5 mg    electrolyte-S (ISOLYTE)    fentaNYL 50 mcg/mL injection 25 mcg    HYDROmorphone (PF) injection 0.2 mg    lorazepam injection 0.25 mg    ondansetron injection 4 mg    oxyCODONE immediate release tablet 5 mg    prochlorperazine injection Soln 5 mg       Review of patient's allergies indicates:   Allergen Reactions    Keflex [cephalexin]      Throat swelling    Pcn [penicillins]      Throat swelling    Latex, natural rubber Other (See Comments)     Redness with bandaids     Adhesive Other (See Comments)     bandainds redness at skin    Trelegy ellipta [fluticasone-umeclidin-vilanter]      Vision disturbance       Family History   Problem Relation Age of Onset    Heart disease Mother        Social History     Socioeconomic History    Marital status:    Tobacco Use    Smoking status: Former Smoker     Quit date: 1996     Years since quittin.0    Smokeless tobacco: Never Used   Substance and Sexual  Activity    Alcohol use: Yes     Comment: very little     Drug use: No       Chief Complaint:   Chief Complaint   Patient presents with    Right Shoulder - Post-op Evaluation       Date of surgery:  April 12, 2022    History of present illness:  72-year-old female underwent right reverse total shoulder arthroplasty for a rotator cuff tear and significant glenoid bone loss with shoulder instability.  Patient had very poor bone quality and the fixation of the glenosphere was less than ideal.  Pain is a 5/10.  She has been compliant with the sling.  No wound issues.      Review of Systems:    Musculoskeletal:  See HPI        Physical Examination:    Vital Signs:    Vitals:    05/30/22 0846   Resp: 18       Body mass index is 42.58 kg/m².    This a well-developed, well nourished patient in no acute distress.  They are alert and oriented and cooperative to examination.  Pt. walks without an antalgic gait.      Examination of the right shoulder shows well-healing surgical incision.  No erythema drainage.  No bruising or swelling.  She is neurovascularly intact.  Wearing the sling as instructed.    X-rays:  X-rays of the right shoulder reviewed which show well-aligned reverse total shoulder arthroplasty without complications     Assessment::  Status post right reverse total shoulder arthroplasty    Plan:  Reviewed the findings with her today.  We will go ahead and start some physical therapy.  Okay to stop the sling.  Advised her still be very slow with this.  I will see her back in 6 weeks with x-rays of the right shoulder.    This note was created using M Mod[employer] voice recognition software that occasionally misinterpreted phrases or words.

## 2022-05-30 NOTE — TELEPHONE ENCOUNTER
----- Message from Suzanne Neri sent at 5/30/2022  7:55 AM CDT -----  VM 05/29/22 @11:37 AM :patient called stating she cannot come in today for her 8:30 appointment she has to go see her surgeon. Patient would like to know if she can come in later or should she wait until 06/09 for her appointment please give her a call and advise.

## 2022-06-06 ENCOUNTER — TELEPHONE (OUTPATIENT)
Dept: ORTHOPEDICS | Facility: CLINIC | Age: 72
End: 2022-06-06
Payer: MEDICARE

## 2022-06-06 NOTE — TELEPHONE ENCOUNTER
----- Message from Thad Alfaro sent at 6/6/2022 12:50 PM CDT -----  Regarding: Shoulder hurting for the last two days, call pt   Contact: pt   Shoulder hurting for the last two days, call pt

## 2022-06-06 NOTE — TELEPHONE ENCOUNTER
"Called and spoke to patient. She report some increase pain since yesterday just with ADL's.  Reports no injury.  I informed her she can use the sling periodically as she would like and to try to limit her activity with her upper extremity as much as possible. Tried to reiterated that Hong advised to "take it slow" now that she is out of her sling.  Patient understood and was thankful for the call.     Asa   "

## 2022-06-09 ENCOUNTER — OFFICE VISIT (OUTPATIENT)
Dept: FAMILY MEDICINE | Facility: CLINIC | Age: 72
End: 2022-06-09
Payer: MEDICARE

## 2022-06-09 VITALS
HEART RATE: 68 BPM | BODY MASS INDEX: 42.6 KG/M2 | HEIGHT: 60 IN | OXYGEN SATURATION: 97 % | DIASTOLIC BLOOD PRESSURE: 62 MMHG | SYSTOLIC BLOOD PRESSURE: 124 MMHG | WEIGHT: 217 LBS

## 2022-06-09 DIAGNOSIS — F32.1 MODERATE MAJOR DEPRESSION, SINGLE EPISODE: ICD-10-CM

## 2022-06-09 DIAGNOSIS — F41.9 ANXIETY: ICD-10-CM

## 2022-06-09 DIAGNOSIS — I89.0 LYMPHEDEMA OF BOTH LOWER EXTREMITIES: ICD-10-CM

## 2022-06-09 DIAGNOSIS — M79.7 FIBROMYALGIA: ICD-10-CM

## 2022-06-09 DIAGNOSIS — K21.9 GASTROESOPHAGEAL REFLUX DISEASE, UNSPECIFIED WHETHER ESOPHAGITIS PRESENT: ICD-10-CM

## 2022-06-09 DIAGNOSIS — R49.0 HOARSENESS: ICD-10-CM

## 2022-06-09 DIAGNOSIS — E78.2 MIXED HYPERLIPIDEMIA: ICD-10-CM

## 2022-06-09 PROCEDURE — 3008F BODY MASS INDEX DOCD: CPT | Mod: CPTII,S$GLB,, | Performed by: FAMILY MEDICINE

## 2022-06-09 PROCEDURE — 99214 PR OFFICE/OUTPT VISIT, EST, LEVL IV, 30-39 MIN: ICD-10-PCS | Mod: S$GLB,,, | Performed by: FAMILY MEDICINE

## 2022-06-09 PROCEDURE — 3044F HG A1C LEVEL LT 7.0%: CPT | Mod: CPTII,S$GLB,, | Performed by: FAMILY MEDICINE

## 2022-06-09 PROCEDURE — 4010F ACE/ARB THERAPY RXD/TAKEN: CPT | Mod: CPTII,S$GLB,, | Performed by: FAMILY MEDICINE

## 2022-06-09 PROCEDURE — 3061F NEG MICROALBUMINURIA REV: CPT | Mod: CPTII,S$GLB,, | Performed by: FAMILY MEDICINE

## 2022-06-09 PROCEDURE — 3008F PR BODY MASS INDEX (BMI) DOCUMENTED: ICD-10-PCS | Mod: CPTII,S$GLB,, | Performed by: FAMILY MEDICINE

## 2022-06-09 PROCEDURE — 3078F DIAST BP <80 MM HG: CPT | Mod: CPTII,S$GLB,, | Performed by: FAMILY MEDICINE

## 2022-06-09 PROCEDURE — 3044F PR MOST RECENT HEMOGLOBIN A1C LEVEL <7.0%: ICD-10-PCS | Mod: CPTII,S$GLB,, | Performed by: FAMILY MEDICINE

## 2022-06-09 PROCEDURE — 3066F NEPHROPATHY DOC TX: CPT | Mod: CPTII,S$GLB,, | Performed by: FAMILY MEDICINE

## 2022-06-09 PROCEDURE — 99214 OFFICE O/P EST MOD 30 MIN: CPT | Mod: S$GLB,,, | Performed by: FAMILY MEDICINE

## 2022-06-09 PROCEDURE — 3074F PR MOST RECENT SYSTOLIC BLOOD PRESSURE < 130 MM HG: ICD-10-PCS | Mod: CPTII,S$GLB,, | Performed by: FAMILY MEDICINE

## 2022-06-09 PROCEDURE — 3066F PR DOCUMENTATION OF TREATMENT FOR NEPHROPATHY: ICD-10-PCS | Mod: CPTII,S$GLB,, | Performed by: FAMILY MEDICINE

## 2022-06-09 PROCEDURE — 4010F PR ACE/ARB THEARPY RXD/TAKEN: ICD-10-PCS | Mod: CPTII,S$GLB,, | Performed by: FAMILY MEDICINE

## 2022-06-09 PROCEDURE — 3078F PR MOST RECENT DIASTOLIC BLOOD PRESSURE < 80 MM HG: ICD-10-PCS | Mod: CPTII,S$GLB,, | Performed by: FAMILY MEDICINE

## 2022-06-09 PROCEDURE — 3074F SYST BP LT 130 MM HG: CPT | Mod: CPTII,S$GLB,, | Performed by: FAMILY MEDICINE

## 2022-06-09 PROCEDURE — 3061F PR NEG MICROALBUMINURIA RESULT DOCUMENTED/REVIEW: ICD-10-PCS | Mod: CPTII,S$GLB,, | Performed by: FAMILY MEDICINE

## 2022-06-09 RX ORDER — VENLAFAXINE HYDROCHLORIDE 150 MG/1
150 CAPSULE, EXTENDED RELEASE ORAL DAILY
Qty: 90 CAPSULE | Refills: 3 | Status: SHIPPED | OUTPATIENT
Start: 2022-06-09 | End: 2023-07-06

## 2022-06-09 RX ORDER — ALPRAZOLAM 1 MG/1
1 TABLET ORAL 2 TIMES DAILY PRN
Qty: 60 TABLET | Refills: 3 | Status: SHIPPED | OUTPATIENT
Start: 2022-06-20 | End: 2022-09-22 | Stop reason: SDUPTHER

## 2022-06-09 RX ORDER — FUROSEMIDE 20 MG/1
20 TABLET ORAL DAILY
Qty: 90 TABLET | Refills: 3 | Status: SHIPPED | OUTPATIENT
Start: 2022-06-09 | End: 2022-09-22 | Stop reason: ALTCHOICE

## 2022-06-09 RX ORDER — OMEPRAZOLE 20 MG/1
20 CAPSULE, DELAYED RELEASE ORAL DAILY
Qty: 90 CAPSULE | Refills: 3 | Status: SHIPPED | OUTPATIENT
Start: 2022-06-09 | End: 2023-06-23

## 2022-06-09 RX ORDER — LOVASTATIN 40 MG/1
40 TABLET ORAL NIGHTLY
Qty: 90 TABLET | Refills: 3 | Status: SHIPPED | OUTPATIENT
Start: 2022-06-09 | End: 2023-08-02 | Stop reason: SDUPTHER

## 2022-06-09 NOTE — PROGRESS NOTES
SUBJECTIVE:    Patient ID: Genoveva Gilbert is a 72 y.o. female.    Chief Complaint: Diabetes (Has some things to discuss, brought list, Mammo req DIS, C-scope req Dr. Garcia in [place], Eye exam pt will sched// SW)    Patient is status post shoulder replacement for DJD and rotator cuff tear that occurred after a fall.  Patient had a slip and fall at home.  She had had pain in the shoulder for some time and dislocation was noted.  She had arthroscopy and relocation of the shoulder prior to the decision being made for shoulder replacement.  She continues to rehab from this.  Patient reports some trouble with falls recently and admits she then had a fall today.  She states that she feels off balance in her head not so much in her feet.  She has also had recent knee replacement.  Medications are reviewed and she does have some neural altering medications that could be causing a problem.  Blood pressure noted to be on the low normal side as well and this could potentially be an issue in this patient with osteoarthritis and multiple sclerosis.  Patient having issues with memory and would like to try OTC Neuriva  Had been trying to wean off omeprazole but has symptoms when she misses any days   Notes some situation depression as he  needs 24 hour attention. She has no assistance to help with him       Past Medical History:   Diagnosis Date    Allergy     Anxiety     Arthritis, rheumatoid     Back pain     Chronic bronchiolitis     Depression     Fibromyalgia     GERD (gastroesophageal reflux disease)     High cholesterol     Hilar mass 3/27/2014    [place] (hard of hearing)     aid right ear    [place] (hard of hearing)     Hypertension     Incontinence of urine     Lymphedema     MS (multiple sclerosis)     benign; another MD stated she has no MS    Neuropathy     Restless leg syndrome     Rotator cuff tear 4/16/2015    Sciatica of left side 1/5/2018    Seasonal allergies     Sinus congestion     Sleep apnea     DOES  NOT USE MACHINE    Spondylolysis     Thyroid disease     Type 2 diabetes mellitus with polyneuropathy     no med. was borderline    Wheezing      Past Surgical History:   Procedure Laterality Date    APPENDECTOMY      ARTHROSCOPIC DEBRIDEMENT OF SHOULDER Right 2/18/2022    Procedure: EXTENSIVE DEBRIDEMENT, SHOULDER, ARTHROSCOPIC;  Surgeon: Rio Pitts MD;  Location: [facility] OR;  Service: Orthopedics;  Laterality: Right;    BREAST SURGERY      reduction    CLOSED REDUCTION OF INJURY OF SHOULDER Right 9/19/2021    Procedure: CLOSED REDUCTION, SHOULDER;  Surgeon: Antonio Irwin MD;  Location: [facility] OR;  Service: Orthopedics;  Laterality: Right;    EPIDURAL STEROID INJECTION INTO LUMBAR SPINE N/A 6/12/2019    Procedure: Injection-steroid-epidural-lumbar;  Surgeon: Thad Manning MD;  Location: [facility] OR;  Service: Pain Management;  Laterality: N/A;  L5-S1    EPIDURAL STEROID INJECTION INTO LUMBAR SPINE N/A 9/16/2019    Procedure: Injection-steroid-epidural-lumbar;  Surgeon: Thad Manning MD;  Location: [facility] OR;  Service: Pain Management;  Laterality: N/A;  L5-S1    EYE SURGERY Bilateral     HYSTERECTOMY      partial - fibroids    INJECTION OF ANESTHETIC AGENT AROUND MEDIAL BRANCH NERVES INNERVATING LUMBAR FACET JOINT Bilateral 2/28/2019    Procedure: Block-nerve-medial branch-lumbar;  Surgeon: Thad Manning MD;  Location: [facility] OR;  Service: Pain Management;  Laterality: Bilateral;  L3, 4,5     INJECTION OF ANESTHETIC AGENT AROUND MEDIAL BRANCH NERVES INNERVATING LUMBAR FACET JOINT Bilateral 3/21/2019    Procedure: Block-nerve-medial branch-lumbar L3,4,5;  Surgeon: Thad Manning MD;  Location: [facility] OR;  Service: Pain Management;  Laterality: Bilateral;    KNEE ARTHROPLASTY Left 3/16/2021    Procedure: ARTHROPLASTY, KNEE;  Surgeon: Rio Pitts MD;  Location: [facility] OR;  Service: Orthopedics;  Laterality: Left;  GENERAL AND BLOCK    LIPOSUCTION  1985    RADIOFREQUENCY ABLATION Left 11/4/2020    Procedure:  Radiofrequency Ablation left knee;  Surgeon: Andrew Escudero MD;  Location: [facility] OR;  Service: Pain Management;  Laterality: Left;    RADIOFREQUENCY ABLATION OF LUMBAR MEDIAL BRANCH NERVE AT SINGLE LEVEL Bilateral 4/12/2019    Procedure: Radiofrequency Ablation, Nerve, Spinal, Lumbar, Medial Branch, 1 Level;  Surgeon: Thad Manning MD;  Location: [facility] OR;  Service: Pain Management;  Laterality: Bilateral;  L3, 4, 5  lumbar  [employer] pain management generator  SN [id number]  80 degrees for 75 seconds x2    RADIOFREQUENCY ABLATION OF LUMBAR MEDIAL BRANCH NERVE AT SINGLE LEVEL Bilateral 10/22/2019    Procedure: Radiofrequency Ablation, Nerve, Spinal, Lumbar, Medial Branch, L3,4,5;  Surgeon: Thad Manning MD;  Location: [facility] OR;  Service: Pain Management;  Laterality: Bilateral;  burned at 80 degrees C X 60 seconds X 2 each site    RADIOFREQUENCY ABLATION OF LUMBAR MEDIAL BRANCH NERVE AT SINGLE LEVEL Bilateral 5/27/2020    Procedure: Radiofrequency Ablation, Nerve, Spinal, Lumbar, Medial Branch, 1 Level;  Surgeon: Thad Manning MD;  Location: [facility] OR;  Service: Pain Management;  Laterality: Bilateral;  L3,4,5    REVERSE TOTAL SHOULDER ARTHROPLASTY Right 4/12/2022    Procedure: ARTHROPLASTY, SHOULDER, TOTAL, REVERSE;  Surgeon: Rio Pitts MD;  Location: [facility] OR;  Service: Orthopedics;  Laterality: Right;    SHOULDER ARTHROSCOPY Right 9/19/2021    Procedure: ARTHROSCOPY, SHOULDER;  Surgeon: Antonio Irwin MD;  Location: [facility] OR;  Service: Orthopedics;  Laterality: Right;  LIMITED DEBRIDMENT    TONSILLECTOMY      TOTAL REDUCTION MAMMOPLASTY       Family History   Problem Relation Age of Onset    Heart disease Mother        Marital Status:   Alcohol History:  reports current alcohol use.  Tobacco History:  reports that she quit smoking about 26 years ago. Her smoking use included cigarettes. She has been exposed to tobacco smoke. She has never used smokeless tobacco.  Drug History:  reports no history of  drug use.    Review of patient's allergies indicates:   Allergen Reactions    Keflex [cephalexin]      Throat swelling    Pcn [penicillins]      Throat swelling    Latex, natural rubber Other (See Comments)     Redness with bandaids     Adhesive Other (See Comments)     bandainds redness at skin    Trelegy ellipta [fluticasone-umeclidin-vilanter]      Vision disturbance       Current Outpatient Medications:     albuterol (PROAIR HFA) 90 mcg/actuation inhaler, Inhale 2 puffs into the lungs every 6 (six) hours as needed for Wheezing. Rescue, Disp: 18 g, Rfl: 6    amLODIPine (NORVASC) 5 MG tablet, Take 1 tablet (5 mg total) by mouth once daily. For blood pressure, Disp: 90 tablet, Rfl: 3    fexofenadine (ALLEGRA) 180 MG tablet, , Disp: , Rfl:     levothyroxine (SYNTHROID) 88 MCG tablet, Take 1 tablet (88 mcg total) by mouth once daily., Disp: 90 tablet, Rfl: 3    nystatin (MYCOSTATIN) cream, Apply topically 2 (two) times daily., Disp: 60 g, Rfl: 1    pramipexole (MIRAPEX) 0.5 MG tablet, Take 2 tablets (1 mg total) by mouth every evening. For restless legs, Disp: 180 tablet, Rfl: 3    pregabalin (LYRICA) 75 MG capsule, Take 1 capsule (75 mg total) by mouth 2 (two) times daily., Disp: 180 capsule, Rfl: 1    valacyclovir HCl (VALACYCLOVIR ORAL), Take by mouth., Disp: , Rfl:     ALPRAZolam (XANAX) 1 MG tablet, Take 1 tablet (1 mg total) by mouth 2 (two) times daily as needed for Anxiety (anxiety)., Disp: 60 tablet, Rfl: 0    biotin 1 mg Cap, Take by mouth., Disp: , Rfl:     hydroCHLOROthiazide (HYDRODIURIL) 25 MG tablet, , Disp: , Rfl:     ibuprofen (ADVIL,MOTRIN) 600 MG tablet, Take 1 tablet (600 mg total) by mouth 3 (three) times daily as needed for Pain., Disp: 90 tablet, Rfl: 0    losartan (COZAAR) 50 MG tablet, Take 1 tablet (50 mg total) by mouth once daily. For blood pressure, Disp: 90 tablet, Rfl: 0    lovastatin (MEVACOR) 40 MG tablet, Take 1 tablet (40 mg total) by mouth nightly. at bedtime. For cholesterol,  Disp: 90 tablet, Rfl: 3    magnesium oxide (MAG-OX) 400 mg (241.3 mg magnesium) tablet, Take 1 tablet (400 mg total) by mouth once daily., Disp: 90 tablet, Rfl: 3    omeprazole (PRILOSEC) 20 MG capsule, Take 1 capsule (20 mg total) by mouth once daily. For heartburn, Disp: 90 capsule, Rfl: 3    oxybutynin (DITROPAN) 5 MG Tab, Take 1 tablet (5 mg total) by mouth once daily., Disp: 30 tablet, Rfl: 2    potassium chloride (MICRO-K) 10 MEQ CpSR, TAKE 2 CAPSULES(20 MEQ) BY MOUTH EVERY EVENING, Disp: 180 capsule, Rfl: 0    torsemide (DEMADEX) 10 MG Tab, Take 1 tablet (10 mg total) by mouth once daily., Disp: 30 tablet, Rfl: 2    traZODone (DESYREL) 100 MG tablet, Take 1 tablet (100 mg total) by mouth every evening., Disp: 30 tablet, Rfl: 2    umeclidinium-vilanteroL (ANORO ELLIPTA) 62.5-25 mcg/actuation DsDv, Inhale 1 puff into the lungs once daily. Controller, Disp: 60 each, Rfl: 11    venlafaxine (EFFEXOR-XR) 150 MG Cp24, Take 1 capsule (150 mg total) by mouth once daily. For depression, Disp: 90 capsule, Rfl: 3    venlafaxine (EFFEXOR-XR) 37.5 MG 24 hr capsule, Take 1 capsule (37.5 mg total) by mouth once daily. Take in addition to the 150mg dose of effexor, Disp: 30 capsule, Rfl: 2    Review of Systems   All other systems reviewed and are negative.       Objective:      Vitals:    06/09/22 1540   BP: 124/62   Pulse: 68   SpO2: 97%   Weight: 98.4 kg (217 lb)   Height: 5' (1.524 m)     Physical Exam  Constitutional:       Appearance: Normal appearance. She is morbidly obese.   HENT:      Head: Normocephalic and atraumatic.      Mouth/Throat:      Mouth: Mucous membranes are moist.   Eyes:      Conjunctiva/sclera: Conjunctivae normal.   Cardiovascular:      Rate and Rhythm: Normal rate.   Pulmonary:      Effort: Pulmonary effort is normal.   Musculoskeletal:      Right lower leg: Edema present.      Left lower leg: Edema present.   Skin:     General: Skin is warm and dry.   Neurological:      General: No focal deficit  present.      Mental Status: She is alert and oriented to person, place, and time.   Psychiatric:         Mood and Affect: Mood normal.         Behavior: Behavior normal.         Assessment:       1. Mixed hyperlipidemia    2. Anxiety    3. Fibromyalgia    4. Moderate major depression, single episode    5. Lymphedema of both lower extremities    6. Gastroesophageal reflux disease, unspecified whether esophagitis present    7. Hoarseness         Plan:       Mixed hyperlipidemia  -     lovastatin (MEVACOR) 40 MG tablet; Take 1 tablet (40 mg total) by mouth nightly. at bedtime. For cholesterol  Dispense: 90 tablet; Refill: 3    Anxiety  -     ALPRAZolam (XANAX) 1 MG tablet; Take 1 tablet (1 mg total) by mouth 2 (two) times daily as needed (anxiety).  Dispense: 60 tablet; Refill: 3    Fibromyalgia  -     venlafaxine (EFFEXOR-XR) 150 MG Cp24; Take 1 capsule (150 mg total) by mouth once daily. For depression  Dispense: 90 capsule; Refill: 3    Moderate major depression, single episode  -     venlafaxine (EFFEXOR-XR) 150 MG Cp24; Take 1 capsule (150 mg total) by mouth once daily. For depression  Dispense: 90 capsule; Refill: 3    Lymphedema of both lower extremities  -     furosemide (LASIX) 20 MG tablet; Take 1 tablet (20 mg total) by mouth once daily. For blood pressure and swelling (Patient not taking: Reported on 9/22/2022)  Dispense: 90 tablet; Refill: 3    Gastroesophageal reflux disease, unspecified whether esophagitis present  -     omeprazole (PRILOSEC) 20 MG capsule; Take 1 capsule (20 mg total) by mouth once daily. For heartburn  Dispense: 90 capsule; Refill: 3    Hoarseness  Comments:  - hoarseness likely coming from undertreated acid reflux.  Orders:  -     omeprazole (PRILOSEC) 20 MG capsule; Take 1 capsule (20 mg total) by mouth once daily. For heartburn  Dispense: 90 capsule; Refill: 3    Follow up keep current.

## 2022-06-09 NOTE — PATIENT INSTRUCTIONS
Contact [facility] [employer] [place] [facility] for social assistance  Contact his PCP for home health information or get a caretaker   Please use your cane at all times

## 2022-06-13 PROCEDURE — G0179 MD RECERTIFICATION HHA PT: HCPCS | Mod: ,,, | Performed by: FAMILY MEDICINE

## 2022-06-13 PROCEDURE — G0179 PR HOME HEALTH MD RECERTIFICATION: ICD-10-PCS | Mod: ,,, | Performed by: FAMILY MEDICINE

## 2022-06-16 DIAGNOSIS — S43.014D ANTERIOR DISLOCATION OF RIGHT SHOULDER, SUBSEQUENT ENCOUNTER: ICD-10-CM

## 2022-06-16 RX ORDER — OXYCODONE AND ACETAMINOPHEN 5; 325 MG/1; MG/1
1 TABLET ORAL EVERY 6 HOURS PRN
Qty: 28 TABLET | Refills: 0 | Status: SHIPPED | OUTPATIENT
Start: 2022-06-16 | End: 2022-07-14 | Stop reason: SDUPTHER

## 2022-06-22 ENCOUNTER — EXTERNAL HOME HEALTH (OUTPATIENT)
Dept: HOME HEALTH SERVICES | Facility: HOSPITAL | Age: 72
End: 2022-06-22
Payer: MEDICARE

## 2022-06-23 ENCOUNTER — TELEPHONE (OUTPATIENT)
Dept: FAMILY MEDICINE | Facility: CLINIC | Age: 72
End: 2022-06-23

## 2022-06-23 DIAGNOSIS — J41.0 SIMPLE CHRONIC BRONCHITIS: ICD-10-CM

## 2022-06-23 RX ORDER — UMECLIDINIUM BROMIDE AND VILANTEROL TRIFENATATE 62.5; 25 UG/1; UG/1
1 POWDER RESPIRATORY (INHALATION) DAILY
Qty: 60 EACH | Refills: 11 | Status: SHIPPED | OUTPATIENT
Start: 2022-06-23 | End: 2022-12-08

## 2022-06-23 RX ORDER — ALBUTEROL SULFATE 90 UG/1
2 AEROSOL, METERED RESPIRATORY (INHALATION) EVERY 6 HOURS PRN
Qty: 18 G | Refills: 6 | Status: CANCELLED | OUTPATIENT
Start: 2022-06-23

## 2022-06-23 NOTE — TELEPHONE ENCOUNTER
----- Message from Loreta Wilder sent at 6/23/2022  4:56 PM CDT -----   3:37  Refill Anoro  inhaler [facility]

## 2022-06-23 NOTE — TELEPHONE ENCOUNTER
Called patient, call picked up and hung up.  Return call not answered.  Unable to leave voicemail at this time -DN

## 2022-06-23 NOTE — TELEPHONE ENCOUNTER
----- Message from Carissa Mcgee sent at 6/23/2022  1:46 PM CDT -----  Voicemail- Patient says her fibromyalgia is advanced. She's hurting from morning to night. Meds aren't working for the pain.  Patient's callback number is [phone].

## 2022-06-24 ENCOUNTER — PATIENT MESSAGE (OUTPATIENT)
Dept: PSYCHIATRY | Facility: CLINIC | Age: 72
End: 2022-06-24
Payer: MEDICARE

## 2022-07-06 DIAGNOSIS — Z96.611 STATUS POST REVERSE ARTHROPLASTY OF SHOULDER, RIGHT: Primary | ICD-10-CM

## 2022-07-11 ENCOUNTER — TELEPHONE (OUTPATIENT)
Dept: FAMILY MEDICINE | Facility: CLINIC | Age: 72
End: 2022-07-11

## 2022-07-11 NOTE — TELEPHONE ENCOUNTER
----- Message from Bria Garduno sent at 7/11/2022  9:31 AM CDT -----  Pt would like and rx for pain. She is having so much pain she can not stand it.   Imelda lafleurTrinity Health Grand Haven Hospitalin   [phone]

## 2022-07-11 NOTE — TELEPHONE ENCOUNTER
Spoke to pt and she states that her fibromyalgia is flaring and she thought it would get better as the pain usually comes and go but not this time. States she has been in pain for the past 2 weeks and it is getting worse. States it hurts to get out of bed and it hurts to walk. Pt states after she gets up in the morning, she can make it to the living room. States the light dose of gabapentin is no longer helping but she knows she can not take the higher dose due to the previous seizures she had while taking the higher dose. Pt states she had some oxycodone from her surgery and it does help her a lot but she would need a new rx for that. Pt states she would like to know what you advise for her.

## 2022-07-11 NOTE — TELEPHONE ENCOUNTER
----- Message from Bria Garduno sent at 7/11/2022 11:54 AM CDT -----  Pt is supposed to have blood work at [facility]. She says it was ordered at her last visit   [phone]

## 2022-07-11 NOTE — TELEPHONE ENCOUNTER
Spoke with patient notified no lab orders due at this time.  Patient verbalized understanding, requests rx for fibro pain.  States she is taking gabapentin which makes her loopy.  -DN    PRINTED

## 2022-07-12 ENCOUNTER — TELEPHONE (OUTPATIENT)
Dept: FAMILY MEDICINE | Facility: CLINIC | Age: 72
End: 2022-07-12

## 2022-07-12 NOTE — TELEPHONE ENCOUNTER
----- Message from Bria Garduno sent at 7/12/2022  2:55 PM CDT -----  - pt is returning a call   [phone]

## 2022-07-12 NOTE — TELEPHONE ENCOUNTER
----- Message from Bria Garduno sent at 7/12/2022 10:22 AM CDT -----  - pt has been calling since yesterday stating that she is in extreme pain due to fibromyalgia. Someone called yesterday and was asking her all kinds of questions and typing while she was talking. Asked her about her surgery and she stated that she did get pain pills from her surgery. She suggested that she take those medications from another doctor. That is not the reason she called yesterday. She called to talk to her doctor about her health problems. Does it always take this long to get an answer. Wishes someone would call her back today with answers. If we do not want her as a patient please let her know.   [phone]

## 2022-07-14 ENCOUNTER — HOSPITAL ENCOUNTER (OUTPATIENT)
Dept: RADIOLOGY | Facility: HOSPITAL | Age: 72
Discharge: HOME | End: 2022-07-14
Attending: ORTHOPAEDIC SURGERY
Payer: MEDICARE

## 2022-07-14 ENCOUNTER — OFFICE VISIT (OUTPATIENT)
Dept: ORTHOPEDICS | Facility: CLINIC | Age: 72
End: 2022-07-14
Payer: MEDICARE

## 2022-07-14 VITALS — RESPIRATION RATE: 18 BRPM | WEIGHT: 217 LBS | BODY MASS INDEX: 42.6 KG/M2 | HEIGHT: 60 IN

## 2022-07-14 DIAGNOSIS — Z96.652 STATUS POST TOTAL KNEE REPLACEMENT NOT USING CEMENT, LEFT: ICD-10-CM

## 2022-07-14 DIAGNOSIS — Z96.611 STATUS POST REVERSE ARTHROPLASTY OF SHOULDER, RIGHT: ICD-10-CM

## 2022-07-14 DIAGNOSIS — S43.014D ANTERIOR DISLOCATION OF RIGHT SHOULDER, SUBSEQUENT ENCOUNTER: ICD-10-CM

## 2022-07-14 DIAGNOSIS — Z96.611 STATUS POST REVERSE ARTHROPLASTY OF SHOULDER, RIGHT: Primary | ICD-10-CM

## 2022-07-14 PROCEDURE — 99213 PR OFFICE/OUTPT VISIT, EST, LEVL III, 20-29 MIN: ICD-10-PCS | Mod: S$GLB,,, | Performed by: ORTHOPAEDIC SURGERY

## 2022-07-14 PROCEDURE — 99213 OFFICE O/P EST LOW 20 MIN: CPT | Mod: S$GLB,,, | Performed by: ORTHOPAEDIC SURGERY

## 2022-07-14 PROCEDURE — 99999 PR PBB SHADOW E&M-EST. PATIENT-LVL IV: CPT | Mod: PBBFAC,,, | Performed by: ORTHOPAEDIC SURGERY

## 2022-07-14 PROCEDURE — 73030 X-RAY EXAM OF SHOULDER: CPT | Mod: 26,RT,, | Performed by: RADIOLOGY

## 2022-07-14 PROCEDURE — 1159F PR MEDICATION LIST DOCUMENTED IN MEDICAL RECORD: ICD-10-PCS | Mod: CPTII,S$GLB,, | Performed by: ORTHOPAEDIC SURGERY

## 2022-07-14 PROCEDURE — 1101F PT FALLS ASSESS-DOCD LE1/YR: CPT | Mod: CPTII,S$GLB,, | Performed by: ORTHOPAEDIC SURGERY

## 2022-07-14 PROCEDURE — 3044F HG A1C LEVEL LT 7.0%: CPT | Mod: CPTII,S$GLB,, | Performed by: ORTHOPAEDIC SURGERY

## 2022-07-14 PROCEDURE — 3008F BODY MASS INDEX DOCD: CPT | Mod: CPTII,S$GLB,, | Performed by: ORTHOPAEDIC SURGERY

## 2022-07-14 PROCEDURE — 3008F PR BODY MASS INDEX (BMI) DOCUMENTED: ICD-10-PCS | Mod: CPTII,S$GLB,, | Performed by: ORTHOPAEDIC SURGERY

## 2022-07-14 PROCEDURE — 4010F PR ACE/ARB THEARPY RXD/TAKEN: ICD-10-PCS | Mod: CPTII,S$GLB,, | Performed by: ORTHOPAEDIC SURGERY

## 2022-07-14 PROCEDURE — 3044F PR MOST RECENT HEMOGLOBIN A1C LEVEL <7.0%: ICD-10-PCS | Mod: CPTII,S$GLB,, | Performed by: ORTHOPAEDIC SURGERY

## 2022-07-14 PROCEDURE — 1160F PR REVIEW ALL MEDS BY PRESCRIBER/CLIN PHARMACIST DOCUMENTED: ICD-10-PCS | Mod: CPTII,S$GLB,, | Performed by: ORTHOPAEDIC SURGERY

## 2022-07-14 PROCEDURE — 1126F AMNT PAIN NOTED NONE PRSNT: CPT | Mod: CPTII,S$GLB,, | Performed by: ORTHOPAEDIC SURGERY

## 2022-07-14 PROCEDURE — 73030 X-RAY EXAM OF SHOULDER: CPT | Mod: TC,PN,RT

## 2022-07-14 PROCEDURE — 3288F PR FALLS RISK ASSESSMENT DOCUMENTED: ICD-10-PCS | Mod: CPTII,S$GLB,, | Performed by: ORTHOPAEDIC SURGERY

## 2022-07-14 PROCEDURE — 3288F FALL RISK ASSESSMENT DOCD: CPT | Mod: CPTII,S$GLB,, | Performed by: ORTHOPAEDIC SURGERY

## 2022-07-14 PROCEDURE — 4010F ACE/ARB THERAPY RXD/TAKEN: CPT | Mod: CPTII,S$GLB,, | Performed by: ORTHOPAEDIC SURGERY

## 2022-07-14 PROCEDURE — 1159F MED LIST DOCD IN RCRD: CPT | Mod: CPTII,S$GLB,, | Performed by: ORTHOPAEDIC SURGERY

## 2022-07-14 PROCEDURE — 1101F PR PT FALLS ASSESS DOC 0-1 FALLS W/OUT INJ PAST YR: ICD-10-PCS | Mod: CPTII,S$GLB,, | Performed by: ORTHOPAEDIC SURGERY

## 2022-07-14 PROCEDURE — 73030 XR SHOULDER TRAUMA 3 VIEW RIGHT: ICD-10-PCS | Mod: 26,RT,, | Performed by: RADIOLOGY

## 2022-07-14 PROCEDURE — 1160F RVW MEDS BY RX/DR IN RCRD: CPT | Mod: CPTII,S$GLB,, | Performed by: ORTHOPAEDIC SURGERY

## 2022-07-14 PROCEDURE — 1126F PR PAIN SEVERITY QUANTIFIED, NO PAIN PRESENT: ICD-10-PCS | Mod: CPTII,S$GLB,, | Performed by: ORTHOPAEDIC SURGERY

## 2022-07-14 PROCEDURE — 99999 PR PBB SHADOW E&M-EST. PATIENT-LVL IV: ICD-10-PCS | Mod: PBBFAC,,, | Performed by: ORTHOPAEDIC SURGERY

## 2022-07-14 RX ORDER — OXYCODONE AND ACETAMINOPHEN 5; 325 MG/1; MG/1
1 TABLET ORAL EVERY 6 HOURS PRN
Qty: 28 TABLET | Refills: 0 | Status: SHIPPED | OUTPATIENT
Start: 2022-07-14 | End: 2022-09-22

## 2022-07-14 NOTE — PROGRESS NOTES
Past Medical History:   Diagnosis Date    Allergy     Anxiety     Arthritis, rheumatoid     Back pain     Chronic bronchiolitis     Depression     Fibromyalgia     GERD (gastroesophageal reflux disease)     High cholesterol     Hilar mass 3/27/2014    [place] (hard of hearing)     aid right ear    [place] (hard of hearing)     Hypertension     Incontinence of urine     Lymphedema     MS (multiple sclerosis)     benign; another MD stated she has no MS    Neuropathy     Restless leg syndrome     Rotator cuff tear 4/16/2015    Sciatica of left side 1/5/2018    Seasonal allergies     Sinus congestion     Sleep apnea     DOES NOT USE MACHINE    Spondylolysis     Thyroid disease     Type 2 diabetes mellitus with polyneuropathy     no med. was borderline    Wheezing        Past Surgical History:   Procedure Laterality Date    APPENDECTOMY      ARTHROSCOPIC DEBRIDEMENT OF SHOULDER Right 2/18/2022    Procedure: EXTENSIVE DEBRIDEMENT, SHOULDER, ARTHROSCOPIC;  Surgeon: Rio Pitts MD;  Location: [facility] OR;  Service: Orthopedics;  Laterality: Right;    BREAST SURGERY      reduction    CLOSED REDUCTION OF INJURY OF SHOULDER Right 9/19/2021    Procedure: CLOSED REDUCTION, SHOULDER;  Surgeon: Antonio Irwin MD;  Location: [facility] OR;  Service: Orthopedics;  Laterality: Right;    EPIDURAL STEROID INJECTION INTO LUMBAR SPINE N/A 6/12/2019    Procedure: Injection-steroid-epidural-lumbar;  Surgeon: Thad Manning MD;  Location: [facility] OR;  Service: Pain Management;  Laterality: N/A;  L5-S1    EPIDURAL STEROID INJECTION INTO LUMBAR SPINE N/A 9/16/2019    Procedure: Injection-steroid-epidural-lumbar;  Surgeon: Thad Manning MD;  Location: [facility] OR;  Service: Pain Management;  Laterality: N/A;  L5-S1    EYE SURGERY Bilateral     HYSTERECTOMY      partial - fibroids    INJECTION OF ANESTHETIC AGENT AROUND MEDIAL BRANCH NERVES INNERVATING LUMBAR FACET JOINT Bilateral 2/28/2019    Procedure:  Block-nerve-medial branch-lumbar;  Surgeon: Thad Manning MD;  Location: [facility];  Service: Pain Management;  Laterality: Bilateral;  L3, 4,5     INJECTION OF ANESTHETIC AGENT AROUND MEDIAL BRANCH NERVES INNERVATING LUMBAR FACET JOINT Bilateral 3/21/2019    Procedure: Block-nerve-medial branch-lumbar L3,4,5;  Surgeon: Thad Manning MD;  [facility];  Service: Pain Management;  Laterality: Bilateral;    KNEE ARTHROPLASTY Left 3/16/2021    Procedure: ARTHROPLASTY, KNEE;  Surgeon: Rio Pitts MD;  Location: [facility] OR;  Service: Orthopedics;  Laterality: Left;  GENERAL AND BLOCK    LIPOSUCTION  1985    RADIOFREQUENCY ABLATION Left 11/4/2020    Procedure: Radiofrequency Ablation left knee;  Surgeon: Andrew Escudero MD;  Location: [facility] OR;  Service: Pain Management;  Laterality: Left;    RADIOFREQUENCY ABLATION OF LUMBAR MEDIAL BRANCH NERVE AT SINGLE LEVEL Bilateral 4/12/2019    Procedure: Radiofrequency Ablation, Nerve, Spinal, Lumbar, Medial Branch, 1 Level;  Surgeon: Thad Manning MD;  Location: [facility];  Service: Pain Management;  Laterality: Bilateral;  L3, 4, 5  lumbar  [email or web address] pain management generator  SN [id number]  80 degrees for 75 seconds x2    RADIOFREQUENCY ABLATION OF LUMBAR MEDIAL BRANCH NERVE AT SINGLE LEVEL Bilateral 10/22/2019    Procedure: Radiofrequency Ablation, Nerve, Spinal, Lumbar, Medial Branch, L3,4,5;  Surgeon: Thad Manning MD;  Location: [facility];  Service: Pain Management;  Laterality: Bilateral;  burned at 80 degrees C X 60 seconds X 2 each site    RADIOFREQUENCY ABLATION OF LUMBAR MEDIAL BRANCH NERVE AT SINGLE LEVEL Bilateral 5/27/2020    Procedure: Radiofrequency Ablation, Nerve, Spinal, Lumbar, Medial Branch, 1 Level;  Surgeon: Thad Manning MD;  Location: [facility];  Service: Pain Management;  Laterality: Bilateral;  L3,4,5    REVERSE TOTAL SHOULDER ARTHROPLASTY Right 4/12/2022    Procedure: ARTHROPLASTY, SHOULDER, TOTAL, REVERSE;  Surgeon: Rio Pitts MD;   Location: [facility] OR;  Service: Orthopedics;  Laterality: Right;    SHOULDER ARTHROSCOPY Right 9/19/2021    Procedure: ARTHROSCOPY, SHOULDER;  Surgeon: Antonio Irwin MD;  Location: [facility] OR;  Service: Orthopedics;  Laterality: Right;  LIMITED DEBRIDMENT    TONSILLECTOMY      TOTAL REDUCTION MAMMOPLASTY         Current Outpatient Medications   Medication Sig    albuterol (PROAIR HFA) 90 mcg/actuation inhaler Inhale 2 puffs into the lungs every 6 (six) hours as needed for Wheezing. Rescue    ALPRAZolam (XANAX) 1 MG tablet Take 1 tablet (1 mg total) by mouth 2 (two) times daily as needed (anxiety).    amLODIPine (NORVASC) 5 MG tablet Take 1 tablet (5 mg total) by mouth once daily. For blood pressure    coffee xt/phosphatidyl serine (NEURIVA ORIGINAL ORAL) Take by mouth.    fexofenadine (ALLEGRA) 180 MG tablet     furosemide (LASIX) 20 MG tablet Take 1 tablet (20 mg total) by mouth once daily. For blood pressure and swelling    levothyroxine (SYNTHROID) 88 MCG tablet Take 1 tablet (88 mcg total) by mouth once daily.    losartan (COZAAR) 50 MG tablet Take 1 tablet (50 mg total) by mouth once daily. For blood pressure    lovastatin (MEVACOR) 40 MG tablet Take 1 tablet (40 mg total) by mouth nightly. at bedtime. For cholesterol    metoprolol succinate (TOPROL-XL) 50 MG 24 hr tablet Take 1 tablet (50 mg total) by mouth once daily. For blood pressure and heart    MULTIVIT-IRON-MIN-FOLIC ACID 3,500-18-0.4 UNIT-MG-MG ORAL CHEW Take 1 tablet by mouth.    nystatin (MYCOSTATIN) cream Apply topically 2 (two) times daily.    omeprazole (PRILOSEC) 20 MG capsule Take 1 capsule (20 mg total) by mouth once daily. For heartburn    oxyCODONE-acetaminophen (PERCOCET) 5-325 mg per tablet Take 1 tablet by mouth every 6 (six) hours as needed for Pain (not controlled by tylenol).    potassium chloride (MICRO-K) 10 MEQ CpSR Take 1 capsule (10 mEq total) by mouth every evening.    pramipexole (MIRAPEX) 0.5 MG tablet Take 2  tablets (1 mg total) by mouth every evening. For restless legs    pregabalin (LYRICA) 75 MG capsule Take 1 capsule (75 mg total) by mouth 2 (two) times daily.    umeclidinium-vilanteroL (ANORO ELLIPTA) 62.5-25 mcg/actuation DsDv Inhale 1 puff into the lungs once daily. Controller    valacyclovir HCl (VALACYCLOVIR ORAL) Take by mouth.    venlafaxine (EFFEXOR-XR) 150 MG Cp24 Take 1 capsule (150 mg total) by mouth once daily. For depression     No current facility-administered medications for this visit.       Review of patient's allergies indicates:   Allergen Reactions    Keflex [cephalexin]      Throat swelling    Pcn [penicillins]      Throat swelling    Latex, natural rubber Other (See Comments)     Redness with bandaids     Adhesive Other (See Comments)     bandainds redness at skin    Trelegy ellipta [fluticasone-umeclidin-vilanter]      Vision disturbance       Family History   Problem Relation Age of Onset    Heart disease Mother        Social History     Socioeconomic History    Marital status:    Tobacco Use    Smoking status: Former Smoker     Quit date: 1996     Years since quittin.1    Smokeless tobacco: Never Used   Substance and Sexual Activity    Alcohol use: Yes     Comment: very little     Drug use: No       Chief Complaint:   Chief Complaint   Patient presents with    Right Shoulder - Post-op Evaluation       Date of surgery:  2022    History of present illness:  72-year-old female underwent right reverse total shoulder arthroplasty for a rotator cuff tear and significant glenoid bone loss with shoulder instability.  Patient had very poor bone quality and the fixation of the glenosphere was less than ideal.  Pain is a 0/10.  She started physical therapy and it is going well.  She has good days and bad days but overall feels she is doing well.  Having some pain in her left knee as well.  Did have an injury or trauma.  Started after we injected her right  knee.      Review of Systems:    Musculoskeletal:  See HPI        Physical Examination:    Vital Signs:    Vitals:    07/14/22 0816   Resp: 18       Body mass index is 42.38 kg/m².    This a well-developed, well nourished patient in no acute distress.  They are alert and oriented and cooperative to examination.  Pt. walks without an antalgic gait.      Examination of the right shoulder shows healed surgical incision.  No erythema drainage.  No bruising or swelling.  She is neurovascularly intact.  Passive forward flexion of about 130° with external rotation of 70°.    X-rays:  X-rays of the right shoulder is ordered and reviewed which show well-aligned reverse total shoulder arthroplasty without complications     Assessment::  Status post right reverse total shoulder arthroplasty    Plan:  Reviewed the findings with her today.  Continue with the physical therapy.  Follow-up in 3 months.  I would like x-rays of both of her knees at that visit.    This note was created using [employer] voice recognition software that occasionally misinterpreted phrases or words.

## 2022-07-15 NOTE — TELEPHONE ENCOUNTER
Per Dr Marquez should be taking lyrica not gabapentin.  And office visit with ortho on 7/14/22 stated no pain at this time.  Can increase lyrica if needed. -DN     Spoke with patient states she is taking gabapentin, was not aware of the lyrica.  States the lyrica is not going to work, requests increased dose. -CARL

## 2022-07-18 RX ORDER — PREGABALIN 150 MG/1
150 CAPSULE ORAL 2 TIMES DAILY
Qty: 60 CAPSULE | Refills: 6 | Status: SHIPPED | OUTPATIENT
Start: 2022-07-18 | End: 2022-10-31

## 2022-07-20 ENCOUNTER — TELEPHONE (OUTPATIENT)
Dept: FAMILY MEDICINE | Facility: CLINIC | Age: 72
End: 2022-07-20
Payer: MEDICARE

## 2022-07-20 NOTE — TELEPHONE ENCOUNTER
----- Message from Fernando Aleman sent at 7/20/2022  9:48 AM CDT -----  Contact: pt at [phone]  Type:  Sooner Appointment Request    Caller is requesting a sooner appointment.  Caller declined first available appointment listed below.  Caller will not accept being placed on the waitlist and is requesting a message be sent to doctor.    Name of Caller:  Pt  When is the first available appointment?  N/A  Symptoms:  Establish care  Best Call Back Number:  [phone]   Additional Information:  Pt is calling the office to establish care. No appts in [email or web address]   PER THE PT PLEASE CALL PT BACK TODAY

## 2022-07-20 NOTE — TELEPHONE ENCOUNTER
----- Message from Fernando Aleman sent at 7/20/2022  9:48 AM CDT -----  Contact: pt at [phone]  Type:  Sooner Appointment Request    Caller is requesting a sooner appointment.  Caller declined first available appointment listed below.  Caller will not accept being placed on the waitlist and is requesting a message be sent to doctor.    Name of Caller:  Pt  When is the first available appointment?  N/A  Symptoms:  Establish care  Best Call Back Number:  [phone]   Additional Information:  Pt is calling the office to establish care. No appts in [employer]..   PER THE PT PLEASE CALL PT BACK TODAY

## 2022-07-25 ENCOUNTER — TELEPHONE (OUTPATIENT)
Dept: CARDIOLOGY | Facility: CLINIC | Age: 72
End: 2022-07-25
Payer: MEDICARE

## 2022-07-25 NOTE — TELEPHONE ENCOUNTER
----- Message from Esthela Chew sent at 7/25/2022 12:04 PM CDT -----  Contact: pt  Type: Needs Medical Advice         Who Called: pt  Best Call Back Number:[phone]  Additional Information: Requesting a call back regarding   pt is wanting to est care with office. However pt is only wanting a female provider.   Please Advise- Thank you

## 2022-07-25 NOTE — TELEPHONE ENCOUNTER
Spoke to told her we only have male cardiologist. She was ok with seeing Alexy Smith in September. She isn't having any problems.

## 2022-07-28 ENCOUNTER — DOCUMENT SCAN (OUTPATIENT)
Dept: HOME HEALTH SERVICES | Facility: HOSPITAL | Age: 72
End: 2022-07-28
Payer: MEDICARE

## 2022-08-03 ENCOUNTER — TELEPHONE (OUTPATIENT)
Dept: FAMILY MEDICINE | Facility: CLINIC | Age: 72
End: 2022-08-03
Payer: MEDICARE

## 2022-08-03 NOTE — TELEPHONE ENCOUNTER
Spoke with patient in regards to her message. Instructed the patient that she can use her nebulizer every 4 hours as needed. Patient verbalized understanding. Also instructed the patient to contact the clinic back if her symptoms did not improve, and we would reschedule her appointment.

## 2022-08-03 NOTE — TELEPHONE ENCOUNTER
----- Message from Mariely Tavares sent at 8/3/2022  9:58 AM CDT -----  Regarding: pt called  Name of Who is Calling: OBED CORMIER [[id number]]      What is the request in detail: pt is requesting to speak with a nurse. She has been wheezing and  heavy and fast breathing .Her appt in not until September. Please advise       Can the clinic reply by [facility]: No       [facility]: [phone]

## 2022-08-03 NOTE — TELEPHONE ENCOUNTER
Called and spoke with pt. Pt states she is having cough and wheezing for the past few days and it is getting worse. Pt scheduled for a same day appt with one of our provides.

## 2022-08-03 NOTE — TELEPHONE ENCOUNTER
----- Message from Janine Huston sent at 8/3/2022 12:59 PM CDT -----  Who Called: Patient    What is the reqeust in detail: Requesting call back to discuss cancelling her today 08/03/22 appointment. Patient cannot make it today and does not know what else she should be doing for her wheezing. Please advise.     Can the clinic reply by [facility]? No    Best Call Back Number: [phone]    Additional Information:

## 2022-08-09 ENCOUNTER — OFFICE VISIT (OUTPATIENT)
Dept: FAMILY MEDICINE | Facility: CLINIC | Age: 72
End: 2022-08-09
Payer: MEDICARE

## 2022-08-09 VITALS
DIASTOLIC BLOOD PRESSURE: 80 MMHG | SYSTOLIC BLOOD PRESSURE: 130 MMHG | WEIGHT: 222.69 LBS | TEMPERATURE: 99 F | BODY MASS INDEX: 43.72 KG/M2 | OXYGEN SATURATION: 96 % | HEART RATE: 60 BPM | HEIGHT: 60 IN

## 2022-08-09 DIAGNOSIS — I10 ESSENTIAL HYPERTENSION: ICD-10-CM

## 2022-08-09 DIAGNOSIS — Z78.0 ENCOUNTER FOR OSTEOPOROSIS SCREENING IN ASYMPTOMATIC POSTMENOPAUSAL PATIENT: ICD-10-CM

## 2022-08-09 DIAGNOSIS — E11.9 DIET-CONTROLLED TYPE 2 DIABETES MELLITUS: ICD-10-CM

## 2022-08-09 DIAGNOSIS — Z76.0 MEDICATION REFILL: Primary | ICD-10-CM

## 2022-08-09 DIAGNOSIS — I89.0 LYMPHEDEMA OF BOTH LOWER EXTREMITIES: ICD-10-CM

## 2022-08-09 DIAGNOSIS — E78.2 MIXED HYPERLIPIDEMIA: ICD-10-CM

## 2022-08-09 DIAGNOSIS — G25.81 RESTLESS LEG SYNDROME: ICD-10-CM

## 2022-08-09 DIAGNOSIS — Z13.820 ENCOUNTER FOR OSTEOPOROSIS SCREENING IN ASYMPTOMATIC POSTMENOPAUSAL PATIENT: ICD-10-CM

## 2022-08-09 DIAGNOSIS — M06.9 RHEUMATOID ARTHRITIS, INVOLVING UNSPECIFIED SITE, UNSPECIFIED WHETHER RHEUMATOID FACTOR PRESENT: ICD-10-CM

## 2022-08-09 DIAGNOSIS — F41.9 ANXIETY: ICD-10-CM

## 2022-08-09 PROCEDURE — 3075F SYST BP GE 130 - 139MM HG: CPT | Mod: CPTII,S$GLB,, | Performed by: NURSE PRACTITIONER

## 2022-08-09 PROCEDURE — 1159F MED LIST DOCD IN RCRD: CPT | Mod: CPTII,S$GLB,, | Performed by: NURSE PRACTITIONER

## 2022-08-09 PROCEDURE — 3075F PR MOST RECENT SYSTOLIC BLOOD PRESS GE 130-139MM HG: ICD-10-PCS | Mod: CPTII,S$GLB,, | Performed by: NURSE PRACTITIONER

## 2022-08-09 PROCEDURE — 3288F FALL RISK ASSESSMENT DOCD: CPT | Mod: CPTII,S$GLB,, | Performed by: NURSE PRACTITIONER

## 2022-08-09 PROCEDURE — 1159F PR MEDICATION LIST DOCUMENTED IN MEDICAL RECORD: ICD-10-PCS | Mod: CPTII,S$GLB,, | Performed by: NURSE PRACTITIONER

## 2022-08-09 PROCEDURE — 99499 RISK ADDL DX/OHS AUDIT: ICD-10-PCS | Mod: S$GLB,,, | Performed by: NURSE PRACTITIONER

## 2022-08-09 PROCEDURE — 3288F PR FALLS RISK ASSESSMENT DOCUMENTED: ICD-10-PCS | Mod: CPTII,S$GLB,, | Performed by: NURSE PRACTITIONER

## 2022-08-09 PROCEDURE — 3079F DIAST BP 80-89 MM HG: CPT | Mod: CPTII,S$GLB,, | Performed by: NURSE PRACTITIONER

## 2022-08-09 PROCEDURE — 1160F RVW MEDS BY RX/DR IN RCRD: CPT | Mod: CPTII,S$GLB,, | Performed by: NURSE PRACTITIONER

## 2022-08-09 PROCEDURE — 3079F PR MOST RECENT DIASTOLIC BLOOD PRESSURE 80-89 MM HG: ICD-10-PCS | Mod: CPTII,S$GLB,, | Performed by: NURSE PRACTITIONER

## 2022-08-09 PROCEDURE — 99499 UNLISTED E&M SERVICE: CPT | Mod: S$GLB,,, | Performed by: NURSE PRACTITIONER

## 2022-08-09 PROCEDURE — 1160F PR REVIEW ALL MEDS BY PRESCRIBER/CLIN PHARMACIST DOCUMENTED: ICD-10-PCS | Mod: CPTII,S$GLB,, | Performed by: NURSE PRACTITIONER

## 2022-08-09 PROCEDURE — 1126F PR PAIN SEVERITY QUANTIFIED, NO PAIN PRESENT: ICD-10-PCS | Mod: CPTII,S$GLB,, | Performed by: NURSE PRACTITIONER

## 2022-08-09 PROCEDURE — 99214 PR OFFICE/OUTPT VISIT, EST, LEVL IV, 30-39 MIN: ICD-10-PCS | Mod: S$GLB,,, | Performed by: NURSE PRACTITIONER

## 2022-08-09 PROCEDURE — 1100F PR PT FALLS ASSESS DOC 2+ FALLS/FALL W/INJURY/YR: ICD-10-PCS | Mod: CPTII,S$GLB,, | Performed by: NURSE PRACTITIONER

## 2022-08-09 PROCEDURE — 99999 PR PBB SHADOW E&M-EST. PATIENT-LVL V: CPT | Mod: PBBFAC,,, | Performed by: NURSE PRACTITIONER

## 2022-08-09 PROCEDURE — 99999 PR PBB SHADOW E&M-EST. PATIENT-LVL V: ICD-10-PCS | Mod: PBBFAC,,, | Performed by: NURSE PRACTITIONER

## 2022-08-09 PROCEDURE — 3008F BODY MASS INDEX DOCD: CPT | Mod: CPTII,S$GLB,, | Performed by: NURSE PRACTITIONER

## 2022-08-09 PROCEDURE — 1126F AMNT PAIN NOTED NONE PRSNT: CPT | Mod: CPTII,S$GLB,, | Performed by: NURSE PRACTITIONER

## 2022-08-09 PROCEDURE — 99214 OFFICE O/P EST MOD 30 MIN: CPT | Mod: S$GLB,,, | Performed by: NURSE PRACTITIONER

## 2022-08-09 PROCEDURE — 4010F ACE/ARB THERAPY RXD/TAKEN: CPT | Mod: CPTII,S$GLB,, | Performed by: NURSE PRACTITIONER

## 2022-08-09 PROCEDURE — 4010F PR ACE/ARB THEARPY RXD/TAKEN: ICD-10-PCS | Mod: CPTII,S$GLB,, | Performed by: NURSE PRACTITIONER

## 2022-08-09 PROCEDURE — 3044F PR MOST RECENT HEMOGLOBIN A1C LEVEL <7.0%: ICD-10-PCS | Mod: CPTII,S$GLB,, | Performed by: NURSE PRACTITIONER

## 2022-08-09 PROCEDURE — 1100F PTFALLS ASSESS-DOCD GE2>/YR: CPT | Mod: CPTII,S$GLB,, | Performed by: NURSE PRACTITIONER

## 2022-08-09 PROCEDURE — 3044F HG A1C LEVEL LT 7.0%: CPT | Mod: CPTII,S$GLB,, | Performed by: NURSE PRACTITIONER

## 2022-08-09 PROCEDURE — 3008F PR BODY MASS INDEX (BMI) DOCUMENTED: ICD-10-PCS | Mod: CPTII,S$GLB,, | Performed by: NURSE PRACTITIONER

## 2022-08-09 RX ORDER — LOSARTAN POTASSIUM 50 MG/1
50 TABLET ORAL DAILY
Qty: 90 TABLET | Refills: 0 | Status: ON HOLD | OUTPATIENT
Start: 2022-08-09 | End: 2023-06-25 | Stop reason: HOSPADM

## 2022-08-09 RX ORDER — POTASSIUM CHLORIDE 750 MG/1
10 CAPSULE, EXTENDED RELEASE ORAL NIGHTLY
Qty: 90 CAPSULE | Refills: 0 | Status: SHIPPED | OUTPATIENT
Start: 2022-08-09 | End: 2022-09-22

## 2022-08-09 NOTE — PROGRESS NOTES
Subjective:       Patient ID: Genoveva Gilbert is a 72 y.o. female.    Chief Complaint: Medication Refill    HPI   71 y/o female patient with medical problems listed below presents for medication refill. Patient states has been adherent with medication regimen. Patient states noticed more swelling in lower legs. Patient currently takes furosemide 20 mg daily. Denies cough, chest pain, sob, nausea, abdominal pain, fever, chills.   States had mammogram done in [place] in 12/2021      Patient Active Problem List   Diagnosis    Hypothyroidism    Fibromyalgia    Anemia    Hyperlipidemia    Essential hypertension    Lymphadenopathy, hilar    Back pain, chronic    Polypharmacy    Gastroesophageal reflux disease    Morbid obesity with BMI of 45.0-49.9, adult    NAFLD (nonalcoholic fatty liver disease)    Bilateral sciatica    Subjective memory complaints    Other spondylosis, lumbar region    Lumbar radiculitis    Lymphedema of both lower extremities    Heart failure    Localized, primary osteoarthritis of shoulder region    Moderate major depression, single episode    Obstructive sleep apnea syndrome    Simple chronic bronchitis    Rheumatoid arthritis    Restless legs syndrome    Type 2 diabetes mellitus with polyneuropathy    Insomnia    Chronic renal insufficiency, stage III (moderate)    Multiple sclerosis    Osteoarthritis of left knee    Diet-controlled type 2 diabetes mellitus    Dislocation closed    Pain    Traumatic tear of right rotator cuff, subsequent encounter    Status post Domenic reverse arthroplasty of shoulder, right April 12, 2022        Review of patient's allergies indicates:   Allergen Reactions    Keflex [cephalexin]      Throat swelling    Pcn [penicillins]      Throat swelling    Latex, natural rubber Other (See Comments)     Redness with bandaids     Adhesive Other (See Comments)     bandainds redness at skin    Trelegy ellipta [fluticasone-umeclidin-vilanter]       Vision disturbance       Past Surgical History:   Procedure Laterality Date    APPENDECTOMY      ARTHROSCOPIC DEBRIDEMENT OF SHOULDER Right 2/18/2022    Procedure: EXTENSIVE DEBRIDEMENT, SHOULDER, ARTHROSCOPIC;  Surgeon: Rio Pitts MD;  Location: [facility] OR;  Service: Orthopedics;  Laterality: Right;    BREAST SURGERY      reduction    CLOSED REDUCTION OF INJURY OF SHOULDER Right 9/19/2021    Procedure: CLOSED REDUCTION, SHOULDER;  Surgeon: Antonio Irwin MD;  Location: [facility] OR;  Service: Orthopedics;  Laterality: Right;    EPIDURAL STEROID INJECTION INTO LUMBAR SPINE N/A 6/12/2019    Procedure: Injection-steroid-epidural-lumbar;  Surgeon: Thad Manning MD;  Location: [facility];  Service: Pain Management;  Laterality: N/A;  L5-S1    EPIDURAL STEROID INJECTION INTO LUMBAR SPINE N/A 9/16/2019    Procedure: Injection-steroid-epidural-lumbar;  Surgeon: Thad Manning MD;  Location: [facility];  Service: Pain Management;  Laterality: N/A;  L5-S1    EYE SURGERY Bilateral     HYSTERECTOMY      partial - fibroids    INJECTION OF ANESTHETIC AGENT AROUND MEDIAL BRANCH NERVES INNERVATING LUMBAR FACET JOINT Bilateral 2/28/2019    Procedure: Block-nerve-medial branch-lumbar;  Surgeon: Thad Manning MD;  Location: [facility];  Service: Pain Management;  Laterality: Bilateral;  L3, 4,5     INJECTION OF ANESTHETIC AGENT AROUND MEDIAL BRANCH NERVES INNERVATING LUMBAR FACET JOINT Bilateral 3/21/2019    Procedure: Block-nerve-medial branch-lumbar L3,4,5;  Surgeon: Thad Manning MD;  Location: [facility];  Service: Pain Management;  Laterality: Bilateral;    KNEE ARTHROPLASTY Left 3/16/2021    Procedure: ARTHROPLASTY, KNEE;  Surgeon: Rio Pitts MD;  Location: [facility] OR;  Service: Orthopedics;  Laterality: Left;  GENERAL AND BLOCK    LIPOSUCTION  1985    RADIOFREQUENCY ABLATION Left 11/4/2020    Procedure: Radiofrequency Ablation left knee;  Surgeon: Andrew Escudero MD;  Location: [facility] OR;  Service: Pain Management;   Laterality: Left;    RADIOFREQUENCY ABLATION OF LUMBAR MEDIAL BRANCH NERVE AT SINGLE LEVEL Bilateral 4/12/2019    Procedure: Radiofrequency Ablation, Nerve, Spinal, Lumbar, Medial Branch, 1 Level;  Surgeon: Thad Manning MD;  Location: [facility] OR;  Service: Pain Management;  Laterality: Bilateral;  L3, 4, 5  lumbar  Wiki-PR pain management generator  SN [id number]  80 degrees for 75 seconds x2    RADIOFREQUENCY ABLATION OF LUMBAR MEDIAL BRANCH NERVE AT SINGLE LEVEL Bilateral 10/22/2019    Procedure: Radiofrequency Ablation, Nerve, Spinal, Lumbar, Medial Branch, L3,4,5;  Surgeon: Thad Manning MD;  Location: [facility] OR;  Service: Pain Management;  Laterality: Bilateral;  burned at 80 degrees C X 60 seconds X 2 each site    RADIOFREQUENCY ABLATION OF LUMBAR MEDIAL BRANCH NERVE AT SINGLE LEVEL Bilateral 5/27/2020    Procedure: Radiofrequency Ablation, Nerve, Spinal, Lumbar, Medial Branch, 1 Level;  Surgeon: Thad Manning MD;  Location: [facility] OR;  Service: Pain Management;  Laterality: Bilateral;  L3,4,5    REVERSE TOTAL SHOULDER ARTHROPLASTY Right 4/12/2022    Procedure: ARTHROPLASTY, SHOULDER, TOTAL, REVERSE;  Surgeon: Rio Pitts MD;  Location: [facility] OR;  Service: Orthopedics;  Laterality: Right;    SHOULDER ARTHROSCOPY Right 9/19/2021    Procedure: ARTHROSCOPY, SHOULDER;  Surgeon: Antonio Irwin MD;  Location: [facility] OR;  Service: Orthopedics;  Laterality: Right;  LIMITED DEBRIDMENT    TONSILLECTOMY      TOTAL REDUCTION MAMMOPLASTY          Current Outpatient Medications:     albuterol (PROAIR HFA) 90 mcg/actuation inhaler, Inhale 2 puffs into the lungs every 6 (six) hours as needed for Wheezing. Rescue, Disp: 18 g, Rfl: 6    ALPRAZolam (XANAX) 1 MG tablet, Take 1 tablet (1 mg total) by mouth 2 (two) times daily as needed (anxiety)., Disp: 60 tablet, Rfl: 3    amLODIPine (NORVASC) 5 MG tablet, Take 1 tablet (5 mg total) by mouth once daily. For blood pressure, Disp: 90 tablet, Rfl: 3    fexofenadine  (ALLEGRA) 180 MG tablet, , Disp: , Rfl:     furosemide (LASIX) 20 MG tablet, Take 1 tablet (20 mg total) by mouth once daily. For blood pressure and swelling, Disp: 90 tablet, Rfl: 3    levothyroxine (SYNTHROID) 88 MCG tablet, Take 1 tablet (88 mcg total) by mouth once daily., Disp: 90 tablet, Rfl: 3    lovastatin (MEVACOR) 40 MG tablet, Take 1 tablet (40 mg total) by mouth nightly. at bedtime. For cholesterol, Disp: 90 tablet, Rfl: 3    metoprolol succinate (TOPROL-XL) 50 MG 24 hr tablet, Take 1 tablet (50 mg total) by mouth once daily. For blood pressure and heart, Disp: 90 tablet, Rfl: 3    MULTIVIT-IRON-MIN-FOLIC ACID 3,500-18-0.4 UNIT-MG-MG ORAL CHEW, Take 1 tablet by mouth., Disp: , Rfl:     omeprazole (PRILOSEC) 20 MG capsule, Take 1 capsule (20 mg total) by mouth once daily. For heartburn, Disp: 90 capsule, Rfl: 3    pramipexole (MIRAPEX) 0.5 MG tablet, Take 2 tablets (1 mg total) by mouth every evening. For restless legs, Disp: 180 tablet, Rfl: 3    pregabalin (LYRICA) 150 MG capsule, Take 1 capsule (150 mg total) by mouth 2 (two) times daily., Disp: 60 capsule, Rfl: 6    pregabalin (LYRICA) 75 MG capsule, Take 1 capsule (75 mg total) by mouth 2 (two) times daily., Disp: 180 capsule, Rfl: 1    umeclidinium-vilanteroL (ANORO ELLIPTA) 62.5-25 mcg/actuation DsDv, Inhale 1 puff into the lungs once daily. Controller, Disp: 60 each, Rfl: 11    valacyclovir HCl (VALACYCLOVIR ORAL), Take by mouth., Disp: , Rfl:     venlafaxine (EFFEXOR-XR) 150 MG Cp24, Take 1 capsule (150 mg total) by mouth once daily. For depression, Disp: 90 capsule, Rfl: 3    coffee xt/phosphatidyl serine (NEURIVA ORIGINAL ORAL), Take by mouth., Disp: , Rfl:     losartan (COZAAR) 50 MG tablet, Take 1 tablet (50 mg total) by mouth once daily. For blood pressure, Disp: 90 tablet, Rfl: 0    nystatin (MYCOSTATIN) cream, Apply topically 2 (two) times daily. (Patient not taking: Reported on 8/9/2022), Disp: 60 g, Rfl: 1     oxyCODONE-acetaminophen (PERCOCET) 5-325 mg per tablet, Take 1 tablet by mouth every 6 (six) hours as needed for Pain (not controlled by tylenol). (Patient not taking: Reported on 8/9/2022), Disp: 28 tablet, Rfl: 0    potassium chloride (MICRO-K) 10 MEQ CpSR, Take 1 capsule (10 mEq total) by mouth every evening., Disp: 90 capsule, Rfl: 0    Lab Results   Component Value Date    WBC 10.08 04/13/2022    HGB 9.4 (L) 04/13/2022    HCT 29.5 (L) 04/13/2022     04/13/2022    CHOL 225 (H) 03/15/2022    TRIG 212 (H) 03/15/2022    HDL 44 (L) 03/15/2022    ALT 18 03/15/2022    AST 25 03/15/2022     04/13/2022    K 3.8 04/13/2022     04/13/2022    CREATININE 0.9 04/13/2022    BUN 15 04/13/2022    CO2 24 04/13/2022    TSH 1.728 05/14/2019    INR 1.0 07/03/2017    HGBA1C 6.3 (H) 03/15/2022     Above labs reviewed    Review of Systems   Constitutional: Negative for chills and fever.   Respiratory: Negative for cough, chest tightness and shortness of breath.    Cardiovascular: Negative for chest pain and palpitations.   Gastrointestinal: Negative for abdominal pain.   Neurological: Negative for dizziness and headaches.       Objective:   /80 (BP Location: Left arm, Patient Position: Sitting, BP Method: Medium (Manual))   Pulse 60   Temp 98.6 °F (37 °C) (Oral)   Ht 5' (1.524 m)   Wt 101 kg (222 lb 10.6 oz)   SpO2 96%   BMI 43.49 kg/m²       Physical Exam  Constitutional:       General: She is not in acute distress.     Appearance: Normal appearance.   HENT:      Head: Atraumatic.   Cardiovascular:      Rate and Rhythm: Normal rate and regular rhythm.      Pulses: Normal pulses.      Heart sounds: Normal heart sounds.   Pulmonary:      Effort: Pulmonary effort is normal.      Breath sounds: Normal breath sounds.   Abdominal:      General: Abdomen is flat. Bowel sounds are normal.      Palpations: Abdomen is soft.   Musculoskeletal:         General: Swelling present.      Right lower leg: Edema present.       Left lower leg: Edema present.   Skin:     General: Skin is warm and dry.   Neurological:      General: No focal deficit present.      Mental Status: She is alert and oriented to person, place, and time.   Psychiatric:         Mood and Affect: Mood normal.         Assessment:       1. Medication refill    2. Rheumatoid arthritis, involving unspecified site, unspecified whether rheumatoid factor present    3. Essential hypertension    4. Restless leg syndrome    5. Mixed hyperlipidemia    6. Diet-controlled type 2 diabetes mellitus    7. Encounter for osteoporosis screening in asymptomatic postmenopausal patient    8. Anxiety    9. Lymphedema of both lower extremities        Plan:       1. Rheumatoid arthritis, involving unspecified site, unspecified whether rheumatoid factor present    2. Essential hypertension  - Controlled and continue with current regimen  - losartan (COZAAR) 50 MG tablet; Take 1 tablet (50 mg total) by mouth once daily. For blood pressure  Dispense: 90 tablet; Refill: 0  - CBC Auto Differential; Future  - Comprehensive Metabolic Panel; Future    3. Restless leg syndrome  - continue with current regimen    4. Mixed hyperlipidemia  - Lipid Panel; Future    5. Diet-controlled type 2 diabetes mellitus  - Hemoglobin A1C; Future    6. Encounter for osteoporosis screening in asymptomatic postmenopausal patient  - DXA Bone Density Spine And Hip; Future    7. Anxiety  - discussed tapering and limiting use of alprazolam  - Ambulatory referral/consult to Psychiatry; Future    8. Medication refill    9. Lymphedema of both lower extremities  - discussed regarding limiting of salty food  - advised to elevate the legs  - advised to take furosemide 40 mg on mondays, wednesdays, fridays and take 20 mg for the rest of the days    Patient with be reevaluated in as scheduled or sooner kiara Reza NP

## 2022-08-15 ENCOUNTER — TELEPHONE (OUTPATIENT)
Dept: FAMILY MEDICINE | Facility: CLINIC | Age: 72
End: 2022-08-15
Payer: MEDICARE

## 2022-08-15 NOTE — TELEPHONE ENCOUNTER
----- Message from Lashon Olguin sent at 8/13/2022  9:39 AM CDT -----  Contact: pt  Type: Needs Medical Advice    Who Called: pt  Best Call Back Number: [phone]  Inquiry/Question: pt calling to advise of a rx that is needed states that she forgot to mention it in recent visit, please advise pt  Thank you~

## 2022-08-19 ENCOUNTER — TELEPHONE (OUTPATIENT)
Dept: FAMILY MEDICINE | Facility: CLINIC | Age: 72
End: 2022-08-19
Payer: MEDICARE

## 2022-08-19 NOTE — TELEPHONE ENCOUNTER
----- Message from Sydnie MAHESH Boyle sent at 8/19/2022 11:09 AM CDT -----  Regarding: advice  Contact: patient  Type: Needs Medical Advice  Who Called:  patient  Symptoms (please be specific):    How long has patient had these symptoms:    Pharmacy name and phone #:    [id number] - [place] - [address] [facility] [address]  [address] [address]  Phone: [phone] Fax: [phone]  Best Call Back Number: [phone] (home)   Additional Information: Patient spoke with the nurse at her insurance company. She suggested that either the pregablin be increased or another medication that could be used would be Savella for her pain. She also suggested instead of Venlafaxine she may try Duloxetine for the  her depression and for the fluid build up maybe add a second medication along with her Furosemide. Please call patient to advise.Thanks!

## 2022-08-20 DIAGNOSIS — I89.0 LYMPHEDEMA OF BOTH LOWER EXTREMITIES: Primary | ICD-10-CM

## 2022-08-22 ENCOUNTER — TELEPHONE (OUTPATIENT)
Dept: FAMILY MEDICINE | Facility: CLINIC | Age: 72
End: 2022-08-22
Payer: MEDICARE

## 2022-08-22 NOTE — TELEPHONE ENCOUNTER
Called patient to inform her Mrs Young has placed a referral for her, for further evaluation. No answer, left voicemail to return call.

## 2022-08-22 NOTE — TELEPHONE ENCOUNTER
----- Message from Vito Young NP sent at 8/20/2022  9:30 AM CDT -----  Regarding: FW: advice  Contact: patient  I will refer her to lymphedema clinic for further evaluation. Referral placed.  Thanks.  ----- Message -----  From: Celso Gann MA  Sent: 8/19/2022  11:22 AM CDT  To: Vito Young NP  Subject: FW: advice                                       Please advise.  ----- Message -----  From: Sydnie Boyle  Sent: 8/19/2022  11:15 AM CDT  To: [facility] Padron Staff  Subject: advice                                           Type: Needs Medical Advice  Who Called:  patient  Symptoms (please be specific):    How long has patient had these symptoms:    Pharmacy name and phone #:    [id number] - [place] - [address]  [facility] [address]  Phone: [phone] Fax: [phone]  Best Call Back Number: [phone] (home)   Additional Information: Patient spoke with the nurse at her insurance company. She suggested that either the pregablin be increased or another medication that could be used would be Savella for her pain. She also suggested instead of Venlafaxine she may try Duloxetine for the  her depression and for the fluid build up maybe add a second medication along with her Furosemide. Please call patient to advise.Thanks!

## 2022-08-23 ENCOUNTER — TELEPHONE (OUTPATIENT)
Dept: ALLERGY | Facility: CLINIC | Age: 72
End: 2022-08-23
Payer: MEDICARE

## 2022-08-23 NOTE — TELEPHONE ENCOUNTER
Spoke with the patient and gave her the message from Ms Young and the patient verbalized understanding.

## 2022-08-26 DIAGNOSIS — S43.014D ANTERIOR DISLOCATION OF RIGHT SHOULDER, SUBSEQUENT ENCOUNTER: ICD-10-CM

## 2022-08-26 RX ORDER — OXYCODONE AND ACETAMINOPHEN 5; 325 MG/1; MG/1
1 TABLET ORAL EVERY 6 HOURS PRN
Qty: 28 TABLET | Refills: 0 | OUTPATIENT
Start: 2022-08-26

## 2022-08-26 NOTE — TELEPHONE ENCOUNTER
----- Message from Genny Murphy MA sent at 8/26/2022 11:29 AM CDT -----  Contact: pt  Wants refill pain med   Pharmacy  [facility]   Call back

## 2022-08-26 NOTE — TELEPHONE ENCOUNTER
Pt advised that we are not going to be able to refill requested medication. Pt advised to continue with ice and ibuprofen as needed.

## 2022-09-08 ENCOUNTER — CLINICAL SUPPORT (OUTPATIENT)
Dept: REHABILITATION | Facility: HOSPITAL | Age: 72
End: 2022-09-08
Payer: MEDICARE

## 2022-09-08 DIAGNOSIS — I89.0 LYMPHEDEMA OF BOTH LOWER EXTREMITIES: ICD-10-CM

## 2022-09-08 PROCEDURE — 97165 OT EVAL LOW COMPLEX 30 MIN: CPT | Mod: PN

## 2022-09-08 PROCEDURE — 97140 MANUAL THERAPY 1/> REGIONS: CPT | Mod: PN

## 2022-09-09 NOTE — PLAN OF CARE
[facility]  Occupational Therapy Initial Evaluation    Date: 9/8/2022  Name: Genoveva Gilbert  Clinic Number: [id number]    Therapy Diagnosis:   Encounter Diagnosis   Name Primary?    Lymphedema of both lower extremities      Physician: Vito Young NP    Physician Orders: evaluate and treat  Medical Diagnosis: lymphedema  Surgical Procedure and Date: na,   Evaluation Date: 9/8/2022  Insurance Authorization Period Expiration: 12/31/2022  Plan of Care Certification Period: 12/8/2022  Progress Note Due: each visit   Date of Return to MD: not scheduled  Visit # / Visits authorized: 1 / 20  FOTO: to be completed at next appointment    Precautions:  Standard    Time In:1100  Time Out: 1200  Total Appointment Time (timed & untimed codes): 60 minutes    SUBJECTIVE     Date of Onset: over 10 years    History of Current Condition/Mechanism of Injury: Genoveva Gilbert is a 72 y.o. female who presents to [facility] for evaluation secondary to lymphedema. Patient was referred to therapy by Vito oYung NP , which is the patient's primary doctor.. Patient reports that she has reduced her activity level as she has been staying home and caring for sick [facility]. Her legs are much less swollen, she does not wrap or wear garments that she purchased last year. Patient was accompanied to the evaluation by self.    Falls: no    Involved Side: bilateral  Prior Therapy: yes, Estela was seen by this writer a little over 1 year ago.  Occupation/Working presently: retired  Duties: homemaker, primary caregiver for chronically ill .    Functional Limitations/Social History:    Previous functional status includes: Independent with all ADLs.     Current Functional Status   Home/Living environment: lives with their spouse. Is caregiver for spouse, as he is chronically ill.      No Limitation of Functional Status    Pain:  Functional Pain Scale Rating 0-10: Current  0/10, worst 3/10, best 0/10   Location: lower legs  Description: Burning, Throbbing, and Tight  Aggravating Factors: undetermined  Easing Factors: rest and elevation    Patient's Goals for Therapy: to maximaize and maintain reduction    Medical History:   Past Medical History:   Diagnosis Date    Allergy     Anxiety     Arthritis, rheumatoid     Back pain     Chronic bronchiolitis     Depression     Fibromyalgia     GERD (gastroesophageal reflux disease)     High cholesterol     Hilar mass 3/27/2014    [place]     aid right ear    [place]     Hypertension     Incontinence of urine     Lymphedema     MS (multiple sclerosis)     benign; another MD stated she has no MS    Neuropathy     Restless leg syndrome     Rotator cuff tear 4/16/2015    Sciatica of left side 1/5/2018    Seasonal allergies     Sinus congestion     Sleep apnea     DOES NOT USE MACHINE    Spondylolysis     Thyroid disease     Type 2 diabetes mellitus with polyneuropathy     no med. was borderline    Wheezing        Surgical History:    has a past surgical history that includes Hysterectomy; Breast surgery; Tonsillectomy; Appendectomy; Injection of anesthetic agent around medial branch nerves innervating lumbar facet joint (Bilateral, 2/28/2019); Injection of anesthetic agent around medial branch nerves innervating lumbar facet joint (Bilateral, 3/21/2019); Radiofrequency ablation of lumbar medial branch nerve at single level (Bilateral, 4/12/2019); Epidural steroid injection into lumbar spine (N/A, 6/12/2019); Epidural steroid injection into lumbar spine (N/A, 9/16/2019); Radiofrequency ablation of lumbar medial branch nerve at single level (Bilateral, 10/22/2019); Radiofrequency ablation of lumbar medial branch nerve at single level (Bilateral, 5/27/2020); Radiofrequency ablation (Left, 11/4/2020); Liposuction (1985); Eye surgery (Bilateral); Total Reduction Mammoplasty; Knee Arthroplasty (Left, 3/16/2021); Closed  "reduction of injury of shoulder (Right, 9/19/2021); Shoulder arthroscopy (Right, 9/19/2021); Arthroscopic debridement of shoulder (Right, 2/18/2022); and Reverse total shoulder arthroplasty (Right, 4/12/2022).    Medications:   has a current medication list which includes the following prescription(s): albuterol, alprazolam, amlodipine, coffee xt/phosphatidyl serine, fexofenadine, furosemide, levothyroxine, losartan, lovastatin, metoprolol succinate, multivit-iron-min-folic acid, nystatin, omeprazole, oxycodone-acetaminophen, potassium chloride, pramipexole, pregabalin, pregabalin, anoro ellipta, valacyclovir hcl, and venlafaxine.    Allergies:   Review of patient's allergies indicates:   Allergen Reactions    Keflex [cephalexin]      Throat swelling    Pcn [penicillins]      Throat swelling    Latex, natural rubber Other (See Comments)     Redness with bandaids     Adhesive Other (See Comments)     bandainds redness at skin    Trelegy ellipta [fluticasone-umeclidin-vilanter]      Vision disturbance          OBJECTIVE     Girth Measurements (in centimeters)  LANDMARK LEFT LE RIGHT LE    forefoot 21.5 cm 21.5 cm    ankle  26 cm 24.5 cm    4" 34 cm 31 cm    calf 42 cm 37 cm    Below knee 41.5 cm 40.5 cm    Above knee 54.5 cm 54 cm    Mid thigh 51 cm 53 cm       Treatment   Total Treatment time (time-based codes) separate from Evaluation: 45 minutes    Estela received the treatments listed below:     Manual therapy techniques: Manual Lymphatic Drainage were applied for 45 minutes, including:  Patient presented unwrapped. Skin care completed. Measurements taken and documented. Initiated MLD with patient in seated on mat. Deep abdominal breathing technique reviewed and 4 sets completed. Bilateral lower extremities were compression bandaged using cast padding under 4 comprilan low stretch bandages on each leg up to knees. At end of session,  patient was instructed to leave bandages on to tolerance and if removed before next " appointment, to use sigvaris compreflex garments that were already purchased. .    Patient Education and Home Exercises    Education provided:   1. Educated on definition of lymphedema.  2. Explained the Complete Decongestive Therapy protocol in depth  3. Educated on Phase 1 and 2 of protocol.  4. Reviewed treatment frequency and likely duration of weeks  5.Contraindications for treatment.  6. Plan of care and goals.  7. Educated on home management protocols.     Written Home Exercises Provided:  Wade Palmer is active at home and also primary caregiver for her . Additional exercises willl not improve functional outcomes. .  Patient/Family Education: role of OT, goals for OT, scheduling/cancellations - pt verbalized understanding. Discussed insurance limitations with patient.      ASSESSMENT     Genoveva Gilbert is a 72 y.o. female referred to outpatient occupational therapy and presents with a medical diagnosis of lymphedema of bilateral lower extremities. Lymphedema, left untreated increases risk of infection, gait deviation causing ortho problems and poor body image. Patient presents with the following therapy deficits: lymphedema of bilateral lower limbs and demonstrates limitations as described in the chart below. Following medical record review it is determined that pt will benefit from complete decongestive therapy services for the treatment and management of this chronic condition. The following goals were discussed with the patient and patient is in agreement with them as to be addressed in the treatment plan. The patient's rehab potential is Good.     Anticipated barriers to occupational therapy: compliance  Pt has no cultural, educational or language barriers to learning provided.    Profile and History Assessment of Occupational Performance Level of Clinical Decision Making Complexity Score   Occupational Profile:   Genoveva Gilbert is a 72 y.o. female who lives with their spouse and is retired Genoveva WHITEHEAD  Vladimir has difficulty with  ADLs and IADLs as listed previously, which  Affecting herdaily functional abilities.      Comorbidities:    has a past medical history of Allergy, Anxiety, Arthritis, rheumatoid, Back pain, Chronic bronchiolitis, Depression, Fibromyalgia, GERD (gastroesophageal reflux disease), High cholesterol, Hilar mass[place] Hypertension, Incontinence of urine, Lymphedema, MS (multiple sclerosis), Neuropathy, Restless leg syndrome, Rotator cuff tear, Sciatica of left side, Seasonal allergies, Sinus congestion, Sleep apnea, Spondylolysis, Thyroid disease, Type 2 diabetes mellitus with polyneuropathy, and Wheezing.    Medical and Therapy History Review:   Brief               Performance Deficits    Physical:  Muscle Endurance  Skin Integrity/Scar Formation  Edema    Cognitive:  No Deficits    Psychosocial:    Family Support     Clinical Decision Making:  low    Assessment Process:  Problem-Focused Assessments    Modification/Need for Assistance:  Minimal-Moderate Modifications/Assistance    Intervention Selection:  Limited Treatment Options       low  Based on PMHX, co morbidities , data from assessments and functional level of assistance required with task and clinical presentation directly impacting function.       The following goals were discussed with the patient and patient is in agreement with them as to be addressed in the treatment plan.     Goals:   Short Term Goals for 6 weeks: (phase 1 of protocol)  Complete decongestion B LE- Ongoing 9/8/2022   Patient will be educated on lymphedema precautions and signs of infection. - Ongoing 9/8/2022   Patient will perform deep abdominal breathing TID- Ongoing 9/8/2022   Patient will tolerate daily activities with multilayered bandaging.- Ongoing 9/8/2022   Appropriate compression garments to be ordered/delivered- Ongoing 9/8/2022      Long Term Goals for 12 weeks: (Phase 2 of goals)  Patient will be independent  with home management of this chronic condition.  Patient to ami/doff compression garment.   Patient will demonstrate compliance with all home management recommendations.  Patient will maintain reduction at monthly follow up appointments for 3 months     PLAN   Plan of Care Certification: 9/8/2022 to 12-8-2022.     Outpatient Occupational Therapy 2 times weekly for 10 weeks to include the following interventions: Manual therapy/joint mobilizations, Therapeutic exercises/activities., Edema Control, and Complete Decongestive Therapy  .      SOTO Wise, ADOLFO/PHIL      I CERTIFY THE NEED FOR THESE SERVICES FURNISHED UNDER THIS PLAN OF TREATMENT AND WHILE UNDER MY CARE  Physician's comments:      Physician's Signature: ___________________________________________________

## 2022-09-15 ENCOUNTER — CLINICAL SUPPORT (OUTPATIENT)
Dept: REHABILITATION | Facility: HOSPITAL | Age: 72
End: 2022-09-15
Payer: MEDICARE

## 2022-09-15 ENCOUNTER — HOSPITAL ENCOUNTER (OUTPATIENT)
Dept: RADIOLOGY | Facility: CLINIC | Age: 72
Discharge: HOME | End: 2022-09-15
Attending: NURSE PRACTITIONER
Payer: MEDICARE

## 2022-09-15 ENCOUNTER — DOCUMENTATION ONLY (OUTPATIENT)
Dept: FAMILY MEDICINE | Facility: CLINIC | Age: 72
End: 2022-09-15
Payer: MEDICARE

## 2022-09-15 DIAGNOSIS — I89.0 LYMPHEDEMA OF BOTH LOWER EXTREMITIES: Primary | ICD-10-CM

## 2022-09-15 DIAGNOSIS — Z13.820 ENCOUNTER FOR OSTEOPOROSIS SCREENING IN ASYMPTOMATIC POSTMENOPAUSAL PATIENT: ICD-10-CM

## 2022-09-15 DIAGNOSIS — Z78.0 ENCOUNTER FOR OSTEOPOROSIS SCREENING IN ASYMPTOMATIC POSTMENOPAUSAL PATIENT: ICD-10-CM

## 2022-09-15 PROCEDURE — 77080 DXA BONE DENSITY AXIAL: CPT | Mod: TC,PO

## 2022-09-15 PROCEDURE — 77080 DEXA BONE DENSITY SPINE HIP: ICD-10-PCS | Mod: 26,,, | Performed by: RADIOLOGY

## 2022-09-15 PROCEDURE — 77080 DXA BONE DENSITY AXIAL: CPT | Mod: 26,,, | Performed by: RADIOLOGY

## 2022-09-15 PROCEDURE — 97140 MANUAL THERAPY 1/> REGIONS: CPT | Mod: PN

## 2022-09-15 NOTE — PROGRESS NOTES
"Patient with trip and fall coming into clinic today. Able to pick herself up after fall. Notes "bump" her head after fall. No complaints at this time. Denies HA/CP/SOB/vision changes. Neuro exam intact. Spine survey elicits no crepitus, deformity, or tenderness. BP wnl. Advised close f/u if develop any new or worsening symptoms.    "

## 2022-09-16 ENCOUNTER — CLINICAL SUPPORT (OUTPATIENT)
Dept: REHABILITATION | Facility: HOSPITAL | Age: 72
End: 2022-09-16
Payer: MEDICARE

## 2022-09-16 DIAGNOSIS — I89.0 LYMPHEDEMA OF BOTH LOWER EXTREMITIES: Primary | ICD-10-CM

## 2022-09-16 PROCEDURE — 97140 MANUAL THERAPY 1/> REGIONS: CPT | Mod: PN

## 2022-09-16 NOTE — PROGRESS NOTES
[facility]  Occupational Therapy Treatment Note    Date: 9/16/2022  Name: Genoveva Gilbert  [facility] Number: [id number]    Therapy Diagnosis:   Encounter Diagnosis   Name Primary?    Lymphedema of both lower extremities Yes     Physician: Vito Young NP       Physician Orders: evaluate and treat  Medical Diagnosis: lymphedema  Surgical Procedure and Date: na,   Evaluation Date: 9/8/2022  Insurance Authorization Period Expiration: 12/31/2022  Plan of Care Certification Period: 12/8/2022  Progress Note Due: each visit   Date of Return to MD: not scheduled  Visit # / Visits authorized: 3/20  FOTO: to be completed at next appointment     Precautions:  Standard    Time In: 1200  Time Out: 1300  Total Billable Time: 60 minutes    SUBJECTIVE     Pt reports: My legs stayed down  Response to previous treatment: reduction maintained with tubigrip  Functional change: no    Pain: 0/10  Location: na    OBJECTIVE     Objective Measures updated at progress report unless specified.    Treatment     Estela received the treatments listed below:     Manual therapy techniques: Manual Lymphatic Drainage  for 60 minutes, including:  Patient presented with tubi  on bilateral lower extremities. Legs maintained reduction achieved form compression bandages. Skin care completed.  Initiated MLD with patient in seated on mat, per request.   Complete Decongestive Therapy  Protocol for lymphedema of bilateral lower quadrants was completed. Patient requested no compression bandages this date due to weekend, patient has no one to help her wrap. Patient had purchased compression calf sleeves 20-30 HHMg and therapist assisted her in donning them. Advised Estela to wear her diabetic socks and tennis shoes when she gets home to add compression to foot.  Patient Education and Home Exercises      Education provided:    Educated on Phase 1 and 2 of protocol.  Reviewed treatment frequency and likely duration of weeks  Plan of care  and goals.  Educated on home management protocols.     Written Home Exercises Provided:  No. 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 She does not have the time or need for additional exercises and functional outcomes will not be negatively affected by this. .    Assessment     Pt would continue to benefit from skilled OT. Yes. Lymphedema, left untreated increases risk of infection, gait deviation causing ortho problems and poor body image.  Estela is not able to tolerate the standard Complete Decongestive Therapy protocol and so modifications will be made. This is known to this writer from Estela's previous treatment last year.   Estela is progressing well towards her goals and there are no updates to goals at this time. Pt prognosis is Good.     Pt will continue to benefit from skilled outpatient occupational therapy to address the deficits listed in the problem list on initial evaluation provide pt/family education and to maximize pt's level of independence in the home and community environment.     Pt's spiritual, cultural and educational needs considered and pt agreeable to plan of care and goals.    Anticipated barriers to occupational therapy: Estela is only caregiver for disabled spouse and has health problems herself.    Goals:   Short Term Goals for 6 weeks: modified this date in bold  Complete decongestion B LE- Ongoing 9/8/2022   Patient will be educated on lymphedema precautions and signs of infection. - Ongoing 9/8/2022   Patient will perform deep abdominal breathing TID- Ongoing 9/8/2022   Patient will tolerate daily activities with multilayered bandaging.- Patient will wear compression bandages for 24 hours following sessions with therapist, ami garments otherwise.   Appropriate compression garments to be ordered/delivered- Ongoing 9/8/2022      Long Term Goals for 12 weeks: (Phase 2 of goals)  Patient will be independent with home management of this chronic  condition.  Patient to ami/doff compression garment.   Patient will demonstrate compliance with all home management recommendations. Patient will demonstrate compliance with modified home management program.  Patient will maintain reduction at monthly follow up appointments for 3 months   PLAN     Continue plan of care 1 times a week to address modified goals listed above..  Updates/Grading for next session: continue with modified Complete Decongestive Therapy      SOTO Wise , ADOLFO/PHIL

## 2022-09-16 NOTE — PROGRESS NOTES
[facility]  Occupational Therapy Treatment Note    Date: 9/15/2022  Name: Genoveva Gilbert  [facility] Number: [id number]    Therapy Diagnosis:   Encounter Diagnosis   Name Primary?    Lymphedema of both lower extremities Yes     Physician: Vito Young NP       Physician Orders: evaluate and treat  Medical Diagnosis: lymphedema  Surgical Procedure and Date: na,   Evaluation Date: 9/8/2022  Insurance Authorization Period Expiration: 12/31/2022  Plan of Care Certification Period: 12/8/2022  Progress Note Due: each visit   Date of Return to MD: not scheduled  Visit # / Visits authorized: 2/20  FOTO: to be completed at next appointment     Precautions:  Standard    Time In: 1300  Time Out: 1400  Total Billable Time: 60 minutes    SUBJECTIVE     Pt reports: I was late because I fell at the doctor's office.    Response to previous treatment: good reduction  Functional change: no    Pain: 5/10  Location: left elbow and knee from fall at the doctor's office earlier today      OBJECTIVE     Objective Measures updated at progress report unless specified.    Treatment     Estela received the treatments listed below:     Manual therapy techniques: Manual Lymphatic Drainage were applied for 60 minutes, including:  Patient presented wrapped. Bandages were removed. Skin care completed. Measurements taken and documented. Initiated MLD with patient in seated on mat, per request.   Complete Decongestive Therapy  Protocol for lymphedema of bilateral lower quadrants was completed. Patient requested no compression bandages this date due to pain and so tubigrip was put on bilateral lower extremities to , hopefully, maintain reduction until her session in the morning.    Patient Education and Home Exercises      Education provided:    Educated on Phase 1 and 2 of protocol.  Reviewed treatment frequency and likely duration of weeks  Plan of care and goals.  Educated on home management protocols.     Written Home  Exercises Provided:  No. 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 She does not have the time or need for additional exercises and functional outcomes will not be negatively affected by this. .    Assessment     Pt would continue to benefit from skilled OT. Yes. Lymphedema, left untreated increases risk of infection, gait deviation causing ortho problems and poor body image.  Estela is not able to tolerate the standard Complete Decongestive Therapy protocol and so modifications will be made. This is known to this writer from Estela's previous treatment last year.   Estela is progressing well towards her goals and there are no updates to goals at this time. Pt prognosis is Good.     Pt will continue to benefit from skilled outpatient occupational therapy to address the deficits listed in the problem list on initial evaluation provide pt/family education and to maximize pt's level of independence in the home and community environment.     Pt's spiritual, cultural and educational needs considered and pt agreeable to plan of care and goals.    Anticipated barriers to occupational therapy: Estela is only caregiver for disabled spouse and has health problems herself.    Goals:   Short Term Goals for 6 weeks: modified this date in bold  Complete decongestion B LE- Ongoing 9/8/2022   Patient will be educated on lymphedema precautions and signs of infection. - Ongoing 9/8/2022   Patient will perform deep abdominal breathing TID- Ongoing 9/8/2022   Patient will tolerate daily activities with multilayered bandaging.- Patient will wear compression bandages for 24 hours following sessions with therapist, ami garments otherwise.   Appropriate compression garments to be ordered/delivered- Ongoing 9/8/2022      Long Term Goals for 12 weeks: (Phase 2 of goals)  Patient will be independent with home management of this chronic condition.  Patient to ami/doff compression garment.   Patient will demonstrate  compliance with all home management recommendations. Patient will demonstrate compliance with modified home management program.  Patient will maintain reduction at monthly follow up appointments for 3 months   PLAN     Continue plan of care 1 times a week to address modified goals listed above..  Updates/Grading for next session: continue with modified Complete Decongestive Therapy      SOTO Wise , ADOLFO/PHIL

## 2022-09-19 ENCOUNTER — CLINICAL SUPPORT (OUTPATIENT)
Dept: REHABILITATION | Facility: HOSPITAL | Age: 72
End: 2022-09-19
Payer: MEDICARE

## 2022-09-19 DIAGNOSIS — I89.0 LYMPHEDEMA OF BOTH LOWER EXTREMITIES: Primary | ICD-10-CM

## 2022-09-19 PROCEDURE — 97140 MANUAL THERAPY 1/> REGIONS: CPT | Mod: PN

## 2022-09-19 NOTE — PROGRESS NOTES
[facility]  Occupational Therapy Treatment Note    Date: 9/19/2022  Name: Genoveva Gilbert  [facility] Number: [id number]    Therapy Diagnosis:   Encounter Diagnosis   Name Primary?    Lymphedema of both lower extremities Yes     Physician: Vito Young NP       Physician Orders: evaluate and treat  Medical Diagnosis: lymphedema  Surgical Procedure and Date: na,   Evaluation Date: 9/8/2022  Insurance Authorization Period Expiration: 12/31/2022  Plan of Care Certification Period: 12/8/2022  Progress Note Due: each visit   Date of Return to MD: not scheduled  Visit # / Visits authorized: 4/20  FOTO: to be completed at next appointment     Precautions:  Standard    Time In: 1100  Time Out: 1200  Total Billable Time: 60 minutes    SUBJECTIVE     Pt reports: My legs stayed down pretty good over the weekend.  Response to previous treatment: reduction maintained well with calf sleeves  Functional change: no    Pain: 0/10  Location: na    OBJECTIVE     Objective Measures updated at progress report unless specified.    Treatment     Estela received the treatments listed below:     Manual therapy techniques: Manual Lymphatic Drainage  for 60 minutes, including:  Patient presented with calf sleeves on bilateral lower extremities. Legs maintained reduction fairly well with the sleeves. Skin care completed.  Initiated MLD with patient in seated on mat, per request.   Complete Decongestive Therapy  Protocol for lymphedema of bilateral lower quadrants was completed. Measurements do indicate minimal swelling over weekend but feet were markedly swollen as no compression. Therapist compression bandaged bilateral lower extremities with instructions for patient to try to keep on until next visit. If unable, then replace with sleeves OVER light compression socks or tubigrip. Patient agreed.  Patient Education and Home Exercises      Education provided:    Educated on Phase 1 and 2 of protocol.  Reviewed treatment  frequency and likely duration of weeks  Plan of care and goals.  Educated on home management protocols.     Written Home Exercises Provided:  No. 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 She does not have the time or need for additional exercises and functional outcomes will not be negatively affected by this. .    Assessment     Pt would continue to benefit from skilled OT. Yes. Lymphedema, left untreated increases risk of infection, gait deviation causing ortho problems and poor body image.  Estela is not able to tolerate the standard Complete Decongestive Therapy protocol and so modifications will be made. This is known to this writer from Estela's previous treatment last year.   Estela is progressing well towards her goals and there are no updates to goals at this time. Pt prognosis is Good.     Pt will continue to benefit from skilled outpatient occupational therapy to address the deficits listed in the problem list on initial evaluation provide pt/family education and to maximize pt's level of independence in the home and community environment.     Pt's spiritual, cultural and educational needs considered and pt agreeable to plan of care and goals.    Anticipated barriers to occupational therapy: Estela is only caregiver for disabled spouse and has health problems herself.    Goals:   Short Term Goals for 6 weeks: modified this date in bold  Complete decongestion B LE- Ongoing 9/8/2022   Patient will be educated on lymphedema precautions and signs of infection. - Ongoing 9/8/2022   Patient will perform deep abdominal breathing TID- Ongoing 9/8/2022   Patient will tolerate daily activities with multilayered bandaging.- Patient will wear compression bandages for at least 24 hours following sessions with therapist, ami garments otherwise.   Appropriate compression garments to be ordered/delivered- Ongoing 9/8/2022      Long Term Goals for 12 weeks: (Phase 2 of goals)  Patient will be  independent with home management of this chronic condition.  Patient to ami/doff compression garment.   Patient will demonstrate compliance with all home management recommendations. Patient will demonstrate compliance with modified home management program.  Patient will maintain reduction at monthly follow up appointments for 3 months   PLAN     Continue plan of care 2 times a week to address modified goals listed above..  Updates/Grading for next session: continue with modified Complete Decongestive Therapy      SOTO Wise , ADOLFO/PHIL

## 2022-09-22 ENCOUNTER — OFFICE VISIT (OUTPATIENT)
Dept: FAMILY MEDICINE | Facility: CLINIC | Age: 72
End: 2022-09-22
Payer: MEDICARE

## 2022-09-22 ENCOUNTER — CLINICAL SUPPORT (OUTPATIENT)
Dept: REHABILITATION | Facility: HOSPITAL | Age: 72
End: 2022-09-22
Payer: MEDICARE

## 2022-09-22 VITALS
HEART RATE: 66 BPM | TEMPERATURE: 99 F | BODY MASS INDEX: 42.11 KG/M2 | HEIGHT: 60 IN | WEIGHT: 214.5 LBS | DIASTOLIC BLOOD PRESSURE: 52 MMHG | SYSTOLIC BLOOD PRESSURE: 120 MMHG | OXYGEN SATURATION: 94 % | RESPIRATION RATE: 15 BRPM

## 2022-09-22 DIAGNOSIS — M06.9 RHEUMATOID ARTHRITIS, INVOLVING UNSPECIFIED SITE, UNSPECIFIED WHETHER RHEUMATOID FACTOR PRESENT: ICD-10-CM

## 2022-09-22 DIAGNOSIS — F41.1 GAD (GENERALIZED ANXIETY DISORDER): ICD-10-CM

## 2022-09-22 DIAGNOSIS — E11.59 HYPERTENSION ASSOCIATED WITH DIABETES: ICD-10-CM

## 2022-09-22 DIAGNOSIS — Z00.00 HEALTHCARE MAINTENANCE: Primary | ICD-10-CM

## 2022-09-22 DIAGNOSIS — I15.2 HYPERTENSION ASSOCIATED WITH DIABETES: ICD-10-CM

## 2022-09-22 DIAGNOSIS — E03.9 ACQUIRED HYPOTHYROIDISM: ICD-10-CM

## 2022-09-22 DIAGNOSIS — G47.30 SLEEP APNEA, UNSPECIFIED TYPE: ICD-10-CM

## 2022-09-22 DIAGNOSIS — F41.9 ANXIETY: ICD-10-CM

## 2022-09-22 DIAGNOSIS — G47.00 INSOMNIA, UNSPECIFIED TYPE: ICD-10-CM

## 2022-09-22 DIAGNOSIS — R60.9 EDEMA, UNSPECIFIED TYPE: ICD-10-CM

## 2022-09-22 DIAGNOSIS — E11.69 HYPERLIPIDEMIA ASSOCIATED WITH TYPE 2 DIABETES MELLITUS: ICD-10-CM

## 2022-09-22 DIAGNOSIS — E78.5 HYPERLIPIDEMIA ASSOCIATED WITH TYPE 2 DIABETES MELLITUS: ICD-10-CM

## 2022-09-22 DIAGNOSIS — R21 RASH: ICD-10-CM

## 2022-09-22 DIAGNOSIS — G35 MULTIPLE SCLEROSIS: ICD-10-CM

## 2022-09-22 DIAGNOSIS — E11.65 TYPE 2 DIABETES MELLITUS WITH HYPERGLYCEMIA, WITHOUT LONG-TERM CURRENT USE OF INSULIN: ICD-10-CM

## 2022-09-22 DIAGNOSIS — R79.9 ABNORMAL FINDING OF BLOOD CHEMISTRY, UNSPECIFIED: ICD-10-CM

## 2022-09-22 DIAGNOSIS — E66.01 MORBID OBESITY WITH BMI OF 45.0-49.9, ADULT: ICD-10-CM

## 2022-09-22 DIAGNOSIS — K31.83 ACHLORHYDRIA: ICD-10-CM

## 2022-09-22 DIAGNOSIS — I89.0 LYMPHEDEMA OF BOTH LOWER EXTREMITIES: Primary | ICD-10-CM

## 2022-09-22 PROBLEM — N18.30 CHRONIC RENAL INSUFFICIENCY, STAGE III (MODERATE): Status: RESOLVED | Noted: 2020-06-02 | Resolved: 2022-09-22

## 2022-09-22 PROBLEM — J41.0 SIMPLE CHRONIC BRONCHITIS: Status: RESOLVED | Noted: 2020-06-02 | Resolved: 2022-09-22

## 2022-09-22 PROCEDURE — 99999 PR PBB SHADOW E&M-EST. PATIENT-LVL V: ICD-10-PCS | Mod: PBBFAC,,, | Performed by: STUDENT IN AN ORGANIZED HEALTH CARE EDUCATION/TRAINING PROGRAM

## 2022-09-22 PROCEDURE — 3008F BODY MASS INDEX DOCD: CPT | Mod: CPTII,S$GLB,, | Performed by: STUDENT IN AN ORGANIZED HEALTH CARE EDUCATION/TRAINING PROGRAM

## 2022-09-22 PROCEDURE — 3078F PR MOST RECENT DIASTOLIC BLOOD PRESSURE < 80 MM HG: ICD-10-PCS | Mod: CPTII,S$GLB,, | Performed by: STUDENT IN AN ORGANIZED HEALTH CARE EDUCATION/TRAINING PROGRAM

## 2022-09-22 PROCEDURE — 99999 PR PBB SHADOW E&M-EST. PATIENT-LVL V: CPT | Mod: PBBFAC,,, | Performed by: STUDENT IN AN ORGANIZED HEALTH CARE EDUCATION/TRAINING PROGRAM

## 2022-09-22 PROCEDURE — 3074F SYST BP LT 130 MM HG: CPT | Mod: CPTII,S$GLB,, | Performed by: STUDENT IN AN ORGANIZED HEALTH CARE EDUCATION/TRAINING PROGRAM

## 2022-09-22 PROCEDURE — 4010F ACE/ARB THERAPY RXD/TAKEN: CPT | Mod: CPTII,S$GLB,, | Performed by: STUDENT IN AN ORGANIZED HEALTH CARE EDUCATION/TRAINING PROGRAM

## 2022-09-22 PROCEDURE — 3008F PR BODY MASS INDEX (BMI) DOCUMENTED: ICD-10-PCS | Mod: CPTII,S$GLB,, | Performed by: STUDENT IN AN ORGANIZED HEALTH CARE EDUCATION/TRAINING PROGRAM

## 2022-09-22 PROCEDURE — 99215 OFFICE O/P EST HI 40 MIN: CPT | Mod: S$GLB,,, | Performed by: STUDENT IN AN ORGANIZED HEALTH CARE EDUCATION/TRAINING PROGRAM

## 2022-09-22 PROCEDURE — 3288F PR FALLS RISK ASSESSMENT DOCUMENTED: ICD-10-PCS | Mod: CPTII,S$GLB,, | Performed by: STUDENT IN AN ORGANIZED HEALTH CARE EDUCATION/TRAINING PROGRAM

## 2022-09-22 PROCEDURE — 99499 UNLISTED E&M SERVICE: CPT | Mod: S$GLB,,, | Performed by: STUDENT IN AN ORGANIZED HEALTH CARE EDUCATION/TRAINING PROGRAM

## 2022-09-22 PROCEDURE — 3074F PR MOST RECENT SYSTOLIC BLOOD PRESSURE < 130 MM HG: ICD-10-PCS | Mod: CPTII,S$GLB,, | Performed by: STUDENT IN AN ORGANIZED HEALTH CARE EDUCATION/TRAINING PROGRAM

## 2022-09-22 PROCEDURE — 1125F AMNT PAIN NOTED PAIN PRSNT: CPT | Mod: CPTII,S$GLB,, | Performed by: STUDENT IN AN ORGANIZED HEALTH CARE EDUCATION/TRAINING PROGRAM

## 2022-09-22 PROCEDURE — 1159F MED LIST DOCD IN RCRD: CPT | Mod: CPTII,S$GLB,, | Performed by: STUDENT IN AN ORGANIZED HEALTH CARE EDUCATION/TRAINING PROGRAM

## 2022-09-22 PROCEDURE — 1101F PR PT FALLS ASSESS DOC 0-1 FALLS W/OUT INJ PAST YR: ICD-10-PCS | Mod: CPTII,S$GLB,, | Performed by: STUDENT IN AN ORGANIZED HEALTH CARE EDUCATION/TRAINING PROGRAM

## 2022-09-22 PROCEDURE — 99499 RISK ADDL DX/OHS AUDIT: ICD-10-PCS | Mod: S$GLB,,, | Performed by: STUDENT IN AN ORGANIZED HEALTH CARE EDUCATION/TRAINING PROGRAM

## 2022-09-22 PROCEDURE — 3044F HG A1C LEVEL LT 7.0%: CPT | Mod: CPTII,S$GLB,, | Performed by: STUDENT IN AN ORGANIZED HEALTH CARE EDUCATION/TRAINING PROGRAM

## 2022-09-22 PROCEDURE — 3288F FALL RISK ASSESSMENT DOCD: CPT | Mod: CPTII,S$GLB,, | Performed by: STUDENT IN AN ORGANIZED HEALTH CARE EDUCATION/TRAINING PROGRAM

## 2022-09-22 PROCEDURE — 4010F PR ACE/ARB THEARPY RXD/TAKEN: ICD-10-PCS | Mod: CPTII,S$GLB,, | Performed by: STUDENT IN AN ORGANIZED HEALTH CARE EDUCATION/TRAINING PROGRAM

## 2022-09-22 PROCEDURE — 3078F DIAST BP <80 MM HG: CPT | Mod: CPTII,S$GLB,, | Performed by: STUDENT IN AN ORGANIZED HEALTH CARE EDUCATION/TRAINING PROGRAM

## 2022-09-22 PROCEDURE — 1101F PT FALLS ASSESS-DOCD LE1/YR: CPT | Mod: CPTII,S$GLB,, | Performed by: STUDENT IN AN ORGANIZED HEALTH CARE EDUCATION/TRAINING PROGRAM

## 2022-09-22 PROCEDURE — 1159F PR MEDICATION LIST DOCUMENTED IN MEDICAL RECORD: ICD-10-PCS | Mod: CPTII,S$GLB,, | Performed by: STUDENT IN AN ORGANIZED HEALTH CARE EDUCATION/TRAINING PROGRAM

## 2022-09-22 PROCEDURE — 97140 MANUAL THERAPY 1/> REGIONS: CPT | Mod: PN

## 2022-09-22 PROCEDURE — 3044F PR MOST RECENT HEMOGLOBIN A1C LEVEL <7.0%: ICD-10-PCS | Mod: CPTII,S$GLB,, | Performed by: STUDENT IN AN ORGANIZED HEALTH CARE EDUCATION/TRAINING PROGRAM

## 2022-09-22 PROCEDURE — 99215 PR OFFICE/OUTPT VISIT, EST, LEVL V, 40-54 MIN: ICD-10-PCS | Mod: S$GLB,,, | Performed by: STUDENT IN AN ORGANIZED HEALTH CARE EDUCATION/TRAINING PROGRAM

## 2022-09-22 PROCEDURE — 1125F PR PAIN SEVERITY QUANTIFIED, PAIN PRESENT: ICD-10-PCS | Mod: CPTII,S$GLB,, | Performed by: STUDENT IN AN ORGANIZED HEALTH CARE EDUCATION/TRAINING PROGRAM

## 2022-09-22 RX ORDER — TRAZODONE HYDROCHLORIDE 50 MG/1
50 TABLET ORAL NIGHTLY PRN
Qty: 30 TABLET | Refills: 3 | Status: SHIPPED | OUTPATIENT
Start: 2022-09-22 | End: 2022-10-31

## 2022-09-22 RX ORDER — HYDROCHLOROTHIAZIDE 25 MG/1
TABLET ORAL
COMMUNITY
Start: 2022-09-04 | End: 2023-04-05 | Stop reason: SDUPTHER

## 2022-09-22 RX ORDER — POTASSIUM CHLORIDE 750 MG/1
20 CAPSULE, EXTENDED RELEASE ORAL NIGHTLY
Qty: 180 CAPSULE | Refills: 0 | Status: SHIPPED | OUTPATIENT
Start: 2022-09-22 | End: 2022-11-07

## 2022-09-22 RX ORDER — ALPRAZOLAM 1 MG/1
1 TABLET ORAL 2 TIMES DAILY PRN
Qty: 60 TABLET | Refills: 0 | Status: SHIPPED | OUTPATIENT
Start: 2022-09-22 | End: 2022-09-22

## 2022-09-22 RX ORDER — NICOTINE POLACRILEX 2 MG
GUM BUCCAL
Status: ON HOLD | COMMUNITY
End: 2023-09-24

## 2022-09-22 RX ORDER — ALPRAZOLAM 1 MG/1
1 TABLET ORAL 2 TIMES DAILY PRN
Qty: 60 TABLET | Refills: 0 | Status: SHIPPED | OUTPATIENT
Start: 2022-09-22 | End: 2023-02-14

## 2022-09-22 RX ORDER — TORSEMIDE 10 MG/1
10 TABLET ORAL DAILY
Qty: 30 TABLET | Refills: 2 | Status: SHIPPED | OUTPATIENT
Start: 2022-09-22 | End: 2022-12-02

## 2022-09-22 NOTE — PROGRESS NOTES
[facility] Note    Subjective:    The HPI and pertinent ROS is included in the Diagnostic Impression Remarks section at the end of the note. Please see below for further details. Chief complaint is at end of note.     Estela is a pleasant intelligent patient who is here for evaluation.     Modified Medications    Modified Medication Previous Medication    ALPRAZOLAM (XANAX) 1 MG TABLET ALPRAZolam (XANAX) 1 MG tablet       Take 1 tablet (1 mg total) by mouth 2 (two) times daily as needed for Anxiety (anxiety).    Take 1 tablet (1 mg total) by mouth 2 (two) times daily as needed (anxiety).    POTASSIUM CHLORIDE (MICRO-K) 10 MEQ CPSR potassium chloride (MICRO-K) 10 MEQ CpSR       Take 2 capsules (20 mEq total) by mouth every evening.    Take 1 capsule (10 mEq total) by mouth every evening.       Data reviewed 274}  Previous medical records reviewed and summarized in plan section at end of note.      If you are due for any health screening(s) below please notify me so we can arrange them to be ordered and scheduled to maintain your health.     Tests to Keep You Healthy    Mammogram: DUE  Eye Exam: ORDERED BUT NOT SCHEDULED  Colon Cancer Screening: DUE  Last Blood Pressure <= 139/89 (9/22/2022): Yes  Last HbA1c < 8 (09/15/2022): Yes      The following portions of the patient's history were reviewed and updated as appropriate: allergies, current medications, past family history, past medical history, past social history, past surgical history and problem list.    She  has a past medical history of Allergy, Anxiety, Arthritis, rheumatoid, Back pain, Chronic bronchiolitis, Depression, Fibromyalgia, GERD (gastroesophageal reflux disease), High cholesterol, Hilar mass (3/27/2014), [place] (hard of hearing), [place] (hard of hearing), Hypertension, Incontinence of urine, Lymphedema, MS (multiple sclerosis), Neuropathy, Restless leg syndrome, Rotator cuff tear (4/16/2015), Sciatica of left side (1/5/2018), Seasonal  allergies, Sinus congestion, Sleep apnea, Spondylolysis, Thyroid disease, Type 2 diabetes mellitus with polyneuropathy, and Wheezing.  She  has a past surgical history that includes Hysterectomy; Breast surgery; Tonsillectomy; Appendectomy; Injection of anesthetic agent around medial branch nerves innervating lumbar facet joint (Bilateral, 2/28/2019); Injection of anesthetic agent around medial branch nerves innervating lumbar facet joint (Bilateral, 3/21/2019); Radiofrequency ablation of lumbar medial branch nerve at single level (Bilateral, 4/12/2019); Epidural steroid injection into lumbar spine (N/A, 6/12/2019); Epidural steroid injection into lumbar spine (N/A, 9/16/2019); Radiofrequency ablation of lumbar medial branch nerve at single level (Bilateral, 10/22/2019); Radiofrequency ablation of lumbar medial branch nerve at single level (Bilateral, 5/27/2020); Radiofrequency ablation (Left, 11/4/2020); Liposuction (1985); Eye surgery (Bilateral); Total Reduction Mammoplasty; Knee Arthroplasty (Left, 3/16/2021); Closed reduction of injury of shoulder (Right, 9/19/2021); Shoulder arthroscopy (Right, 9/19/2021); Arthroscopic debridement of shoulder (Right, 2/18/2022); and Reverse total shoulder arthroplasty (Right, 4/12/2022).    She  reports that she quit smoking about 26 years ago. Her smoking use included cigarettes. She has been exposed to tobacco smoke. She has never used smokeless tobacco. She reports current alcohol use. She reports that she does not use drugs.  She family history includes Heart disease in her mother.    Review of patient's allergies indicates:   Allergen Reactions    Keflex [cephalexin]      Throat swelling    Pcn [penicillins]      Throat swelling    Latex, natural rubber Other (See Comments)     Redness with bandaids     Adhesive Other (See Comments)     bandainds redness at skin    Trelegy ellipta [fluticasone-umeclidin-vilanter]      Vision disturbance       Tobacco Use: Medium Risk     Smoking Tobacco Use: Former    Smokeless Tobacco Use: Never     Physical Examination  General appearance: alert, cooperative, no distress  Neck: no thyromegaly, no neck stiffness  Lungs: clear to auscultation, no wheezes, rales or rhonchi, symmetric air entry  Heart: normal rate, regular rhythm, normal S1, S2, no murmurs, rubs, clicks or gallops  Abdomen: soft, nontender, nondistended, no rigidity, rebound, or guarding.   Back: no point tenderness over spine  Extremities: peripheral pulses normal, prem leg edema in bandages   Neurological:alert, oriented, normal speech, no new focal findings or movement disorder noted from baseline    BP Readings from Last 3 Encounters:   09/22/22 (!) 120/52   08/09/22 130/80   06/09/22 124/62     Wt Readings from Last 3 Encounters:   09/22/22 97.3 kg (214 lb 8.1 oz)   08/09/22 101 kg (222 lb 10.6 oz)   07/14/22 98.4 kg (217 lb)     BP (!) 120/52 (BP Location: Right arm, Patient Position: Sitting, BP Method: Large (Manual))   Pulse 66   Temp 99.4 °F (37.4 °C) (Oral)   Resp 15   Ht 5' (1.524 m)   Wt 97.3 kg (214 lb 8.1 oz)   SpO2 (!) 94%   BMI 41.89 kg/m²    274}  Laboratory: I have reviewed old labs below:    274}    Lab Results   Component Value Date    WBC 7.21 09/15/2022    HGB 11.7 (L) 09/15/2022    HCT 37.2 09/15/2022    MCV 88 09/15/2022     09/15/2022     09/15/2022    K 3.4 (L) 09/15/2022    CL 98 09/15/2022    CALCIUM 9.4 09/15/2022    PHOS 3.8 09/19/2021    CO2 33 (H) 09/15/2022     (H) 09/15/2022    BUN 26 (H) 09/15/2022    CREATININE 1.1 09/15/2022    ANIONGAP 8 09/15/2022    PROT 6.4 09/15/2022    ALBUMIN 3.9 09/15/2022    BILITOT 0.4 09/15/2022    ALKPHOS 109 09/15/2022    ALT 23 09/15/2022    AST 27 09/15/2022    INR 1.0 07/03/2017    CHOL 150 09/15/2022    TRIG 185 (H) 09/15/2022    HDL 34 (L) 09/15/2022    LDLCALC 79.0 09/15/2022    TSH 1.728 05/14/2019    HGBA1C 6.1 (H) 09/15/2022     Lab reviewed by me: Particular labs of significance  "that I will monitor, workup, or treat to improve are mentioned below in diagnostic impression remarks.    Imaging/EKG: I have reviewed the pertinent results and my findings are noted in remarks.  274}    CC:   Chief Complaint   Patient presents with    Establish Care    Hypertension    Anxiety    Insomnia    Edema        274}    Assessment/Plan  Genoveva Gilbert is a 72 y.o. female who presents to clinic with:  1. Healthcare maintenance    2. Acquired hypothyroidism    3. Type 2 diabetes mellitus with hyperglycemia, without long-term current use of insulin    4. Hypertension associated with diabetes    5. Hyperlipidemia associated with type 2 diabetes mellitus    6. Abnormal finding of blood chemistry, unspecified    7. Rash    8. Edema, unspecified type    9. MARCOS (generalized anxiety disorder)    10. Rheumatoid arthritis, involving unspecified site, unspecified whether rheumatoid factor present    11. Sleep apnea, unspecified type    12. Anxiety    13. Achlorhydria    14. Morbid obesity with BMI of 45.0-49.9, adult    15. Multiple sclerosis       274}  Diagnostic Impression Remarks + HPI     Documentation entered by me for this encounter may have been done in part using speech-recognition technology. Although I have made an effort to ensure accuracy, "sound like" errors may exist and should be interpreted in context.     Edema-patient has bilateral leg edema reports she has lymphedema she has a diagnosis of CHF our chart she denies having this diagnosis no orthopnea or PND recommend follow-up with Cardiology or place Lasix which she is taking daily with torsemide which has better bioavailability and will see increase as tolerated increased potassium  Diabetes-stable recommended he diet exercise monitor repeat blood work    Hypertension well control continue current meds were increased potassium monitor    MARCOS-stable on Effexor and Xanax were the side effects of Xanax extensively and I will not prescribe his long-term " will put a referral for specialist will send in temporary script will have to find specialist to continue this or taper off CP review    Sleep apnea-not using a machine recommend follow-up with cp medicine to be further evaluated    Insonmia needs improved recommended trying wean off Xanax try trazodone p.r.n.    hypothyroidcontrol uncertain will recheck blood work monitor continue    Health maintenance-had mammogram at [facility] will get records also she had a colonoscopy a Dr. Yaya Layton will get records  The patient is meeting the goals of benzodiazepine therapy and is dependent on them for functionality and can not perform ADLS without them. Regarding benzodiazepine, the risks were discussed including tolerance, addiction, overdose, over-sedation, drug interactions, falls, seizures, cognitive effects, and even death. I cautioned the patient that the medications that are being taken are dangerous and can result in death from overdose. They can also result in death if taken by someone else without medical supervision. I also warned the patient to not drive or operate heavy machinery while taking these medications. Shared decision making occurred and she agreed with this treatment plan.     Patient offered lower dose of benzodiazepine medication.     was reviewed today (09/22/2022) and determined to be appropriate for continued benzodiazepine therapy.    The addictive potential of the medications were discussed and she agrees to inform us or the PCP if any compulsion to taking his medications for any reason other than for pain arises. She agrees to safe-guard all medications from unintentional or intentional use or misuse by other persons who may potentially have access to their premises, including but not limited to significant others, children, parents, friends, and even strangers. She takes sole responsibility for any consequences that may occur from not doing so.    Altogether 42minutes of total  time spent on the encounter, which includes face to face time and non-face to face time preparing to see the patient (eg, review of tests), Obtaining and/or reviewing separately obtained history, Documenting clinical information in the electronic or other health record, Independently interpreting results (not separately reported) and communicating results to the patient/family/caregiver, or Care coordination (not separately reported). I also counseled her on common and the most usual side effect of prescribed medications. Risk and benefits of the medication were also discussed. I reviewed previous notes to better coordinate patient's care. She test results and lifestyle changes were also discussed. All questions were answered, and patient voiced understanding.     This is the extent of this pleasant patient's concerns at this present time. She did not feel chest pain upon exertion, dyspnea, nausea, vomiting, diaphoresis, or syncope. No pleuritic chest pain, calf tenderness, or calf pain. Negative for unintentional weight loss night sweats and fevers. Genoveva will return to clinic in a few months for further workup and reassessment or sooner as needed. She was instructed to call the clinic or go to the emergency department if her symptoms do not improve, worsens, or if new symptoms develop. As we discussed that symptoms could worsen over the next 24 hours she was advised that if any increased swelling, pain, or numbness arise to go immediately to the ED. Patient knows to call any time if an emergency arises. Shared decision making occurred and she verbalized understanding in agreement with this plan. I discussed imaging findings, diagnosis, possibilities, treatment options, medications, risks, and benefits. She had many questions regarding the options and long-term effects. All questions were answered. She expressed understanding after counseling regarding the diagnosis and recommendations. She was capable and  demonstrated competence with understanding of these options. Shared decision making was performed resulting in her choosing the current treatment plan. Patient handout was given with instructions and recommendations. Advised the patient that if they become pregnant to alert us immediately to assess for medication changes. I also discussed the importance of close follow up to discuss labs, change or modify her medications if needed, monitor side effects, and further evaluation of medical problems.     Additional workup planned: see labs ordered below.    See below for labs and meds ordered with associated diagnosis      1. Healthcare maintenance    2. Acquired hypothyroidism  - TSH; Future    3. Type 2 diabetes mellitus with hyperglycemia, without long-term current use of insulin  - MICROALBUMIN / CREATININE RATIO URINE; Future  - Diabetic Eye Screening Photo; Future    4. Hypertension associated with diabetes  - Basic Metabolic Panel; Future  - Magnesium; Future  - Magnesium, RBC; Future    5. Hyperlipidemia associated with type 2 diabetes mellitus    6. Abnormal finding of blood chemistry, unspecified  - Ferritin; Future  - Iron and TIBC; Future  - Vitamin B12 Deficiency Panel; Future    7. Rash  - Ambulatory referral/consult to Dermatology; Future    8. Edema, unspecified type  - torsemide (DEMADEX) 10 MG Tab; Take 1 tablet (10 mg total) by mouth once daily.  Dispense: 30 tablet; Refill: 2  - Ambulatory referral/consult to Physical/Occupational Therapy; Future    9. MARCOS (generalized anxiety disorder)  - Ambulatory referral/consult to Psychiatry; Future    10. Rheumatoid arthritis, involving unspecified site, unspecified whether rheumatoid factor present  - potassium chloride (MICRO-K) 10 MEQ CpSR; Take 2 capsules (20 mEq total) by mouth every evening.  Dispense: 180 capsule; Refill: 0    11. Sleep apnea, unspecified type  - Ambulatory referral/consult to Pulmonology; Future    12. Anxiety  - ALPRAZolam (XANAX) 1  MG tablet; Take 1 tablet (1 mg total) by mouth 2 (two) times daily as needed for Anxiety (anxiety).  Dispense: 60 tablet; Refill: 0    13. Achlorhydria  - Vitamin B12 Deficiency Panel; Future    14. Morbid obesity with BMI of 45.0-49.9, adult    15. Multiple sclerosis    Rajesh Dubois MD   274}    Tests to Keep You Healthy    Mammogram: DUE  Eye Exam: ORDERED BUT NOT SCHEDULED  Colon Cancer Screening: DUE  Last Blood Pressure <= 139/89 (9/22/2022): Yes  Last HbA1c < 8 (09/15/2022): Yes

## 2022-09-22 NOTE — PATIENT INSTRUCTIONS
Mick Tomas, Please read below to learn more on healthy choices, screenings, and useful information related to your health.  Most of my patients read it. Most of my healthy patients complete their cancer screenings. Don't lose out on improving your health.     Table of Contents:     Overdue health maintenance   Cancer prevention  Explanation on the different components of your blood work and interpretation  Frequently asked questions   Blood pressure log     If you are due for any health screening(s) below please notify me so we can arrange them to be ordered and scheduled to maintain your health. Most healthy patients at your age complete them.     Tests to Keep You Healthy    Mammogram: DUE  Eye Exam: DUE  Colon Cancer Screening: DUE  Last Blood Pressure <= 139/89 (9/22/2022): Yes  Last HbA1c < 8 (09/15/2022): Yes      Breast Cancer Screening    Breast cancer is the second most common cancer in women after skin cancer, and the second leading cause of death from cancer after lung cancer. Mammograms can detect breast cancer early, which significantly increases the chances of curing the cancer.      A screening mammogram is an x-ray image of the breasts used for early breast cancer detection. It can help reduce the number of deaths from breast cancer among women. To get a clear image, the breast is placed between two plastic plates to make it flat. How often a mammogram is needed depends on your age and your breast cancer risk.    Although you are still overdue for this important screening, due to the COVID-19 pandemic, we recommend scheduling it in the near future.  Colon Cancer Screening    Of cancers affecting both men and women, colorectal cancer is the third leading cancer killer in the [place]. But it doesnt have to be. Screening can prevent colorectal cancer or find it at an early stage when treatment often leads to a cure.    A colonoscopy is the preferred test for detecting colon cancer. It is needed  only once every 10 years if results are negative. While sedated, a flexible, lighted tube with a tiny camera is inserted into the rectum and advanced through the colon to look for cancers. An alternative screening test that is used at home and returned to the lab may also be used. It detects hidden blood in bowel movements which could indicate cancer in the colon. If results are positive, you will need a colonoscopy to determine if the blood is a sign of cancer. This type of follow up (diagnostic) colonoscopy usually requires additional copays as required by your insurance provider. Please contact your PCP if you have any questions.    Although you are still overdue for this important screening, due to the COVID-19 pandemic, we recommend scheduling it in the near future.       Diabetic Retinal Eye Exam    Diabetes is the #1 cause of blindness in the [place] - early detection before signs or symptoms develop can prevent debilitating blindness.    Once-a-year screening is recommended for all diabetic patients. This exam can prevent and treat diabetes complications in the eye before developing symptoms. This can be done with a special camera is used to take photographs of the back of your eye without having to dilate them, or you can see an eye doctor for a full dilated exam.    Although you are still overdue for this important screening, due to the COVID-19 pandemic, we recommend scheduling it in the near future.            Cancer Prevention    Why should you choose to get screened for cancer? One simple reason is because you are important. You matter and deserve to have the best health so you can fulfill your great potential.       Colon Cancer screening - Most colon cancers can be prevented by screening. In most cases a polyp in the colon can grow and is not cancerous at first, but it can become cancerous years later. A polyp is like a seed that can grow into a weed if it is left in the soil. If the seed is detected and  "removed, no weed sprouts. A FIT test/Cologuard test and colonoscopy can detect precancerous polyps and lead to the prevention of cancer. A polyp is just like a seed that can be removed before the roots take hold. A colonoscopy can remove these polyps and eliminate the chance that these polyps turn into cancer.     Cervical Cancer screening- A PAP smear can detect precancerous cells that can become cervical cancer and can lead to procedures to remove them. It is very important to get a PAP smear as investing these few minutes can help prevent a lot of trouble down the road. If you want to do the most you can do to spend more time with your family and friends', cancer screenings can help with that goal.     Breast Cancer screening- Mammograms can detect breast cancer before it has spread and early detection allows the ability to remove the lesion prior to any spread. It is similar to a small rotten spot on fruit. If the spoiled part is removed quickly it can preserve the rest of the fruit, but if it is left alone it will corrupt the whole peach.     Smoking Cessation- "Smoking is like a chimney. The tar builds up and causes a back draft which leads to a cough. Smoking is like sitting in a car with a blocked exhaust system." Smoking can increase your risk for lung, mouth, throat, nose, esophagus, bladder, kidney, ureter, pancreas, stomach, liver, cervix, ovary, bowel, and leukemia [2]. If you have smoked in the past, you might meet the criteria for a CT lung cancer screening.     Lung Cancer Screening - "The U.S. Preventive Services Task Force (USPSTF) recommendsexternal icon yearly lung cancer screening with LDCT for people who--have a 20 pack-year or more smoking history, and smoke now or have quit within the past 15 years, and are between 50 and 80 years old. A pack-year is smoking an average of one pack of cigarettes per day for one year. For example, a person could have a 20 pack-year history by smoking one pack a " "day for 20 years or two packs a day for 10 years." [3]    Hypertension - Common high blood pressure meds may lower colorectal cancer risk-MCKAYLA, July 6, 2020 - Hypertension Journal Report Medications commonly prescribed to treat high blood pressure may also reduce patients' colorectal cancer risk, according to new research published today in Hypertension, an American Heart Association journal." [4].     Low HbA1c - "Higher HbA1c levels (within both the non?diabetic and diabetic ranges) were associated with the risk of colorectal cancer (Model 2; P linear?=?0.009), especially for colon cancer." [5].    A healthy diet, exercise, limitation of alcohol use, certain infections, avoidance of radiation/environmental toxins, and staying lean lowers your cancer risk [6].     I want to empower you to make informed choices for your health. I have a listed below the most common blood components that we check for when you get blood work. I discuss what each component measures along with common reasons for why they can be abnormal. If you are interested in understanding your blood work better, please read the lab explanation attached below.     Explanation of Lab Results    Please note: This information is included as a reference to help you better understand your lab results and is not be used for diagnosis.     Lipid Panel:  Cholesterol is a measure of cardiac risk and stroke risk. A lipid panel measures total cholesterol and provides readings which are broken down into 3 subsections: Triglycerides, HDL and LDL.    Total Cholesterol: Your total blood cholesterol is a measure of LDL cholesterol, HDL cholesterol, and other lipid components.  If your individual lipid level components are in the normal range and you have an elevated total cholesterol level we are generally not to concerned since we primarily look at the LDL cholesterol which is considered the bad cholesterol which can lead to heart attacks and strokes.  Your " calculated cardiac risk is the most important factor in deciding if you would benefit from a cholesterol-lowering medication that the total cholesterol level.    Triglycerides are most diet and weight sensitive. They are affected easily by lifestyle changes. Ideally, this reading should be less than 150, although if the remainder of the cholesterol panel is within normal limits, we will tolerate upwards of 250-300. For the most part, if triglycerides are the only part of your cholesterol panel reading as 'abnormal', it is best to lose weight and modify your diet, including less saturated fat and less simple sugars. We only start medication to lower this is the level is greater than 500 since this can increase ones risk for pancreatitis. This is not the cholesterol type that leads to heart attacks.     HDL is your 'good cholesterol.' Ideally, the reading should be over 40 and is particularly helpful when it is greater than 60. Research indicates that high HDL is extremely protective; and if your HDL level is greater than 60, we tolerate far worse LDL or triglyceride levels. For the most part, HDL is responsive to aerobic activity and ideal weight. We do not have medicines that increase the HDL reading very easily.    LDL is your 'bad cholesterol.' Our goals depend on how many cardiac risk factors you have.     High LDL: If you are diabetic or have had a heart attack, we like the LDL level to be less than 100. If you have 2 cardiac risk factors (such as diabetes, hypertension, family history, a low HDL and smoking), we like your LDL to be less than 130. If you have 0-2 cardiac risk factors, we like your LDL level to be less than 160, but we may not necessarily initiate medications to 190 unless you cannot lower it. The latest guidelines recommend to consider taking a statin only if your ASCVD cardiac risk score is at least greater than 7.5% and a strong recommendation if your risk score is greater than 10%.     Low  LDL: Normal or low LDL is considered good and having an LDL below the normal range is not of a concern.     Complete Blood Count (CBC):    White blood cells: These are infection fighting cells. Mild fluctuations may represent minor illness at the time of blood draw. Marked fluctuations can represent immune diseases. This can also be elevated from smoking.     High WBC: Elevation in wbc can commonly be caused by an infection, steroid use, or any cause of inflammation such as many rheumatologic diseases, surgery, or trauma.      Low WBC: Many different things can cause this such as infections, medications, rheumatologic disorders, malignancies, nutritional deficiencies, and a normal variant in some individuals. If this occurs it is common practice to rule out a few viruses such as HIV, Hep C, B12, folate along with a a peripheral blood smear to look for abnormalities. If you are otherwise healthy with no concerning symptoms and the workup is negative we usually monitor it with yearly blood work.  If there are other abnormal cell line abnormalities sometimes we refer to hematology to further evaluate.    Hemoglobin: A key indicator for anemia. Ranges depend on age. If you are significantly out of this range, we may need to talk with you.    High Hemoglobin: This can be elevated in some blood disorders, sleep apnea, chronic lung disease, kidney disease, testosterone use, dehydration, smoking, along with some other rare causes.     Low Hemoglobin: Many things can lead to this but the most common cause is an iron deficiency in females who lose blood during their periods, decreased absorption due to antacids, poor diet, sickle cell, anemia of chronic disease, malignancy, bleeding from the gut, along with some other less common causes. If you are a young female who has periods it is usually assumed that this is from that blood loss unless other symptoms or abnormal blood work is present. If you are above 45 and have an iron  deficiency it is always recommended to get a colonoscopy to ensure that there is no lesion causing blood loss and to exclude malignancy.     Hematocrit: Another way of looking at anemia, much like your hemoglobin. If you are significantly out of this range, we may need to talk with you.    MCV: This looks at the size of your red blood cells.     High MCV:  A large number may indicate a B-12 deficiency, folate deficiency, alcohol use, liver disease, medication-induced phenomenon, or a need for further workup.    Low MCV: A small number may imply iron anemia or genetic disorder such as alpha-thalassemia. We may check iron studies called to see if you are low.    MCH: Much like MCV above.    Platelets: These are clotting factors. We tolerate a broad range in this. If your numbers are less than 100, we may need to work it up.     High Platelet Count: If your numbers are elevated, this could represent ongoing inflammation within the body which can be caused by any infection, illness, iron deficiency, or other malignancy. We usually recheck it to monitor for improvement and if it does not resolve will work it up with more blood work.     Low Platelet Count: Many things can cause this such as infections, alcohol use, medications, liver disease, vitamin deficiencies, aspleenia, and some other rare blood disorders. If it persists many times we refer to hematology if a cause is not identified.     Iron:  This is not a reliable blood marker since this fluctuates throughout the day and if you are fasting many times your iron level in your blood is low but this does not necessarily mean you have a deficiency.  There are more reliable ways to measure your iron stores and these are discussed below.    Transferrin:  Many things can cause an abnormal result and this is not as important as some other labs mentioned below.    TIBC:  This is the total iron-binding capacity and this measures the total amount of iron that can be bound by  proteins in the blood.  If you have an elevated TIBC this is a good marker that you could be low on iron and this is useful blood marker.  A simple way to think of this is seats in a car. The TIBC is the number of open seats that iron can sit in. If you have a high TIBC (number of open seats) that means you have a low iron level.     Ferritin:  A low ferritin is almost always caused from an iron deficiency.  A normal ferritin level does not need necessarily exclude an iron deficiency since many inflammatory conditions such as kidney disease, diabetes, arthritis, and obesity can raise this level hiding an iron deficiency.  If you are healthy with no inflammatory conditions this is a very reliable test for detecting an iron deficiency since nothing else lowers your ferritin level except a low iron level. Keep in mind that you can have an iron deficiency if your ferritin level is normal but your TIBC is elevated.  If you have a low iron level the next step is always to identify why you have a low iron level.    CMP (Comprehensive Metabolic Panel):  Broadly, this is a test of organ function including the kidneys, liver, and electrolyte levels.    Glucose: This is primarily looking for diabetes. We like your blood glucose level to be less than 100 when fasting. Readings of 100-125 indicate what we call 'pre-diabetes' or 'glucose intolerance.' This does not necessarily indicate diabetes, but we may check another test called a hemoglobin A1C for confirmation. This level puts you at great risk for becoming a true diabetic and we would encourage the reduction of simple sugars and processed white flour as well as appropriate weight loss. If this number is greater than 125, it is likely you are diabetic; we will get an additional hemoglobin A1C test and will likely schedule you for an appointment. If you notice that your blood glucose is above 125 and we have not scheduled a follow-up appointment, please call us. Patients who  are known diabetics can have readings greater than 125.    Urea Nitrogen: This is a kidney function and maybe elevated because of mild dehydration or because of excessive muscle breakdown from aggressive exercise habits.    Creatinine: This also is a kidney function test. It may be mildly elevated if you have particularly large muscle bulk or taking a supplement like creatine. It is related to the GFR. It is a muscle product that we track to look at your kidney function. If this is elevated and new, we may need to talk with you. If you have had this mildly elevated in the past, it is likely that we will just track it to ensure that it does not worsen quickly. Some medications may gently worsen this, namely blood pressure pills called ace inhibitors. To some degree, we will permit levels of up to 1.6.    Sodium: This is essentially the concentration of salt within the body. This may be mildly low because of dehydration, overhydration, diuretics, .    Potassium: This is an electrolyte that can cause muscular cramping or cardiac difficulties. It is sometimes lowered by diuretic medications.    Chloride: For the most part, this is only relevant if the other electrolytes are abnormal.    CO2: This is a function of acid balance within the body. For the most part, mild abnormalities are not important and may represent a starvation or dehydration state when blood was drawn. Many medications can change this level as well such as diuretics, acetazolamide, calcium carbonate, laxatives, aspirin, and many others.    High CO2: Many things can cause this which includes dehydration, sleep apnea, and COPD.     Low CO2: Many things can lead to a low level and if you are generally healthy we are usually  not concerned. Diarrhea, renal disease, diabetes, and many medications can cause this.     Anion Gap: Only relevant if your CO2 is abnormal.    Calcium: This is not related to dietary intake of calcium. It may fluctuate gently based  on the amount of protein within your body. If it is above 10.9, we may need to do additional testing. Many times if low the albumin level is low and once the corrected calcium level is calculated the calcium is in a normal range.     Total protein: This looks at protein within the body. Markedly elevated levels can represent an immune response and may require further workup.    Albumin: This may be elevated because of particularly high level of fitness. If it is markedly suppressed, it may represent organ dysfunction, particularly in the liver or kidneys.    AST and ALT: These are enzymes in the liver and if elevated can indicate liver damage.     Elevated AST/ALT: Many things can caused elevated liver enzymes and the most common reason is viral infections, alcohol use, medications, supplements, autoimmune, along with some other rare causes. One of the most common reasons for a mild elevation in liver enzymes (less than 3 times the upper limit) is fatty liver disease. Many individuals who have extra fat in their diet store this in the liver and this can build up and cause a mild elevation. This is usually diagnosed with a liver ultrasound and exclusion of other causes. The only treatment for this is diet and exercise along with avoidance of liver toxic medications and alcohol. It is standard of care to rule our viral hepatitis and get imaging of the liver if elevated. This is monitored and if I feel concerned will refer to hepatology.     Alk Phos: Part of liver function or bones    High Alk Phos: elevated levels may indicate a liver injury or obstruction of bile flow. Elevated levels can also be seen in vitamin D deficiency, drug induced, or bone disorders.     Low Alk Phos: In most cases having a low Alkaline Phosphatase enzyme activity is not due to any disease and is simply a normal variant.  Having an elevated Alk Phos is more concerning and associated with many diseases and thus I would not be overly concerned  with a low level which is seen in many health individuals. The reasons for a low serum alkaline phosphatase activity were reviewed in a 1-yr retrospective study and in this study it was found that no cause was found in most cases.  Low activity values were recorded in several individuals in the absence of any obvious cause. This would suggest that the definition of the lower limit of the reference range for alkaline phosphatase is arbitrary, thus limiting the use of low serum activity as a marker of disease.  In some cases micronutrients like Zinc (Zn) and Magnesium (Mg) are causes of low ALP activity and you can take zinc or magnesium supplements. Unfortunately, the blood work to measure zinc and magnesium levels are unreliable and not very accurate since it does not test for the intracellular zinc or magnesium levels.     Brittny GREENBERGD, Kemi SANTIAGO, Olga MORGAN. Clinical significance of a low serum alkaline phosphatase. Net J Med. 1992 Feb;40(1-2):9-14. PMID: [id number].  Rubén CORDOVA S, Jose B, Belkys I, Behera S, Rubén S. Low Alkaline Phosphatase (ALP) In Adult Population an Indicator of Zinc (Zn) and Magnesium (Mg) Deficiency. Curr Res Nutr Food Sci 2017;5(3). doi : http://[email or web address]    Total Bilirubin: This is also part of liver function.    High T Bili: If you have right upper quadrant pain an elevation in total bilirubin can be caused by a gallbladder stone that is blocking the biliary tract that leads to your gastrointestinal tract. If you have fever and right upper quadrant pain this can sometimes be elevated when your gallbladder is infected and most individuals have nausea with vomiting associated with it. If you have no symptoms and are otherwise healthy this can be caused by Gilbert syndrome which is a benign normal variant. There are other causes such as some anemias but there would be abnormal blood counts.     Low T Bili: Nothing to be concerned about.     Thyroid Stimulating Hormone  "(TSH): This reading is an indicator of your thyroid function. The thyroid regulates energy levels throughout the entire body, affecting almost every organ system. This is an inverse relationship so a high number actually presents a low thyroid. If this is abnormal, we will often check an additional lab called a Free T4 to evaluate this more carefully. Borderline elevations, those of 5-10, can be watched or worked up further. Please do not take the supplement Biotin for at least a week prior to getting your TSH checked since this can lead to false measurement levels.     Prostate Specific Antigen (PSA): (Men only.) This is a prostate cancer screening test, and is no longer a routine screening test. Levels are truly a function of age. Being less than 4 is typical for someone more in their 60s. If you are young, it should probably be less than 3. A higher PSA result does not necessarily mean that you have cancer, but may indicate a need for a discussion with your provider. Options include observation to look at rate of rise or prostate biopsy. Not only is the absolute value important, but how much it has changed from previous years. Please ensure that there is not a dramatic rise from previous years.    Please Note: This information is included as a reference to help you better understand your lab results and is not be used for diagnosis.    Frequently asked questions    When should I take my blood pressure medication?    The latest studies show that taking your blood pressure medication at night is the best time. A recent study showed that this prevents more heart attacks and strokes. See the answer below from [place].     "Q. I've taken blood pressure medicines every morning for many years, and they keep my pressure under control. Recently, my doctor recommended taking them at bedtime, instead. Does that make sense?    A. It actually does make sense -- based on recent research. For many years, there have been at " "least three theoretical reasons for taking blood pressure medicines before bedtime. First, a body system that strongly affects blood pressure, called the renin-angiotensin system, has its peak activity during sleep. Second, circadian rhythms cause differences in the body chemistry at night compared with daytime. Third, most heart attacks occur in the morning, before medicines taken in the morning have a chance to "kick in."" [6].     When should I take my cholesterol medication?    It used to be recommended to take your cholesterol medication at night since the original statins that lowered cholesterol did not last 24 hours and most cholesterol synthesis is done at night. The long acting statins such as atorvastatin and rosuvastatin last 24 hours so they can be taken any time during the day. Simvastatin, pravastatin, fluvastatin, and lovastatin are shorter acting and should be taken at bed time.     Can supplements affect my blood work?    Yes they can. A very important supplement to not take for at least a week prior to your blood work is biotin. "Biotin supplement use is common and can lead to the false measurement of thyroid hormone in commonly used assays." [8.]    What are conditions that should not be addressed during a virtual visit?    There are some conditions that should not be evaluated via a virtual visit since optimal care is impossible. Chest pain, shortness of breath, lung conditions, abdominal pain, and any neurological complaint such as headache, dizziness, numbness ect.         When will I get commentary of my blood work?    I review all blood work that you get and I will send out commentary on this via [employer] within 72 hours. In most cases you will get a message from me sooner, but many times not all of the blood work is completed thus I usually wait until all results have returned. If there is a critical abnormality you should be contacted the same day you got blood work.     How frequently do I " need to have visits to get controlled substances?    It is standard protocol to have a visit every 3 months if you are taking scheduled substances such as ADHD medications, psychiatric medications, and pain medications. This is to ensure your safety and monitor for any side effects.     When should I bring prior to a visit if I want to lose weight?    I recommend that you make a food diary for a week and fill out what you ate each day. You can bring this form in to your visit and I can look over it to suggest changes that you can make.     Which over the counter medications should I avoid if I have decreased kidney function?    NSAIDs which includes ibuprofen (Advil, Motrin, Nuprin), naproxen (Aleve), meloxicam (Mobic) and diclofenac(Zorvolex, Voltaren) and ketorolac (Toradol) can damage your kidneys if you take this long term.Tylenol does not affect your kidney and thus is safe as long as you don't have liver disease.     Is there an [facility] pharmacy?     Yes! The [facility] is located on the first floor of the [facility]. The address is listed below. You can get curbside pickup if you call their number at [phone]. One of the many benefits of using the [facility] pharmacy is that the pharmacists can contact me directly if a cheaper alternative is available to save you money. They also see your note to know more about what the medication was prescribed for. I recommend this pharmacy since communication with me is quick in case any confusion arises on your medications.     [address] [facility].  [address] [place] [id number]  Phone: [phone]    Hours:  Monday - Friday  8:00 a.m. - 5:30 a.m.    Why is it not in my best interest to call in order to get an antibiotic?    Medicine is a complex field and many times the correct diagnosis is critical in order to provide the correct care. One of the most important goals of a healthcare provider is to ensure that no dangerous condition is masquerading as a mild  illness. Specific questions are very important to obtain during an examination that provide a wealth of information to understand your illness. Health care providers are trained to investigate for signs that can be dangerous to your health. Messaging or calling the office in order to get an antibiotic can be very dangerous.     For example, many urinary tract infections can lead to an infection in the kidney that can result in a serious blood stream infection that can lead to hospitalization if not recognized. A cough can be caused by many different things and not necessarily an infection. It is not uncommon that one assumes a cough is from an infection when it is actually caused by a blood clot in the lungs. This can lead to death. Determining your risk can only be performed after a thorough history and examination. A few sentences through e-mail is not enough.     What are some common symptoms that should be evaluated by the emergency department and not by phone or e-mail?    This does not include every symptom, but common examples of symptoms that should prompt one to go to the emergency department are chest pain, chest pressure, shortness of breath, difficulty breathing, abdominal pain, weakness or numbness or an extremity, sudden weakness or drooping on one side of the body, speaking difficulty, unusual or bad headache (particularly if it started suddenly), head injury, confusion, seizure, passing out, lightheaded, pain in arm or jaw, suddenly not able to speak, see, walk, or move, dizziness, neck stiffness, suicidal thoughts, testicular pain, cuts and wounds, severe pain, along with many others. This is not an inclusive list.     Outside Records    It is common to have an colonoscopy, mammogram, PAP smear, or eye exam done outside the [facility] system. Many times we do not get the records automatically sent to us. Please call your provider's office to notify them to fax us your records so that we can have the  most up to date information. Your provider will review your outside results in order to provide you with the complete care that you deserve. We appreciate that you decided to choose us to be serving on your healthcare team and the more information we have about your health is essential.     If I have a psychiatric crisis what should I do?    If you ever feel that there is a risk of a harm to yourself we recommend to go to the emergency room. There is a National Suicide Prevention lifeline number 4-351-143-TALK which route you to the nearest crisis center. There is also a suicide hotline 2-333-VKSDNMF (1-437.423.9787).    988 has been designated as the new three-digit dialing code that will route callers to the National Suicide Prevention Lifeline (now known as the 988 Suicide & Crisis Lifeline), and is now active across the [place].    When people call, text, or chat 988, they will be connected to trained counselors that are part of the existing Lifeline network. These trained counselors will listen, understand how their problems are affecting them, provide support, and connect them to resources if necessary.    The previous Lifeline phone number (1-693.427.1777) will always remain available to people in emotional distress or suicidal crisis.    The [facility] [employer] network of over 200 crisis centers has been in operation since 2005, and has been proven to be effective. Its the counselors at these local crisis centers who answer the contacts the Lifeline receives every day. Numerous studies have shown that callers feel less suicidal, less depressed, less overwhelmed and more hopeful after speaking with a Lifeline counselor.     Patient Education       Checking Your Blood Pressure at Home   The Basics   Written by the doctors and editors at [facility]   How is blood pressure measured? -- Blood pressure is usually measured with a device that goes around your upper arm. This is often done in a doctor's office. But some  "people also check their blood pressure themselves, at home or at work.  Blood pressure is explained with 2 numbers. For instance, your blood pressure might be "140 over 90." The first (top) number is the pressure inside your arteries when your heart is amanda. The second (bottom) number is the pressure inside your arteries when your heart is relaxed. The table shows how doctors and nurses define high and normal blood pressure (table 1).  If your blood pressure gets too high, it puts you at risk for heart attack, stroke, and kidney disease. High blood pressure does not usually cause symptoms. But it can be serious.  What is a home blood pressure meter? -- A home blood pressure meter (or "monitor") is a device you can use to check your blood pressure yourself. It has a cuff that goes around your upper arm (figure 1). Some devices have a cuff that goes around your wrist instead. But doctors aren't sure if these work as well. The meter also has a small screen, or dial, that shows your blood pressure numbers.  There are also special meters you can wear for a day or 2. These are different because they automatically check your blood pressure throughout the day and night, even while you are sleeping. If your doctor thinks you should use one of these devices, they will talk to you about how to wear it.  Why do I need to check my blood pressure at home? -- If your doctor knows or suspects that you have high blood pressure, they might want you to check it at home. There are a few reasons for this. Your doctor might want to look at:  Whether your blood pressure measures the same at home as it did in the doctor's office  How well your blood pressure medicines are working  Changes in your blood pressure, for example, if it goes up and down  People who check their own blood pressure at home usually do better at keeping it low.  How do I choose a home blood pressure meter? -- When choosing a home blood pressure meter, you will " probably want to think about:  Cost - Some devices cost more than others. You should also check to see if your insurance will help pay for your device.  Size - It's important to make sure the cuff fits your arm comfortably. Your doctor or nurse can help you with this.  How easy it is to use - You should make sure you understand how to use the device. You also need to be able to read the numbers on the screen.  You do not need a prescription to buy a home blood pressure meter. You can buy them at most pharmacies or over the internet. Your doctor or nurse can help you choose the right device for you.  How do I check my blood pressure at home? -- Once you have a home blood pressure meter, your doctor or nurse should check it to make sure it fits you and works correctly.  When it's time to check your blood pressure:  Go to the bathroom and empty your bladder first. Having a full bladder can temporarily increase your blood pressure, making the results inaccurate.  Sit in a chair with your feet flat on the ground.  Try to breathe normally and stay calm.  Attach the cuff to your arm. Place the cuff directly on your skin, not over your clothing. The cuff should be tight enough to not slip down, but not uncomfortably tight.  Sit and relax for about 3 to 5 minutes with the cuff on.  Follow the directions that came with your device to start measuring your blood pressure. This might involve squeezing the bulb at the end of the tube to inflate the cuff (fill it with air). With some monitors, you just need to press a button to inflate the cuff. When the cuff fills with air, it feels like someone is squeezing your arm, but it should not hurt. Then you will slowly deflate the cuff (let the air out of it), or it will deflate by itself. The screen or dial will show your blood pressure numbers.  Stay seated and relax for 1 minute, then measure your blood pressure again.  How often should I check my blood pressure? -- It depends.  Different people need to follow different schedules. Your doctor or nurse will tell you how often to check your blood pressure, and when. Some people need to check their blood pressure twice a day, in the morning and evening.  Your doctor or nurse will probably tell you to keep track of your blood pressure for at least a few days (table 2). Then they will look at the numbers. The reason for this is that it's normal for your blood pressure to change a bit from day to day. For example, the numbers might change depending on whether you recently had caffeine, just exercised, or feel stressed. Checking your blood pressure over several days - or longer - will give your doctor or nurse a better idea of what is average for you.  How should I keep track of my blood pressure? -- Some blood pressure meters will record your numbers for you, or send them to your computer or smartphone. If yours does not do this, you will need to write them down. Your doctor or nurse can help you figure out the best way to keep track of the numbers.  What if my blood pressure is high? -- Your doctor or nurse will tell you what to do if your blood pressure is high when you check it at home. If you get a number that is higher than normal, measure it again to see if it is still high. If it is very high (above a certain number, which your doctor or nurse will tell you to watch out for), you should call your doctor right away.  If your blood pressure is only a little high, your doctor or nurse might tell you to keep checking it for a few more days or weeks, and then call if it does not go back down. Then they can help you decide what to do next.  All topics are updated as new evidence becomes available and our peer review process is complete.  This topic retrieved from [employer] on: Sep 21, 2021.  Topic 211112 Version 4.0  Release: 29.4.2 - C29.263  © 2021 UpToDate, Inc. and/or its affiliates. All rights reserved.  table 1: Definition of normal and  "high blood pressure  Level  Top number  Bottom number    High 130 or above 80 or above   Elevated 120 to 129 79 or below   Normal 119 or below 79 or below   These definitions are from the American College of Cardiology/American Heart Association. Other expert groups might use slightly different definitions.  "Elevated blood pressure" is a term doctor or nurses use as a warning. It means you do not yet have high blood pressure, but your blood pressure is not as low as it should be for good health.  Graphic 70137 Version 6.0  figure 1: Using a home blood pressure meter     This is an example of a person using a home blood pressure meter.  Graphic 925911 Version 1.0    Consumer Information Use and Disclaimer   This information is not specific medical advice and does not replace information you receive from your health care provider. This is only a brief summary of general information. It does NOT include all information about conditions, illnesses, injuries, tests, procedures, treatments, therapies, discharge instructions or life-style choices that may apply to you. You must talk with your health care provider for complete information about your health and treatment options. This information should not be used to decide whether or not to accept your health care provider's advice, instructions or recommendations. Only your health care provider has the knowledge and training to provide advice that is right for you. The use of this information is governed by the [employer] User License Agreement, available at https://www.[employer]/en/solutions/[employer] Warrior/about/gali.The use of [employer] content is governed by the [employer] Terms of Use. ©2021 UpToDate, Inc. All rights reserved.  Copyright   © 2021 UpToDate, Inc. and/or its affiliates. All rights reserved.      Daily Blood Pressure Log    Please print this form to assist you in keeping track of your blood pressure at home.      Name:  Date of Birth:       Average " Blood Pressure:           Date: Time  (a.m.) Blood  Pressure: Pulse  Rate: Time  (p.m.) Blood  Pressure : Pulse  Rate: Comments:   Sample 8:37 127/83 84    Stressful morning                                                                                                                                                                                                     Wishing you good health,     Rajesh Dubois MD     References:  1-https://www.[employer]/speakers/riafrancisco_az  2-https://www.[employer].com.au/news/cjmxr-eef-80-cancers-that-can-ul-gqfgsz-ck-smoking/  3-https://www.cdc.gov/cancer/lung/basic_info/screening.htm  4-https://newsroom.heart.org/news/common-hypertension-medications-may-reduce-colorectal-cancer-risk  5-Emma KRISHNAN, David M, Sawada N, et al. High hemoglobin A1c levels within the non-diabetic range are associated with the risk of all cancers. Int J Cancer. 2016;138(7):0307-7011. doi:10.1002/ijc.05085  6-https://www.health.[employer] [place].edu/newsletter_article/the-10-commandments-of-cancer-prevention  7-https://www.health.[employer] [place].edu/diseases-and-conditions/should-i-take-blood-pressure-medications-at-night  8-Demetrio REYES et al 2018 Prevalence of biotin supplement usage in outpatients and plasma biotin concentrations in patients presenting to the emergency department. Clin Biochem. Epub 2018 Jul 20. PMID: [id number].

## 2022-09-23 ENCOUNTER — TELEPHONE (OUTPATIENT)
Dept: FAMILY MEDICINE | Facility: CLINIC | Age: 72
End: 2022-09-23
Payer: MEDICARE

## 2022-09-23 ENCOUNTER — PATIENT OUTREACH (OUTPATIENT)
Dept: ADMINISTRATIVE | Facility: HOSPITAL | Age: 72
End: 2022-09-23
Payer: MEDICARE

## 2022-09-23 ENCOUNTER — LAB VISIT (OUTPATIENT)
Dept: LAB | Facility: HOSPITAL | Age: 72
End: 2022-09-23
Attending: STUDENT IN AN ORGANIZED HEALTH CARE EDUCATION/TRAINING PROGRAM
Payer: MEDICARE

## 2022-09-23 ENCOUNTER — CLINICAL SUPPORT (OUTPATIENT)
Dept: FAMILY MEDICINE | Facility: CLINIC | Age: 72
End: 2022-09-23
Attending: STUDENT IN AN ORGANIZED HEALTH CARE EDUCATION/TRAINING PROGRAM
Payer: MEDICARE

## 2022-09-23 DIAGNOSIS — E03.9 ACQUIRED HYPOTHYROIDISM: ICD-10-CM

## 2022-09-23 DIAGNOSIS — K31.83 ACHLORHYDRIA: ICD-10-CM

## 2022-09-23 DIAGNOSIS — R79.9 ABNORMAL FINDING OF BLOOD CHEMISTRY, UNSPECIFIED: ICD-10-CM

## 2022-09-23 DIAGNOSIS — I15.2 HYPERTENSION ASSOCIATED WITH DIABETES: ICD-10-CM

## 2022-09-23 DIAGNOSIS — E11.59 HYPERTENSION ASSOCIATED WITH DIABETES: ICD-10-CM

## 2022-09-23 DIAGNOSIS — E11.65 TYPE 2 DIABETES MELLITUS WITH HYPERGLYCEMIA, WITHOUT LONG-TERM CURRENT USE OF INSULIN: ICD-10-CM

## 2022-09-23 LAB
ANION GAP SERPL CALC-SCNC: 17 MMOL/L (ref 8–16)
BUN SERPL-MCNC: 26 MG/DL (ref 8–23)
CALCIUM SERPL-MCNC: 9.4 MG/DL (ref 8.7–10.5)
CHLORIDE SERPL-SCNC: 98 MMOL/L (ref 95–110)
CO2 SERPL-SCNC: 27 MMOL/L (ref 23–29)
CREAT SERPL-MCNC: 1.1 MG/DL (ref 0.5–1.4)
EST. GFR  (NO RACE VARIABLE): 52.7 ML/MIN/1.73 M^2
FERRITIN SERPL-MCNC: 31 NG/ML (ref 20–300)
GLUCOSE SERPL-MCNC: 131 MG/DL (ref 70–110)
IRON SERPL-MCNC: 43 UG/DL (ref 30–160)
MAGNESIUM SERPL-MCNC: 1.9 MG/DL (ref 1.6–2.6)
POTASSIUM SERPL-SCNC: 3.1 MMOL/L (ref 3.5–5.1)
SATURATED IRON: 9 % (ref 20–50)
SODIUM SERPL-SCNC: 142 MMOL/L (ref 136–145)
TOTAL IRON BINDING CAPACITY: 469 UG/DL (ref 250–450)
TRANSFERRIN SERPL-MCNC: 317 MG/DL (ref 200–375)
TSH SERPL DL<=0.005 MIU/L-ACNC: 3.22 UIU/ML (ref 0.4–4)

## 2022-09-23 PROCEDURE — 80048 BASIC METABOLIC PNL TOTAL CA: CPT | Performed by: STUDENT IN AN ORGANIZED HEALTH CARE EDUCATION/TRAINING PROGRAM

## 2022-09-23 PROCEDURE — 82607 VITAMIN B-12: CPT | Performed by: STUDENT IN AN ORGANIZED HEALTH CARE EDUCATION/TRAINING PROGRAM

## 2022-09-23 PROCEDURE — 83735 ASSAY OF MAGNESIUM: CPT | Performed by: STUDENT IN AN ORGANIZED HEALTH CARE EDUCATION/TRAINING PROGRAM

## 2022-09-23 PROCEDURE — 83735 ASSAY OF MAGNESIUM: CPT | Mod: 91 | Performed by: STUDENT IN AN ORGANIZED HEALTH CARE EDUCATION/TRAINING PROGRAM

## 2022-09-23 PROCEDURE — 84443 ASSAY THYROID STIM HORMONE: CPT | Performed by: STUDENT IN AN ORGANIZED HEALTH CARE EDUCATION/TRAINING PROGRAM

## 2022-09-23 PROCEDURE — 92228 DIABETIC EYE SCREENING PHOTO: ICD-10-PCS | Mod: TC,S$GLB,, | Performed by: STUDENT IN AN ORGANIZED HEALTH CARE EDUCATION/TRAINING PROGRAM

## 2022-09-23 PROCEDURE — 92228 IMG RTA DETC/MNTR DS PHY/QHP: CPT | Mod: 26,S$GLB,, | Performed by: OPTOMETRIST

## 2022-09-23 PROCEDURE — 92228 DIABETIC EYE SCREENING PHOTO: ICD-10-PCS | Mod: 26,S$GLB,, | Performed by: OPTOMETRIST

## 2022-09-23 PROCEDURE — 84466 ASSAY OF TRANSFERRIN: CPT | Performed by: STUDENT IN AN ORGANIZED HEALTH CARE EDUCATION/TRAINING PROGRAM

## 2022-09-23 PROCEDURE — 92228 IMG RTA DETC/MNTR DS PHY/QHP: CPT | Mod: TC,S$GLB,, | Performed by: STUDENT IN AN ORGANIZED HEALTH CARE EDUCATION/TRAINING PROGRAM

## 2022-09-23 PROCEDURE — 82728 ASSAY OF FERRITIN: CPT | Performed by: STUDENT IN AN ORGANIZED HEALTH CARE EDUCATION/TRAINING PROGRAM

## 2022-09-23 NOTE — LETTER
AUTHORIZATION FOR RELEASE OF   CONFIDENTIAL INFORMATION    Dear Dr. Garcia,    We are seeing Genoveva Gilbert, date of birth 1950, in the clinic at [facility]. Rajesh Dubois MD is the patient's PCP. Genoveva Gilbert has an outstanding lab/procedure at the time we reviewed her chart. In order to help keep her health information updated, she has authorized us to request the following medical record(s):        (  )  MAMMOGRAM                                      (X)COLONOSCOPY- most recent      (  )  PAP SMEAR                                          (  )  OUTSIDE LAB RESULTS     (  )  DEXA SCAN                                          (  )  EYE EXAM            (  )  FOOT EXAM                                          (  )  ENTIRE RECORD     (  )  OUTSIDE IMMUNIZATIONS                 (  )  _______________         Please fax records to [facility], Stephen Lee Lambert, MD, [phone]     If you have any questions, please contact [facility] at [phone]           Patient Name: Genoveva Gilbert  : 1950  Patient Phone #: [phone]

## 2022-09-23 NOTE — PROGRESS NOTES
"  [facility]  Occupational Therapy Treatment Note    Date: 9/22/2022  Name: Genoveva Gilbert  [facility] Number: [id number]    Therapy Diagnosis:   Encounter Diagnosis   Name Primary?    Lymphedema of both lower extremities Yes     Physician: Vito Young NP       Physician Orders: evaluate and treat  Medical Diagnosis: lymphedema  Surgical Procedure and Date: na,   Evaluation Date: 9/8/2022  Insurance Authorization Period Expiration: 12/31/2022  Plan of Care Certification Period: 12/8/2022  Progress Note Due: each visit   Date of Return to MD: not scheduled  Visit # / Visits authorized: 4/20  FOTO: to be completed at next appointment     Precautions:  Standard    Time In: 1500  Time Out: 1600  Total Billable Time: 60 minutes    SUBJECTIVE     Pt reports: can you believe I stayed wrapped  Response to previous treatment: good reduction  Functional change: no    Pain: 0/10  Location: na    OBJECTIVE   Girth Measurements (in centimeters)  LANDMARK LEFT LE RIGHT LE     forefoot 22.5/21 cm 21.5/21 cm     ankle  26/22 cm 24.5/21.5 cm     4" 34/31 cm 31/30 cm     calf 44/40 cm 42/40 cm     Below knee 41.5/40 cm 40.5/40 cm     Above knee 54.5 cm 54 cm     Mid thigh 51 cm 53 cm        Treatment     Estela received the treatments listed below:     Manual therapy techniques: Manual Lymphatic Drainage  for 60 minutes, including:  Patient presented with compression bandages on bilateral lower extremities. Bandages removed and measurements taken skin care completed.Initiated MLD with patient in seated on mat, per request.   Complete Decongestive Therapy  Protocol for lymphedema of bilateral lower quadrants was completed.  Therapist applied compression calf sleeves bilateral lower extremities with instructions for patient to remove at night.. I  Patient Education and Home Exercises      Education provided:    Educated on Phase 1 and 2 of protocol.  Reviewed treatment frequency and likely duration of weeks  Plan " of care and goals.  Educated on home management protocols.     Written Home Exercises Provided:  No. 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 She does not have the time or need for additional exercises and functional outcomes will not be negatively affected by this. .    Assessment     Pt would continue to benefit from skilled OT. Yes. Lymphedema, left untreated increases risk of infection, gait deviation causing ortho problems and poor body image.  Estela is not able to tolerate the standard Complete Decongestive Therapy protocol and so modifications will be made. This is known to this writer from Estela's previous treatment last year.   Estela is progressing well towards her goals and there are no updates to goals at this time. Pt prognosis is Good.     Pt will continue to benefit from skilled outpatient occupational therapy to address the deficits listed in the problem list on initial evaluation provide pt/family education and to maximize pt's level of independence in the home and community environment.     Pt's spiritual, cultural and educational needs considered and pt agreeable to plan of care and goals.    Anticipated barriers to occupational therapy: Estela is only caregiver for disabled spouse and has health problems herself.    Goals:   Short Term Goals for 6 weeks: modified this date in bold  Complete decongestion B LE- Ongoing 9/8/2022   Patient will be educated on lymphedema precautions and signs of infection. - Ongoing 9/8/2022   Patient will perform deep abdominal breathing TID- Ongoing 9/8/2022   Patient will tolerate daily activities with multilayered bandaging.- met for up to 36 hours  Appropriate compression garments to be ordered/delivered- Ongoing 9/8/2022 met, calf sleeves class 1 used     Long Term Goals for 12 weeks: (Phase 2 of goals)  Patient will be independent with home management of this chronic condition.  Patient to ami/doff compression garment.   Patient will  demonstrate compliance with all home management recommendations. Patient will demonstrate compliance with modified home management program.  Patient will maintain reduction at monthly follow up appointments for 3 months   PLAN     Continue plan of care 2 times a week to address modified goals listed above..  Updates/Grading for next session: continue with modified Complete Decongestive Therapy and determine if home management will be successful with calf sleeves.      SOTO Wise , ADOLFO/PHIL

## 2022-09-23 NOTE — PROGRESS NOTES
External mammogram record hyperlinked in Health Maintenance.    Colonoscopy requested from Dr. Garcia.

## 2022-09-23 NOTE — PROGRESS NOTES
Genoveva Gilbert is a 72 y.o. female here for a diabetic eye screening with non-dilated fundus photos per Dr. Dubois    Patient cooperative?: Yes  Small pupils?: yes  Last eye exam: 9/2021    For exam results, see Encounter Report.

## 2022-09-26 ENCOUNTER — PATIENT MESSAGE (OUTPATIENT)
Dept: FAMILY MEDICINE | Facility: CLINIC | Age: 72
End: 2022-09-26
Payer: MEDICARE

## 2022-09-26 ENCOUNTER — PATIENT MESSAGE (OUTPATIENT)
Dept: ADMINISTRATIVE | Facility: HOSPITAL | Age: 72
End: 2022-09-26
Payer: MEDICARE

## 2022-09-26 DIAGNOSIS — E87.6 HYPOKALEMIA: Primary | ICD-10-CM

## 2022-09-26 RX ORDER — LANOLIN ALCOHOL/MO/W.PET/CERES
400 CREAM (GRAM) TOPICAL DAILY
Qty: 90 TABLET | Refills: 3 | Status: ON HOLD | OUTPATIENT
Start: 2022-09-26 | End: 2023-09-24

## 2022-09-26 NOTE — PROGRESS NOTES
Please let the patient know that she had a low potassium level and that if she is taking 2 tablets of her 10 meq potasium tablets she should take 1 extra tablet to equal 30 meq per day. I also sent a magnesium supplement that she should take every day which helps increase the absorption of potassium and will likely help out her electrolyte levels.  Please have a recheck potassium level within the next week thank you.

## 2022-09-27 ENCOUNTER — TELEPHONE (OUTPATIENT)
Dept: FAMILY MEDICINE | Facility: CLINIC | Age: 72
End: 2022-09-27
Payer: MEDICARE

## 2022-09-27 LAB — VIT B12 SERPL-MCNC: 443 NG/L (ref 180–914)

## 2022-09-29 LAB — MAGNESIUM RBC-MCNC: 4.8 MG/DL (ref 4–6.4)

## 2022-09-30 ENCOUNTER — LAB VISIT (OUTPATIENT)
Dept: LAB | Facility: HOSPITAL | Age: 72
End: 2022-09-30
Attending: STUDENT IN AN ORGANIZED HEALTH CARE EDUCATION/TRAINING PROGRAM
Payer: MEDICARE

## 2022-09-30 DIAGNOSIS — E87.6 HYPOKALEMIA: ICD-10-CM

## 2022-09-30 LAB
ANION GAP SERPL CALC-SCNC: 8 MMOL/L (ref 8–16)
BUN SERPL-MCNC: 14 MG/DL (ref 8–23)
CALCIUM SERPL-MCNC: 9.2 MG/DL (ref 8.7–10.5)
CHLORIDE SERPL-SCNC: 102 MMOL/L (ref 95–110)
CO2 SERPL-SCNC: 32 MMOL/L (ref 23–29)
CREAT SERPL-MCNC: 0.9 MG/DL (ref 0.5–1.4)
EST. GFR  (NO RACE VARIABLE): >60 ML/MIN/1.73 M^2
GLUCOSE SERPL-MCNC: 155 MG/DL (ref 70–110)
MAGNESIUM SERPL-MCNC: 1.8 MG/DL (ref 1.6–2.6)
POTASSIUM SERPL-SCNC: 3.9 MMOL/L (ref 3.5–5.1)
SODIUM SERPL-SCNC: 142 MMOL/L (ref 136–145)

## 2022-09-30 PROCEDURE — 36415 COLL VENOUS BLD VENIPUNCTURE: CPT | Mod: PO | Performed by: STUDENT IN AN ORGANIZED HEALTH CARE EDUCATION/TRAINING PROGRAM

## 2022-09-30 PROCEDURE — 80048 BASIC METABOLIC PNL TOTAL CA: CPT | Performed by: STUDENT IN AN ORGANIZED HEALTH CARE EDUCATION/TRAINING PROGRAM

## 2022-09-30 PROCEDURE — 83735 ASSAY OF MAGNESIUM: CPT | Performed by: STUDENT IN AN ORGANIZED HEALTH CARE EDUCATION/TRAINING PROGRAM

## 2022-10-03 ENCOUNTER — TELEPHONE (OUTPATIENT)
Dept: FAMILY MEDICINE | Facility: CLINIC | Age: 72
End: 2022-10-03
Payer: MEDICARE

## 2022-10-03 NOTE — TELEPHONE ENCOUNTER
----- Message from Janine Huston sent at 10/3/2022  1:44 PM CDT -----  Who Called: Patient    What is the reqeust in detail: Requesting call back to schedule NP appointment to be seen to [facility] care. Please advise.     Can the clinic reply by [facility]? No    Best Call Back Number: [phone]    Additional Information:

## 2022-10-05 ENCOUNTER — HOSPITAL ENCOUNTER (OUTPATIENT)
Facility: HOSPITAL | Age: 72
Discharge: HOME HEALTH | End: 2022-10-06
Attending: EMERGENCY MEDICINE | Admitting: EMERGENCY MEDICINE
Payer: MEDICARE

## 2022-10-05 ENCOUNTER — TELEPHONE (OUTPATIENT)
Dept: ORTHOPEDICS | Facility: CLINIC | Age: 72
End: 2022-10-05
Payer: MEDICARE

## 2022-10-05 DIAGNOSIS — I50.9 HEART FAILURE, UNSPECIFIED HF CHRONICITY, UNSPECIFIED HEART FAILURE TYPE: ICD-10-CM

## 2022-10-05 DIAGNOSIS — R79.89 ELEVATED TROPONIN: ICD-10-CM
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
## 2022-10-05 DIAGNOSIS — E87.6 HYPOKALEMIA: ICD-10-CM

## 2022-10-05 DIAGNOSIS — S80.01XA CONTUSION OF RIGHT KNEE, INITIAL ENCOUNTER: Primary | ICD-10-CM

## 2022-10-05 DIAGNOSIS — R55 NEAR SYNCOPE: ICD-10-CM

## 2022-10-05 DIAGNOSIS — S40.011A CONTUSION OF RIGHT SHOULDER, INITIAL ENCOUNTER: ICD-10-CM

## 2022-10-05 LAB
ALBUMIN SERPL BCP-MCNC: 3.9 G/DL (ref 3.5–5.2)
ALP SERPL-CCNC: 98 U/L (ref 55–135)
ALT SERPL W/O P-5'-P-CCNC: 24 U/L (ref 10–44)
ANION GAP SERPL CALC-SCNC: 11 MMOL/L (ref 8–16)
AST SERPL-CCNC: 32 U/L (ref 10–40)
BASOPHILS # BLD AUTO: 0.03 K/UL (ref 0–0.2)
BASOPHILS NFR BLD: 0.5 % (ref 0–1.9)
BILIRUB SERPL-MCNC: 0.4 MG/DL (ref 0.1–1)
BILIRUB UR QL STRIP: NEGATIVE
BUN SERPL-MCNC: 22 MG/DL (ref 8–23)
CALCIUM SERPL-MCNC: 9.3 MG/DL (ref 8.7–10.5)
CHLORIDE SERPL-SCNC: 97 MMOL/L (ref 95–110)
CLARITY UR: CLEAR
CO2 SERPL-SCNC: 32 MMOL/L (ref 23–29)
COLOR UR: YELLOW
CREAT SERPL-MCNC: 0.9 MG/DL (ref 0.5–1.4)
D DIMER PPP IA.FEU-MCNC: 1.25 MG/L FEU
DIFFERENTIAL METHOD: ABNORMAL
EOSINOPHIL # BLD AUTO: 0.1 K/UL (ref 0–0.5)
EOSINOPHIL NFR BLD: 2.4 % (ref 0–8)
ERYTHROCYTE [DISTWIDTH] IN BLOOD BY AUTOMATED COUNT: 15.1 % (ref 11.5–14.5)
EST. GFR  (NO RACE VARIABLE): >60 ML/MIN/1.73 M^2
GLUCOSE SERPL-MCNC: 104 MG/DL (ref 70–110)
GLUCOSE UR QL STRIP: NEGATIVE
HCT VFR BLD AUTO: 37.4 % (ref 37–48.5)
HGB BLD-MCNC: 12.1 G/DL (ref 12–16)
HGB UR QL STRIP: NEGATIVE
IMM GRANULOCYTES # BLD AUTO: 0.01 K/UL (ref 0–0.04)
IMM GRANULOCYTES NFR BLD AUTO: 0.2 % (ref 0–0.5)
KETONES UR QL STRIP: NEGATIVE
LEUKOCYTE ESTERASE UR QL STRIP: NEGATIVE
LYMPHOCYTES # BLD AUTO: 2.3 K/UL (ref 1–4.8)
LYMPHOCYTES NFR BLD: 39.8 % (ref 18–48)
MCH RBC QN AUTO: 27.8 PG (ref 27–31)
MCHC RBC AUTO-ENTMCNC: 32.4 G/DL (ref 32–36)
MCV RBC AUTO: 86 FL (ref 82–98)
MONOCYTES # BLD AUTO: 0.5 K/UL (ref 0.3–1)
MONOCYTES NFR BLD: 7.9 % (ref 4–15)
NEUTROPHILS # BLD AUTO: 2.8 K/UL (ref 1.8–7.7)
NEUTROPHILS NFR BLD: 49.2 % (ref 38–73)
NITRITE UR QL STRIP: NEGATIVE
NRBC BLD-RTO: 0 /100 WBC
PH UR STRIP: 7 [PH] (ref 5–8)
PLATELET # BLD AUTO: 240 K/UL (ref 150–450)
PMV BLD AUTO: 9.3 FL (ref 9.2–12.9)
POCT GLUCOSE: 119 MG/DL (ref 70–110)
POTASSIUM SERPL-SCNC: 3.3 MMOL/L (ref 3.5–5.1)
PROT SERPL-MCNC: 7.1 G/DL (ref 6–8.4)
PROT UR QL STRIP: NEGATIVE
RBC # BLD AUTO: 4.35 M/UL (ref 4–5.4)
SODIUM SERPL-SCNC: 140 MMOL/L (ref 136–145)
SP GR UR STRIP: 1.01 (ref 1–1.03)
TROPONIN I SERPL DL<=0.01 NG/ML-MCNC: 0.2 NG/ML (ref 0–0.03)
TROPONIN I SERPL DL<=0.01 NG/ML-MCNC: 0.27 NG/ML (ref 0–0.03)
URN SPEC COLLECT METH UR: NORMAL
UROBILINOGEN UR STRIP-ACNC: NEGATIVE EU/DL
WBC # BLD AUTO: 5.73 K/UL (ref 3.9–12.7)

## 2022-10-05 PROCEDURE — G0378 HOSPITAL OBSERVATION PER HR: HCPCS

## 2022-10-05 PROCEDURE — 84484 ASSAY OF TROPONIN QUANT: CPT | Mod: 91 | Performed by: NURSE PRACTITIONER

## 2022-10-05 PROCEDURE — 96372 THER/PROPH/DIAG INJ SC/IM: CPT | Performed by: NURSE PRACTITIONER

## 2022-10-05 PROCEDURE — 25000003 PHARM REV CODE 250: Performed by: EMERGENCY MEDICINE

## 2022-10-05 PROCEDURE — 81003 URINALYSIS AUTO W/O SCOPE: CPT | Performed by: NURSE PRACTITIONER

## 2022-10-05 PROCEDURE — 93010 EKG 12-LEAD: ICD-10-PCS | Mod: ,,, | Performed by: INTERNAL MEDICINE

## 2022-10-05 PROCEDURE — 36415 COLL VENOUS BLD VENIPUNCTURE: CPT | Performed by: NURSE PRACTITIONER

## 2022-10-05 PROCEDURE — 85379 FIBRIN DEGRADATION QUANT: CPT | Performed by: NURSE PRACTITIONER

## 2022-10-05 PROCEDURE — 63600175 PHARM REV CODE 636 W HCPCS: Performed by: EMERGENCY MEDICINE

## 2022-10-05 PROCEDURE — 96372 THER/PROPH/DIAG INJ SC/IM: CPT | Mod: 59 | Performed by: EMERGENCY MEDICINE

## 2022-10-05 PROCEDURE — 85025 COMPLETE CBC W/AUTO DIFF WBC: CPT | Performed by: EMERGENCY MEDICINE

## 2022-10-05 PROCEDURE — 84484 ASSAY OF TROPONIN QUANT: CPT | Performed by: EMERGENCY MEDICINE

## 2022-10-05 PROCEDURE — 99900035 HC TECH TIME PER 15 MIN (STAT)

## 2022-10-05 PROCEDURE — 25000003 PHARM REV CODE 250: Performed by: NURSE PRACTITIONER

## 2022-10-05 PROCEDURE — 36415 COLL VENOUS BLD VENIPUNCTURE: CPT | Performed by: EMERGENCY MEDICINE

## 2022-10-05 PROCEDURE — 93005 ELECTROCARDIOGRAM TRACING: CPT

## 2022-10-05 PROCEDURE — 93010 ELECTROCARDIOGRAM REPORT: CPT | Mod: ,,, | Performed by: INTERNAL MEDICINE

## 2022-10-05 PROCEDURE — 94760 N-INVAS EAR/PLS OXIMETRY 1: CPT

## 2022-10-05 PROCEDURE — 25500020 PHARM REV CODE 255

## 2022-10-05 PROCEDURE — 63600175 PHARM REV CODE 636 W HCPCS: Performed by: NURSE PRACTITIONER

## 2022-10-05 PROCEDURE — 80053 COMPREHEN METABOLIC PANEL: CPT | Performed by: EMERGENCY MEDICINE

## 2022-10-05 PROCEDURE — A4216 STERILE WATER/SALINE, 10 ML: HCPCS | Performed by: NURSE PRACTITIONER

## 2022-10-05 PROCEDURE — 99285 EMERGENCY DEPT VISIT HI MDM: CPT | Mod: 25

## 2022-10-05 RX ORDER — SODIUM CHLORIDE 0.9 % (FLUSH) 0.9 %
10 SYRINGE (ML) INJECTION EVERY 8 HOURS
Status: DISCONTINUED | OUTPATIENT
Start: 2022-10-05 | End: 2022-10-06 | Stop reason: HOSPADM

## 2022-10-05 RX ORDER — ACETAMINOPHEN 500 MG
1000 TABLET ORAL EVERY 6 HOURS PRN
Status: DISCONTINUED | OUTPATIENT
Start: 2022-10-05 | End: 2022-10-06 | Stop reason: HOSPADM

## 2022-10-05 RX ORDER — IBUPROFEN 200 MG
16 TABLET ORAL
Status: DISCONTINUED | OUTPATIENT
Start: 2022-10-05 | End: 2022-10-06 | Stop reason: HOSPADM

## 2022-10-05 RX ORDER — GLUCAGON 1 MG
1 KIT INJECTION
Status: DISCONTINUED | OUTPATIENT
Start: 2022-10-05 | End: 2022-10-06 | Stop reason: HOSPADM

## 2022-10-05 RX ORDER — LEVOTHYROXINE SODIUM 88 UG/1
88 TABLET ORAL
Status: DISCONTINUED | OUTPATIENT
Start: 2022-10-06 | End: 2022-10-06 | Stop reason: HOSPADM

## 2022-10-05 RX ORDER — INSULIN ASPART 100 [IU]/ML
0-5 INJECTION, SOLUTION INTRAVENOUS; SUBCUTANEOUS
Status: DISCONTINUED | OUTPATIENT
Start: 2022-10-05 | End: 2022-10-06 | Stop reason: HOSPADM

## 2022-10-05 RX ORDER — VENLAFAXINE HYDROCHLORIDE 37.5 MG/1
150 CAPSULE, EXTENDED RELEASE ORAL DAILY
Status: DISCONTINUED | OUTPATIENT
Start: 2022-10-06 | End: 2022-10-06 | Stop reason: HOSPADM

## 2022-10-05 RX ORDER — ONDANSETRON 2 MG/ML
4 INJECTION INTRAMUSCULAR; INTRAVENOUS EVERY 6 HOURS PRN
Status: DISCONTINUED | OUTPATIENT
Start: 2022-10-05 | End: 2022-10-06 | Stop reason: HOSPADM

## 2022-10-05 RX ORDER — ENOXAPARIN SODIUM 100 MG/ML
40 INJECTION SUBCUTANEOUS EVERY 12 HOURS
Status: DISCONTINUED | OUTPATIENT
Start: 2022-10-05 | End: 2022-10-06 | Stop reason: HOSPADM

## 2022-10-05 RX ORDER — METOPROLOL SUCCINATE 50 MG/1
50 TABLET, EXTENDED RELEASE ORAL DAILY
Status: DISCONTINUED | OUTPATIENT
Start: 2022-10-06 | End: 2022-10-06 | Stop reason: HOSPADM

## 2022-10-05 RX ORDER — LANOLIN ALCOHOL/MO/W.PET/CERES
800 CREAM (GRAM) TOPICAL
Status: DISCONTINUED | OUTPATIENT
Start: 2022-10-05 | End: 2022-10-06 | Stop reason: HOSPADM

## 2022-10-05 RX ORDER — IBUPROFEN 200 MG
24 TABLET ORAL
Status: DISCONTINUED | OUTPATIENT
Start: 2022-10-05 | End: 2022-10-06 | Stop reason: HOSPADM

## 2022-10-05 RX ORDER — PRAMIPEXOLE DIHYDROCHLORIDE 1 MG/1
1 TABLET ORAL NIGHTLY
Status: DISCONTINUED | OUTPATIENT
Start: 2022-10-05 | End: 2022-10-06 | Stop reason: HOSPADM

## 2022-10-05 RX ORDER — MORPHINE SULFATE 4 MG/ML
8 INJECTION, SOLUTION INTRAMUSCULAR; INTRAVENOUS
Status: COMPLETED | OUTPATIENT
Start: 2022-10-05 | End: 2022-10-05

## 2022-10-05 RX ORDER — LANOLIN ALCOHOL/MO/W.PET/CERES
400 CREAM (GRAM) TOPICAL DAILY
Status: DISCONTINUED | OUTPATIENT
Start: 2022-10-06 | End: 2022-10-06 | Stop reason: HOSPADM

## 2022-10-05 RX ORDER — MAG HYDROX/ALUMINUM HYD/SIMETH 200-200-20
30 SUSPENSION, ORAL (FINAL DOSE FORM) ORAL 4 TIMES DAILY PRN
Status: DISCONTINUED | OUTPATIENT
Start: 2022-10-05 | End: 2022-10-06 | Stop reason: HOSPADM

## 2022-10-05 RX ORDER — ALPRAZOLAM 1 MG/1
1 TABLET ORAL DAILY PRN
Status: DISCONTINUED | OUTPATIENT
Start: 2022-10-05 | End: 2022-10-06 | Stop reason: HOSPADM

## 2022-10-05 RX ORDER — PANTOPRAZOLE SODIUM 40 MG/1
40 TABLET, DELAYED RELEASE ORAL DAILY
Status: DISCONTINUED | OUTPATIENT
Start: 2022-10-06 | End: 2022-10-06 | Stop reason: HOSPADM

## 2022-10-05 RX ORDER — SUCRALFATE 1 G/10ML
1 SUSPENSION ORAL EVERY 6 HOURS
Status: DISCONTINUED | OUTPATIENT
Start: 2022-10-05 | End: 2022-10-06 | Stop reason: HOSPADM

## 2022-10-05 RX ORDER — POTASSIUM CHLORIDE 750 MG/1
20 TABLET, EXTENDED RELEASE ORAL NIGHTLY
Status: DISCONTINUED | OUTPATIENT
Start: 2022-10-05 | End: 2022-10-06 | Stop reason: HOSPADM

## 2022-10-05 RX ORDER — POTASSIUM CHLORIDE 20 MEQ/1
40 TABLET, EXTENDED RELEASE ORAL
Status: COMPLETED | OUTPATIENT
Start: 2022-10-05 | End: 2022-10-05

## 2022-10-05 RX ORDER — AMOXICILLIN 250 MG
1 CAPSULE ORAL 2 TIMES DAILY
Status: DISCONTINUED | OUTPATIENT
Start: 2022-10-05 | End: 2022-10-06 | Stop reason: HOSPADM

## 2022-10-05 RX ORDER — TRAZODONE HYDROCHLORIDE 50 MG/1
50 TABLET ORAL NIGHTLY PRN
Status: DISCONTINUED | OUTPATIENT
Start: 2022-10-05 | End: 2022-10-06 | Stop reason: HOSPADM

## 2022-10-05 RX ORDER — NALOXONE HCL 0.4 MG/ML
0.02 VIAL (ML) INJECTION
Status: DISCONTINUED | OUTPATIENT
Start: 2022-10-05 | End: 2022-10-06 | Stop reason: HOSPADM

## 2022-10-05 RX ORDER — IPRATROPIUM BROMIDE AND ALBUTEROL SULFATE 2.5; .5 MG/3ML; MG/3ML
3 SOLUTION RESPIRATORY (INHALATION) EVERY 4 HOURS PRN
Status: DISCONTINUED | OUTPATIENT
Start: 2022-10-05 | End: 2022-10-06 | Stop reason: HOSPADM

## 2022-10-05 RX ORDER — PREGABALIN 75 MG/1
75 CAPSULE ORAL 2 TIMES DAILY
Status: DISCONTINUED | OUTPATIENT
Start: 2022-10-05 | End: 2022-10-06 | Stop reason: HOSPADM

## 2022-10-05 RX ORDER — MAG HYDROX/ALUMINUM HYD/SIMETH 200-200-20
30 SUSPENSION, ORAL (FINAL DOSE FORM) ORAL
Status: DISCONTINUED | OUTPATIENT
Start: 2022-10-05 | End: 2022-10-06 | Stop reason: HOSPADM

## 2022-10-05 RX ADMIN — Medication 10 ML: at 10:10

## 2022-10-05 RX ADMIN — SUCRALFATE 1 G: 1 SUSPENSION ORAL at 11:10

## 2022-10-05 RX ADMIN — POTASSIUM CHLORIDE 20 MEQ: 750 TABLET, EXTENDED RELEASE ORAL at 08:10

## 2022-10-05 RX ADMIN — MORPHINE SULFATE 8 MG: 4 INJECTION INTRAVENOUS at 03:10

## 2022-10-05 RX ADMIN — POTASSIUM CHLORIDE 40 MEQ: 1500 TABLET, EXTENDED RELEASE ORAL at 03:10

## 2022-10-05 RX ADMIN — PREGABALIN 75 MG: 75 CAPSULE ORAL at 08:10

## 2022-10-05 RX ADMIN — PRAMIPEXOLE DIHYDROCHLORIDE 1 MG: 1 TABLET ORAL at 08:10

## 2022-10-05 RX ADMIN — TRAZODONE HYDROCHLORIDE 50 MG: 50 TABLET ORAL at 08:10

## 2022-10-05 RX ADMIN — IOHEXOL 75 ML: 350 INJECTION, SOLUTION INTRAVENOUS at 04:10

## 2022-10-05 RX ADMIN — SUCRALFATE 1 G: 1 SUSPENSION ORAL at 05:10

## 2022-10-05 RX ADMIN — ALUMINUM HYDROXIDE, MAGNESIUM HYDROXIDE, AND DIMETHICONE 30 ML: 200; 20; 200 SUSPENSION ORAL at 05:10

## 2022-10-05 RX ADMIN — ENOXAPARIN SODIUM 40 MG: 100 INJECTION SUBCUTANEOUS at 08:10

## 2022-10-05 NOTE — H&P
"[facility] Medicine  History & Physical    Patient Name: Genoveva Gilbert  MRN: [id number]  Patient Class: OP- Observation  Admission Date: 10/5/2022  Attending Physician: Verna Garcia MD   Primary Care Provider: Primary Doctor No         Patient information was obtained from past medical records and ER records.     Subjective:     Principal Problem:Near syncope    Chief Complaint:   Chief Complaint   Patient presents with    Fall     Pt comes in via ems with c/o fall pt does not remember how she fell. Pt denies LOC neck pain and back pain.pt c/o R knee pain. Pt alert and oriented x4.     near syncope         HPI: Genoveva Gilbert this 72-year-old female with PMHx significant for lymphedema, HLD, chronic bronchitis, GERD, and hypothyroidism.  Pt presents to the ED via EMS after a fall at a [employer].  Pt reports uncertainty surrounding how she fell.  She denies any LOC. She reports she fell on her right shoulder and knee resultant knee pain and shoulder pain which she describes more chronic in nature.  She reports frequent similar episodes recently.  She reports feeling as though she is going to fall almost daily describing a "strange" feeling prior to episodes particularly when she stands and gets moving.  She denies any precipitating chest pain, dizziness, vertigo, palpitations, or other complaints.  She reports chronic SOB worse with exertion which is unchanged lately.  She denies any fever or chills.  Of note, she was recently started on torsemide of leg swelling and magnesium + increased potassium supplementation. She reports no history of chest pain.  She reports a negative stress test > 5 years ago. Her workup in the ED consists of a shoulder x-ray and knee x-ray which were negative for any acute abnormality.  She had finding of elevated troponin.       Past Medical History:   Diagnosis Date    Allergy     Anxiety     Arthritis, rheumatoid     Back pain     Chronic " bronchiolitis     Depression     Fibromyalgia     GERD (gastroesophageal reflux disease)     High cholesterol     Hilar mass 3/27/2014    [place] (hard of hearing)     aid right ear    [place] (hard of hearing)     Hypertension     Incontinence of urine     Lymphedema     MS (multiple sclerosis)     benign; another MD stated she has no MS    Neuropathy     Restless leg syndrome     Rotator cuff tear 4/16/2015    Sciatica of left side 1/5/2018    Seasonal allergies     Sinus congestion     Sleep apnea     DOES NOT USE MACHINE    Spondylolysis     Thyroid disease     Type 2 diabetes mellitus with polyneuropathy     no med. was borderline    Wheezing        Past Surgical History:   Procedure Laterality Date    APPENDECTOMY      ARTHROSCOPIC DEBRIDEMENT OF SHOULDER Right 2/18/2022    Procedure: EXTENSIVE DEBRIDEMENT, SHOULDER, ARTHROSCOPIC;  Surgeon: Rio Pitts MD;  Location: [facility] OR;  Service: Orthopedics;  Laterality: Right;    BREAST SURGERY      reduction    CLOSED REDUCTION OF INJURY OF SHOULDER Right 9/19/2021    Procedure: CLOSED REDUCTION, SHOULDER;  Surgeon: Antonio Irwin MD;  Location: [facility] OR;  Service: Orthopedics;  Laterality: Right;    EPIDURAL STEROID INJECTION INTO LUMBAR SPINE N/A 6/12/2019    Procedure: Injection-steroid-epidural-lumbar;  Surgeon: Thad Manning MD;  Location: [facility] OR;  Service: Pain Management;  Laterality: N/A;  L5-S1    EPIDURAL STEROID INJECTION INTO LUMBAR SPINE N/A 9/16/2019    Procedure: Injection-steroid-epidural-lumbar;  Surgeon: Thad Manning MD;  Location: [facility] OR;  Service: Pain Management;  Laterality: N/A;  L5-S1    EYE SURGERY Bilateral     HYSTERECTOMY      partial - fibroids    INJECTION OF ANESTHETIC AGENT AROUND MEDIAL BRANCH NERVES INNERVATING LUMBAR FACET JOINT Bilateral 2/28/2019    Procedure: Block-nerve-medial branch-lumbar;  Surgeon: Thad Manning MD;  Location: [facility] OR;  Service: Pain Management;  Laterality: Bilateral;  L3, 4,5      INJECTION OF ANESTHETIC AGENT AROUND MEDIAL BRANCH NERVES INNERVATING LUMBAR FACET JOINT Bilateral 3/21/2019    Procedure: Block-nerve-medial branch-lumbar L3,4,5;  Surgeon: Thad Manning MD;  Location: [facility] OR;  Service: Pain Management;  Laterality: Bilateral;    KNEE ARTHROPLASTY Left 3/16/2021    Procedure: ARTHROPLASTY, KNEE;  Surgeon: Rio Pitts MD;  Location: [facility] OR;  Service: Orthopedics;  Laterality: Left;  GENERAL AND BLOCK    LIPOSUCTION  1985    RADIOFREQUENCY ABLATION Left 11/4/2020    Procedure: Radiofrequency Ablation left knee;  Surgeon: Andrew Escudero MD;  Location: [facility] OR;  Service: Pain Management;  Laterality: Left;    RADIOFREQUENCY ABLATION OF LUMBAR MEDIAL BRANCH NERVE AT SINGLE LEVEL Bilateral 4/12/2019    Procedure: Radiofrequency Ablation, Nerve, Spinal, Lumbar, Medial Branch, 1 Level;  Surgeon: Thad Manning MD;  Location: [facility] OR;  Service: Pain Management;  Laterality: Bilateral;  L3, 4, 5  lumbar  Sk[employer]z pain management generator  SN [id number]  80 degrees for 75 seconds x2    RADIOFREQUENCY ABLATION OF LUMBAR MEDIAL BRANCH NERVE AT SINGLE LEVEL Bilateral 10/22/2019    Procedure: Radiofrequency Ablation, Nerve, Spinal, Lumbar, Medial Branch, L3,4,5;  Surgeon: Thad Manning MD;  Location: [facility] OR;  Service: Pain Management;  Laterality: Bilateral;  burned at 80 degrees C X 60 seconds X 2 each site    RADIOFREQUENCY ABLATION OF LUMBAR MEDIAL BRANCH NERVE AT SINGLE LEVEL Bilateral 5/27/2020    Procedure: Radiofrequency Ablation, Nerve, Spinal, Lumbar, Medial Branch, 1 Level;  Surgeon: Thad Manning MD;  Location: [facility] OR;  Service: Pain Management;  Laterality: Bilateral;  L3,4,5    REVERSE TOTAL SHOULDER ARTHROPLASTY Right 4/12/2022    Procedure: ARTHROPLASTY, SHOULDER, TOTAL, REVERSE;  Surgeon: Rio Pitts MD;  Location: [facility] OR;  Service: Orthopedics;  Laterality: Right;    SHOULDER ARTHROSCOPY Right 9/19/2021    Procedure: ARTHROSCOPY, SHOULDER;   Surgeon: Antonio Irwin MD;  Location: [facility];  Service: Orthopedics;  Laterality: Right;  LIMITED DEBRIDMENT    TONSILLECTOMY      TOTAL REDUCTION MAMMOPLASTY         Review of patient's allergies indicates:   Allergen Reactions    Keflex [cephalexin]      Throat swelling    Pcn [penicillins]      Throat swelling    Latex, natural rubber Other (See Comments)     Redness with bandaids     Adhesive Other (See Comments)     bandainds redness at skin    Trelegy ellipta [fluticasone-umeclidin-vilanter]      Vision disturbance       No current facility-administered medications on file prior to encounter.     Current Outpatient Medications on File Prior to Encounter   Medication Sig    albuterol (PROAIR HFA) 90 mcg/actuation inhaler Inhale 2 puffs into the lungs every 6 (six) hours as needed for Wheezing. Rescue    ALPRAZolam (XANAX) 1 MG tablet Take 1 tablet (1 mg total) by mouth 2 (two) times daily as needed for Anxiety (anxiety).    amLODIPine (NORVASC) 5 MG tablet Take 1 tablet (5 mg total) by mouth once daily. For blood pressure    biotin 1 mg Cap Take by mouth.    coffee xt/phosphatidyl serine (NEURIVA ORIGINAL ORAL) Take by mouth.    fexofenadine (ALLEGRA) 180 MG tablet     hydroCHLOROthiazide (HYDRODIURIL) 25 MG tablet     levothyroxine (SYNTHROID) 88 MCG tablet Take 1 tablet (88 mcg total) by mouth once daily.    losartan (COZAAR) 50 MG tablet Take 1 tablet (50 mg total) by mouth once daily. For blood pressure    lovastatin (MEVACOR) 40 MG tablet Take 1 tablet (40 mg total) by mouth nightly. at bedtime. For cholesterol    magnesium oxide (MAG-OX) 400 mg (241.3 mg magnesium) tablet Take 1 tablet (400 mg total) by mouth once daily.    metoprolol succinate (TOPROL-XL) 50 MG 24 hr tablet Take 1 tablet (50 mg total) by mouth once daily. For blood pressure and heart    MULTIVIT-IRON-MIN-FOLIC ACID 3,500-18-0.4 UNIT-MG-MG ORAL CHEW Take 1 tablet by mouth.    nystatin (MYCOSTATIN) cream Apply  topically 2 (two) times daily. (Patient not taking: No sig reported)    omeprazole (PRILOSEC) 20 MG capsule Take 1 capsule (20 mg total) by mouth once daily. For heartburn    potassium chloride (MICRO-K) 10 MEQ CpSR Take 2 capsules (20 mEq total) by mouth every evening.    pramipexole (MIRAPEX) 0.5 MG tablet Take 2 tablets (1 mg total) by mouth every evening. For restless legs    pregabalin (LYRICA) 150 MG capsule Take 1 capsule (150 mg total) by mouth 2 (two) times daily.    pregabalin (LYRICA) 75 MG capsule Take 1 capsule (75 mg total) by mouth 2 (two) times daily.    torsemide (DEMADEX) 10 MG Tab Take 1 tablet (10 mg total) by mouth once daily.    traZODone (DESYREL) 50 MG tablet Take 1 tablet (50 mg total) by mouth nightly as needed for Insomnia.    umeclidinium-vilanteroL (ANORO ELLIPTA) 62.5-25 mcg/actuation DsDv Inhale 1 puff into the lungs once daily. Controller    valacyclovir HCl (VALACYCLOVIR ORAL) Take by mouth.    venlafaxine (EFFEXOR-XR) 150 MG Cp24 Take 1 capsule (150 mg total) by mouth once daily. For depression     Family History       Problem Relation (Age of Onset)    Heart disease Mother          Tobacco Use    Smoking status: Former     Types: Cigarettes     Quit date: 1996     Years since quittin.3     Passive exposure: Past    Smokeless tobacco: Never   Substance and Sexual Activity    Alcohol use: Yes     Comment: very little     Drug use: No    Sexual activity: Not Currently     Partners: Male     Review of Systems   Constitutional:  Negative for chills, diaphoresis, fatigue and fever.   HENT:  Negative for congestion, postnasal drip and trouble swallowing.    Respiratory:  Positive for shortness of breath. Negative for cough and wheezing.    Cardiovascular:  Positive for leg swelling (chronic LEFT > RIGHT). Negative for chest pain and palpitations.   Gastrointestinal:  Negative for abdominal pain, constipation, nausea and vomiting.   Genitourinary:  Negative for  difficulty urinating, dysuria and flank pain.   Musculoskeletal:  Positive for arthralgias. Negative for myalgias.   Skin:  Negative for color change.   Neurological:  Positive for syncope (? near syncope). Negative for dizziness, weakness, light-headedness and headaches.   Psychiatric/Behavioral:  Negative for confusion. The patient is not nervous/anxious.    Objective:     Vital Signs (Most Recent):  Temp: 98.2 °F (36.8 °C) (10/05/22 1214)  Pulse: (!) 56 (10/05/22 1304)  Resp: 18 (10/05/22 1506)  BP: 112/62 (10/05/22 1304)  SpO2: 95 % (10/05/22 1304)   Vital Signs (24h Range):  Temp:  [98.2 °F (36.8 °C)] 98.2 °F (36.8 °C)  Pulse:  [56-57] 56  Resp:  [18] 18  SpO2:  [95 %-98 %] 95 %  BP: (112-125)/(62-68) 112/62     Weight: 96.2 kg (212 lb)  Body mass index is 41.4 kg/m².    Physical Exam  Vitals and nursing note reviewed.   Constitutional:       Appearance: Normal appearance.   HENT:      Head: Normocephalic and atraumatic.      Mouth/Throat:      Mouth: Mucous membranes are moist.      Pharynx: Oropharynx is clear.   Eyes:      Extraocular Movements: Extraocular movements intact.      Conjunctiva/sclera: Conjunctivae normal.      Pupils: Pupils are equal, round, and reactive to light.   Cardiovascular:      Rate and Rhythm: Regular rhythm. Bradycardia present.      Pulses: Normal pulses.      Heart sounds: Normal heart sounds.      Comments: Noted short burst of tachycardia on monitor (5 seconds) during visit  Pulmonary:      Effort: Pulmonary effort is normal. No respiratory distress.      Breath sounds: Normal breath sounds.   Abdominal:      General: Abdomen is flat. Bowel sounds are normal. There is no distension.      Palpations: Abdomen is soft.      Tenderness: There is no abdominal tenderness.   Musculoskeletal:         General: Tenderness (RIGHT shoulder; RIGHT knee) present. Normal range of motion.      Cervical back: Normal range of motion and neck supple.      Right lower leg: Edema (1+ pitting)  present.      Left lower leg: Edema (2+ pitting) present.   Skin:     General: Skin is warm and dry.   Neurological:      General: No focal deficit present.      Mental Status: She is alert and oriented to person, place, and time. Mental status is at baseline.   Psychiatric:         Mood and Affect: Mood normal.         Behavior: Behavior normal.         CRANIAL NERVES     CN III, IV, VI   Pupils are equal, round, and reactive to light.     Significant Labs: All pertinent labs within the past 24 hours have been reviewed.  CBC:   Recent Labs   Lab 10/05/22  1307   WBC 5.73   HGB 12.1   HCT 37.4        CMP:   Recent Labs   Lab 10/05/22  1307      K 3.3*   CL 97   CO2 32*      BUN 22   CREATININE 0.9   CALCIUM 9.3   PROT 7.1   ALBUMIN 3.9   BILITOT 0.4   ALKPHOS 98   AST 32   ALT 24   ANIONGAP 11       Troponin:   Recent Labs   Lab 10/05/22  1307   TROPONINI 0.268*       Significant Imaging: I have reviewed all pertinent imaging results/findings within the past 24 hours.    Assessment/Plan:     * Near syncope  Recurrent issue  -Check orthostatics (relays a positional component in history)  -Monitor on telemetry  -Trending troponin  -Check D-Dimer; if elevated obtain CTA chest  -Check carotid US  -Check ECHO      Elevated troponin  Patient with historical troponin elevation (6 yrs ago) of 0.128.  -Monitor on telemetry  -trend troponin  -discussed with cardiology: in the absence of symptoms/abnormal EKG, recommends trending.      Hyperlipidemia associated with type 2 diabetes mellitus   Patient is chronically on statin.will not continue for now as her home medication is non-formulary. Monitor clinically. Last LDL was   Lab Results   Component Value Date    LDLCALC 79.0 09/15/2022          Diet-controlled type 2 diabetes mellitus  Patient's FSGs are controlled on current medication regimen.  Last A1c reviewed-   Lab Results   Component Value Date    HGBA1C 6.1 (H) 09/15/2022     Most recent fingerstick  glucose reviewed- No results for input(s): POCTGLUCOSE in the last 24 hours.  Current correctional scale  Low  Initiate anti-hyperglycemic dose as follows-   Antihyperglycemics (From admission, onward)    None        Not on any oral medications.    Lymphedema of both lower extremities  Chronic issue  -Compliant with lymphedema therapy  -Hold the diuretic for now      Morbid obesity with BMI of 40.0-44.9, adult  Body mass index is 41.4 kg/m². Morbid obesity complicates all aspects of disease management from diagnostic modalities to treatment.         Gastroesophageal reflux disease  Chronic issue, stable.  -Continue PPI      Hypokalemia  Recurrent issue, maintained on oral repletion daily  -Received potassium supplementation in ED  -Follow labs  -Replete as needed      Hypertension associated with diabetes  Chronic, controlled.  Latest blood pressure and vitals reviewed-   Temp:  [98.2 °F (36.8 °C)]   Pulse:  [55-57]   Resp:  [18]   BP: (112-141)/(62-72)   SpO2:  [95 %-98 %] .   Home meds for hypertension were reviewed and noted below.   Hypertension Medications             amLODIPine (NORVASC) 5 MG tablet Take 1 tablet (5 mg total) by mouth once daily. For blood pressure    hydroCHLOROthiazide (HYDRODIURIL) 25 MG tablet     losartan (COZAAR) 50 MG tablet Take 1 tablet (50 mg total) by mouth once daily. For blood pressure    metoprolol succinate (TOPROL-XL) 50 MG 24 hr tablet Take 1 tablet (50 mg total) by mouth once daily. For blood pressure and heart    torsemide (DEMADEX) 10 MG Tab Take 1 tablet (10 mg total) by mouth once daily.          While in the hospital, will manage blood pressure as follows; Continue home antihypertensive regimen    Will utilize p.r.n. blood pressure medication only if patient's blood pressure greater than  180/110 and she develops symptoms such as worsening chest pain or shortness of breath.      Hypothyroidism  Patient has chronic hypothyroidism. TFTs reviewed-   Lab Results   Component  Value Date    TSH 3.222 09/23/2022   . Will continue chronic levothyroxine and adjust for and clinical changes.          VTE Risk Mitigation (From admission, onward)    None             Margi Mckeon NP  Department of Hospital Medicine   [facility] - Emergency Dept

## 2022-10-05 NOTE — ASSESSMENT & PLAN NOTE
Patient has chronic hypothyroidism. TFTs reviewed-   Lab Results   Component Value Date    TSH 3.222 09/23/2022   . Will continue chronic levothyroxine and adjust for and clinical changes.

## 2022-10-05 NOTE — ASSESSMENT & PLAN NOTE
Recurrent issue  -Check orthostatics (relays a positional component in history)  -Monitor on telemetry  -Trending troponin  -Check D-Dimer; if elevated obtain CTA chest  -Check carotid US  -Check ECHO

## 2022-10-05 NOTE — ED PROVIDER NOTES
Encounter Date: 10/5/2022    SCRIBE #1 NOTE: I, Sarah Johnson, am scribing for, and in the presence of,  Riccardo Hutchinson III, MD.     History     Chief Complaint   Patient presents with    Fall     Pt comes in via ems with c/o fall pt does not remember how she fell. Pt denies LOC neck pain and back pain.pt c/o R knee pain. Pt alert and oriented x4.     near syncope      Time seen by provider: 12:07 PM on 10/05/2022    Genoveva Gilbert is a 72 y.o. female who presents to the ED via EMS with right knee pain and right shoulder pain that began PTA after a fall. Pt states she was standing when she fell on her right side. Pt denies LOC. Pt reports a Hx of 4 right shoulder surgeries. Pt endorses searching for a PCP. The patient denies fever, congestion, cough, chest pain, N/V/D, dizziness, palpitations, SOB, abdominal pain, or any other symptoms at this time. PMHx of thyroid disease, HTN, back pain, GERD, wheezing, anxiety, neuropathy, fibromyalgia, DM, restless leg syndrome, and MS. PSHx of hysterectomy, epidural steroid injection into lumbar spine, knee arthroplasty, and reverse total shoulder arthroplasty.     The history is provided by the patient and the EMS personnel.   Review of patient's allergies indicates:   Allergen Reactions    Keflex [cephalexin]      Throat swelling    Pcn [penicillins]      Throat swelling    Latex, natural rubber Other (See Comments)     Redness with bandaids     Adhesive Other (See Comments)     bandainds redness at skin    Trelegy ellipta [fluticasone-umeclidin-vilanter]      Vision disturbance     Past Medical History:   Diagnosis Date    Allergy     Anxiety     Arthritis, rheumatoid     Back pain     Chronic bronchiolitis     Depression     Fibromyalgia     GERD (gastroesophageal reflux disease)     High cholesterol     Hilar mass 3/27/2014    [place]     aid right ear    [place] hearing[place]     Hypertension     Incontinence of urine     Lymphedema     MS (multiple  sclerosis)     benign; another MD stated she has no MS    Neuropathy     Restless leg syndrome     Rotator cuff tear 4/16/2015    Sciatica of left side 1/5/2018    Seasonal allergies     Sinus congestion     Sleep apnea     DOES NOT USE MACHINE    Spondylolysis     Thyroid disease     Type 2 diabetes mellitus with polyneuropathy     no med. was borderline    Wheezing      Past Surgical History:   Procedure Laterality Date    APPENDECTOMY      ARTHROSCOPIC DEBRIDEMENT OF SHOULDER Right 2/18/2022    Procedure: EXTENSIVE DEBRIDEMENT, SHOULDER, ARTHROSCOPIC;  Surgeon: Rio Pitts MD;  Location: [facility] OR;  Service: Orthopedics;  Laterality: Right;    BREAST SURGERY      reduction    CLOSED REDUCTION OF INJURY OF SHOULDER Right 9/19/2021    Procedure: CLOSED REDUCTION, SHOULDER;  Surgeon: Antonio Irwin MD;  Location: [facility] OR;  Service: Orthopedics;  Laterality: Right;    EPIDURAL STEROID INJECTION INTO LUMBAR SPINE N/A 6/12/2019    Procedure: Injection-steroid-epidural-lumbar;  Surgeon: Thad Manning MD;  Location: [facility] OR;  Service: Pain Management;  Laterality: N/A;  L5-S1    EPIDURAL STEROID INJECTION INTO LUMBAR SPINE N/A 9/16/2019    Procedure: Injection-steroid-epidural-lumbar;  Surgeon: Thad Manning MD;  Location: [facility] OR;  Service: Pain Management;  Laterality: N/A;  L5-S1    EYE SURGERY Bilateral     HYSTERECTOMY      partial - fibroids    INJECTION OF ANESTHETIC AGENT AROUND MEDIAL BRANCH NERVES INNERVATING LUMBAR FACET JOINT Bilateral 2/28/2019    Procedure: Block-nerve-medial branch-lumbar;  Surgeon: Thad Manning MD;  Location: [facility] OR;  Service: Pain Management;  Laterality: Bilateral;  L3, 4,5     INJECTION OF ANESTHETIC AGENT AROUND MEDIAL BRANCH NERVES INNERVATING LUMBAR FACET JOINT Bilateral 3/21/2019    Procedure: Block-nerve-medial branch-lumbar L3,4,5;  Surgeon: Thad Manning MD;  Location: [facility] OR;  Service: Pain Management;  Laterality: Bilateral;    KNEE ARTHROPLASTY Left 3/16/2021     Procedure: ARTHROPLASTY, KNEE;  Surgeon: Rio Pitts MD;  Location: [facility] OR;  Service: Orthopedics;  Laterality: Left;  GENERAL AND BLOCK    LIPOSUCTION  1985    RADIOFREQUENCY ABLATION Left 11/4/2020    Procedure: Radiofrequency Ablation left knee;  Surgeon: Andrew Escudero MD;  Location: [facility] OR;  Service: Pain Management;  Laterality: Left;    RADIOFREQUENCY ABLATION OF LUMBAR MEDIAL BRANCH NERVE AT SINGLE LEVEL Bilateral 4/12/2019    Procedure: Radiofrequency Ablation, Nerve, Spinal, Lumbar, Medial Branch, 1 Level;  Surgeon: Thad Manning MD;  Location: [facility] OR;  Service: Pain Management;  Laterality: Bilateral;  L3, 4, 5  lumbar  [employer] pain management generator  SN [id number]  80 degrees for 75 seconds x2    RADIOFREQUENCY ABLATION OF LUMBAR MEDIAL BRANCH NERVE AT SINGLE LEVEL Bilateral 10/22/2019    Procedure: Radiofrequency Ablation, Nerve, Spinal, Lumbar, Medial Branch, L3,4,5;  Surgeon: Thad Manning MD;  Location: [facility] OR;  Service: Pain Management;  Laterality: Bilateral;  burned at 80 degrees C X 60 seconds X 2 each site    RADIOFREQUENCY ABLATION OF LUMBAR MEDIAL BRANCH NERVE AT SINGLE LEVEL Bilateral 5/27/2020    Procedure: Radiofrequency Ablation, Nerve, Spinal, Lumbar, Medial Branch, 1 Level;  Surgeon: Thad Manning MD;  Location: [facility] OR;  Service: Pain Management;  Laterality: Bilateral;  L3,4,5    REVERSE TOTAL SHOULDER ARTHROPLASTY Right 4/12/2022    Procedure: ARTHROPLASTY, SHOULDER, TOTAL, REVERSE;  Surgeon: Rio Pitts MD;  Location: [facility] OR;  Service: Orthopedics;  Laterality: Right;    SHOULDER ARTHROSCOPY Right 9/19/2021    Procedure: ARTHROSCOPY, SHOULDER;  Surgeon: Antonio Irwin MD;  Location: [facility] OR;  Service: Orthopedics;  Laterality: Right;  LIMITED DEBRIDMENT    TONSILLECTOMY      TOTAL REDUCTION MAMMOPLASTY       Family History   Problem Relation Age of Onset    Heart disease Mother      Social History     Tobacco Use    Smoking status: Former     Types:  Cigarettes     Quit date: 1996     Years since quittin.3     Passive exposure: Past    Smokeless tobacco: Never   Substance Use Topics    Alcohol use: Yes     Comment: very little     Drug use: No     Review of Systems   Constitutional:  Negative for fever.   HENT:  Negative for congestion.    Respiratory:  Negative for cough and shortness of breath.    Cardiovascular:  Negative for chest pain and palpitations.   Gastrointestinal:  Negative for abdominal pain, diarrhea, nausea and vomiting.   Musculoskeletal:  Positive for arthralgias.   Skin:  Negative for pallor.   Neurological:  Negative for dizziness and syncope.   Hematological:  Does not bruise/bleed easily.   Psychiatric/Behavioral:  Negative for agitation.      Physical Exam     Initial Vitals [10/05/22 1214]   BP Pulse Resp Temp SpO2   125/68 (!) 57 18 98.2 °F (36.8 °C) 98 %      MAP       --         Physical Exam    Nursing note and vitals reviewed.  Constitutional: She appears well-developed and well-nourished.   HENT:   Head: Normocephalic and atraumatic.   Eyes: Conjunctivae are normal.   Neck: Neck supple.   Normal range of motion.  Cardiovascular:  Normal rate, regular rhythm and normal heart sounds.     Exam reveals no gallop and no friction rub.       No murmur heard.  Pulmonary/Chest: Effort normal and breath sounds normal. No respiratory distress. She has no wheezes. She has no rhonchi. She has no rales.   Abdominal: Abdomen is soft. She exhibits no distension. There is no abdominal tenderness.   Musculoskeletal:         General: Normal range of motion.      Right shoulder: Tenderness present.      Cervical back: Normal range of motion and neck supple.      Right knee: Tenderness present.     Neurological: She is alert and oriented to person, place, and time.   Skin: Skin is warm and dry. No erythema.   Psychiatric: She has a normal mood and affect.       ED Course   Procedures  Labs Reviewed   CBC W/ AUTO DIFFERENTIAL - Abnormal; Notable  for the following components:       Result Value    RDW 15.1 (*)     All other components within normal limits   COMPREHENSIVE METABOLIC PANEL   TROPONIN I     EKG Readings: (Independently Interpreted)   Rhythm: Sinus Bradycardia. Heart Rate: 57. Ectopy: No Ectopy. Conduction: Normal. ST Segments: Normal ST Segments. T Waves: Normal. Axis: Normal. Clinical Impression: Sinus Bradycardia     Imaging Results              X-Ray Shoulder Complete 2 View Right (In process)                      X-Ray Knee 3 View Right (In process)                      Medications - No data to display  Medical Decision Making:   History:   Old Medical Records: I decided to obtain old medical records.  Independently Interpreted Test(s):   I have ordered and independently interpreted EKG Reading(s) - see prior notes  Clinical Tests:   Lab Tests: Ordered and Reviewed  Radiological Study: Ordered and Reviewed  Medical Tests: Ordered and Reviewed  ED Management:  72-year-old female presents with right shoulder and knee pain after near syncopal episode.  She has had multiple episodes of a similar nature.  She denies any chest pain, palpitations, shortness of breath, nausea vomiting or diaphoresis.  EKG fails to demonstrate any evidence of ischemia; however, she does have a substantially elevated troponin.  She will be admitted for serial enzymes and cardiac monitoring.  X-rays of the right shoulder and knee independently interpreted by me failed to demonstrate any fracture or dislocation.  Other:   I have discussed this case with another health care provider.        Scribe Attestation:   Scribe #1: I performed the above scribed service and the documentation accurately describes the services I performed. I attest to the accuracy of the note.                   Clinical Impression:   Final diagnoses:  [R55] Near syncope  [W19.XXXA] Fall               Riccardo Hutchinson III, MD  10/05/22 5570

## 2022-10-05 NOTE — SUBJECTIVE & OBJECTIVE
Past Medical History:   Diagnosis Date    Allergy     Anxiety     Arthritis, rheumatoid     Back pain     Chronic bronchiolitis     Depression     Fibromyalgia     GERD (gastroesophageal reflux disease)     High cholesterol     Hilar mass 3/27/2014    [place] (hard of hearing)     aid right ear    [place]hard of hearing[place]     Hypertension     Incontinence of urine     Lymphedema     MS (multiple sclerosis)     benign; another MD stated she has no MS    Neuropathy     Restless leg syndrome     Rotator cuff tear 4/16/2015    Sciatica of left side 1/5/2018    Seasonal allergies     Sinus congestion     Sleep apnea     DOES NOT USE MACHINE    Spondylolysis     Thyroid disease     Type 2 diabetes mellitus with polyneuropathy     no med. was borderline    Wheezing        Past Surgical History:   Procedure Laterality Date    APPENDECTOMY      ARTHROSCOPIC DEBRIDEMENT OF SHOULDER Right 2/18/2022    Procedure: EXTENSIVE DEBRIDEMENT, SHOULDER, ARTHROSCOPIC;  Surgeon: Rio Pitts MD;  Location: [facility] OR;  Service: Orthopedics;  Laterality: Right;    BREAST SURGERY      reduction    CLOSED REDUCTION OF INJURY OF SHOULDER Right 9/19/2021    Procedure: CLOSED REDUCTION, SHOULDER;  Surgeon: Antonio Irwin MD;  Location: [facility] OR;  Service: Orthopedics;  Laterality: Right;    EPIDURAL STEROID INJECTION INTO LUMBAR SPINE N/A 6/12/2019    Procedure: Injection-steroid-epidural-lumbar;  Surgeon: Thad Manning MD;  Location: [facility] OR;  Service: Pain Management;  Laterality: N/A;  L5-S1    EPIDURAL STEROID INJECTION INTO LUMBAR SPINE N/A 9/16/2019    Procedure: Injection-steroid-epidural-lumbar;  Surgeon: Thad Manning MD;  Location: [facility] OR;  Service: Pain Management;  Laterality: N/A;  L5-S1    EYE SURGERY Bilateral     HYSTERECTOMY      partial - fibroids    INJECTION OF ANESTHETIC AGENT AROUND MEDIAL BRANCH NERVES INNERVATING LUMBAR FACET JOINT Bilateral 2/28/2019    Procedure: Block-nerve-medial branch-lumbar;  Surgeon: Thad Manning  MD;  Location: [facility];  Service: Pain Management;  Laterality: Bilateral;  L3, 4,5     INJECTION OF ANESTHETIC AGENT AROUND MEDIAL BRANCH NERVES INNERVATING LUMBAR FACET JOINT Bilateral 3/21/2019    Procedure: Block-nerve-medial branch-lumbar L3,4,5;  Surgeon: Thad Manning MD;  Location: [facility] OR;  Service: Pain Management;  Laterality: Bilateral;    KNEE ARTHROPLASTY Left 3/16/2021    Procedure: ARTHROPLASTY, KNEE;  Surgeon: Rio Pitts MD;  Location: [facility] OR;  Service: Orthopedics;  Laterality: Left;  GENERAL AND BLOCK    LIPOSUCTION  1985    RADIOFREQUENCY ABLATION Left 11/4/2020    Procedure: Radiofrequency Ablation left knee;  Surgeon: Andrew Escudero MD;  Location: [facility] OR;  Service: Pain Management;  Laterality: Left;    RADIOFREQUENCY ABLATION OF LUMBAR MEDIAL BRANCH NERVE AT SINGLE LEVEL Bilateral 4/12/2019    Procedure: Radiofrequency Ablation, Nerve, Spinal, Lumbar, Medial Branch, 1 Level;  Surgeon: Thad Manning MD;  Location: [facility] OR;  Service: Pain Management;  Laterality: Bilateral;  L3, 4, 5  lumbar  [employer] pain management generator  SN [id number]  80 degrees for 75 seconds x2    RADIOFREQUENCY ABLATION OF LUMBAR MEDIAL BRANCH NERVE AT SINGLE LEVEL Bilateral 10/22/2019    Procedure: Radiofrequency Ablation, Nerve, Spinal, Lumbar, Medial Branch, L3,4,5;  Surgeon: Thad Manning MD;  Location: [facility];  Service: Pain Management;  Laterality: Bilateral;  burned at 80 degrees C X 60 seconds X 2 each site    RADIOFREQUENCY ABLATION OF LUMBAR MEDIAL BRANCH NERVE AT SINGLE LEVEL Bilateral 5/27/2020    Procedure: Radiofrequency Ablation, Nerve, Spinal, Lumbar, Medial Branch, 1 Level;  Surgeon: Thad Manning MD;  Location: [facility];  Service: Pain Management;  Laterality: Bilateral;  L3,4,5    REVERSE TOTAL SHOULDER ARTHROPLASTY Right 4/12/2022    Procedure: ARTHROPLASTY, SHOULDER, TOTAL, REVERSE;  Surgeon: Rio Pitts MD;  Location: [facility] OR;  Service: Orthopedics;  Laterality: Right;     SHOULDER ARTHROSCOPY Right 9/19/2021    Procedure: ARTHROSCOPY, SHOULDER;  Surgeon: Antonio Irwin MD;  Location: [facility];  Service: Orthopedics;  Laterality: Right;  LIMITED DEBRIDMENT    TONSILLECTOMY      TOTAL REDUCTION MAMMOPLASTY         Review of patient's allergies indicates:   Allergen Reactions    Keflex [cephalexin]      Throat swelling    Pcn [penicillins]      Throat swelling    Latex, natural rubber Other (See Comments)     Redness with bandaids     Adhesive Other (See Comments)     bandainds redness at skin    Trelegy ellipta [fluticasone-umeclidin-vilanter]      Vision disturbance       No current facility-administered medications on file prior to encounter.     Current Outpatient Medications on File Prior to Encounter   Medication Sig    albuterol (PROAIR HFA) 90 mcg/actuation inhaler Inhale 2 puffs into the lungs every 6 (six) hours as needed for Wheezing. Rescue    ALPRAZolam (XANAX) 1 MG tablet Take 1 tablet (1 mg total) by mouth 2 (two) times daily as needed for Anxiety (anxiety).    amLODIPine (NORVASC) 5 MG tablet Take 1 tablet (5 mg total) by mouth once daily. For blood pressure    biotin 1 mg Cap Take by mouth.    coffee xt/phosphatidyl serine (NEURIVA ORIGINAL ORAL) Take by mouth.    fexofenadine (ALLEGRA) 180 MG tablet     hydroCHLOROthiazide (HYDRODIURIL) 25 MG tablet     levothyroxine (SYNTHROID) 88 MCG tablet Take 1 tablet (88 mcg total) by mouth once daily.    losartan (COZAAR) 50 MG tablet Take 1 tablet (50 mg total) by mouth once daily. For blood pressure    lovastatin (MEVACOR) 40 MG tablet Take 1 tablet (40 mg total) by mouth nightly. at bedtime. For cholesterol    magnesium oxide (MAG-OX) 400 mg (241.3 mg magnesium) tablet Take 1 tablet (400 mg total) by mouth once daily.    metoprolol succinate (TOPROL-XL) 50 MG 24 hr tablet Take 1 tablet (50 mg total) by mouth once daily. For blood pressure and heart    MULTIVIT-IRON-MIN-FOLIC ACID 3,500-18-0.4 UNIT-MG-MG ORAL CHEW Take 1  tablet by mouth.    nystatin (MYCOSTATIN) cream Apply topically 2 (two) times daily. (Patient not taking: No sig reported)    omeprazole (PRILOSEC) 20 MG capsule Take 1 capsule (20 mg total) by mouth once daily. For heartburn    potassium chloride (MICRO-K) 10 MEQ CpSR Take 2 capsules (20 mEq total) by mouth every evening.    pramipexole (MIRAPEX) 0.5 MG tablet Take 2 tablets (1 mg total) by mouth every evening. For restless legs    pregabalin (LYRICA) 150 MG capsule Take 1 capsule (150 mg total) by mouth 2 (two) times daily.    pregabalin (LYRICA) 75 MG capsule Take 1 capsule (75 mg total) by mouth 2 (two) times daily.    torsemide (DEMADEX) 10 MG Tab Take 1 tablet (10 mg total) by mouth once daily.    traZODone (DESYREL) 50 MG tablet Take 1 tablet (50 mg total) by mouth nightly as needed for Insomnia.    umeclidinium-vilanteroL (ANORO ELLIPTA) 62.5-25 mcg/actuation DsDv Inhale 1 puff into the lungs once daily. Controller    valacyclovir HCl (VALACYCLOVIR ORAL) Take by mouth.    venlafaxine (EFFEXOR-XR) 150 MG Cp24 Take 1 capsule (150 mg total) by mouth once daily. For depression     Family History       Problem Relation (Age of Onset)    Heart disease Mother          Tobacco Use    Smoking status: Former     Types: Cigarettes     Quit date: 1996     Years since quittin.3     Passive exposure: Past    Smokeless tobacco: Never   Substance and Sexual Activity    Alcohol use: Yes     Comment: very little     Drug use: No    Sexual activity: Not Currently     Partners: Male     Review of Systems   Constitutional:  Negative for chills, diaphoresis, fatigue and fever.   HENT:  Negative for congestion, postnasal drip and trouble swallowing.    Respiratory:  Positive for shortness of breath. Negative for cough and wheezing.    Cardiovascular:  Positive for leg swelling (chronic LEFT > RIGHT). Negative for chest pain and palpitations.   Gastrointestinal:  Negative for abdominal pain, constipation, nausea and  vomiting.   Genitourinary:  Negative for difficulty urinating, dysuria and flank pain.   Musculoskeletal:  Positive for arthralgias. Negative for myalgias.   Skin:  Negative for color change.   Neurological:  Positive for syncope (? near syncope). Negative for dizziness, weakness, light-headedness and headaches.   Psychiatric/Behavioral:  Negative for confusion. The patient is not nervous/anxious.    Objective:     Vital Signs (Most Recent):  Temp: 98.2 °F (36.8 °C) (10/05/22 1214)  Pulse: (!) 56 (10/05/22 1304)  Resp: 18 (10/05/22 1506)  BP: 112/62 (10/05/22 1304)  SpO2: 95 % (10/05/22 1304)   Vital Signs (24h Range):  Temp:  [98.2 °F (36.8 °C)] 98.2 °F (36.8 °C)  Pulse:  [56-57] 56  Resp:  [18] 18  SpO2:  [95 %-98 %] 95 %  BP: (112-125)/(62-68) 112/62     Weight: 96.2 kg (212 lb)  Body mass index is 41.4 kg/m².    Physical Exam  Vitals and nursing note reviewed.   Constitutional:       Appearance: Normal appearance.   HENT:      Head: Normocephalic and atraumatic.      Mouth/Throat:      Mouth: Mucous membranes are moist.      Pharynx: Oropharynx is clear.   Eyes:      Extraocular Movements: Extraocular movements intact.      Conjunctiva/sclera: Conjunctivae normal.      Pupils: Pupils are equal, round, and reactive to light.   Cardiovascular:      Rate and Rhythm: Regular rhythm. Bradycardia present.      Pulses: Normal pulses.      Heart sounds: Normal heart sounds.      Comments: Noted short burst of tachycardia on monitor (5 seconds) during visit  Pulmonary:      Effort: Pulmonary effort is normal. No respiratory distress.      Breath sounds: Normal breath sounds.   Abdominal:      General: Abdomen is flat. Bowel sounds are normal. There is no distension.      Palpations: Abdomen is soft.      Tenderness: There is no abdominal tenderness.   Musculoskeletal:         General: Tenderness (RIGHT shoulder; RIGHT knee) present. Normal range of motion.      Cervical back: Normal range of motion and neck supple.       Right lower leg: Edema (1+ pitting) present.      Left lower leg: Edema (2+ pitting) present.   Skin:     General: Skin is warm and dry.   Neurological:      General: No focal deficit present.      Mental Status: She is alert and oriented to person, place, and time. Mental status is at baseline.   Psychiatric:         Mood and Affect: Mood normal.         Behavior: Behavior normal.         CRANIAL NERVES     CN III, IV, VI   Pupils are equal, round, and reactive to light.     Significant Labs: All pertinent labs within the past 24 hours have been reviewed.  CBC:   Recent Labs   Lab 10/05/22  1307   WBC 5.73   HGB 12.1   HCT 37.4        CMP:   Recent Labs   Lab 10/05/22  1307      K 3.3*   CL 97   CO2 32*      BUN 22   CREATININE 0.9   CALCIUM 9.3   PROT 7.1   ALBUMIN 3.9   BILITOT 0.4   ALKPHOS 98   AST 32   ALT 24   ANIONGAP 11       Troponin:   Recent Labs   Lab 10/05/22  1307   TROPONINI 0.268*       Significant Imaging: I have reviewed all pertinent imaging results/findings within the past 24 hours.

## 2022-10-05 NOTE — TELEPHONE ENCOUNTER
----- Message from Thad Alfaro sent at 10/5/2022  8:49 AM CDT -----  Regarding: severe pain wants to speak to staff, pt   Contact: pt   severe pain wants to speak to staff, pt

## 2022-10-05 NOTE — HPI
"Genoveva Gilbert this 72-year-old female with PMHx significant for lymphedema, HLD, chronic bronchitis, GERD, and hypothyroidism.  Pt presents to the ED via EMS after a fall at a [employer].  Pt reports uncertainty surrounding how she fell.  She denies any LOC. She reports she fell on her right shoulder and knee resultant knee pain and shoulder pain which she describes more chronic in nature.  She reports frequent similar episodes recently.  She reports feeling as though she is going to fall almost daily describing a "strange" feeling prior to episodes particularly when she stands and gets moving.  She denies any precipitating chest pain, dizziness, vertigo, palpitations, or other complaints.  She reports chronic SOB worse with exertion which is unchanged lately.  She denies any fever or chills.  Of note, she was recently started on torsemide of leg swelling and magnesium + increased potassium supplementation. She reports no history of chest pain.  She reports a negative stress test > 5 years ago. Her workup in the ED consists of a shoulder x-ray and knee x-ray which were negative for any acute abnormality.  She had finding of elevated troponin.   "

## 2022-10-05 NOTE — ASSESSMENT & PLAN NOTE
Patient's FSGs are controlled on current medication regimen.  Last A1c reviewed-   Lab Results   Component Value Date    HGBA1C 6.1 (H) 09/15/2022     Most recent fingerstick glucose reviewed- No results for input(s): POCTGLUCOSE in the last 24 hours.  Current correctional scale  Low  Initiate anti-hyperglycemic dose as follows-   Antihyperglycemics (From admission, onward)    None        Not on any oral medications.

## 2022-10-05 NOTE — ASSESSMENT & PLAN NOTE
Patient is chronically on statin.will not continue for now as her home medication is non-formulary. Monitor clinically. Last LDL was   Lab Results   Component Value Date    LDLCALC 79.0 09/15/2022

## 2022-10-05 NOTE — PLAN OF CARE
Nurses Note -- 4 Eyes      10/5/2022   6:51 PM      Skin assessed during: Admit      [] No Pressure Injuries Present    []Prevention Measures Documented      [x] Yes- Altered Skin Integrity Present or Discovered   [x] LDA Added if Not in Epic (Describe Wound)   [x] New Altered Skin Integrity was Present on Admit and Documented in LDA   [x] Wound Image Taken    Wound Care Consulted? Yes    Attending Nurse:  Fabio Begum RN     Second RN/Staff Member:  NEELAM Nugent LPN

## 2022-10-05 NOTE — ASSESSMENT & PLAN NOTE
Patient with historical troponin elevation (6 yrs ago) of 0.128.  -Monitor on telemetry  -trend troponin  -discussed with cardiology: in the absence of symptoms/abnormal EKG, recommends trending.

## 2022-10-05 NOTE — ASSESSMENT & PLAN NOTE
Chronic, controlled.  Latest blood pressure and vitals reviewed-   Temp:  [98.2 °F (36.8 °C)]   Pulse:  [55-57]   Resp:  [18]   BP: (112-141)/(62-72)   SpO2:  [95 %-98 %] .   Home meds for hypertension were reviewed and noted below.   Hypertension Medications             amLODIPine (NORVASC) 5 MG tablet Take 1 tablet (5 mg total) by mouth once daily. For blood pressure    hydroCHLOROthiazide (HYDRODIURIL) 25 MG tablet     losartan (COZAAR) 50 MG tablet Take 1 tablet (50 mg total) by mouth once daily. For blood pressure    metoprolol succinate (TOPROL-XL) 50 MG 24 hr tablet Take 1 tablet (50 mg total) by mouth once daily. For blood pressure and heart    torsemide (DEMADEX) 10 MG Tab Take 1 tablet (10 mg total) by mouth once daily.          While in the hospital, will manage blood pressure as follows; Continue home antihypertensive regimen    Will utilize p.r.n. blood pressure medication only if patient's blood pressure greater than  180/110 and she develops symptoms such as worsening chest pain or shortness of breath.

## 2022-10-05 NOTE — ASSESSMENT & PLAN NOTE
Body mass index is 41.4 kg/m². Morbid obesity complicates all aspects of disease management from diagnostic modalities to treatment.

## 2022-10-05 NOTE — ASSESSMENT & PLAN NOTE
Recurrent issue, maintained on oral repletion daily  -Received potassium supplementation in ED  -Follow labs  -Replete as needed

## 2022-10-05 NOTE — DISCHARGE INSTRUCTIONS
As discussed, I have decreased her metoprolol from 50 mg daily to 25 mg daily   -monitor BP, record these values, and bring with you to your primary care appointment  -you should receive a phone call to schedule your cardiology appointment    Discharge Instructions, [facility] Medicine    Thank you for choosing [facility] for your medical care. The primary doctor who is taking care of you at the time of your discharge is Verna Garcia MD.     You were admitted to the hospital with Near syncope.     Please note your discharge instructions, including diet/activity restrictions, follow-up appointments, and medication changes.  If you have any questions about your medical issues, prescriptions, or any other questions, please feel free to contact the [facility] Medicine Dept at [phone] and we will help.    If you are previously with Home health, outpatient PT/OT or under a therapy program, you are cleared to return to those programs.    Please direct all long term medication refills and follow up to your primary care provider, Rajesh Dubois MD. Thank you again for letting us take care of your health care needs.    Please note the following discharge instructions per your discharging physician-  Margi Mckeon NP

## 2022-10-06 ENCOUNTER — TELEPHONE (OUTPATIENT)
Dept: FAMILY MEDICINE | Facility: CLINIC | Age: 72
End: 2022-10-06
Payer: MEDICARE

## 2022-10-06 VITALS
RESPIRATION RATE: 16 BRPM | HEIGHT: 60 IN | WEIGHT: 212 LBS | DIASTOLIC BLOOD PRESSURE: 64 MMHG | SYSTOLIC BLOOD PRESSURE: 122 MMHG | BODY MASS INDEX: 41.62 KG/M2 | HEART RATE: 66 BPM | TEMPERATURE: 98 F | OXYGEN SATURATION: 96 %

## 2022-10-06 LAB
ALBUMIN SERPL BCP-MCNC: 3.3 G/DL (ref 3.5–5.2)
ALP SERPL-CCNC: 86 U/L (ref 55–135)
ALT SERPL W/O P-5'-P-CCNC: 23 U/L (ref 10–44)
ANION GAP SERPL CALC-SCNC: 8 MMOL/L (ref 8–16)
AORTIC ROOT ANNULUS: 2.47 CM
AST SERPL-CCNC: 28 U/L (ref 10–40)
AV INDEX (PROSTH): 0.67
AV MEAN GRADIENT: 6 MMHG
AV PEAK GRADIENT: 0 MMHG
AV VALVE AREA: 2.1 CM2
AV VELOCITY RATIO: 110
BASOPHILS # BLD AUTO: 0.04 K/UL (ref 0–0.2)
BASOPHILS NFR BLD: 0.8 % (ref 0–1.9)
BILIRUB SERPL-MCNC: 0.2 MG/DL (ref 0.1–1)
BSA FOR ECHO PROCEDURE: 2.02 M2
BUN SERPL-MCNC: 17 MG/DL (ref 8–23)
CALCIUM SERPL-MCNC: 8.8 MG/DL (ref 8.7–10.5)
CHLORIDE SERPL-SCNC: 103 MMOL/L (ref 95–110)
CO2 SERPL-SCNC: 32 MMOL/L (ref 23–29)
CREAT SERPL-MCNC: 0.9 MG/DL (ref 0.5–1.4)
CV ECHO LV RWT: 0.52 CM
DIFFERENTIAL METHOD: ABNORMAL
DOP CALC AO PEAK VEL: 0.01 M/S
DOP CALC AO VTI: 43.08 CM
DOP CALC LVOT AREA: 3.1 CM2
DOP CALC LVOT DIAMETER: 1.99 CM
DOP CALC LVOT PEAK VEL: 1.1 M/S
DOP CALC LVOT STROKE VOLUME: 90.34 CM3
DOP CALC MV VTI: 40.55 CM
DOP CALCLVOT PEAK VEL VTI: 29.06 CM
E WAVE DECELERATION TIME: 195.49 MSEC
E/A RATIO: 1.42
E/E' RATIO: 10.57 M/S
ECHO LV POSTERIOR WALL: 1.21 CM (ref 0.6–1.1)
EJECTION FRACTION: 62 %
EOSINOPHIL # BLD AUTO: 0.2 K/UL (ref 0–0.5)
EOSINOPHIL NFR BLD: 3.7 % (ref 0–8)
ERYTHROCYTE [DISTWIDTH] IN BLOOD BY AUTOMATED COUNT: 15.3 % (ref 11.5–14.5)
EST. GFR  (NO RACE VARIABLE): >60 ML/MIN/1.73 M^2
FRACTIONAL SHORTENING: 28 % (ref 28–44)
GLUCOSE SERPL-MCNC: 126 MG/DL (ref 70–110)
HCT VFR BLD AUTO: 36 % (ref 37–48.5)
HGB BLD-MCNC: 11.4 G/DL (ref 12–16)
IMM GRANULOCYTES # BLD AUTO: 0.01 K/UL (ref 0–0.04)
IMM GRANULOCYTES NFR BLD AUTO: 0.2 % (ref 0–0.5)
INTERVENTRICULAR SEPTUM: 1.3 CM (ref 0.6–1.1)
LEFT ATRIUM SIZE: 4.56 CM
LEFT ATRIUM VOLUME INDEX MOD: 44.8 ML/M2
LEFT ATRIUM VOLUME MOD: 85.64 CM3
LEFT INTERNAL DIMENSION IN SYSTOLE: 3.38 CM (ref 2.1–4)
LEFT VENTRICLE DIASTOLIC VOLUME INDEX: 52.78 ML/M2
LEFT VENTRICLE DIASTOLIC VOLUME: 100.81 ML
LEFT VENTRICLE MASS INDEX: 117 G/M2
LEFT VENTRICLE SYSTOLIC VOLUME INDEX: 24.4 ML/M2
LEFT VENTRICLE SYSTOLIC VOLUME: 46.68 ML
LEFT VENTRICULAR INTERNAL DIMENSION IN DIASTOLE: 4.67 CM (ref 3.5–6)
LEFT VENTRICULAR MASS: 223.82 G
LV LATERAL E/E' RATIO: 10.09 M/S
LV SEPTAL E/E' RATIO: 11.1 M/S
LYMPHOCYTES # BLD AUTO: 2.1 K/UL (ref 1–4.8)
LYMPHOCYTES NFR BLD: 43.2 % (ref 18–48)
MAGNESIUM SERPL-MCNC: 2.3 MG/DL (ref 1.6–2.6)
MCH RBC QN AUTO: 27.8 PG (ref 27–31)
MCHC RBC AUTO-ENTMCNC: 31.7 G/DL (ref 32–36)
MCV RBC AUTO: 88 FL (ref 82–98)
MONOCYTES # BLD AUTO: 0.5 K/UL (ref 0.3–1)
MONOCYTES NFR BLD: 9.9 % (ref 4–15)
MV MEAN GRADIENT: 0 MMHG
MV PEAK A VEL: 0.78 M/S
MV PEAK E VEL: 1.11 M/S
MV PEAK GRADIENT: 5 MMHG
MV STENOSIS PRESSURE HALF TIME: 56.69 MS
MV VALVE AREA BY CONTINUITY EQUATION: 2.23 CM2
MV VALVE AREA P 1/2 METHOD: 3.88 CM2
NEUTROPHILS # BLD AUTO: 2.1 K/UL (ref 1.8–7.7)
NEUTROPHILS NFR BLD: 42.2 % (ref 38–73)
NRBC BLD-RTO: 0 /100 WBC
PISA MRMAX VEL: 0.05 M/S
PISA TR MAX VEL: 3 M/S
PLATELET # BLD AUTO: 221 K/UL (ref 150–450)
PMV BLD AUTO: 9.4 FL (ref 9.2–12.9)
POTASSIUM SERPL-SCNC: 4.1 MMOL/L (ref 3.5–5.1)
PROT SERPL-MCNC: 6.2 G/DL (ref 6–8.4)
PV PEAK VELOCITY: 1.4 CM/S
RA PRESSURE: 3 MMHG
RBC # BLD AUTO: 4.1 M/UL (ref 4–5.4)
RIGHT VENTRICULAR END-DIASTOLIC DIMENSION: 2.37 CM
RV TISSUE DOPPLER FREE WALL SYSTOLIC VELOCITY 1 (APICAL 4 CHAMBER VIEW): 10.04 CM/S
SODIUM SERPL-SCNC: 143 MMOL/L (ref 136–145)
TDI LATERAL: 0.11 M/S
TDI SEPTAL: 0.1 M/S
TDI: 0.11 M/S
TR MAX PG: 36 MMHG
TRICUSPID ANNULAR PLANE SYSTOLIC EXCURSION: 2.22 CM
TROPONIN I SERPL DL<=0.01 NG/ML-MCNC: 0.2 NG/ML (ref 0–0.03)
TROPONIN I SERPL DL<=0.01 NG/ML-MCNC: 0.22 NG/ML (ref 0–0.03)
TV REST PULMONARY ARTERY PRESSURE: 39 MMHG
WBC # BLD AUTO: 4.93 K/UL (ref 3.9–12.7)

## 2022-10-06 PROCEDURE — 80053 COMPREHEN METABOLIC PANEL: CPT | Performed by: NURSE PRACTITIONER

## 2022-10-06 PROCEDURE — 84484 ASSAY OF TROPONIN QUANT: CPT | Mod: 91 | Performed by: NURSE PRACTITIONER

## 2022-10-06 PROCEDURE — 94761 N-INVAS EAR/PLS OXIMETRY MLT: CPT

## 2022-10-06 PROCEDURE — 99900035 HC TECH TIME PER 15 MIN (STAT)

## 2022-10-06 PROCEDURE — G0378 HOSPITAL OBSERVATION PER HR: HCPCS

## 2022-10-06 PROCEDURE — 96372 THER/PROPH/DIAG INJ SC/IM: CPT | Performed by: NURSE PRACTITIONER

## 2022-10-06 PROCEDURE — 84484 ASSAY OF TROPONIN QUANT: CPT | Performed by: NURSE PRACTITIONER

## 2022-10-06 PROCEDURE — 25000003 PHARM REV CODE 250: Performed by: NURSE PRACTITIONER

## 2022-10-06 PROCEDURE — A4216 STERILE WATER/SALINE, 10 ML: HCPCS | Performed by: NURSE PRACTITIONER

## 2022-10-06 PROCEDURE — 94760 N-INVAS EAR/PLS OXIMETRY 1: CPT

## 2022-10-06 PROCEDURE — 63600175 PHARM REV CODE 636 W HCPCS: Performed by: NURSE PRACTITIONER

## 2022-10-06 PROCEDURE — 36415 COLL VENOUS BLD VENIPUNCTURE: CPT | Performed by: NURSE PRACTITIONER

## 2022-10-06 PROCEDURE — 97165 OT EVAL LOW COMPLEX 30 MIN: CPT

## 2022-10-06 PROCEDURE — 97161 PT EVAL LOW COMPLEX 20 MIN: CPT

## 2022-10-06 PROCEDURE — 83735 ASSAY OF MAGNESIUM: CPT | Performed by: NURSE PRACTITIONER

## 2022-10-06 PROCEDURE — 97535 SELF CARE MNGMENT TRAINING: CPT

## 2022-10-06 PROCEDURE — 85025 COMPLETE CBC W/AUTO DIFF WBC: CPT | Performed by: NURSE PRACTITIONER

## 2022-10-06 RX ORDER — METOPROLOL SUCCINATE 25 MG/1
25 TABLET, EXTENDED RELEASE ORAL DAILY
Qty: 30 TABLET | Refills: 0 | Status: SHIPPED | OUTPATIENT
Start: 2022-10-06 | End: 2022-10-14

## 2022-10-06 RX ORDER — METOPROLOL SUCCINATE 25 MG/1
50 TABLET, EXTENDED RELEASE ORAL DAILY
Qty: 30 TABLET | Refills: 0 | Status: SHIPPED | OUTPATIENT
Start: 2022-10-06 | End: 2022-10-06 | Stop reason: SDUPTHER

## 2022-10-06 RX ADMIN — ALUMINUM HYDROXIDE, MAGNESIUM HYDROXIDE, AND DIMETHICONE 30 ML: 200; 20; 200 SUSPENSION ORAL at 11:10

## 2022-10-06 RX ADMIN — VENLAFAXINE HYDROCHLORIDE 150 MG: 37.5 CAPSULE, EXTENDED RELEASE ORAL at 09:10

## 2022-10-06 RX ADMIN — LEVOTHYROXINE SODIUM 88 MCG: 0.09 TABLET ORAL at 05:10

## 2022-10-06 RX ADMIN — PREGABALIN 75 MG: 75 CAPSULE ORAL at 09:10

## 2022-10-06 RX ADMIN — PANTOPRAZOLE SODIUM 40 MG: 40 TABLET, DELAYED RELEASE ORAL at 09:10

## 2022-10-06 RX ADMIN — METOPROLOL SUCCINATE 50 MG: 50 TABLET, EXTENDED RELEASE ORAL at 09:10

## 2022-10-06 RX ADMIN — DOCUSATE SODIUM AND SENNOSIDES 1 TABLET: 8.6; 5 TABLET, FILM COATED ORAL at 09:10

## 2022-10-06 RX ADMIN — SUCRALFATE 1 G: 1 SUSPENSION ORAL at 11:10

## 2022-10-06 RX ADMIN — Medication 10 ML: at 05:10

## 2022-10-06 RX ADMIN — ENOXAPARIN SODIUM 40 MG: 100 INJECTION SUBCUTANEOUS at 09:10

## 2022-10-06 RX ADMIN — Medication 400 MG: at 09:10

## 2022-10-06 RX ADMIN — SUCRALFATE 1 G: 1 SUSPENSION ORAL at 05:10

## 2022-10-06 NOTE — PLAN OF CARE
Problem: Physical Therapy  Goal: Physical Therapy Goal  Description: Goals to be met by: 10-     Patient will increase functional independence with mobility by performin. Supine to sit with Modified [place]  2. Sit to stand transfer with Supervision  3. Bed to chair transfer with Supervision using No Assistive Device  4. Gait  x 250 feet with Contact Guard Assistance using Rolling Walker.   5. Lower extremity exercise program x20 reps    Outcome: Ongoing, Progressing   PT eval and treat completed. Pt with R shoulder discomfort. Pt supervision with mobility- ambulated with RW 250ft with CGA and OOB chair. HHPT

## 2022-10-06 NOTE — PT/OT/SLP EVAL
Occupational Therapy   Evaluation    Name: Genoveva Gilbert  MRN: [id number]  Admitting Diagnosis:  Near syncope  Recent Surgery: * No surgery found *      Recommendations:     Discharge Recommendations: home health OT  Discharge Equipment Recommendations:     Barriers to discharge:       Assessment:     Genoveva Gilbert is a 72 y.o. female with a medical diagnosis of Near syncope.  She presents with the following performance deficits affecting function: weakness, impaired endurance, impaired self care skills, impaired functional mobility, gait instability, impaired balance, pain, decreased upper extremity function.  Pt was agreeable to OT. Pt demonstrates AROM R shoulder flexion to approximately 90 degrees. Distal LUE AROM/strength WFL's. Pt demonstrates LUE AROM/strength WFL's. Pt reports history of R shoulder TSA and has an appointment with Dr. Pitts next week. Pt with c/o BUE pain with use, but was unable to report any specific change in activities that may have caused such. Pt reports problems for several months. Pt required SBA with supine to sit EOB. BP was taken is sitting and standing with no issues noted. Pt required CGA/HHA with sit to stand. Pt ambulated to bathroom with CGA/HHA. Pt completed toilet transfers and changing of underwear in sitting with CGA. Pt ambulated back to bed with CGA/HHA. Pt completed sit to supine with SBA. OT provided instruction in home safety with ADL's/IADL's including review of home set up and DME/AE. Pt/family verbalized understanding. Recommend HH OT at discharge.    Rehab Prognosis: Good; patient would benefit from acute skilled OT services to address these deficits and reach maximum level of function.       Plan:     Patient to be seen 5 x/week to address the above listed problems via self-care/home management, therapeutic activities, therapeutic exercises  Plan of Care Expires: 11/03/22  Plan of Care Reviewed with: patient    Subjective     Chief Complaint: weakness; BUE  "discomfort  Patient/Family Comments/goals: To get better and go home    Occupational Profile:  Living Environment: Pt lives in [facility] with a chair lift to 2nd floor where living quarters are located. Pt has a tub/shower with grab bars and shower chair. Pt had a tub transfer bench in the past and said it was too big for the bathroom and did not like it. Pt has a standard toilet. Pt has BSC available if needed.   Previous level of function: Pt reports difficulty with ADL's and "furniture walking" at home although she does have a RW and rollator.  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    Pain/Comfort:  Pain Rating 1: 0/10  Pain Rating Post-Intervention 1: 0/10    Patients cultural, spiritual, Mu-ism conflicts given the current situation:      Objective:     Communicated with: Nurse Borjas prior to session.  Patient found HOB elevated with peripheral IV, telemetry, bed alarm upon OT entry to room.    General Precautions: Standard, fall   Orthopedic Precautions:N/A   Braces: N/A  Respiratory Status: Room air    Occupational Performance:    Bed Mobility:    Patient completed Supine to Sit with stand by assistance  Patient completed Sit to Supine with stand by assistance    Functional Mobility/Transfers:  Patient completed Sit <> Stand Transfer with contact guard assistance  with  hand-held assist   Patient completed Toilet Transfer Step Transfer technique with contact guard assistance with  hand-held assist  Functional Mobility: Pt ambulated to/from bathroom with CGA/HHA.    Activities of Daily Living:  Feeding:  independence    Grooming: stand by assistance set up in sitting  Bathing: minimum assistance    Upper Body Dressing: stand by assistance set up in sitting  Lower Body Dressing: contact guard assistance    Toileting: contact " guard assistance      Cognitive/Visual Perceptual:  Pt alert and oriented    Physical Exam:  Upper Extremity Strength:    -       Right Upper Extremity: AROM R shoulder flexion approximatley 90 degrees. Distal AROM/strength RUE WFL's. Pt reports history of R TSA. Pt reports RUE pain with use. Pt to see Dr. Pitts next week.  -       Left Upper Extremity: WFL  Pt reports LUE pain with use.  AMPAC 6 Click ADL:  AMPAC Total Score: 19    Treatment & Education:  OT provided education in role of OT. Pt verbalized understanding and was agreeable to OT.  OT provided instruction in home safety with ADL's/IADL's including review of home set up and DME/AE. Pt verbalized understanding.  OT provided education in use of AE for lower body dressing and bathing. Pt verbalized understanding.  OT provided education in calling for assist. Pt verbalized understanding.    Patient left HOB elevated with all lines intact, call button in reach, bed alarm on, and Family present    GOALS:   Multidisciplinary Problems       Occupational Therapy Goals          Problem: Occupational Therapy    Goal Priority Disciplines Outcome Interventions   Occupational Therapy Goal     OT, PT/OT     Description: Goals to be met by: 11/3/22     Patient will increase functional independence with ADLs by performing:    UE Dressing with Modified Power.  LE Dressing with Modified Power.  Grooming while standing at sink with Modified Power.  Toileting from toilet with Modified Power for hygiene and clothing management.   Bathing from  shower chair/bench with Modified Power.  Toilet transfer to toilet with Modified Power.  Increased functional strength to WFL for ADL's/IADL's                         History:     Past Medical History:   Diagnosis Date    Allergy     Anxiety     Arthritis, rheumatoid     Back pain     Chronic bronchiolitis     Depression     Fibromyalgia     GERD (gastroesophageal reflux disease)     High  cholesterol     Hilar mass 3/27/2014    [place] (hard of hearing)     aid right ear    [place] (hard of hearing)     Hypertension     Incontinence of urine     Lymphedema     MS (multiple sclerosis)     benign; another MD stated she has no MS    Neuropathy     Restless leg syndrome     Rotator cuff tear 4/16/2015    Sciatica of left side 1/5/2018    Seasonal allergies     Sinus congestion     Sleep apnea     DOES NOT USE MACHINE    Spondylolysis     Thyroid disease     Type 2 diabetes mellitus with polyneuropathy     no med. was borderline    Wheezing          Past Surgical History:   Procedure Laterality Date    APPENDECTOMY      ARTHROSCOPIC DEBRIDEMENT OF SHOULDER Right 2/18/2022    Procedure: EXTENSIVE DEBRIDEMENT, SHOULDER, ARTHROSCOPIC;  Surgeon: Rio Pitts MD;  Location: [facility] OR;  Service: Orthopedics;  Laterality: Right;    BREAST SURGERY      reduction    CLOSED REDUCTION OF INJURY OF SHOULDER Right 9/19/2021    Procedure: CLOSED REDUCTION, SHOULDER;  Surgeon: Antonio Irwin MD;  Location: [facility] OR;  Service: Orthopedics;  Laterality: Right;    EPIDURAL STEROID INJECTION INTO LUMBAR SPINE N/A 6/12/2019    Procedure: Injection-steroid-epidural-lumbar;  Surgeon: Thad Manning MD;  Location: [facility] OR;  Service: Pain Management;  Laterality: N/A;  L5-S1    EPIDURAL STEROID INJECTION INTO LUMBAR SPINE N/A 9/16/2019    Procedure: Injection-steroid-epidural-lumbar;  Surgeon: Thad Manning MD;  Location: [facility] OR;  Service: Pain Management;  Laterality: N/A;  L5-S1    EYE SURGERY Bilateral     HYSTERECTOMY      partial - fibroids    INJECTION OF ANESTHETIC AGENT AROUND MEDIAL BRANCH NERVES INNERVATING LUMBAR FACET JOINT Bilateral 2/28/2019    Procedure: Block-nerve-medial branch-lumbar;  Surgeon: Thad Manning MD;  Location: [facility] OR;  Service: Pain Management;  Laterality: Bilateral;  L3, 4,5     INJECTION OF ANESTHETIC AGENT AROUND MEDIAL BRANCH NERVES INNERVATING LUMBAR FACET JOINT Bilateral 3/21/2019    Procedure:  Block-nerve-medial branch-lumbar L3,4,5;  Surgeon: Thad Manning MD;  Location: [facility] OR;  Service: Pain Management;  Laterality: Bilateral;    KNEE ARTHROPLASTY Left 3/16/2021    Procedure: ARTHROPLASTY, KNEE;  Surgeon: Rio Pitts MD;  Location: [facility] OR;  Service: Orthopedics;  Laterality: Left;  GENERAL AND BLOCK    LIPOSUCTION  1985    RADIOFREQUENCY ABLATION Left 11/4/2020    Procedure: Radiofrequency Ablation left knee;  Surgeon: Andrew Escudero MD;  Location: [facility] OR;  Service: Pain Management;  Laterality: Left;    RADIOFREQUENCY ABLATION OF LUMBAR MEDIAL BRANCH NERVE AT SINGLE LEVEL Bilateral 4/12/2019    Procedure: Radiofrequency Ablation, Nerve, Spinal, Lumbar, Medial Branch, 1 Level;  Surgeon: Thad Manning MD;  Location: [facility] OR;  Service: Pain Management;  Laterality: Bilateral;  L3, 4, 5  lumbar  Xlumena pain management generator  SN [id number]  80 degrees for 75 seconds x2    RADIOFREQUENCY ABLATION OF LUMBAR MEDIAL BRANCH NERVE AT SINGLE LEVEL Bilateral 10/22/2019    Procedure: Radiofrequency Ablation, Nerve, Spinal, Lumbar, Medial Branch, L3,4,5;  Surgeon: Thad Manning MD;  Location: [facility] OR;  Service: Pain Management;  Laterality: Bilateral;  burned at 80 degrees C X 60 seconds X 2 each site    RADIOFREQUENCY ABLATION OF LUMBAR MEDIAL BRANCH NERVE AT SINGLE LEVEL Bilateral 5/27/2020    Procedure: Radiofrequency Ablation, Nerve, Spinal, Lumbar, Medial Branch, 1 Level;  Surgeon: Thad Manning MD;  Location: [facility] OR;  Service: Pain Management;  Laterality: Bilateral;  L3,4,5    REVERSE TOTAL SHOULDER ARTHROPLASTY Right 4/12/2022    Procedure: ARTHROPLASTY, SHOULDER, TOTAL, REVERSE;  Surgeon: Rio Pitts MD;  Location: [facility] OR;  Service: Orthopedics;  Laterality: Right;    SHOULDER ARTHROSCOPY Right 9/19/2021    Procedure: ARTHROSCOPY, SHOULDER;  Surgeon: Antonio Irwin MD;  Location: [facility] OR;  Service: Orthopedics;  Laterality: Right;  LIMITED DEBRIDMENT    TONSILLECTOMY       TOTAL REDUCTION MAMMOPLASTY         Time Tracking:     OT Date of Treatment: 10/06/22  OT Start Time: 0921  OT Stop Time: 1005  OT Total Time (min): 44 min    Billable Minutes:Evaluation 8  Self Care/Home Management 36    10/6/2022

## 2022-10-06 NOTE — PLAN OF CARE
AVS updated with follow up apts including PCP , ortho and lymphedema clinic. Ambulatory referral to cardiology placed for an appt.    10/06/22 [id number]   Discharge Assessment   Assessment Type Discharge Planning Reassessment

## 2022-10-06 NOTE — PLAN OF CARE
The pt is cleared for discharge home from case management with [facility].    10/06/22 1323   Final Note   Assessment Type Final Discharge Note   Anticipated Discharge Disposition Home-Health   Hospital Resources/Appts/Education Provided Appointments scheduled and added to AVS

## 2022-10-06 NOTE — DISCHARGE SUMMARY
"[facility] Medicine  Discharge Summary      Patient Name: Genoveva Gilbert  MRN: [id number]  Patient Class: OP- Observation  Admission Date: 10/5/2022  Hospital Length of Stay: 0 days  Discharge Date and Time:  10/06/2022 2:53 PM  Attending Physician: No att. providers found   Discharging Provider: Margi Mckeon NP  Primary Care Provider: Rajesh Dubois MD      HPI:   Genoveva Gilbert this 72-year-old female with PMHx significant for lymphedema, HLD, chronic bronchitis, GERD, and hypothyroidism.  Pt presents to the ED via EMS after a fall at a [employer].  Pt reports uncertainty surrounding how she fell.  She denies any LOC. She reports she fell on her right shoulder and knee resultant knee pain and shoulder pain which she describes more chronic in nature.  She reports frequent similar episodes recently.  She reports feeling as though she is going to fall almost daily describing a "strange" feeling prior to episodes particularly when she stands and gets moving.  She denies any precipitating chest pain, dizziness, vertigo, palpitations, or other complaints.  She reports chronic SOB worse with exertion which is unchanged lately.  She denies any fever or chills.  Of note, she was recently started on torsemide of leg swelling and magnesium + increased potassium supplementation. She reports no history of chest pain.  She reports a negative stress test > 5 years ago. Her workup in the ED consists of a shoulder x-ray and knee x-ray which were negative for any acute abnormality.  She had finding of elevated troponin.       * No surgery found *      Hospital Course:   Pt was monitored closely during her stay.  She remained free of any chest pain or further near syncopal episodes.  Her troponin was trended with a flat trend noted.  Her case was discussed with Cardiology who recommended trending troponins and not pursuing any provocative ischemic workup due to lack of anginal symptoms.  She " underwent CTA chest after elevated D-dimer which was negative for any acute pulmonary embolism.  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 She underwent carotid ultrasound which was negative for any pulmonary embolism.  She did have small nodule or abnormality near the aortic arch for which a follow-up noncontrast CT of the lung is recommended in the future to ensure resolution of that as well as a mild mosaic hypoattenuation in the lung fields. An echo was performed which showed no wall motion abnormality or significant valvular abnormalities with very mild regurgitation in multiple valves. She had no arrhythmias on telemetry.  She did have some bradycardia and her dose of metoprolol was decreased.  Pt was feeling well.  She worked with PT/OT who recommended home health.  She was D/C to follow-up closely with her primary care provider.  An ambulatory referral was placed to establish care with Cardiology.  Return precautions were discussed with Pt and her granddaughter.  They verbalized understanding and agreement with discharge plan.  She was instructed to maintain adequate fluid intake while taking oral diuretic therapy as an outpatient.    Pt was seen on day of discharge and deemed appropriate for discharge.       Goals of Care Treatment Preferences:  Code Status: Full Code      Consults:     No new Assessment & Plan notes have been filed under this hospital service since the last note was generated.  Service: Hospital Medicine    Final Active Diagnoses:    Diagnosis Date Noted POA    PRINCIPAL PROBLEM:  Near syncope [R55] 10/05/2022 Yes    Elevated troponin [R77.8] 10/05/2022 Yes    Hyperlipidemia associated with type 2 diabetes mellitus [E11.69, E78.5] 09/22/2022 Yes    Diet-controlled type 2 diabetes mellitus [E11.9] 03/16/2021 Yes    Lymphedema of both lower extremities [I89.0] 01/02/2020 Yes     Gastroesophageal reflux disease [K21.9] 06/21/2017 Yes    Morbid obesity with BMI of 40.0-44.9, adult [E66.01, Z68.41] 06/21/2017 Not Applicable    Hypokalemia [E87.6] 03/28/2014 Yes    Hypertension associated with diabetes [E11.59, I15.2] 03/27/2014 Yes    Hypothyroidism [E03.9] 03/27/2014 Yes      Problems Resolved During this Admission:       Discharged Condition: good    Disposition: Home-Health Care [facility]    Follow Up:   Follow-up Information     Rajesh Dubois MD Follow up on 10/18/2022.    Specialty: Family Medicine  Why: with RADHA Albert 8:20am  Contact information:  [address]  [place] [address]  [phone]             Cardiology Follow up.    Why: They will be contacting you to schedule. If you have not heard from anyone by the time you follow up with your PCP, please let them know so they can expedite.           Rio Pitts MD Follow up on 10/13/2022.    Specialties: Sports Medicine, Orthopedic Surgery  Why: 8:45am  Contact information:  [address] [place] [address] [address]  [place] [address]  [phone]             [facility] Follow up.    Specialty: Home Health Services  Why: Home Health  Contact information:  [address] [facility] [address]  [phone]                       Patient Instructions:      Ambulatory referral/consult to Cardiology   Standing Status: Future   Referral Priority: Urgent Referral Type: Consultation   Referral Reason: Specialty Services Required   Requested Specialty: Cardiology   Number of Visits Requested: 1     Ambulatory referral/consult to Home Health   Standing Status: Future   Referral Priority: Routine Referral Type: Home Health   Referral Reason: Specialty Services Required   Requested Specialty: Home Health Services   Number of Visits Requested: 1     Diet Cardiac     Notify your health care provider if you experience any of the following:  temperature >100.4     Notify your health care provider if you experience any  of the following:  persistent nausea and vomiting or diarrhea     Notify your health care provider if you experience any of the following:  severe uncontrolled pain     Notify your health care provider if you experience any of the following:  redness, tenderness, or signs of infection (pain, swelling, redness, odor or green/yellow discharge around incision site)     Notify your health care provider if you experience any of the following:  difficulty breathing or increased cough     Notify your health care provider if you experience any of the following:  increased confusion or weakness     Notify your health care provider if you experience any of the following:  persistent dizziness, light-headedness, or visual disturbances     Activity as tolerated       Significant Diagnostic Studies: Labs:   CMP   Recent Labs   Lab 10/05/22  1307 10/06/22  0621    143   K 3.3* 4.1   CL 97 103   CO2 32* 32*    126*   BUN 22 17   CREATININE 0.9 0.9   CALCIUM 9.3 8.8   PROT 7.1 6.2   ALBUMIN 3.9 3.3*   BILITOT 0.4 0.2   ALKPHOS 98 86   AST 32 28   ALT 24 23   ANIONGAP 11 8    and CBC   Recent Labs   Lab 10/05/22  1307 10/06/22  0621   WBC 5.73 4.93   HGB 12.1 11.4*   HCT 37.4 36.0*    221       Pending Diagnostic Studies:     None         Medications:  Reconciled Home Medications:      Medication List      CHANGE how you take these medications    metoprolol succinate 25 MG 24 hr tablet  Commonly known as: TOPROL-XL  Take 1 tablet (25 mg total) by mouth once daily. For blood pressure and heart  What changed:   · medication strength  · how much to take        CONTINUE taking these medications    albuterol 90 mcg/actuation inhaler  Commonly known as: PROAIR HFA  Inhale 2 puffs into the lungs every 6 (six) hours as needed for Wheezing. Rescue     ALPRAZolam 1 MG tablet  Commonly known as: XANAX  Take 1 tablet (1 mg total) by mouth 2 (two) times daily as needed for Anxiety (anxiety).     amLODIPine 5 MG  tablet  Commonly known as: NORVASC  Take 1 tablet (5 mg total) by mouth once daily. For blood pressure     ANORO ELLIPTA 62.5-25 mcg/actuation Dsdv  Generic drug: umeclidinium-vilanteroL  Inhale 1 puff into the lungs once daily. Controller     biotin 1 mg Cap  Take by mouth.     fexofenadine 180 MG tablet  Commonly known as: ALLEGRA     hydroCHLOROthiazide 25 MG tablet  Commonly known as: HYDRODIURIL     levothyroxine 88 MCG tablet  Commonly known as: SYNTHROID  Take 1 tablet (88 mcg total) by mouth once daily.     losartan 50 MG tablet  Commonly known as: COZAAR  Take 1 tablet (50 mg total) by mouth once daily. For blood pressure     lovastatin 40 MG tablet  Commonly known as: MEVACOR  Take 1 tablet (40 mg total) by mouth nightly. at bedtime. For cholesterol     magnesium oxide 400 mg (241.3 mg magnesium) tablet  Commonly known as: MAG-OX  Take 1 tablet (400 mg total) by mouth once daily.     multivit-iron-min-folic acid 3,500-18-0.4 unit-mg-mg Chew  Take 1 tablet by mouth.     NEURIVA ORIGINAL ORAL  Take by mouth.     nystatin cream  Commonly known as: MYCOSTATIN  Apply topically 2 (two) times daily.     omeprazole 20 MG capsule  Commonly known as: PRILOSEC  Take 1 capsule (20 mg total) by mouth once daily. For heartburn     potassium chloride 10 MEQ Cpsr  Commonly known as: MICRO-K  Take 2 capsules (20 mEq total) by mouth every evening.     pramipexole 0.5 MG tablet  Commonly known as: MIRAPEX  Take 2 tablets (1 mg total) by mouth every evening. For restless legs     * pregabalin 75 MG capsule  Commonly known as: LYRICA  Take 1 capsule (75 mg total) by mouth 2 (two) times daily.     * pregabalin 150 MG capsule  Commonly known as: LYRICA  Take 1 capsule (150 mg total) by mouth 2 (two) times daily.     torsemide 10 MG Tab  Commonly known as: DEMADEX  Take 1 tablet (10 mg total) by mouth once daily.     traZODone 50 MG tablet  Commonly known as: DESYREL  Take 1 tablet (50 mg total) by mouth nightly as needed for  Insomnia.     VALACYCLOVIR ORAL  Take by mouth.     venlafaxine 150 MG Cp24  Commonly known as: EFFEXOR-XR  Take 1 capsule (150 mg total) by mouth once daily. For depression         * This list has 2 medication(s) that are the same as other medications prescribed for you. Read the directions carefully, and ask your doctor or other care provider to review them with you.                Indwelling Lines/Drains at time of discharge:   Lines/Drains/Airways     None                 Time spent on the discharge of patient: 36 minutes         Margi Mckeon NP  Department of Hospital Medicine  [facility]

## 2022-10-06 NOTE — MEDICAL/APP STUDENT
"[facility]  Discharge Summary      Patient Name: Genoveva Gilbert  MRN: [id number]  Admission Date: 10/5/2022  Hospital Length of Stay: 0 days  Discharge Date and Time:  10/06/2022 11:39 AM  Attending Physician: Verna Garcia MD   Discharging Provider: Jana Moon  Primary Care Provider: Rajesh Dubois MD    HPI: Genoveva Gilbert this 72-year-old female with PMHx significant for lymphedema, HLD, chronic bronchitis, GERD, and hypothyroidism.  Pt presents to the ED via EMS after a fall at a [employer].  Pt reports uncertainty surrounding how she fell.  She denies any LOC. She reports she fell on her right shoulder and knee resultant knee pain and shoulder pain which she describes more chronic in nature.  She reports frequent similar episodes recently.  She reports feeling as though she is going to fall almost daily describing a "strange" feeling prior to episodes particularly when she stands and gets moving.  She denies any precipitating chest pain, dizziness, vertigo, palpitations, or other complaints.  She reports chronic SOB worse with exertion which is unchanged lately.  She denies any fever or chills.  Of note, she was recently started on torsemide of leg swelling and magnesium + increased potassium supplementation. She reports no history of chest pain.  She reports a negative stress test > 5 years ago. Her workup in the ED consists of a shoulder x-ray and knee x-ray which were negative for any acute abnormality.  She had finding of elevated troponin.     * No surgery found *      Indwelling Lines/Drains at time of discharge:   Lines/Drains/Airways       None                 Hospital Course: Mrs. Gilbert presented to the ED yesterday  (10/5/2022) via EMS for near syncope. Workup from the ED included a shoulder and knee xray which showed no acute abnormalities as well a slightly elevated troponin. Patient was admitted to Hospital Medicine for further observation with telemetry and " workup. She had no acute telemetry events overnight. Echo showed mild mitral, aortic, and tricuspid regurgitation. CTA revealed no evidence of PE. There was evidence of PAH. Carotid US showed no carotid stenosis. Metoprolol was decreased from 50 mg daily to 25 mg daily. OT recommended home health services. Patient was told to increase fluid intake and to take time when transitioning from lying to sitting and sitting to standing. She was notified that a referral to Cardiology would be ordered to establish care and to follow up with her PCP.    Consults:     Significant Diagnostic Studies: Labs: CMP   Recent Labs   Lab 10/05/22  1307 10/06/22  0621    143   K 3.3* 4.1   CL 97 103   CO2 32* 32*    126*   BUN 22 17   CREATININE 0.9 0.9   CALCIUM 9.3 8.8   PROT 7.1 6.2   ALBUMIN 3.9 3.3*   BILITOT 0.4 0.2   ALKPHOS 98 86   AST 32 28   ALT 24 23   ANIONGAP 11 8   , CBC   Recent Labs   Lab 10/05/22  1307 10/06/22  0621   WBC 5.73 4.93   HGB 12.1 11.4*   HCT 37.4 36.0*    221   , Troponin   Recent Labs   Lab 10/05/22  1931 10/06/22  0047 10/06/22  0621   TROPONINI 0.195* 0.223* 0.201*   , and All labs within the past 24 hours have been reviewed  Radiology: X-Ray:   Narrative & Impression  EXAMINATION:  XR SHOULDER COMPLETE 2 OR MORE VIEWS RIGHT     CLINICAL HISTORY:  Unspecified fall, initial encounter     TECHNIQUE:  2 or more views of the shoulder are obtained.     COMPARISON:  None.     FINDINGS:  The patient has undergone a reverse right shoulder arthroplasty with glenoid ball in humeral cup prosthesis in good position.  Lucency around the prosthesis consistent with loosening or osteomyelitis is not seen.  No fracture or dislocation is noted.     Impression:     Status post right shoulder reverse arthroplasty, no acute findings        Electronically signed by: Jay Corrigan MD  Date:                                            10/05/2022  Time:                                            14:24      Narrative & Impression  EXAMINATION:  XR KNEE 3 VIEW RIGHT     CLINICAL HISTORY:  Unspecified fall, initial encounter     TECHNIQUE:  AP, lateral, and Merchant views of the right knee were performed.     COMPARISON:  None     FINDINGS:  Minor spur formation is noted of the femur and tibia.  A joint effusion or fracture is not seen.  The intercondylar joint space is well maintained.     Impression:     Minor spur formation of the femur and tibia consistent with mild DJD.        Electronically signed by: Jay Corrigan MD  Date:                                            10/05/2022  Time:                                           14:24    CT scan:   Narrative & Impression  EXAMINATION:  CTA CHEST NON CORONARY (PE STUDIES)     CLINICAL HISTORY:  Pulmonary embolism (PE) suspected, positive D-dimer;     TECHNIQUE:  Low dose axial images, sagittal and coronal reformations were obtained from the thoracic inlet to the lung bases following the IV administration of 75 mL of Omnipaque 350.  Contrast timing was optimized to evaluate the pulmonary arteries.  MIP images were performed.     COMPARISON:  Routine chest CT 06/23/2014     FINDINGS:  There is good pulmonary artery enhancement.  There are no intraluminal filling defects in pulmonary artery suggesting pulmonary artery embolism     Primary pulmonary arteries are large suggesting pulmonary artery hypertension.  There is mild mosaic appearance of the lungs which at least in part in the dependent portion could reflect hypoventilatory change but can also be seen with infectious/inflammatory process or mild pulmonary edema.  Mild there is somewhat platelike opacification along the cephalad aspect of the aortic arch for example sagittal series 602, image 180 corresponding to axial series 2, images 84 through 95 and coronal series 601, image 158.  This is in area of streaky artifact and likely represents combination of the artifact and atelectasis cannot however  exclude mass adjacent to the aortic arch.     No significant hilar or mediastinal adenopathy.  No pleural or pericardial effusion.  Visualized upper abdominal structures show no acute findings.  There is a small hiatal hernia.  Osseous degenerative changes.  Right shoulder reverse arthroplasty.  Severe degenerative changes in the spine.     Impression:     No intraluminal filling defects in pulmonary artery suggesting pulmonary artery embolism.  Large pulmonary arteries suggesting pulmonary artery hypertension     Mild mosaic appearance of the lungs differential for which includes mild pulmonary edema, infectious/inflammatory process, and is in part may reflect hypoventilatory change.  Small nodular density along the cephalad aspect of the aortic arch in area of streaky artifact most likely represents atelectasis.  Cannot exclude however mass and suggest follow-up study which could be without contrast to determine if this as well as the mosaic appearance of the lungs resolves.        Electronically signed by: Karol Valadez MD  Date:                                            10/05/2022  Time:                                           16:20    Ultrasound:   Narrative & Impression  EXAMINATION:  US CAROTID BILATERAL     CLINICAL HISTORY:  syncope;     TECHNIQUE:  Grayscale and color Doppler ultrasound examination of the carotid and vertebral artery systems bilaterally.  Stenosis estimates are per the NASCET measurement criteria.     COMPARISON:  None.     FINDINGS:  Difficult study due to limited patient cooperation.     Right:     Internal Carotid Artery (ICA) peak systolic velocity 114 cm/sec     ICA/CCA peak systolic velocity ratio: 0.87     Plaque formation: Minimal     Vertebral artery: Antegrade flow and normal waveform.     Tortuous vessels.     Left:     Internal Carotid Artery (ICA)  peak systolic velocity 59.6 cm/sec     ICA/CCA peak systolic velocity ratio: 0.9     Plaque formation: No significant      Vertebral artery: Antegrade flow and normal waveform.     Tortuous vessels.     Impression:     No evidence of a hemodynamically significant carotid bifurcation stenosis.        Electronically signed by: Denton Eaton  Date:                                            10/05/2022  Time:                                           20:03    Cardiac Graphics: ECG:    Narrative  Performed by: CHAD  Test Reason : R55,     Vent. Rate : 057 BPM     Atrial Rate : 057 BPM      P-R Int : 168 ms          QRS Dur : 100 ms       QT Int : 462 ms       P-R-T Axes : 036 021 028 degrees      QTc Int : 449 ms     Sinus bradycardia   Otherwise normal ECG   When compared with ECG of 07-APR-2022 10:37,   Vent. rate has decreased BY  29 BPM     Referred By: [email or web address]    SELF           Confirmed By:    and Echocardiogram: Transthoracic echo (TTE) complete (Cupid Only):   Results for orders placed or performed during the hospital encounter of 10/05/22   Echo   Result Value Ref Range    AV mean gradient 6 mmHg    Ao peak robbi 0.01 m/s    Ao VTI 43.08 cm    IVS 1.30 (A) 0.6 - 1.1 cm    LA size 4.56 cm    LVIDd 4.67 3.5 - 6.0 cm    LVIDs 3.38 2.1 - 4.0 cm    LVOT diameter 1.99 cm    LVOT peak VTI 29.06 cm    Posterior Wall 1.21 (A) 0.6 - 1.1 cm    MV Peak A Robbi 0.78 m/s    E wave deceleration time 195.49 msec    MV Peak E Robbi 1.11 m/s    RVDD 2.37 cm    TAPSE 2.22 cm    TR Max Robbi 3.00 m/s    Ao root annulus 2.47 cm    PV PEAK VELOCITY 1.40 cm/s    MV stenosis pressure 1/2 time 56.69 ms    LV Diastolic Volume 100.81 mL    LV Systolic Volume 46.68 mL    LVOT peak robbi 1.10 m/s    TDI LATERAL 0.11 m/s    TDI SEPTAL 0.10 m/s    Mr max robbi 0.05 m/s    LA volume (mod) 85.64 cm3    MV mean gradient 0 mmHg    MV peak gradient 5 mmHg    RV S' 10.04 cm/s    MV VTI 40.55 cm    LV LATERAL E/E' RATIO 10.09 m/s    LV SEPTAL E/E' RATIO 11.10 m/s    FS 28 %    LV mass 223.82 g    Left Ventricle Relative Wall Thickness 0.52 cm    AV valve area 2.10 cm2    AV  Velocity Ratio 110.00     AV index (prosthetic) 0.67     MV valve area p 1/2 method 3.88 cm2    MV valve area by continuity eq 2.23 cm2    E/A ratio 1.42     Mean e' 0.11 m/s    LVOT area 3.1 cm2    LVOT stroke volume 90.34 cm3    AV peak gradient 0 mmHg    E/E' ratio 10.57 m/s    LV Systolic Volume Index 24.4 mL/m2    LV Diastolic Volume Index 52.78 mL/m2    LV Mass Index 117 g/m2    Triscuspid Valve Regurgitation Peak Gradient 36 mmHg    LA Volume Index (Mod) 44.8 mL/m2    BSA 2.02 m2    Right Atrial Pressure (from IVC) 3 mmHg    EF 62 %    TV rest pulmonary artery pressure 39 mmHg    Narrative    · The left ventricle is normal in size with mild concentric hypertrophy   and normal systolic function.  · The estimated ejection fraction is 62%.  · Normal left ventricular diastolic function.  · Normal right ventricular size with normal right ventricular systolic   function.  · Moderate left atrial enlargement.  · Mild tricuspid regurgitation.  · Normal central venous pressure (3 mmHg).  · The estimated PA systolic pressure is 39 mmHg.  · There is mild pulmonary hypertension.  · Mild mitral regurgitation.  · Mild aortic regurgitation.          Pending Diagnostic Studies:       Procedure Component Value Units Date/Time    Troponin I [[id number]]     Order Status: Sent Lab Status: No result     Specimen: Blood           Final Active Diagnoses:    Diagnosis Date Noted POA    PRINCIPAL PROBLEM:  Near syncope [R55] 10/05/2022 Yes    Elevated troponin [R77.8] 10/05/2022 Yes    Hyperlipidemia associated with type 2 diabetes mellitus [E11.69, E78.5] 09/22/2022 Yes    Diet-controlled type 2 diabetes mellitus [E11.9] 03/16/2021 Yes    Lymphedema of both lower extremities [I89.0] 01/02/2020 Yes    Gastroesophageal reflux disease [K21.9] 06/21/2017 Yes    Morbid obesity with BMI of 40.0-44.9, adult [E66.01, Z68.41] 06/21/2017 Not Applicable    Hypokalemia [E87.6] 03/28/2014 Yes    Hypertension associated with diabetes [E11.59,  I15.2] 03/27/2014 Yes    Hypothyroidism [E03.9] 03/27/2014 Yes      Problems Resolved During this Admission:      Discharged Condition: stable    Disposition:  Home, Home Health    Follow Up:   Follow-up Information       Rajesh Dubois MD Follow up on 10/18/2022.    Specialty: Family Medicine  Why: with RADHA Albert 8:20am  Contact information:  [address]  [place] [address]  [phone]               Cardiology Follow up.    Why: They will be contacting you to schedule. If you have not heard from anyone by the time you follow up with your PCP, please let them know so they can expedite.             Rio Pitts MD Follow up on 10/13/2022.    Specialties: Sports Medicine, Orthopedic Surgery  Why: 8:45am  Contact information:  [address] [place] [address] [address]  [place] [address]  [phone]                           Patient Instructions:      Ambulatory referral/consult to Cardiology   Standing Status: Future   Referral Priority: Urgent Referral Type: Consultation   Referral Reason: Specialty Services Required   Requested Specialty: Cardiology   Number of Visits Requested: 1     Ambulatory referral/consult to Home Health   Standing Status: Future   Referral Priority: Routine Referral Type: Home Health   Referral Reason: Specialty Services Required   Requested Specialty: Home Health Services   Number of Visits Requested: 1     Diet Cardiac     Notify your health care provider if you experience any of the following:  temperature >100.4     Notify your health care provider if you experience any of the following:  persistent nausea and vomiting or diarrhea     Notify your health care provider if you experience any of the following:  severe uncontrolled pain     Notify your health care provider if you experience any of the following:  redness, tenderness, or signs of infection (pain, swelling, redness, odor or green/yellow discharge around incision site)     Notify your health care provider if you  experience any of the following:  difficulty breathing or increased cough     Notify your health care provider if you experience any of the following:  increased confusion or weakness     Notify your health care provider if you experience any of the following:  persistent dizziness, light-headedness, or visual disturbances     Activity as tolerated     Medications:  Reconciled Home Medications:      Medication List        CHANGE how you take these medications      metoprolol succinate 25 MG 24 hr tablet  Commonly known as: TOPROL-XL  Take 1 tablet (25 mg total) by mouth once daily. For blood pressure and heart  What changed:   medication strength  how much to take            CONTINUE taking these medications      albuterol 90 mcg/actuation inhaler  Commonly known as: PROAIR HFA  Inhale 2 puffs into the lungs every 6 (six) hours as needed for Wheezing. Rescue     ALPRAZolam 1 MG tablet  Commonly known as: XANAX  Take 1 tablet (1 mg total) by mouth 2 (two) times daily as needed for Anxiety (anxiety).     amLODIPine 5 MG tablet  Commonly known as: NORVASC  Take 1 tablet (5 mg total) by mouth once daily. For blood pressure     ANORO ELLIPTA 62.5-25 mcg/actuation Dsdv  Generic drug: umeclidinium-vilanteroL  Inhale 1 puff into the lungs once daily. Controller     biotin 1 mg Cap  Take by mouth.     fexofenadine 180 MG tablet  Commonly known as: ALLEGRA     hydroCHLOROthiazide 25 MG tablet  Commonly known as: HYDRODIURIL     levothyroxine 88 MCG tablet  Commonly known as: SYNTHROID  Take 1 tablet (88 mcg total) by mouth once daily.     losartan 50 MG tablet  Commonly known as: COZAAR  Take 1 tablet (50 mg total) by mouth once daily. For blood pressure     lovastatin 40 MG tablet  Commonly known as: MEVACOR  Take 1 tablet (40 mg total) by mouth nightly. at bedtime. For cholesterol     magnesium oxide 400 mg (241.3 mg magnesium) tablet  Commonly known as: MAG-OX  Take 1 tablet (400 mg total) by mouth once daily.      multivit-iron-min-folic acid 3,500-18-0.4 unit-mg-mg Chew  Take 1 tablet by mouth.     NEURIVA ORIGINAL ORAL  Take by mouth.     nystatin cream  Commonly known as: MYCOSTATIN  Apply topically 2 (two) times daily.     omeprazole 20 MG capsule  Commonly known as: PRILOSEC  Take 1 capsule (20 mg total) by mouth once daily. For heartburn     potassium chloride 10 MEQ Cpsr  Commonly known as: MICRO-K  Take 2 capsules (20 mEq total) by mouth every evening.     pramipexole 0.5 MG tablet  Commonly known as: MIRAPEX  Take 2 tablets (1 mg total) by mouth every evening. For restless legs     * pregabalin 75 MG capsule  Commonly known as: LYRICA  Take 1 capsule (75 mg total) by mouth 2 (two) times daily.     * pregabalin 150 MG capsule  Commonly known as: LYRICA  Take 1 capsule (150 mg total) by mouth 2 (two) times daily.     torsemide 10 MG Tab  Commonly known as: DEMADEX  Take 1 tablet (10 mg total) by mouth once daily.     traZODone 50 MG tablet  Commonly known as: DESYREL  Take 1 tablet (50 mg total) by mouth nightly as needed for Insomnia.     VALACYCLOVIR ORAL  Take by mouth.     venlafaxine 150 MG Cp24  Commonly known as: EFFEXOR-XR  Take 1 capsule (150 mg total) by mouth once daily. For depression           * This list has 2 medication(s) that are the same as other medications prescribed for you. Read the directions carefully, and ask your doctor or other care provider to review them with you.                Time spent on the discharge of patient: 30 minutes         Jana Moon  [facility]

## 2022-10-06 NOTE — PT/OT/SLP EVAL
Physical Therapy Evaluation    Patient Name:  Genoveva Gilbert   MRN:  [id number]    Recommendations:     Discharge Recommendations:  home health PT   Discharge Equipment Recommendations: none   Barriers to discharge: Decreased caregiver support    Assessment:     Genoveva Gilbert is a 72 y.o. female admitted with a medical diagnosis of Near syncope.  She presents with the following impairments/functional limitations:  weakness, impaired endurance, impaired functional mobility, gait instability, pain, impaired cardiopulmonary response to activity .  Pt seen supine in bed, alert and agreeable to PT. Pt stated of being sore all over from falling yesterday. Pt requiring close supervision for mobility as pt requesting to let her move on own. Pt ambulated with RW 250ft with slow but steady gokul. OOB to chair with tray table in front.  Pt to benefit from HHPT    Rehab Prognosis: Fair; patient would benefit from acute skilled PT services to address these deficits and reach maximum level of function.    Recent Surgery: * No surgery found *      Plan:     During this hospitalization, patient to be seen 6 x/week to address the identified rehab impairments via gait training, therapeutic activities, therapeutic exercises and progress toward the following goals:    Plan of Care Expires:  10/30/22    Subjective   Pt stated is worried about spouse as he is not doing well and had to use home O2  Stated that she is the caregiver  Pt also reported that there are 24 steps to enter home but she has an elevator chair    Chief Complaint: sore in R shoulder and L arm  Patient/Family Comments/goals: get stronger  Pain/Comfort:  Pain Rating 1:  (not rated)  Location - Side 1: Right  Location 1: shoulder  Pain Addressed 1: Reposition, Distraction    Patients cultural, spiritual, Episcopal conflicts given the current situation:      Living Environment:  Home with spouse  Prior to admission, patients level of function was ambulatory and  caregiver for spouse.  Equipment used at home: walker, rolling, rollator (chair elevator).  DME owned (not currently used): none.  Upon discharge, patient will have assistance from family.    Objective:     Communicated with nurse [place] prior to session.  Patient found HOB elevated with telemetry, bed alarm  upon PT entry to room.    General Precautions: Standard, fall   Orthopedic Precautions:N/A   Braces: N/A  Respiratory Status: Room air    Exams:  Postural Exam:  Patient presented with the following abnormalities:    -       Rounded shoulders  -       Forward head  -       BMI 41  RLE ROM: WFL  RLE Strength: WFL  LLE ROM: WFL  LLE Strength: WFL    Functional Mobility:  Bed Mobility:     Scooting: supervision  Supine to Sit: supervision  Transfers:     Sit to Stand:  supervision with no AD  Bed to Chair: contact guard assistance with  rolling walker  using  Stand Pivot  Gait: 250ft with RW CGA- slow but steady gokul    Therapeutic Activities and Exercises:   Patient was educated on the importance of OOB activity and functional mobility to negate negative effects of prolonged bed rest during hospitalization, safe transfers and ambulation, and D/C planning   Bathroom use to void post PT  OOB chair with tray table in front    AM-PAC 6 CLICK MOBILITY  Total Score:19     Patient left up in chair with all lines intact, call button in reach, chair alarm on, and case management Zakia present.    GOALS:   Multidisciplinary Problems       Physical Therapy Goals          Problem: Physical Therapy    Goal Priority Disciplines Outcome Goal Variances Interventions   Physical Therapy Goal     PT, PT/OT Ongoing, Progressing     Description: Goals to be met by: 10-     Patient will increase functional independence with mobility by performin. Supine to sit with Modified [place]  2. Sit to stand transfer with Supervision  3. Bed to chair transfer with Supervision using No Assistive Device  4. Gait  x 250 feet  with Contact Guard Assistance using Rolling Walker.   5. Lower extremity exercise program x20 reps                         History:     Past Medical History:   Diagnosis Date    Allergy     Anxiety     Arthritis, rheumatoid     Back pain     Chronic bronchiolitis     Depression     Fibromyalgia     GERD (gastroesophageal reflux disease)     High cholesterol     Hilar mass 3/27/2014    Kiana (hard of hearing)     aid right ear    [place] (hard of hearing)     Hypertension     Incontinence of urine     Lymphedema     MS (multiple sclerosis)     benign; another MD stated she has no MS    Neuropathy     Restless leg syndrome     Rotator cuff tear 4/16/2015    Sciatica of left side 1/5/2018    Seasonal allergies     Sinus congestion     Sleep apnea     DOES NOT USE MACHINE    Spondylolysis     Thyroid disease     Type 2 diabetes mellitus with polyneuropathy     no med. was borderline    Wheezing        Past Surgical History:   Procedure Laterality Date    APPENDECTOMY      ARTHROSCOPIC DEBRIDEMENT OF SHOULDER Right 2/18/2022    Procedure: EXTENSIVE DEBRIDEMENT, SHOULDER, ARTHROSCOPIC;  Surgeon: Rio Pitts MD;  Location: [facility] OR;  Service: Orthopedics;  Laterality: Right;    BREAST SURGERY      reduction    CLOSED REDUCTION OF INJURY OF SHOULDER Right 9/19/2021    Procedure: CLOSED REDUCTION, SHOULDER;  Surgeon: Antonio Irwin MD;  Location: [facility] OR;  Service: Orthopedics;  Laterality: Right;    EPIDURAL STEROID INJECTION INTO LUMBAR SPINE N/A 6/12/2019    Procedure: Injection-steroid-epidural-lumbar;  Surgeon: Thad Manning MD;  Location: [facility] OR;  Service: Pain Management;  Laterality: N/A;  L5-S1    EPIDURAL STEROID INJECTION INTO LUMBAR SPINE N/A 9/16/2019    Procedure: Injection-steroid-epidural-lumbar;  Surgeon: Thad Manning MD;  Location: [facility] OR;  Service: Pain Management;  Laterality: N/A;  L5-S1    EYE SURGERY Bilateral     HYSTERECTOMY      partial - fibroids    INJECTION OF ANESTHETIC AGENT AROUND MEDIAL  BRANCH NERVES INNERVATING LUMBAR FACET JOINT Bilateral 2/28/2019    Procedure: Block-nerve-medial branch-lumbar;  Surgeon: Thad Manning MD;  Location: [facility] OR;  Service: Pain Management;  Laterality: Bilateral;  L3, 4,5     INJECTION OF ANESTHETIC AGENT AROUND MEDIAL BRANCH NERVES INNERVATING LUMBAR FACET JOINT Bilateral 3/21/2019    Procedure: Block-nerve-medial branch-lumbar L3,4,5;  Surgeon: Thad Manning MD;  Location: [facility] OR;  Service: Pain Management;  Laterality: Bilateral;    KNEE ARTHROPLASTY Left 3/16/2021    Procedure: ARTHROPLASTY, KNEE;  Surgeon: Rio Pitts MD;  Location: [facility] OR;  Service: Orthopedics;  Laterality: Left;  GENERAL AND BLOCK    LIPOSUCTION  1985    RADIOFREQUENCY ABLATION Left 11/4/2020    Procedure: Radiofrequency Ablation left knee;  Surgeon: Andrew Escudero MD;  Location: [facility] OR;  Service: Pain Management;  Laterality: Left;    RADIOFREQUENCY ABLATION OF LUMBAR MEDIAL BRANCH NERVE AT SINGLE LEVEL Bilateral 4/12/2019    Procedure: Radiofrequency Ablation, Nerve, Spinal, Lumbar, Medial Branch, 1 Level;  Surgeon: Thad Manning MD;  Location: [facility] OR;  Service: Pain Management;  Laterality: Bilateral;  L3, 4, 5  lumbar  [employer] pain management generator  SN [id number]  80 degrees for 75 seconds x2    RADIOFREQUENCY ABLATION OF LUMBAR MEDIAL BRANCH NERVE AT SINGLE LEVEL Bilateral 10/22/2019    Procedure: Radiofrequency Ablation, Nerve, Spinal, Lumbar, Medial Branch, L3,4,5;  Surgeon: Thad Manning MD;  Location: [facility] OR;  Service: Pain Management;  Laterality: Bilateral;  burned at 80 degrees C X 60 seconds X 2 each site    RADIOFREQUENCY ABLATION OF LUMBAR MEDIAL BRANCH NERVE AT SINGLE LEVEL Bilateral 5/27/2020    Procedure: Radiofrequency Ablation, Nerve, Spinal, Lumbar, Medial Branch, 1 Level;  Surgeon: Thad Manning MD;  Location: [facility] OR;  Service: Pain Management;  Laterality: Bilateral;  L3,4,5    REVERSE TOTAL SHOULDER ARTHROPLASTY Right 4/12/2022    Procedure:  ARTHROPLASTY, SHOULDER, TOTAL, REVERSE;  Surgeon: Rio Pitts MD;  Location: [facility] OR;  Service: Orthopedics;  Laterality: Right;    SHOULDER ARTHROSCOPY Right 9/19/2021    Procedure: ARTHROSCOPY, SHOULDER;  Surgeon: Antonio Irwin MD;  Location: [facility] OR;  Service: Orthopedics;  Laterality: Right;  LIMITED DEBRIDMENT    TONSILLECTOMY      TOTAL REDUCTION MAMMOPLASTY         Time Tracking:     PT Received On: 10/06/22  PT Start Time: 1125     PT Stop Time: 1139  PT Total Time (min): 14 min     Billable Minutes: Evaluation 14      10/06/2022

## 2022-10-06 NOTE — PLAN OF CARE
Problem: Adult Inpatient Plan of Care  Goal: Plan of Care Review  Outcome: Ongoing, Progressing     Problem: Adult Inpatient Plan of Care  Goal: Optimal Comfort and Wellbeing  Outcome: Ongoing, Progressing     Problem: Diabetes Comorbidity  Goal: Blood Glucose Level Within Targeted Range  Outcome: Ongoing, Progressing

## 2022-10-06 NOTE — PLAN OF CARE
10/06/22 1144   GRIDER Message   Medicare Outpatient and Observation Notification regarding financial responsibility Given to patient/caregiver;Explained to patient/caregiver;Signed/date by patient/caregiver   Date GRIDER was signed 10/06/22   Time GRIDER was signed 1141

## 2022-10-06 NOTE — PLAN OF CARE
[facility]  Initial Discharge Assessment       Primary Care Provider: Rajesh Dubois MD    Admission Diagnosis: Hypokalemia [E87.6]  Fall [W19.XXXA]  Elevated troponin [R77.8]  Near syncope [R55]  Contusion of right shoulder, initial encounter [S40.011A]  Contusion of right knee, initial encounter [S80.01XA]    Admission Date: 10/5/2022  Expected Discharge Date: 10/6/2022    Pt confirmed home address and lives with spouse Adrian Adams [phone]. Denied HH and has a home rollator, walker and inhaler. Pt uses [facility] on [address] and PCP is Dr. Dubois. Pt has [employer] insurance and stated that her grandchild Yesenia will provide transport home.     Discharge Barriers Identified: None    Payor: [employer] MEDICARE / Plan: Section 101 HEALTH / Product Type: Medicare Advantage /     Extended Emergency Contact Information  Primary Emergency Contact: Adrian Adams   [facility]  Home Phone: [phone]  Mobile Phone: [phone]  Relation: Spouse  Secondary Emergency Contact: yesenia logan  Mobile Phone: [phone]  Relation: Grandchild  Preferred language: English   needed? No    Discharge Plan A: Home with family  Discharge Plan B: Home      [id number] - [place] - [address] AT [facility] [address]  [address] [address]  Phone: [phone] Fax: [phone]      Initial Assessment (most recent)       Adult Discharge Assessment - 10/06/22 1144          Discharge Assessment    Assessment Type Discharge Planning Assessment     Confirmed/corrected address, phone number and insurance Yes     Confirmed Demographics Correct on Facesheet     Source of Information patient     Does patient/caregiver understand observation status Yes     Communicated MATILDA with patient/caregiver Yes     Reason For Admission hypokalemia     Lives With spouse     Facility Arrived From: home     Do you  expect to return to your current living situation? Yes     Do you have help at home or someone to help you manage your care at home? Yes     Who are your caregiver(s) and their phone number(s)? spouse Adrian Adams [phone]     Prior to hospitilization cognitive status: Alert/Oriented     Current cognitive status: Alert/Oriented     Walking or Climbing Stairs Difficulty none     Dressing/Bathing Difficulty none     Home Layout Able to live on 1st floor     Equipment Currently Used at Home walker, rolling;rollator;other (see comments)     Readmission within 30 days? No     Patient currently being followed by outpatient case management? No     Do you currently have service(s) that help you manage your care at home? No     Do you take prescription medications? Yes     Do you have prescription coverage? Yes     Do you have any problems affording any of your prescribed medications? No     Is the patient taking medications as prescribed? yes     Who is going to help you get home at discharge? spouse Adrian Adams [phone]     How do you get to doctors appointments? car, drives self     Are you on dialysis? No     Do you take coumadin? No     Discharge Plan A Home with family     Discharge Plan B Home     DME Needed Upon Discharge  none     Discharge Plan discussed with: Patient     Discharge Barriers Identified None        Physical Activity    On average, how many days per week do you engage in moderate to strenuous exercise (like a brisk walk)? Patient refused     On average, how many minutes do you engage in exercise at this level? Patient refused        Financial Resource Strain    How hard is it for you to pay for the very basics like food, housing, medical care, and heating? Patient refused        Housing Stability    In the last 12 months, was there a time when you were not able to pay the mortgage or rent on time? No     In the last 12 months, was there a time when you did not have a steady place to sleep or  slept in a shelter (including now)? No        Transportation Needs    In the past 12 months, has lack of transportation kept you from medical appointments or from getting medications? No     In the past 12 months, has lack of transportation kept you from meetings, work, or from getting things needed for daily living? No        Food Insecurity    Within the past 12 months, you worried that your food would run out before you got the money to buy more. Never true     Within the past 12 months, the food you bought just didn't last and you didn't have money to get more. Never true        Stress    Do you feel stress - tense, restless, nervous, or anxious, or unable to sleep at night because your mind is troubled all the time - these days? Patient refused        Social Connections    In a typical week, how many times do you talk on the phone with family, friends, or neighbors? Twice a week     How often do you get together with friends or relatives? Twice a week     How often do you attend Rastafari or Temple services? Patient refused     Do you belong to any clubs or organizations such as Rastafari groups, unions, fraternal or athletic groups, or school groups? Patient refused     How often do you attend meetings of the clubs or organizations you belong to? Patient refused     Are you , , , , never , or living with a partner?         Alcohol Use    Q1: How often do you have a drink containing alcohol? Patient refused     Q2: How many drinks containing alcohol do you have on a typical day when you are drinking? Patient refused     Q3: How often do you have six or more drinks on one occasion? Patient refused        Relationship/Environment    Name(s) of Who Lives With Patient spouse Adrian Adams [phone]

## 2022-10-06 NOTE — PLAN OF CARE
The pt is cleared for discharge home from case management and has follow up appts added to her avs.    10/06/22 1148   Final Note   Assessment Type Final Discharge Note   Anticipated Discharge Disposition Home   Hospital Resources/Appts/Education Provided Appointments scheduled and added to AVS

## 2022-10-06 NOTE — PLAN OF CARE
Goals to be met by: 11/3/22     Patient will increase functional independence with ADLs by performing:    UE Dressing with Modified [place].  LE Dressing with Modified [place].  Grooming while standing at sink with Modified [place].  Toileting from toilet with Modified [place] for hygiene and clothing management.   Bathing from  shower chair/bench with Modified [place].  Toilet transfer to toilet with Modified [place].  Increased functional strength to WFL for ADL's/IADL's

## 2022-10-06 NOTE — NURSING
IV and tele removed. Orders reviewed. Prescription sent to pharmacy. Pt ride here. Pt escorted to car via w/c. Pt d/c home

## 2022-10-06 NOTE — PLAN OF CARE
Accepted for  services with [facility]. Discharge plan closed and AVS updated. Discharge plan closed in [facility],    10/06/22 1327   Post-Acute Status   Post-Acute Authorization Home Health   Home Health Status Set-up Complete/Auth obtained

## 2022-10-06 NOTE — HOSPITAL COURSE
Pt was monitored closely during her stay.  She remained free of any chest pain or further near syncopal episodes.  Her troponin was trended with a flat trend noted.  Her case was discussed with Cardiology who recommended trending troponins and not pursuing any provocative ischemic workup due to lack of anginal symptoms.  She underwent CTA chest after elevated D-dimer which was negative for any acute pulmonary embolism.  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 She underwent carotid ultrasound which was negative for any pulmonary embolism.  She did have small nodule or abnormality near the aortic arch for which a follow-up noncontrast CT of the lung is recommended in the future to ensure resolution of that as well as a mild mosaic hypoattenuation in the lung fields. An echo was performed which showed no wall motion abnormality or significant valvular abnormalities with very mild regurgitation in multiple valves. She had no arrhythmias on telemetry.  She did have some bradycardia and her dose of metoprolol was decreased.  Pt was feeling well.  She worked with PT/OT who recommended home health.  She was D/C to follow-up closely with her primary care provider.  An ambulatory referral was placed to establish care with Cardiology.  Return precautions were discussed with Pt and her granddaughter.  They verbalized understanding and agreement with discharge plan.  She was instructed to maintain adequate fluid intake while taking oral diuretic therapy as an outpatient.    Pt was seen on day of discharge and deemed appropriate for discharge.

## 2022-10-06 NOTE — PLAN OF CARE
CM sent HH packet and orders to [facility] to review for acceptance.    10/06/22 1303   Post-Acute Status   Post-Acute Authorization Home Health   Home Health Status Referrals Sent

## 2022-10-06 NOTE — CARE UPDATE
10/05/22 1930   Patient Assessment/Suction   Level of Consciousness (AVPU) alert   Respiratory Effort Normal;Unlabored   Expansion/Accessory Muscles/Retractions expansion symmetric;no retractions;no use of accessory muscles   PRE-TX-O2   O2 Device (Oxygen Therapy) room air   SpO2 97 %   Pulse Oximetry Type Intermittent   $ Pulse Oximetry - Single Charge Pulse Oximetry - Single   Aerosol Therapy   $ Aerosol Therapy Charges PRN treatment not required   Respiratory Treatment Status (SVN) PRN treatment not required

## 2022-10-07 ENCOUNTER — TELEPHONE (OUTPATIENT)
Dept: MEDSURG UNIT | Facility: HOSPITAL | Age: 72
End: 2022-10-07
Payer: MEDICARE

## 2022-10-07 DIAGNOSIS — M25.569 KNEE PAIN, UNSPECIFIED CHRONICITY, UNSPECIFIED LATERALITY: Primary | ICD-10-CM

## 2022-10-07 PROCEDURE — G0180 PR HOME HEALTH MD CERTIFICATION: ICD-10-PCS | Mod: ,,, | Performed by: STUDENT IN AN ORGANIZED HEALTH CARE EDUCATION/TRAINING PROGRAM

## 2022-10-07 PROCEDURE — G0180 MD CERTIFICATION HHA PATIENT: HCPCS | Mod: ,,, | Performed by: STUDENT IN AN ORGANIZED HEALTH CARE EDUCATION/TRAINING PROGRAM

## 2022-10-10 ENCOUNTER — TELEPHONE (OUTPATIENT)
Dept: FAMILY MEDICINE | Facility: CLINIC | Age: 72
End: 2022-10-10
Payer: MEDICARE

## 2022-10-10 NOTE — TELEPHONE ENCOUNTER
Spoke to pt karly ROSAS call med into pharmacy for urinary frequency. Advised pt she will need to be seen prior to med being sent in. Pt states she was just tested for everything in the hospital. Asked pt reason for going to the hospital and pt advised for a fall. Informed pt she will need to be seen and evaluated regarding the urinary frequency prior to meds being sent. Pt requesting to be seen today no appts available. Scheduled next day appt and pt accepted

## 2022-10-10 NOTE — TELEPHONE ENCOUNTER
----- Message from Zakia Christianson sent at 10/10/2022 10:01 AM CDT -----  Contact: Patient  Type:  Needs Medical Advice    Who Called: Patient     Would the patient rather a call back or a response via [facility]? Call     Best Call Back Number: [phone]     Additional Information:  Patient would like to speak with the nurse about getting a medicine prescribed for urinating frequently.     Please call to advise

## 2022-10-11 ENCOUNTER — OFFICE VISIT (OUTPATIENT)
Dept: FAMILY MEDICINE | Facility: CLINIC | Age: 72
End: 2022-10-11
Payer: MEDICARE

## 2022-10-11 VITALS
DIASTOLIC BLOOD PRESSURE: 62 MMHG | SYSTOLIC BLOOD PRESSURE: 110 MMHG | HEIGHT: 60 IN | WEIGHT: 214.5 LBS | TEMPERATURE: 99 F | RESPIRATION RATE: 15 BRPM | OXYGEN SATURATION: 96 % | HEART RATE: 67 BPM | BODY MASS INDEX: 42.11 KG/M2

## 2022-10-11 DIAGNOSIS — E03.9 ACQUIRED HYPOTHYROIDISM: ICD-10-CM

## 2022-10-11 DIAGNOSIS — N32.81 OVERACTIVE BLADDER: Primary | ICD-10-CM

## 2022-10-11 DIAGNOSIS — I15.2 HYPERTENSION ASSOCIATED WITH DIABETES: ICD-10-CM

## 2022-10-11 DIAGNOSIS — Z12.11 COLON CANCER SCREENING: ICD-10-CM

## 2022-10-11 DIAGNOSIS — E11.65 TYPE 2 DIABETES MELLITUS WITH HYPERGLYCEMIA, WITHOUT LONG-TERM CURRENT USE OF INSULIN: ICD-10-CM

## 2022-10-11 DIAGNOSIS — E11.69 HYPERLIPIDEMIA ASSOCIATED WITH TYPE 2 DIABETES MELLITUS: ICD-10-CM

## 2022-10-11 DIAGNOSIS — E11.59 HYPERTENSION ASSOCIATED WITH DIABETES: ICD-10-CM

## 2022-10-11 DIAGNOSIS — E61.1 IRON DEFICIENCY: ICD-10-CM

## 2022-10-11 DIAGNOSIS — E78.5 HYPERLIPIDEMIA ASSOCIATED WITH TYPE 2 DIABETES MELLITUS: ICD-10-CM

## 2022-10-11 LAB
ALBUMIN/CREAT UR: NORMAL UG/MG (ref 0–30)
CREAT UR-MCNC: 41 MG/DL (ref 15–325)
MICROALBUMIN UR DL<=1MG/L-MCNC: <5 UG/ML

## 2022-10-11 PROCEDURE — 3074F SYST BP LT 130 MM HG: CPT | Mod: CPTII,S$GLB,, | Performed by: STUDENT IN AN ORGANIZED HEALTH CARE EDUCATION/TRAINING PROGRAM

## 2022-10-11 PROCEDURE — 99214 OFFICE O/P EST MOD 30 MIN: CPT | Mod: S$GLB,,, | Performed by: STUDENT IN AN ORGANIZED HEALTH CARE EDUCATION/TRAINING PROGRAM

## 2022-10-11 PROCEDURE — 1159F PR MEDICATION LIST DOCUMENTED IN MEDICAL RECORD: ICD-10-PCS | Mod: CPTII,S$GLB,, | Performed by: STUDENT IN AN ORGANIZED HEALTH CARE EDUCATION/TRAINING PROGRAM

## 2022-10-11 PROCEDURE — 1100F PTFALLS ASSESS-DOCD GE2>/YR: CPT | Mod: CPTII,S$GLB,, | Performed by: STUDENT IN AN ORGANIZED HEALTH CARE EDUCATION/TRAINING PROGRAM

## 2022-10-11 PROCEDURE — 99999 PR PBB SHADOW E&M-EST. PATIENT-LVL V: ICD-10-PCS | Mod: PBBFAC,,, | Performed by: STUDENT IN AN ORGANIZED HEALTH CARE EDUCATION/TRAINING PROGRAM

## 2022-10-11 PROCEDURE — 4010F PR ACE/ARB THEARPY RXD/TAKEN: ICD-10-PCS | Mod: CPTII,S$GLB,, | Performed by: STUDENT IN AN ORGANIZED HEALTH CARE EDUCATION/TRAINING PROGRAM

## 2022-10-11 PROCEDURE — 3008F PR BODY MASS INDEX (BMI) DOCUMENTED: ICD-10-PCS | Mod: CPTII,S$GLB,, | Performed by: STUDENT IN AN ORGANIZED HEALTH CARE EDUCATION/TRAINING PROGRAM

## 2022-10-11 PROCEDURE — 99214 PR OFFICE/OUTPT VISIT, EST, LEVL IV, 30-39 MIN: ICD-10-PCS | Mod: S$GLB,,, | Performed by: STUDENT IN AN ORGANIZED HEALTH CARE EDUCATION/TRAINING PROGRAM

## 2022-10-11 PROCEDURE — 3074F PR MOST RECENT SYSTOLIC BLOOD PRESSURE < 130 MM HG: ICD-10-PCS | Mod: CPTII,S$GLB,, | Performed by: STUDENT IN AN ORGANIZED HEALTH CARE EDUCATION/TRAINING PROGRAM

## 2022-10-11 PROCEDURE — 3078F DIAST BP <80 MM HG: CPT | Mod: CPTII,S$GLB,, | Performed by: STUDENT IN AN ORGANIZED HEALTH CARE EDUCATION/TRAINING PROGRAM

## 2022-10-11 PROCEDURE — 1100F PR PT FALLS ASSESS DOC 2+ FALLS/FALL W/INJURY/YR: ICD-10-PCS | Mod: CPTII,S$GLB,, | Performed by: STUDENT IN AN ORGANIZED HEALTH CARE EDUCATION/TRAINING PROGRAM

## 2022-10-11 PROCEDURE — 81003 URINALYSIS AUTO W/O SCOPE: CPT | Performed by: STUDENT IN AN ORGANIZED HEALTH CARE EDUCATION/TRAINING PROGRAM

## 2022-10-11 PROCEDURE — 99999 PR PBB SHADOW E&M-EST. PATIENT-LVL V: CPT | Mod: PBBFAC,,, | Performed by: STUDENT IN AN ORGANIZED HEALTH CARE EDUCATION/TRAINING PROGRAM

## 2022-10-11 PROCEDURE — 1126F PR PAIN SEVERITY QUANTIFIED, NO PAIN PRESENT: ICD-10-PCS | Mod: CPTII,S$GLB,, | Performed by: STUDENT IN AN ORGANIZED HEALTH CARE EDUCATION/TRAINING PROGRAM

## 2022-10-11 PROCEDURE — 3288F PR FALLS RISK ASSESSMENT DOCUMENTED: ICD-10-PCS | Mod: CPTII,S$GLB,, | Performed by: STUDENT IN AN ORGANIZED HEALTH CARE EDUCATION/TRAINING PROGRAM

## 2022-10-11 PROCEDURE — 3008F BODY MASS INDEX DOCD: CPT | Mod: CPTII,S$GLB,, | Performed by: STUDENT IN AN ORGANIZED HEALTH CARE EDUCATION/TRAINING PROGRAM

## 2022-10-11 PROCEDURE — 82043 UR ALBUMIN QUANTITATIVE: CPT | Performed by: STUDENT IN AN ORGANIZED HEALTH CARE EDUCATION/TRAINING PROGRAM

## 2022-10-11 PROCEDURE — 4010F ACE/ARB THERAPY RXD/TAKEN: CPT | Mod: CPTII,S$GLB,, | Performed by: STUDENT IN AN ORGANIZED HEALTH CARE EDUCATION/TRAINING PROGRAM

## 2022-10-11 PROCEDURE — 3288F FALL RISK ASSESSMENT DOCD: CPT | Mod: CPTII,S$GLB,, | Performed by: STUDENT IN AN ORGANIZED HEALTH CARE EDUCATION/TRAINING PROGRAM

## 2022-10-11 PROCEDURE — 1126F AMNT PAIN NOTED NONE PRSNT: CPT | Mod: CPTII,S$GLB,, | Performed by: STUDENT IN AN ORGANIZED HEALTH CARE EDUCATION/TRAINING PROGRAM

## 2022-10-11 PROCEDURE — 1159F MED LIST DOCD IN RCRD: CPT | Mod: CPTII,S$GLB,, | Performed by: STUDENT IN AN ORGANIZED HEALTH CARE EDUCATION/TRAINING PROGRAM

## 2022-10-11 PROCEDURE — 82570 ASSAY OF URINE CREATININE: CPT | Performed by: STUDENT IN AN ORGANIZED HEALTH CARE EDUCATION/TRAINING PROGRAM

## 2022-10-11 PROCEDURE — 3078F PR MOST RECENT DIASTOLIC BLOOD PRESSURE < 80 MM HG: ICD-10-PCS | Mod: CPTII,S$GLB,, | Performed by: STUDENT IN AN ORGANIZED HEALTH CARE EDUCATION/TRAINING PROGRAM

## 2022-10-11 PROCEDURE — 3044F PR MOST RECENT HEMOGLOBIN A1C LEVEL <7.0%: ICD-10-PCS | Mod: CPTII,S$GLB,, | Performed by: STUDENT IN AN ORGANIZED HEALTH CARE EDUCATION/TRAINING PROGRAM

## 2022-10-11 PROCEDURE — 3044F HG A1C LEVEL LT 7.0%: CPT | Mod: CPTII,S$GLB,, | Performed by: STUDENT IN AN ORGANIZED HEALTH CARE EDUCATION/TRAINING PROGRAM

## 2022-10-11 RX ORDER — OXYBUTYNIN CHLORIDE 5 MG/1
5 TABLET ORAL DAILY
Qty: 30 TABLET | Refills: 2 | Status: SHIPPED | OUTPATIENT
Start: 2022-10-11 | End: 2023-03-08

## 2022-10-11 NOTE — PROGRESS NOTES
[facility] Note    Subjective:    The HPI and pertinent ROS is included in the Diagnostic Impression Remarks section at the end of the note. Please see below for further details. Chief complaint is at end of note.     Estela is a pleasant intelligent patient who is here for evaluation.     Modified Medications    No medications on file       Data reviewed 274}  Previous medical records reviewed and summarized in plan section at end of note.      If you are due for any health screening(s) below please notify me so we can arrange them to be ordered and scheduled to maintain your health. Most healthy patients at your age complete them, but you are free to accept or refuse.      All of your core healthy metrics are met.      The following portions of the patient's history were reviewed and updated as appropriate: allergies, current medications, past family history, past medical history, past social history, past surgical history and problem list.    She  has a past medical history of Allergy, Anxiety, Arthritis, rheumatoid, Back pain, Chronic bronchiolitis, Depression, Fibromyalgia, GERD (gastroesophageal reflux disease), High cholesterol, Hilar mass (3/27/2014), [place] of hearing[place]hard of hearing), Hypertension, Incontinence of urine, Lymphedema, MS (multiple sclerosis), Neuropathy, Restless leg syndrome, Rotator cuff tear (4/16/2015), Sciatica of left side (1/5/2018), Seasonal allergies, Sinus congestion, Sleep apnea, Spondylolysis, Thyroid disease, Type 2 diabetes mellitus with polyneuropathy, and Wheezing.  She  has a past surgical history that includes Hysterectomy; Breast surgery; Tonsillectomy; Appendectomy; Injection of anesthetic agent around medial branch nerves innervating lumbar facet joint (Bilateral, 2/28/2019); Injection of anesthetic agent around medial branch nerves innervating lumbar facet joint (Bilateral, 3/21/2019); Radiofrequency ablation of lumbar medial branch nerve at  single level (Bilateral, 4/12/2019); Epidural steroid injection into lumbar spine (N/A, 6/12/2019); Epidural steroid injection into lumbar spine (N/A, 9/16/2019); Radiofrequency ablation of lumbar medial branch nerve at single level (Bilateral, 10/22/2019); Radiofrequency ablation of lumbar medial branch nerve at single level (Bilateral, 5/27/2020); Radiofrequency ablation (Left, 11/4/2020); Liposuction (1985); Eye surgery (Bilateral); Total Reduction Mammoplasty; Knee Arthroplasty (Left, 3/16/2021); Closed reduction of injury of shoulder (Right, 9/19/2021); Shoulder arthroscopy (Right, 9/19/2021); Arthroscopic debridement of shoulder (Right, 2/18/2022); and Reverse total shoulder arthroplasty (Right, 4/12/2022).    She  reports that she quit smoking about 26 years ago. Her smoking use included cigarettes. She has been exposed to tobacco smoke. She has never used smokeless tobacco. She reports current alcohol use. She reports that she does not use drugs.  She family history includes Heart disease in her mother.    Review of patient's allergies indicates:   Allergen Reactions    Keflex [cephalexin]      Throat swelling    Pcn [penicillins]      Throat swelling    Latex, natural rubber Other (See Comments)     Redness with bandaids     Adhesive Other (See Comments)     bandainds redness at skin    Trelegy ellipta [fluticasone-umeclidin-vilanter]      Vision disturbance       Tobacco Use: Medium Risk    Smoking Tobacco Use: Former    Smokeless Tobacco Use: Never    Passive Exposure: Past     Physical Examination  General appearance: alert, cooperative, no distress  Neck: no thyromegaly, no neck stiffness  Lungs: clear to auscultation, no wheezes, rales or rhonchi, symmetric air entry  Heart: normal rate, regular rhythm, normal S1, S2, no murmurs, rubs, clicks or gallops  Abdomen: soft, nontender, nondistended, no rigidity, rebound, or guarding.   Back: no point tenderness over spine  Extremities: peripheral pulses  normal, no unilateral leg swelling or calf tenderness   Neurological:alert, oriented, normal speech, no new focal findings or movement disorder noted from baseline    BP Readings from Last 3 Encounters:   10/11/22 110/62   10/06/22 122/64   09/22/22 (!) 120/52     Wt Readings from Last 3 Encounters:   10/11/22 97.3 kg (214 lb 8.1 oz)   10/06/22 96.2 kg (212 lb)   09/22/22 97.3 kg (214 lb 8.1 oz)     /62 (BP Location: Right arm, Patient Position: Sitting, BP Method: Large (Manual))   Pulse 67   Temp 99.4 °F (37.4 °C) (Oral)   Resp 15   Ht 5' (1.524 m)   Wt 97.3 kg (214 lb 8.1 oz)   SpO2 96%   BMI 41.89 kg/m²    274}  Laboratory: I have reviewed old labs below:    274}    Lab Results   Component Value Date    WBC 4.93 10/06/2022    HGB 11.4 (L) 10/06/2022    HCT 36.0 (L) 10/06/2022    MCV 88 10/06/2022     10/06/2022     10/06/2022    K 4.1 10/06/2022     10/06/2022    CALCIUM 8.8 10/06/2022    PHOS 3.8 09/19/2021    CO2 32 (H) 10/06/2022     (H) 10/06/2022    BUN 17 10/06/2022    CREATININE 0.9 10/06/2022    ANIONGAP 8 10/06/2022    PROT 6.2 10/06/2022    ALBUMIN 3.3 (L) 10/06/2022    BILITOT 0.2 10/06/2022    ALKPHOS 86 10/06/2022    ALT 23 10/06/2022    AST 28 10/06/2022    INR 1.0 07/03/2017    CHOL 150 09/15/2022    TRIG 185 (H) 09/15/2022    HDL 34 (L) 09/15/2022    LDLCALC 79.0 09/15/2022    TSH 3.222 09/23/2022    HGBA1C 6.1 (H) 09/15/2022     Lab reviewed by me: Particular labs of significance that I will monitor, workup, or treat to improve are mentioned below in diagnostic impression remarks.    Imaging/EKG: I have reviewed the pertinent results and my findings are noted in remarks.  274}    CC:   Chief Complaint   Patient presents with    Urinary Frequency        274}    Assessment/Plan  Genoveva Gilbert is a 72 y.o. female who presents to clinic with:  1. Overactive bladder    2. Type 2 diabetes mellitus with hyperglycemia, without long-term current use of insulin   "  3. Hypertension associated with diabetes    4. Hyperlipidemia associated with type 2 diabetes mellitus    5. Acquired hypothyroidism    6. Colon cancer screening    7. Iron deficiency       274}  Diagnostic Impression Remarks + HPI     Documentation entered by me for this encounter may have been done in part using speech-recognition technology. Although I have made an effort to ensure accuracy, "sound like" errors may exist and should be interpreted in context.     Overactive bladder-patient reports having the urge to urinate and this has progressed.  No dysuria no fever chills.  Patient reports that she is taking torsemide and HCTZ but this has not increased her urination much.  Could consider stopping 1 of these but she wants to continue them to help prevent her swelling.  Went over that Kegel exercises can help she is not want work with physical therapy will print out some exercises will put a referral Urology.  She reports her symptoms are generally in the morning will give short-acting oxybutynin small trial went over that if her swelling becomes worse or any side effects stop it immediately.  Diabetes-stable continue current medications monitor could consider G LP 1 agonist but her likely to expensive    Hypertension well controlled continue current meds monitor    Hypothyroidism-well controlled continue Synthroid    Iron deficiency-recommend to get colon cancer screening she reports she had a colonoscopy by an outside doctor few years ago likely is due recommend to get scoped again will put in referral to rule out any malignancy    Health maintenance-patient reports she had a colonoscopy with Yaya Valentin - [facility] a few years ago will get records recommend to establish GI to get future scope as well    This is the extent of this pleasant patient's concerns at this present time. She did not feel chest pain upon exertion, dyspnea, nausea, vomiting, diaphoresis, or syncope. No " pleuritic chest pain, unilateral leg swelling, calf tenderness, or calf pain. Negative for unintentional weight loss night sweats and fevers. Genoveva will return to clinic in a few months for further workup and reassessment or sooner as needed. She was instructed to call the clinic or go to the emergency department or urgent care immediately if her symptoms do not improve, worsens, or if any new symptoms develop. As we discussed that symptoms could worsen over the next 24 hours she was advised that if any increased swelling, pain, or numbness arise to go immediately to the ED. Patient knows to call any time if an emergency arises. Shared decision making occurred and she verbalized understanding in agreement with this plan. I discussed imaging findings, diagnosis, possibilities, treatment options, medications, risks, and benefits. She had many questions regarding the options and long-term effects. All questions were answered. She expressed understanding after counseling regarding the diagnosis and recommendations. She was capable and demonstrated competence with understanding of these options. Shared decision making was performed resulting in her choosing the current treatment plan. Patient handout was given with instructions and recommendations. Advised the patient that if they become pregnant to alert us immediately to assess for medication changes. I also discussed the importance of close follow up to discuss labs, change or modify her medications if needed, monitor side effects, and further evaluation of medical problems.     Additional workup planned: see labs ordered below.    See below for labs and meds ordered with associated diagnosis      1. Overactive bladder  - Urinalysis  - Ambulatory referral/consult to Urology; Future  - oxybutynin (DITROPAN) 5 MG Tab; Take 1 tablet (5 mg total) by mouth once daily.  Dispense: 30 tablet; Refill: 2    2. Type 2 diabetes mellitus with hyperglycemia, without long-term  current use of insulin  - Microalbumin/Creatinine Ratio, Urine; Future    3. Hypertension associated with diabetes    4. Hyperlipidemia associated with type 2 diabetes mellitus    5. Acquired hypothyroidism    6. Colon cancer screening    7. Iron deficiency  - Ambulatory referral/consult to Gastroenterology; Future    Rajesh Dubois MD   274}    If you are due for any health screening(s) below please notify me so we can arrange them to be ordered and scheduled to maintain your health. Most healthy patients at your age complete them, but you are free to accept or refuse.   All of your core healthy metrics are met.

## 2022-10-12 ENCOUNTER — PATIENT OUTREACH (OUTPATIENT)
Dept: ADMINISTRATIVE | Facility: HOSPITAL | Age: 72
End: 2022-10-12
Payer: MEDICARE

## 2022-10-12 ENCOUNTER — TELEPHONE (OUTPATIENT)
Dept: FAMILY MEDICINE | Facility: CLINIC | Age: 72
End: 2022-10-12
Payer: MEDICARE

## 2022-10-12 LAB
BILIRUB UR QL STRIP: NEGATIVE
CLARITY UR REFRACT.AUTO: CLEAR
COLOR UR AUTO: YELLOW
GLUCOSE UR QL STRIP: NEGATIVE
HGB UR QL STRIP: NEGATIVE
KETONES UR QL STRIP: NEGATIVE
LEUKOCYTE ESTERASE UR QL STRIP: NEGATIVE
NITRITE UR QL STRIP: NEGATIVE
PH UR STRIP: 6 [PH] (ref 5–8)
PROT UR QL STRIP: NEGATIVE
SP GR UR STRIP: 1.01 (ref 1–1.03)
URN SPEC COLLECT METH UR: NORMAL

## 2022-10-12 NOTE — LETTER
AUTHORIZATION FOR RELEASE OF   CONFIDENTIAL INFORMATION    Dear Dr. Garcia,    We are seeing Genoveva Gilbert, date of birth 1950, in the clinic at [facility]. Rajesh Dubois MD is the patient's PCP. Genoveva Gilbert has an outstanding lab/procedure at the time we reviewed her chart. In order to help keep her health information updated, she has authorized us to request the following medical record(s):        (  )  MAMMOGRAM                                      (X) COLONOSCOPY      (  )  PAP SMEAR                                          (  )  OUTSIDE LAB RESULTS     (  )  DEXA SCAN                                          (  )  EYE EXAM            (  )  FOOT EXAM                                          (  )  ENTIRE RECORD     (  )  OUTSIDE IMMUNIZATIONS                 (  )  _______________         Please fax records to [facility], [place], MD, [id number]     If you have any questions, please contact [facility] at [phone].           Patient Name: Genoveva Gilbert  : 1950  Patient Phone #: [phone]

## 2022-10-13 ENCOUNTER — CLINICAL SUPPORT (OUTPATIENT)
Dept: REHABILITATION | Facility: HOSPITAL | Age: 72
End: 2022-10-13
Payer: MEDICARE

## 2022-10-13 ENCOUNTER — HOSPITAL ENCOUNTER (OUTPATIENT)
Dept: RADIOLOGY | Facility: HOSPITAL | Age: 72
Discharge: HOME | End: 2022-10-13
Attending: ORTHOPAEDIC SURGERY
Payer: MEDICARE

## 2022-10-13 ENCOUNTER — OFFICE VISIT (OUTPATIENT)
Dept: ORTHOPEDICS | Facility: CLINIC | Age: 72
End: 2022-10-13
Payer: MEDICARE

## 2022-10-13 DIAGNOSIS — I89.0 LYMPHEDEMA OF BOTH LOWER EXTREMITIES: Primary | ICD-10-CM

## 2022-10-13 DIAGNOSIS — M17.10 ARTHRITIS OF KNEE: ICD-10-CM

## 2022-10-13 DIAGNOSIS — M25.569 KNEE PAIN, UNSPECIFIED CHRONICITY, UNSPECIFIED LATERALITY: ICD-10-CM

## 2022-10-13 DIAGNOSIS — Z96.652 STATUS POST TOTAL KNEE REPLACEMENT NOT USING CEMENT, LEFT: ICD-10-CM

## 2022-10-13 DIAGNOSIS — Z96.611 STATUS POST REVERSE ARTHROPLASTY OF SHOULDER, RIGHT: Primary | ICD-10-CM

## 2022-10-13 PROCEDURE — 97140 MANUAL THERAPY 1/> REGIONS: CPT | Mod: PN

## 2022-10-13 PROCEDURE — 3061F PR NEG MICROALBUMINURIA RESULT DOCUMENTED/REVIEW: ICD-10-PCS | Mod: CPTII,S$GLB,, | Performed by: ORTHOPAEDIC SURGERY

## 2022-10-13 PROCEDURE — 3066F NEPHROPATHY DOC TX: CPT | Mod: CPTII,S$GLB,, | Performed by: ORTHOPAEDIC SURGERY

## 2022-10-13 PROCEDURE — 1160F PR REVIEW ALL MEDS BY PRESCRIBER/CLIN PHARMACIST DOCUMENTED: ICD-10-PCS | Mod: CPTII,S$GLB,, | Performed by: ORTHOPAEDIC SURGERY

## 2022-10-13 PROCEDURE — 99213 PR OFFICE/OUTPT VISIT, EST, LEVL III, 20-29 MIN: ICD-10-PCS | Mod: S$GLB,,, | Performed by: ORTHOPAEDIC SURGERY

## 2022-10-13 PROCEDURE — 3061F NEG MICROALBUMINURIA REV: CPT | Mod: CPTII,S$GLB,, | Performed by: ORTHOPAEDIC SURGERY

## 2022-10-13 PROCEDURE — 4010F ACE/ARB THERAPY RXD/TAKEN: CPT | Mod: CPTII,S$GLB,, | Performed by: ORTHOPAEDIC SURGERY

## 2022-10-13 PROCEDURE — 1160F RVW MEDS BY RX/DR IN RCRD: CPT | Mod: CPTII,S$GLB,, | Performed by: ORTHOPAEDIC SURGERY

## 2022-10-13 PROCEDURE — 1159F MED LIST DOCD IN RCRD: CPT | Mod: CPTII,S$GLB,, | Performed by: ORTHOPAEDIC SURGERY

## 2022-10-13 PROCEDURE — 1159F PR MEDICATION LIST DOCUMENTED IN MEDICAL RECORD: ICD-10-PCS | Mod: CPTII,S$GLB,, | Performed by: ORTHOPAEDIC SURGERY

## 2022-10-13 PROCEDURE — 99999 PR PBB SHADOW E&M-EST. PATIENT-LVL II: CPT | Mod: PBBFAC,,, | Performed by: ORTHOPAEDIC SURGERY

## 2022-10-13 PROCEDURE — 99213 OFFICE O/P EST LOW 20 MIN: CPT | Mod: S$GLB,,, | Performed by: ORTHOPAEDIC SURGERY

## 2022-10-13 PROCEDURE — 3066F PR DOCUMENTATION OF TREATMENT FOR NEPHROPATHY: ICD-10-PCS | Mod: CPTII,S$GLB,, | Performed by: ORTHOPAEDIC SURGERY

## 2022-10-13 PROCEDURE — 73564 XR KNEE ORTHO BILAT WITH FLEXION: ICD-10-PCS | Mod: 26,50,, | Performed by: RADIOLOGY

## 2022-10-13 PROCEDURE — 3044F HG A1C LEVEL LT 7.0%: CPT | Mod: CPTII,S$GLB,, | Performed by: ORTHOPAEDIC SURGERY

## 2022-10-13 PROCEDURE — 99999 PR PBB SHADOW E&M-EST. PATIENT-LVL II: ICD-10-PCS | Mod: PBBFAC,,, | Performed by: ORTHOPAEDIC SURGERY

## 2022-10-13 PROCEDURE — 73564 X-RAY EXAM KNEE 4 OR MORE: CPT | Mod: 26,50,, | Performed by: RADIOLOGY

## 2022-10-13 PROCEDURE — 73564 X-RAY EXAM KNEE 4 OR MORE: CPT | Mod: TC,50,PN

## 2022-10-13 PROCEDURE — 3044F PR MOST RECENT HEMOGLOBIN A1C LEVEL <7.0%: ICD-10-PCS | Mod: CPTII,S$GLB,, | Performed by: ORTHOPAEDIC SURGERY

## 2022-10-13 PROCEDURE — 4010F PR ACE/ARB THEARPY RXD/TAKEN: ICD-10-PCS | Mod: CPTII,S$GLB,, | Performed by: ORTHOPAEDIC SURGERY

## 2022-10-13 NOTE — PROGRESS NOTES
"Of 1 felicitas  [facility]  Occupational Therapy Treatment Note    Date: 10/13/2022  Name: Genoveva Gilbert  [facility] Number: [id number]    Therapy Diagnosis:   No diagnosis found.    Physician: Vito Young NP       Physician Orders: evaluate and treat  Medical Diagnosis: lymphedema  Surgical Procedure and Date: na,   Evaluation Date: 9/8/2022  Insurance Authorization Period Expiration: 12/31/2022  Plan of Care Certification Period: 12/8/2022  Progress Note Due: each visit   Date of Return to MD: not scheduled  Visit # / Visits authorized: 5/20  FOTO: no     Precautions:  Standard    Time In: 1000  Time Out: 1100  Total Billable Time: 60 minutes    SUBJECTIVE     Pt reports: I don't know how much more I can handle."  Response to previous treatment: Patient has not been seen since 9/22.  Functional change: no    Pain: 0  Location: na    OBJECTIVE   Girth Measurements (in centimeters)  LANDMARK LEFT leg 9/8 10/13 RIGHT leg 9/8 10/13     forefoot 22.5/21 cm 21.5/21.25 cm     ankle  26/26 cm 24.5/23.75 cm     4" 34/38 cm 31/34 cm     calf 44/44.75 cm 42/43 cm     Below knee 41.5/40 cm 40.5/40 cm     Above knee 54.5/56 cm 54 /56cm     Mid thigh 51 cm 53 cm        Estela was admitted to hospital over night for cardiac symptoms. Estela has been wearing 20-30 compression stockings most days.  Treatment     Estela received the treatments listed below:     Manual therapy techniques: Manual Lymphatic Drainage  for 60 minutes, including:  Patient presented with compression stockings on bilateral lower extremities. Stockings removed and measurements taken skin care completed.Initiated MLD with patient in seated on mat, per request.   Complete Decongestive Therapy  Protocol for lymphedema of bilateral lower quadrants was completed.  Therapist reapplied compression stockings as Estela did not want to be bandaged this date.   Patient Education and Home Exercises      Education provided:    Educated on Phase 1 and 2 of " protocol.  Reviewed treatment frequency and likely duration of weeks  Plan of care and goals.  Educated on home management protocols.     Written Home Exercises Provided:  No. 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 She does not have the time or need for additional exercises and functional outcomes will not be negatively affected by this. .    Assessment   Estela became tearful during session as her  is becoming more dependent and she is only caregiver. Expressed that her anxiety is worse than ever and she is not sleeping. Therapist messaged primary care doctor and he responded immediately. His nurse will call Estela today to set appointment.  Pt would continue to benefit from skilled OT. Yes. Lymphedema, left untreated increases risk of infection, gait deviation causing ortho problems and poor body image.  Estela is not able to tolerate the standard Complete Decongestive Therapy protocol and so modifications will be made. This is known to this writer from Estela's previous treatment last year.   Estela is motivated to participate in Complete Decongestive Therapy again, to resume.   and there are no updates to goals at this time. Pt prognosis is Good.     Pt will continue to benefit from skilled outpatient occupational therapy to address the deficits listed in the problem list on initial evaluation provide pt/family education and to maximize pt's level of independence in the home and community environment.     Pt's spiritual, cultural and educational needs considered and pt agreeable to plan of care and goals.    Anticipated barriers to occupational therapy: Estela is only caregiver for disabled spouse and has health problems herself.    Goals:   Short Term Goals for 6 weeks: modified this date in bold  Complete decongestion B LE- Resumed this date 10/13  Patient will be educated on lymphedema precautions and signs of infection. - Ongoing 9/8/2022   Patient will perform deep abdominal  breathing TID- Ongoing 9/8/2022 Resumed this date 10/13  Patient will tolerate daily activities with multilayered bandaging.- Resumed this date 10/13  Appropriate compression garments to be ordered/delivered- Ongoing 9/8/2022  Estela no longer able to tolerate sleeves and will need new garments.     Long Term Goals for 12 weeks: (Phase 2 of goals)  Patient will be independent with home management of this chronic condition.  Patient to ami/doff compression garment.   Patient will demonstrate compliance with all home management recommendations. Patient will demonstrate compliance with modified home management program.  Patient will maintain reduction at monthly follow up appointments for 3 months   PLAN     Continue plan of care 2 times a week to address modified goals listed above..  Updates/Grading for next session: continue with modified Complete Decongestive Therapy and determine if home management will be successful with calf sleeves.      SOTO Wise , ADOLFO/PHIL

## 2022-10-13 NOTE — PROGRESS NOTES
Past Medical History:   Diagnosis Date    Allergy     Anxiety     Arthritis, rheumatoid     Back pain     Chronic bronchiolitis     Depression     Fibromyalgia     GERD (gastroesophageal reflux disease)     High cholesterol     Hilar mass 3/27/2014    [place] (hard of hearing)     aid right ear    [place] (hard of hearing)     Hypertension     Incontinence of urine     Lymphedema     MS (multiple sclerosis)     benign; another MD stated she has no MS    Neuropathy     Restless leg syndrome     Rotator cuff tear 4/16/2015    Sciatica of left side 1/5/2018    Seasonal allergies     Sinus congestion     Sleep apnea     DOES NOT USE MACHINE    Spondylolysis     Thyroid disease     Type 2 diabetes mellitus with polyneuropathy     no med. was borderline    Wheezing        Past Surgical History:   Procedure Laterality Date    APPENDECTOMY      ARTHROSCOPIC DEBRIDEMENT OF SHOULDER Right 2/18/2022    Procedure: EXTENSIVE DEBRIDEMENT, SHOULDER, ARTHROSCOPIC;  Surgeon: Rio Pitts MD;  Location: [facility] OR;  Service: Orthopedics;  Laterality: Right;    BREAST SURGERY      reduction    CLOSED REDUCTION OF INJURY OF SHOULDER Right 9/19/2021    Procedure: CLOSED REDUCTION, SHOULDER;  Surgeon: Antonio Irwin MD;  Location: [facility] OR;  Service: Orthopedics;  Laterality: Right;    EPIDURAL STEROID INJECTION INTO LUMBAR SPINE N/A 6/12/2019    Procedure: Injection-steroid-epidural-lumbar;  Surgeon: Thad Manning MD;  Location: [facility] OR;  Service: Pain Management;  Laterality: N/A;  L5-S1    EPIDURAL STEROID INJECTION INTO LUMBAR SPINE N/A 9/16/2019    Procedure: Injection-steroid-epidural-lumbar;  Surgeon: Thad Manning MD;  Location: [facility] OR;  Service: Pain Management;  Laterality: N/A;  L5-S1    EYE SURGERY Bilateral     HYSTERECTOMY      partial - fibroids    INJECTION OF ANESTHETIC AGENT AROUND MEDIAL BRANCH NERVES INNERVATING LUMBAR FACET JOINT Bilateral 2/28/2019    Procedure: Block-nerve-medial branch-lumbar;  Surgeon: Thad HAQUE  MD Nigel;  Location: [facility];  Service: Pain Management;  Laterality: Bilateral;  L3, 4,5     INJECTION OF ANESTHETIC AGENT AROUND MEDIAL BRANCH NERVES INNERVATING LUMBAR FACET JOINT Bilateral 3/21/2019    Procedure: Block-nerve-medial branch-lumbar L3,4,5;  Surgeon: Thad Manning MD;  Location: [facility] OR;  Service: Pain Management;  Laterality: Bilateral;    KNEE ARTHROPLASTY Left 3/16/2021    Procedure: ARTHROPLASTY, KNEE;  Surgeon: Rio Pitts MD;  Location: [facility] OR;  Service: Orthopedics;  Laterality: Left;  GENERAL AND BLOCK    LIPOSUCTION  1985    RADIOFREQUENCY ABLATION Left 11/4/2020    Procedure: Radiofrequency Ablation left knee;  Surgeon: Andrew Escudero MD;  Location: [facility] OR;  Service: Pain Management;  Laterality: Left;    RADIOFREQUENCY ABLATION OF LUMBAR MEDIAL BRANCH NERVE AT SINGLE LEVEL Bilateral 4/12/2019    Procedure: Radiofrequency Ablation, Nerve, Spinal, Lumbar, Medial Branch, 1 Level;  Surgeon: Thad Manning MD;  Location: [facility] OR;  Service: Pain Management;  Laterality: Bilateral;  L3, 4, 5  lumbar  [employer] pain management generator  SN [id number]  80 degrees for 75 seconds x2    RADIOFREQUENCY ABLATION OF LUMBAR MEDIAL BRANCH NERVE AT SINGLE LEVEL Bilateral 10/22/2019    Procedure: Radiofrequency Ablation, Nerve, Spinal, Lumbar, Medial Branch, L3,4,5;  Surgeon: Thad Manning MD;  Location: [facility];  Service: Pain Management;  Laterality: Bilateral;  burned at 80 degrees C X 60 seconds X 2 each site    RADIOFREQUENCY ABLATION OF LUMBAR MEDIAL BRANCH NERVE AT SINGLE LEVEL Bilateral 5/27/2020    Procedure: Radiofrequency Ablation, Nerve, Spinal, Lumbar, Medial Branch, 1 Level;  Surgeon: Thad Manning MD;  Location: [facility];  Service: Pain Management;  Laterality: Bilateral;  L3,4,5    REVERSE TOTAL SHOULDER ARTHROPLASTY Right 4/12/2022    Procedure: ARTHROPLASTY, SHOULDER, TOTAL, REVERSE;  Surgeon: Rio Pitts MD;  Location: [facility] OR;  Service: Orthopedics;  Laterality: Right;     SHOULDER ARTHROSCOPY Right 9/19/2021    Procedure: ARTHROSCOPY, SHOULDER;  Surgeon: Antonio Irwin MD;  Location: [facility];  Service: Orthopedics;  Laterality: Right;  LIMITED DEBRIDMENT    TONSILLECTOMY      TOTAL REDUCTION MAMMOPLASTY         Current Outpatient Medications   Medication Sig    albuterol (PROAIR HFA) 90 mcg/actuation inhaler Inhale 2 puffs into the lungs every 6 (six) hours as needed for Wheezing. Rescue    ALPRAZolam (XANAX) 1 MG tablet Take 1 tablet (1 mg total) by mouth 2 (two) times daily as needed for Anxiety (anxiety).    amLODIPine (NORVASC) 5 MG tablet Take 1 tablet (5 mg total) by mouth once daily. For blood pressure    biotin 1 mg Cap Take by mouth.    coffee xt/phosphatidyl serine (NEURIVA ORIGINAL ORAL) Take by mouth.    fexofenadine (ALLEGRA) 180 MG tablet     hydroCHLOROthiazide (HYDRODIURIL) 25 MG tablet     levothyroxine (SYNTHROID) 88 MCG tablet Take 1 tablet (88 mcg total) by mouth once daily.    losartan (COZAAR) 50 MG tablet Take 1 tablet (50 mg total) by mouth once daily. For blood pressure    lovastatin (MEVACOR) 40 MG tablet Take 1 tablet (40 mg total) by mouth nightly. at bedtime. For cholesterol    magnesium oxide (MAG-OX) 400 mg (241.3 mg magnesium) tablet Take 1 tablet (400 mg total) by mouth once daily.    metoprolol succinate (TOPROL-XL) 25 MG 24 hr tablet Take 1 tablet (25 mg total) by mouth once daily. For blood pressure and heart    MULTIVIT-IRON-MIN-FOLIC ACID 3,500-18-0.4 UNIT-MG-MG ORAL CHEW Take 1 tablet by mouth.    nystatin (MYCOSTATIN) cream Apply topically 2 (two) times daily.    omeprazole (PRILOSEC) 20 MG capsule Take 1 capsule (20 mg total) by mouth once daily. For heartburn    oxybutynin (DITROPAN) 5 MG Tab Take 1 tablet (5 mg total) by mouth once daily.    potassium chloride (MICRO-K) 10 MEQ CpSR Take 2 capsules (20 mEq total) by mouth every evening.    pramipexole (MIRAPEX) 0.5 MG tablet Take 2 tablets (1 mg total) by mouth every evening. For restless  legs    pregabalin (LYRICA) 150 MG capsule Take 1 capsule (150 mg total) by mouth 2 (two) times daily.    pregabalin (LYRICA) 75 MG capsule Take 1 capsule (75 mg total) by mouth 2 (two) times daily.    torsemide (DEMADEX) 10 MG Tab Take 1 tablet (10 mg total) by mouth once daily.    traZODone (DESYREL) 50 MG tablet Take 1 tablet (50 mg total) by mouth nightly as needed for Insomnia.    umeclidinium-vilanteroL (ANORO ELLIPTA) 62.5-25 mcg/actuation DsDv Inhale 1 puff into the lungs once daily. Controller    valacyclovir HCl (VALACYCLOVIR ORAL) Take by mouth.    venlafaxine (EFFEXOR-XR) 150 MG Cp24 Take 1 capsule (150 mg total) by mouth once daily. For depression     No current facility-administered medications for this visit.       Review of patient's allergies indicates:   Allergen Reactions    Keflex [cephalexin]      Throat swelling    Pcn [penicillins]      Throat swelling    Latex, natural rubber Other (See Comments)     Redness with bandaids     Adhesive Other (See Comments)     bandainds redness at skin    Trelegy ellipta [fluticasone-umeclidin-vilanter]      Vision disturbance       Family History   Problem Relation Age of Onset    Heart disease Mother        Social History     Socioeconomic History    Marital status:    Tobacco Use    Smoking status: Former     Types: Cigarettes     Quit date: 1996     Years since quittin.3     Passive exposure: Past    Smokeless tobacco: Never   Substance and Sexual Activity    Alcohol use: Yes     Comment: very little     Drug use: No    Sexual activity: Not Currently     Partners: Male     Social Determinants of Health     Financial Resource Strain: Unknown    Difficulty of Paying Living Expenses: Patient refused   Food Insecurity: No Food Insecurity    Worried About Running Out of Food in the Last Year: Never true    Ran Out of Food in the Last Year: Never true   Transportation Needs: No Transportation Needs    Lack of Transportation (Medical): No    Lack  of Transportation (Non-Medical): No   Physical Activity: Unknown    Days of Exercise per Week: Patient refused    Minutes of Exercise per Session: Patient refused   Stress: Unknown    Feeling of Stress : Patient refused   Social Connections: Unknown    Frequency of Communication with Friends and Family: Twice a week    Frequency of Social Gatherings with Friends and Family: Twice a week    Attends [employer] Services: Patient refused    Active Member of Clubs or Organizations: Patient refused    Attends Club or Organization Meetings: Patient refused    Marital Status:    Housing Stability: Unknown    Unable to Pay for Housing in the Last Year: No    Unstable Housing in the Last Year: No       Chief Complaint:   No chief complaint on file.      History of present illness:  72-year-old female underwent left total knee arthroplasty.   Had a reverse total shoulder arthroplasty performed as well.  She has been doing pretty well in regards to both.  Pain in the shoulder is a 4/10.  Up to 6/10 with movement.  Knees are doing pretty well.  No pain at this time.      Review of Systems:    Musculoskeletal:  See HPI        Physical Examination:    Vital Signs:    There were no vitals filed for this visit.      There is no height or weight on file to calculate BMI.    This a well-developed, well nourished patient in no acute distress.  They are alert and oriented and cooperative to examination.  Pt. walks without an antalgic gait.      Examination of her left knee shows well-healed surgical incision.  Patient has great range of motion from 0-120.  Still has a little edema and redness down more toward her ankle but that is probably more lymph edema than anything else.    Examination of the right knee shows no rashes or erythema. There are no masses ecchymosis or effusion. Patient has full range of motion from 0-130°. Patient is nontender to palpation over lateral joint line and nontender to palpation over the medial joint  line. Patient has a - Lachman exam, - anterior drawer exam, and - posterior drawer exam. - Kevin's exam. Knee is stable to varus and valgus stress. 5 out of 5 motor strength. Palpable distal pulses. Intact light touch sensation. Negative Patellofemoral crepitus    Examination right shoulder shows well-healed surgical portals.  Patient has decent range of motion with forward flexion of about 110° with external rotation of 40°.  Mild discomfort.    X-rays:  Four views of both knees are ordered and  reviewed which show well-aligned left total knee arthroplasty components without complication.  Mild to moderate right knee arthritis     Assessment::  Left total knee arthroplasty  Moderate right knee arthritis  Right reverse total shoulder arthroplasty    Plan:  I think she is doing very well.  I will see her back in 6 months.  Discussed possibly doing injections in the right knee if she needs them.  X-rays of her right shoulder at that point.    This note was created using [employer] voice recognition software that occasionally misinterpreted phrases or words.

## 2022-10-14 ENCOUNTER — TELEPHONE (OUTPATIENT)
Dept: FAMILY MEDICINE | Facility: CLINIC | Age: 72
End: 2022-10-14
Payer: MEDICARE

## 2022-10-14 ENCOUNTER — EXTERNAL HOME HEALTH (OUTPATIENT)
Dept: HOME HEALTH SERVICES | Facility: HOSPITAL | Age: 72
End: 2022-10-14
Payer: MEDICARE

## 2022-10-14 DIAGNOSIS — I10 HYPERTENSION, UNSPECIFIED TYPE: Primary | ICD-10-CM

## 2022-10-14 RX ORDER — CARVEDILOL 12.5 MG/1
12.5 TABLET ORAL 2 TIMES DAILY WITH MEALS
Qty: 60 TABLET | Refills: 2 | Status: SHIPPED | OUTPATIENT
Start: 2022-10-14 | End: 2022-10-31

## 2022-10-14 NOTE — TELEPHONE ENCOUNTER
----- Message from Eveline Matre sent at 10/14/2022 11:30 AM CDT -----  Contact: self  Type: Needs Medical Advice    Who Called:  patient  Symptoms (please be specific):  lost sight/ talking to people that were not here/ can't stay in bed /side effects   How long has patient had these symptoms:  this week  Pharmacy name and phone #:    [id number] - [place] - [address] [facility]  [address]  [address] [place] [address]  Phone: [phone] Fax: [phone]     Best Call Back Number: [phone]   Additional Information: The pt stated that she took metoprolol succinate (TOPROL-XL) 25 MG 24 hr tablet and had to stop taking it. The pt stated that she was having all the side effects from the medication. The pt stated that she needs a new medication for her BP and heart. The pt stated that she would like to speak with someone in the office regarding the problem. Please call pt back ASAP. Thanks.

## 2022-10-14 NOTE — TELEPHONE ENCOUNTER
Attempted to reach pt by phone unable to leave message mailbox is full  LOV 10/11/22        The pt stated that she took metoprolol succinate (TOPROL-XL) 25 MG 24 hr tablet and had to stop taking it. The pt stated that she was having all the side effects from the medication. The pt stated that she needs a new medication for her BP and heart. The pt stated that she would like to speak with someone in the office regarding the problem. Please call pt back ASAP. Thanks.

## 2022-10-24 DIAGNOSIS — M25.522 LEFT ELBOW PAIN: Primary | ICD-10-CM

## 2022-10-25 ENCOUNTER — HOSPITAL ENCOUNTER (OUTPATIENT)
Dept: RADIOLOGY | Facility: HOSPITAL | Age: 72
Discharge: HOME | End: 2022-10-25
Attending: ORTHOPAEDIC SURGERY
Payer: MEDICARE

## 2022-10-25 ENCOUNTER — PATIENT MESSAGE (OUTPATIENT)
Dept: PSYCHIATRY | Facility: CLINIC | Age: 72
End: 2022-10-25
Payer: MEDICARE

## 2022-10-25 ENCOUNTER — OFFICE VISIT (OUTPATIENT)
Dept: ORTHOPEDICS | Facility: CLINIC | Age: 72
End: 2022-10-25
Payer: MEDICARE

## 2022-10-25 VITALS — HEIGHT: 60 IN | RESPIRATION RATE: 16 BRPM | WEIGHT: 214 LBS | BODY MASS INDEX: 42.01 KG/M2

## 2022-10-25 DIAGNOSIS — M25.511 ACUTE PAIN OF RIGHT SHOULDER: Primary | ICD-10-CM

## 2022-10-25 DIAGNOSIS — M25.511 ACUTE PAIN OF RIGHT SHOULDER: ICD-10-CM

## 2022-10-25 DIAGNOSIS — M25.522 LEFT ELBOW PAIN: ICD-10-CM

## 2022-10-25 PROCEDURE — 1159F PR MEDICATION LIST DOCUMENTED IN MEDICAL RECORD: ICD-10-PCS | Mod: CPTII,S$GLB,, | Performed by: ORTHOPAEDIC SURGERY

## 2022-10-25 PROCEDURE — 99203 OFFICE O/P NEW LOW 30 MIN: CPT | Mod: S$GLB,,, | Performed by: ORTHOPAEDIC SURGERY

## 2022-10-25 PROCEDURE — 4010F PR ACE/ARB THEARPY RXD/TAKEN: ICD-10-PCS | Mod: CPTII,S$GLB,, | Performed by: ORTHOPAEDIC SURGERY

## 2022-10-25 PROCEDURE — 3288F PR FALLS RISK ASSESSMENT DOCUMENTED: ICD-10-PCS | Mod: CPTII,S$GLB,, | Performed by: ORTHOPAEDIC SURGERY

## 2022-10-25 PROCEDURE — 99203 PR OFFICE/OUTPT VISIT, NEW, LEVL III, 30-44 MIN: ICD-10-PCS | Mod: S$GLB,,, | Performed by: ORTHOPAEDIC SURGERY

## 2022-10-25 PROCEDURE — 3066F PR DOCUMENTATION OF TREATMENT FOR NEPHROPATHY: ICD-10-PCS | Mod: CPTII,S$GLB,, | Performed by: ORTHOPAEDIC SURGERY

## 2022-10-25 PROCEDURE — 4010F ACE/ARB THERAPY RXD/TAKEN: CPT | Mod: CPTII,S$GLB,, | Performed by: ORTHOPAEDIC SURGERY

## 2022-10-25 PROCEDURE — 73080 XR ELBOW COMPLETE 3 VIEW RIGHT: ICD-10-PCS | Mod: 26,RT,, | Performed by: RADIOLOGY

## 2022-10-25 PROCEDURE — 73080 X-RAY EXAM OF ELBOW: CPT | Mod: 26,RT,, | Performed by: RADIOLOGY

## 2022-10-25 PROCEDURE — 3066F NEPHROPATHY DOC TX: CPT | Mod: CPTII,S$GLB,, | Performed by: ORTHOPAEDIC SURGERY

## 2022-10-25 PROCEDURE — 3044F PR MOST RECENT HEMOGLOBIN A1C LEVEL <7.0%: ICD-10-PCS | Mod: CPTII,S$GLB,, | Performed by: ORTHOPAEDIC SURGERY

## 2022-10-25 PROCEDURE — 3288F FALL RISK ASSESSMENT DOCD: CPT | Mod: CPTII,S$GLB,, | Performed by: ORTHOPAEDIC SURGERY

## 2022-10-25 PROCEDURE — 73030 X-RAY EXAM OF SHOULDER: CPT | Mod: TC,PN,RT

## 2022-10-25 PROCEDURE — 1159F MED LIST DOCD IN RCRD: CPT | Mod: CPTII,S$GLB,, | Performed by: ORTHOPAEDIC SURGERY

## 2022-10-25 PROCEDURE — 99999 PR PBB SHADOW E&M-EST. PATIENT-LVL IV: CPT | Mod: PBBFAC,,, | Performed by: ORTHOPAEDIC SURGERY

## 2022-10-25 PROCEDURE — 1125F AMNT PAIN NOTED PAIN PRSNT: CPT | Mod: CPTII,S$GLB,, | Performed by: ORTHOPAEDIC SURGERY

## 2022-10-25 PROCEDURE — 1100F PR PT FALLS ASSESS DOC 2+ FALLS/FALL W/INJURY/YR: ICD-10-PCS | Mod: CPTII,S$GLB,, | Performed by: ORTHOPAEDIC SURGERY

## 2022-10-25 PROCEDURE — 1125F PR PAIN SEVERITY QUANTIFIED, PAIN PRESENT: ICD-10-PCS | Mod: CPTII,S$GLB,, | Performed by: ORTHOPAEDIC SURGERY

## 2022-10-25 PROCEDURE — 3044F HG A1C LEVEL LT 7.0%: CPT | Mod: CPTII,S$GLB,, | Performed by: ORTHOPAEDIC SURGERY

## 2022-10-25 PROCEDURE — 1100F PTFALLS ASSESS-DOCD GE2>/YR: CPT | Mod: CPTII,S$GLB,, | Performed by: ORTHOPAEDIC SURGERY

## 2022-10-25 PROCEDURE — 99999 PR PBB SHADOW E&M-EST. PATIENT-LVL IV: ICD-10-PCS | Mod: PBBFAC,,, | Performed by: ORTHOPAEDIC SURGERY

## 2022-10-25 PROCEDURE — 3061F PR NEG MICROALBUMINURIA RESULT DOCUMENTED/REVIEW: ICD-10-PCS | Mod: CPTII,S$GLB,, | Performed by: ORTHOPAEDIC SURGERY

## 2022-10-25 PROCEDURE — 3061F NEG MICROALBUMINURIA REV: CPT | Mod: CPTII,S$GLB,, | Performed by: ORTHOPAEDIC SURGERY

## 2022-10-25 PROCEDURE — 73030 XR SHOULDER TRAUMA 3 VIEW RIGHT: ICD-10-PCS | Mod: 26,RT,, | Performed by: RADIOLOGY

## 2022-10-25 PROCEDURE — 73030 X-RAY EXAM OF SHOULDER: CPT | Mod: 26,RT,, | Performed by: RADIOLOGY

## 2022-10-25 PROCEDURE — 73080 X-RAY EXAM OF ELBOW: CPT | Mod: TC,PN,RT

## 2022-10-25 RX ORDER — IBUPROFEN 600 MG/1
600 TABLET ORAL 3 TIMES DAILY PRN
Qty: 90 TABLET | Refills: 0 | Status: SHIPPED | OUTPATIENT
Start: 2022-10-25 | End: 2023-11-14

## 2022-10-25 NOTE — PROGRESS NOTES
Patient ID: Genoveva Gilbert is a 72 y.o. female    Chief Complaint:   Chief Complaint   Patient presents with    Right Shoulder - Pain       History of Present Illness:    Genoveva Gilbert is a pleasant 72 y.o. female who presents for evaluation of right shoulder pain. She  reports pain for the past 2 weeks or so. She does not endorse a history of trauma.  History of right reverse shoulder replacement back in April of this year for recurrent right shoulder dislocation and rotator cuff tendinopathy.  Palpation of the acromion makes it worse and Tylenol makes it better. The pain is mostly anterior and lateral. She does endorse nighttime pain.  Recently saw Dr. Thao a few weeks ago and was doing well.  She is independent with all activities of daily living.     In the past she has tried the following treatments:    none    She currently does not work.       Instability: No  Mechanical Symptoms: No    Diabetic:  Yes  Smoker:  No          PAST MEDICAL HISTORY:   Past Medical History:   Diagnosis Date    Allergy     Anxiety     Arthritis, rheumatoid     Back pain     Chronic bronchiolitis     Depression     Fibromyalgia     GERD (gastroesophageal reflux disease)     High cholesterol     Hilar mass 3/27/2014    [place] (hard of hearing)     aid right ear    [place]hard of hearing)     Hypertension     Incontinence of urine     Lymphedema     MS (multiple sclerosis)     benign; another MD stated she has no MS    Neuropathy     Restless leg syndrome     Rotator cuff tear 4/16/2015    Sciatica of left side 1/5/2018    Seasonal allergies     Sinus congestion     Sleep apnea     DOES NOT USE MACHINE    Spondylolysis     Thyroid disease     Type 2 diabetes mellitus with polyneuropathy     no med. was borderline    Wheezing      PAST SURGICAL HISTORY:   Past Surgical History:   Procedure Laterality Date    APPENDECTOMY      ARTHROSCOPIC DEBRIDEMENT OF SHOULDER Right 2/18/2022    Procedure: EXTENSIVE DEBRIDEMENT, SHOULDER, ARTHROSCOPIC;   Surgeon: Rio Pitts MD;  Location: [facility] OR;  Service: Orthopedics;  Laterality: Right;    BREAST SURGERY      reduction    CLOSED REDUCTION OF INJURY OF SHOULDER Right 9/19/2021    Procedure: CLOSED REDUCTION, SHOULDER;  Surgeon: Antonio Irwin MD;  Location: [facility] OR;  Service: Orthopedics;  Laterality: Right;    EPIDURAL STEROID INJECTION INTO LUMBAR SPINE N/A 6/12/2019    Procedure: Injection-steroid-epidural-lumbar;  Surgeon: Thad Manning MD;  Location: [facility] OR;  Service: Pain Management;  Laterality: N/A;  L5-S1    EPIDURAL STEROID INJECTION INTO LUMBAR SPINE N/A 9/16/2019    Procedure: Injection-steroid-epidural-lumbar;  Surgeon: Thad Manning MD;  Location: [facility] OR;  Service: Pain Management;  Laterality: N/A;  L5-S1    EYE SURGERY Bilateral     HYSTERECTOMY      partial - fibroids    INJECTION OF ANESTHETIC AGENT AROUND MEDIAL BRANCH NERVES INNERVATING LUMBAR FACET JOINT Bilateral 2/28/2019    Procedure: Block-nerve-medial branch-lumbar;  Surgeon: Thad Manning MD;  Location: [facility] OR;  Service: Pain Management;  Laterality: Bilateral;  L3, 4,5     INJECTION OF ANESTHETIC AGENT AROUND MEDIAL BRANCH NERVES INNERVATING LUMBAR FACET JOINT Bilateral 3/21/2019    Procedure: Block-nerve-medial branch-lumbar L3,4,5;  Surgeon: Thad Manning MD;  Location: [facility] OR;  Service: Pain Management;  Laterality: Bilateral;    KNEE ARTHROPLASTY Left 3/16/2021    Procedure: ARTHROPLASTY, KNEE;  Surgeon: Rio Pitts MD;  Location: [facility] OR;  Service: Orthopedics;  Laterality: Left;  GENERAL AND BLOCK    LIPOSUCTION  1985    RADIOFREQUENCY ABLATION Left 11/4/2020    Procedure: Radiofrequency Ablation left knee;  Surgeon: Andrew Escudreo MD;  Location: [facility] OR;  Service: Pain Management;  Laterality: Left;    RADIOFREQUENCY ABLATION OF LUMBAR MEDIAL BRANCH NERVE AT SINGLE LEVEL Bilateral 4/12/2019    Procedure: Radiofrequency Ablation, Nerve, Spinal, Lumbar, Medial Branch, 1 Level;  Surgeon: Thad Manning MD;   Location: [facility];  Service: Pain Management;  Laterality: Bilateral;  L3, 4, 5  lumbar  [employer] pain management generator  SN [id number]  80 degrees for 75 seconds x2    RADIOFREQUENCY ABLATION OF LUMBAR MEDIAL BRANCH NERVE AT SINGLE LEVEL Bilateral 10/22/2019    Procedure: Radiofrequency Ablation, Nerve, Spinal, Lumbar, Medial Branch, L3,4,5;  Surgeon: Thad Manning MD;  Location: [facility] OR;  Service: Pain Management;  Laterality: Bilateral;  burned at 80 degrees C X 60 seconds X 2 each site    RADIOFREQUENCY ABLATION OF LUMBAR MEDIAL BRANCH NERVE AT SINGLE LEVEL Bilateral 2020    Procedure: Radiofrequency Ablation, Nerve, Spinal, Lumbar, Medial Branch, 1 Level;  Surgeon: Thad Manning MD;  Location: [facility];  Service: Pain Management;  Laterality: Bilateral;  L3,4,5    REVERSE TOTAL SHOULDER ARTHROPLASTY Right 2022    Procedure: ARTHROPLASTY, SHOULDER, TOTAL, REVERSE;  Surgeon: Rio Pitts MD;  Location: [facility] OR;  Service: Orthopedics;  Laterality: Right;    SHOULDER ARTHROSCOPY Right 2021    Procedure: ARTHROSCOPY, SHOULDER;  Surgeon: Antonio Irwin MD;  Location: [facility] OR;  Service: Orthopedics;  Laterality: Right;  LIMITED DEBRIDMENT    TONSILLECTOMY      TOTAL REDUCTION MAMMOPLASTY       FAMILY HISTORY:   Family History   Problem Relation Age of Onset    Heart disease Mother      SOCIAL HISTORY:   Social History     Occupational History    Not on file   Tobacco Use    Smoking status: Former     Types: Cigarettes     Quit date: 1996     Years since quittin.4     Passive exposure: Past    Smokeless tobacco: Never   Substance and Sexual Activity    Alcohol use: Yes     Comment: very little     Drug use: No    Sexual activity: Not Currently     Partners: Male        MEDICATIONS:   Current Outpatient Medications:     albuterol (PROAIR HFA) 90 mcg/actuation inhaler, Inhale 2 puffs into the lungs every 6 (six) hours as needed for Wheezing. Rescue, Disp: 18 g, Rfl: 6     ALPRAZolam (XANAX) 1 MG tablet, Take 1 tablet (1 mg total) by mouth 2 (two) times daily as needed for Anxiety (anxiety)., Disp: 60 tablet, Rfl: 0    amLODIPine (NORVASC) 5 MG tablet, Take 1 tablet (5 mg total) by mouth once daily. For blood pressure, Disp: 90 tablet, Rfl: 3    biotin 1 mg Cap, Take by mouth., Disp: , Rfl:     coffee xt/phosphatidyl serine (NEURIVA ORIGINAL ORAL), Take by mouth., Disp: , Rfl:     fexofenadine (ALLEGRA) 180 MG tablet, , Disp: , Rfl:     hydroCHLOROthiazide (HYDRODIURIL) 25 MG tablet, , Disp: , Rfl:     levothyroxine (SYNTHROID) 88 MCG tablet, Take 1 tablet (88 mcg total) by mouth once daily., Disp: 90 tablet, Rfl: 3    losartan (COZAAR) 50 MG tablet, Take 1 tablet (50 mg total) by mouth once daily. For blood pressure, Disp: 90 tablet, Rfl: 0    lovastatin (MEVACOR) 40 MG tablet, Take 1 tablet (40 mg total) by mouth nightly. at bedtime. For cholesterol, Disp: 90 tablet, Rfl: 3    magnesium oxide (MAG-OX) 400 mg (241.3 mg magnesium) tablet, Take 1 tablet (400 mg total) by mouth once daily., Disp: 90 tablet, Rfl: 3    nystatin (MYCOSTATIN) cream, Apply topically 2 (two) times daily., Disp: 60 g, Rfl: 1    omeprazole (PRILOSEC) 20 MG capsule, Take 1 capsule (20 mg total) by mouth once daily. For heartburn, Disp: 90 capsule, Rfl: 3    oxybutynin (DITROPAN) 5 MG Tab, Take 1 tablet (5 mg total) by mouth once daily., Disp: 30 tablet, Rfl: 2    potassium chloride (MICRO-K) 10 MEQ CpSR, Take 2 capsules (20 mEq total) by mouth every evening., Disp: 180 capsule, Rfl: 0    pramipexole (MIRAPEX) 0.5 MG tablet, Take 2 tablets (1 mg total) by mouth every evening. For restless legs, Disp: 180 tablet, Rfl: 3    pregabalin (LYRICA) 150 MG capsule, Take 1 capsule (150 mg total) by mouth 2 (two) times daily., Disp: 60 capsule, Rfl: 6    pregabalin (LYRICA) 75 MG capsule, Take 1 capsule (75 mg total) by mouth 2 (two) times daily., Disp: 180 capsule, Rfl: 1    torsemide (DEMADEX) 10 MG Tab, Take 1 tablet  (10 mg total) by mouth once daily., Disp: 30 tablet, Rfl: 2    traZODone (DESYREL) 50 MG tablet, Take 1 tablet (50 mg total) by mouth nightly as needed for Insomnia., Disp: 30 tablet, Rfl: 3    umeclidinium-vilanteroL (ANORO ELLIPTA) 62.5-25 mcg/actuation DsDv, Inhale 1 puff into the lungs once daily. Controller, Disp: 60 each, Rfl: 11    valacyclovir HCl (VALACYCLOVIR ORAL), Take by mouth., Disp: , Rfl:     venlafaxine (EFFEXOR-XR) 150 MG Cp24, Take 1 capsule (150 mg total) by mouth once daily. For depression, Disp: 90 capsule, Rfl: 3    carvediloL (COREG) 12.5 MG tablet, Take 1 tablet (12.5 mg total) by mouth 2 (two) times daily with meals., Disp: 60 tablet, Rfl: 2    MULTIVIT-IRON-MIN-FOLIC ACID 3,500-18-0.4 UNIT-MG-MG ORAL CHEW, Take 1 tablet by mouth., Disp: , Rfl:   ALLERGIES:   Review of patient's allergies indicates:   Allergen Reactions    Keflex [cephalexin]      Throat swelling    Pcn [penicillins]      Throat swelling    Latex, natural rubber Other (See Comments)     Redness with bandaids     Adhesive Other (See Comments)     bandainds redness at skin    Trelegy ellipta [fluticasone-umeclidin-vilanter]      Vision disturbance         Physical Exam     Vitals:    10/25/22 0849   Resp: 16     Alert and oriented to person, place and time. No acute distress. Well-groomed, not ill appearing. Pupils round and reactive, normal respiratory effort, no audible wheezing.     Forward flexion 90, abduction 120  Well-healed deltopectoral incision of the right shoulder.  Tenderness over the acromioclavicular joint and acromion  No evidence of crepitus with passive range of motion  Neurovascularly intact  No signs or symptoms of infection        Imaging:     X-Ray: I have reviewed all pertinent results/findings and my personal findings are:  Right shoulder reverse total shoulder replacement in appropriate position.  No evidence of loosening or osteolysis. Right elbow x-rays reviewed showing no evidence of fracture or  dislocation.  There is mild degenerative changes of the elbow joint.      Assessment & Plan    Acute pain of right shoulder  -     X-ray Shoulder 2 or More Views Right; Future; Expected date: 10/25/2022  -     X-Ray Shoulder Trauma 3 view Right; Future; Expected date: 10/25/2022         Treatment options were discussed with her in detail.  X-rays overall look good without evidence of loosening or instability.  Pain is over the acromion and acromioclavicular joint which could be stress reaction from the reverse shoulder replacement.  We discussed brief period of anti-inflammatories for pain with a period of rest in a sling for 5-7 days to see if this improves.  If it does not improve, she would need to see her primary surgeon    Follow up: if symptoms worsen or fail to improve  X-rays next visit: none

## 2022-10-27 ENCOUNTER — TELEPHONE (OUTPATIENT)
Dept: FAMILY MEDICINE | Facility: CLINIC | Age: 72
End: 2022-10-27
Payer: MEDICARE

## 2022-10-27 ENCOUNTER — TELEPHONE (OUTPATIENT)
Dept: ORTHOPEDICS | Facility: CLINIC | Age: 72
End: 2022-10-27
Payer: MEDICARE

## 2022-10-27 NOTE — TELEPHONE ENCOUNTER
----- Message from Nat Eliceo sent at 10/27/2022 11:51 AM CDT -----  Contact: patient  Type:  Patient Call          Who Called: Patient         Does the patient know what this is regarding?: Requesting a call back about RxcarvediloL (COREG) 12.5 MG tablet  ;Pt would like to know why was she prescribed the medication for heart issues and she don't have issues with her heart ; please advise           Would the patient rather a call back or a response via [facility]? Call           Best Call Back Number:[phone]             Additional Information:   [email or web address][id number] - [place] - [address] AT [facility] [address]  [facility] [place] [address]  Phone: [phone] Fax: [phone]

## 2022-10-27 NOTE — TELEPHONE ENCOUNTER
Called and spoke with pt. Informed pt that her carvedilol replaced her metoprolol. Pt verbalized understanding.

## 2022-10-27 NOTE — TELEPHONE ENCOUNTER
----- Message from Yeny Martinez sent at 10/27/2022  2:01 PM CDT -----  Contact: called at [phone]  Type: Needs Medical Advice  Who Called:  Pt  Best Call Back Number: [phone]  Additional Information: Pt picked up the med (carvediloL (COREG) 12.5 MG tablet) today but she read the instructions and found out that the med is for heart trouble, but she says she doesn't have heart trouble. Pt took 1 pill already. Please call back and advice pt would like to know why was she given this med.

## 2022-10-27 NOTE — TELEPHONE ENCOUNTER
----- Message from Thad Alfaro sent at 10/27/2022 11:34 AM CDT -----  Regarding: FW: medication and pain issue, wants to speak to staff, call pt   Contact: pt   Missed call, try again,[phone]  ----- Message -----  From: Thad Alfaro  Sent: 10/27/2022  11:26 AM CDT  To: Linda Antony Staff  Subject: medication and pain issue, wants to speak to#    medication and pain issue, wants to speak to staff, call pt

## 2022-10-27 NOTE — TELEPHONE ENCOUNTER
Pt states that rx prescribed not for pain. Pt advised what was prescribed and states that is not what she received. Pt advise to go to pharmacy with RX and get correct medication. Pt to call back if she has any issues.

## 2022-10-31 ENCOUNTER — PATIENT OUTREACH (OUTPATIENT)
Dept: ADMINISTRATIVE | Facility: HOSPITAL | Age: 72
End: 2022-10-31
Payer: MEDICARE

## 2022-10-31 ENCOUNTER — OFFICE VISIT (OUTPATIENT)
Dept: FAMILY MEDICINE | Facility: CLINIC | Age: 72
End: 2022-10-31
Payer: MEDICARE

## 2022-10-31 VITALS
WEIGHT: 207.44 LBS | DIASTOLIC BLOOD PRESSURE: 60 MMHG | RESPIRATION RATE: 16 BRPM | SYSTOLIC BLOOD PRESSURE: 114 MMHG | HEART RATE: 69 BPM | BODY MASS INDEX: 40.72 KG/M2 | OXYGEN SATURATION: 97 % | HEIGHT: 60 IN | TEMPERATURE: 99 F

## 2022-10-31 DIAGNOSIS — F41.1 GAD (GENERALIZED ANXIETY DISORDER): ICD-10-CM

## 2022-10-31 DIAGNOSIS — E11.65 TYPE 2 DIABETES MELLITUS WITH HYPERGLYCEMIA, WITHOUT LONG-TERM CURRENT USE OF INSULIN: ICD-10-CM

## 2022-10-31 DIAGNOSIS — G47.33 OBSTRUCTIVE SLEEP APNEA SYNDROME: ICD-10-CM

## 2022-10-31 DIAGNOSIS — E11.59 HYPERTENSION ASSOCIATED WITH DIABETES: ICD-10-CM

## 2022-10-31 DIAGNOSIS — I89.0 LYMPHEDEMA OF BOTH LOWER EXTREMITIES: ICD-10-CM

## 2022-10-31 DIAGNOSIS — G47.00 INSOMNIA, UNSPECIFIED TYPE: ICD-10-CM

## 2022-10-31 DIAGNOSIS — E03.9 ACQUIRED HYPOTHYROIDISM: Primary | ICD-10-CM

## 2022-10-31 DIAGNOSIS — I15.2 HYPERTENSION ASSOCIATED WITH DIABETES: ICD-10-CM

## 2022-10-31 DIAGNOSIS — F32.1 MODERATE MAJOR DEPRESSION, SINGLE EPISODE: ICD-10-CM

## 2022-10-31 DIAGNOSIS — Z79.899 POLYPHARMACY: ICD-10-CM

## 2022-10-31 PROCEDURE — 4010F ACE/ARB THERAPY RXD/TAKEN: CPT | Mod: CPTII,S$GLB,, | Performed by: PHYSICIAN ASSISTANT

## 2022-10-31 PROCEDURE — 90694 VACC AIIV4 NO PRSRV 0.5ML IM: CPT | Mod: S$GLB,,, | Performed by: PHYSICIAN ASSISTANT

## 2022-10-31 PROCEDURE — 99999 PR PBB SHADOW E&M-EST. PATIENT-LVL V: ICD-10-PCS | Mod: PBBFAC,,, | Performed by: PHYSICIAN ASSISTANT

## 2022-10-31 PROCEDURE — 3074F PR MOST RECENT SYSTOLIC BLOOD PRESSURE < 130 MM HG: ICD-10-PCS | Mod: CPTII,S$GLB,, | Performed by: PHYSICIAN ASSISTANT

## 2022-10-31 PROCEDURE — 1125F AMNT PAIN NOTED PAIN PRSNT: CPT | Mod: CPTII,S$GLB,, | Performed by: PHYSICIAN ASSISTANT

## 2022-10-31 PROCEDURE — 3061F NEG MICROALBUMINURIA REV: CPT | Mod: CPTII,S$GLB,, | Performed by: PHYSICIAN ASSISTANT

## 2022-10-31 PROCEDURE — 99214 PR OFFICE/OUTPT VISIT, EST, LEVL IV, 30-39 MIN: ICD-10-PCS | Mod: S$GLB,,, | Performed by: PHYSICIAN ASSISTANT

## 2022-10-31 PROCEDURE — 3078F PR MOST RECENT DIASTOLIC BLOOD PRESSURE < 80 MM HG: ICD-10-PCS | Mod: CPTII,S$GLB,, | Performed by: PHYSICIAN ASSISTANT

## 2022-10-31 PROCEDURE — 3074F SYST BP LT 130 MM HG: CPT | Mod: CPTII,S$GLB,, | Performed by: PHYSICIAN ASSISTANT

## 2022-10-31 PROCEDURE — 3078F DIAST BP <80 MM HG: CPT | Mod: CPTII,S$GLB,, | Performed by: PHYSICIAN ASSISTANT

## 2022-10-31 PROCEDURE — 90694 FLU VACCINE - QUADRIVALENT - ADJUVANTED: ICD-10-PCS | Mod: S$GLB,,, | Performed by: PHYSICIAN ASSISTANT

## 2022-10-31 PROCEDURE — 4010F PR ACE/ARB THEARPY RXD/TAKEN: ICD-10-PCS | Mod: CPTII,S$GLB,, | Performed by: PHYSICIAN ASSISTANT

## 2022-10-31 PROCEDURE — 1100F PTFALLS ASSESS-DOCD GE2>/YR: CPT | Mod: CPTII,S$GLB,, | Performed by: PHYSICIAN ASSISTANT

## 2022-10-31 PROCEDURE — 99999 PR PBB SHADOW E&M-EST. PATIENT-LVL V: CPT | Mod: PBBFAC,,, | Performed by: PHYSICIAN ASSISTANT

## 2022-10-31 PROCEDURE — G0008 ADMIN INFLUENZA VIRUS VAC: HCPCS | Mod: S$GLB,,, | Performed by: PHYSICIAN ASSISTANT

## 2022-10-31 PROCEDURE — 1100F PR PT FALLS ASSESS DOC 2+ FALLS/FALL W/INJURY/YR: ICD-10-PCS | Mod: CPTII,S$GLB,, | Performed by: PHYSICIAN ASSISTANT

## 2022-10-31 PROCEDURE — 1160F RVW MEDS BY RX/DR IN RCRD: CPT | Mod: CPTII,S$GLB,, | Performed by: PHYSICIAN ASSISTANT

## 2022-10-31 PROCEDURE — 3061F PR NEG MICROALBUMINURIA RESULT DOCUMENTED/REVIEW: ICD-10-PCS | Mod: CPTII,S$GLB,, | Performed by: PHYSICIAN ASSISTANT

## 2022-10-31 PROCEDURE — 3044F PR MOST RECENT HEMOGLOBIN A1C LEVEL <7.0%: ICD-10-PCS | Mod: CPTII,S$GLB,, | Performed by: PHYSICIAN ASSISTANT

## 2022-10-31 PROCEDURE — 3288F FALL RISK ASSESSMENT DOCD: CPT | Mod: CPTII,S$GLB,, | Performed by: PHYSICIAN ASSISTANT

## 2022-10-31 PROCEDURE — 1159F PR MEDICATION LIST DOCUMENTED IN MEDICAL RECORD: ICD-10-PCS | Mod: CPTII,S$GLB,, | Performed by: PHYSICIAN ASSISTANT

## 2022-10-31 PROCEDURE — 3066F PR DOCUMENTATION OF TREATMENT FOR NEPHROPATHY: ICD-10-PCS | Mod: CPTII,S$GLB,, | Performed by: PHYSICIAN ASSISTANT

## 2022-10-31 PROCEDURE — 1160F PR REVIEW ALL MEDS BY PRESCRIBER/CLIN PHARMACIST DOCUMENTED: ICD-10-PCS | Mod: CPTII,S$GLB,, | Performed by: PHYSICIAN ASSISTANT

## 2022-10-31 PROCEDURE — G0008 FLU VACCINE - QUADRIVALENT - ADJUVANTED: ICD-10-PCS | Mod: S$GLB,,, | Performed by: PHYSICIAN ASSISTANT

## 2022-10-31 PROCEDURE — 1159F MED LIST DOCD IN RCRD: CPT | Mod: CPTII,S$GLB,, | Performed by: PHYSICIAN ASSISTANT

## 2022-10-31 PROCEDURE — 99214 OFFICE O/P EST MOD 30 MIN: CPT | Mod: S$GLB,,, | Performed by: PHYSICIAN ASSISTANT

## 2022-10-31 PROCEDURE — 3066F NEPHROPATHY DOC TX: CPT | Mod: CPTII,S$GLB,, | Performed by: PHYSICIAN ASSISTANT

## 2022-10-31 PROCEDURE — 3044F HG A1C LEVEL LT 7.0%: CPT | Mod: CPTII,S$GLB,, | Performed by: PHYSICIAN ASSISTANT

## 2022-10-31 PROCEDURE — 3288F PR FALLS RISK ASSESSMENT DOCUMENTED: ICD-10-PCS | Mod: CPTII,S$GLB,, | Performed by: PHYSICIAN ASSISTANT

## 2022-10-31 PROCEDURE — 1125F PR PAIN SEVERITY QUANTIFIED, PAIN PRESENT: ICD-10-PCS | Mod: CPTII,S$GLB,, | Performed by: PHYSICIAN ASSISTANT

## 2022-10-31 RX ORDER — TRAZODONE HYDROCHLORIDE 100 MG/1
100 TABLET ORAL NIGHTLY
Qty: 30 TABLET | Refills: 2 | Status: SHIPPED | OUTPATIENT
Start: 2022-10-31 | End: 2022-12-27

## 2022-10-31 RX ORDER — VENLAFAXINE HYDROCHLORIDE 37.5 MG/1
37.5 CAPSULE, EXTENDED RELEASE ORAL DAILY
Qty: 30 CAPSULE | Refills: 2 | Status: SHIPPED | OUTPATIENT
Start: 2022-10-31 | End: 2022-10-31 | Stop reason: SDUPTHER

## 2022-10-31 RX ORDER — VENLAFAXINE HYDROCHLORIDE 37.5 MG/1
37.5 CAPSULE, EXTENDED RELEASE ORAL DAILY
Qty: 30 CAPSULE | Refills: 2 | Status: SHIPPED | OUTPATIENT
Start: 2022-10-31 | End: 2023-02-03

## 2022-10-31 NOTE — PROGRESS NOTES
"Subjective:       Patient ID: Genoveva Gilbert is a 72 y.o. female.    Chief Complaint: Anxiety and Arm Pain    Ms. Gilbert is a 72 year old female with lymphedema, HTN, MDD, and MARCOS. She is here today for follow up. The patient reports right arm pain; she was seen by orthopedics 1 week ago. The patient reports improvement in pain with prescribed therapy.  The patient admits to worsening anxiety. She reports that she has seen therapists and psychiatrists in the past with no improvement. She admits to "problems in her past that won't go away." The patient does have ESHA but does not use CPAP. The patient will follow up with pulmonology. She does need flu shot and agrees to get this today. She reports she has had colonoscopy with Dr. Valentin in [place].   Review of patient's allergies indicates:   Allergen Reactions    Keflex [cephalexin]      Throat swelling    Pcn [penicillins]      Throat swelling    Latex, natural rubber Other (See Comments)     Redness with bandaids     Adhesive Other (See Comments)     bandainds redness at skin    Trelegy ellipta [fluticasone-umeclidin-vilanter]      Vision disturbance         Current Outpatient Medications:     albuterol (PROAIR HFA) 90 mcg/actuation inhaler, Inhale 2 puffs into the lungs every 6 (six) hours as needed for Wheezing. Rescue, Disp: 18 g, Rfl: 6    ALPRAZolam (XANAX) 1 MG tablet, Take 1 tablet (1 mg total) by mouth 2 (two) times daily as needed for Anxiety (anxiety)., Disp: 60 tablet, Rfl: 0    amLODIPine (NORVASC) 5 MG tablet, Take 1 tablet (5 mg total) by mouth once daily. For blood pressure, Disp: 90 tablet, Rfl: 3    biotin 1 mg Cap, Take by mouth., Disp: , Rfl:     fexofenadine (ALLEGRA) 180 MG tablet, , Disp: , Rfl:     hydroCHLOROthiazide (HYDRODIURIL) 25 MG tablet, , Disp: , Rfl:     ibuprofen (ADVIL,MOTRIN) 600 MG tablet, Take 1 tablet (600 mg total) by mouth 3 (three) times daily as needed for Pain., Disp: 90 tablet, Rfl: 0    levothyroxine (SYNTHROID) 88 MCG " tablet, Take 1 tablet (88 mcg total) by mouth once daily., Disp: 90 tablet, Rfl: 3    losartan (COZAAR) 50 MG tablet, Take 1 tablet (50 mg total) by mouth once daily. For blood pressure, Disp: 90 tablet, Rfl: 0    lovastatin (MEVACOR) 40 MG tablet, Take 1 tablet (40 mg total) by mouth nightly. at bedtime. For cholesterol, Disp: 90 tablet, Rfl: 3    magnesium oxide (MAG-OX) 400 mg (241.3 mg magnesium) tablet, Take 1 tablet (400 mg total) by mouth once daily., Disp: 90 tablet, Rfl: 3    nystatin (MYCOSTATIN) cream, Apply topically 2 (two) times daily., Disp: 60 g, Rfl: 1    omeprazole (PRILOSEC) 20 MG capsule, Take 1 capsule (20 mg total) by mouth once daily. For heartburn, Disp: 90 capsule, Rfl: 3    oxybutynin (DITROPAN) 5 MG Tab, Take 1 tablet (5 mg total) by mouth once daily., Disp: 30 tablet, Rfl: 2    potassium chloride (MICRO-K) 10 MEQ CpSR, Take 2 capsules (20 mEq total) by mouth every evening., Disp: 180 capsule, Rfl: 0    pramipexole (MIRAPEX) 0.5 MG tablet, Take 2 tablets (1 mg total) by mouth every evening. For restless legs, Disp: 180 tablet, Rfl: 3    pregabalin (LYRICA) 75 MG capsule, Take 1 capsule (75 mg total) by mouth 2 (two) times daily., Disp: 180 capsule, Rfl: 1    torsemide (DEMADEX) 10 MG Tab, Take 1 tablet (10 mg total) by mouth once daily., Disp: 30 tablet, Rfl: 2    umeclidinium-vilanteroL (ANORO ELLIPTA) 62.5-25 mcg/actuation DsDv, Inhale 1 puff into the lungs once daily. Controller, Disp: 60 each, Rfl: 11    valacyclovir HCl (VALACYCLOVIR ORAL), Take by mouth., Disp: , Rfl:     venlafaxine (EFFEXOR-XR) 150 MG Cp24, Take 1 capsule (150 mg total) by mouth once daily. For depression, Disp: 90 capsule, Rfl: 3    traZODone (DESYREL) 100 MG tablet, Take 1 tablet (100 mg total) by mouth every evening., Disp: 30 tablet, Rfl: 2    venlafaxine (EFFEXOR-XR) 37.5 MG 24 hr capsule, Take 1 capsule (37.5 mg total) by mouth once daily. Take in addition to the 150mg dose of effexor, Disp: 30 capsule, Rfl:  2    Lab Results   Component Value Date    WBC 4.93 10/06/2022    HGB 11.4 (L) 10/06/2022    HCT 36.0 (L) 10/06/2022     10/06/2022    CHOL 150 09/15/2022    TRIG 185 (H) 09/15/2022    HDL 34 (L) 09/15/2022    ALT 23 10/06/2022    AST 28 10/06/2022     10/06/2022    K 4.1 10/06/2022     10/06/2022    CREATININE 0.9 10/06/2022    BUN 17 10/06/2022    CO2 32 (H) 10/06/2022    TSH 3.222 09/23/2022    INR 1.0 07/03/2017    HGBA1C 6.1 (H) 09/15/2022       Review of Systems   Constitutional:  Negative for activity change, appetite change and fever.   HENT:  Negative for postnasal drip, rhinorrhea and sinus pressure.    Eyes:  Negative for visual disturbance.   Respiratory:  Negative for cough and shortness of breath.    Cardiovascular:  Positive for leg swelling (chronic due to lymphedema). Negative for chest pain.   Gastrointestinal:  Negative for abdominal distention and abdominal pain.   Genitourinary:  Negative for difficulty urinating and dysuria.   Musculoskeletal:  Positive for arthralgias. Negative for myalgias.   Neurological:  Negative for headaches.   Hematological:  Negative for adenopathy.   Psychiatric/Behavioral:  Positive for dysphoric mood and sleep disturbance. The patient is nervous/anxious.      Objective:      Physical Exam  Constitutional:       Appearance: Normal appearance.   HENT:      Head: Normocephalic and atraumatic.   Eyes:      Conjunctiva/sclera: Conjunctivae normal.   Cardiovascular:      Rate and Rhythm: Normal rate and regular rhythm.   Pulmonary:      Effort: Pulmonary effort is normal. No respiratory distress.      Breath sounds: Normal breath sounds. No wheezing.   Abdominal:      General: There is no distension.      Palpations: There is no mass.      Tenderness: There is no abdominal tenderness.   Musculoskeletal:      Right lower leg: No edema.      Left lower leg: Edema present.      Comments: Left lower extremity edema- improved from baseline- chronic due to  lymphedema- in lymphedema therapy    Skin:     Findings: No erythema.   Neurological:      Mental Status: She is alert and oriented to person, place, and time.   Psychiatric:         Behavior: Behavior normal.       Assessment:       1. Acquired hypothyroidism    2. Hypertension associated with diabetes    3. Type 2 diabetes mellitus with hyperglycemia, without long-term current use of insulin    4. MARCOS (generalized anxiety disorder)    5. Insomnia, unspecified type    6. Moderate major depression, single episode    7. Obstructive sleep apnea syndrome    8. Polypharmacy    9. Lymphedema of both lower extremities          Plan:       Genoveva was seen today for anxiety and arm pain.    Diagnoses and all orders for this visit:    Acquired hypothyroidism  Stable  Continue current medication  Hypertension associated with diabetes  Controlled  Low salt diet  Continue current medication  Type 2 diabetes mellitus with hyperglycemia, without long-term current use of insulin  Diabetic diet  Continue diet  MARCOS (generalized anxiety disorder)  -     Discontinue: venlafaxine (EFFEXOR-XR) 37.5 MG 24 hr capsule; Take 1 capsule (37.5 mg total) by mouth once daily.  -     venlafaxine (EFFEXOR-XR) 37.5 MG 24 hr capsule; Take 1 capsule (37.5 mg total) by mouth once daily. Take in addition to the 150mg dose of effexor  Encouraged therapy- patient refuses at this time.   Insomnia, unspecified type  -     traZODone (DESYREL) 100 MG tablet; Take 1 tablet (100 mg total) by mouth every evening.    Moderate major depression, single episode  Increase effexor  Obstructive sleep apnea syndrome  Follow up pulmonology   Polypharmacy  Concerns about medication combination. Discussed need to wean down xanax  Lymphedema of both lower extremities  Chronic  Continue therapy  Other orders  -     Influenza - Quadrivalent (Adjuvanted)      Will request colonoscopy report    Follow up in 6 weeks or sooner if needed.

## 2022-10-31 NOTE — LETTER
AUTHORIZATION FOR RELEASE OF   CONFIDENTIAL INFORMATION    Dear Dr. Valentin,    We are seeing Genoveva Gilbert, date of birth 1950, in the clinic at [facility]. Rajesh Dubois MD is the patient's PCP. Genoveva Gilbert has an outstanding lab/procedure at the time we reviewed her chart. In order to help keep her health information updated, she has authorized us to request the following medical record(s):                                            ( X )  COLONOSCOPY              Please fax records to [facility], Stephen Lee Lambert, [place], [phone]     Thank you in advance,  Silvina          Patient Name: Genoveva Gilbert  : 1950  Patient Phone #: [phone]

## 2022-10-31 NOTE — PATIENT INSTRUCTIONS
Mick Tomas,     If you are due for any health screening(s) below please notify me so we can arrange them to be ordered and scheduled to maintain your health. Most healthy patients complete it. Don't lose out on improving your health.     All of your core healthy metrics are met.         Colon Cancer Screening    Of cancers affecting both men and women, colorectal cancer is the third leading cancer killer in the [place]. But it doesnt have to be. Screening can prevent colorectal cancer or find it at an early stage when treatment often leads to a cure.    A colonoscopy is the preferred test for detecting colon cancer. It is needed only once every 10 years if results are negative. While sedated, a flexible, lighted tube with a tiny camera is inserted into the rectum and advanced through the colon to look for cancers. An alternative screening test that is used at home and returned to the lab may also be used. It detects hidden blood in bowel movements which could indicate cancer in the colon. If results are positive, you will need a colonoscopy to determine if the blood is a sign of cancer. This type of follow up (diagnostic) colonoscopy usually requires additional copays as required by your insurance provider. Please contact your PCP if you have any questions.    Although you are still overdue for this important screening, due to the COVID-19 pandemic, we recommend scheduling it in the near future.

## 2022-11-01 ENCOUNTER — TELEPHONE (OUTPATIENT)
Dept: FAMILY MEDICINE | Facility: CLINIC | Age: 72
End: 2022-11-01
Payer: MEDICARE

## 2022-11-01 NOTE — TELEPHONE ENCOUNTER
Spoke to patient and clarified that the pregabalin dose is to remain the same;75 mg bid.  Patient verbalized understanding.

## 2022-11-01 NOTE — TELEPHONE ENCOUNTER
----- Message from Raina Burnett sent at 11/1/2022  9:06 AM CDT -----  Who Called:pt      What is the reqeust in detail:is requesting a call back , to know the new directions to take Pregabalin 150mg . Please advise       Can the clinic reply by [facility]? No       Best Call Back Number:[phone]      Additional Information:

## 2022-11-01 NOTE — TELEPHONE ENCOUNTER
Attempted to reach pt by phone no answer no voicemail unable to reach pt. Mailbox is full.    I do not see Pregabalin 150mg script. I do see the 75 mg twice daily from Jimmy [facility]. Are you aware of any changes?

## 2022-11-01 NOTE — TELEPHONE ENCOUNTER
Patient stated in clinic visit she was taking lyrica 75mg. This was not prescribed by our office. Medication adjustments made yesterday were to increase effexor and trazodone.

## 2022-11-02 ENCOUNTER — TELEPHONE (OUTPATIENT)
Dept: FAMILY MEDICINE | Facility: CLINIC | Age: 72
End: 2022-11-02
Payer: MEDICARE

## 2022-11-02 NOTE — TELEPHONE ENCOUNTER
----- Message from Nat Fenton sent at 11/2/2022  3:45 PM CDT -----  Contact: Patient  Type:  Patient Call          Who Called: patient         Does the patient know what this is regarding?: requesting a call back about Rx umeclidinium-vilanteroL (ANORO ELLIPTA) 62.5-25 mcg/actuation DsDv  ;pt have some concerns & questions about the medication after reading the side effects ; please advise           Would the patient rather a call back or a response via [facility]? Call           Best Call Back Number: [phone]             Additional Information:

## 2022-11-04 ENCOUNTER — TELEPHONE (OUTPATIENT)
Dept: ORTHOPEDICS | Facility: CLINIC | Age: 72
End: 2022-11-04
Payer: MEDICARE

## 2022-11-04 NOTE — TELEPHONE ENCOUNTER
Pt states her arms are bothering her and would like some pain medication called in. She said you are aware of the arm pain she is having and that she wanted to know what you thinks she should do. States ibuprofen is not helping and would like me to send you a message

## 2022-11-04 NOTE — TELEPHONE ENCOUNTER
----- Message from Genny Murphy MA sent at 11/4/2022  3:43 PM CDT -----  Contact: pt  Arms are hurting bad, ibuprofen is not holding pain   Wants pain medication called in   Pharmacy [facility]   Call back

## 2022-11-09 RX ORDER — TRAMADOL HYDROCHLORIDE 50 MG/1
50 TABLET ORAL EVERY 6 HOURS PRN
Qty: 28 TABLET | Refills: 0 | Status: SHIPPED | OUTPATIENT
Start: 2022-11-09 | End: 2022-12-12 | Stop reason: SDUPTHER

## 2022-11-23 ENCOUNTER — OFFICE VISIT (OUTPATIENT)
Dept: DERMATOLOGY | Facility: CLINIC | Age: 72
End: 2022-11-23
Payer: MEDICARE

## 2022-11-23 VITALS — HEIGHT: 60 IN | BODY MASS INDEX: 40.64 KG/M2 | WEIGHT: 207 LBS

## 2022-11-23 DIAGNOSIS — L81.4 SOLAR LENTIGO: ICD-10-CM

## 2022-11-23 DIAGNOSIS — Z12.83 SKIN CANCER SCREENING: ICD-10-CM

## 2022-11-23 DIAGNOSIS — D22.9 MULTIPLE BENIGN NEVI: ICD-10-CM

## 2022-11-23 DIAGNOSIS — B07.8 COMMON WART: Primary | ICD-10-CM

## 2022-11-23 DIAGNOSIS — L82.1 SEBORRHEIC KERATOSES: ICD-10-CM

## 2022-11-23 PROCEDURE — 3066F NEPHROPATHY DOC TX: CPT | Mod: CPTII,S$GLB,, | Performed by: DERMATOLOGY

## 2022-11-23 PROCEDURE — 99203 OFFICE O/P NEW LOW 30 MIN: CPT | Mod: 25,S$GLB,, | Performed by: DERMATOLOGY

## 2022-11-23 PROCEDURE — 3044F PR MOST RECENT HEMOGLOBIN A1C LEVEL <7.0%: ICD-10-PCS | Mod: CPTII,S$GLB,, | Performed by: DERMATOLOGY

## 2022-11-23 PROCEDURE — 17110 PR DESTRUCTION BENIGN LESIONS UP TO 14: ICD-10-PCS | Mod: S$GLB,,, | Performed by: DERMATOLOGY

## 2022-11-23 PROCEDURE — 3061F NEG MICROALBUMINURIA REV: CPT | Mod: CPTII,S$GLB,, | Performed by: DERMATOLOGY

## 2022-11-23 PROCEDURE — 99203 PR OFFICE/OUTPT VISIT, NEW, LEVL III, 30-44 MIN: ICD-10-PCS | Mod: 25,S$GLB,, | Performed by: DERMATOLOGY

## 2022-11-23 PROCEDURE — 3066F PR DOCUMENTATION OF TREATMENT FOR NEPHROPATHY: ICD-10-PCS | Mod: CPTII,S$GLB,, | Performed by: DERMATOLOGY

## 2022-11-23 PROCEDURE — 3288F PR FALLS RISK ASSESSMENT DOCUMENTED: ICD-10-PCS | Mod: CPTII,S$GLB,, | Performed by: DERMATOLOGY

## 2022-11-23 PROCEDURE — 1159F MED LIST DOCD IN RCRD: CPT | Mod: CPTII,S$GLB,, | Performed by: DERMATOLOGY

## 2022-11-23 PROCEDURE — 1160F RVW MEDS BY RX/DR IN RCRD: CPT | Mod: CPTII,S$GLB,, | Performed by: DERMATOLOGY

## 2022-11-23 PROCEDURE — 3008F BODY MASS INDEX DOCD: CPT | Mod: CPTII,S$GLB,, | Performed by: DERMATOLOGY

## 2022-11-23 PROCEDURE — 1101F PR PT FALLS ASSESS DOC 0-1 FALLS W/OUT INJ PAST YR: ICD-10-PCS | Mod: CPTII,S$GLB,, | Performed by: DERMATOLOGY

## 2022-11-23 PROCEDURE — 4010F PR ACE/ARB THEARPY RXD/TAKEN: ICD-10-PCS | Mod: CPTII,S$GLB,, | Performed by: DERMATOLOGY

## 2022-11-23 PROCEDURE — 17110 DESTRUCTION B9 LES UP TO 14: CPT | Mod: S$GLB,,, | Performed by: DERMATOLOGY

## 2022-11-23 PROCEDURE — 1101F PT FALLS ASSESS-DOCD LE1/YR: CPT | Mod: CPTII,S$GLB,, | Performed by: DERMATOLOGY

## 2022-11-23 PROCEDURE — 1126F AMNT PAIN NOTED NONE PRSNT: CPT | Mod: CPTII,S$GLB,, | Performed by: DERMATOLOGY

## 2022-11-23 PROCEDURE — 4010F ACE/ARB THERAPY RXD/TAKEN: CPT | Mod: CPTII,S$GLB,, | Performed by: DERMATOLOGY

## 2022-11-23 PROCEDURE — 3288F FALL RISK ASSESSMENT DOCD: CPT | Mod: CPTII,S$GLB,, | Performed by: DERMATOLOGY

## 2022-11-23 PROCEDURE — 3061F PR NEG MICROALBUMINURIA RESULT DOCUMENTED/REVIEW: ICD-10-PCS | Mod: CPTII,S$GLB,, | Performed by: DERMATOLOGY

## 2022-11-23 PROCEDURE — 99999 PR PBB SHADOW E&M-EST. PATIENT-LVL IV: CPT | Mod: PBBFAC,,, | Performed by: DERMATOLOGY

## 2022-11-23 PROCEDURE — 1159F PR MEDICATION LIST DOCUMENTED IN MEDICAL RECORD: ICD-10-PCS | Mod: CPTII,S$GLB,, | Performed by: DERMATOLOGY

## 2022-11-23 PROCEDURE — 99999 PR PBB SHADOW E&M-EST. PATIENT-LVL IV: ICD-10-PCS | Mod: PBBFAC,,, | Performed by: DERMATOLOGY

## 2022-11-23 PROCEDURE — 3008F PR BODY MASS INDEX (BMI) DOCUMENTED: ICD-10-PCS | Mod: CPTII,S$GLB,, | Performed by: DERMATOLOGY

## 2022-11-23 PROCEDURE — 1160F PR REVIEW ALL MEDS BY PRESCRIBER/CLIN PHARMACIST DOCUMENTED: ICD-10-PCS | Mod: CPTII,S$GLB,, | Performed by: DERMATOLOGY

## 2022-11-23 PROCEDURE — 3044F HG A1C LEVEL LT 7.0%: CPT | Mod: CPTII,S$GLB,, | Performed by: DERMATOLOGY

## 2022-11-23 PROCEDURE — 1126F PR PAIN SEVERITY QUANTIFIED, NO PAIN PRESENT: ICD-10-PCS | Mod: CPTII,S$GLB,, | Performed by: DERMATOLOGY

## 2022-11-23 NOTE — PROGRESS NOTES
Subjective:       Patient ID:  Genoveva Gilbert is a 72 y.o. female who presents for   Chief Complaint   Patient presents with    Skin Check     TBSC    Spot     Left hip, left leg     New Patient    Patient here today for TBSC  C/O spot to left hip, drained but returned soon after  C/O spot to left shin, would like to discuss removal    Derm Hx:  Denies Phx of NMSC  Denies Fhx of MM    Current Outpatient Medications:   ·  albuterol (PROAIR HFA) 90 mcg/actuation inhaler, Inhale 2 puffs into the lungs every 6 (six) hours as needed for Wheezing. Rescue, Disp: 18 g, Rfl: 6  ·  ALPRAZolam (XANAX) 1 MG tablet, Take 1 tablet (1 mg total) by mouth 2 (two) times daily as needed for Anxiety (anxiety)., Disp: 60 tablet, Rfl: 0  ·  amLODIPine (NORVASC) 5 MG tablet, Take 1 tablet (5 mg total) by mouth once daily. For blood pressure, Disp: 90 tablet, Rfl: 3  ·  biotin 1 mg Cap, Take by mouth., Disp: , Rfl:   ·  fexofenadine (ALLEGRA) 180 MG tablet, , Disp: , Rfl:   ·  hydroCHLOROthiazide (HYDRODIURIL) 25 MG tablet, , Disp: , Rfl:   ·  ibuprofen (ADVIL,MOTRIN) 600 MG tablet, Take 1 tablet (600 mg total) by mouth 3 (three) times daily as needed for Pain., Disp: 90 tablet, Rfl: 0  ·  levothyroxine (SYNTHROID) 88 MCG tablet, Take 1 tablet (88 mcg total) by mouth once daily., Disp: 90 tablet, Rfl: 3  ·  losartan (COZAAR) 50 MG tablet, Take 1 tablet (50 mg total) by mouth once daily. For blood pressure, Disp: 90 tablet, Rfl: 0  ·  lovastatin (MEVACOR) 40 MG tablet, Take 1 tablet (40 mg total) by mouth nightly. at bedtime. For cholesterol, Disp: 90 tablet, Rfl: 3  ·  magnesium oxide (MAG-OX) 400 mg (241.3 mg magnesium) tablet, Take 1 tablet (400 mg total) by mouth once daily., Disp: 90 tablet, Rfl: 3  ·  nystatin (MYCOSTATIN) cream, Apply topically 2 (two) times daily., Disp: 60 g, Rfl: 1  ·  omeprazole (PRILOSEC) 20 MG capsule, Take 1 capsule (20 mg total) by mouth once daily. For heartburn, Disp: 90 capsule, Rfl: 3  ·  oxybutynin  (DITROPAN) 5 MG Tab, Take 1 tablet (5 mg total) by mouth once daily., Disp: 30 tablet, Rfl: 2  ·  potassium chloride (MICRO-K) 10 MEQ CpSR, TAKE 2 CAPSULES(20 MEQ) BY MOUTH EVERY EVENING, Disp: 180 capsule, Rfl: 0  ·  pramipexole (MIRAPEX) 0.5 MG tablet, Take 2 tablets (1 mg total) by mouth every evening. For restless legs, Disp: 180 tablet, Rfl: 3  ·  pregabalin (LYRICA) 75 MG capsule, Take 1 capsule (75 mg total) by mouth 2 (two) times daily., Disp: 180 capsule, Rfl: 1  ·  torsemide (DEMADEX) 10 MG Tab, Take 1 tablet (10 mg total) by mouth once daily., Disp: 30 tablet, Rfl: 2  ·  traMADoL (ULTRAM) 50 mg tablet, Take 1 tablet (50 mg total) by mouth every 6 (six) hours as needed for Pain., Disp: 28 tablet, Rfl: 0  ·  traZODone (DESYREL) 100 MG tablet, Take 1 tablet (100 mg total) by mouth every evening., Disp: 30 tablet, Rfl: 2  ·  umeclidinium-vilanteroL (ANORO ELLIPTA) 62.5-25 mcg/actuation DsDv, Inhale 1 puff into the lungs once daily. Controller, Disp: 60 each, Rfl: 11  ·  valacyclovir HCl (VALACYCLOVIR ORAL), Take by mouth., Disp: , Rfl:   ·  venlafaxine (EFFEXOR-XR) 150 MG Cp24, Take 1 capsule (150 mg total) by mouth once daily. For depression, Disp: 90 capsule, Rfl: 3  ·  venlafaxine (EFFEXOR-XR) 37.5 MG 24 hr capsule, Take 1 capsule (37.5 mg total) by mouth once daily. Take in addition to the 150mg dose of effexor, Disp: 30 capsule, Rfl: 2      Review of Systems   Constitutional:  Negative for fever, chills and fatigue.   Skin:  Negative for daily sunscreen use.   Hematologic/Lymphatic: Bruises/bleeds easily.      Objective:    Physical Exam   Constitutional: She appears well-developed and well-nourished. No distress.   Neurological: She is alert and oriented to person, place, and time. She is not disoriented.   Psychiatric: She has a normal mood and affect.   Skin:   Areas Examined (abnormalities noted in diagram):   Scalp / Hair Palpated and Inspected  Head / Face Inspection Performed  Neck Inspection  Performed  Chest / Axilla Inspection Performed  Abdomen Inspection Performed  Genitals / Buttocks / Groin Inspection Performed  Back Inspection Performed  RUE Inspected  LUE Inspection Performed  RLE Inspected  LLE Inspection Performed  Nails and Digits Inspection Performed                     Diagram Legend     Erythematous scaling macule/papule c/w actinic keratosis       Vascular papule c/w angioma      Pigmented verrucoid papule/plaque c/w seborrheic keratosis      Yellow umbilicated papule c/w sebaceous hyperplasia      Irregularly shaped tan macule c/w lentigo     1-2 mm smooth white papules consistent with Milia      Movable subcutaneous cyst with punctum c/w epidermal inclusion cyst      Subcutaneous movable cyst c/w pilar cyst      Firm pink to brown papule c/w dermatofibroma      Pedunculated fleshy papule(s) c/w skin tag(s)      Evenly pigmented macule c/w junctional nevus     Mildly variegated pigmented, slightly irregular-bordered macule c/w mildly atypical nevus      Flesh colored to evenly pigmented papule c/w intradermal nevus       Pink pearly papule/plaque c/w basal cell carcinoma      Erythematous hyperkeratotic cursted plaque c/w SCC      Surgical scar with no sign of skin cancer recurrence      Open and closed comedones      Inflammatory papules and pustules      Verrucoid papule consistent consistent with wart     Erythematous eczematous patches and plaques     Dystrophic onycholytic nail with subungual debris c/w onychomycosis     Umbilicated papule    Erythematous-base heme-crusted tan verrucoid plaque consistent with inflamed seborrheic keratosis     Erythematous Silvery Scaling Plaque c/w Psoriasis     See annotation      Assessment / Plan:        Common wart  Cryosurgery procedure note:    Verbal consent from the patient is obtained. Liquid nitrogen cryosurgery is applied to 1 lesions to produce a freeze injury. The patient is aware that blisters may form and is instructed on wound care  with gentle cleansing and use of vaseline ointment to keep moist until healed. The patient is supplied a handout on cryosurgery and is instructed to call if lesions do not completely resolve.    Seborrheic keratoses  These are benign inherited growths without a malignant potential. Reassurance given to patient. No treatment is necessary.     Multiple benign nevi  Careful dermoscopy evaluation of nevi performed with none identified as needing biopsy today  Monitor for new mole or moles that are becoming bigger, darker, irritated, or developing irregular borders.     Solar lentigo  This is a benign hyperpigmented sun induced lesion. Daily sun protection will reduce the number of new lesions. Treatment of these benign lesions are considered cosmetic.    Skin cancer screening  Total body skin examination performed today including at least 12 points as noted in physical examination. No lesions suspicious for malignancy noted.    Patient instructed in importance in daily broad spectrum sun protection of at least spf 30. Mineral sunscreen ingredients preferred (Zinc +/- Titanium) and can be found OTC.   Recommend Elta MD for daily use on face and neck.  Patient encouraged to wear hat for all outdoor exposure.   Also discussed sun avoidance and use of protective clothing.           Follow up if symptoms worsen or fail to improve.

## 2022-11-25 ENCOUNTER — EXTERNAL HOME HEALTH (OUTPATIENT)
Dept: HOME HEALTH SERVICES | Facility: HOSPITAL | Age: 72
End: 2022-11-25
Payer: MEDICARE

## 2022-12-05 ENCOUNTER — DOCUMENT SCAN (OUTPATIENT)
Dept: HOME HEALTH SERVICES | Facility: HOSPITAL | Age: 72
End: 2022-12-05
Payer: MEDICARE

## 2022-12-06 ENCOUNTER — HOSPITAL ENCOUNTER (EMERGENCY)
Facility: HOSPITAL | Age: 72
Discharge: HOME | End: 2022-12-06
Attending: EMERGENCY MEDICINE
Payer: MEDICARE

## 2022-12-06 ENCOUNTER — NURSE TRIAGE (OUTPATIENT)
Dept: ADMINISTRATIVE | Facility: CLINIC | Age: 72
End: 2022-12-06
Payer: MEDICARE

## 2022-12-06 VITALS
HEIGHT: 60 IN | TEMPERATURE: 98 F | RESPIRATION RATE: 18 BRPM | HEART RATE: 55 BPM | SYSTOLIC BLOOD PRESSURE: 114 MMHG | BODY MASS INDEX: 40.64 KG/M2 | WEIGHT: 207 LBS | OXYGEN SATURATION: 96 % | DIASTOLIC BLOOD PRESSURE: 58 MMHG

## 2022-12-06 DIAGNOSIS — R53.83 FATIGUE: ICD-10-CM

## 2022-12-06 DIAGNOSIS — R52 GENERALIZED BODY ACHES: Primary | ICD-10-CM

## 2022-12-06 LAB
ALBUMIN SERPL BCP-MCNC: 3.3 G/DL (ref 3.5–5.2)
ALP SERPL-CCNC: 93 U/L (ref 55–135)
ALT SERPL W/O P-5'-P-CCNC: 21 U/L (ref 10–44)
ANION GAP SERPL CALC-SCNC: 12 MMOL/L (ref 8–16)
AST SERPL-CCNC: 21 U/L (ref 10–40)
BACTERIA #/AREA URNS HPF: ABNORMAL /HPF
BASOPHILS # BLD AUTO: 0.02 K/UL (ref 0–0.2)
BASOPHILS NFR BLD: 0.3 % (ref 0–1.9)
BILIRUB SERPL-MCNC: 0.5 MG/DL (ref 0.1–1)
BILIRUB UR QL STRIP: NEGATIVE
BUN SERPL-MCNC: 34 MG/DL (ref 8–23)
CALCIUM SERPL-MCNC: 8.9 MG/DL (ref 8.7–10.5)
CHLORIDE SERPL-SCNC: 101 MMOL/L (ref 95–110)
CK SERPL-CCNC: 80 U/L (ref 20–180)
CLARITY UR: CLEAR
CO2 SERPL-SCNC: 27 MMOL/L (ref 23–29)
COLOR UR: YELLOW
CREAT SERPL-MCNC: 1.6 MG/DL (ref 0.5–1.4)
DIFFERENTIAL METHOD: ABNORMAL
EOSINOPHIL # BLD AUTO: 0.4 K/UL (ref 0–0.5)
EOSINOPHIL NFR BLD: 6.4 % (ref 0–8)
ERYTHROCYTE [DISTWIDTH] IN BLOOD BY AUTOMATED COUNT: 14.5 % (ref 11.5–14.5)
EST. GFR  (NO RACE VARIABLE): 34 ML/MIN/1.73 M^2
GLUCOSE SERPL-MCNC: 166 MG/DL (ref 70–110)
GLUCOSE UR QL STRIP: NEGATIVE
HCT VFR BLD AUTO: 36.9 % (ref 37–48.5)
HCV AB SERPL QL IA: NORMAL
HGB BLD-MCNC: 11.7 G/DL (ref 12–16)
HGB UR QL STRIP: NEGATIVE
HIV 1+2 AB+HIV1 P24 AG SERPL QL IA: NORMAL
IMM GRANULOCYTES # BLD AUTO: 0.03 K/UL (ref 0–0.04)
IMM GRANULOCYTES NFR BLD AUTO: 0.5 % (ref 0–0.5)
INFLUENZA A, MOLECULAR: NEGATIVE
INFLUENZA B, MOLECULAR: NEGATIVE
KETONES UR QL STRIP: NEGATIVE
LEUKOCYTE ESTERASE UR QL STRIP: ABNORMAL
LYMPHOCYTES # BLD AUTO: 1.9 K/UL (ref 1–4.8)
LYMPHOCYTES NFR BLD: 33 % (ref 18–48)
MCH RBC QN AUTO: 28.7 PG (ref 27–31)
MCHC RBC AUTO-ENTMCNC: 31.7 G/DL (ref 32–36)
MCV RBC AUTO: 90 FL (ref 82–98)
MICROSCOPIC COMMENT: ABNORMAL
MONOCYTES # BLD AUTO: 0.4 K/UL (ref 0.3–1)
MONOCYTES NFR BLD: 7.2 % (ref 4–15)
NEUTROPHILS # BLD AUTO: 3.1 K/UL (ref 1.8–7.7)
NEUTROPHILS NFR BLD: 52.6 % (ref 38–73)
NITRITE UR QL STRIP: NEGATIVE
NRBC BLD-RTO: 0 /100 WBC
PH UR STRIP: 6 [PH] (ref 5–8)
PLATELET # BLD AUTO: 202 K/UL (ref 150–450)
PMV BLD AUTO: 10.8 FL (ref 9.2–12.9)
POTASSIUM SERPL-SCNC: 3.3 MMOL/L (ref 3.5–5.1)
PROT SERPL-MCNC: 6.8 G/DL (ref 6–8.4)
PROT UR QL STRIP: NEGATIVE
RBC # BLD AUTO: 4.08 M/UL (ref 4–5.4)
SARS-COV-2 RDRP RESP QL NAA+PROBE: NEGATIVE
SODIUM SERPL-SCNC: 140 MMOL/L (ref 136–145)
SP GR UR STRIP: 1.01 (ref 1–1.03)
SPECIMEN SOURCE: NORMAL
URN SPEC COLLECT METH UR: ABNORMAL
UROBILINOGEN UR STRIP-ACNC: NEGATIVE EU/DL
WBC # BLD AUTO: 5.81 K/UL (ref 3.9–12.7)
WBC #/AREA URNS HPF: 6 /HPF (ref 0–5)

## 2022-12-06 PROCEDURE — 36415 COLL VENOUS BLD VENIPUNCTURE: CPT | Performed by: EMERGENCY MEDICINE

## 2022-12-06 PROCEDURE — 80053 COMPREHEN METABOLIC PANEL: CPT | Performed by: PHYSICIAN ASSISTANT

## 2022-12-06 PROCEDURE — 86803 HEPATITIS C AB TEST: CPT | Performed by: EMERGENCY MEDICINE

## 2022-12-06 PROCEDURE — 81000 URINALYSIS NONAUTO W/SCOPE: CPT | Performed by: PHYSICIAN ASSISTANT

## 2022-12-06 PROCEDURE — 99284 EMERGENCY DEPT VISIT MOD MDM: CPT | Mod: 25

## 2022-12-06 PROCEDURE — 87502 INFLUENZA DNA AMP PROBE: CPT | Performed by: PHYSICIAN ASSISTANT

## 2022-12-06 PROCEDURE — 25000003 PHARM REV CODE 250: Performed by: PHYSICIAN ASSISTANT

## 2022-12-06 PROCEDURE — 82550 ASSAY OF CK (CPK): CPT | Performed by: PHYSICIAN ASSISTANT

## 2022-12-06 PROCEDURE — 85025 COMPLETE CBC W/AUTO DIFF WBC: CPT | Performed by: PHYSICIAN ASSISTANT

## 2022-12-06 PROCEDURE — 96360 HYDRATION IV INFUSION INIT: CPT

## 2022-12-06 PROCEDURE — U0002 COVID-19 LAB TEST NON-CDC: HCPCS | Performed by: PHYSICIAN ASSISTANT

## 2022-12-06 PROCEDURE — 87389 HIV-1 AG W/HIV-1&-2 AB AG IA: CPT | Performed by: EMERGENCY MEDICINE

## 2022-12-06 PROCEDURE — 36415 COLL VENOUS BLD VENIPUNCTURE: CPT | Performed by: PHYSICIAN ASSISTANT

## 2022-12-06 RX ORDER — MELOXICAM 7.5 MG/1
7.5 TABLET ORAL DAILY
Qty: 10 TABLET | Refills: 0 | Status: SHIPPED | OUTPATIENT
Start: 2022-12-06 | End: 2022-12-16

## 2022-12-06 RX ORDER — MELOXICAM 7.5 MG/1
7.5 TABLET ORAL DAILY
Status: DISCONTINUED | OUTPATIENT
Start: 2022-12-06 | End: 2022-12-06

## 2022-12-06 RX ORDER — MELOXICAM 7.5 MG/1
7.5 TABLET ORAL ONCE
Status: COMPLETED | OUTPATIENT
Start: 2022-12-06 | End: 2022-12-06

## 2022-12-06 RX ORDER — ACETAMINOPHEN 500 MG
1000 TABLET ORAL
Status: COMPLETED | OUTPATIENT
Start: 2022-12-06 | End: 2022-12-06

## 2022-12-06 RX ADMIN — POTASSIUM BICARBONATE 20 MEQ: 391 TABLET, EFFERVESCENT ORAL at 03:12

## 2022-12-06 RX ADMIN — MELOXICAM 7.5 MG: 7.5 TABLET ORAL at 02:12

## 2022-12-06 RX ADMIN — SODIUM CHLORIDE 500 ML: 9 INJECTION, SOLUTION INTRAVENOUS at 03:12

## 2022-12-06 RX ADMIN — ACETAMINOPHEN 1000 MG: 500 TABLET ORAL at 02:12

## 2022-12-06 NOTE — TELEPHONE ENCOUNTER
Patient c/o severe general body pain x 1 week. Patient rates pain 10+/10 at this time and states history of Fibromyalgia.     Care Advice given to Go to ED Now for immediate medical attention. Patient states understanding of care advice.    Reason for Disposition   Nursing judgment    Additional Information   Negative: Nursing judgment   Negative: Information only call; adult is not ill or injured    Protocols used: No Guideline or Reference [id number]

## 2022-12-06 NOTE — ED PROVIDER NOTES
"Encounter Date: 12/6/2022    SCRIBE #1 NOTE: I, Dylon Wiseman, am scribing for, and in the presence of,  Saranya Rizvi PA-C.     History     Chief Complaint   Patient presents with    Generalized Body Aches     Throughout entire body x 1 week      Time seen by provider: 2:38 PM on 12/06/2022    Genoveva Gilbert is a 72 y.o. female with a PMHx of HTN, lower back pain, neuropathy, MS, fibromyalgia, rheumatoid arthritis, DM with polyneuropathy, spondylosis, sciatica (left), and bronchiolitis who presents to the ED with an onset of generalized body aches. Patient states she woke up this morning and could hardly get out of bed due to the pain "all over" her body. She normally uses a walker. The patient reports having problems walking more so than usual. She feels that the pain is mostly in her joints, specifically her knees and elbows, and only when she moves. She has taken no pain medications for these symptoms. The patient denies fever, abdominal pain, weakness, runny nose, cough, pain during urination or any other symptoms at this time. PSHx of left knee replacement, lumbar steroid injections, and hysterectomy.      The history is provided by the patient.   Review of patient's allergies indicates:   Allergen Reactions    Keflex [cephalexin]      Throat swelling    Pcn [penicillins]      Throat swelling    Latex, natural rubber Other (See Comments)     Redness with bandaids     Adhesive Other (See Comments)     bandainds redness at skin    Trelegy ellipta [fluticasone-umeclidin-vilanter]      Vision disturbance     Past Medical History:   Diagnosis Date    Allergy     Anxiety     Arthritis, rheumatoid     Back pain     Chronic bronchiolitis     Depression     Fibromyalgia     GERD (gastroesophageal reflux disease)     High cholesterol     Hilar mass 03/27/2014    [place] (hard of hearing)     aid right ear    [place] (hard of hearing)     Hypertension     Incontinence of urine     Lymphedema     MS (multiple sclerosis)     " benign; another MD stated she has no MS    Neuropathy     Restless leg syndrome     Rotator cuff tear 04/16/2015    Sciatica of left side 01/05/2018    Seasonal allergies     Sinus congestion     Sleep apnea     DOES NOT USE MACHINE    Spondylolysis     Thyroid disease     Type 2 diabetes mellitus with polyneuropathy     no med. was borderline    Wheezing      Past Surgical History:   Procedure Laterality Date    APPENDECTOMY      ARTHROSCOPIC DEBRIDEMENT OF SHOULDER Right 2/18/2022    Procedure: EXTENSIVE DEBRIDEMENT, SHOULDER, ARTHROSCOPIC;  Surgeon: Rio Pitts MD;  Location: [facility] OR;  Service: Orthopedics;  Laterality: Right;    BREAST SURGERY      reduction    CLOSED REDUCTION OF INJURY OF SHOULDER Right 9/19/2021    Procedure: CLOSED REDUCTION, SHOULDER;  Surgeon: Antonio Irwin MD;  Location: [facility] OR;  Service: Orthopedics;  Laterality: Right;    EPIDURAL STEROID INJECTION INTO LUMBAR SPINE N/A 6/12/2019    Procedure: Injection-steroid-epidural-lumbar;  Surgeon: Thad Manning MD;  Location: [facility] OR;  Service: Pain Management;  Laterality: N/A;  L5-S1    EPIDURAL STEROID INJECTION INTO LUMBAR SPINE N/A 9/16/2019    Procedure: Injection-steroid-epidural-lumbar;  Surgeon: Thad Manning MD;  Location: [facility] OR;  Service: Pain Management;  Laterality: N/A;  L5-S1    EYE SURGERY Bilateral     HYSTERECTOMY      partial - fibroids    INJECTION OF ANESTHETIC AGENT AROUND MEDIAL BRANCH NERVES INNERVATING LUMBAR FACET JOINT Bilateral 2/28/2019    Procedure: Block-nerve-medial branch-lumbar;  Surgeon: Thad Manning MD;  Location: [facility] OR;  Service: Pain Management;  Laterality: Bilateral;  L3, 4,5     INJECTION OF ANESTHETIC AGENT AROUND MEDIAL BRANCH NERVES INNERVATING LUMBAR FACET JOINT Bilateral 3/21/2019    Procedure: Block-nerve-medial branch-lumbar L3,4,5;  Surgeon: Thad Manning MD;  Location: [facility] OR;  Service: Pain Management;  Laterality: Bilateral;    KNEE ARTHROPLASTY Left 3/16/2021    Procedure:  ARTHROPLASTY, KNEE;  Surgeon: Rio Pitts MD;  Location: [facility] OR;  Service: Orthopedics;  Laterality: Left;  GENERAL AND BLOCK    LIPOSUCTION  1985    RADIOFREQUENCY ABLATION Left 11/4/2020    Procedure: Radiofrequency Ablation left knee;  Surgeon: Andrew Escudero MD;  Location: [facility] OR;  Service: Pain Management;  Laterality: Left;    RADIOFREQUENCY ABLATION OF LUMBAR MEDIAL BRANCH NERVE AT SINGLE LEVEL Bilateral 4/12/2019    Procedure: Radiofrequency Ablation, Nerve, Spinal, Lumbar, Medial Branch, 1 Level;  Surgeon: Thad Manning MD;  Location: [facility] OR;  Service: Pain Management;  Laterality: Bilateral;  L3, 4, 5  lumbar  Omnikles pain management generator  SN [id number]  80 degrees for 75 seconds x2    RADIOFREQUENCY ABLATION OF LUMBAR MEDIAL BRANCH NERVE AT SINGLE LEVEL Bilateral 10/22/2019    Procedure: Radiofrequency Ablation, Nerve, Spinal, Lumbar, Medial Branch, L3,4,5;  Surgeon: Thad Manning MD;  Location: [facility] OR;  Service: Pain Management;  Laterality: Bilateral;  burned at 80 degrees C X 60 seconds X 2 each site    RADIOFREQUENCY ABLATION OF LUMBAR MEDIAL BRANCH NERVE AT SINGLE LEVEL Bilateral 5/27/2020    Procedure: Radiofrequency Ablation, Nerve, Spinal, Lumbar, Medial Branch, 1 Level;  Surgeon: Thad Manning MD;  Location: [facility] OR;  Service: Pain Management;  Laterality: Bilateral;  L3,4,5    REVERSE TOTAL SHOULDER ARTHROPLASTY Right 4/12/2022    Procedure: ARTHROPLASTY, SHOULDER, TOTAL, REVERSE;  Surgeon: Rio Pitts MD;  Location: [facility] OR;  Service: Orthopedics;  Laterality: Right;    SHOULDER ARTHROSCOPY Right 9/19/2021    Procedure: ARTHROSCOPY, SHOULDER;  Surgeon: Antonio Irwin MD;  Location: [facility] OR;  Service: Orthopedics;  Laterality: Right;  LIMITED DEBRIDMENT    TONSILLECTOMY      TOTAL REDUCTION MAMMOPLASTY       Family History   Problem Relation Age of Onset    Heart disease Mother      Social History     Tobacco Use    Smoking status: Former     Types: Cigarettes      Quit date: 1996     Years since quittin.5     Passive exposure: Past    Smokeless tobacco: Never   Substance Use Topics    Alcohol use: Yes     Comment: very little     Drug use: No     Review of Systems   Constitutional:  Positive for fatigue. Negative for chills and fever.   HENT:  Negative for facial swelling, rhinorrhea and trouble swallowing.    Respiratory:  Negative for cough, chest tightness, shortness of breath and wheezing.    Cardiovascular:  Negative for chest pain and palpitations.   Gastrointestinal:  Negative for abdominal pain, diarrhea, nausea and vomiting.   Genitourinary:  Negative for dysuria and hematuria.   Musculoskeletal:  Positive for arthralgias, back pain and myalgias (Bilateral knees and elbows). Negative for joint swelling, neck pain and neck stiffness.   Skin:  Negative for color change, pallor, rash and wound.   Neurological:  Negative for dizziness, syncope, weakness, light-headedness, numbness and headaches.   Hematological:  Does not bruise/bleed easily.   Psychiatric/Behavioral:  The patient is not nervous/anxious.    All other systems reviewed and are negative.    Physical Exam     Initial Vitals [22 1328]   BP Pulse Resp Temp SpO2   (!) 110/55 62 18 97.7 °F (36.5 °C) 100 %      MAP       --         Physical Exam    Nursing note and vitals reviewed.  Constitutional: She appears well-developed and well-nourished. She is not diaphoretic. No distress.   HENT:   Head: Normocephalic and atraumatic.   Eyes: Conjunctivae are normal.   Neck: Neck supple.   Normal range of motion.  Cardiovascular:  Normal rate, regular rhythm, normal heart sounds and intact distal pulses.     Exam reveals no gallop and no friction rub.       No murmur heard.  Pulmonary/Chest: Breath sounds normal. No respiratory distress. She has no wheezes. She has no rhonchi. She has no rales.   Equal, bilateral breath sounds noted without wheezing.     Abdominal: Abdomen is soft. She exhibits no  distension and no mass. There is no abdominal tenderness.   No palpable abdominal tenderness noted.      Musculoskeletal:         General: No tenderness or edema. Normal range of motion.      Cervical back: Normal range of motion and neck supple.      Comments: No reproducible bony or muscular tenderness noted on exam.  No decreased ROM, decreased strength or loss of sensation to bilateral upper or lower extremities.  Palpable 2+ radial and pedal pulses.      Neurological: She is alert and oriented to person, place, and time. She has normal strength. No sensory deficit.   Skin: Skin is warm and dry. No rash and no abscess noted. No erythema.   Psychiatric: She has a normal mood and affect.       ED Course   Procedures  Labs Reviewed   CBC W/ AUTO DIFFERENTIAL - Abnormal; Notable for the following components:       Result Value    Hemoglobin 11.7 (*)     Hematocrit 36.9 (*)     MCHC 31.7 (*)     All other components within normal limits   COMPREHENSIVE METABOLIC PANEL - Abnormal; Notable for the following components:    Potassium 3.3 (*)     Glucose 166 (*)     BUN 34 (*)     Creatinine 1.6 (*)     Albumin 3.3 (*)     eGFR 34 (*)     All other components within normal limits   URINALYSIS, REFLEX TO URINE CULTURE - Abnormal; Notable for the following components:    Leukocytes, UA 1+ (*)     All other components within normal limits    Narrative:     Specimen Source->Urine   URINALYSIS MICROSCOPIC - Abnormal; Notable for the following components:    WBC, UA 6 (*)     All other components within normal limits    Narrative:     Specimen Source->Urine   INFLUENZA A & B BY MOLECULAR   SARS-COV-2 RNA AMPLIFICATION, QUAL   CK   HIV 1 / 2 ANTIBODY   HEPATITIS C ANTIBODY          Imaging Results              X-Ray Chest 1 View (Final result)  Result time 12/06/22 16:01:59      Final result by Jay Corrigan Jr., MD (12/06/22 16:01:59)                   Impression:      Mild cardiomegaly and aortic ectasia.  Faint basilar  infiltrates improved from the prior study.  Recent right shoulder reverse arthroplasty      Electronically signed by: Jay Corrigan MD  Date:    12/06/2022  Time:    16:01               Narrative:    EXAMINATION:  XR CHEST 1 VIEW    CLINICAL HISTORY:  Other fatigue    TECHNIQUE:  Single frontal view of the chest was performed.    COMPARISON:  Chest of April 7, 2022    FINDINGS:  There is mild cardiomegaly.  There is aortic ectasia.  Minor infiltrates remain at the lung bases significantly improved since the prior study.  Consolidation or solid mass is not seen.  The patient has had a right shoulder reverse arthroplasty.                                       Medications   acetaminophen tablet 1,000 mg (1,000 mg Oral Given 12/6/22 1455)   meloxicam tablet 7.5 mg ( Oral Canceled Entry 12/6/22 1600)   sodium chloride 0.9% bolus 500 mL (0 mLs Intravenous Stopped 12/6/22 1700)   potassium bicarbonate disintegrating tablet 20 mEq (20 mEq Oral Given 12/6/22 1526)     Medical Decision Making:   History:   Old Medical Records: I decided to obtain old medical records.  Old Records Summarized: records from clinic visits and records from previous admission(s).  Differential Diagnosis:   Viral Syndrome   Osteoarthritis   Fibromyalgia--hx   UTI   Pneumonia   Clinical Tests:   Lab Tests: Ordered and Reviewed  Radiological Study: Ordered and Reviewed  ED Management:  Pt is well appearing on exam.  Afebrile.  Influenza and COVID-19 negative.  There is no reproducible bony tenderness noted.  Labs show no elevated WBCs, very mild hypokalemia (oral potassium given here), very mildly increased creatinine at 1.6 with BUN of 34 with 500 ccs of IV fluids given.  CPK normal.  No difficulty breathing or shortness of breath.  Hx of CHF and she states she has been compliant with her medication at home.  No chest pain.  UA negative for infection.  At this time, we don't feel she warrants admission to the hospital and no indication for  narcotic pain medication.  She will be discharged home to follow-up with her PCP for re-evaluation.  She is given a few days worth of Mobic to help with her joint pain.  She is encouraged to take it with food, stay hydrated and keep taking her diuretics at home. She voices understanding and is agreeable to the plan.  She is given specific return precautions.              Scribe Attestation:   Scribe #1: I performed the above scribed service and the documentation accurately describes the services I performed. I attest to the accuracy of the note.            I, Saranya Rizvi PA-C, personally performed the services described in this documentation. All medical record entries made by the scribe were at my direction and in my presence.  I have reviewed the chart and agree that the record reflects my personal performance and is accurate and complete. Saranya Rizvi PA-C.  6:44 PM 12/06/2022         Clinical Impression:   Final diagnoses:  [R53.83] Fatigue  [R52] Generalized body aches (Primary)        ED Disposition Condition    Discharge Stable          ED Prescriptions       Medication Sig Dispense Start Date End Date Auth. Provider    meloxicam (MOBIC) 7.5 MG tablet Take 1 tablet (7.5 mg total) by mouth once daily. for 10 days 10 tablet 12/6/2022 12/16/2022 Saranya Rizvi PA-C          Follow-up Information       Follow up With Specialties Details Why Contact Info    [facility] Emergency Dept Emergency Medicine  As needed, If symptoms worsen [address] [place] [id number] [phone]    Rajesh Dubois MD Family Medicine  for re-evaluation next week [address] [facility] [id number]  [phone]               Saranya Rizvi PA-C  12/06/22 1674

## 2022-12-08 ENCOUNTER — OFFICE VISIT (OUTPATIENT)
Dept: PULMONOLOGY | Facility: CLINIC | Age: 72
End: 2022-12-08
Payer: MEDICARE

## 2022-12-08 VITALS
WEIGHT: 207 LBS | HEART RATE: 64 BPM | HEIGHT: 60 IN | DIASTOLIC BLOOD PRESSURE: 67 MMHG | OXYGEN SATURATION: 95 % | SYSTOLIC BLOOD PRESSURE: 103 MMHG | BODY MASS INDEX: 40.64 KG/M2

## 2022-12-08 DIAGNOSIS — J41.0 SIMPLE CHRONIC BRONCHITIS: ICD-10-CM

## 2022-12-08 DIAGNOSIS — J45.40 MODERATE PERSISTENT ASTHMA WITHOUT COMPLICATION: ICD-10-CM

## 2022-12-08 DIAGNOSIS — G47.30 SLEEP APNEA, UNSPECIFIED TYPE: ICD-10-CM

## 2022-12-08 DIAGNOSIS — R06.02 SOB (SHORTNESS OF BREATH): Primary | ICD-10-CM

## 2022-12-08 DIAGNOSIS — I27.20 PULMONARY HTN: ICD-10-CM

## 2022-12-08 PROCEDURE — 3074F SYST BP LT 130 MM HG: CPT | Mod: CPTII,S$GLB,, | Performed by: INTERNAL MEDICINE

## 2022-12-08 PROCEDURE — 1101F PR PT FALLS ASSESS DOC 0-1 FALLS W/OUT INJ PAST YR: ICD-10-PCS | Mod: CPTII,S$GLB,, | Performed by: INTERNAL MEDICINE

## 2022-12-08 PROCEDURE — 3008F PR BODY MASS INDEX (BMI) DOCUMENTED: ICD-10-PCS | Mod: CPTII,S$GLB,, | Performed by: INTERNAL MEDICINE

## 2022-12-08 PROCEDURE — 99214 PR OFFICE/OUTPT VISIT, EST, LEVL IV, 30-39 MIN: ICD-10-PCS | Mod: S$GLB,,, | Performed by: INTERNAL MEDICINE

## 2022-12-08 PROCEDURE — 1126F PR PAIN SEVERITY QUANTIFIED, NO PAIN PRESENT: ICD-10-PCS | Mod: CPTII,S$GLB,, | Performed by: INTERNAL MEDICINE

## 2022-12-08 PROCEDURE — 3061F NEG MICROALBUMINURIA REV: CPT | Mod: CPTII,S$GLB,, | Performed by: INTERNAL MEDICINE

## 2022-12-08 PROCEDURE — 99499 RISK ADDL DX/OHS AUDIT: ICD-10-PCS | Mod: S$GLB,,, | Performed by: INTERNAL MEDICINE

## 2022-12-08 PROCEDURE — 3074F PR MOST RECENT SYSTOLIC BLOOD PRESSURE < 130 MM HG: ICD-10-PCS | Mod: CPTII,S$GLB,, | Performed by: INTERNAL MEDICINE

## 2022-12-08 PROCEDURE — 3288F FALL RISK ASSESSMENT DOCD: CPT | Mod: CPTII,S$GLB,, | Performed by: INTERNAL MEDICINE

## 2022-12-08 PROCEDURE — 3061F PR NEG MICROALBUMINURIA RESULT DOCUMENTED/REVIEW: ICD-10-PCS | Mod: CPTII,S$GLB,, | Performed by: INTERNAL MEDICINE

## 2022-12-08 PROCEDURE — 3288F PR FALLS RISK ASSESSMENT DOCUMENTED: ICD-10-PCS | Mod: CPTII,S$GLB,, | Performed by: INTERNAL MEDICINE

## 2022-12-08 PROCEDURE — 3044F PR MOST RECENT HEMOGLOBIN A1C LEVEL <7.0%: ICD-10-PCS | Mod: CPTII,S$GLB,, | Performed by: INTERNAL MEDICINE

## 2022-12-08 PROCEDURE — 3044F HG A1C LEVEL LT 7.0%: CPT | Mod: CPTII,S$GLB,, | Performed by: INTERNAL MEDICINE

## 2022-12-08 PROCEDURE — 1159F MED LIST DOCD IN RCRD: CPT | Mod: CPTII,S$GLB,, | Performed by: INTERNAL MEDICINE

## 2022-12-08 PROCEDURE — 1101F PT FALLS ASSESS-DOCD LE1/YR: CPT | Mod: CPTII,S$GLB,, | Performed by: INTERNAL MEDICINE

## 2022-12-08 PROCEDURE — 3066F NEPHROPATHY DOC TX: CPT | Mod: CPTII,S$GLB,, | Performed by: INTERNAL MEDICINE

## 2022-12-08 PROCEDURE — 3078F PR MOST RECENT DIASTOLIC BLOOD PRESSURE < 80 MM HG: ICD-10-PCS | Mod: CPTII,S$GLB,, | Performed by: INTERNAL MEDICINE

## 2022-12-08 PROCEDURE — 4010F ACE/ARB THERAPY RXD/TAKEN: CPT | Mod: CPTII,S$GLB,, | Performed by: INTERNAL MEDICINE

## 2022-12-08 PROCEDURE — 1126F AMNT PAIN NOTED NONE PRSNT: CPT | Mod: CPTII,S$GLB,, | Performed by: INTERNAL MEDICINE

## 2022-12-08 PROCEDURE — 3066F PR DOCUMENTATION OF TREATMENT FOR NEPHROPATHY: ICD-10-PCS | Mod: CPTII,S$GLB,, | Performed by: INTERNAL MEDICINE

## 2022-12-08 PROCEDURE — 99999 PR PBB SHADOW E&M-EST. PATIENT-LVL IV: ICD-10-PCS | Mod: PBBFAC,,, | Performed by: INTERNAL MEDICINE

## 2022-12-08 PROCEDURE — 1159F PR MEDICATION LIST DOCUMENTED IN MEDICAL RECORD: ICD-10-PCS | Mod: CPTII,S$GLB,, | Performed by: INTERNAL MEDICINE

## 2022-12-08 PROCEDURE — 99999 PR PBB SHADOW E&M-EST. PATIENT-LVL IV: CPT | Mod: PBBFAC,,, | Performed by: INTERNAL MEDICINE

## 2022-12-08 PROCEDURE — 3078F DIAST BP <80 MM HG: CPT | Mod: CPTII,S$GLB,, | Performed by: INTERNAL MEDICINE

## 2022-12-08 PROCEDURE — 3008F BODY MASS INDEX DOCD: CPT | Mod: CPTII,S$GLB,, | Performed by: INTERNAL MEDICINE

## 2022-12-08 PROCEDURE — 99214 OFFICE O/P EST MOD 30 MIN: CPT | Mod: S$GLB,,, | Performed by: INTERNAL MEDICINE

## 2022-12-08 PROCEDURE — 99499 UNLISTED E&M SERVICE: CPT | Mod: S$GLB,,, | Performed by: INTERNAL MEDICINE

## 2022-12-08 PROCEDURE — 4010F PR ACE/ARB THEARPY RXD/TAKEN: ICD-10-PCS | Mod: CPTII,S$GLB,, | Performed by: INTERNAL MEDICINE

## 2022-12-08 RX ORDER — ALBUTEROL SULFATE 90 UG/1
2 AEROSOL, METERED RESPIRATORY (INHALATION) EVERY 6 HOURS PRN
Qty: 18 G | Refills: 11 | Status: ON HOLD | OUTPATIENT
Start: 2022-12-08 | End: 2023-09-27 | Stop reason: HOSPADM

## 2022-12-08 RX ORDER — FLUTICASONE PROPIONATE AND SALMETEROL XINAFOATE 230; 21 UG/1; UG/1
2 AEROSOL, METERED RESPIRATORY (INHALATION) 2 TIMES DAILY
Qty: 12 G | Refills: 11 | Status: ON HOLD | OUTPATIENT
Start: 2022-12-08 | End: 2023-09-24

## 2022-12-08 NOTE — PROGRESS NOTES
12/8/2022    Genoveva Gilbert  New Patient Consult    Chief Complaint   Patient presents with    Shortness of Breath       HPI:Pt is a 71 yo female with HTN, morbid obesity, fibromyalgia, fatty liver disease, GERD, DM2, RA and hypothyroidism presenting for new evaluation of possible ESHA.  Pt c/o shortness of breath for about 1 year, problem happens with exertion and getting worse. She does notice wheezing sometimes but denies chest pains, dizziness, coughing, phlegm.   She uses anoro inhaler daily, rescue inhaler- notes benefit, uses when being active.  She has had pneumonia once about 1yr ago.   Her  has COPD and parkinson's and she helps care for him.  She was dx with ESHA years ago but does not use CPAP. She did have machine in past but sent it back because she did not feel she needed it.  She denies daytime sleepiness. Sleeps well. Goes to bed 8pm and sleeps around 10pm, wakes 6am. She denies naps during the day.  Pt is a past smoker, quit 1996. She does not know of any family hx. She was raised by her aunt and uncle. Denies childhood asthma.    The chief complaint problem is new to me.    PFSH:  Past Medical History:   Diagnosis Date    Allergy     Anxiety     Arthritis, rheumatoid     Back pain     Chronic bronchiolitis     Depression     Fibromyalgia     GERD (gastroesophageal reflux disease)     High cholesterol     Hilar mass 03/27/2014    [place] (hard of hearing)     aid right ear    [place] (hard of hearing)     Hypertension     Incontinence of urine     Lymphedema     MS (multiple sclerosis)     benign; another MD stated she has no MS    Neuropathy     Restless leg syndrome     Rotator cuff tear 04/16/2015    Sciatica of left side 01/05/2018    Seasonal allergies     Sinus congestion     Sleep apnea     DOES NOT USE MACHINE    Spondylolysis     Thyroid disease     Type 2 diabetes mellitus with polyneuropathy     no med. was borderline    Wheezing          Past Surgical History:   Procedure Laterality Date     APPENDECTOMY      ARTHROSCOPIC DEBRIDEMENT OF SHOULDER Right 2/18/2022    Procedure: EXTENSIVE DEBRIDEMENT, SHOULDER, ARTHROSCOPIC;  Surgeon: Rio Pitts MD;  Location: [facility] OR;  Service: Orthopedics;  Laterality: Right;    BREAST SURGERY      reduction    CLOSED REDUCTION OF INJURY OF SHOULDER Right 9/19/2021    Procedure: CLOSED REDUCTION, SHOULDER;  Surgeon: Antonio Irwin MD;  Location: [facility] OR;  Service: Orthopedics;  Laterality: Right;    EPIDURAL STEROID INJECTION INTO LUMBAR SPINE N/A 6/12/2019    Procedure: Injection-steroid-epidural-lumbar;  Surgeon: Thad Manning MD;  Location: [facility];  Service: Pain Management;  Laterality: N/A;  L5-S1    EPIDURAL STEROID INJECTION INTO LUMBAR SPINE N/A 9/16/2019    Procedure: Injection-steroid-epidural-lumbar;  Surgeon: Thad Manning MD;  Location: [facility] OR;  Service: Pain Management;  Laterality: N/A;  L5-S1    EYE SURGERY Bilateral     HYSTERECTOMY      partial - fibroids    INJECTION OF ANESTHETIC AGENT AROUND MEDIAL BRANCH NERVES INNERVATING LUMBAR FACET JOINT Bilateral 2/28/2019    Procedure: Block-nerve-medial branch-lumbar;  Surgeon: Thad Manning MD;  Location: [facility] OR;  Service: Pain Management;  Laterality: Bilateral;  L3, 4,5     INJECTION OF ANESTHETIC AGENT AROUND MEDIAL BRANCH NERVES INNERVATING LUMBAR FACET JOINT Bilateral 3/21/2019    Procedure: Block-nerve-medial branch-lumbar L3,4,5;  Surgeon: Thad Manning MD;  Location: [facility] OR;  Service: Pain Management;  Laterality: Bilateral;    KNEE ARTHROPLASTY Left 3/16/2021    Procedure: ARTHROPLASTY, KNEE;  Surgeon: Rio Pitts MD;  Location: [facility] OR;  Service: Orthopedics;  Laterality: Left;  GENERAL AND BLOCK    LIPOSUCTION  1985    RADIOFREQUENCY ABLATION Left 11/4/2020    Procedure: Radiofrequency Ablation left knee;  Surgeon: Andrew Escudero MD;  Location: [facility] OR;  Service: Pain Management;  Laterality: Left;    RADIOFREQUENCY ABLATION OF LUMBAR MEDIAL BRANCH NERVE AT SINGLE LEVEL  Bilateral 2019    Procedure: Radiofrequency Ablation, Nerve, Spinal, Lumbar, Medial Branch, 1 Level;  Surgeon: Thad Manning MD;  Location: [facility] OR;  Service: Pain Management;  Laterality: Bilateral;  L3, 4, 5  lumbar  Crackle pain management generator  SN [id number]  80 degrees for 75 seconds x2    RADIOFREQUENCY ABLATION OF LUMBAR MEDIAL BRANCH NERVE AT SINGLE LEVEL Bilateral 10/22/2019    Procedure: Radiofrequency Ablation, Nerve, Spinal, Lumbar, Medial Branch, L3,4,5;  Surgeon: Thad Manning MD;  Location: [facility] OR;  Service: Pain Management;  Laterality: Bilateral;  burned at 80 degrees C X 60 seconds X 2 each site    RADIOFREQUENCY ABLATION OF LUMBAR MEDIAL BRANCH NERVE AT SINGLE LEVEL Bilateral 2020    Procedure: Radiofrequency Ablation, Nerve, Spinal, Lumbar, Medial Branch, 1 Level;  Surgeon: Thad Manning MD;  Location: [facility] OR;  Service: Pain Management;  Laterality: Bilateral;  L3,4,5    REVERSE TOTAL SHOULDER ARTHROPLASTY Right 2022    Procedure: ARTHROPLASTY, SHOULDER, TOTAL, REVERSE;  Surgeon: Rio Pitts MD;  Location: [facility] OR;  Service: Orthopedics;  Laterality: Right;    SHOULDER ARTHROSCOPY Right 2021    Procedure: ARTHROSCOPY, SHOULDER;  Surgeon: Antonio Irwin MD;  Location: [facility] OR;  Service: Orthopedics;  Laterality: Right;  LIMITED DEBRIDMENT    TONSILLECTOMY      TOTAL REDUCTION MAMMOPLASTY       Social History     Tobacco Use    Smoking status: Former     Types: Cigarettes     Quit date: 1996     Years since quittin.5     Passive exposure: Past    Smokeless tobacco: Never   Substance Use Topics    Alcohol use: Yes     Comment: very little     Drug use: No     Family History   Problem Relation Age of Onset    Heart disease Mother      Review of patient's allergies indicates:   Allergen Reactions    Keflex [cephalexin]      Throat swelling    Pcn [penicillins]      Throat swelling    Latex, natural rubber Other (See Comments)     Redness with bandaids      Adhesive Other (See Comments)     bandainds redness at skin    Trelegy ellipta [fluticasone-umeclidin-vilanter]      Vision disturbance       Performance Status:The patient's activity level is functions out of house.      Review of Systems:  a review of eleven systems covering constitutional, Eye, HEENT, Psych, Respiratory, Cardiac, GI, , Musculoskeletal, Endocrine, Dermatologic was negative except for pertinent findings as listed ABOVE and below:  All negative with pertinent positives as above        Exam:Comprehensive exam done. /67 (BP Location: Right arm, Patient Position: Sitting, BP Method: Large (Automatic))   Pulse 64   Ht 5' (1.524 m)   Wt 93.9 kg (207 lb 0.2 oz)   SpO2 95%   BMI 40.43 kg/m²   Exam included Vitals as listed, and patient's appearance and affect and alertness and mood, oral exam for yeast and hygiene and pharynx lesions and Mallapatti (M) score, neck with inspection for jvd and masses and thyroid abnormalities and lymph nodes (supraclavicular and infraclavicular nodes and axillary also examined and noted if abn), chest exam included symmetry and effort and fremitus and percussion and auscultation, cardiac exam included rhythm and gallops and murmur and rubs and jvd and edema, abdominal exam for mass and hepatosplenomegaly and tenderness and hernias and bowel sounds, Musculoskeletal exam with muscle tone and posture and mobility/gait and  strength, and skin for rashes and cyanosis and pallor and turgor, extremity for clubbing.  Findings were normal except for pertinent findings listed below:  M4, oropharynx clear  HR regular  Breath sounds with bilat upper lobe end expiratory wheezing  Bilat lower ext 1+ pitting edema      Radiographs (ct chest and cxr) reviewed: view by direct vision   CXR 12/6/22- R shoulder hardware, hilar fullness  CTA chest 10/5/22-   No intraluminal filling defects in pulmonary artery suggesting pulmonary artery embolism.  Large pulmonary arteries  suggesting pulmonary artery hypertension     Mild mosaic appearance of the lungs differential for which includes mild pulmonary edema, infectious/inflammatory process, and is in part may reflect hypoventilatory change.  Small nodular density along the cephalad aspect of the aortic arch in area of streaky artifact most likely represents atelectasis.  Cannot exclude however mass and suggest follow-up study which could be without contrast to determine if this as well as the mosaic appearance of the lungs resolves.     TTE 10/6/22-   The left ventricle is normal in size with mild concentric hypertrophy and normal systolic function.  The estimated ejection fraction is 62%.  Normal left ventricular diastolic function.  Normal right ventricular size with normal right ventricular systolic function.  Moderate left atrial enlargement.  Mild tricuspid regurgitation.  Normal central venous pressure (3 mmHg).  The estimated PA systolic pressure is 39 mmHg.  There is mild pulmonary hypertension.  Mild mitral regurgitation.  Mild aortic regurgitation.    Labs reviewed     Latest Reference Range & Units 12/06/22 14:04   WBC 3.90 - 12.70 K/uL 5.81   RBC 4.00 - 5.40 M/uL 4.08   HEMOGLOBIN 12.0 - 16.0 g/dL 11.7 (L)   HEMATOCRIT 37.0 - 48.5 % 36.9 (L)   MCV 82 - 98 fL 90   MCH 27.0 - 31.0 pg 28.7   MCHC 32.0 - 36.0 g/dL 31.7 (L)   RDW 11.5 - 14.5 % 14.5   Platelets 150 - 450 K/uL 202      Latest Reference Range & Units 12/06/22 14:04   Sodium 136 - 145 mmol/L 140   Potassium 3.5 - 5.1 mmol/L 3.3 (L)   Chloride 95 - 110 mmol/L 101   CO2 23 - 29 mmol/L 27   ANION GAP 8 - 16 mmol/L 12   BUN 8 - 23 mg/dL 34 (H)   Creatinine 0.5 - 1.4 mg/dL 1.6 (H)   eGFR >60 mL/min/1.73 m^2 34 !   Glucose 70 - 110 mg/dL 166 (H)   Calcium 8.7 - 10.5 mg/dL 8.9   Alkaline Phosphatase 55 - 135 U/L 93   PROTEIN TOTAL 6.0 - 8.4 g/dL 6.8   Albumin 3.5 - 5.2 g/dL 3.3 (L)   BILIRUBIN TOTAL 0.1 - 1.0 mg/dL 0.5   AST 10 - 40 U/L 21   ALT 10 - 44 U/L 21       PFT will  be done and results to be reviewed      Plan:  Clinical impression is reasonably certain and repeated evaluation prn +/- follow up will be needed as below. ESHA untreated, SOB establishing care. She is wheezing- switching to advair for possible asthma    Genoveva was seen today for shortness of breath.    Diagnoses and all orders for this visit:    SOB (shortness of breath)    Sleep apnea, unspecified type  -     Ambulatory referral/consult to Pulmonology    Simple chronic bronchitis    Moderate persistent asthma without complication  -     Complete PFT with bronchodilator; Future  -     albuterol (PROAIR HFA) 90 mcg/actuation inhaler; Inhale 2 puffs into the lungs every 6 (six) hours as needed for Wheezing. Rescue  -     fluticasone-salmeterol 230-21 mcg/dose (ADVAIR HFA) 230-21 mcg/actuation HFAA inhaler; Inhale 2 puffs into the lungs 2 (two) times daily. Controller    Pulmonary HTN      Follow up in about 3 months (around 3/8/2023).    Discussed with patient above for education the following:      Patient Instructions   Home sleep study- if positive will order CPAP machine for home use  Pulmonary function testing  Start using advair 2 puffs twice daily to replace anoro inhaler  Albuterol rescue inhaler as needed

## 2022-12-08 NOTE — PATIENT INSTRUCTIONS
Home sleep study- if positive will order CPAP machine for home use  Pulmonary function testing  Start using advair 2 puffs twice daily to replace anoro inhaler  Albuterol rescue inhaler as needed

## 2022-12-12 ENCOUNTER — OFFICE VISIT (OUTPATIENT)
Dept: FAMILY MEDICINE | Facility: CLINIC | Age: 72
End: 2022-12-12
Payer: MEDICARE

## 2022-12-12 ENCOUNTER — LAB VISIT (OUTPATIENT)
Dept: LAB | Facility: HOSPITAL | Age: 72
End: 2022-12-12
Attending: PHYSICIAN ASSISTANT
Payer: MEDICARE

## 2022-12-12 ENCOUNTER — TELEPHONE (OUTPATIENT)
Dept: PULMONOLOGY | Facility: CLINIC | Age: 72
End: 2022-12-12
Payer: MEDICARE

## 2022-12-12 VITALS
DIASTOLIC BLOOD PRESSURE: 70 MMHG | WEIGHT: 210.75 LBS | SYSTOLIC BLOOD PRESSURE: 128 MMHG | OXYGEN SATURATION: 97 % | HEIGHT: 60 IN | RESPIRATION RATE: 17 BRPM | BODY MASS INDEX: 41.37 KG/M2 | TEMPERATURE: 99 F | HEART RATE: 80 BPM

## 2022-12-12 DIAGNOSIS — N17.9 AKI (ACUTE KIDNEY INJURY): ICD-10-CM

## 2022-12-12 DIAGNOSIS — Z12.39 ENCOUNTER FOR SCREENING FOR MALIGNANT NEOPLASM OF BREAST, UNSPECIFIED SCREENING MODALITY: ICD-10-CM

## 2022-12-12 DIAGNOSIS — E11.65 TYPE 2 DIABETES MELLITUS WITH HYPERGLYCEMIA, WITHOUT LONG-TERM CURRENT USE OF INSULIN: ICD-10-CM

## 2022-12-12 DIAGNOSIS — E03.9 ACQUIRED HYPOTHYROIDISM: Primary | ICD-10-CM

## 2022-12-12 DIAGNOSIS — G47.00 INSOMNIA, UNSPECIFIED TYPE: ICD-10-CM

## 2022-12-12 DIAGNOSIS — E11.69 HYPERLIPIDEMIA ASSOCIATED WITH TYPE 2 DIABETES MELLITUS: ICD-10-CM

## 2022-12-12 DIAGNOSIS — E87.6 HYPOKALEMIA: ICD-10-CM

## 2022-12-12 DIAGNOSIS — I15.2 HYPERTENSION ASSOCIATED WITH DIABETES: ICD-10-CM

## 2022-12-12 DIAGNOSIS — Z12.31 ENCOUNTER FOR SCREENING MAMMOGRAM FOR MALIGNANT NEOPLASM OF BREAST: ICD-10-CM

## 2022-12-12 DIAGNOSIS — E78.5 HYPERLIPIDEMIA ASSOCIATED WITH TYPE 2 DIABETES MELLITUS: ICD-10-CM

## 2022-12-12 DIAGNOSIS — D64.9 MILD ANEMIA: ICD-10-CM

## 2022-12-12 DIAGNOSIS — E11.59 HYPERTENSION ASSOCIATED WITH DIABETES: ICD-10-CM

## 2022-12-12 DIAGNOSIS — F32.1 MODERATE MAJOR DEPRESSION, SINGLE EPISODE: ICD-10-CM

## 2022-12-12 DIAGNOSIS — R73.9 HYPERGLYCEMIA: ICD-10-CM

## 2022-12-12 LAB
ANION GAP SERPL CALC-SCNC: 13 MMOL/L (ref 8–16)
BASOPHILS # BLD AUTO: 0.06 K/UL (ref 0–0.2)
BASOPHILS NFR BLD: 0.8 % (ref 0–1.9)
BUN SERPL-MCNC: 20 MG/DL (ref 8–23)
CALCIUM SERPL-MCNC: 9.5 MG/DL (ref 8.7–10.5)
CHLORIDE SERPL-SCNC: 103 MMOL/L (ref 95–110)
CO2 SERPL-SCNC: 28 MMOL/L (ref 23–29)
CREAT SERPL-MCNC: 1 MG/DL (ref 0.5–1.4)
DIFFERENTIAL METHOD: ABNORMAL
EOSINOPHIL # BLD AUTO: 0.3 K/UL (ref 0–0.5)
EOSINOPHIL NFR BLD: 4 % (ref 0–8)
ERYTHROCYTE [DISTWIDTH] IN BLOOD BY AUTOMATED COUNT: 14.4 % (ref 11.5–14.5)
EST. GFR  (NO RACE VARIABLE): 59.1 ML/MIN/1.73 M^2
ESTIMATED AVG GLUCOSE: 123 MG/DL (ref 68–131)
FERRITIN SERPL-MCNC: 73 NG/ML (ref 20–300)
GLUCOSE SERPL-MCNC: 74 MG/DL (ref 70–110)
HBA1C MFR BLD: 5.9 % (ref 4–5.6)
HCT VFR BLD AUTO: 37.4 % (ref 37–48.5)
HGB BLD-MCNC: 11.4 G/DL (ref 12–16)
IMM GRANULOCYTES # BLD AUTO: 0.13 K/UL (ref 0–0.04)
IMM GRANULOCYTES NFR BLD AUTO: 1.7 % (ref 0–0.5)
IRON SERPL-MCNC: 46 UG/DL (ref 30–160)
LYMPHOCYTES # BLD AUTO: 3.1 K/UL (ref 1–4.8)
LYMPHOCYTES NFR BLD: 42.2 % (ref 18–48)
MCH RBC QN AUTO: 28.1 PG (ref 27–31)
MCHC RBC AUTO-ENTMCNC: 30.5 G/DL (ref 32–36)
MCV RBC AUTO: 92 FL (ref 82–98)
MONOCYTES # BLD AUTO: 0.6 K/UL (ref 0.3–1)
MONOCYTES NFR BLD: 8.3 % (ref 4–15)
NEUTROPHILS # BLD AUTO: 3.2 K/UL (ref 1.8–7.7)
NEUTROPHILS NFR BLD: 43 % (ref 38–73)
NRBC BLD-RTO: 0 /100 WBC
PLATELET # BLD AUTO: 288 K/UL (ref 150–450)
PMV BLD AUTO: 10.1 FL (ref 9.2–12.9)
POTASSIUM SERPL-SCNC: 3.4 MMOL/L (ref 3.5–5.1)
RBC # BLD AUTO: 4.06 M/UL (ref 4–5.4)
SATURATED IRON: 10 % (ref 20–50)
SODIUM SERPL-SCNC: 144 MMOL/L (ref 136–145)
TOTAL IRON BINDING CAPACITY: 454 UG/DL (ref 250–450)
TRANSFERRIN SERPL-MCNC: 307 MG/DL (ref 200–375)
WBC # BLD AUTO: 7.44 K/UL (ref 3.9–12.7)

## 2022-12-12 PROCEDURE — 3066F PR DOCUMENTATION OF TREATMENT FOR NEPHROPATHY: ICD-10-PCS | Mod: CPTII,S$GLB,, | Performed by: PHYSICIAN ASSISTANT

## 2022-12-12 PROCEDURE — 99214 PR OFFICE/OUTPT VISIT, EST, LEVL IV, 30-39 MIN: ICD-10-PCS | Mod: S$GLB,,, | Performed by: PHYSICIAN ASSISTANT

## 2022-12-12 PROCEDURE — 99999 PR PBB SHADOW E&M-EST. PATIENT-LVL V: CPT | Mod: PBBFAC,,, | Performed by: PHYSICIAN ASSISTANT

## 2022-12-12 PROCEDURE — 4010F PR ACE/ARB THEARPY RXD/TAKEN: ICD-10-PCS | Mod: CPTII,S$GLB,, | Performed by: PHYSICIAN ASSISTANT

## 2022-12-12 PROCEDURE — 3044F HG A1C LEVEL LT 7.0%: CPT | Mod: CPTII,S$GLB,, | Performed by: PHYSICIAN ASSISTANT

## 2022-12-12 PROCEDURE — 1126F AMNT PAIN NOTED NONE PRSNT: CPT | Mod: CPTII,S$GLB,, | Performed by: PHYSICIAN ASSISTANT

## 2022-12-12 PROCEDURE — 3066F NEPHROPATHY DOC TX: CPT | Mod: CPTII,S$GLB,, | Performed by: PHYSICIAN ASSISTANT

## 2022-12-12 PROCEDURE — 82728 ASSAY OF FERRITIN: CPT | Performed by: PHYSICIAN ASSISTANT

## 2022-12-12 PROCEDURE — 84466 ASSAY OF TRANSFERRIN: CPT | Performed by: PHYSICIAN ASSISTANT

## 2022-12-12 PROCEDURE — 3288F PR FALLS RISK ASSESSMENT DOCUMENTED: ICD-10-PCS | Mod: CPTII,S$GLB,, | Performed by: PHYSICIAN ASSISTANT

## 2022-12-12 PROCEDURE — 80048 BASIC METABOLIC PNL TOTAL CA: CPT | Performed by: PHYSICIAN ASSISTANT

## 2022-12-12 PROCEDURE — 3008F BODY MASS INDEX DOCD: CPT | Mod: CPTII,S$GLB,, | Performed by: PHYSICIAN ASSISTANT

## 2022-12-12 PROCEDURE — 3044F PR MOST RECENT HEMOGLOBIN A1C LEVEL <7.0%: ICD-10-PCS | Mod: CPTII,S$GLB,, | Performed by: PHYSICIAN ASSISTANT

## 2022-12-12 PROCEDURE — 99499 RISK ADDL DX/OHS AUDIT: ICD-10-PCS | Mod: S$GLB,,, | Performed by: PHYSICIAN ASSISTANT

## 2022-12-12 PROCEDURE — 36415 COLL VENOUS BLD VENIPUNCTURE: CPT | Mod: PO | Performed by: PHYSICIAN ASSISTANT

## 2022-12-12 PROCEDURE — 99214 OFFICE O/P EST MOD 30 MIN: CPT | Mod: S$GLB,,, | Performed by: PHYSICIAN ASSISTANT

## 2022-12-12 PROCEDURE — 1159F MED LIST DOCD IN RCRD: CPT | Mod: CPTII,S$GLB,, | Performed by: PHYSICIAN ASSISTANT

## 2022-12-12 PROCEDURE — 3008F PR BODY MASS INDEX (BMI) DOCUMENTED: ICD-10-PCS | Mod: CPTII,S$GLB,, | Performed by: PHYSICIAN ASSISTANT

## 2022-12-12 PROCEDURE — 3074F SYST BP LT 130 MM HG: CPT | Mod: CPTII,S$GLB,, | Performed by: PHYSICIAN ASSISTANT

## 2022-12-12 PROCEDURE — 4010F ACE/ARB THERAPY RXD/TAKEN: CPT | Mod: CPTII,S$GLB,, | Performed by: PHYSICIAN ASSISTANT

## 2022-12-12 PROCEDURE — 3078F DIAST BP <80 MM HG: CPT | Mod: CPTII,S$GLB,, | Performed by: PHYSICIAN ASSISTANT

## 2022-12-12 PROCEDURE — 3078F PR MOST RECENT DIASTOLIC BLOOD PRESSURE < 80 MM HG: ICD-10-PCS | Mod: CPTII,S$GLB,, | Performed by: PHYSICIAN ASSISTANT

## 2022-12-12 PROCEDURE — 99499 UNLISTED E&M SERVICE: CPT | Mod: S$GLB,,, | Performed by: PHYSICIAN ASSISTANT

## 2022-12-12 PROCEDURE — 1100F PR PT FALLS ASSESS DOC 2+ FALLS/FALL W/INJURY/YR: ICD-10-PCS | Mod: CPTII,S$GLB,, | Performed by: PHYSICIAN ASSISTANT

## 2022-12-12 PROCEDURE — 1159F PR MEDICATION LIST DOCUMENTED IN MEDICAL RECORD: ICD-10-PCS | Mod: CPTII,S$GLB,, | Performed by: PHYSICIAN ASSISTANT

## 2022-12-12 PROCEDURE — 3061F NEG MICROALBUMINURIA REV: CPT | Mod: CPTII,S$GLB,, | Performed by: PHYSICIAN ASSISTANT

## 2022-12-12 PROCEDURE — 1160F RVW MEDS BY RX/DR IN RCRD: CPT | Mod: CPTII,S$GLB,, | Performed by: PHYSICIAN ASSISTANT

## 2022-12-12 PROCEDURE — 1160F PR REVIEW ALL MEDS BY PRESCRIBER/CLIN PHARMACIST DOCUMENTED: ICD-10-PCS | Mod: CPTII,S$GLB,, | Performed by: PHYSICIAN ASSISTANT

## 2022-12-12 PROCEDURE — 3061F PR NEG MICROALBUMINURIA RESULT DOCUMENTED/REVIEW: ICD-10-PCS | Mod: CPTII,S$GLB,, | Performed by: PHYSICIAN ASSISTANT

## 2022-12-12 PROCEDURE — 3074F PR MOST RECENT SYSTOLIC BLOOD PRESSURE < 130 MM HG: ICD-10-PCS | Mod: CPTII,S$GLB,, | Performed by: PHYSICIAN ASSISTANT

## 2022-12-12 PROCEDURE — 3288F FALL RISK ASSESSMENT DOCD: CPT | Mod: CPTII,S$GLB,, | Performed by: PHYSICIAN ASSISTANT

## 2022-12-12 PROCEDURE — 1100F PTFALLS ASSESS-DOCD GE2>/YR: CPT | Mod: CPTII,S$GLB,, | Performed by: PHYSICIAN ASSISTANT

## 2022-12-12 PROCEDURE — 99999 PR PBB SHADOW E&M-EST. PATIENT-LVL V: ICD-10-PCS | Mod: PBBFAC,,, | Performed by: PHYSICIAN ASSISTANT

## 2022-12-12 PROCEDURE — 83036 HEMOGLOBIN GLYCOSYLATED A1C: CPT | Performed by: PHYSICIAN ASSISTANT

## 2022-12-12 PROCEDURE — 85025 COMPLETE CBC W/AUTO DIFF WBC: CPT | Performed by: PHYSICIAN ASSISTANT

## 2022-12-12 PROCEDURE — 1126F PR PAIN SEVERITY QUANTIFIED, NO PAIN PRESENT: ICD-10-PCS | Mod: CPTII,S$GLB,, | Performed by: PHYSICIAN ASSISTANT

## 2022-12-12 RX ORDER — TRAMADOL HYDROCHLORIDE 50 MG/1
50 TABLET ORAL EVERY 6 HOURS PRN
Qty: 28 TABLET | Refills: 0 | Status: SHIPPED | OUTPATIENT
Start: 2022-12-12 | End: 2023-02-17

## 2022-12-12 NOTE — PROGRESS NOTES
Subjective:       Patient ID: Genoveva Gilbert is a 72 y.o. female.    Chief Complaint: Follow-up (6 week f/u )    Mr. Gilbert is a 72 year old female with DM, HTN, HLD, and  depression.She is here for 6 week follow up after increasing trazodone and effexor. The patient reports her anxiety is related to the death of her son. She has previously been to grief counseling/ bereavement counseling. The patient also reports continued problems with insomnia. She is to have a sleep study through pulmonology. She is also scheduled for a PFT tomorrow. She is due for her mammogram in February. Her colonoscopy is up to date with Dr. Garcia.     Review of patient's allergies indicates:   Allergen Reactions    Keflex [cephalexin]      Throat swelling    Pcn [penicillins]      Throat swelling    Latex, natural rubber Other (See Comments)     Redness with bandaids     Adhesive Other (See Comments)     bandainds redness at skin    Trelegy ellipta [fluticasone-umeclidin-vilanter]      Vision disturbance         Current Outpatient Medications:     albuterol (PROAIR HFA) 90 mcg/actuation inhaler, Inhale 2 puffs into the lungs every 6 (six) hours as needed for Wheezing. Rescue, Disp: 18 g, Rfl: 11    ALPRAZolam (XANAX) 1 MG tablet, Take 1 tablet (1 mg total) by mouth 2 (two) times daily as needed for Anxiety (anxiety)., Disp: 60 tablet, Rfl: 0    amLODIPine (NORVASC) 5 MG tablet, Take 1 tablet (5 mg total) by mouth once daily. For blood pressure, Disp: 90 tablet, Rfl: 3    biotin 1 mg Cap, Take by mouth., Disp: , Rfl:     fexofenadine (ALLEGRA) 180 MG tablet, , Disp: , Rfl:     fluticasone-salmeterol 230-21 mcg/dose (ADVAIR HFA) 230-21 mcg/actuation HFAA inhaler, Inhale 2 puffs into the lungs 2 (two) times daily. Controller, Disp: 12 g, Rfl: 11    hydroCHLOROthiazide (HYDRODIURIL) 25 MG tablet, , Disp: , Rfl:     ibuprofen (ADVIL,MOTRIN) 600 MG tablet, Take 1 tablet (600 mg total) by mouth 3 (three) times daily as needed for Pain., Disp: 90  tablet, Rfl: 0    levothyroxine (SYNTHROID) 88 MCG tablet, Take 1 tablet (88 mcg total) by mouth once daily., Disp: 90 tablet, Rfl: 3    losartan (COZAAR) 50 MG tablet, Take 1 tablet (50 mg total) by mouth once daily. For blood pressure, Disp: 90 tablet, Rfl: 0    lovastatin (MEVACOR) 40 MG tablet, Take 1 tablet (40 mg total) by mouth nightly. at bedtime. For cholesterol, Disp: 90 tablet, Rfl: 3    magnesium oxide (MAG-OX) 400 mg (241.3 mg magnesium) tablet, Take 1 tablet (400 mg total) by mouth once daily., Disp: 90 tablet, Rfl: 3    meloxicam (MOBIC) 7.5 MG tablet, Take 1 tablet (7.5 mg total) by mouth once daily. for 10 days, Disp: 10 tablet, Rfl: 0    nystatin (MYCOSTATIN) cream, Apply topically 2 (two) times daily., Disp: 60 g, Rfl: 1    omeprazole (PRILOSEC) 20 MG capsule, Take 1 capsule (20 mg total) by mouth once daily. For heartburn, Disp: 90 capsule, Rfl: 3    oxybutynin (DITROPAN) 5 MG Tab, Take 1 tablet (5 mg total) by mouth once daily., Disp: 30 tablet, Rfl: 2    potassium chloride (MICRO-K) 10 MEQ CpSR, TAKE 2 CAPSULES(20 MEQ) BY MOUTH EVERY EVENING, Disp: 180 capsule, Rfl: 0    pramipexole (MIRAPEX) 0.5 MG tablet, Take 2 tablets (1 mg total) by mouth every evening. For restless legs, Disp: 180 tablet, Rfl: 3    torsemide (DEMADEX) 10 MG Tab, TAKE 1 TABLET(10 MG) BY MOUTH EVERY DAY, Disp: 90 tablet, Rfl: 3    traMADoL (ULTRAM) 50 mg tablet, Take 1 tablet (50 mg total) by mouth every 6 (six) hours as needed for Pain., Disp: 28 tablet, Rfl: 0    traZODone (DESYREL) 100 MG tablet, Take 1 tablet (100 mg total) by mouth every evening., Disp: 30 tablet, Rfl: 2    valacyclovir HCl (VALACYCLOVIR ORAL), Take by mouth., Disp: , Rfl:     venlafaxine (EFFEXOR-XR) 150 MG Cp24, Take 1 capsule (150 mg total) by mouth once daily. For depression, Disp: 90 capsule, Rfl: 3    venlafaxine (EFFEXOR-XR) 37.5 MG 24 hr capsule, Take 1 capsule (37.5 mg total) by mouth once daily. Take in addition to the 150mg dose of effexor,  Disp: 30 capsule, Rfl: 2    Lab Results   Component Value Date    WBC 5.81 12/06/2022    HGB 11.7 (L) 12/06/2022    HCT 36.9 (L) 12/06/2022     12/06/2022    CHOL 150 09/15/2022    TRIG 185 (H) 09/15/2022    HDL 34 (L) 09/15/2022    ALT 21 12/06/2022    AST 21 12/06/2022     12/06/2022    K 3.3 (L) 12/06/2022     12/06/2022    CREATININE 1.6 (H) 12/06/2022    BUN 34 (H) 12/06/2022    CO2 27 12/06/2022    TSH 3.222 09/23/2022    INR 1.0 07/03/2017    HGBA1C 6.1 (H) 09/15/2022       Review of Systems   Constitutional:  Negative for activity change, appetite change and fever.   HENT:  Negative for postnasal drip, rhinorrhea and sinus pressure.    Eyes:  Negative for visual disturbance.   Respiratory:  Negative for cough and shortness of breath.    Cardiovascular:  Negative for chest pain.   Gastrointestinal:  Negative for abdominal distention and abdominal pain.   Genitourinary:  Negative for difficulty urinating and dysuria.   Musculoskeletal:  Positive for arthralgias (chronic arthralgia- followed by ortho) and back pain (chronic back pain). Negative for myalgias.   Neurological:  Negative for headaches.   Hematological:  Negative for adenopathy.   Psychiatric/Behavioral:  Positive for sleep disturbance. The patient is nervous/anxious.      Objective:      Physical Exam  Constitutional:       Appearance: Normal appearance.   HENT:      Head: Normocephalic and atraumatic.   Eyes:      Conjunctiva/sclera: Conjunctivae normal.   Cardiovascular:      Rate and Rhythm: Normal rate and regular rhythm.   Pulmonary:      Effort: Pulmonary effort is normal. No respiratory distress.      Breath sounds: Normal breath sounds. No wheezing.   Abdominal:      General: There is no distension.      Palpations: There is no mass.      Tenderness: There is no abdominal tenderness.   Musculoskeletal:      Right lower leg: No edema.      Left lower leg: No edema.   Skin:     Findings: No erythema.   Neurological:       Mental Status: She is alert and oriented to person, place, and time.   Psychiatric:         Behavior: Behavior normal.       Assessment:       1. Acquired hypothyroidism    2. Hypertension associated with diabetes    3. Type 2 diabetes mellitus with hyperglycemia, without long-term current use of insulin    4. Hyperlipidemia associated with type 2 diabetes mellitus    5. Moderate major depression, single episode    6. Insomnia, unspecified type    7. Encounter for screening for malignant neoplasm of breast, unspecified screening modality    8. Encounter for screening mammogram for malignant neoplasm of breast    9. Hypokalemia    10. Mild anemia    11. TARAN (acute kidney injury)    12. Hyperglycemia          Plan:   Genoveva was seen today for follow-up.    Diagnoses and all orders for this visit:    Acquired hypothyroidism  stable  Hypertension associated with diabetes  Controlled  Low salt diet  Continue current medication  Type 2 diabetes mellitus with hyperglycemia, without long-term current use of insulin  Stable  Continue current medications  Hyperlipidemia associated with type 2 diabetes mellitus  High fiber diet  Continue current medication   Moderate major depression, single episode  stable  Insomnia, unspecified type  Chronic  Continue trazodone  Follow up with pulmonology   Encounter for screening for malignant neoplasm of breast, unspecified screening modality  -     Mammo Digital Screening Bilat w/ Tito; Future    Encounter for screening mammogram for malignant neoplasm of breast  -     Mammo Digital Screening Bilat w/ Tito; Future    Hypokalemia  -     Basic Metabolic Panel; Future    Mild anemia  -     CBC Auto Differential; Future  -     Iron and TIBC; Future  -     Ferritin; Future    TARAN (acute kidney injury)  -     Basic Metabolic Panel; Future    Hyperglycemia  -     Basic Metabolic Panel; Future  -     Hemoglobin A1C; Future    Labs reviewed and held for ordering provider. Further recommendations  will be given upon his or her return.  Follow up as scheduled with PCP

## 2022-12-12 NOTE — TELEPHONE ENCOUNTER
----- Message from Nat Fenton sent at 12/12/2022  1:57 PM CST -----  Contact: Trina  Type:  Patient Call          Who Called: [facility] - Trina         Does the patient know what this is regarding?: requesting a call back about a symptom related to sleep being on the referral due to insurance ; please advise           Would the patient rather a call back or a response via [facility]? Call           Best Call Back Number:[phone]            Additional Information: Fax  [phone]

## 2022-12-12 NOTE — TELEPHONE ENCOUNTER
Sp/w [facility]  they need ins info and sleep symptoms  advised would pull paperwork and refax with additional info

## 2022-12-13 ENCOUNTER — HOSPITAL ENCOUNTER (OUTPATIENT)
Dept: PULMONOLOGY | Facility: HOSPITAL | Age: 72
Discharge: HOME | End: 2022-12-13
Attending: INTERNAL MEDICINE
Payer: MEDICARE

## 2022-12-13 DIAGNOSIS — J45.40 MODERATE PERSISTENT ASTHMA WITHOUT COMPLICATION: ICD-10-CM

## 2022-12-13 PROCEDURE — 94727 PR PULM FUNCTION TEST BY GAS: ICD-10-PCS | Mod: 26,,, | Performed by: INTERNAL MEDICINE

## 2022-12-13 PROCEDURE — 94729 PR C02/MEMBANE DIFFUSE CAPACITY: ICD-10-PCS | Mod: 26,,, | Performed by: INTERNAL MEDICINE

## 2022-12-13 PROCEDURE — 94727 GAS DIL/WSHOT DETER LNG VOL: CPT | Mod: 26,,, | Performed by: INTERNAL MEDICINE

## 2022-12-13 PROCEDURE — 94060 EVALUATION OF WHEEZING: CPT

## 2022-12-13 PROCEDURE — 94729 DIFFUSING CAPACITY: CPT

## 2022-12-13 PROCEDURE — 94727 GAS DIL/WSHOT DETER LNG VOL: CPT

## 2022-12-13 PROCEDURE — 94060 EVALUATION OF WHEEZING: CPT | Mod: 26,,, | Performed by: INTERNAL MEDICINE

## 2022-12-13 PROCEDURE — 94060 PR EVAL OF BRONCHOSPASM: ICD-10-PCS | Mod: 26,,, | Performed by: INTERNAL MEDICINE

## 2022-12-13 PROCEDURE — 94729 DIFFUSING CAPACITY: CPT | Mod: 26,,, | Performed by: INTERNAL MEDICINE

## 2022-12-13 RX ORDER — ALBUTEROL SULFATE 2.5 MG/.5ML
2.5 SOLUTION RESPIRATORY (INHALATION)
Status: COMPLETED | OUTPATIENT
Start: 2022-12-13 | End: 2022-12-13

## 2022-12-13 RX ADMIN — ALBUTEROL SULFATE 2.5 MG: 2.5 SOLUTION RESPIRATORY (INHALATION) at 03:12

## 2022-12-14 ENCOUNTER — TELEPHONE (OUTPATIENT)
Dept: FAMILY MEDICINE | Facility: CLINIC | Age: 72
End: 2022-12-14

## 2022-12-14 ENCOUNTER — TELEPHONE (OUTPATIENT)
Dept: FAMILY MEDICINE | Facility: CLINIC | Age: 72
End: 2022-12-14
Payer: MEDICARE

## 2022-12-14 NOTE — TELEPHONE ENCOUNTER
----- Message from Albina Spann sent at 12/14/2022  2:25 PM CST -----  Contact: patient  Type:  Patient Returning Call    Who Called:  patient  Who Left Message for Patient:  Brianne  Does the patient know what this is regarding?:    Best Call Back Number:  [phone] (home)   Additional Information:

## 2022-12-15 ENCOUNTER — TELEPHONE (OUTPATIENT)
Dept: FAMILY MEDICINE | Facility: CLINIC | Age: 72
End: 2022-12-15
Payer: MEDICARE

## 2022-12-15 DIAGNOSIS — M06.9 RHEUMATOID ARTHRITIS, INVOLVING UNSPECIFIED SITE, UNSPECIFIED WHETHER RHEUMATOID FACTOR PRESENT: ICD-10-CM

## 2022-12-15 NOTE — TELEPHONE ENCOUNTER
Called patient- she spoke to staff yesterday about lab results and how she was taking her potassium- she realizes she has been taking 3 daily for about 2 months now. Correct dose will need to be sent to [facility].     Recent  onset of right hip pain. Denies injury or fall. Today she started Tylenol and Salonpas gel which has given her some relief. Appt made 1/5/23 for evaluation.

## 2022-12-15 NOTE — TELEPHONE ENCOUNTER
----- Message from Nat Fenton sent at 12/15/2022 12:04 PM CST -----  Contact: Patient  Type:  Patient Call          Who Called: Patient         Does the patient know what this is regarding?: Requesting a call back about her medication potassium ;pt need clarification on taking her potassium ; pain real bad in the right hip pt need pain; ;please advise           Would the patient rather a call back or a response via [facility]? Call           Best Call Back Number:  [phone]             Additional Information:

## 2022-12-16 LAB
BRPFT: NORMAL
DLCO ADJ PRE: 8.81 ML/(MIN*MMHG)
DLCO SINGLE BREATH LLN: 12.38
DLCO SINGLE BREATH PRE REF: 48.6 %
DLCO SINGLE BREATH REF: 18.12
DLCOC SBVA LLN: 2.59
DLCOC SBVA PRE REF: 70.3 %
DLCOC SBVA REF: 4.27
DLCOC SINGLE BREATH LLN: 12.38
DLCOC SINGLE BREATH PRE REF: 48.6 %
DLCOC SINGLE BREATH REF: 18.12
DLCOVA LLN: 2.59
DLCOVA PRE REF: 70.3 %
DLCOVA PRE: 3 ML/(MIN*MMHG*L)
DLCOVA REF: 4.27
DLVAADJ PRE: 3 ML/(MIN*MMHG*L)
ERVN2 LLN: -16449.46
ERVN2 PRE REF: 4.2 %
ERVN2 PRE: 0.02 L
ERVN2 REF: 0.54
FEF 25 75 CHG: 54.8 %
FEF 25 75 LLN: 0.68
FEF 25 75 POST REF: 80.4 %
FEF 25 75 PRE REF: 52 %
FEF 25 75 REF: 1.55
FET100 CHG: -12 %
FEV1 CHG: 8.2 %
FEV1 FVC CHG: 6.5 %
FEV1 FVC LLN: 64
FEV1 FVC POST REF: 98.7 %
FEV1 FVC PRE REF: 92.7 %
FEV1 FVC REF: 78
FEV1 LLN: 1.28
FEV1 POST REF: 78.3 %
FEV1 PRE REF: 72.4 %
FEV1 REF: 1.8
FRCN2 LLN: 1.66
FRCN2 PRE REF: 18.6 %
FRCN2 REF: 2.48
FVC CHG: 1.6 %
FVC LLN: 1.66
FVC POST REF: 78.7 %
FVC PRE REF: 77.5 %
FVC REF: 2.32
IVC PRE: 1.61 L
IVC SINGLE BREATH LLN: 1.66
IVC SINGLE BREATH PRE REF: 69.5 %
IVC SINGLE BREATH REF: 2.32
MVV LLN: 53
MVV PRE REF: 93.3 %
MVV REF: 62
PEF CHG: 9.5 %
PEF LLN: 3.22
PEF POST REF: 111.7 %
PEF PRE REF: 102 %
PEF REF: 4.71
POST FEF 25 75: 1.25 L/S
POST FET 100: 6.9 SEC
POST FEV1 FVC: 77.13 %
POST FEV1: 1.41 L
POST FVC: 1.82 L
POST PEF: 5.26 L/S
PRE DLCO: 8.81 ML/(MIN*MMHG)
PRE FEF 25 75: 0.81 L/S
PRE FET 100: 7.85 SEC
PRE FEV1 FVC: 72.41 %
PRE FEV1: 1.3 L
PRE FRC N2: 0.46 L
PRE FVC: 1.79 L
PRE MVV: 58 L/MIN
PRE PEF: 4.8 L/S
RVN2 LLN: 1.36
RVN2 PRE REF: 22.7 %
RVN2 PRE: 0.44 L
RVN2 REF: 1.94
RVN2TLCN2 LLN: 34.53
RVN2TLCN2 PRE REF: 41.6 %
RVN2TLCN2 PRE: 18.33 %
RVN2TLCN2 REF: 44.12
TLCN2 LLN: 3.25
TLCN2 PRE REF: 56.6 %
TLCN2 PRE: 2.4 L
TLCN2 REF: 4.24
VA PRE: 2.93 L
VA SINGLE BREATH LLN: 4.09
VA SINGLE BREATH PRE REF: 71.7 %
VA SINGLE BREATH REF: 4.09
VCMAXN2 LLN: 1.66
VCMAXN2 PRE REF: 84.6 %
VCMAXN2 PRE: 1.96 L
VCMAXN2 REF: 2.32

## 2022-12-16 RX ORDER — POTASSIUM CHLORIDE 750 MG/1
10 CAPSULE, EXTENDED RELEASE ORAL 3 TIMES DAILY
Qty: 90 CAPSULE | Refills: 0 | Status: SHIPPED | OUTPATIENT
Start: 2022-12-16 | End: 2023-03-10 | Stop reason: SDUPTHER

## 2022-12-16 NOTE — TELEPHONE ENCOUNTER
Rx sent . Please add to appt on 1/5 that she needs to discuss potassium. Also ask patient to bring all medications to this appt for review

## 2022-12-22 ENCOUNTER — TELEPHONE (OUTPATIENT)
Dept: FAMILY MEDICINE | Facility: CLINIC | Age: 72
End: 2022-12-22
Payer: MEDICARE

## 2022-12-22 ENCOUNTER — TELEPHONE (OUTPATIENT)
Dept: PULMONOLOGY | Facility: CLINIC | Age: 72
End: 2022-12-22
Payer: MEDICARE

## 2022-12-22 NOTE — TELEPHONE ENCOUNTER
Called and spoke with pt. Pt was trying to contact pulmonology to get results of a PFT. Informed pt to call [phone] and tell them she is needing to speak with Dr. Diaz nurse. Pt verbalize understanding.

## 2022-12-22 NOTE — TELEPHONE ENCOUNTER
----- Message from Pearlfaby Osorio sent at 12/22/2022  2:00 PM CST -----  Regarding: Results  Contact: Patient  Type:  Patient Returning Call    Who Called:  Patient  Who Left Message for Patient:    Does the patient know what this is regarding?:  Results  Best Call Back Number: [phone]    Additional Information:  Please call patient to advise. She needs to go over it again it wasn't very clear due to having background noise at the time.Thanks!

## 2022-12-22 NOTE — TELEPHONE ENCOUNTER
Attempted to contact. Patient on other line according to who answered phone.     ----- Message from Mariaa Mckenna sent at 12/22/2022  2:45 PM CST -----  Regarding: pt call back  Name of Who is Calling: OBED CORMIER [[id number]]      What is the request in detail: Pt would like the results of her PFT.      Can the clinic reply by [facility]: no       What Number to Call Back if not in [facility]: [phone]

## 2022-12-26 DIAGNOSIS — G47.00 INSOMNIA, UNSPECIFIED TYPE: ICD-10-CM

## 2022-12-26 RX ORDER — TRAZODONE HYDROCHLORIDE 100 MG/1
TABLET ORAL
Qty: 30 TABLET | Refills: 2 | OUTPATIENT
Start: 2022-12-26

## 2022-12-28 ENCOUNTER — TELEPHONE (OUTPATIENT)
Dept: PULMONOLOGY | Facility: CLINIC | Age: 72
End: 2022-12-28
Payer: MEDICARE

## 2022-12-28 NOTE — TELEPHONE ENCOUNTER
----- Message from Valorie Hope sent at 12/28/2022 10:18 AM CST -----  Type: Needs Medical Advice  Who Called:  Trina from [facility]  Symptoms (please be specific):  said she was calling about the referral on pt--said she was missing info and she faxed a form over requesting the missing info and wanted to make sure the office got it and fill it out and fax it back to her--please call and advise  Best Call Back Number: [phone]  Additional Information: thank you

## 2022-12-30 ENCOUNTER — PATIENT OUTREACH (OUTPATIENT)
Dept: ADMINISTRATIVE | Facility: HOSPITAL | Age: 72
End: 2022-12-30
Payer: MEDICARE

## 2022-12-30 NOTE — PROGRESS NOTES
[facility]'S SYSTEM IS DOWN FOR PAST 2 DAYS. COLONOSCOPY REQUEST SENT TO DR LUCIANO/DR OLMSTEAD'S OFFICE WITH EXPLANATION THAT [facility] IS DOWN AND WE WOULD LIKE A COPY OF THE COLON REPORT FROM THEM.

## 2022-12-30 NOTE — LETTER
AUTHORIZATION FOR RELEASE OF   CONFIDENTIAL INFORMATION    DR LUCIANO/DR OLMSTEAD    We are seeing Genoveva Gilbert, date of birth 1950, in the clinic at [facility]. Rajesh Dubois MD is the patient's PCP. Genoveva Gilbert has an outstanding lab/procedure at the time we reviewed her chart. In order to help keep her health information updated, she has authorized us to request the following medical record(s):                                            ( X )  COLONOSCOPY    [employer]S SYSTEM HAS BEEN DOWN FOR 2 DAYS. WE CANNOT RETRIEVE THE COLONOSCOPY REPORT FOR OUR PROVIDER.  PLEASE FAX A COPY OF THE REPORT SO WE CAN UPDATE THE CHART FOR OUR PROVIDER AS HE HAS REQUESTED.            Please fax records to [facility], Stephen Lee Lambert, MD, [phone]    Thank you in advance,       Hannah MONSIVAIS  Care Coordinator  [place] [facility] [address] [place] [address]  Phone [phone]  Fax [phone]           Patient Name: Genoveva Gilbert  : 1950  Patient Phone #: [phone]

## 2023-01-05 ENCOUNTER — OFFICE VISIT (OUTPATIENT)
Dept: FAMILY MEDICINE | Facility: CLINIC | Age: 73
End: 2023-01-05
Payer: MEDICARE

## 2023-01-05 ENCOUNTER — HOSPITAL ENCOUNTER (OUTPATIENT)
Dept: RADIOLOGY | Facility: HOSPITAL | Age: 73
Discharge: HOME | End: 2023-01-05
Attending: NURSE PRACTITIONER
Payer: MEDICARE

## 2023-01-05 VITALS
TEMPERATURE: 99 F | HEIGHT: 60 IN | OXYGEN SATURATION: 97 % | HEART RATE: 64 BPM | BODY MASS INDEX: 40.43 KG/M2 | WEIGHT: 205.94 LBS | DIASTOLIC BLOOD PRESSURE: 60 MMHG | SYSTOLIC BLOOD PRESSURE: 102 MMHG

## 2023-01-05 DIAGNOSIS — M25.551 RIGHT HIP PAIN: Primary | ICD-10-CM

## 2023-01-05 DIAGNOSIS — L30.4 INTERTRIGO: ICD-10-CM

## 2023-01-05 DIAGNOSIS — M25.551 RIGHT HIP PAIN: ICD-10-CM

## 2023-01-05 PROCEDURE — 1160F RVW MEDS BY RX/DR IN RCRD: CPT | Mod: CPTII,S$GLB,, | Performed by: NURSE PRACTITIONER

## 2023-01-05 PROCEDURE — 1159F MED LIST DOCD IN RCRD: CPT | Mod: CPTII,S$GLB,, | Performed by: NURSE PRACTITIONER

## 2023-01-05 PROCEDURE — 99213 PR OFFICE/OUTPT VISIT, EST, LEVL III, 20-29 MIN: ICD-10-PCS | Mod: S$GLB,,, | Performed by: NURSE PRACTITIONER

## 2023-01-05 PROCEDURE — 3288F PR FALLS RISK ASSESSMENT DOCUMENTED: ICD-10-PCS | Mod: CPTII,S$GLB,, | Performed by: NURSE PRACTITIONER

## 2023-01-05 PROCEDURE — 1125F AMNT PAIN NOTED PAIN PRSNT: CPT | Mod: CPTII,S$GLB,, | Performed by: NURSE PRACTITIONER

## 2023-01-05 PROCEDURE — 3078F PR MOST RECENT DIASTOLIC BLOOD PRESSURE < 80 MM HG: ICD-10-PCS | Mod: CPTII,S$GLB,, | Performed by: NURSE PRACTITIONER

## 2023-01-05 PROCEDURE — 3074F PR MOST RECENT SYSTOLIC BLOOD PRESSURE < 130 MM HG: ICD-10-PCS | Mod: CPTII,S$GLB,, | Performed by: NURSE PRACTITIONER

## 2023-01-05 PROCEDURE — 1100F PTFALLS ASSESS-DOCD GE2>/YR: CPT | Mod: CPTII,S$GLB,, | Performed by: NURSE PRACTITIONER

## 2023-01-05 PROCEDURE — 3078F DIAST BP <80 MM HG: CPT | Mod: CPTII,S$GLB,, | Performed by: NURSE PRACTITIONER

## 2023-01-05 PROCEDURE — 73502 X-RAY EXAM HIP UNI 2-3 VIEWS: CPT | Mod: TC,FY,RT

## 2023-01-05 PROCEDURE — 99213 OFFICE O/P EST LOW 20 MIN: CPT | Mod: S$GLB,,, | Performed by: NURSE PRACTITIONER

## 2023-01-05 PROCEDURE — 3008F PR BODY MASS INDEX (BMI) DOCUMENTED: ICD-10-PCS | Mod: CPTII,S$GLB,, | Performed by: NURSE PRACTITIONER

## 2023-01-05 PROCEDURE — 99999 PR PBB SHADOW E&M-EST. PATIENT-LVL V: CPT | Mod: PBBFAC,,, | Performed by: NURSE PRACTITIONER

## 2023-01-05 PROCEDURE — 1159F PR MEDICATION LIST DOCUMENTED IN MEDICAL RECORD: ICD-10-PCS | Mod: CPTII,S$GLB,, | Performed by: NURSE PRACTITIONER

## 2023-01-05 PROCEDURE — 3008F BODY MASS INDEX DOCD: CPT | Mod: CPTII,S$GLB,, | Performed by: NURSE PRACTITIONER

## 2023-01-05 PROCEDURE — 1100F PR PT FALLS ASSESS DOC 2+ FALLS/FALL W/INJURY/YR: ICD-10-PCS | Mod: CPTII,S$GLB,, | Performed by: NURSE PRACTITIONER

## 2023-01-05 PROCEDURE — 99999 PR PBB SHADOW E&M-EST. PATIENT-LVL V: ICD-10-PCS | Mod: PBBFAC,,, | Performed by: NURSE PRACTITIONER

## 2023-01-05 PROCEDURE — 73502 X-RAY EXAM HIP UNI 2-3 VIEWS: CPT | Mod: 26,RT,, | Performed by: RADIOLOGY

## 2023-01-05 PROCEDURE — 73502 XR HIP WITH PELVIS WHEN PERFORMED, 2 OR 3  VIEWS RIGHT: ICD-10-PCS | Mod: 26,RT,, | Performed by: RADIOLOGY

## 2023-01-05 PROCEDURE — 1125F PR PAIN SEVERITY QUANTIFIED, PAIN PRESENT: ICD-10-PCS | Mod: CPTII,S$GLB,, | Performed by: NURSE PRACTITIONER

## 2023-01-05 PROCEDURE — 3074F SYST BP LT 130 MM HG: CPT | Mod: CPTII,S$GLB,, | Performed by: NURSE PRACTITIONER

## 2023-01-05 PROCEDURE — 1160F PR REVIEW ALL MEDS BY PRESCRIBER/CLIN PHARMACIST DOCUMENTED: ICD-10-PCS | Mod: CPTII,S$GLB,, | Performed by: NURSE PRACTITIONER

## 2023-01-05 PROCEDURE — 3288F FALL RISK ASSESSMENT DOCD: CPT | Mod: CPTII,S$GLB,, | Performed by: NURSE PRACTITIONER

## 2023-01-05 NOTE — PROGRESS NOTES
Subjective:       Patient ID: Genoveva Gilbert is a 72 y.o. female.    Chief Complaint: Hip Pain     HPI   73 y/o female patient with medical problems listed below presents for right hip pain for 2 weeks. Denies recent trauma to the affected area. Patient describes pain as aching, pain 8/10 in intensity, non radiating, constant, not associated with nausea, abdominal pain, urinary or bowel symptoms, fever, chills, generalized body ache. Patient states takes tramadol as needed for pain with relief.     Patient is followed by dr. Pitts for shoulder and knee pain- on tramadol 50 mg    Patient Active Problem List   Diagnosis    Hypothyroidism    Fibromyalgia    Anemia    Hyperlipidemia    Hypertension associated with diabetes    Lymphadenopathy, hilar    Hypokalemia    Back pain, chronic    Polypharmacy    Gastroesophageal reflux disease    Morbid obesity with BMI of 40.0-44.9, adult    NAFLD (nonalcoholic fatty liver disease)    Bilateral sciatica    Subjective memory complaints    Other spondylosis, lumbar region    Lumbar radiculitis    Lymphedema of both lower extremities    Heart failure    Localized, primary osteoarthritis of shoulder region    Moderate major depression, single episode    Obstructive sleep apnea syndrome    Rheumatoid arthritis    Restless legs syndrome    Type 2 diabetes mellitus with hyperglycemia, without long-term current use of insulin    Insomnia    Multiple sclerosis    Osteoarthritis of left knee    Diet-controlled type 2 diabetes mellitus    Dislocation closed    Pain    Traumatic tear of right rotator cuff, subsequent encounter    Status post Domenic reverse arthroplasty of shoulder, right April 12, 2022    Hyperlipidemia associated with type 2 diabetes mellitus    Elevated troponin    Near syncope        Review of patient's allergies indicates:   Allergen Reactions    Keflex [cephalexin]      Throat swelling    Pcn [penicillins]      Throat swelling    Latex, natural rubber Other (See  Comments)     Redness with bandaids     Adhesive Other (See Comments)     bandainds redness at skin    Trelegy ellipta [fluticasone-umeclidin-vilanter]      Vision disturbance       Past Surgical History:   Procedure Laterality Date    APPENDECTOMY      ARTHROSCOPIC DEBRIDEMENT OF SHOULDER Right 2/18/2022    Procedure: EXTENSIVE DEBRIDEMENT, SHOULDER, ARTHROSCOPIC;  Surgeon: Rio Pitts MD;  Location: [facility] OR;  Service: Orthopedics;  Laterality: Right;    BREAST SURGERY      reduction    CLOSED REDUCTION OF INJURY OF SHOULDER Right 9/19/2021    Procedure: CLOSED REDUCTION, SHOULDER;  Surgeon: Antonio Irwin MD;  Location: [facility] OR;  Service: Orthopedics;  Laterality: Right;    EPIDURAL STEROID INJECTION INTO LUMBAR SPINE N/A 6/12/2019    Procedure: Injection-steroid-epidural-lumbar;  Surgeon: Thad Manning MD;  Location: [facility];  Service: Pain Management;  Laterality: N/A;  L5-S1    EPIDURAL STEROID INJECTION INTO LUMBAR SPINE N/A 9/16/2019    Procedure: Injection-steroid-epidural-lumbar;  Surgeon: Thad Manning MD;  Location: [facility];  Service: Pain Management;  Laterality: N/A;  L5-S1    EYE SURGERY Bilateral     HYSTERECTOMY      partial - fibroids    INJECTION OF ANESTHETIC AGENT AROUND MEDIAL BRANCH NERVES INNERVATING LUMBAR FACET JOINT Bilateral 2/28/2019    Procedure: Block-nerve-medial branch-lumbar;  Surgeon: Thad Manning MD;  Location: [facility];  Service: Pain Management;  Laterality: Bilateral;  L3, 4,5     INJECTION OF ANESTHETIC AGENT AROUND MEDIAL BRANCH NERVES INNERVATING LUMBAR FACET JOINT Bilateral 3/21/2019    Procedure: Block-nerve-medial branch-lumbar L3,4,5;  Surgeon: Thad Manning MD;  Location: [facility] OR;  Service: Pain Management;  Laterality: Bilateral;    KNEE ARTHROPLASTY Left 3/16/2021    Procedure: ARTHROPLASTY, KNEE;  Surgeon: Rio Pitts MD;  Location: [facility] OR;  Service: Orthopedics;  Laterality: Left;  GENERAL AND BLOCK    LIPOSUCTION  1985    RADIOFREQUENCY ABLATION  Left 11/4/2020    Procedure: Radiofrequency Ablation left knee;  Surgeon: Andrew Escudero MD;  Location: [facility] OR;  Service: Pain Management;  Laterality: Left;    RADIOFREQUENCY ABLATION OF LUMBAR MEDIAL BRANCH NERVE AT SINGLE LEVEL Bilateral 4/12/2019    Procedure: Radiofrequency Ablation, Nerve, Spinal, Lumbar, Medial Branch, 1 Level;  Surgeon: Thad Manning MD;  Location: [facility] OR;  Service: Pain Management;  Laterality: Bilateral;  L3, 4, 5  lumbar  Hipster pain management generator  SN [id number]  80 degrees for 75 seconds x2    RADIOFREQUENCY ABLATION OF LUMBAR MEDIAL BRANCH NERVE AT SINGLE LEVEL Bilateral 10/22/2019    Procedure: Radiofrequency Ablation, Nerve, Spinal, Lumbar, Medial Branch, L3,4,5;  Surgeon: Thad Manning MD;  Location: [facility] OR;  Service: Pain Management;  Laterality: Bilateral;  burned at 80 degrees C X 60 seconds X 2 each site    RADIOFREQUENCY ABLATION OF LUMBAR MEDIAL BRANCH NERVE AT SINGLE LEVEL Bilateral 5/27/2020    Procedure: Radiofrequency Ablation, Nerve, Spinal, Lumbar, Medial Branch, 1 Level;  Surgeon: Thad Manning MD;  Location: [facility] OR;  Service: Pain Management;  Laterality: Bilateral;  L3,4,5    REVERSE TOTAL SHOULDER ARTHROPLASTY Right 4/12/2022    Procedure: ARTHROPLASTY, SHOULDER, TOTAL, REVERSE;  Surgeon: Rio Pitts MD;  Location: [facility] OR;  Service: Orthopedics;  Laterality: Right;    SHOULDER ARTHROSCOPY Right 9/19/2021    Procedure: ARTHROSCOPY, SHOULDER;  Surgeon: Antonio Irwin MD;  Location: [facility] OR;  Service: Orthopedics;  Laterality: Right;  LIMITED DEBRIDMENT    TONSILLECTOMY      TOTAL REDUCTION MAMMOPLASTY            Current Outpatient Medications:     albuterol (PROAIR HFA) 90 mcg/actuation inhaler, Inhale 2 puffs into the lungs every 6 (six) hours as needed for Wheezing. Rescue, Disp: 18 g, Rfl: 11    ALPRAZolam (XANAX) 1 MG tablet, Take 1 tablet (1 mg total) by mouth 2 (two) times daily as needed for Anxiety (anxiety)., Disp: 60 tablet, Rfl: 0     amLODIPine (NORVASC) 5 MG tablet, Take 1 tablet (5 mg total) by mouth once daily. For blood pressure, Disp: 90 tablet, Rfl: 3    biotin 1 mg Cap, Take by mouth., Disp: , Rfl:     fexofenadine (ALLEGRA) 180 MG tablet, , Disp: , Rfl:     fluticasone-salmeterol 230-21 mcg/dose (ADVAIR HFA) 230-21 mcg/actuation HFAA inhaler, Inhale 2 puffs into the lungs 2 (two) times daily. Controller, Disp: 12 g, Rfl: 11    hydroCHLOROthiazide (HYDRODIURIL) 25 MG tablet, , Disp: , Rfl:     levothyroxine (SYNTHROID) 88 MCG tablet, Take 1 tablet (88 mcg total) by mouth once daily., Disp: 90 tablet, Rfl: 3    losartan (COZAAR) 50 MG tablet, Take 1 tablet (50 mg total) by mouth once daily. For blood pressure, Disp: 90 tablet, Rfl: 0    lovastatin (MEVACOR) 40 MG tablet, Take 1 tablet (40 mg total) by mouth nightly. at bedtime. For cholesterol, Disp: 90 tablet, Rfl: 3    magnesium oxide (MAG-OX) 400 mg (241.3 mg magnesium) tablet, Take 1 tablet (400 mg total) by mouth once daily., Disp: 90 tablet, Rfl: 3    nystatin (MYCOSTATIN) cream, Apply topically 2 (two) times daily., Disp: 60 g, Rfl: 1    omeprazole (PRILOSEC) 20 MG capsule, Take 1 capsule (20 mg total) by mouth once daily. For heartburn, Disp: 90 capsule, Rfl: 3    oxybutynin (DITROPAN) 5 MG Tab, Take 1 tablet (5 mg total) by mouth once daily., Disp: 30 tablet, Rfl: 2    potassium chloride (MICRO-K) 10 MEQ CpSR, Take 1 capsule (10 mEq total) by mouth 3 (three) times daily., Disp: 90 capsule, Rfl: 0    pramipexole (MIRAPEX) 0.5 MG tablet, Take 2 tablets (1 mg total) by mouth every evening. For restless legs, Disp: 180 tablet, Rfl: 3    torsemide (DEMADEX) 10 MG Tab, TAKE 1 TABLET(10 MG) BY MOUTH EVERY DAY, Disp: 90 tablet, Rfl: 3    traMADoL (ULTRAM) 50 mg tablet, Take 1 tablet (50 mg total) by mouth every 6 (six) hours as needed for Pain., Disp: 28 tablet, Rfl: 0    traZODone (DESYREL) 100 MG tablet, TAKE 1 TABLET(100 MG) BY MOUTH EVERY EVENING, Disp: 30 tablet, Rfl: 2     valacyclovir HCl (VALACYCLOVIR ORAL), Take by mouth., Disp: , Rfl:     venlafaxine (EFFEXOR-XR) 150 MG Cp24, Take 1 capsule (150 mg total) by mouth once daily. For depression, Disp: 90 capsule, Rfl: 3    venlafaxine (EFFEXOR-XR) 37.5 MG 24 hr capsule, Take 1 capsule (37.5 mg total) by mouth once daily. Take in addition to the 150mg dose of effexor, Disp: 30 capsule, Rfl: 2    ibuprofen (ADVIL,MOTRIN) 600 MG tablet, Take 1 tablet (600 mg total) by mouth 3 (three) times daily as needed for Pain. (Patient not taking: Reported on 1/5/2023), Disp: 90 tablet, Rfl: 0    Review of Systems   Constitutional:  Negative for chills and fever.   Respiratory:  Negative for cough, chest tightness and shortness of breath.    Cardiovascular:  Negative for chest pain and palpitations.   Gastrointestinal:  Negative for abdominal pain.   Genitourinary:  Negative for dysuria.   Musculoskeletal:  Negative for gait problem.        Right hip pain   Neurological:  Negative for dizziness and headaches.       Objective:   /60 (BP Location: Left arm, Patient Position: Sitting, BP Method: Large (Manual))   Pulse 64   Temp 98.6 °F (37 °C) (Oral)   Ht 5' (1.524 m)   Wt 93.4 kg (205 lb 14.6 oz)   SpO2 97%   BMI 40.21 kg/m²         Physical Exam  Constitutional:       General: She is not in acute distress.     Appearance: Normal appearance.   HENT:      Head: Atraumatic.   Cardiovascular:      Rate and Rhythm: Normal rate and regular rhythm.      Pulses: Normal pulses.      Heart sounds: Normal heart sounds.   Pulmonary:      Effort: Pulmonary effort is normal.      Breath sounds: Normal breath sounds.   Abdominal:      General: Abdomen is flat. Bowel sounds are normal.      Palpations: Abdomen is soft.      Tenderness: There is no abdominal tenderness.   Musculoskeletal:      Comments: +tenderness in right lateral hip   Skin:     General: Skin is warm and dry.      Findings: Erythema present.      Comments: +erythema in right groin    Neurological:      General: No focal deficit present.      Mental Status: She is alert and oriented to person, place, and time.   Psychiatric:         Mood and Affect: Mood normal.       Assessment:       1. Right hip pain    2. Intertrigo        Plan:       1. Right hip pain  - X-Ray Hip 2 or 3 views Right (with Pelvis when performed); Future  - advised to take tylenol as needed for pain  - will follow up result    2. Intertrigo  - advised to use clotrimazole cream over the counter    Patient with be reevaluated in  as needed  or sooner kiara Reza NP

## 2023-01-23 ENCOUNTER — TELEPHONE (OUTPATIENT)
Dept: FAMILY MEDICINE | Facility: CLINIC | Age: 73
End: 2023-01-23
Payer: MEDICARE

## 2023-01-23 ENCOUNTER — TELEPHONE (OUTPATIENT)
Dept: ORTHOPEDICS | Facility: CLINIC | Age: 73
End: 2023-01-23
Payer: MEDICARE

## 2023-01-23 DIAGNOSIS — M25.511 ACUTE PAIN OF RIGHT SHOULDER: Primary | ICD-10-CM

## 2023-01-23 RX ORDER — HYDROCODONE BITARTRATE AND ACETAMINOPHEN 5; 325 MG/1; MG/1
1 TABLET ORAL EVERY 6 HOURS PRN
Qty: 28 TABLET | Refills: 0 | Status: ON HOLD | OUTPATIENT
Start: 2023-01-23 | End: 2023-09-24

## 2023-01-23 NOTE — TELEPHONE ENCOUNTER
----- Message from Nat Fenton sent at 1/23/2023 10:30 AM CST -----  Contact: Patient  Type:  Patient Call          Who Called: Patient         Does the patient know what this is regarding?: Requesting a call back ;pt would like to know why her 3/22 appt was cancelled ; Pt would like to reschedule ;  please advise           Would the patient rather a call back or a response via [facility]? Call           Best Call Back Number: [phone]             Additional Information:

## 2023-01-23 NOTE — TELEPHONE ENCOUNTER
----- Message from Thad Alfaro sent at 1/23/2023 11:56 AM CST -----  Regarding: FW: having pain in R arm, wants to speak to staff, call pt   Contact: pt   Missed call try againplease   ----- Message -----  From: Thad Alfaro  Sent: 1/23/2023  11:36 AM CST  To: [facility] Staff  Subject: having pain in R arm, wants to speak to staf#    having pain in R arm, wants to speak to staff, call pt

## 2023-01-23 NOTE — TELEPHONE ENCOUNTER
----- Message from Thad Alfaro sent at 1/23/2023 11:34 AM CST -----  Regarding: having pain in R arm, wants to speak to staff, call pt   Contact: pt   having pain in R arm, wants to speak to staff, call pt

## 2023-01-23 NOTE — TELEPHONE ENCOUNTER
Pt still having pain in the arm, dirk when pushing or pulling on , as he is terminal and needs care with all ADLS. Pt is not able to leave house at this time due to . Pt is requesting something be sent in for pain until she can come in and discuss. Pt is not willing to go to pain management at this time.

## 2023-01-23 NOTE — TELEPHONE ENCOUNTER
Called and spoke with pt. Pt states her  is terminal and pt is requesting something for anxiety. Pt is afraid to leave her  and states she cannot come in for a visit at this time. Pt last saw you on 10/11/22. Are you able to do a audio visit with pt if needed? Please advise.

## 2023-01-24 ENCOUNTER — OFFICE VISIT (OUTPATIENT)
Dept: FAMILY MEDICINE | Facility: CLINIC | Age: 73
End: 2023-01-24
Payer: MEDICARE

## 2023-01-24 DIAGNOSIS — E61.1 IRON DEFICIENCY: ICD-10-CM

## 2023-01-24 DIAGNOSIS — E66.01 MORBID OBESITY WITH BMI OF 40.0-44.9, ADULT: ICD-10-CM

## 2023-01-24 DIAGNOSIS — G35 MULTIPLE SCLEROSIS: ICD-10-CM

## 2023-01-24 DIAGNOSIS — F41.1 GAD (GENERALIZED ANXIETY DISORDER): Primary | ICD-10-CM

## 2023-01-24 DIAGNOSIS — I27.20 PULMONARY HTN: ICD-10-CM

## 2023-01-24 DIAGNOSIS — J41.0 SIMPLE CHRONIC BRONCHITIS: ICD-10-CM

## 2023-01-24 DIAGNOSIS — M06.9 RHEUMATOID ARTHRITIS, INVOLVING UNSPECIFIED SITE, UNSPECIFIED WHETHER RHEUMATOID FACTOR PRESENT: ICD-10-CM

## 2023-01-24 DIAGNOSIS — F32.1 MODERATE MAJOR DEPRESSION, SINGLE EPISODE: ICD-10-CM

## 2023-01-24 DIAGNOSIS — M25.551 RIGHT HIP PAIN: ICD-10-CM

## 2023-01-24 DIAGNOSIS — Z12.11 COLON CANCER SCREENING: ICD-10-CM

## 2023-01-24 DIAGNOSIS — E11.59 HYPERTENSION ASSOCIATED WITH DIABETES: ICD-10-CM

## 2023-01-24 DIAGNOSIS — I15.2 HYPERTENSION ASSOCIATED WITH DIABETES: ICD-10-CM

## 2023-01-24 PROCEDURE — 99443 PR PHYSICIAN TELEPHONE EVALUATION 21-30 MIN: ICD-10-PCS | Mod: 95,,, | Performed by: STUDENT IN AN ORGANIZED HEALTH CARE EDUCATION/TRAINING PROGRAM

## 2023-01-24 PROCEDURE — 99443 PR PHYSICIAN TELEPHONE EVALUATION 21-30 MIN: CPT | Mod: 95,,, | Performed by: STUDENT IN AN ORGANIZED HEALTH CARE EDUCATION/TRAINING PROGRAM

## 2023-01-24 RX ORDER — BUSPIRONE HYDROCHLORIDE 7.5 MG/1
7.5 TABLET ORAL 2 TIMES DAILY
Qty: 60 TABLET | Refills: 2 | Status: SHIPPED | OUTPATIENT
Start: 2023-01-24 | End: 2023-02-17

## 2023-01-24 NOTE — TELEPHONE ENCOUNTER
Called and spoke with pt. Informed pt that Dr. Dubois is going to call her and do a telephone visit with her. Pt verbalized understanding.

## 2023-01-24 NOTE — PROGRESS NOTES
Established Patient - Audio Only Telehealth Visit     The patient location is: [place]  The chief complaint leading to consultation is: anxiety   Visit type: Virtual visit with audio only (telephone)  Total time spent with patient: 21       The reason for the audio only service rather than synchronous audio and video virtual visit was related to technical difficulties or patient preference/necessity.     Each patient to whom I provide medical services by telemedicine is:  (1) informed of the relationship between the physician and patient and the respective role of any other health care provider with respect to management of the patient; and (2) notified that they may decline to receive medical services by telemedicine and may withdraw from such care at any time. Patient verbally consented to receive this service via voice-only telephone call.       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  She does not have much time and is taking Effexor with some relief still has lot of anxiety and stress her life.  Wants to try an additional medication.     She notes she is tired at the end of the day is not taking iron no blood or black in stool no unintentional weight loss.  Blood pressure has been well controlled breathing has been stable.     Assessment and plan:      MARCOS-needs improvement will continue Effexor will add on BuSpar twice a day increases tolerated     Iron deficiency anemia-had a low iron level recommend take iron recommended get a colonoscopy an EGD to rule out colon cancer and other malignancies.  Patient reports she understands the risks and does not want to get a colonoscopy at this time and she reports she does not have time since her  is dependent on her.  Recommended strongly to get this done and she understands the risk and wants to hold off on this.  Did offer a Cologuard which is not as good as a colonoscopy and not  first-line but she is open to trying this and thus I will send this in since it is better than no screening at all.    HTN-stable cont current meds    Morbid obesity-recommend low glycemic index diet stay active monitor A1c and lipid levels.  TSH within normal limits.    RA-stable cont current meds     Depression-stable continue Effexor no SI or plan    MS-no recent issues stable complaints    Pulm HTN-stable continue current medications inhalers monitor no shortness of breath at rest                 This service was not originating from a related E/M service provided within the previous 7 days nor will  to an E/M service or procedure within the next 24 hours or my soonest available appointment.  Prevailing standard of care was able to be met in this audio-only visit.

## 2023-02-09 ENCOUNTER — PATIENT OUTREACH (OUTPATIENT)
Dept: ADMINISTRATIVE | Facility: HOSPITAL | Age: 73
End: 2023-02-09
Payer: MEDICARE

## 2023-02-09 NOTE — LETTER
AUTHORIZATION FOR RELEASE OF   CONFIDENTIAL INFORMATION    Dear Jose,    We are seeing Genoveva Gilbert, date of birth 1950, in the clinic at [facility]. Rajesh Dubois MD is the patient's PCP. Genoveva Gilbert has an outstanding lab/procedure at the time we reviewed her chart. In order to help keep her health information updated, she has authorized us to request the following medical record(s):                                         ( X )  COLONOSCOPY - most recent              Please fax records to [facility], [facility], [place], [phone]     If you have any questions, please contact [facility] at [phone].           Patient Name: Genoveva Gilbert  : 1950  Patient Phone #: [phone]

## 2023-02-13 ENCOUNTER — TELEPHONE (OUTPATIENT)
Dept: FAMILY MEDICINE | Facility: CLINIC | Age: 73
End: 2023-02-13
Payer: MEDICARE

## 2023-02-13 DIAGNOSIS — G47.00 INSOMNIA, UNSPECIFIED TYPE: Primary | ICD-10-CM

## 2023-02-14 RX ORDER — ALPRAZOLAM 1 MG/1
1 TABLET ORAL NIGHTLY PRN
Qty: 5 TABLET | Refills: 0 | Status: SHIPPED | OUTPATIENT
Start: 2023-02-14 | End: 2023-03-08

## 2023-02-14 NOTE — TELEPHONE ENCOUNTER
----- Message from Nat Fenton sent at 2/13/2023  4:31 PM CST -----  Contact: Patient  Type:  Patient Call          Who Called: patient       Does the patient know what this is regarding?: Requesting a call back  for Rx ALPRAZolam (XANAX) 1 MG tablet ; pt said that she's not getting any sleep ; please advise           Would the patient rather a call back or a response via [facility]? Call           Best Call Back Number: [phone]             Additional Information:   [id number] - [place] - [address]  [address]  [place] [address]  Phone: [phone] Fax: [phone]           
Called and spoke with pt. Pt scheduled to see Dr. Dubois on 2/16. Pt is having a really hard time with her husbands passing and trouble sleeping. Pt states her husbands burial service is tomorrow and would like to know if something can be sent in to get her through until her appt on 2/16. Please advise.  
Informed pt message from provider. Pt verbalized understanding.   
Sent 5 pills of xanax temporarily until visit.  reviewed and consistent with medication use as prescribed. Will refill.     I received your message and based on this, the following orders were placed AND/OR medicines were sent in.     No orders of the defined types were placed in this encounter.      Medications written and sent at this time include:  Medications Ordered This Encounter   Medications    ALPRAZolam (XANAX) 1 MG tablet     Sig: Take 1 tablet (1 mg total) by mouth nightly as needed for Insomnia or Anxiety.     Dispense:  5 tablet     Refill:  0       If you are due for any health screening(s) below please notify me so we can arrange them to be ordered and scheduled to maintain your health. Most healthy patients at your age complete them.     All of your core healthy metrics are met.      Thanks in advance      Rajesh Dubois MD      This note was completed with dictation software and grammatical errors may exist.  
Spoke to pt informed and verbalizes understanding  
Spoke to pt states she lost her  2 weeks ago and has not been sleeping at all. Pt states she is having trouble adjusting. Pt states her anxiety is very high right now as well. Pt has been taking benadryl with no help. Pt requesting xanax be sent to her pharmacy. Pt states all she wants is to get some sleep. Pt states please please please send something to her pharmacy. Pt states she tried buspar and was wired so stopped taking it. Pt states the buspar is not helping her but increased her symptoms.  
Left without being seen (saw a nurse but never saw a physician or midlevel provider)

## 2023-02-16 ENCOUNTER — PATIENT OUTREACH (OUTPATIENT)
Dept: ADMINISTRATIVE | Facility: HOSPITAL | Age: 73
End: 2023-02-16
Payer: MEDICARE

## 2023-02-16 ENCOUNTER — TELEPHONE (OUTPATIENT)
Dept: FAMILY MEDICINE | Facility: CLINIC | Age: 73
End: 2023-02-16
Payer: MEDICARE

## 2023-02-16 NOTE — TELEPHONE ENCOUNTER
----- Message from Zakia Christianson sent at 2/16/2023  9:58 AM CST -----  Contact: patient  Type:  Needs Medical Advice    Who Called:  patient       Would the patient rather a call back or a response via [facility]? Call     Best Call Back Number: [phone] (home)      Additional Information:  Patient would like to speak with the nurse in regards to her appointment. She stated that she did not fall asleep until 4 this morning and just now waking up.     Please call to advise

## 2023-02-16 NOTE — LETTER
February 16, 2023    Genoveva Gilbert  [address]  [address]  [place] [address]             [facility]  [address]  [place] [address]  Phone: [phone] Genoveva Gilbert  [address]  [address]  [place] [address]    [facility] to your overall health. To help you get the most out of each of your visits, we will review your information to make sure you are up to date on all of your recommended tests and/or procedures.      Dr. Rajesh Dubois MD  has found that your chart shows you may be due for    Colon Cancer Screening     If you have had any of the above done at another facility, please let me know and I will request a copy of your report so that your record at [facility] will be complete. If you would like to schedule this/ any of these, please contact the clinic at [phone].    Thank You,    Your [facility],  MD Vasquez Martinez LPN,CCC [facility] [facility]  [address] [address]  Phone [phone]  Fax [phone]

## 2023-02-16 NOTE — TELEPHONE ENCOUNTER
Returned call to pt. Pt states she fell asleep after not being able to sleep for days and will miss her appt. Pt appt rescheduled as requested. Pt  just passed away and she has had trouble sleeping and depression.

## 2023-02-17 ENCOUNTER — OFFICE VISIT (OUTPATIENT)
Dept: FAMILY MEDICINE | Facility: CLINIC | Age: 73
End: 2023-02-17
Payer: MEDICARE

## 2023-02-17 VITALS
HEART RATE: 85 BPM | TEMPERATURE: 98 F | OXYGEN SATURATION: 95 % | SYSTOLIC BLOOD PRESSURE: 112 MMHG | BODY MASS INDEX: 38.22 KG/M2 | WEIGHT: 194.69 LBS | DIASTOLIC BLOOD PRESSURE: 64 MMHG | HEIGHT: 60 IN

## 2023-02-17 DIAGNOSIS — F32.1 CURRENT MODERATE EPISODE OF MAJOR DEPRESSIVE DISORDER, UNSPECIFIED WHETHER RECURRENT: ICD-10-CM

## 2023-02-17 DIAGNOSIS — F43.21 GRIEF: ICD-10-CM

## 2023-02-17 DIAGNOSIS — G47.00 INSOMNIA, UNSPECIFIED TYPE: Primary | ICD-10-CM

## 2023-02-17 PROCEDURE — 3078F PR MOST RECENT DIASTOLIC BLOOD PRESSURE < 80 MM HG: ICD-10-PCS | Mod: CPTII,S$GLB,, | Performed by: PHYSICIAN ASSISTANT

## 2023-02-17 PROCEDURE — 99999 PR PBB SHADOW E&M-EST. PATIENT-LVL V: CPT | Mod: PBBFAC,,, | Performed by: PHYSICIAN ASSISTANT

## 2023-02-17 PROCEDURE — 1159F PR MEDICATION LIST DOCUMENTED IN MEDICAL RECORD: ICD-10-PCS | Mod: CPTII,S$GLB,, | Performed by: PHYSICIAN ASSISTANT

## 2023-02-17 PROCEDURE — 99214 PR OFFICE/OUTPT VISIT, EST, LEVL IV, 30-39 MIN: ICD-10-PCS | Mod: S$GLB,,, | Performed by: PHYSICIAN ASSISTANT

## 2023-02-17 PROCEDURE — 1125F AMNT PAIN NOTED PAIN PRSNT: CPT | Mod: CPTII,S$GLB,, | Performed by: PHYSICIAN ASSISTANT

## 2023-02-17 PROCEDURE — 1101F PR PT FALLS ASSESS DOC 0-1 FALLS W/OUT INJ PAST YR: ICD-10-PCS | Mod: CPTII,S$GLB,, | Performed by: PHYSICIAN ASSISTANT

## 2023-02-17 PROCEDURE — 3078F DIAST BP <80 MM HG: CPT | Mod: CPTII,S$GLB,, | Performed by: PHYSICIAN ASSISTANT

## 2023-02-17 PROCEDURE — 1101F PT FALLS ASSESS-DOCD LE1/YR: CPT | Mod: CPTII,S$GLB,, | Performed by: PHYSICIAN ASSISTANT

## 2023-02-17 PROCEDURE — 3074F PR MOST RECENT SYSTOLIC BLOOD PRESSURE < 130 MM HG: ICD-10-PCS | Mod: CPTII,S$GLB,, | Performed by: PHYSICIAN ASSISTANT

## 2023-02-17 PROCEDURE — 99999 PR PBB SHADOW E&M-EST. PATIENT-LVL V: ICD-10-PCS | Mod: PBBFAC,,, | Performed by: PHYSICIAN ASSISTANT

## 2023-02-17 PROCEDURE — 99214 OFFICE O/P EST MOD 30 MIN: CPT | Mod: S$GLB,,, | Performed by: PHYSICIAN ASSISTANT

## 2023-02-17 PROCEDURE — 3074F SYST BP LT 130 MM HG: CPT | Mod: CPTII,S$GLB,, | Performed by: PHYSICIAN ASSISTANT

## 2023-02-17 PROCEDURE — 3288F PR FALLS RISK ASSESSMENT DOCUMENTED: ICD-10-PCS | Mod: CPTII,S$GLB,, | Performed by: PHYSICIAN ASSISTANT

## 2023-02-17 PROCEDURE — 1159F MED LIST DOCD IN RCRD: CPT | Mod: CPTII,S$GLB,, | Performed by: PHYSICIAN ASSISTANT

## 2023-02-17 PROCEDURE — 3288F FALL RISK ASSESSMENT DOCD: CPT | Mod: CPTII,S$GLB,, | Performed by: PHYSICIAN ASSISTANT

## 2023-02-17 PROCEDURE — 1125F PR PAIN SEVERITY QUANTIFIED, PAIN PRESENT: ICD-10-PCS | Mod: CPTII,S$GLB,, | Performed by: PHYSICIAN ASSISTANT

## 2023-02-17 RX ORDER — MIRTAZAPINE 7.5 MG/1
7.5 TABLET, FILM COATED ORAL NIGHTLY
Qty: 30 TABLET | Refills: 2 | Status: ON HOLD | OUTPATIENT
Start: 2023-02-17 | End: 2023-06-25 | Stop reason: HOSPADM

## 2023-02-17 NOTE — PROGRESS NOTES
Subjective:       Patient ID: Genoveva Gilbert is a 73 y.o. female.    Chief Complaint: Insomnia    Patient with MDD, MARCOS, insomnia presents for follow up of insomnia.  Her  passed away 3 weeks ago.  She is unable to sleep at nightttime.  She called PCP who sent in #5 xanax.  She took the medication and it seemed to help.  She is taking venlafaxine which seems to help with chronic symptoms but she is crying and having lack of motivation since her  passing.  She lives alone.  She has a close friend who she speaks with.  She mentions that family does not want to be around her because of her mood.  Patients patient medical/surgical, social and family histories have been reviewed       Review of Systems   Psychiatric/Behavioral:  Positive for dysphoric mood and sleep disturbance. Negative for self-injury and suicidal ideas. The patient is nervous/anxious. The patient is not hyperactive.      Objective:      Physical Exam  Constitutional:       General: She is not in acute distress.     Appearance: Normal appearance. She is not ill-appearing.   HENT:      Head: Normocephalic and atraumatic.   Eyes:      Conjunctiva/sclera: Conjunctivae normal.   Pulmonary:      Effort: Pulmonary effort is normal.   Neurological:      Mental Status: She is alert.   Psychiatric:         Attention and Perception: Attention and perception normal.         Mood and Affect: Mood is depressed. Affect is tearful.         Speech: Speech normal.         Behavior: Behavior normal. Behavior is cooperative.         Thought Content: Thought content normal.         Cognition and Memory: Cognition and memory normal.         Judgment: Judgment normal.       Assessment:       1. Insomnia, unspecified type    2. Current moderate episode of major depressive disorder, unspecified whether recurrent    3. Grief          Plan:       Genoveva was seen today for insomnia.    Diagnoses and all orders for this visit:    Insomnia, unspecified type  -      "mirtazapine (REMERON) 7.5 MG Tab; Take 1 tablet (7.5 mg total) by mouth every evening.    Current moderate episode of major depressive disorder, unspecified whether recurrent  -     mirtazapine (REMERON) 7.5 MG Tab; Take 1 tablet (7.5 mg total) by mouth every evening.  Do not recommend long term use of benzodiazepine.    Continue effexor  She had buspar and trazodone listed but she states that she is not taking these medications due to side effects     Grief     Offered referral for counseling but she declines       Follow up for Follow-Up with PCP 1mo.           Documentation entered by me for this encounter may have been done in part using speech-recognition technology. Although I have made an effort to ensure accuracy, "sound like" errors may exist and should be interpreted in context.     "

## 2023-03-01 ENCOUNTER — TELEPHONE (OUTPATIENT)
Dept: FAMILY MEDICINE | Facility: CLINIC | Age: 73
End: 2023-03-01
Payer: MEDICARE

## 2023-03-01 NOTE — TELEPHONE ENCOUNTER
----- Message from Nirali Eaton sent at 3/1/2023  4:22 PM CST -----  Contact: Self  Type: Needs Medical Advice  Who Called:  Patient  Symptoms (please be specific):  Patients legs and feet have been swollen, stated it seems to have gotten bigger this afternoon  How long has patient had these symptoms:  started yesterday  Pharmacy name and phone #:    [facility] #[id number] - [place]  [address] [facility] [address]  [facility] [address] [address]  Phone: [phone] Fax: [phone]  Best Call Back Number: [phone] or [phone]  Additional Information: Please call pt back to advise, worried that it hasn't gone down. Thank You.

## 2023-03-01 NOTE — TELEPHONE ENCOUNTER
Spoke with pt. See pt message.  Pt has an appointment next Wednesday, March 8th. Pt stated she would like to know if there is an antiinflammatory or something like that she can take. Advised pt if swelling got worse or she experienced any other symptoms to go to ER. Please advise.

## 2023-03-06 NOTE — TELEPHONE ENCOUNTER
Spoke with pt. Notified pt of providers recommendation. Pt stated swelling has gone down. Advised pt to call if swelling returned.

## 2023-03-08 ENCOUNTER — OFFICE VISIT (OUTPATIENT)
Dept: FAMILY MEDICINE | Facility: CLINIC | Age: 73
End: 2023-03-08
Payer: MEDICARE

## 2023-03-08 ENCOUNTER — OFFICE VISIT (OUTPATIENT)
Dept: PULMONOLOGY | Facility: CLINIC | Age: 73
End: 2023-03-08
Payer: MEDICARE

## 2023-03-08 ENCOUNTER — TELEPHONE (OUTPATIENT)
Dept: PULMONOLOGY | Facility: CLINIC | Age: 73
End: 2023-03-08
Payer: MEDICARE

## 2023-03-08 VITALS
WEIGHT: 196.63 LBS | TEMPERATURE: 98 F | HEIGHT: 60 IN | HEART RATE: 74 BPM | BODY MASS INDEX: 38.6 KG/M2 | OXYGEN SATURATION: 95 % | DIASTOLIC BLOOD PRESSURE: 76 MMHG | SYSTOLIC BLOOD PRESSURE: 126 MMHG

## 2023-03-08 VITALS
WEIGHT: 193.81 LBS | BODY MASS INDEX: 38.05 KG/M2 | OXYGEN SATURATION: 95 % | SYSTOLIC BLOOD PRESSURE: 126 MMHG | HEART RATE: 68 BPM | DIASTOLIC BLOOD PRESSURE: 76 MMHG | HEIGHT: 60 IN

## 2023-03-08 DIAGNOSIS — G47.33 OBSTRUCTIVE SLEEP APNEA SYNDROME: Primary | ICD-10-CM

## 2023-03-08 DIAGNOSIS — F32.1 CURRENT MODERATE EPISODE OF MAJOR DEPRESSIVE DISORDER, UNSPECIFIED WHETHER RECURRENT: ICD-10-CM

## 2023-03-08 DIAGNOSIS — M25.511 CHRONIC PAIN OF BOTH SHOULDERS: Primary | ICD-10-CM

## 2023-03-08 DIAGNOSIS — M25.512 CHRONIC PAIN OF BOTH SHOULDERS: Primary | ICD-10-CM

## 2023-03-08 DIAGNOSIS — F43.21 GRIEF: ICD-10-CM

## 2023-03-08 DIAGNOSIS — I89.0 LYMPHEDEMA: ICD-10-CM

## 2023-03-08 DIAGNOSIS — Z63.4 BEREAVEMENT: ICD-10-CM

## 2023-03-08 DIAGNOSIS — G89.29 CHRONIC PAIN OF BOTH SHOULDERS: Primary | ICD-10-CM

## 2023-03-08 PROCEDURE — 1159F MED LIST DOCD IN RCRD: CPT | Mod: CPTII,S$GLB,, | Performed by: PHYSICIAN ASSISTANT

## 2023-03-08 PROCEDURE — 99999 PR PBB SHADOW E&M-EST. PATIENT-LVL V: ICD-10-PCS | Mod: PBBFAC,,, | Performed by: PHYSICIAN ASSISTANT

## 2023-03-08 PROCEDURE — 99999 PR PBB SHADOW E&M-EST. PATIENT-LVL III: ICD-10-PCS | Mod: PBBFAC,,, | Performed by: INTERNAL MEDICINE

## 2023-03-08 PROCEDURE — 3288F FALL RISK ASSESSMENT DOCD: CPT | Mod: CPTII,S$GLB,, | Performed by: PHYSICIAN ASSISTANT

## 2023-03-08 PROCEDURE — 99999 PR PBB SHADOW E&M-EST. PATIENT-LVL III: CPT | Mod: PBBFAC,,, | Performed by: INTERNAL MEDICINE

## 2023-03-08 PROCEDURE — 1159F PR MEDICATION LIST DOCUMENTED IN MEDICAL RECORD: ICD-10-PCS | Mod: CPTII,S$GLB,, | Performed by: PHYSICIAN ASSISTANT

## 2023-03-08 PROCEDURE — 1101F PT FALLS ASSESS-DOCD LE1/YR: CPT | Mod: CPTII,S$GLB,, | Performed by: INTERNAL MEDICINE

## 2023-03-08 PROCEDURE — 1125F PR PAIN SEVERITY QUANTIFIED, PAIN PRESENT: ICD-10-PCS | Mod: CPTII,S$GLB,, | Performed by: PHYSICIAN ASSISTANT

## 2023-03-08 PROCEDURE — 1126F AMNT PAIN NOTED NONE PRSNT: CPT | Mod: CPTII,S$GLB,, | Performed by: INTERNAL MEDICINE

## 2023-03-08 PROCEDURE — 4010F PR ACE/ARB THEARPY RXD/TAKEN: ICD-10-PCS | Mod: CPTII,S$GLB,, | Performed by: PHYSICIAN ASSISTANT

## 2023-03-08 PROCEDURE — 4010F ACE/ARB THERAPY RXD/TAKEN: CPT | Mod: CPTII,S$GLB,, | Performed by: PHYSICIAN ASSISTANT

## 2023-03-08 PROCEDURE — 1125F AMNT PAIN NOTED PAIN PRSNT: CPT | Mod: CPTII,S$GLB,, | Performed by: PHYSICIAN ASSISTANT

## 2023-03-08 PROCEDURE — 1100F PR PT FALLS ASSESS DOC 2+ FALLS/FALL W/INJURY/YR: ICD-10-PCS | Mod: CPTII,S$GLB,, | Performed by: PHYSICIAN ASSISTANT

## 2023-03-08 PROCEDURE — 3288F PR FALLS RISK ASSESSMENT DOCUMENTED: ICD-10-PCS | Mod: CPTII,S$GLB,, | Performed by: INTERNAL MEDICINE

## 2023-03-08 PROCEDURE — 1100F PTFALLS ASSESS-DOCD GE2>/YR: CPT | Mod: CPTII,S$GLB,, | Performed by: PHYSICIAN ASSISTANT

## 2023-03-08 PROCEDURE — 1126F PR PAIN SEVERITY QUANTIFIED, NO PAIN PRESENT: ICD-10-PCS | Mod: CPTII,S$GLB,, | Performed by: INTERNAL MEDICINE

## 2023-03-08 PROCEDURE — 99999 PR PBB SHADOW E&M-EST. PATIENT-LVL V: CPT | Mod: PBBFAC,,, | Performed by: PHYSICIAN ASSISTANT

## 2023-03-08 PROCEDURE — 3078F DIAST BP <80 MM HG: CPT | Mod: CPTII,S$GLB,, | Performed by: PHYSICIAN ASSISTANT

## 2023-03-08 PROCEDURE — 4010F PR ACE/ARB THEARPY RXD/TAKEN: ICD-10-PCS | Mod: CPTII,S$GLB,, | Performed by: INTERNAL MEDICINE

## 2023-03-08 PROCEDURE — 3078F DIAST BP <80 MM HG: CPT | Mod: CPTII,S$GLB,, | Performed by: INTERNAL MEDICINE

## 2023-03-08 PROCEDURE — 3074F SYST BP LT 130 MM HG: CPT | Mod: CPTII,S$GLB,, | Performed by: PHYSICIAN ASSISTANT

## 2023-03-08 PROCEDURE — 3288F FALL RISK ASSESSMENT DOCD: CPT | Mod: CPTII,S$GLB,, | Performed by: INTERNAL MEDICINE

## 2023-03-08 PROCEDURE — 99213 OFFICE O/P EST LOW 20 MIN: CPT | Mod: S$GLB,,, | Performed by: PHYSICIAN ASSISTANT

## 2023-03-08 PROCEDURE — 3078F PR MOST RECENT DIASTOLIC BLOOD PRESSURE < 80 MM HG: ICD-10-PCS | Mod: CPTII,S$GLB,, | Performed by: PHYSICIAN ASSISTANT

## 2023-03-08 PROCEDURE — 99213 PR OFFICE/OUTPT VISIT, EST, LEVL III, 20-29 MIN: ICD-10-PCS | Mod: S$GLB,,, | Performed by: PHYSICIAN ASSISTANT

## 2023-03-08 PROCEDURE — 99214 PR OFFICE/OUTPT VISIT, EST, LEVL IV, 30-39 MIN: ICD-10-PCS | Mod: S$GLB,,, | Performed by: INTERNAL MEDICINE

## 2023-03-08 PROCEDURE — 3078F PR MOST RECENT DIASTOLIC BLOOD PRESSURE < 80 MM HG: ICD-10-PCS | Mod: CPTII,S$GLB,, | Performed by: INTERNAL MEDICINE

## 2023-03-08 PROCEDURE — 3288F PR FALLS RISK ASSESSMENT DOCUMENTED: ICD-10-PCS | Mod: CPTII,S$GLB,, | Performed by: PHYSICIAN ASSISTANT

## 2023-03-08 PROCEDURE — 3074F PR MOST RECENT SYSTOLIC BLOOD PRESSURE < 130 MM HG: ICD-10-PCS | Mod: CPTII,S$GLB,, | Performed by: INTERNAL MEDICINE

## 2023-03-08 PROCEDURE — 3074F PR MOST RECENT SYSTOLIC BLOOD PRESSURE < 130 MM HG: ICD-10-PCS | Mod: CPTII,S$GLB,, | Performed by: PHYSICIAN ASSISTANT

## 2023-03-08 PROCEDURE — 1101F PR PT FALLS ASSESS DOC 0-1 FALLS W/OUT INJ PAST YR: ICD-10-PCS | Mod: CPTII,S$GLB,, | Performed by: INTERNAL MEDICINE

## 2023-03-08 PROCEDURE — 3074F SYST BP LT 130 MM HG: CPT | Mod: CPTII,S$GLB,, | Performed by: INTERNAL MEDICINE

## 2023-03-08 PROCEDURE — 4010F ACE/ARB THERAPY RXD/TAKEN: CPT | Mod: CPTII,S$GLB,, | Performed by: INTERNAL MEDICINE

## 2023-03-08 PROCEDURE — 99214 OFFICE O/P EST MOD 30 MIN: CPT | Mod: S$GLB,,, | Performed by: INTERNAL MEDICINE

## 2023-03-08 SDOH — SOCIAL DETERMINANTS OF HEALTH (SDOH): DISSAPEARANCE AND DEATH OF FAMILY MEMBER: Z63.4

## 2023-03-08 NOTE — TELEPHONE ENCOUNTER
----- Message from Aleksandra Limon, Patient Care Assistant sent at 3/8/2023  2:03 PM CST -----  Contact: self  Pt is calling to see if can come in earlier for her appt at 3 today. Please call back to advise [phone]  thanks

## 2023-03-08 NOTE — PROGRESS NOTES
Subjective:       Patient ID: Genoveva Gilbert is a 73 y.o. female.    Chief Complaint: Shoulder Pain    Patient presents as scheduled visit for lower extremity swelling.  She actually states that her swelling today is improved compared to previous.  She has chronic lymphedema.  She was previously in lymphedema therapy with benefit.  She is not been wearing her compression stockings.  She is also complaining of bilateral shoulder pain.  She is status post right total shoulder.  She is scheduled to see Orthopedics for evaluation.  She states that the pain does not bother her at nighttime.  She is taking opioid pain medications and Tylenol as needed.  She tells me that she is had about 6 falls in the past year.  She typically falls while she is doing housework.  She states that she is always using step stools.  Step stools are typically no higher than 2 steps.  Her last fall was approximately 6 weeks ago.   She continues to grieve the death of her .  She states that she feels she is grieving appropriately.  She is having less crying spells.  She communicates with her granddaughter and ex- but otherwise has no close family or friends.  Patients patient medical/surgical, social and family histories have been reviewed       Review of Systems   Respiratory:  Negative for cough, chest tightness and shortness of breath.    Cardiovascular:  Positive for leg swelling. Negative for chest pain and palpitations.   Musculoskeletal:  Positive for arthralgias. Negative for back pain, gait problem and joint swelling.   Psychiatric/Behavioral:  Positive for dysphoric mood. Negative for self-injury, sleep disturbance and suicidal ideas. The patient is not nervous/anxious.      Objective:      Physical Exam    Assessment:       1. Chronic pain of both shoulders    2. Lymphedema    3. Current moderate episode of major depressive disorder, unspecified whether recurrent    4. Grief          Plan:       Genoveva was seen today  "for shoulder pain.    Diagnoses and all orders for this visit:    Chronic pain of both shoulders  Follow-up with orthopedics as scheduled  Lymphedema  Recommended resuming lymphedema therapy however she declines.  Recommend regular use of compression stockings  Current moderate episode of major depressive disorder, unspecified whether recurrent    Grief     Recommended grief counseling however she declines.  Continue current medications and follow-up as scheduled        I spent a total of 25 minutes on the day of the visit.This includes face to face time and non-face to face time preparing to see the patient (eg, review of tests), obtaining and/or reviewing separately obtained history, documenting clinical information in the electronic or other health record, independently interpreting results and communicating results to the patient/family/caregiver, or care coordinator.        Documentation entered by me for this encounter may have been done in part using speech-recognition technology. Although I have made an effort to ensure accuracy, "sound like" errors may exist and should be interpreted in context.  "

## 2023-03-08 NOTE — PATIENT INSTRUCTIONS
Will hold off on sleep apnea work up at this time  Let me know if you change your mind  Advair and albuterol inhalers- use if you have recurrent wheezing or shortness of breath  Get outside and do more activity

## 2023-03-08 NOTE — PROGRESS NOTES
3/8/2023    Genoveva Gilbert  Follow up    Chief Complaint   Patient presents with    Shortness of Breath       HPI:  03/08/2023- pts  passed away in January and she is still grieving.  She denies SOB/wheezing. Has used advair inhaler a few times, does not notice a difference. Pt did not complete sleep study. States she sleeps well and does not wish to look into ESHA. She does not do much activity but does enjoy making ceramics and getting outdoors.    12/8/22-   Home sleep study- if positive will order CPAP machine for home use  Pulmonary function testing  Start using advair 2 puffs twice daily to replace anoro inhaler  Albuterol rescue inhaler as needed  Pt is a 73 yo female with HTN, morbid obesity, fibromyalgia, fatty liver disease, GERD, DM2, RA and hypothyroidism presenting for new evaluation of possible ESHA.  Pt c/o shortness of breath for about 1 year, problem happens with exertion and getting worse. She does notice wheezing sometimes but denies chest pains, dizziness, coughing, phlegm.   She uses anoro inhaler daily, rescue inhaler- notes benefit, uses when being active.  She has had pneumonia once about 1yr ago.   Her  has COPD and parkinson's and she helps care for him.  She was dx with ESHA years ago but does not use CPAP. She did have machine in past but sent it back because she did not feel she needed it.  She denies daytime sleepiness. Sleeps well. Goes to bed 8pm and sleeps around 10pm, wakes 6am. She denies naps during the day.  Pt is a past smoker, quit 1996. She does not know of any family hx. She was raised by her aunt and uncle. Denies childhood asthma.    The chief complaint problem is improved.    PFSH:  Past Medical History:   Diagnosis Date    Allergy     Anxiety     Arthritis, rheumatoid     Back pain     Chronic bronchiolitis     Depression     Fibromyalgia     GERD (gastroesophageal reflux disease)     High cholesterol     Hilar mass 03/27/2014    [place] (hard of hearing)     aid  right ear    [place] (hard of hearing)     Hypertension     Incontinence of urine     Lymphedema     MS (multiple sclerosis)     benign; another MD stated she has no MS    Neuropathy     Restless leg syndrome     Rotator cuff tear 04/16/2015    Sciatica of left side 01/05/2018    Seasonal allergies     Sinus congestion     Sleep apnea     DOES NOT USE MACHINE    Spondylolysis     Thyroid disease     Type 2 diabetes mellitus with polyneuropathy     no med. was borderline    Wheezing          Past Surgical History:   Procedure Laterality Date    APPENDECTOMY      ARTHROSCOPIC DEBRIDEMENT OF SHOULDER Right 2/18/2022    Procedure: EXTENSIVE DEBRIDEMENT, SHOULDER, ARTHROSCOPIC;  Surgeon: Rio Pitts MD;  Location: [facility] OR;  Service: Orthopedics;  Laterality: Right;    BREAST SURGERY      reduction    CLOSED REDUCTION OF INJURY OF SHOULDER Right 9/19/2021    Procedure: CLOSED REDUCTION, SHOULDER;  Surgeon: Antonio Irwin MD;  Location: [facility] OR;  Service: Orthopedics;  Laterality: Right;    EPIDURAL STEROID INJECTION INTO LUMBAR SPINE N/A 6/12/2019    Procedure: Injection-steroid-epidural-lumbar;  Surgeon: Thad Manning MD;  Location: [facility] OR;  Service: Pain Management;  Laterality: N/A;  L5-S1    EPIDURAL STEROID INJECTION INTO LUMBAR SPINE N/A 9/16/2019    Procedure: Injection-steroid-epidural-lumbar;  Surgeon: Thad Manning MD;  Location: [facility] OR;  Service: Pain Management;  Laterality: N/A;  L5-S1    EYE SURGERY Bilateral     HYSTERECTOMY      partial - fibroids    INJECTION OF ANESTHETIC AGENT AROUND MEDIAL BRANCH NERVES INNERVATING LUMBAR FACET JOINT Bilateral 2/28/2019    Procedure: Block-nerve-medial branch-lumbar;  Surgeon: Thad Manning MD;  Location: [facility] OR;  Service: Pain Management;  Laterality: Bilateral;  L3, 4,5     INJECTION OF ANESTHETIC AGENT AROUND MEDIAL BRANCH NERVES INNERVATING LUMBAR FACET JOINT Bilateral 3/21/2019    Procedure: Block-nerve-medial branch-lumbar L3,4,5;  Surgeon: Thad Manning MD;   [facility];  Service: Pain Management;  Laterality: Bilateral;    KNEE ARTHROPLASTY Left 3/16/2021    Procedure: ARTHROPLASTY, KNEE;  Surgeon: Rio Pitts MD;  Location: [facility] OR;  Service: Orthopedics;  Laterality: Left;  GENERAL AND BLOCK    LIPOSUCTION  1985    RADIOFREQUENCY ABLATION Left 11/4/2020    Procedure: Radiofrequency Ablation left knee;  Surgeon: Andrew Escudero MD;  Location: [facility] OR;  Service: Pain Management;  Laterality: Left;    RADIOFREQUENCY ABLATION OF LUMBAR MEDIAL BRANCH NERVE AT SINGLE LEVEL Bilateral 4/12/2019    Procedure: Radiofrequency Ablation, Nerve, Spinal, Lumbar, Medial Branch, 1 Level;  Surgeon: Thad Manning MD;  [facility];  Service: Pain Management;  Laterality: Bilateral;  L3, 4, 5  lumbar  Azimuth pain management generator  SN [id number]  80 degrees for 75 seconds x2    RADIOFREQUENCY ABLATION OF LUMBAR MEDIAL BRANCH NERVE AT SINGLE LEVEL Bilateral 10/22/2019    Procedure: Radiofrequency Ablation, Nerve, Spinal, Lumbar, Medial Branch, L3,4,5;  Surgeon: Thda Manning MD;  [facility];  Service: Pain Management;  Laterality: Bilateral;  burned at 80 degrees C X 60 seconds X 2 each site    RADIOFREQUENCY ABLATION OF LUMBAR MEDIAL BRANCH NERVE AT SINGLE LEVEL Bilateral 5/27/2020    Procedure: Radiofrequency Ablation, Nerve, Spinal, Lumbar, Medial Branch, 1 Level;  Surgeon: Thad Manning MD;  [facility];  Service: Pain Management;  Laterality: Bilateral;  L3,4,5    REVERSE TOTAL SHOULDER ARTHROPLASTY Right 4/12/2022    Procedure: ARTHROPLASTY, SHOULDER, TOTAL, REVERSE;  Surgeon: Rio Pitts MD;  Location: [facility] OR;  Service: Orthopedics;  Laterality: Right;    SHOULDER ARTHROSCOPY Right 9/19/2021    Procedure: ARTHROSCOPY, SHOULDER;  Surgeon: Antonio Irwin MD;  Location: [facility] OR;  Service: Orthopedics;  Laterality: Right;  LIMITED DEBRIDMENT    TONSILLECTOMY      TOTAL REDUCTION MAMMOPLASTY       Social History     Tobacco Use     Smoking status: Former     Types: Cigarettes     Quit date: 1996     Years since quittin.7     Passive exposure: Past    Smokeless tobacco: Never   Substance Use Topics    Alcohol use: Yes     Comment: very little     Drug use: No     Family History   Problem Relation Age of Onset    Heart disease Mother      Review of patient's allergies indicates:   Allergen Reactions    Keflex [cephalexin]      Throat swelling    Pcn [penicillins]      Throat swelling    Latex, natural rubber Other (See Comments)     Redness with bandaids     Adhesive Other (See Comments)     bandainds redness at skin    Trelegy ellipta [fluticasone-umeclidin-vilanter]      Vision disturbance       Performance Status:The patient's activity level is functions out of house.      Review of Systems:  a review of eleven systems covering constitutional, Eye, HEENT, Psych, Respiratory, Cardiac, GI, , Musculoskeletal, Endocrine, Dermatologic was negative except for pertinent findings as listed ABOVE and below:  All negative with pertinent positives as above        Exam:Comprehensive exam done. /76 (BP Location: Right arm, Patient Position: Sitting, BP Method: Large (Automatic))   Pulse 68   Ht 5' (1.524 m)   Wt 87.9 kg (193 lb 12.6 oz)   SpO2 95%   BMI 37.85 kg/m²   Exam included Vitals as listed, and patient's appearance and affect and alertness and mood, oral exam for yeast and hygiene and pharynx lesions and Mallapatti (M) score, neck with inspection for jvd and masses and thyroid abnormalities and lymph nodes (supraclavicular and infraclavicular nodes and axillary also examined and noted if abn), chest exam included symmetry and effort and fremitus and percussion and auscultation, cardiac exam included rhythm and gallops and murmur and rubs and jvd and edema, abdominal exam for mass and hepatosplenomegaly and tenderness and hernias and bowel sounds, Musculoskeletal exam with muscle tone and posture and mobility/gait and   strength, and skin for rashes and cyanosis and pallor and turgor, extremity for clubbing.  Findings were normal except for pertinent findings listed below:  M4, oropharynx clear  HR regular  Clear breath sounds bilaterally  No edema      Radiographs (ct chest and cxr) reviewed: view by direct vision   CXR 12/6/22- R shoulder hardware, hilar fullness  CTA chest 10/5/22-   No intraluminal filling defects in pulmonary artery suggesting pulmonary artery embolism.  Large pulmonary arteries suggesting pulmonary artery hypertension     Mild mosaic appearance of the lungs differential for which includes mild pulmonary edema, infectious/inflammatory process, and is in part may reflect hypoventilatory change.  Small nodular density along the cephalad aspect of the aortic arch in area of streaky artifact most likely represents atelectasis.  Cannot exclude however mass and suggest follow-up study which could be without contrast to determine if this as well as the mosaic appearance of the lungs resolves.     TTE 10/6/22-   The left ventricle is normal in size with mild concentric hypertrophy and normal systolic function.  The estimated ejection fraction is 62%.  Normal left ventricular diastolic function.  Normal right ventricular size with normal right ventricular systolic function.  Moderate left atrial enlargement.  Mild tricuspid regurgitation.  Normal central venous pressure (3 mmHg).  The estimated PA systolic pressure is 39 mmHg.  There is mild pulmonary hypertension.  Mild mitral regurgitation.  Mild aortic regurgitation.    Labs reviewed     Latest Reference Range & Units 12/06/22 14:04   WBC 3.90 - 12.70 K/uL 5.81   RBC 4.00 - 5.40 M/uL 4.08   HEMOGLOBIN 12.0 - 16.0 g/dL 11.7 (L)   HEMATOCRIT 37.0 - 48.5 % 36.9 (L)   MCV 82 - 98 fL 90   MCH 27.0 - 31.0 pg 28.7   MCHC 32.0 - 36.0 g/dL 31.7 (L)   RDW 11.5 - 14.5 % 14.5   Platelets 150 - 450 K/uL 202      Latest Reference Range & Units 12/06/22 14:04   Sodium 136 - 145  mmol/L 140   Potassium 3.5 - 5.1 mmol/L 3.3 (L)   Chloride 95 - 110 mmol/L 101   CO2 23 - 29 mmol/L 27   ANION GAP 8 - 16 mmol/L 12   BUN 8 - 23 mg/dL 34 (H)   Creatinine 0.5 - 1.4 mg/dL 1.6 (H)   eGFR >60 mL/min/1.73 m^2 34 !   Glucose 70 - 110 mg/dL 166 (H)   Calcium 8.7 - 10.5 mg/dL 8.9   Alkaline Phosphatase 55 - 135 U/L 93   PROTEIN TOTAL 6.0 - 8.4 g/dL 6.8   Albumin 3.5 - 5.2 g/dL 3.3 (L)   BILIRUBIN TOTAL 0.1 - 1.0 mg/dL 0.5   AST 10 - 40 U/L 21   ALT 10 - 44 U/L 21       PFT reviewed  12/16/22- moderate restriction, mod reduced dlco        Plan:  Clinical impression is reasonably certain and repeated evaluation prn +/- follow up will be needed as below. ESHA- declines tx, intermittent wheezes, doing well. F/u as needed    Genoveva was seen today for shortness of breath.    Diagnoses and all orders for this visit:    Obstructive sleep apnea syndrome    Bereavement          Follow up if symptoms worsen or fail to improve.    Discussed with patient above for education the following:      Patient Instructions   Will hold off on sleep apnea work up at this time  Let me know if you change your mind  Advair and albuterol inhalers- use if you have recurrent wheezing or shortness of breath  Get outside and do more activity

## 2023-03-09 DIAGNOSIS — E03.9 ACQUIRED HYPOTHYROIDISM: ICD-10-CM

## 2023-03-09 RX ORDER — LEVOTHYROXINE SODIUM 88 UG/1
88 TABLET ORAL DAILY
Qty: 90 TABLET | Refills: 3 | Status: SHIPPED | OUTPATIENT
Start: 2023-03-09 | End: 2024-01-30 | Stop reason: SDUPTHER

## 2023-03-09 NOTE — TELEPHONE ENCOUNTER
Pt contacted and notified of medication refill sent to preferred pharmacy. Pt verbalized understanding.

## 2023-03-09 NOTE — TELEPHONE ENCOUNTER
----- Message from Clotilde Harmon sent at 3/9/2023  9:40 AM CST -----  .Type:  RX Refill Request    Who Called: PT     Refill or New Rx: REFILL     RX Name and Strength: levothyroxine (SYNTHROID) 88 MCG tablet    Preferred Pharmacy with phone number: [facility]     Pt Call Back Number: [phone]    Additional Information: PT HAS BEEN WITHOUT THE MEDICATION FOR 3 DAYS SHE NEEDS IT FILLED TODAY PER PT     Thank You

## 2023-03-09 NOTE — TELEPHONE ENCOUNTER
No new care gaps identified.  [facility] Embedded Care Gaps. Reference number: [id number]. 3/09/2023   10:03:38 AM CST

## 2023-03-10 DIAGNOSIS — M06.9 RHEUMATOID ARTHRITIS, INVOLVING UNSPECIFIED SITE, UNSPECIFIED WHETHER RHEUMATOID FACTOR PRESENT: ICD-10-CM

## 2023-03-10 RX ORDER — PREGABALIN 150 MG/1
150 CAPSULE ORAL 2 TIMES DAILY
Qty: 60 CAPSULE | Refills: 6 | Status: CANCELLED | OUTPATIENT
Start: 2023-03-10 | End: 2023-09-08

## 2023-03-10 RX ORDER — POTASSIUM CHLORIDE 750 MG/1
10 CAPSULE, EXTENDED RELEASE ORAL 3 TIMES DAILY
Qty: 90 CAPSULE | Refills: 2 | Status: ON HOLD | OUTPATIENT
Start: 2023-03-10 | End: 2023-06-25 | Stop reason: HOSPADM

## 2023-03-10 NOTE — TELEPHONE ENCOUNTER
----- Message from Aleksandra Limon, Patient Care Assistant sent at 3/10/2023  1:09 PM CST -----  Contact: self  Pt is calling f/u on  refills. Please call when sent the   [id number] - [place] - [address]  [facility] [address] [place] [address]  Phone: [phone] Fax: [phone]  thanks

## 2023-03-15 ENCOUNTER — HOSPITAL ENCOUNTER (OUTPATIENT)
Dept: RADIOLOGY | Facility: CLINIC | Age: 73
Discharge: HOME | End: 2023-03-15
Attending: PHYSICIAN ASSISTANT
Payer: MEDICARE

## 2023-03-15 DIAGNOSIS — R92.2 INCONCLUSIVE MAMMOGRAM: ICD-10-CM

## 2023-03-15 DIAGNOSIS — N63.20 MASS OF LEFT BREAST, UNSPECIFIED QUADRANT: Primary | ICD-10-CM

## 2023-03-15 DIAGNOSIS — Z12.39 ENCOUNTER FOR SCREENING FOR MALIGNANT NEOPLASM OF BREAST, UNSPECIFIED SCREENING MODALITY: ICD-10-CM

## 2023-03-15 DIAGNOSIS — Z12.31 ENCOUNTER FOR SCREENING MAMMOGRAM FOR MALIGNANT NEOPLASM OF BREAST: ICD-10-CM

## 2023-03-15 PROCEDURE — 77063 BREAST TOMOSYNTHESIS BI: CPT | Mod: 26,,, | Performed by: RADIOLOGY

## 2023-03-15 PROCEDURE — 77067 MAMMO DIGITAL SCREENING BILAT WITH TOMO: ICD-10-PCS | Mod: 26,,, | Performed by: RADIOLOGY

## 2023-03-15 PROCEDURE — 77067 SCR MAMMO BI INCL CAD: CPT | Mod: 26,,, | Performed by: RADIOLOGY

## 2023-03-15 PROCEDURE — 77063 MAMMO DIGITAL SCREENING BILAT WITH TOMO: ICD-10-PCS | Mod: 26,,, | Performed by: RADIOLOGY

## 2023-03-15 PROCEDURE — 77067 SCR MAMMO BI INCL CAD: CPT | Mod: TC,PO

## 2023-03-16 ENCOUNTER — HOSPITAL ENCOUNTER (OUTPATIENT)
Dept: RADIOLOGY | Facility: HOSPITAL | Age: 73
Discharge: HOME | End: 2023-03-16
Attending: PHYSICIAN ASSISTANT
Payer: MEDICARE

## 2023-03-16 ENCOUNTER — TELEPHONE (OUTPATIENT)
Dept: FAMILY MEDICINE | Facility: CLINIC | Age: 73
End: 2023-03-16
Payer: MEDICARE

## 2023-03-16 DIAGNOSIS — R92.2 INCONCLUSIVE MAMMOGRAM: ICD-10-CM

## 2023-03-16 DIAGNOSIS — N63.20 MASS OF LEFT BREAST, UNSPECIFIED QUADRANT: ICD-10-CM

## 2023-03-16 PROCEDURE — 77065 DX MAMMO INCL CAD UNI: CPT | Mod: TC,LT

## 2023-03-16 PROCEDURE — 77061 MAMMO DIGITAL DIAGNOSTIC LEFT WITH TOMO: ICD-10-PCS | Mod: 26,LT,, | Performed by: RADIOLOGY

## 2023-03-16 PROCEDURE — 76642 US BREAST LEFT LIMITED: ICD-10-PCS | Mod: 26,LT,, | Performed by: RADIOLOGY

## 2023-03-16 PROCEDURE — 76642 ULTRASOUND BREAST LIMITED: CPT | Mod: TC,LT

## 2023-03-16 PROCEDURE — 77065 MAMMO DIGITAL DIAGNOSTIC LEFT WITH TOMO: ICD-10-PCS | Mod: 26,LT,, | Performed by: RADIOLOGY

## 2023-03-16 PROCEDURE — 77061 BREAST TOMOSYNTHESIS UNI: CPT | Mod: 26,LT,, | Performed by: RADIOLOGY

## 2023-03-16 PROCEDURE — 76642 ULTRASOUND BREAST LIMITED: CPT | Mod: 26,LT,, | Performed by: RADIOLOGY

## 2023-03-16 PROCEDURE — 77065 DX MAMMO INCL CAD UNI: CPT | Mod: 26,LT,, | Performed by: RADIOLOGY

## 2023-03-16 NOTE — TELEPHONE ENCOUNTER
----- Message from Gely Camacho sent at 3/16/2023  3:13 PM CDT -----  Type:  Patient Returning Call         Who Called: OBED CORMIER [[id number]]         Who Left Message for Patient: not sure          Does the patient know what this is regarding? Results          Best Call Back Number:[phone]         Additional Information:

## 2023-03-24 ENCOUNTER — TELEPHONE (OUTPATIENT)
Dept: FAMILY MEDICINE | Facility: CLINIC | Age: 73
End: 2023-03-24
Payer: MEDICARE

## 2023-04-03 ENCOUNTER — TELEPHONE (OUTPATIENT)
Dept: FAMILY MEDICINE | Facility: CLINIC | Age: 73
End: 2023-04-03
Payer: MEDICARE

## 2023-04-03 DIAGNOSIS — G25.81 RESTLESS LEG SYNDROME: ICD-10-CM

## 2023-04-03 RX ORDER — PRAMIPEXOLE DIHYDROCHLORIDE 0.5 MG/1
1 TABLET ORAL NIGHTLY
Qty: 180 TABLET | Refills: 3 | Status: SHIPPED | OUTPATIENT
Start: 2023-04-03

## 2023-04-03 NOTE — TELEPHONE ENCOUNTER
----- Message from [address]na [address] sent at 4/3/2023 10:06 AM CDT -----  Who Called: Pt    What is the request in detail: Requesting call back to discuss New Rx    pregabalin capsule 75 mg       pramipexole (MIRAPEX) 0.5 MG tablet 180 tablet 3 1/18/2022  No  Sig - Route: Take 2 tablets (1 mg total) by mouth every evening. For restless legs - Oral  Sent to pharmacy as: pramipexole (MIRAPEX) 0.5 MG tablet  Class: Normal  Order: [id number]    hydroCHLOROthiazide (HYDRODIURIL) 25 MG tablet   9/4/2022  --  Class: Historical Med  Order: [id number]    [facility] #[id number] - [place] - [address] [facility] [address]  [facility] [address] [address]  Phone: [phone] Fax: [phone]    Can the clinic reply by [facility]? No    Best Call Back Number: [phone]      Additional Information:

## 2023-04-03 NOTE — TELEPHONE ENCOUNTER
----- Message from Aleksandra Limon, Patient Care Assistant sent at 4/3/2023  9:06 AM CDT -----  Contact: self  Type: Needs Medical Advice  Who Called:  self  Symptoms (please be specific):  not eating or sleeping  How long has patient had these symptoms: since her  passed  Pharmacy name and phone #:    [facility] #[id number] - [place] - [address] AT [facility] [address]  [facility] [place] [address]  Phone: [phone] Fax: [phone]  Best Call Back Number: [phone]  Additional Information: thanks

## 2023-04-03 NOTE — TELEPHONE ENCOUNTER
----- Message from Aleksanrda Limon, Patient Care Assistant sent at 4/3/2023  9:06 AM CDT -----  Contact: self  Type: Needs Medical Advice  Who Called:  self  Symptoms (please be specific):  not eating or sleeping  How long has patient had these symptoms: since her  passed  Pharmacy name and phone #:    [facility] #[id number] - [place] - [address] AT [facility] [address]  [facility] [address] [place] [address]  Phone: [phone] Fax: [phone]  Best Call Back Number: [phone]  Additional Information: thanks

## 2023-04-03 NOTE — TELEPHONE ENCOUNTER
----- Message from Sapna St sent at 4/3/2023 10:06 AM CDT -----  Who Called: Pt    What is the request in detail: Requesting call back to discuss New Rx    pregabalin capsule 75 mg       pramipexole (MIRAPEX) 0.5 MG tablet 180 tablet 3 1/18/2022  No  Sig - Route: Take 2 tablets (1 mg total) by mouth every evening. For restless legs - Oral  Sent to pharmacy as: pramipexole (MIRAPEX) 0.5 MG tablet  Class: Normal  Order: [id number]    hydroCHLOROthiazide (HYDRODIURIL) 25 MG tablet   9/4/2022  --  Class: Historical Med  Order: [id number]    [facility] #[id number] - [place] - [address] AT [facility] [address]  [facility] [place] [address]  Phone: [phone] Fax: [phone]    Can the clinic reply by [facility]? No    Best Call Back Number: [phone]      Additional Information:

## 2023-04-03 NOTE — TELEPHONE ENCOUNTER
"Called and spoke with pt. Pt states she is depressed and hasn't been sleeping or eating. Pt states "I know my body and I just need xanax." Informed pt that Dr. Dubois does not prescribe that long term. Asked pt if she has tried taking the Mirtazapine, pt states she hasn't. Called the pharmacy and verified that it wasn't picked up. Called pt back to inform her that it is waiting for pickup at her pharmacy. Pt verbalize understanding. Informed pt to try the medication and contact our office if her symptoms don't improve or they worsen. Pt verbalized understanding.  "

## 2023-04-04 DIAGNOSIS — M25.511 ACUTE PAIN OF RIGHT SHOULDER: Primary | ICD-10-CM

## 2023-04-05 RX ORDER — HYDROCHLOROTHIAZIDE 25 MG/1
25 TABLET ORAL DAILY
Qty: 90 TABLET | Refills: 2 | Status: ON HOLD | OUTPATIENT
Start: 2023-04-05 | End: 2023-06-25 | Stop reason: HOSPADM

## 2023-04-05 NOTE — TELEPHONE ENCOUNTER
No new care gaps identified.  [facility] Embedded Care Gaps. Reference number: [id number]. 4/05/2023   2:35:25 PM CDT

## 2023-04-05 NOTE — TELEPHONE ENCOUNTER
----- Message from Albina Spann sent at 4/5/2023  2:12 PM CDT -----  Contact: patient  Type:  RX Refill Request    Who Called:  patient  Refill or New Rx:  refill  RX Name and Strength:  hydroCHLOROthiazide (HYDRODIURIL) 25 MG tablet  How is the patient currently taking it? (ex. 1XDay):  as directed  Is this a 30 day or 90 day RX:  90  Preferred Pharmacy with phone number:    [facility] #[id number] - [address] AT [facility] [address]  [address]  [place] [address]  Phone: [phone] Fax: [phone]  Local or Mail Order:  local  Ordering Provider:  rupert Navarrete Call Back Number:  [phone] (home)   Additional Information:

## 2023-04-18 DIAGNOSIS — F41.9 ANXIETY: ICD-10-CM

## 2023-04-18 DIAGNOSIS — I50.9 HEART FAILURE, UNSPECIFIED HF CHRONICITY, UNSPECIFIED HEART FAILURE TYPE: ICD-10-CM

## 2023-04-18 RX ORDER — METOPROLOL SUCCINATE 25 MG/1
25 TABLET, EXTENDED RELEASE ORAL DAILY
Qty: 90 TABLET | Refills: 3 | Status: ON HOLD | OUTPATIENT
Start: 2023-04-18 | End: 2023-09-24

## 2023-04-18 RX ORDER — ALPRAZOLAM 2 MG/1
2 TABLET ORAL 2 TIMES DAILY PRN
Qty: 60 TABLET | Refills: 2 | Status: CANCELLED | OUTPATIENT
Start: 2023-04-18

## 2023-04-18 RX ORDER — PREGABALIN 150 MG/1
150 CAPSULE ORAL 2 TIMES DAILY
Qty: 180 CAPSULE | Refills: 1 | Status: SHIPPED | OUTPATIENT
Start: 2023-04-18 | End: 2023-07-06

## 2023-04-18 NOTE — TELEPHONE ENCOUNTER
No new care gaps identified.  [facility] Embedded Care Gaps. Reference number: [id number]. 4/18/2023   10:33:14 AM GLADYST

## 2023-04-18 NOTE — TELEPHONE ENCOUNTER
"Pt called requesting refill. Pt stated "I know he won't do it but I really need some xanax for my anxiety."     LOV 3/8/23  LAB 12/12/22  Next OV 9/26/23    "

## 2023-04-19 DIAGNOSIS — E03.9 ACQUIRED HYPOTHYROIDISM: ICD-10-CM

## 2023-04-19 NOTE — TELEPHONE ENCOUNTER
----- Message from Emilee Nicolas sent at 4/19/2023 12:37 PM CDT -----  Type:  Patient Returning Call    Who Called:  Pt  Who Left Message for Patient:  N/A  Does the patient know what this is regarding?:  Yes  Best Call Back Number:  [phone]  Additional Information:  Pt does not know who called but is did call at some point to get some RX refilled, please call back to advise Thanks

## 2023-04-19 NOTE — TELEPHONE ENCOUNTER
No new care gaps identified.  [facility] Embedded Care Gaps. Reference number: [id number]. 4/19/2023   2:08:39 PM CDT

## 2023-04-20 RX ORDER — HYDROCHLOROTHIAZIDE 25 MG/1
25 TABLET ORAL DAILY
Qty: 90 TABLET | Refills: 2 | OUTPATIENT
Start: 2023-04-20 | End: 2023-07-19

## 2023-04-20 RX ORDER — LEVOTHYROXINE SODIUM 88 UG/1
88 TABLET ORAL DAILY
Qty: 90 TABLET | Refills: 3 | OUTPATIENT
Start: 2023-04-20

## 2023-04-20 RX ORDER — PREGABALIN 150 MG/1
150 CAPSULE ORAL 2 TIMES DAILY
Qty: 180 CAPSULE | Refills: 1 | OUTPATIENT
Start: 2023-04-20 | End: 2023-07-19

## 2023-05-01 DIAGNOSIS — N18.30 STAGE 3 CHRONIC KIDNEY DISEASE, UNSPECIFIED WHETHER STAGE 3A OR 3B CKD: Primary | ICD-10-CM

## 2023-05-01 RX ORDER — MELOXICAM 7.5 MG/1
7.5 TABLET ORAL DAILY
Qty: 10 TABLET | Refills: 0 | OUTPATIENT
Start: 2023-05-01 | End: 2023-05-11

## 2023-05-01 NOTE — TELEPHONE ENCOUNTER
----- Message from Ita Coronel sent at 5/1/2023 12:50 PM CDT -----  Contact: self  Type:  RX Refill Request    Who Called: Pt  Refill or New Rx: ...  RX Name and Strength: meloxicam 15 mg    How is the patient currently taking it? (ex. 1XDay):as directed  Is this a 30 day or 90 day RX:30  Preferred Pharmacy with phone number:  [facility] #[id number] - [address]  [facility] [address] [facility] [address]  [facility] [place] [address]  Phone: [phone] Fax: [phone]    Local or Mail Order:Local  Ordering Provider:Dr. Dubois  Would the patient rather a call back or a response via [facility]? call  Best Call Back Number:[phone]    Please call to advise... Thank you...

## 2023-05-03 NOTE — TELEPHONE ENCOUNTER
"Informed pt message from provider. Pt verbalized understanding. Pt states she is still having pain in her arm. Informed pt of PT referral that's waiting to be scheduled. Pt verbalized understanding and asked to have referral faxed to [facility] on [address], referral faxed as requested. Pt also states she is still having trouble sleeping since her  passed. Pt informed our office that she made a mistake and took 6 benadryl to help her sleep. Pt experienced hallucinations and her family was concerned. Pt states she wasn't trying to harm herself, she just wanted to sleep. Informed pt to not take benadryl and especially not at that dose. Pt states the mirtazapine only works when she is really sleepy from not getting enough sleep and doesn't work for her when she takes it daily.  Pt states " I knew it would be hard when my  passed, but not this hard. I really wish I could sleep. " Informed pt that I will send a message to Dr. Dubois for any suggestions. Please advise.  "

## 2023-05-04 ENCOUNTER — OFFICE VISIT (OUTPATIENT)
Dept: FAMILY MEDICINE | Facility: CLINIC | Age: 73
End: 2023-05-04
Payer: MEDICARE

## 2023-05-04 ENCOUNTER — TELEPHONE (OUTPATIENT)
Dept: GASTROENTEROLOGY | Facility: CLINIC | Age: 73
End: 2023-05-04
Payer: MEDICARE

## 2023-05-04 DIAGNOSIS — G47.00 INSOMNIA, UNSPECIFIED TYPE: Primary | ICD-10-CM

## 2023-05-04 DIAGNOSIS — E61.1 IRON DEFICIENCY: ICD-10-CM

## 2023-05-04 PROCEDURE — 99443 PR PHYSICIAN TELEPHONE EVALUATION 21-30 MIN: CPT | Mod: 95,,, | Performed by: STUDENT IN AN ORGANIZED HEALTH CARE EDUCATION/TRAINING PROGRAM

## 2023-05-04 PROCEDURE — 4010F PR ACE/ARB THEARPY RXD/TAKEN: ICD-10-PCS | Mod: CPTII,95,, | Performed by: STUDENT IN AN ORGANIZED HEALTH CARE EDUCATION/TRAINING PROGRAM

## 2023-05-04 PROCEDURE — 99443 PR PHYSICIAN TELEPHONE EVALUATION 21-30 MIN: ICD-10-PCS | Mod: 95,,, | Performed by: STUDENT IN AN ORGANIZED HEALTH CARE EDUCATION/TRAINING PROGRAM

## 2023-05-04 PROCEDURE — 4010F ACE/ARB THERAPY RXD/TAKEN: CPT | Mod: CPTII,95,, | Performed by: STUDENT IN AN ORGANIZED HEALTH CARE EDUCATION/TRAINING PROGRAM

## 2023-05-04 RX ORDER — FERROUS SULFATE 325(65) MG
325 TABLET, DELAYED RELEASE (ENTERIC COATED) ORAL DAILY
Qty: 90 TABLET | Refills: 3 | Status: SHIPPED | OUTPATIENT
Start: 2023-05-04 | End: 2023-08-02

## 2023-05-04 RX ORDER — RAMELTEON 8 MG/1
8 TABLET ORAL NIGHTLY
Qty: 30 TABLET | Refills: 6 | Status: SHIPPED | OUTPATIENT
Start: 2023-05-04 | End: 2023-08-02 | Stop reason: SDUPTHER

## 2023-05-04 NOTE — PROGRESS NOTES
Established Patient - Audio Only Telehealth Visit     The patient location is: [place]   The chief complaint leading to consultation is: insomnia   Visit type: Virtual visit with audio only (telephone)  Total time spent with patient: 23 min       The reason for the audio only service rather than synchronous audio and video virtual visit was related to technical difficulties or patient preference/necessity.     Each patient to whom I provide medical services by telemedicine is:  (1) informed of the relationship between the physician and patient and the respective role of any other health care provider with respect to management of the patient; and (2) notified that they may decline to receive medical services by telemedicine and may withdraw from such care at any time. Patient verbally consented to receive this service via voice-only telephone call.       HPI: Pt reports she has trouble initiating sleep. Has tried mirtazapine.  She did not want try trazodone do the side effects and does not want sleep study at this time.     Assessment and plan:       Insomnia-will try medications the safe profile will try ramelteon might not be covered but has a great side effect profile.  Consider doxepin as well     Iron deficiency-will send in iron needs to rule out any malignancy recommend to see GI will put a referral notify my staff educated the patient she understands                   This service was not originating from a related E/M service provided within the previous 7 days nor will  to an E/M service or procedure within the next 24 hours or my soonest available appointment.  Prevailing standard of care was able to be met in this audio-only visit.

## 2023-05-04 NOTE — TELEPHONE ENCOUNTER
Called and spoke with pt. Informed pt of message from provider. Pt verbalize understanding and Audio call scheduled for 5/4 at 1:40 please call pt home number at [phone]. Pt would also like to know if she can recheck her kidney function to see if its improved. Order pended. Please advise.

## 2023-05-05 ENCOUNTER — TELEPHONE (OUTPATIENT)
Dept: ORTHOPEDICS | Facility: CLINIC | Age: 73
End: 2023-05-05
Payer: MEDICARE

## 2023-05-05 NOTE — TELEPHONE ENCOUNTER
----- Message from Genny Murphy MA sent at 5/5/2023  1:28 PM CDT -----  Contact: pt  Wants to schedule Right shoulder injection   Call back      Called back   Wants pain medication called into   [facility]

## 2023-05-05 NOTE — TELEPHONE ENCOUNTER
Returned call. Pt is s/p Reverse Right TSA on 04/12/23. Pt advised to alternate between ice and heat application, as well as OTC Ibuprofen and Tylenol. Appt placed on wait list with hopes to get her in sooner. Pt will try the above to see if it helps to reduce her pain. Denies having any other symptoms (such as SOB, Chest pain, or Fever 100.4 or above) at this time. Advised to seek attention in the ER should any of the above mentioned symptoms develop. Pt verbalized understanding of the above.

## 2023-05-09 ENCOUNTER — TELEPHONE (OUTPATIENT)
Dept: FAMILY MEDICINE | Facility: CLINIC | Age: 73
End: 2023-05-09
Payer: MEDICARE

## 2023-05-09 NOTE — TELEPHONE ENCOUNTER
----- Message from Rona Lin sent at 5/9/2023  2:14 PM CDT -----  Contact: Patient  Type:  Needs Medical Advice    Who Called:   Patient    Would the patient rather a call back or a response via [facility]?   Call back  Best Call Back Number:   [phone]    Additional Information: States she would like her antidepressant changed - states this one is not working right now - please call to advise - thank you

## 2023-05-12 ENCOUNTER — TELEPHONE (OUTPATIENT)
Dept: ORTHOPEDICS | Facility: CLINIC | Age: 73
End: 2023-05-12
Payer: MEDICARE

## 2023-05-12 NOTE — TELEPHONE ENCOUNTER
----- Message from Genny Murphy MA sent at 5/12/2023  1:19 PM CDT -----  Contact: [facility]  Wants op report fax   Call back    ext 3

## 2023-06-13 ENCOUNTER — TELEPHONE (OUTPATIENT)
Dept: FAMILY MEDICINE | Facility: CLINIC | Age: 73
End: 2023-06-13
Payer: MEDICARE

## 2023-06-13 NOTE — TELEPHONE ENCOUNTER
----- Message from Vicky Calderon sent at 6/13/2023  4:44 PM CDT -----  Type: Needs Medical Advice  Who Called:  pt     Best Call Back Number: [phone]    Additional Information: pt is requesting a call back in regards to not sleeping and depression getting worst , wants a new prescription for anti depression . Please advise    [facility] #[id number] - [address] - [address] [facility] [address]  [facility] [place] [address]  Phone: [phone] Fax: [phone]

## 2023-06-21 DIAGNOSIS — E11.9 TYPE 2 DIABETES MELLITUS WITHOUT COMPLICATION: ICD-10-CM

## 2023-06-23 ENCOUNTER — HOSPITAL ENCOUNTER (OUTPATIENT)
Facility: HOSPITAL | Age: 73
Discharge: PSYCHIATRIC OR SUBSTANCE USE FACILITY | End: 2023-06-25
Attending: EMERGENCY MEDICINE | Admitting: HOSPITALIST
Payer: MEDICARE

## 2023-06-23 DIAGNOSIS — R07.9 CHEST PAIN: ICD-10-CM

## 2023-06-23 DIAGNOSIS — G93.41 ENCEPHALOPATHY, METABOLIC: Primary | ICD-10-CM

## 2023-06-23 DIAGNOSIS — N17.9 AKI (ACUTE KIDNEY INJURY): ICD-10-CM

## 2023-06-23 DIAGNOSIS — R41.0 CONFUSION: ICD-10-CM

## 2023-06-23 LAB
ALBUMIN SERPL BCP-MCNC: 4.3 G/DL (ref 3.5–5.2)
ALP SERPL-CCNC: 76 U/L (ref 55–135)
ALT SERPL W/O P-5'-P-CCNC: 12 U/L (ref 10–44)
AMPHET+METHAMPHET UR QL: NEGATIVE
ANION GAP SERPL CALC-SCNC: 12 MMOL/L (ref 8–16)
APAP SERPL-MCNC: <3 UG/ML (ref 10–20)
AST SERPL-CCNC: 16 U/L (ref 10–40)
BARBITURATES UR QL SCN>200 NG/ML: NEGATIVE
BASOPHILS # BLD AUTO: 0.06 K/UL (ref 0–0.2)
BASOPHILS NFR BLD: 0.6 % (ref 0–1.9)
BENZODIAZ UR QL SCN>200 NG/ML: NEGATIVE
BILIRUB SERPL-MCNC: 0.5 MG/DL (ref 0.1–1)
BILIRUB UR QL STRIP: NEGATIVE
BUN SERPL-MCNC: 29 MG/DL (ref 8–23)
BZE UR QL SCN: NEGATIVE
CALCIUM SERPL-MCNC: 10.2 MG/DL (ref 8.7–10.5)
CANNABINOIDS UR QL SCN: NEGATIVE
CHLORIDE SERPL-SCNC: 99 MMOL/L (ref 95–110)
CLARITY UR: ABNORMAL
CO2 SERPL-SCNC: 27 MMOL/L (ref 23–29)
COLOR UR: YELLOW
CREAT SERPL-MCNC: 2.9 MG/DL (ref 0.5–1.4)
CREAT UR-MCNC: 381.3 MG/DL (ref 15–325)
DIFFERENTIAL METHOD: NORMAL
EOSINOPHIL # BLD AUTO: 0.1 K/UL (ref 0–0.5)
EOSINOPHIL NFR BLD: 1.5 % (ref 0–8)
ERYTHROCYTE [DISTWIDTH] IN BLOOD BY AUTOMATED COUNT: 13.4 % (ref 11.5–14.5)
EST. GFR  (NO RACE VARIABLE): 16 ML/MIN/1.73 M^2
ETHANOL SERPL-MCNC: <10 MG/DL
GLUCOSE SERPL-MCNC: 125 MG/DL (ref 70–110)
GLUCOSE UR QL STRIP: NEGATIVE
HCT VFR BLD AUTO: 39.2 % (ref 37–48.5)
HCV AB SERPL QL IA: NORMAL
HGB BLD-MCNC: 13 G/DL (ref 12–16)
HGB UR QL STRIP: NEGATIVE
HIV 1+2 AB+HIV1 P24 AG SERPL QL IA: NORMAL
IMM GRANULOCYTES # BLD AUTO: 0.02 K/UL (ref 0–0.04)
IMM GRANULOCYTES NFR BLD AUTO: 0.2 % (ref 0–0.5)
KETONES UR QL STRIP: NEGATIVE
LEUKOCYTE ESTERASE UR QL STRIP: NEGATIVE
LYMPHOCYTES # BLD AUTO: 2.8 K/UL (ref 1–4.8)
LYMPHOCYTES NFR BLD: 29 % (ref 18–48)
MCH RBC QN AUTO: 30.1 PG (ref 27–31)
MCHC RBC AUTO-ENTMCNC: 33.2 G/DL (ref 32–36)
MCV RBC AUTO: 91 FL (ref 82–98)
METHADONE UR QL SCN>300 NG/ML: NEGATIVE
MONOCYTES # BLD AUTO: 0.8 K/UL (ref 0.3–1)
MONOCYTES NFR BLD: 8 % (ref 4–15)
NEUTROPHILS # BLD AUTO: 5.8 K/UL (ref 1.8–7.7)
NEUTROPHILS NFR BLD: 60.7 % (ref 38–73)
NITRITE UR QL STRIP: NEGATIVE
NRBC BLD-RTO: 0 /100 WBC
OPIATES UR QL SCN: NEGATIVE
PCP UR QL SCN>25 NG/ML: NEGATIVE
PH UR STRIP: 6 [PH] (ref 5–8)
PLATELET # BLD AUTO: 218 K/UL (ref 150–450)
PMV BLD AUTO: 9.7 FL (ref 9.2–12.9)
POCT GLUCOSE: 116 MG/DL (ref 70–110)
POCT GLUCOSE: 77 MG/DL (ref 70–110)
POTASSIUM SERPL-SCNC: 3.7 MMOL/L (ref 3.5–5.1)
PROT SERPL-MCNC: 7.1 G/DL (ref 6–8.4)
PROT UR QL STRIP: NEGATIVE
RBC # BLD AUTO: 4.32 M/UL (ref 4–5.4)
SALICYLATES SERPL-MCNC: <5 MG/DL (ref 15–30)
SODIUM SERPL-SCNC: 138 MMOL/L (ref 136–145)
SP GR UR STRIP: 1.03 (ref 1–1.03)
T4 FREE SERPL-MCNC: 0.64 NG/DL (ref 0.71–1.51)
TOXICOLOGY INFORMATION: ABNORMAL
TSH SERPL DL<=0.005 MIU/L-ACNC: 35.45 UIU/ML (ref 0.4–4)
URN SPEC COLLECT METH UR: ABNORMAL
UROBILINOGEN UR STRIP-ACNC: NEGATIVE EU/DL
WBC # BLD AUTO: 9.58 K/UL (ref 3.9–12.7)

## 2023-06-23 PROCEDURE — 25000003 PHARM REV CODE 250: Performed by: HOSPITALIST

## 2023-06-23 PROCEDURE — 93010 EKG 12-LEAD: ICD-10-PCS | Mod: ,,, | Performed by: SPECIALIST

## 2023-06-23 PROCEDURE — 94760 N-INVAS EAR/PLS OXIMETRY 1: CPT

## 2023-06-23 PROCEDURE — 80053 COMPREHEN METABOLIC PANEL: CPT | Performed by: EMERGENCY MEDICINE

## 2023-06-23 PROCEDURE — G0378 HOSPITAL OBSERVATION PER HR: HCPCS

## 2023-06-23 PROCEDURE — 87389 HIV-1 AG W/HIV-1&-2 AB AG IA: CPT | Performed by: EMERGENCY MEDICINE

## 2023-06-23 PROCEDURE — 63600175 PHARM REV CODE 636 W HCPCS: Performed by: HOSPITALIST

## 2023-06-23 PROCEDURE — 84443 ASSAY THYROID STIM HORMONE: CPT | Performed by: EMERGENCY MEDICINE

## 2023-06-23 PROCEDURE — 80307 DRUG TEST PRSMV CHEM ANLYZR: CPT | Performed by: EMERGENCY MEDICINE

## 2023-06-23 PROCEDURE — 99285 EMERGENCY DEPT VISIT HI MDM: CPT | Mod: 25

## 2023-06-23 PROCEDURE — 93005 ELECTROCARDIOGRAM TRACING: CPT

## 2023-06-23 PROCEDURE — 93010 ELECTROCARDIOGRAM REPORT: CPT | Mod: ,,, | Performed by: SPECIALIST

## 2023-06-23 PROCEDURE — 85025 COMPLETE CBC W/AUTO DIFF WBC: CPT | Performed by: EMERGENCY MEDICINE

## 2023-06-23 PROCEDURE — 96360 HYDRATION IV INFUSION INIT: CPT

## 2023-06-23 PROCEDURE — 36415 COLL VENOUS BLD VENIPUNCTURE: CPT | Performed by: EMERGENCY MEDICINE

## 2023-06-23 PROCEDURE — 96361 HYDRATE IV INFUSION ADD-ON: CPT

## 2023-06-23 PROCEDURE — 96372 THER/PROPH/DIAG INJ SC/IM: CPT | Mod: 59 | Performed by: HOSPITALIST

## 2023-06-23 PROCEDURE — 82077 ASSAY SPEC XCP UR&BREATH IA: CPT | Performed by: EMERGENCY MEDICINE

## 2023-06-23 PROCEDURE — 25000003 PHARM REV CODE 250: Performed by: EMERGENCY MEDICINE

## 2023-06-23 PROCEDURE — 80179 DRUG ASSAY SALICYLATE: CPT | Performed by: EMERGENCY MEDICINE

## 2023-06-23 PROCEDURE — 80143 DRUG ASSAY ACETAMINOPHEN: CPT | Performed by: EMERGENCY MEDICINE

## 2023-06-23 PROCEDURE — 84439 ASSAY OF FREE THYROXINE: CPT | Performed by: EMERGENCY MEDICINE

## 2023-06-23 PROCEDURE — 81003 URINALYSIS AUTO W/O SCOPE: CPT | Mod: 59 | Performed by: EMERGENCY MEDICINE

## 2023-06-23 PROCEDURE — 86803 HEPATITIS C AB TEST: CPT | Performed by: EMERGENCY MEDICINE

## 2023-06-23 RX ORDER — ACETAMINOPHEN 500 MG
1000 TABLET ORAL EVERY 8 HOURS PRN
Status: DISCONTINUED | OUTPATIENT
Start: 2023-06-23 | End: 2023-06-25 | Stop reason: HOSPADM

## 2023-06-23 RX ORDER — LEVOTHYROXINE SODIUM 88 UG/1
88 TABLET ORAL DAILY
Status: DISCONTINUED | OUTPATIENT
Start: 2023-06-24 | End: 2023-06-25 | Stop reason: HOSPADM

## 2023-06-23 RX ORDER — SODIUM CHLORIDE 0.9 % (FLUSH) 0.9 %
10 SYRINGE (ML) INJECTION EVERY 8 HOURS PRN
Status: DISCONTINUED | OUTPATIENT
Start: 2023-06-23 | End: 2023-06-25 | Stop reason: HOSPADM

## 2023-06-23 RX ORDER — GLUCAGON 1 MG
1 KIT INJECTION
Status: DISCONTINUED | OUTPATIENT
Start: 2023-06-23 | End: 2023-06-25 | Stop reason: HOSPADM

## 2023-06-23 RX ORDER — AMLODIPINE BESYLATE 5 MG/1
5 TABLET ORAL DAILY
Status: DISCONTINUED | OUTPATIENT
Start: 2023-06-24 | End: 2023-06-25 | Stop reason: HOSPADM

## 2023-06-23 RX ORDER — SODIUM CHLORIDE 9 MG/ML
INJECTION, SOLUTION INTRAVENOUS CONTINUOUS
Status: DISCONTINUED | OUTPATIENT
Start: 2023-06-23 | End: 2023-06-25 | Stop reason: HOSPADM

## 2023-06-23 RX ORDER — SIMETHICONE 80 MG
1 TABLET,CHEWABLE ORAL 4 TIMES DAILY PRN
Status: DISCONTINUED | OUTPATIENT
Start: 2023-06-23 | End: 2023-06-25 | Stop reason: HOSPADM

## 2023-06-23 RX ORDER — MAG HYDROX/ALUMINUM HYD/SIMETH 200-200-20
30 SUSPENSION, ORAL (FINAL DOSE FORM) ORAL 4 TIMES DAILY PRN
Status: DISCONTINUED | OUTPATIENT
Start: 2023-06-23 | End: 2023-06-25 | Stop reason: HOSPADM

## 2023-06-23 RX ORDER — INSULIN ASPART 100 [IU]/ML
1-10 INJECTION, SOLUTION INTRAVENOUS; SUBCUTANEOUS
Status: DISCONTINUED | OUTPATIENT
Start: 2023-06-23 | End: 2023-06-25 | Stop reason: HOSPADM

## 2023-06-23 RX ORDER — VENLAFAXINE HYDROCHLORIDE 37.5 MG/1
150 CAPSULE, EXTENDED RELEASE ORAL DAILY
Status: DISCONTINUED | OUTPATIENT
Start: 2023-06-24 | End: 2023-06-25 | Stop reason: HOSPADM

## 2023-06-23 RX ORDER — PRAMIPEXOLE DIHYDROCHLORIDE 1 MG/1
1 TABLET ORAL NIGHTLY
Status: DISCONTINUED | OUTPATIENT
Start: 2023-06-23 | End: 2023-06-25 | Stop reason: HOSPADM

## 2023-06-23 RX ORDER — ONDANSETRON 4 MG/1
8 TABLET, ORALLY DISINTEGRATING ORAL EVERY 8 HOURS PRN
Status: DISCONTINUED | OUTPATIENT
Start: 2023-06-23 | End: 2023-06-25 | Stop reason: HOSPADM

## 2023-06-23 RX ORDER — POLYETHYLENE GLYCOL 3350 17 G/17G
17 POWDER, FOR SOLUTION ORAL DAILY
Status: DISCONTINUED | OUTPATIENT
Start: 2023-06-24 | End: 2023-06-25 | Stop reason: HOSPADM

## 2023-06-23 RX ORDER — TALC
6 POWDER (GRAM) TOPICAL NIGHTLY PRN
Status: DISCONTINUED | OUTPATIENT
Start: 2023-06-23 | End: 2023-06-25

## 2023-06-23 RX ORDER — ACETAMINOPHEN 325 MG/1
650 TABLET ORAL EVERY 4 HOURS PRN
Status: DISCONTINUED | OUTPATIENT
Start: 2023-06-23 | End: 2023-06-25 | Stop reason: HOSPADM

## 2023-06-23 RX ORDER — METOPROLOL SUCCINATE 25 MG/1
25 TABLET, EXTENDED RELEASE ORAL DAILY
Status: DISCONTINUED | OUTPATIENT
Start: 2023-06-24 | End: 2023-06-25 | Stop reason: HOSPADM

## 2023-06-23 RX ORDER — MIRTAZAPINE 7.5 MG/1
7.5 TABLET, FILM COATED ORAL NIGHTLY
Status: DISCONTINUED | OUTPATIENT
Start: 2023-06-23 | End: 2023-06-25

## 2023-06-23 RX ORDER — PREGABALIN 75 MG/1
150 CAPSULE ORAL 2 TIMES DAILY
Status: DISCONTINUED | OUTPATIENT
Start: 2023-06-23 | End: 2023-06-24

## 2023-06-23 RX ORDER — DEXTROSE 40 %
15 GEL (GRAM) ORAL
Status: DISCONTINUED | OUTPATIENT
Start: 2023-06-23 | End: 2023-06-25 | Stop reason: HOSPADM

## 2023-06-23 RX ORDER — PROCHLORPERAZINE EDISYLATE 5 MG/ML
5 INJECTION INTRAMUSCULAR; INTRAVENOUS EVERY 6 HOURS PRN
Status: DISCONTINUED | OUTPATIENT
Start: 2023-06-23 | End: 2023-06-25 | Stop reason: HOSPADM

## 2023-06-23 RX ORDER — HEPARIN SODIUM 5000 [USP'U]/ML
5000 INJECTION, SOLUTION INTRAVENOUS; SUBCUTANEOUS EVERY 8 HOURS
Status: DISCONTINUED | OUTPATIENT
Start: 2023-06-23 | End: 2023-06-25 | Stop reason: HOSPADM

## 2023-06-23 RX ORDER — DEXTROSE 40 %
30 GEL (GRAM) ORAL
Status: DISCONTINUED | OUTPATIENT
Start: 2023-06-23 | End: 2023-06-25 | Stop reason: HOSPADM

## 2023-06-23 RX ORDER — NALOXONE HCL 0.4 MG/ML
0.02 VIAL (ML) INJECTION
Status: DISCONTINUED | OUTPATIENT
Start: 2023-06-23 | End: 2023-06-25 | Stop reason: HOSPADM

## 2023-06-23 RX ADMIN — MELATONIN TAB 3 MG 6 MG: 3 TAB at 09:06

## 2023-06-23 RX ADMIN — SODIUM CHLORIDE 1000 ML: 9 INJECTION, SOLUTION INTRAVENOUS at 12:06

## 2023-06-23 RX ADMIN — SODIUM CHLORIDE: 9 INJECTION, SOLUTION INTRAVENOUS at 05:06

## 2023-06-23 RX ADMIN — HEPARIN SODIUM 5000 UNITS: 5000 INJECTION INTRAVENOUS; SUBCUTANEOUS at 09:06

## 2023-06-23 RX ADMIN — PRAMIPEXOLE DIHYDROCHLORIDE 1 MG: 1 TABLET ORAL at 09:06

## 2023-06-23 RX ADMIN — MIRTAZAPINE 7.5 MG: 7.5 TABLET ORAL at 09:06

## 2023-06-23 RX ADMIN — PREGABALIN 150 MG: 75 CAPSULE ORAL at 09:06

## 2023-06-23 NOTE — ASSESSMENT & PLAN NOTE
Patient has chronic hypothyroidism. TFTs reviewed-   Lab Results   Component Value Date    TSH 35.452 (H) 06/23/2023   . Will continue chronic levothyroxine and adjust for and clinical changes.

## 2023-06-23 NOTE — ASSESSMENT & PLAN NOTE
Patient with acute kidney injury likely due to IVVD/dehydration TARAN is currently worsening. Labs reviewed- Renal function/electrolytes with Estimated Creatinine Clearance: 15.8 mL/min (A) (based on SCr of 2.9 mg/dL (H)). according to latest data. Monitor urine output and serial BMP and adjust therapy as needed. Avoid nephrotoxins and renally dose meds for GFR listed above.     IVF  Renal US  Consult with nephrology

## 2023-06-23 NOTE — NURSING
Pt and daughter unsure of pts home meds.  No list available.  Pt hallucinating seeing people in the room who are not there and who she can not identify. Daughter is here in room.

## 2023-06-23 NOTE — HPI
Patient is a 73-year-old female with GERD, hypertension, hypothyroidism, diabetes, hyperlipidemia, depression who is seen today in the ER after being found by a neighbor on the steps barefoot with clothes on inside out.  Granddaughter accompanies patient and provides much of the history.  She reports that patient's   months ago.  Patient has been taking 6 Benadryl at night in addition to multiple other medical patient is provided by multiple prescribers.  Granddaughter reports patient's appetite has been poor up until last night when she eat very well and drank 2 margaritas.  Granddaughter reports she does not normally drink alcohol.  Granddaughter reports patient has been hallucinating today pulling the blanket and eating imaginary food.  Patient is currently oriented to self and year but not location or situation.  Granddaughter's concerned that patient is very depressed.  She reports that she attempted the patient lives with her recently but that seems to have made things worse.  Patient currently lives alone with family members checking on her daily.  Workup in the ED showed creatinine of 2.9.  Patient will be admitted for TARAN and altered mental status.

## 2023-06-23 NOTE — ASSESSMENT & PLAN NOTE
Patient's FSGs are controlled on current medication regimen.  Last A1c reviewed-   Lab Results   Component Value Date    HGBA1C 5.9 (H) 12/12/2022     Most recent fingerstick glucose reviewed- No results for input(s): POCTGLUCOSE in the last 24 hours.  Current correctional scale  Medium  Maintain anti-hyperglycemic dose as follows-   Antihyperglycemics (From admission, onward)    None        Hold Oral hypoglycemics while patient is in the hospital.

## 2023-06-23 NOTE — ASSESSMENT & PLAN NOTE
Patient with AMS and reports of hallucinations  Contributing factors include - EtOH, medications, depression, TARAN  CTH negative acute  Consult neurology for further workup  Consider MRI

## 2023-06-23 NOTE — ASSESSMENT & PLAN NOTE
Chronic, controlled.  Latest blood pressure and vitals reviewed-     Temp:  [98.5 °F (36.9 °C)]   Pulse:  [63-71]   Resp:  [18-20]   BP: (108-144)/(50-68)   SpO2:  [95 %-98 %] .   Home meds for hypertension were reviewed and noted below.   Hypertension Medications             amLODIPine (NORVASC) 5 MG tablet Take 1 tablet (5 mg total) by mouth once daily. For blood pressure    hydroCHLOROthiazide (HYDRODIURIL) 25 MG tablet Take 1 tablet (25 mg total) by mouth once daily.    losartan (COZAAR) 50 MG tablet Take 1 tablet (50 mg total) by mouth once daily. For blood pressure    metoprolol succinate (TOPROL-XL) 25 MG 24 hr tablet Take 1 tablet (25 mg total) by mouth once daily. For blood pressure and heart    torsemide (DEMADEX) 10 MG Tab TAKE 1 TABLET(10 MG) BY MOUTH EVERY DAY          While in the hospital, will manage blood pressure as follows; hold diuretic and ARB    Will utilize p.r.n. blood pressure medication only if patient's blood pressure greater than 180/110 and she develops symptoms such as worsening chest pain or shortness of breath.

## 2023-06-23 NOTE — SUBJECTIVE & OBJECTIVE
Past Medical History:   Diagnosis Date    Allergy     Anxiety     Arthritis, rheumatoid     Back pain     Chronic bronchiolitis     Depression     Fibromyalgia     GERD (gastroesophageal reflux disease)     High cholesterol     Hilar mass 03/27/2014    [place]     aid right ear    [place]     Hypertension     Incontinence of urine     Lymphedema     MS (multiple sclerosis)     benign; another MD stated she has no MS    Neuropathy     Restless leg syndrome     Rotator cuff tear 04/16/2015    Sciatica of left side 01/05/2018    Seasonal allergies     Sinus congestion     Sleep apnea     DOES NOT USE MACHINE    Spondylolysis     Thyroid disease     Type 2 diabetes mellitus with polyneuropathy     no med. was borderline    Wheezing        Past Surgical History:   Procedure Laterality Date    APPENDECTOMY      ARTHROSCOPIC DEBRIDEMENT OF SHOULDER Right 2/18/2022    Procedure: EXTENSIVE DEBRIDEMENT, SHOULDER, ARTHROSCOPIC;  Surgeon: Rio Pitts MD;  Location: [facility] OR;  Service: Orthopedics;  Laterality: Right;    BREAST SURGERY      reduction    CLOSED REDUCTION OF INJURY OF SHOULDER Right 9/19/2021    Procedure: CLOSED REDUCTION, SHOULDER;  Surgeon: Antonio Irwin MD;  Location: [facility] OR;  Service: Orthopedics;  Laterality: Right;    EPIDURAL STEROID INJECTION INTO LUMBAR SPINE N/A 6/12/2019    Procedure: Injection-steroid-epidural-lumbar;  Surgeon: Thad Manning MD;  Location: [facility] OR;  Service: Pain Management;  Laterality: N/A;  L5-S1    EPIDURAL STEROID INJECTION INTO LUMBAR SPINE N/A 9/16/2019    Procedure: Injection-steroid-epidural-lumbar;  Surgeon: Thad Manning MD;  Location: [facility] OR;  Service: Pain Management;  Laterality: N/A;  L5-S1    EYE SURGERY Bilateral     HYSTERECTOMY      partial - fibroids    INJECTION OF ANESTHETIC AGENT AROUND MEDIAL BRANCH NERVES INNERVATING LUMBAR FACET JOINT Bilateral 2/28/2019    Procedure: Block-nerve-medial branch-lumbar;  Surgeon: hTad HAQUE  MD Nigel;  Location: [facility];  Service: Pain Management;  Laterality: Bilateral;  L3, 4,5     INJECTION OF ANESTHETIC AGENT AROUND MEDIAL BRANCH NERVES INNERVATING LUMBAR FACET JOINT Bilateral 3/21/2019    Procedure: Block-nerve-medial branch-lumbar L3,4,5;  Surgeon: Thad Manning MD;  Location: [facility] OR;  Service: Pain Management;  Laterality: Bilateral;    KNEE ARTHROPLASTY Left 3/16/2021    Procedure: ARTHROPLASTY, KNEE;  Surgeon: Rio Pitts MD;  Location: [facility] OR;  Service: Orthopedics;  Laterality: Left;  GENERAL AND BLOCK    LIPOSUCTION  1985    RADIOFREQUENCY ABLATION Left 11/4/2020    Procedure: Radiofrequency Ablation left knee;  Surgeon: Andrew Escudero MD;  Location: [facility] OR;  Service: Pain Management;  Laterality: Left;    RADIOFREQUENCY ABLATION OF LUMBAR MEDIAL BRANCH NERVE AT SINGLE LEVEL Bilateral 4/12/2019    Procedure: Radiofrequency Ablation, Nerve, Spinal, Lumbar, Medial Branch, 1 Level;  Surgeon: Thad Manning MD;  Location: [facility] OR;  Service: Pain Management;  Laterality: Bilateral;  L3, 4, 5  lumbar  [employer] pain management generator  SN [id number]  80 degrees for 75 seconds x2    RADIOFREQUENCY ABLATION OF LUMBAR MEDIAL BRANCH NERVE AT SINGLE LEVEL Bilateral 10/22/2019    Procedure: Radiofrequency Ablation, Nerve, Spinal, Lumbar, Medial Branch, L3,4,5;  Surgeon: Thad Manning MD;  Location: [facility];  Service: Pain Management;  Laterality: Bilateral;  burned at 80 degrees C X 60 seconds X 2 each site    RADIOFREQUENCY ABLATION OF LUMBAR MEDIAL BRANCH NERVE AT SINGLE LEVEL Bilateral 5/27/2020    Procedure: Radiofrequency Ablation, Nerve, Spinal, Lumbar, Medial Branch, 1 Level;  Surgeon: Thad Manning MD;  Location: [facility];  Service: Pain Management;  Laterality: Bilateral;  L3,4,5    REVERSE TOTAL SHOULDER ARTHROPLASTY Right 4/12/2022    Procedure: ARTHROPLASTY, SHOULDER, TOTAL, REVERSE;  Surgeon: Rio Pitts MD;  Location: [facility] OR;  Service: Orthopedics;  Laterality: Right;     SHOULDER ARTHROSCOPY Right 9/19/2021    Procedure: ARTHROSCOPY, SHOULDER;  Surgeon: Antonio Irwin MD;  Location: [facility];  Service: Orthopedics;  Laterality: Right;  LIMITED DEBRIDMENT    TONSILLECTOMY      TOTAL REDUCTION MAMMOPLASTY         Review of patient's allergies indicates:   Allergen Reactions    Keflex [cephalexin]      Throat swelling    Pcn [penicillins]      Throat swelling    Latex, natural rubber Other (See Comments)     Redness with bandaids     Adhesive Other (See Comments)     bandainds redness at skin    Trelegy ellipta [fluticasone-umeclidin-vilanter]      Vision disturbance       No current facility-administered medications on file prior to encounter.     Current Outpatient Medications on File Prior to Encounter   Medication Sig    albuterol (PROAIR HFA) 90 mcg/actuation inhaler Inhale 2 puffs into the lungs every 6 (six) hours as needed for Wheezing. Rescue    amLODIPine (NORVASC) 5 MG tablet Take 1 tablet (5 mg total) by mouth once daily. For blood pressure    biotin 1 mg Cap Take by mouth.    ferrous sulfate 325 (65 FE) MG EC tablet Take 1 tablet (325 mg total) by mouth once daily.    fexofenadine (ALLEGRA) 180 MG tablet     fluticasone-salmeterol 230-21 mcg/dose (ADVAIR HFA) 230-21 mcg/actuation HFAA inhaler Inhale 2 puffs into the lungs 2 (two) times daily. Controller    hydroCHLOROthiazide (HYDRODIURIL) 25 MG tablet Take 1 tablet (25 mg total) by mouth once daily.    HYDROcodone-acetaminophen (NORCO) 5-325 mg per tablet Take 1 tablet by mouth every 6 (six) hours as needed for Pain.    ibuprofen (ADVIL,MOTRIN) 600 MG tablet Take 1 tablet (600 mg total) by mouth 3 (three) times daily as needed for Pain.    levothyroxine (SYNTHROID) 88 MCG tablet Take 1 tablet (88 mcg total) by mouth once daily.    losartan (COZAAR) 50 MG tablet Take 1 tablet (50 mg total) by mouth once daily. For blood pressure    lovastatin (MEVACOR) 40 MG tablet Take 1 tablet (40 mg total) by mouth nightly. at  bedtime. For cholesterol    magnesium oxide (MAG-OX) 400 mg (241.3 mg magnesium) tablet Take 1 tablet (400 mg total) by mouth once daily.    metoprolol succinate (TOPROL-XL) 25 MG 24 hr tablet Take 1 tablet (25 mg total) by mouth once daily. For blood pressure and heart    mirtazapine (REMERON) 7.5 MG Tab Take 1 tablet (7.5 mg total) by mouth every evening.    nystatin (MYCOSTATIN) cream Apply topically 2 (two) times daily.    omeprazole (PRILOSEC) 20 MG capsule Take 1 capsule (20 mg total) by mouth once daily. For heartburn    potassium chloride (MICRO-K) 10 MEQ CpSR Take 1 capsule (10 mEq total) by mouth 3 (three) times daily.    pramipexole (MIRAPEX) 0.5 MG tablet Take 2 tablets (1 mg total) by mouth every evening. For restless legs    pregabalin (LYRICA) 150 MG capsule Take 1 capsule (150 mg total) by mouth 2 (two) times daily.    ramelteon (ROZEREM) 8 mg tablet Take 1 tablet (8 mg total) by mouth every evening.    torsemide (DEMADEX) 10 MG Tab TAKE 1 TABLET(10 MG) BY MOUTH EVERY DAY    valacyclovir HCl (VALACYCLOVIR ORAL) Take by mouth.    venlafaxine (EFFEXOR-XR) 150 MG Cp24 Take 1 capsule (150 mg total) by mouth once daily. For depression    venlafaxine (EFFEXOR-XR) 37.5 MG 24 hr capsule TAKE 1 CAPSULE(37.5 MG) BY MOUTH EVERY DAY     Family History       Problem Relation (Age of Onset)    Heart disease Mother          Tobacco Use    Smoking status: Former     Types: Cigarettes     Quit date: 1996     Years since quittin.0     Passive exposure: Past    Smokeless tobacco: Never   Substance and Sexual Activity    Alcohol use: Yes     Comment: very little     Drug use: No    Sexual activity: Not Currently     Partners: Male     Review of Systems   Constitutional:  Negative for chills and fever.   HENT:  Negative for congestion and rhinorrhea.    Respiratory:  Negative for cough, shortness of breath and wheezing.    Cardiovascular:  Negative for chest pain, palpitations and leg swelling.    Gastrointestinal:  Negative for abdominal distention, abdominal pain, nausea and vomiting.   Endocrine: Negative for cold intolerance and heat intolerance.   Genitourinary:  Negative for dysuria and urgency.   Musculoskeletal:  Negative for arthralgias and neck stiffness.   Skin:  Negative for rash and wound.   Neurological:  Positive for weakness. Negative for dizziness.   Psychiatric/Behavioral:  Positive for confusion.    Objective:     Vital Signs (Most Recent):  Temp: 98.5 °F (36.9 °C) (06/23/23 1026)  Pulse: 66 (06/23/23 1233)  Resp: 20 (06/23/23 1233)  BP: 122/63 (06/23/23 1233)  SpO2: 96 % (06/23/23 1233) Vital Signs (24h Range):  Temp:  [98.5 °F (36.9 °C)] 98.5 °F (36.9 °C)  Pulse:  [63-71] 66  Resp:  [18-20] 20  SpO2:  [95 %-98 %] 96 %  BP: (108-144)/(50-68) 122/63     Weight: 81.6 kg (180 lb)  Body mass index is 35.15 kg/m².     Physical Exam  Constitutional:       General: She is not in acute distress.     Appearance: She is well-developed.   HENT:      Head: Normocephalic and atraumatic.   Eyes:      Pupils: Pupils are equal, round, and reactive to light.   Cardiovascular:      Rate and Rhythm: Normal rate and regular rhythm.      Heart sounds: No murmur heard.  Pulmonary:      Effort: Pulmonary effort is normal. No respiratory distress.      Breath sounds: Normal breath sounds. No wheezing or rales.   Abdominal:      General: Bowel sounds are normal. There is no distension.      Palpations: Abdomen is soft.      Tenderness: There is no abdominal tenderness.   Musculoskeletal:         General: Normal range of motion.   Skin:     General: Skin is warm and dry.      Findings: No rash.   Neurological:      Mental Status: She is alert.      Cranial Nerves: No cranial nerve deficit.      Comments: Oriented to self and time but not location or situation  Poor short-term memory     Psychiatric:      Comments: Fidgety            CRANIAL NERVES     CN III, IV, VI   Pupils are equal, round, and reactive to  light.     Significant Labs: All pertinent labs within the past 24 hours have been reviewed.    Significant Imaging: I have reviewed all pertinent imaging results/findings within the past 24 hours.

## 2023-06-23 NOTE — ASSESSMENT & PLAN NOTE
Patient has persistent depression which is unknown and is currently uncontrolled. Will Continue anti-depressant medications. We will consult psychiatry at this time. Patient does not display psychosis at this time. Continue to monitor closely and adjust plan of care as needed.

## 2023-06-23 NOTE — CONSULTS
[facility]  Wound Care    Patient Name:  Genoveva Gilbert   MRN:  [id number]  Date: 2023  Diagnosis: TARAN (acute kidney injury)    History:     Past Medical History:   Diagnosis Date    Allergy     Anxiety     Arthritis, rheumatoid     Back pain     Chronic bronchiolitis     Depression     Fibromyalgia     GERD (gastroesophageal reflux disease)     High cholesterol     Hilar mass 2014    [place] (hard of hearing)     aid right ear    [place] (hard of hearing)     Hypertension     Incontinence of urine     Lymphedema     MS (multiple sclerosis)     benign; another MD stated she has no MS    Neuropathy     Restless leg syndrome     Rotator cuff tear 2015    Sciatica of left side 2018    Seasonal allergies     Sinus congestion     Sleep apnea     DOES NOT USE MACHINE    Spondylolysis     Thyroid disease     Type 2 diabetes mellitus with polyneuropathy     no med. was borderline    Wheezing        Social History     Socioeconomic History    Marital status:    Tobacco Use    Smoking status: Former     Types: Cigarettes     Quit date: 1996     Years since quittin.0     Passive exposure: Past    Smokeless tobacco: Never   Substance and Sexual Activity    Alcohol use: Yes     Comment: very little     Drug use: No    Sexual activity: Not Currently     Partners: Male     Social Determinants of Health     Financial Resource Strain: Unknown    Difficulty of Paying Living Expenses: Patient refused   Food Insecurity: No Food Insecurity    Worried About Running Out of Food in the Last Year: Never true    Ran Out of Food in the Last Year: Never true   Transportation Needs: No Transportation Needs    Lack of Transportation (Medical): No    Lack of Transportation (Non-Medical): No   Physical Activity: Unknown    Days of Exercise per Week: Patient refused    Minutes of Exercise per Session: Patient refused   Stress: Unknown    Feeling of Stress : Patient refused   Social Connections:  Unknown    Frequency of Communication with Friends and Family: Twice a week    Frequency of Social Gatherings with Friends and Family: Twice a week    Attends Zoroastrian Services: Patient refused    Active Member of Clubs or Organizations: Patient refused    Attends Club or Organization Meetings: Patient refused    Marital Status:    Housing Stability: Unknown    Unable to Pay for Housing in the Last Year: No    Unstable Housing in the Last Year: No       Precautions:     Allergies as of 06/23/2023 - Reviewed 06/23/2023   Allergen Reaction Noted    Keflex [cephalexin]  03/19/2014    Pcn [penicillins]  03/19/2014    Latex, natural rubber Other (See Comments) 06/21/2017    Adhesive Other (See Comments) 02/28/2019    Trelegy ellipta [fluticasone-umeclidin-vilanter]  03/11/2021       [facility] Assessment Details/Treatment     Wound care consulted for calluses to Bilateral feet one noted on he R great toe and one on L hell. The L heel has opened but is not opened it is still covered with a thin layer of skin. Cleanse feet calluses with  chlorhexidine/ns.  Pat dry.  Paint with betadine. Air dry .   Patient noted to have IASD to sacral region POA Clean area with chlorhexidine/ns. Pat dry. Apply a thin layer of Triad and place on an air flow pad daily, do not use briefs on this patient at this time.                       06/23/2023

## 2023-06-23 NOTE — ED PROVIDER NOTES
Encounter Date: 2023    SCRIBE #1 NOTE: I, Kenny Pereira, am scribing for, and in the presence of,  Ramón Mckeon MD.     History     Chief Complaint   Patient presents with    Altered Mental Status     Intermittent x 3 months, recent loss of  with ETOH use; pt denies ETOH use today; pt AAOx4 in triage     Time seen by provider: 10:35 AM on 2023    Genoveva Gilbert is a 73 y.o. female who presents to the ED via EMS with an onset of intermittent confusion over the last several months. History obtained from independent historian: EMS states the patient's family told them that she has been experiencing brief bouts of confusion over the last 3 months, and this morning the patient walked over to her neighbor's house half clothed to have a conversation with them and she didn't know where she was. She confirms that over the last several months she has been instances when she is talking where she forgets what she is saying or what is going on. The patient states that she takes 6 benadryl per night to go to sleep, and  she is unsure what the dosing of the benadryl is. The patient has been drinking alcohol over the last several months since her  , and she last drank alcohol yesterday night. She states she took her routine morning medications this morning without any changes. She states that she falls frequently, most recently yesterday. She states that she hit her head in the fall yesterday, but denies loss of consciousness. She states that she was nauseous, vomiting, and having diarrhea yesterday. The patient denies chest pain, SOB, any urinary symptoms, any pain, or any other symptoms at this time. PMHx of thyroid disease, HTN, anxiety, depression, DM II with polyneuropathy, fibromyalgia, and RA. There is no pertinent PSHx.    The history is provided by the patient and the EMS personnel.   Review of patient's allergies indicates:   Allergen Reactions    Keflex [cephalexin]      Throat  swelling    Pcn [penicillins]      Throat swelling    Latex, natural rubber Other (See Comments)     Redness with bandaids     Adhesive Other (See Comments)     bandainds redness at skin    Trelegy ellipta [fluticasone-umeclidin-vilanter]      Vision disturbance     Past Medical History:   Diagnosis Date    Allergy     Anxiety     Arthritis, rheumatoid     Back pain     Chronic bronchiolitis     Depression     Fibromyalgia     GERD (gastroesophageal reflux disease)     High cholesterol     Hilar mass 03/27/2014    [place] (hard of hearing)     aid right ear    [place] (hard of hearing)     Hypertension     Incontinence of urine     Lymphedema     MS (multiple sclerosis)     benign; another MD stated she has no MS    Neuropathy     Restless leg syndrome     Rotator cuff tear 04/16/2015    Sciatica of left side 01/05/2018    Seasonal allergies     Sinus congestion     Sleep apnea     DOES NOT USE MACHINE    Spondylolysis     Thyroid disease     Type 2 diabetes mellitus with polyneuropathy     no med. was borderline    Wheezing      Past Surgical History:   Procedure Laterality Date    APPENDECTOMY      ARTHROSCOPIC DEBRIDEMENT OF SHOULDER Right 2/18/2022    Procedure: EXTENSIVE DEBRIDEMENT, SHOULDER, ARTHROSCOPIC;  Surgeon: Rio Pitts MD;  Location: [facility] OR;  Service: Orthopedics;  Laterality: Right;    BREAST SURGERY      reduction    CLOSED REDUCTION OF INJURY OF SHOULDER Right 9/19/2021    Procedure: CLOSED REDUCTION, SHOULDER;  Surgeon: Antonio Irwin MD;  Location: [facility] OR;  Service: Orthopedics;  Laterality: Right;    EPIDURAL STEROID INJECTION INTO LUMBAR SPINE N/A 6/12/2019    Procedure: Injection-steroid-epidural-lumbar;  Surgeon: Thad Manning MD;  Location: [facility] OR;  Service: Pain Management;  Laterality: N/A;  L5-S1    EPIDURAL STEROID INJECTION INTO LUMBAR SPINE N/A 9/16/2019    Procedure: Injection-steroid-epidural-lumbar;  Surgeon: Thad Manning MD;  Location: [facility] OR;  Service: Pain Management;   Laterality: N/A;  L5-S1    EYE SURGERY Bilateral     HYSTERECTOMY      partial - fibroids    INJECTION OF ANESTHETIC AGENT AROUND MEDIAL BRANCH NERVES INNERVATING LUMBAR FACET JOINT Bilateral 2/28/2019    Procedure: Block-nerve-medial branch-lumbar;  Surgeon: Thad Manning MD;  Location: [facility];  Service: Pain Management;  Laterality: Bilateral;  L3, 4,5     INJECTION OF ANESTHETIC AGENT AROUND MEDIAL BRANCH NERVES INNERVATING LUMBAR FACET JOINT Bilateral 3/21/2019    Procedure: Block-nerve-medial branch-lumbar L3,4,5;  Surgeon: Thad Manning MD;  Location: [facility];  Service: Pain Management;  Laterality: Bilateral;    KNEE ARTHROPLASTY Left 3/16/2021    Procedure: ARTHROPLASTY, KNEE;  Surgeon: Rio Pitts MD;  Location: [facility] OR;  Service: Orthopedics;  Laterality: Left;  GENERAL AND BLOCK    LIPOSUCTION  1985    RADIOFREQUENCY ABLATION Left 11/4/2020    Procedure: Radiofrequency Ablation left knee;  Surgeon: Andrew Escudero MD;  Location: [facility] OR;  Service: Pain Management;  Laterality: Left;    RADIOFREQUENCY ABLATION OF LUMBAR MEDIAL BRANCH NERVE AT SINGLE LEVEL Bilateral 4/12/2019    Procedure: Radiofrequency Ablation, Nerve, Spinal, Lumbar, Medial Branch, 1 Level;  Surgeon: Thad Manning MD;  Location: [facility];  Service: Pain Management;  Laterality: Bilateral;  L3, 4, 5  lumbar  [employer] pain management generator  SN [id number]  80 degrees for 75 seconds x2    RADIOFREQUENCY ABLATION OF LUMBAR MEDIAL BRANCH NERVE AT SINGLE LEVEL Bilateral 10/22/2019    Procedure: Radiofrequency Ablation, Nerve, Spinal, Lumbar, Medial Branch, L3,4,5;  Surgeon: Thad Manning MD;  Location: [facility];  Service: Pain Management;  Laterality: Bilateral;  burned at 80 degrees C X 60 seconds X 2 each site    RADIOFREQUENCY ABLATION OF LUMBAR MEDIAL BRANCH NERVE AT SINGLE LEVEL Bilateral 5/27/2020    Procedure: Radiofrequency Ablation, Nerve, Spinal, Lumbar, Medial Branch, 1 Level;  Surgeon: Thad Manning MD;  Location: [facility];   Service: Pain Management;  Laterality: Bilateral;  L3,4,5    REVERSE TOTAL SHOULDER ARTHROPLASTY Right 2022    Procedure: ARTHROPLASTY, SHOULDER, TOTAL, REVERSE;  Surgeon: Rio Pitts MD;  Location: [facility] OR;  Service: Orthopedics;  Laterality: Right;    SHOULDER ARTHROSCOPY Right 2021    Procedure: ARTHROSCOPY, SHOULDER;  Surgeon: Antonio Irwin MD;  Location: [facility] OR;  Service: Orthopedics;  Laterality: Right;  LIMITED DEBRIDMENT    TONSILLECTOMY      TOTAL REDUCTION MAMMOPLASTY       Family History   Problem Relation Age of Onset    Heart disease Mother      Social History     Tobacco Use    Smoking status: Former     Types: Cigarettes     Quit date: 1996     Years since quittin.0     Passive exposure: Past    Smokeless tobacco: Never   Substance Use Topics    Alcohol use: Yes     Comment: very little     Drug use: No     Review of Systems   Constitutional:  Negative for fever.   HENT:  Negative for sore throat.    Respiratory:  Negative for shortness of breath.    Cardiovascular:  Negative for chest pain.   Gastrointestinal:  Positive for diarrhea, nausea and vomiting. Negative for abdominal pain.   Genitourinary:  Negative for decreased urine volume, difficulty urinating, dysuria, frequency, hematuria and urgency.   Musculoskeletal:  Negative for arthralgias, back pain, myalgias and neck pain.   Skin:  Negative for rash.   Neurological:  Negative for syncope and weakness.   Hematological:  Does not bruise/bleed easily.   Psychiatric/Behavioral:  Positive for confusion.      Physical Exam     Initial Vitals [23 1026]   BP Pulse Resp Temp SpO2   (!) 108/50 71 20 98.5 °F (36.9 °C) 95 %      MAP       --         Physical Exam    Nursing note and vitals reviewed.  Constitutional: She appears well-developed and well-nourished. She is not diaphoretic. No distress.   HENT:   Head: Normocephalic and atraumatic.   Mouth/Throat: Oropharynx is clear and moist.   Eyes: Conjunctivae are  normal.   Neck: Neck supple.   Normal range of motion.   Full passive range of motion without pain.     Cardiovascular:  Normal rate, regular rhythm, normal heart sounds and intact distal pulses.     Exam reveals no gallop and no friction rub.       No murmur heard.  Pulmonary/Chest: Breath sounds normal. She has no wheezes. She has no rhonchi. She has no rales.   Abdominal: Abdomen is soft. She exhibits no distension. There is no abdominal tenderness.   Musculoskeletal:         General: Normal range of motion.      Cervical back: Full passive range of motion without pain, normal range of motion and neck supple.     Neurological: She is alert and oriented to person, place, and time.   Cranial nerves III through XII grossly intact. 5/5 strength with intact sensation to BUE's and BLE's. Normal gait without ataxia. Poor short term recall.   Skin: No rash noted. No erythema.   Psychiatric: Her speech is normal. She is not actively hallucinating. Thought content is not delusional. She expresses no homicidal and no suicidal ideation.       ED Course   Procedures  Labs Reviewed   COMPREHENSIVE METABOLIC PANEL - Abnormal; Notable for the following components:       Result Value    Glucose 125 (*)     BUN 29 (*)     Creatinine 2.9 (*)     eGFR 16 (*)     All other components within normal limits    Narrative:     Release to patient->Immediate   TSH - Abnormal; Notable for the following components:    TSH 35.452 (*)     All other components within normal limits    Narrative:     Release to patient->Immediate   URINALYSIS, REFLEX TO URINE CULTURE - Abnormal; Notable for the following components:    Appearance, UA Hazy (*)     All other components within normal limits    Narrative:     Specimen Source->Urine   DRUG SCREEN PANEL, URINE EMERGENCY - Abnormal; Notable for the following components:    Creatinine, Urine 381.3 (*)     All other components within normal limits    Narrative:     Specimen Source->Urine   ACETAMINOPHEN  LEVEL - Abnormal; Notable for the following components:    Acetaminophen (Tylenol), Serum <3.0 (*)     All other components within normal limits    Narrative:     Release to patient->Immediate   SALICYLATE LEVEL - Abnormal; Notable for the following components:    Salicylate Lvl <5.0 (*)     All other components within normal limits    Narrative:     Release to patient->Immediate   T4, FREE - Abnormal; Notable for the following components:    Free T4 0.64 (*)     All other components within normal limits    Narrative:     Release to patient->Immediate   CBC W/ AUTO DIFFERENTIAL    Narrative:     Release to patient->Immediate   ALCOHOL,MEDICAL (ETHANOL)    Narrative:     Release to patient->Immediate   HIV 1 / 2 ANTIBODY   HEPATITIS C ANTIBODY        ECG Results              EKG 12-lead (In process)  Result time 06/23/23 14:12:08      In process by Interface, Lab In [facility] (06/23/23 14:12:08)                   Narrative:    Test Reason : Z00.8,    Vent. Rate : 067 BPM     Atrial Rate : 067 BPM     P-R Int : 150 ms          QRS Dur : 096 ms      QT Int : 448 ms       P-R-T Axes : 048 001 059 degrees     QTc Int : 473 ms    Normal sinus rhythm with sinus arrhythmia  Normal ECG  When compared with ECG of 05-OCT-2022 12:42,  No significant change was found    Referred By: [email or web address]   SELF           Confirmed By:                                   Imaging Results              CT Head Without Contrast (Final result)  Result time 06/23/23 12:12:09      Final result by Sid Bartholomew MD (06/23/23 12:12:09)                   Impression:      1. No acute intracranial CT findings.  Overall negative CT of the head for age.      Electronically signed by: Sid Bartholomew  Date:    06/23/2023  Time:    12:12               Narrative:    EXAMINATION:  CT HEAD WITHOUT CONTRAST    CLINICAL HISTORY:  Mental status change, unknown cause;    TECHNIQUE:  Low dose axial CT images obtained throughout the head without intravenous contrast. Sagittal  and coronal reconstructions were performed.    COMPARISON:  MRI brain 05/16/2019.  CT head 05/16/2016.    FINDINGS:  Brain: There is no evidence of a mass, edema, midline shift, or intracranial hemorrhage. No extra-axial fluid collection. No CT evidence of an acute major vascular territorial infarct.    Ventricles: The ventricles, sulci, and cisterns are within normal limits.    Skull: The osseous structures are unremarkable in appearance.    Extracranial soft tissues: Limited imaging is within normal limits.    Other: Mild opacification of the left sphenoid sinus.  Visualized mastoids are clear.  Limited imaging of the orbits is unremarkable.                                       Medications   sodium chloride 0.9% bolus 1,000 mL 1,000 mL (0 mLs Intravenous Stopped 6/23/23 1513)     Medical Decision Making:   History:   Old Medical Records: I decided to obtain old medical records.  Independently Interpreted Test(s):   I have ordered and independently interpreted EKG Reading(s) - see prior notes  Clinical Tests:   Lab Tests: Ordered and Reviewed  Radiological Study: Ordered and Reviewed  Medical Tests: Ordered and Reviewed        Scribe Attestation:   Scribe #1: I performed the above scribed service and the documentation accurately describes the services I performed. I attest to the accuracy of the note.      ED Course as of 06/23/23 1538   Fri Jun 23, 2023   1121 EKG:  NSR with sinus arrhythmia, rate of 67, normal intervals.  There are no acute ST or T wave changes suggestive of acute ischemia or infarction.  (Independently interpreted by me)   [MR]      ED Course User Index  [MR] Ramón Mckeon MD          I, Dr. Ramón Mckeon, personally performed the services described in this documentation. All medical record entries made by the scribe were at my direction and in my presence.  I have reviewed the chart and agree that the record reflects my personal performance and is accurate and complete. Ramón  MD Linda.  3:37 PM 06/23/2023    Genoveva Gilbert is a 73 y.o. female presenting with intermittent confusion of uncertain etiology.  Broad workup does not necessarily reveal likely cause confusion be on history and physical.  History does suggest element of polypharmacy with home alcohol use as well as frequent and excessive utilization of antihistamines for sleep.  There is no appearance of anticholinergic toxidrome at this point.  She is not currently confused.  There is no focal neurological deficit.  I have very low suspicion for acute, life-threatening intracranial process such as CVA, meningitis, encephalitis.  I do not think further MRI or lumbar puncture indicated.  Laboratories reviewed notable for new TARAN.  Etiology is uncertain.  IV fluids initiated here.  There is no sign of UTI.  She is voiding well.  I will admit to Hospital Medicine for further workup and therapy for TARAN as well as possibly for confusion.  There is notably mild hypothyroidism inpatient admits to taking levothyroxine, although not on a regular basis.         Clinical Impression:   Final diagnoses:  [R41.0] Confusion (Primary)  [N17.9] ATRAN (acute kidney injury)        ED Disposition Condition    Observation                 Ramón Mckeon MD  06/23/23 4590

## 2023-06-23 NOTE — H&P
[facility] Medicine  History & Physical    Patient Name: Genoveva Gilbert  MRN: [id number]  Patient Class: OP- Observation  Admission Date: 2023  Attending Physician: Eveilne Gomez MD  Primary Care Provider: Rajesh Dubois MD         Patient information was obtained from patient, relative(s), past medical records and ER records.     Subjective:     Principal Problem:TARAN (acute kidney injury)    Chief Complaint:   Chief Complaint   Patient presents with    Altered Mental Status     Intermittent x 3 months, recent loss of  with ETOH use; pt denies ETOH use today; pt AAOx4 in triage        HPI: Patient is a 73-year-old female with GERD, hypertension, hypothyroidism, diabetes, hyperlipidemia, depression who is seen today in the ER after being found by a neighbor on the steps barefoot with clothes on inside out.  Granddaughter accompanies patient and provides much of the history.  She reports that patient's   months ago.  Patient has been taking 6 Benadryl at night in addition to multiple other medical patient is provided by multiple prescribers.  Granddaughter reports patient's appetite has been poor up until last night when she eat very well and drank 2 margaritas.  Granddaughter reports she does not normally drink alcohol.  Granddaughter reports patient has been hallucinating today pulling the blanket and eating imaginary food.  Patient is currently oriented to self and year but not location or situation.  Granddaughter's concerned that patient is very depressed.  She reports that she attempted the patient lives with her recently but that seems to have made things worse.  Patient currently lives alone with family members checking on her daily.  Workup in the ED showed creatinine of 2.9.  Patient will be admitted for TARAN and altered mental status.      Past Medical History:   Diagnosis Date    Allergy     Anxiety     Arthritis, rheumatoid     Back pain      Chronic bronchiolitis     Depression     Fibromyalgia     GERD (gastroesophageal reflux disease)     High cholesterol     Hilar mass 03/27/2014    [place] (hard of hearing)     aid right ear    [place] (hard of hearing)     Hypertension     Incontinence of urine     Lymphedema     MS (multiple sclerosis)     benign; another MD stated she has no MS    Neuropathy     Restless leg syndrome     Rotator cuff tear 04/16/2015    Sciatica of left side 01/05/2018    Seasonal allergies     Sinus congestion     Sleep apnea     DOES NOT USE MACHINE    Spondylolysis     Thyroid disease     Type 2 diabetes mellitus with polyneuropathy     no med. was borderline    Wheezing        Past Surgical History:   Procedure Laterality Date    APPENDECTOMY      ARTHROSCOPIC DEBRIDEMENT OF SHOULDER Right 2/18/2022    Procedure: EXTENSIVE DEBRIDEMENT, SHOULDER, ARTHROSCOPIC;  Surgeon: Rio Pitts MD;  Location: [facility] OR;  Service: Orthopedics;  Laterality: Right;    BREAST SURGERY      reduction    CLOSED REDUCTION OF INJURY OF SHOULDER Right 9/19/2021    Procedure: CLOSED REDUCTION, SHOULDER;  Surgeon: Antonio Irwin MD;  Location: [facility] OR;  Service: Orthopedics;  Laterality: Right;    EPIDURAL STEROID INJECTION INTO LUMBAR SPINE N/A 6/12/2019    Procedure: Injection-steroid-epidural-lumbar;  Surgeon: Thad Manning MD;  Location: [facility] OR;  Service: Pain Management;  Laterality: N/A;  L5-S1    EPIDURAL STEROID INJECTION INTO LUMBAR SPINE N/A 9/16/2019    Procedure: Injection-steroid-epidural-lumbar;  Surgeon: Thad Manning MD;  Location: [facility] OR;  Service: Pain Management;  Laterality: N/A;  L5-S1    EYE SURGERY Bilateral     HYSTERECTOMY      partial - fibroids    INJECTION OF ANESTHETIC AGENT AROUND MEDIAL BRANCH NERVES INNERVATING LUMBAR FACET JOINT Bilateral 2/28/2019    Procedure: Block-nerve-medial branch-lumbar;  Surgeon: Thad Manning MD;  Location: [facility] OR;  Service: Pain Management;  Laterality:  Bilateral;  L3, 4,5     INJECTION OF ANESTHETIC AGENT AROUND MEDIAL BRANCH NERVES INNERVATING LUMBAR FACET JOINT Bilateral 3/21/2019    Procedure: Block-nerve-medial branch-lumbar L3,4,5;  Surgeon: Thad Manning MD;  Location: [facility] OR;  Service: Pain Management;  Laterality: Bilateral;    KNEE ARTHROPLASTY Left 3/16/2021    Procedure: ARTHROPLASTY, KNEE;  Surgeon: Rio Pitts MD;  Location: [facility] OR;  Service: Orthopedics;  Laterality: Left;  GENERAL AND BLOCK    LIPOSUCTION  1985    RADIOFREQUENCY ABLATION Left 11/4/2020    Procedure: Radiofrequency Ablation left knee;  Surgeon: Andrew Escudero MD;  Location: [facility] OR;  Service: Pain Management;  Laterality: Left;    RADIOFREQUENCY ABLATION OF LUMBAR MEDIAL BRANCH NERVE AT SINGLE LEVEL Bilateral 4/12/2019    Procedure: Radiofrequency Ablation, Nerve, Spinal, Lumbar, Medial Branch, 1 Level;  Surgeon: Thad Manning MD;  Location: [facility] OR;  Service: Pain Management;  Laterality: Bilateral;  L3, 4, 5  lumbar  Siving Egil Kvaleberg pain management generator  SN [id number]  80 degrees for 75 seconds x2    RADIOFREQUENCY ABLATION OF LUMBAR MEDIAL BRANCH NERVE AT SINGLE LEVEL Bilateral 10/22/2019    Procedure: Radiofrequency Ablation, Nerve, Spinal, Lumbar, Medial Branch, L3,4,5;  Surgeon: Thad Manning MD;  Location: [facility] OR;  Service: Pain Management;  Laterality: Bilateral;  burned at 80 degrees C X 60 seconds X 2 each site    RADIOFREQUENCY ABLATION OF LUMBAR MEDIAL BRANCH NERVE AT SINGLE LEVEL Bilateral 5/27/2020    Procedure: Radiofrequency Ablation, Nerve, Spinal, Lumbar, Medial Branch, 1 Level;  Surgeon: Thad Manning MD;  Location: [facility] OR;  Service: Pain Management;  Laterality: Bilateral;  L3,4,5    REVERSE TOTAL SHOULDER ARTHROPLASTY Right 4/12/2022    Procedure: ARTHROPLASTY, SHOULDER, TOTAL, REVERSE;  Surgeon: Rio Pitts MD;  Location: [facility] OR;  Service: Orthopedics;  Laterality: Right;    SHOULDER ARTHROSCOPY Right 9/19/2021    Procedure:  ARTHROSCOPY, SHOULDER;  Surgeon: Antonio Irwin MD;  Location: [facility];  Service: Orthopedics;  Laterality: Right;  LIMITED DEBRIDMENT    TONSILLECTOMY      TOTAL REDUCTION MAMMOPLASTY         Review of patient's allergies indicates:   Allergen Reactions    Keflex [cephalexin]      Throat swelling    Pcn [penicillins]      Throat swelling    Latex, natural rubber Other (See Comments)     Redness with bandaids     Adhesive Other (See Comments)     bandainds redness at skin    Trelegy ellipta [fluticasone-umeclidin-vilanter]      Vision disturbance       No current facility-administered medications on file prior to encounter.     Current Outpatient Medications on File Prior to Encounter   Medication Sig    albuterol (PROAIR HFA) 90 mcg/actuation inhaler Inhale 2 puffs into the lungs every 6 (six) hours as needed for Wheezing. Rescue    amLODIPine (NORVASC) 5 MG tablet Take 1 tablet (5 mg total) by mouth once daily. For blood pressure    biotin 1 mg Cap Take by mouth.    ferrous sulfate 325 (65 FE) MG EC tablet Take 1 tablet (325 mg total) by mouth once daily.    fexofenadine (ALLEGRA) 180 MG tablet     fluticasone-salmeterol 230-21 mcg/dose (ADVAIR HFA) 230-21 mcg/actuation HFAA inhaler Inhale 2 puffs into the lungs 2 (two) times daily. Controller    hydroCHLOROthiazide (HYDRODIURIL) 25 MG tablet Take 1 tablet (25 mg total) by mouth once daily.    HYDROcodone-acetaminophen (NORCO) 5-325 mg per tablet Take 1 tablet by mouth every 6 (six) hours as needed for Pain.    ibuprofen (ADVIL,MOTRIN) 600 MG tablet Take 1 tablet (600 mg total) by mouth 3 (three) times daily as needed for Pain.    levothyroxine (SYNTHROID) 88 MCG tablet Take 1 tablet (88 mcg total) by mouth once daily.    losartan (COZAAR) 50 MG tablet Take 1 tablet (50 mg total) by mouth once daily. For blood pressure    lovastatin (MEVACOR) 40 MG tablet Take 1 tablet (40 mg total) by mouth nightly. at bedtime. For cholesterol    magnesium  oxide (MAG-OX) 400 mg (241.3 mg magnesium) tablet Take 1 tablet (400 mg total) by mouth once daily.    metoprolol succinate (TOPROL-XL) 25 MG 24 hr tablet Take 1 tablet (25 mg total) by mouth once daily. For blood pressure and heart    mirtazapine (REMERON) 7.5 MG Tab Take 1 tablet (7.5 mg total) by mouth every evening.    nystatin (MYCOSTATIN) cream Apply topically 2 (two) times daily.    omeprazole (PRILOSEC) 20 MG capsule Take 1 capsule (20 mg total) by mouth once daily. For heartburn    potassium chloride (MICRO-K) 10 MEQ CpSR Take 1 capsule (10 mEq total) by mouth 3 (three) times daily.    pramipexole (MIRAPEX) 0.5 MG tablet Take 2 tablets (1 mg total) by mouth every evening. For restless legs    pregabalin (LYRICA) 150 MG capsule Take 1 capsule (150 mg total) by mouth 2 (two) times daily.    ramelteon (ROZEREM) 8 mg tablet Take 1 tablet (8 mg total) by mouth every evening.    torsemide (DEMADEX) 10 MG Tab TAKE 1 TABLET(10 MG) BY MOUTH EVERY DAY    valacyclovir HCl (VALACYCLOVIR ORAL) Take by mouth.    venlafaxine (EFFEXOR-XR) 150 MG Cp24 Take 1 capsule (150 mg total) by mouth once daily. For depression    venlafaxine (EFFEXOR-XR) 37.5 MG 24 hr capsule TAKE 1 CAPSULE(37.5 MG) BY MOUTH EVERY DAY     Family History       Problem Relation (Age of Onset)    Heart disease Mother          Tobacco Use    Smoking status: Former     Types: Cigarettes     Quit date: 1996     Years since quittin.0     Passive exposure: Past    Smokeless tobacco: Never   Substance and Sexual Activity    Alcohol use: Yes     Comment: very little     Drug use: No    Sexual activity: Not Currently     Partners: Male     Review of Systems   Constitutional:  Negative for chills and fever.   HENT:  Negative for congestion and rhinorrhea.    Respiratory:  Negative for cough, shortness of breath and wheezing.    Cardiovascular:  Negative for chest pain, palpitations and leg swelling.   Gastrointestinal:  Negative for  abdominal distention, abdominal pain, nausea and vomiting.   Endocrine: Negative for cold intolerance and heat intolerance.   Genitourinary:  Negative for dysuria and urgency.   Musculoskeletal:  Negative for arthralgias and neck stiffness.   Skin:  Negative for rash and wound.   Neurological:  Positive for weakness. Negative for dizziness.   Psychiatric/Behavioral:  Positive for confusion.    Objective:     Vital Signs (Most Recent):  Temp: 98.5 °F (36.9 °C) (06/23/23 1026)  Pulse: 66 (06/23/23 1233)  Resp: 20 (06/23/23 1233)  BP: 122/63 (06/23/23 1233)  SpO2: 96 % (06/23/23 1233) Vital Signs (24h Range):  Temp:  [98.5 °F (36.9 °C)] 98.5 °F (36.9 °C)  Pulse:  [63-71] 66  Resp:  [18-20] 20  SpO2:  [95 %-98 %] 96 %  BP: (108-144)/(50-68) 122/63     Weight: 81.6 kg (180 lb)  Body mass index is 35.15 kg/m².     Physical Exam  Constitutional:       General: She is not in acute distress.     Appearance: She is well-developed.   HENT:      Head: Normocephalic and atraumatic.   Eyes:      Pupils: Pupils are equal, round, and reactive to light.   Cardiovascular:      Rate and Rhythm: Normal rate and regular rhythm.      Heart sounds: No murmur heard.  Pulmonary:      Effort: Pulmonary effort is normal. No respiratory distress.      Breath sounds: Normal breath sounds. No wheezing or rales.   Abdominal:      General: Bowel sounds are normal. There is no distension.      Palpations: Abdomen is soft.      Tenderness: There is no abdominal tenderness.   Musculoskeletal:         General: Normal range of motion.   Skin:     General: Skin is warm and dry.      Findings: No rash.   Neurological:      Mental Status: She is alert.      Cranial Nerves: No cranial nerve deficit.      Comments: Oriented to self and time but not location or situation  Poor short-term memory     Psychiatric:      Comments: Fidgety            CRANIAL NERVES     CN III, IV, VI   Pupils are equal, round, and reactive to light.     Significant Labs: All  pertinent labs within the past 24 hours have been reviewed.    Significant Imaging: I have reviewed all pertinent imaging results/findings within the past 24 hours.    Assessment/Plan:     * TARAN (acute kidney injury)  Patient with acute kidney injury likely due to IVVD/dehydration TARAN is currently worsening. Labs reviewed- Renal function/electrolytes with Estimated Creatinine Clearance: 15.8 mL/min (A) (based on SCr of 2.9 mg/dL (H)). according to latest data. Monitor urine output and serial BMP and adjust therapy as needed. Avoid nephrotoxins and renally dose meds for GFR listed above.     IVF  Renal US  Consult with nephrology    Confusion  Patient with AMS and reports of hallucinations  Contributing factors include - EtOH, medications, depression, TARAN  CTH negative acute  Consult neurology for further workup  Consider MRI      Hyperlipidemia associated with type 2 diabetes mellitus   Patient is chronically on statin.will continue for now. Monitor clinically. Last LDL was   Lab Results   Component Value Date    LDLCALC 79.0 09/15/2022          Diet-controlled type 2 diabetes mellitus  Patient's FSGs are controlled on current medication regimen.  Last A1c reviewed-   Lab Results   Component Value Date    HGBA1C 5.9 (H) 12/12/2022     Most recent fingerstick glucose reviewed- No results for input(s): POCTGLUCOSE in the last 24 hours.  Current correctional scale  Medium  Maintain anti-hyperglycemic dose as follows-   Antihyperglycemics (From admission, onward)    None        Hold Oral hypoglycemics while patient is in the hospital.    Moderate major depression, single episode  Patient has persistent depression which is unknown and is currently uncontrolled. Will Continue anti-depressant medications. We will consult psychiatry at this time. Patient does not display psychosis at this time. Continue to monitor closely and adjust plan of care as needed.        Hypertension associated with diabetes  Chronic, controlled.   Latest blood pressure and vitals reviewed-     Temp:  [98.5 °F (36.9 °C)]   Pulse:  [63-71]   Resp:  [18-20]   BP: (108-144)/(50-68)   SpO2:  [95 %-98 %] .   Home meds for hypertension were reviewed and noted below.   Hypertension Medications             amLODIPine (NORVASC) 5 MG tablet Take 1 tablet (5 mg total) by mouth once daily. For blood pressure    hydroCHLOROthiazide (HYDRODIURIL) 25 MG tablet Take 1 tablet (25 mg total) by mouth once daily.    losartan (COZAAR) 50 MG tablet Take 1 tablet (50 mg total) by mouth once daily. For blood pressure    metoprolol succinate (TOPROL-XL) 25 MG 24 hr tablet Take 1 tablet (25 mg total) by mouth once daily. For blood pressure and heart    torsemide (DEMADEX) 10 MG Tab TAKE 1 TABLET(10 MG) BY MOUTH EVERY DAY          While in the hospital, will manage blood pressure as follows; hold diuretic and ARB    Will utilize p.r.n. blood pressure medication only if patient's blood pressure greater than 180/110 and she develops symptoms such as worsening chest pain or shortness of breath.      Hypothyroidism  Patient has chronic hypothyroidism. TFTs reviewed-   Lab Results   Component Value Date    TSH 35.452 (H) 06/23/2023   . Will continue chronic levothyroxine and adjust for and clinical changes.          VTE Risk Mitigation (From admission, onward)    None             On 06/23/2023, patient should be placed in hospital observation services under my care.        Eveline Gomez MD  Department of Hospital Medicine  [facility] - Emergency Dept

## 2023-06-23 NOTE — ASSESSMENT & PLAN NOTE
Patient is chronically on statin.will continue for now. Monitor clinically. Last LDL was   Lab Results   Component Value Date    LDLCALC 79.0 09/15/2022

## 2023-06-23 NOTE — PLAN OF CARE
Nurses Note -- 4 Eyes      6/23/2023   6:53 PM      Skin assessed during: Admit      [] No Altered Skin Integrity Present    []Prevention Measures Documented      [x] Yes- Altered Skin Integrity Present or Discovered   [x] LDA Added if Not in Epic (Describe Wound)   [x] New Altered Skin Integrity was Present on Admit and Documented in LDA   [x] Wound Image Taken    Wound Care Consulted? Yes    Attending Nurse:  GROVER Ortiz LPN    Second RN/Staff Member:  CORNEL Begum RN

## 2023-06-24 PROBLEM — G93.41 ENCEPHALOPATHY, METABOLIC: Status: ACTIVE | Noted: 2023-06-23

## 2023-06-24 LAB
ALBUMIN SERPL BCP-MCNC: 4.3 G/DL (ref 3.5–5.2)
ALP SERPL-CCNC: 97 U/L (ref 55–135)
ALT SERPL W/O P-5'-P-CCNC: 13 U/L (ref 10–44)
AMMONIA PLAS-SCNC: 26 UMOL/L (ref 10–50)
ANION GAP SERPL CALC-SCNC: 14 MMOL/L (ref 8–16)
AST SERPL-CCNC: 23 U/L (ref 10–40)
BASOPHILS # BLD AUTO: 0.08 K/UL (ref 0–0.2)
BASOPHILS NFR BLD: 0.8 % (ref 0–1.9)
BILIRUB SERPL-MCNC: 0.5 MG/DL (ref 0.1–1)
BUN SERPL-MCNC: 21 MG/DL (ref 8–23)
CALCIUM SERPL-MCNC: 9.9 MG/DL (ref 8.7–10.5)
CHLORIDE SERPL-SCNC: 104 MMOL/L (ref 95–110)
CO2 SERPL-SCNC: 22 MMOL/L (ref 23–29)
CREAT SERPL-MCNC: 1.2 MG/DL (ref 0.5–1.4)
CRP SERPL-MCNC: 2.5 MG/L (ref 0–8.2)
DIFFERENTIAL METHOD: ABNORMAL
EOSINOPHIL # BLD AUTO: 0.6 K/UL (ref 0–0.5)
EOSINOPHIL NFR BLD: 5.3 % (ref 0–8)
ERYTHROCYTE [DISTWIDTH] IN BLOOD BY AUTOMATED COUNT: 13.3 % (ref 11.5–14.5)
ERYTHROCYTE [SEDIMENTATION RATE] IN BLOOD BY WESTERGREN METHOD: 12 MM/HR (ref 0–20)
EST. GFR  (NO RACE VARIABLE): 47 ML/MIN/1.73 M^2
GLUCOSE SERPL-MCNC: 89 MG/DL (ref 70–110)
HCT VFR BLD AUTO: 44.2 % (ref 37–48.5)
HGB BLD-MCNC: 14.6 G/DL (ref 12–16)
IMM GRANULOCYTES # BLD AUTO: 0.02 K/UL (ref 0–0.04)
IMM GRANULOCYTES NFR BLD AUTO: 0.2 % (ref 0–0.5)
LYMPHOCYTES # BLD AUTO: 4.6 K/UL (ref 1–4.8)
LYMPHOCYTES NFR BLD: 44.2 % (ref 18–48)
MAGNESIUM SERPL-MCNC: 1.9 MG/DL (ref 1.6–2.6)
MCH RBC QN AUTO: 30.5 PG (ref 27–31)
MCHC RBC AUTO-ENTMCNC: 33 G/DL (ref 32–36)
MCV RBC AUTO: 93 FL (ref 82–98)
MONOCYTES # BLD AUTO: 0.8 K/UL (ref 0.3–1)
MONOCYTES NFR BLD: 7.2 % (ref 4–15)
NEUTROPHILS # BLD AUTO: 4.4 K/UL (ref 1.8–7.7)
NEUTROPHILS NFR BLD: 42.3 % (ref 38–73)
NRBC BLD-RTO: 0 /100 WBC
PHOSPHATE SERPL-MCNC: 2.9 MG/DL (ref 2.7–4.5)
PLATELET # BLD AUTO: 222 K/UL (ref 150–450)
PMV BLD AUTO: 10.4 FL (ref 9.2–12.9)
POCT GLUCOSE: 106 MG/DL (ref 70–110)
POCT GLUCOSE: 98 MG/DL (ref 70–110)
POTASSIUM SERPL-SCNC: 3.2 MMOL/L (ref 3.5–5.1)
PROT SERPL-MCNC: 7.7 G/DL (ref 6–8.4)
RBC # BLD AUTO: 4.78 M/UL (ref 4–5.4)
SODIUM SERPL-SCNC: 140 MMOL/L (ref 136–145)
VIT B12 SERPL-MCNC: 434 PG/ML (ref 210–950)
WBC # BLD AUTO: 10.37 K/UL (ref 3.9–12.7)

## 2023-06-24 PROCEDURE — 25000003 PHARM REV CODE 250: Performed by: HOSPITALIST

## 2023-06-24 PROCEDURE — 63600175 PHARM REV CODE 636 W HCPCS: Performed by: HOSPITALIST

## 2023-06-24 PROCEDURE — G0378 HOSPITAL OBSERVATION PER HR: HCPCS

## 2023-06-24 PROCEDURE — 25000003 PHARM REV CODE 250: Performed by: NURSE PRACTITIONER

## 2023-06-24 PROCEDURE — 80053 COMPREHEN METABOLIC PANEL: CPT | Performed by: HOSPITALIST

## 2023-06-24 PROCEDURE — 25000003 PHARM REV CODE 250: Performed by: STUDENT IN AN ORGANIZED HEALTH CARE EDUCATION/TRAINING PROGRAM

## 2023-06-24 PROCEDURE — 83921 ORGANIC ACID SINGLE QUANT: CPT | Performed by: STUDENT IN AN ORGANIZED HEALTH CARE EDUCATION/TRAINING PROGRAM

## 2023-06-24 PROCEDURE — 86140 C-REACTIVE PROTEIN: CPT | Performed by: STUDENT IN AN ORGANIZED HEALTH CARE EDUCATION/TRAINING PROGRAM

## 2023-06-24 PROCEDURE — G0426 INPT/ED TELECONSULT50: HCPCS | Mod: GT,,, | Performed by: PSYCHIATRY & NEUROLOGY

## 2023-06-24 PROCEDURE — 83735 ASSAY OF MAGNESIUM: CPT | Performed by: HOSPITALIST

## 2023-06-24 PROCEDURE — 36415 COLL VENOUS BLD VENIPUNCTURE: CPT | Performed by: STUDENT IN AN ORGANIZED HEALTH CARE EDUCATION/TRAINING PROGRAM

## 2023-06-24 PROCEDURE — 94761 N-INVAS EAR/PLS OXIMETRY MLT: CPT

## 2023-06-24 PROCEDURE — 84100 ASSAY OF PHOSPHORUS: CPT | Performed by: HOSPITALIST

## 2023-06-24 PROCEDURE — 36415 COLL VENOUS BLD VENIPUNCTURE: CPT | Performed by: HOSPITALIST

## 2023-06-24 PROCEDURE — 96372 THER/PROPH/DIAG INJ SC/IM: CPT | Performed by: HOSPITALIST

## 2023-06-24 PROCEDURE — 97165 OT EVAL LOW COMPLEX 30 MIN: CPT

## 2023-06-24 PROCEDURE — 97535 SELF CARE MNGMENT TRAINING: CPT

## 2023-06-24 PROCEDURE — 82140 ASSAY OF AMMONIA: CPT | Performed by: STUDENT IN AN ORGANIZED HEALTH CARE EDUCATION/TRAINING PROGRAM

## 2023-06-24 PROCEDURE — 97161 PT EVAL LOW COMPLEX 20 MIN: CPT

## 2023-06-24 PROCEDURE — G0426 PR INPT TELEHEALTH CONSULT 50M: ICD-10-PCS | Mod: GT,,, | Performed by: PSYCHIATRY & NEUROLOGY

## 2023-06-24 PROCEDURE — 84425 ASSAY OF VITAMIN B-1: CPT | Performed by: STUDENT IN AN ORGANIZED HEALTH CARE EDUCATION/TRAINING PROGRAM

## 2023-06-24 PROCEDURE — 84207 ASSAY OF VITAMIN B-6: CPT | Performed by: STUDENT IN AN ORGANIZED HEALTH CARE EDUCATION/TRAINING PROGRAM

## 2023-06-24 PROCEDURE — 86592 SYPHILIS TEST NON-TREP QUAL: CPT | Performed by: STUDENT IN AN ORGANIZED HEALTH CARE EDUCATION/TRAINING PROGRAM

## 2023-06-24 PROCEDURE — 82607 VITAMIN B-12: CPT | Performed by: STUDENT IN AN ORGANIZED HEALTH CARE EDUCATION/TRAINING PROGRAM

## 2023-06-24 PROCEDURE — 85025 COMPLETE CBC W/AUTO DIFF WBC: CPT | Performed by: HOSPITALIST

## 2023-06-24 PROCEDURE — 85651 RBC SED RATE NONAUTOMATED: CPT | Performed by: STUDENT IN AN ORGANIZED HEALTH CARE EDUCATION/TRAINING PROGRAM

## 2023-06-24 RX ORDER — POTASSIUM CHLORIDE 20 MEQ/1
40 TABLET, EXTENDED RELEASE ORAL ONCE
Status: COMPLETED | OUTPATIENT
Start: 2023-06-24 | End: 2023-06-24

## 2023-06-24 RX ORDER — ATORVASTATIN CALCIUM 10 MG/1
10 TABLET, FILM COATED ORAL DAILY
Status: DISCONTINUED | OUTPATIENT
Start: 2023-06-24 | End: 2023-06-25 | Stop reason: HOSPADM

## 2023-06-24 RX ORDER — HALOPERIDOL 5 MG/ML
2 INJECTION INTRAMUSCULAR EVERY 6 HOURS PRN
Status: DISCONTINUED | OUTPATIENT
Start: 2023-06-24 | End: 2023-06-25

## 2023-06-24 RX ORDER — HALOPERIDOL 5 MG/ML
2 INJECTION INTRAMUSCULAR EVERY 6 HOURS PRN
Status: DISCONTINUED | OUTPATIENT
Start: 2023-06-24 | End: 2023-06-24

## 2023-06-24 RX ORDER — PREGABALIN 75 MG/1
75 CAPSULE ORAL 2 TIMES DAILY
Status: DISCONTINUED | OUTPATIENT
Start: 2023-06-24 | End: 2023-06-25 | Stop reason: HOSPADM

## 2023-06-24 RX ORDER — HALOPERIDOL 5 MG/1
5 TABLET ORAL ONCE
Status: COMPLETED | OUTPATIENT
Start: 2023-06-24 | End: 2023-06-24

## 2023-06-24 RX ADMIN — POLYETHYLENE GLYCOL 3350 17 G: 17 POWDER, FOR SOLUTION ORAL at 08:06

## 2023-06-24 RX ADMIN — VENLAFAXINE HYDROCHLORIDE 150 MG: 37.5 CAPSULE, EXTENDED RELEASE ORAL at 08:06

## 2023-06-24 RX ADMIN — PREGABALIN 150 MG: 75 CAPSULE ORAL at 08:06

## 2023-06-24 RX ADMIN — PREGABALIN 75 MG: 75 CAPSULE ORAL at 11:06

## 2023-06-24 RX ADMIN — POTASSIUM CHLORIDE 40 MEQ: 1500 TABLET, EXTENDED RELEASE ORAL at 08:06

## 2023-06-24 RX ADMIN — PRAMIPEXOLE DIHYDROCHLORIDE 1 MG: 1 TABLET ORAL at 11:06

## 2023-06-24 RX ADMIN — AMLODIPINE BESYLATE 5 MG: 5 TABLET ORAL at 08:06

## 2023-06-24 RX ADMIN — HEPARIN SODIUM 5000 UNITS: 5000 INJECTION INTRAVENOUS; SUBCUTANEOUS at 11:06

## 2023-06-24 RX ADMIN — HALOPERIDOL 5 MG: 5 TABLET ORAL at 04:06

## 2023-06-24 RX ADMIN — METOPROLOL SUCCINATE 25 MG: 25 TABLET, EXTENDED RELEASE ORAL at 08:06

## 2023-06-24 RX ADMIN — LEVOTHYROXINE SODIUM 88 MCG: 0.09 TABLET ORAL at 08:06

## 2023-06-24 RX ADMIN — ATORVASTATIN CALCIUM 10 MG: 10 TABLET, FILM COATED ORAL at 02:06

## 2023-06-24 RX ADMIN — HEPARIN SODIUM 5000 UNITS: 5000 INJECTION INTRAVENOUS; SUBCUTANEOUS at 02:06

## 2023-06-24 RX ADMIN — MIRTAZAPINE 7.5 MG: 7.5 TABLET ORAL at 11:06

## 2023-06-24 NOTE — PLAN OF CARE
MAXIME spoke with patient's grandchild Ayaka Gilbert [phone] regarding possible Skilled Nursing Facility Placement vs patient discharging home with home health.  Ayaka in agreement with both discharge plans pending PT/OT evaluation/ recommendations with no preference for facility and/or agency.      Ayaka would like referrals sent to facilities in [place] ONLY.  MAXIME sent patient information to [facility], [facility], and [facility] via [employer] system.       06/24/23 1122   Post-Acute Status   Post-Acute Authorization Placement   Post-Acute Placement Status Referrals Sent   Patient choice form signed by patient/caregiver List with quality metrics by geographic area provided

## 2023-06-24 NOTE — ASSESSMENT & PLAN NOTE
Patient has chronic hypothyroidism. TFTs reviewed and attributed to not taking home med.  Lab Results   Component Value Date    TSH 35.452 (H) 06/23/2023   . Will continue chronic levothyroxine and adjust for and clinical changes.  - will need to be monitored outpatient

## 2023-06-24 NOTE — PLAN OF CARE
[facility]  Initial Discharge Assessment       Primary Care Provider: Rajesh Dubois MD    Admission Diagnosis: Confusion [R41.0]  TARAN (acute kidney injury) [N17.9]    Admission Date: 6/23/2023  Expected Discharge Date:     Transition of Care Barriers: None    Payor: [employer]ICARE / Plan: [employer]URE HEALTH / Product Type: Medicare Advantage /     Extended Emergency Contact Information  Primary Emergency Contact: yesenia gilbert  Mobile Phone: [phone]  Relation: Grandchild  Preferred language: English   needed? No  Secondary Emergency Contact: abigail gilbert  Mobile Phone: [phone]  Relation: Daughter  Preferred language: English   needed? No    Discharge Plan A: Home Health  Discharge Plan B: Skilled Nursing Facility      [id number] - [place] - [address] AT [facility] [address]  [address] [address]  Phone: [phone] Fax: [phone]    W met with patient at bedside to complete discharge planning assessment.  Patient appear somewhat confused.  SW spoke to patient's grand daughter Yesenia Gilbert via telephone to complete assessment [phone].  Yesenia verified all demographic information on facesheet is correct.  Yesenia verified PCP is Dr. Dubois.  Yesenia verified primary health insurance is [employer] but changed to [employer] (06/2023 she thinks).  Patient with NO home health but has listed DME.  Patient with POA (yesenia) but no Living Will.  Patient not on dialysis or medication coumadin.  Patient with no 30 day admission.  Patient with no financial issues at this time.  Patient family will provide transportation upon discharge from facility.  Patient independent with ADLs, live alone drives self.         06/24/23 0935   Discharge Assessment   Assessment Type Discharge Planning Assessment   Confirmed/corrected address, phone number and insurance Yes    Confirmed Demographics Correct on Facesheet   Source of Information family   Does patient/caregiver understand observation status Yes   Communicated MATILDA with patient/caregiver Yes   People in Home alone   Facility Arrived From: home   Do you expect to return to your current living situation? Yes   Do you have help at home or someone to help you manage your care at home? Yes   Who are your caregiver(s) and their phone number(s)? grand daughter   Prior to hospitilization cognitive status: Alert/Oriented   Current cognitive status: Alert/Oriented   Walking or Climbing Stairs ambulation difficulty, requires equipment;stair climbing difficulty, requires equipment   Equipment Currently Used at Home rollator;walker, rolling   Readmission within 30 days? No   Patient currently being followed by outpatient case management? No   Do you currently have service(s) that help you manage your care at home? No   Do you take prescription medications? Yes   Do you have prescription coverage? Yes   Do you have any problems affording any of your prescribed medications? No   Is the patient taking medications as prescribed? yes   Who is going to help you get home at discharge? grand daughter   How do you get to doctors appointments? car, drives self;family or friend will provide   Are you on dialysis? No   Do you take coumadin? No   Discharge Plan A Home Health   Discharge Plan B Skilled Nursing Facility   DME Needed Upon Discharge  none   Discharge Plan discussed with: POA   Name(s) and Number(s) Ayaka Gilbert [phone] grand daughter   Transition of Care Barriers None   Physical Activity   On average, how many days per week do you engage in moderate to strenuous exercise (like a brisk walk)? Patient refu   On average, how many minutes do you engage in exercise at this level? Patient refu   Financial Resource Strain   How hard is it for you to pay for the very basics like food, housing, medical care, and heating? Patient refu    Housing Stability   In the last 12 months, was there a time when you were not able to pay the mortgage or rent on time? [place]   In the last 12 months, was there a time when you did not have a steady place to sleep or slept in a shelter (including now)? [place]   Transportation Needs   In the past 12 months, has lack of transportation kept you from medical appointments or from getting medications? Patient refu   In the past 12 months, has lack of transportation kept you from meetings, work, or from getting things needed for daily living? Patient refu   Food Insecurity   Within the past 12 months, you worried that your food would run out before you got the money to buy more. Patient refu   Within the past 12 months, the food you bought just didn't last and you didn't have money to get more. Patient refu   Stress   Do you feel stress - tense, restless, nervous, or anxious, or unable to sleep at night because your mind is troubled all the time - these days? Patient refu   Social Connections   In a typical week, how many times do you talk on the phone with family, friends, or neighbors? Patient refu   How often do you get together with friends or relatives? Patient refu   How often do you attend [employer] or [employer] services? Patient refu   Do you belong to any clubs or organizations such as [employer] groups, unions, fraternal or athletic groups, or school groups? Patient refu   How often do you attend meetings of the clubs or organizations you belong to? Patient refu   Are you , , , , never , or living with a partner? Patient refu   Alcohol Use   Q1: How often do you have a drink containing alcohol? Patient refu   Q2: How many drinks containing alcohol do you have on a typical day when you are drinking? Patient refu   Q3: How often do you have six or more drinks on one occasion? Patient refu

## 2023-06-24 NOTE — ASSESSMENT & PLAN NOTE
Patient with AMS and reports of hallucinations  Contributing factors include - EtOH, medications, depression, TARAN, hypothyroid  CTH negative acute  - f/u labs, MRI  - f/u neuro and psych recs

## 2023-06-24 NOTE — PROGRESS NOTES
[facility] Hospital Medicine  Progress Note    Patient Name: Genoveva Gilbert  MRN: [id number]  Patient Class: OP- Observation   Admission Date: 2023  Length of Stay: 0 days  Attending Physician: Aroldo Thompson MD  Primary Care Provider: Rajesh Dubois MD        Subjective:     Principal Problem:Encephalopathy, metabolic        HPI:  Patient is a 73-year-old female with GERD, hypertension, hypothyroidism, diabetes, hyperlipidemia, depression who is seen today in the ER after being found by a neighbor on the steps barefoot with clothes on inside out.  Granddaughter accompanies patient and provides much of the history.  She reports that patient's   months ago.  Patient has been taking 6 Benadryl at night in addition to multiple other medical patient is provided by multiple prescribers.  Granddaughter reports patient's appetite has been poor up until last night when she eat very well and drank 2 margaritas.  Granddaughter reports she does not normally drink alcohol.  Granddaughter reports patient has been hallucinating today pulling the blanket and eating imaginary food.  Patient is currently oriented to self and year but not location or situation.  Granddaughter's concerned that patient is very depressed.  She reports that she attempted the patient lives with her recently but that seems to have made things worse.  Patient currently lives alone with family members checking on her daily.  Workup in the ED showed creatinine of 2.9.  Patient will be admitted for TARAN and altered mental status.      Overview/Hospital Course:  Admitted due to bizarre behavior, altered mental status, and TARAN. Overall symptoms attributed to combination of overuse of benadryl coupled with alcohol intake as well as likely component of depression with psychotic features related to the somewhat recent death of her spouse. She did briefly require restraint use. CT head w/o unremarkable. Also uncontrolled  hypothyroidism noted which was attributed to not taking her replacement correctly so this was continued at same dose. She was given IVF. Nephrotoxic meds held. Sedating meds were held where possible. Nephrology, neurology, and psychiatry were consulted. MRI brain pending. PT/OT recommended home health.      Interval History: Patient seen and examined. NAEON. Restraints off. Alert and oriented this AM but also later observed having verbal argument with someone not physically in room.      Objective:     Vital Signs (Most Recent):  Temp: 97.6 °F (36.4 °C) (06/24/23 0748)  Pulse: 68 (06/24/23 1239)  Resp: 17 (06/24/23 1239)  BP: (!) 182/90 (06/24/23 0748)  SpO2: 97 % (06/24/23 1239) Vital Signs (24h Range):  Temp:  [97.2 °F (36.2 °C)-98.2 °F (36.8 °C)] 97.6 °F (36.4 °C)  Pulse:  [50-70] 68  Resp:  [16-20] 17  SpO2:  [96 %-98 %] 97 %  BP: (140-182)/(70-90) 182/90     Weight: 84.9 kg (187 lb 2.7 oz)  Body mass index is 36.55 kg/m².    Intake/Output Summary (Last 24 hours) at 6/24/2023 1340  Last data filed at 6/24/2023 0817  Gross per 24 hour   Intake 1560 ml   Output 200 ml   Net 1360 ml         Physical Exam  Vitals reviewed.   Constitutional:       General: She is not in acute distress.  HENT:      Head: Normocephalic and atraumatic.   Cardiovascular:      Rate and Rhythm: Normal rate and regular rhythm.      Heart sounds: Normal heart sounds.   Pulmonary:      Effort: Pulmonary effort is normal. No respiratory distress.   Neurological:      Mental Status: She is alert and oriented to person, place, and time.   Psychiatric:         Attention and Perception: She perceives auditory and visual hallucinations.         Mood and Affect: Affect normal.           Significant Labs: All pertinent labs within the past 24 hours have been reviewed.    Significant Imaging: I have reviewed all pertinent imaging results/findings within the past 24 hours.      Assessment/Plan:      * Encephalopathy, metabolic  Patient with AMS and  reports of hallucinations  Contributing factors include - EtOH, medications, depression, TARAN, hypothyroid  CTH negative acute  - f/u labs, MRI  - f/u neuro and psych recs    TARAN (acute kidney injury)  Patient with acute kidney injury likely due to IVVD/dehydration TARAN is currently improving. Labs reviewed- Renal function/electrolytes with Estimated Creatinine Clearance: 39.2 mL/min (based on SCr of 1.2 mg/dL). according to latest data. Monitor urine output and serial BMP and adjust therapy as needed. Avoid nephrotoxins and renally dose meds for GFR listed above. Renal US no obstruction.  - continue IVF  - nephrology following    Hyperlipidemia associated with type 2 diabetes mellitus   Patient is chronically on statin.will continue for now. Monitor clinically. Last LDL was   Lab Results   Component Value Date    LDLCALC 79.0 09/15/2022          Diet-controlled type 2 diabetes mellitus  Patient's FSGs are controlled on current medication regimen.  Last A1c reviewed-   Lab Results   Component Value Date    HGBA1C 5.9 (H) 12/12/2022     Most recent fingerstick glucose reviewed-   Recent Labs   Lab 06/23/23  1608 06/23/23  2110 06/24/23  1136   POCTGLUCOSE 77 116* 106     Current correctional scale  Medium  Maintain anti-hyperglycemic dose as follows-   Antihyperglycemics (From admission, onward)    Start     Stop Route Frequency Ordered    06/23/23 1604  insulin aspart U-100 pen 1-10 Units         -- SubQ Before meals & nightly PRN 06/23/23 1604        Hold Oral hypoglycemics while patient is in the hospital.    Moderate major depression, single episode  Patient has persistent depression which is unknown and is currently uncontrolled. Will Continue anti-depressant medications. We will consult psychiatry at this time. Patient does not display psychosis at this time. Continue to monitor closely and adjust plan of care as needed.  - f/u psych recs    Hypokalemia  - replacing po  - replace prn    Hypertension associated  with diabetes  Chronic, controlled.  Latest blood pressure and vitals reviewed-     Temp:  [97.2 °F (36.2 °C)-98.2 °F (36.8 °C)]   Pulse:  [50-70]   Resp:  [16-20]   BP: (140-182)/(70-90)   SpO2:  [96 %-98 %] .   Home meds for hypertension were reviewed and noted below.   Hypertension Medications             amLODIPine (NORVASC) 5 MG tablet Take 1 tablet (5 mg total) by mouth once daily. For blood pressure    hydroCHLOROthiazide (HYDRODIURIL) 25 MG tablet Take 1 tablet (25 mg total) by mouth once daily.    losartan (COZAAR) 50 MG tablet Take 1 tablet (50 mg total) by mouth once daily. For blood pressure    metoprolol succinate (TOPROL-XL) 25 MG 24 hr tablet Take 1 tablet (25 mg total) by mouth once daily. For blood pressure and heart    torsemide (DEMADEX) 10 MG Tab TAKE 1 TABLET(10 MG) BY MOUTH EVERY DAY          While in the hospital, will manage blood pressure as follows; hold diuretics and ARB    Will utilize p.r.n. blood pressure medication only if patient's blood pressure greater than 180/110 and she develops symptoms such as worsening chest pain or shortness of breath.      Hypothyroidism  Patient has chronic hypothyroidism. TFTs reviewed and attributed to not taking home med.  Lab Results   Component Value Date    TSH 35.452 (H) 06/23/2023   . Will continue chronic levothyroxine and adjust for and clinical changes.  - will need to be monitored outpatient      VTE Risk Mitigation (From admission, onward)         Ordered     heparin (porcine) injection 5,000 Units  Every 8 hours         06/23/23 1604     IP VTE HIGH RISK PATIENT  Once         06/23/23 1604     Place sequential compression device  Until discontinued         06/23/23 1604                Discharge Planning   MATILDA:  unclear    Code Status: Full Code   Is the patient medically ready for discharge?:     Reason for patient still in hospital (select all that apply): Patient trending condition, Laboratory test, Imaging and Consult  recommendations  Discharge Plan A: Home Health                  Aroldo Thompson MD  Department of Hospital Medicine   [facility]

## 2023-06-24 NOTE — NURSING
Pt hallucinating-- says seeing  spouse in room, & he's not talking to her. Pt fidgety, getting OOB, assisted safely to walk in room, then back to bed. Redirected to location & situation.

## 2023-06-24 NOTE — NURSING
Pt exited bed, insisted she was going to leave facility and get her car across the street to leave, when redirection attempted, she got belligerent & agitated, raised voice saying we were kidnapping her, threatened litigation w/ , pt reassured she was in hospital, we were trying to keep her from harm and hadnt yet been dc'd yet. Pt undirectable. Code white for security called. Pt got into hallway, hospitalist, neurologist & house supervisor attempting to calm pt down. Pt became combative, walking into staff trying to get around them, After short while , Pt calming down, went back into her room, sat in chair, put on phone w/ granddaughter Ayaka, who said pt's daughter Queta would possibly arrive after work this pm at 8pm or before. Pt then assisted into bed, alarm on, SR up x3. Pt now PEC'd, staff standing at doorway to monitor for safety; [employer] telesitter has already been in use today.

## 2023-06-24 NOTE — ASSESSMENT & PLAN NOTE
Chronic, controlled.  Latest blood pressure and vitals reviewed-     Temp:  [97.2 °F (36.2 °C)-98.2 °F (36.8 °C)]   Pulse:  [50-70]   Resp:  [16-20]   BP: (140-182)/(70-90)   SpO2:  [96 %-98 %] .   Home meds for hypertension were reviewed and noted below.   Hypertension Medications             amLODIPine (NORVASC) 5 MG tablet Take 1 tablet (5 mg total) by mouth once daily. For blood pressure    hydroCHLOROthiazide (HYDRODIURIL) 25 MG tablet Take 1 tablet (25 mg total) by mouth once daily.    losartan (COZAAR) 50 MG tablet Take 1 tablet (50 mg total) by mouth once daily. For blood pressure    metoprolol succinate (TOPROL-XL) 25 MG 24 hr tablet Take 1 tablet (25 mg total) by mouth once daily. For blood pressure and heart    torsemide (DEMADEX) 10 MG Tab TAKE 1 TABLET(10 MG) BY MOUTH EVERY DAY          While in the hospital, will manage blood pressure as follows; hold diuretics and ARB    Will utilize p.r.n. blood pressure medication only if patient's blood pressure greater than 180/110 and she develops symptoms such as worsening chest pain or shortness of breath.

## 2023-06-24 NOTE — HOSPITAL COURSE
Admitted due to bizarre behavior, altered mental status, and TARAN. Overall symptoms attributed to combination of overuse of benadryl coupled with alcohol intake as well as likely component of depression with psychotic features related to the somewhat recent death of her spouse. She did briefly require restraint use. CT head w/o unremarkable. Also uncontrolled hypothyroidism noted which was attributed to not taking her replacement correctly so this was continued at same dose. She was given IVF. Nephrotoxic meds held. Sedating meds were held where possible. Nephrology, neurology, and psychiatry were consulted.  PT/OT followed and recommended home health. Her renal function improved to normal. Her mental status improved to alert and oriented but unfortunately she had intermittent confusion, hallucinations, and agitation requiring her to be placed under PEC. Psych consult was done and they recommended some med changes which were done. She was accepted and transferred to [facility]. By time of discharge the patient was tolerating a regular diet with improved symptoms. Patient seen and examined on day of discharge.    Physical exam on day of discharge:  Constitutional:       General: She is not in acute distress.  HENT:      Head: Normocephalic and atraumatic.   Cardiovascular:      Rate and Rhythm: Normal rate and regular rhythm.      Heart sounds: Normal heart sounds.   Pulmonary:      Effort: Pulmonary effort is normal. No respiratory distress.   Neurological:      Mental Status: She is alert and oriented to person, place, and time.   Psychiatric:         Attention and Perception: She perceives auditory and visual hallucinations.         Mood and Affect: Affect normal.

## 2023-06-24 NOTE — PLAN OF CARE
Goals to be met by: 7/22/23     Patient will increase functional independence with ADLs by performing:    UE Dressing with Supervision.  LE Dressing with Supervision.  Grooming while standing at sink with Supervision.  Toileting from toilet with Supervision for hygiene and clothing management.   Bathing from  shower chair/bench with Supervision.  Toilet transfer to toilet with Supervision.  Increased functional strength to WFL for ADL's/IADL's.

## 2023-06-24 NOTE — PT/OT/SLP EVAL
"Occupational Therapy   Evaluation    Name: Genoveva Gilbert  MRN: [id number]  Admitting Diagnosis: TARAN (acute kidney injury)  Recent Surgery: * No surgery found *      Recommendations:     Discharge Recommendations: home health OT (24/7 supervision/SBA with ADL's)  Discharge Equipment Recommendations:  other (see comments) (TBD bath bench or shower chair?)  Barriers to discharge:       Assessment:     Genoveva Gilbert is a 73 y.o. female with a medical diagnosis of TARAN (acute kidney injury).  She presents with the following performance deficits affecting function: weakness, impaired endurance, impaired self care skills, impaired functional mobility, gait instability, impaired balance, decreased upper extremity function, decreased lower extremity function, decreased safety awareness, impaired cognition, decreased ROM.  Pt was standing at sink and brushing teeth with PCT when OT arrived. PCT reports pt showered self including washing hair with Min/SBA. PCT reports one loss of balance during ADL's. Pt brushed teeth in standing at sink with SBA. Pt combed hair and donned fresh socks in sitting EOB with SBA. Pt completed sit to supine and scooting up in bed with SBA. Pt was alert and oriented to person, month, year, and place([facility]).  OT provided instruction in home safety with ADL's/IADL's including review of home set up and DME/AE. Pt verbalized understanding.  Pt with bouts of confusion, so further training indicated. OT asked pt how will she call for assistance if she needs something. Pt stated "ring the bell". When OT asked pt where is the bell, pt was unable to find it(it was on her lap) OT provided instruction in calling for assist. Pt verbalized understanding. Recommend HHOT and 24/7 Supervision/SBA at discharge. Equipment recs TBD, although a shower chair or tub transfer bench would increase safety with bathing if she does not currently have.     Rehab Prognosis: Good; patient would benefit from acute skilled " OT services to address these deficits and reach maximum level of function.       Plan:     Patient to be seen 5 x/week to address the above listed problems via self-care/home management, therapeutic activities, therapeutic exercises  Plan of Care Expires: 07/22/23  Plan of Care Reviewed with: patient    Subjective     Chief Complaint: No complaints  Patient/Family Comments/goals: To go home    Occupational Profile:  Living Environment: Pt lives alone in [facility]. Pt reports no DIEGO and 24 steps to 2nd floor. Pt reports having a stair lift. Chart indicates family checks on her.  Previous level of function: Living alone with family checking on her. Lost her  recently.   Equipment Used at Home: other (see comments) (Pt reports having a rollator and a scooter. Not sure if report is accurate. No family present during OT Eval.)  Assistance upon Discharge: Spouse    Pain/Comfort:  Pain Rating 1: 0/10  Pain Rating Post-Intervention 1: 0/10    Patients cultural, spiritual, Jehovah's witness conflicts given the current situation:      Objective:     Communicated with: Nurse Thompson prior to session.  Patient found ambulatory in room/mcdonald with peripheral IV, telemetry upon OT entry to room.    General Precautions: Standard, fall  Orthopedic Precautions: N/A  Braces: N/A  Respiratory Status: Room air    Occupational Performance:    Bed Mobility:    Patient completed Scooting/Bridging with stand by assistance  Patient completed Supine to Sit with stand by assistance  Patient completed Sit to Supine with stand by assistance    Functional Mobility/Transfers:  Patient completed Sit <> Stand Transfer with stand by assistance  with  no assistive device   Functional Mobility: Pt ambulated in room with close SBA.    Activities of Daily Living:  Feeding:  stand by assistance    Grooming: stand by assistance and contact guard assistance in standing at sink  Bathing: minimum assistance    Upper Body Dressing: stand by assistance    Lower Body  Dressing: stand by assistance and contact guard assistance    Toileting: stand by assistance and contact guard assistance      Cognitive/Visual Perceptual:  Pt alert and oriented, but documentation/nursing staff report pt continues with bouts of confusion.    Physical Exam:  Upper Extremity Strength:    -       Right Upper Extremity: WFL  -       Left Upper Extremity: WFL  Pt reports history of bilateral shoulder discomfort   AMPAC 6 Click ADL:  AMPA Total Score: 18    Treatment & Education:  OT provided education in role of OT. Pt verbalized understanding and was agreeable to OT.  OT provided instruction in safety with ADL's. Pt verbalized understanding. 24/7 SBA/supervision recommended at this time.   OT provided instruction in calling for assist. Pt verbalized understanding, but required cues to find the call button that was in plain view on her lap.  OT provided instruction in home safety with ADL's/IADL's including review of home set up and DME/AE. Pt verbalized understanding. Further training indicated secondary to inconsistent orientation.    Patient left HOB elevated with all lines intact, call button in reach, bed alarm on, and Nurse Jay notified    GOALS:   Multidisciplinary Problems       Occupational Therapy Goals          Problem: Occupational Therapy    Goal Priority Disciplines Outcome Interventions   Occupational Therapy Goal     OT, PT/OT     Description: Goals to be met by: 7/22/23     Patient will increase functional independence with ADLs by performing:    UE Dressing with Supervision.  LE Dressing with Supervision.  Grooming while standing at sink with Supervision.  Toileting from toilet with Supervision for hygiene and clothing management.   Bathing from  shower chair/bench with Supervision.  Toilet transfer to toilet with Supervision.  Increased functional strength to WFL for ADL's/IADL's.                         History:     Past Medical History:   Diagnosis Date    Allergy     Anxiety      Arthritis, rheumatoid     Back pain     Chronic bronchiolitis     Depression     Fibromyalgia     GERD (gastroesophageal reflux disease)     High cholesterol     Hilar mass 03/27/2014    [place] hearing[place]     aid right ear    [place] of hearing[place]     Hypertension     Incontinence of urine     Lymphedema     MS (multiple sclerosis)     benign; another MD stated she has no MS    Neuropathy     Restless leg syndrome     Rotator cuff tear 04/16/2015    Sciatica of left side 01/05/2018    Seasonal allergies     Sinus congestion     Sleep apnea     DOES NOT USE MACHINE    Spondylolysis     Thyroid disease     Type 2 diabetes mellitus with polyneuropathy     no med. was borderline    Wheezing          Past Surgical History:   Procedure Laterality Date    APPENDECTOMY      ARTHROSCOPIC DEBRIDEMENT OF SHOULDER Right 2/18/2022    Procedure: EXTENSIVE DEBRIDEMENT, SHOULDER, ARTHROSCOPIC;  Surgeon: iRo Pitts MD;  Location: [facility] OR;  Service: Orthopedics;  Laterality: Right;    BREAST SURGERY      reduction    CLOSED REDUCTION OF INJURY OF SHOULDER Right 9/19/2021    Procedure: CLOSED REDUCTION, SHOULDER;  Surgeon: Antonio Irwin MD;  Location: [facility] OR;  Service: Orthopedics;  Laterality: Right;    EPIDURAL STEROID INJECTION INTO LUMBAR SPINE N/A 6/12/2019    Procedure: Injection-steroid-epidural-lumbar;  Surgeon: Thad Manning MD;  Location: [facility] OR;  Service: Pain Management;  Laterality: N/A;  L5-S1    EPIDURAL STEROID INJECTION INTO LUMBAR SPINE N/A 9/16/2019    Procedure: Injection-steroid-epidural-lumbar;  Surgeon: Thad Manning MD;  Location: [facility] OR;  Service: Pain Management;  Laterality: N/A;  L5-S1    EYE SURGERY Bilateral     HYSTERECTOMY      partial - fibroids    INJECTION OF ANESTHETIC AGENT AROUND MEDIAL BRANCH NERVES INNERVATING LUMBAR FACET JOINT Bilateral 2/28/2019    Procedure: Block-nerve-medial branch-lumbar;  Surgeon: Thad Manning MD;  Location: [facility] OR;  Service: Pain Management;   Laterality: Bilateral;  L3, 4,5     INJECTION OF ANESTHETIC AGENT AROUND MEDIAL BRANCH NERVES INNERVATING LUMBAR FACET JOINT Bilateral 3/21/2019    Procedure: Block-nerve-medial branch-lumbar L3,4,5;  Surgeon: Thad Manning MD;  Location: [facility] OR;  Service: Pain Management;  Laterality: Bilateral;    KNEE ARTHROPLASTY Left 3/16/2021    Procedure: ARTHROPLASTY, KNEE;  Surgeon: Rio Pitts MD;  Location: [facility] OR;  Service: Orthopedics;  Laterality: Left;  GENERAL AND BLOCK    LIPOSUCTION  1985    RADIOFREQUENCY ABLATION Left 11/4/2020    Procedure: Radiofrequency Ablation left knee;  Surgeon: Andrew Escudero MD;  Location: [facility] OR;  Service: Pain Management;  Laterality: Left;    RADIOFREQUENCY ABLATION OF LUMBAR MEDIAL BRANCH NERVE AT SINGLE LEVEL Bilateral 4/12/2019    Procedure: Radiofrequency Ablation, Nerve, Spinal, Lumbar, Medial Branch, 1 Level;  Surgeon: Thad Manning MD;  Location: [facility] OR;  Service: Pain Management;  Laterality: Bilateral;  L3, 4, 5  lumbar  [employer] pain management generator  SN [id number]  80 degrees for 75 seconds x2    RADIOFREQUENCY ABLATION OF LUMBAR MEDIAL BRANCH NERVE AT SINGLE LEVEL Bilateral 10/22/2019    Procedure: Radiofrequency Ablation, Nerve, Spinal, Lumbar, Medial Branch, L3,4,5;  Surgeon: Thad Manning MD;  Location: [facility] OR;  Service: Pain Management;  Laterality: Bilateral;  burned at 80 degrees C X 60 seconds X 2 each site    RADIOFREQUENCY ABLATION OF LUMBAR MEDIAL BRANCH NERVE AT SINGLE LEVEL Bilateral 5/27/2020    Procedure: Radiofrequency Ablation, Nerve, Spinal, Lumbar, Medial Branch, 1 Level;  Surgeon: Thad Manning MD;  Location: [facility] OR;  Service: Pain Management;  Laterality: Bilateral;  L3,4,5    REVERSE TOTAL SHOULDER ARTHROPLASTY Right 4/12/2022    Procedure: ARTHROPLASTY, SHOULDER, TOTAL, REVERSE;  Surgeon: Rio Pitts MD;  Location: [facility] OR;  Service: Orthopedics;  Laterality: Right;    SHOULDER ARTHROSCOPY Right 9/19/2021    Procedure:  ARTHROSCOPY, SHOULDER;  Surgeon: Antonio Irwin MD;  Location: [facility];  Service: Orthopedics;  Laterality: Right;  LIMITED DEBRIDMENT    TONSILLECTOMY      TOTAL REDUCTION MAMMOPLASTY         Time Tracking:     OT Date of Treatment: 06/24/23  OT Start Time: 0944  OT Stop Time: 1006  OT Total Time (min): 22 min    Billable Minutes:Evaluation 8  Self Care/Home Management 14    6/24/2023

## 2023-06-24 NOTE — ASSESSMENT & PLAN NOTE
Patient has persistent depression which is unknown and is currently uncontrolled. Will Continue anti-depressant medications. We will consult psychiatry at this time. Patient does not display psychosis at this time. Continue to monitor closely and adjust plan of care as needed.  - f/u psych recs

## 2023-06-24 NOTE — PLAN OF CARE
06/24/23 0935   GRIDER Message   Medicare Outpatient and Observation Notification regarding financial responsibility Explained to patient/caregiver;Other (comments)  (RN witness)   Date GRIDER was signed 06/24/23   Time GRIDER was signed [id number]

## 2023-06-24 NOTE — CONSULTS
Consult Note  Nephrology    Consult Requested By: Aroldo Thompson MD    Reason for Consult: TARAN    SUBJECTIVE:     History of Present Illness:  74 y/o female patient presented to ER with AMS.  Her granddaughter reported that patient has been taking 6 Benadryl at night in addition to multiple other medications patient is provided by multiple prescribers.  She also reports patient's appetite has been poor up until evening PTA when she eat very well and drank 2 margaritas.  Granddaughter reports pt does not normally drink alcohol,  and that patient was hallucinating,  pulling the blanket and eating imaginary food.   Workup in the ED showed creatinine of 2.9. Nephrology is consulted for TARAN.    6/24  BUN/Scr in normal range this am, eGFR still a bit low.   Gets lyrica 150 bid--too much for impaired renal function, cut dose to 75 bid for now, explained to pt that this is temporary.  Although mentation seems to be better from what I'm reading, she was hallucinating and combative as late as 4:30 am today.  Neurology has been consulted and work up in progress.  Hypokalemic, got PO repletion.  Pt states she had not been eating and drinking hardly anything at home, has fallen several times over the past 6 months.  Notably, TSH 35.  No problems urinating.      Assessment/plan:    TARAN  Hypokalemia  Acidosis, mild  HTN    --TARAN 2/2 poor po intake, hypothyroidism.  Levothyroxine per primary team.  Cut lyrica as above until renal function returns to normal--it is improving.  Renal US results pending, UA noted.  Agree w/IVF, cont to hold losartan.  Avoid nephrotoxic agents.  No NSAIDs, COXibs, or aminoglycoside antibiotics.  Dose all medications for level of renal function.  F/u labs in am.  --replete electrolytes as needed.    --monitor, no bicarb needed at present  --avoid hypotension.  Keep MAP >55    Past Medical History:   Diagnosis Date    Allergy     Anxiety     Arthritis, rheumatoid     Back pain     Chronic bronchiolitis      Depression     Fibromyalgia     GERD (gastroesophageal reflux disease)     High cholesterol     Hilar mass 03/27/2014    [place] (hard of hearing)     aid right ear    [place] (hard of hearing)     Hypertension     Incontinence of urine     Lymphedema     MS (multiple sclerosis)     benign; another MD stated she has no MS    Neuropathy     Restless leg syndrome     Rotator cuff tear 04/16/2015    Sciatica of left side 01/05/2018    Seasonal allergies     Sinus congestion     Sleep apnea     DOES NOT USE MACHINE    Spondylolysis     Thyroid disease     Type 2 diabetes mellitus with polyneuropathy     no med. was borderline    Wheezing      Past Surgical History:   Procedure Laterality Date    APPENDECTOMY      ARTHROSCOPIC DEBRIDEMENT OF SHOULDER Right 2/18/2022    Procedure: EXTENSIVE DEBRIDEMENT, SHOULDER, ARTHROSCOPIC;  Surgeon: Rio Pitts MD;  Location: [facility] OR;  Service: Orthopedics;  Laterality: Right;    BREAST SURGERY      reduction    CLOSED REDUCTION OF INJURY OF SHOULDER Right 9/19/2021    Procedure: CLOSED REDUCTION, SHOULDER;  Surgeon: Antonio Irwin MD;  Location: [facility] OR;  Service: Orthopedics;  Laterality: Right;    EPIDURAL STEROID INJECTION INTO LUMBAR SPINE N/A 6/12/2019    Procedure: Injection-steroid-epidural-lumbar;  Surgeon: Thad Manning MD;  Location: [facility] OR;  Service: Pain Management;  Laterality: N/A;  L5-S1    EPIDURAL STEROID INJECTION INTO LUMBAR SPINE N/A 9/16/2019    Procedure: Injection-steroid-epidural-lumbar;  Surgeon: Thad Manning MD;  Location: [facility] OR;  Service: Pain Management;  Laterality: N/A;  L5-S1    EYE SURGERY Bilateral     HYSTERECTOMY      partial - fibroids    INJECTION OF ANESTHETIC AGENT AROUND MEDIAL BRANCH NERVES INNERVATING LUMBAR FACET JOINT Bilateral 2/28/2019    Procedure: Block-nerve-medial branch-lumbar;  Surgeon: Thad Manning MD;  Location: [facility] OR;  Service: Pain Management;  Laterality: Bilateral;  L3, 4,5     INJECTION OF ANESTHETIC AGENT AROUND  MEDIAL BRANCH NERVES INNERVATING LUMBAR FACET JOINT Bilateral 3/21/2019    Procedure: Block-nerve-medial branch-lumbar L3,4,5;  Surgeon: Thad Manning MD;  Location: [facility] OR;  Service: Pain Management;  Laterality: Bilateral;    KNEE ARTHROPLASTY Left 3/16/2021    Procedure: ARTHROPLASTY, KNEE;  Surgeon: Rio Pitts MD;  Location: [facility] OR;  Service: Orthopedics;  Laterality: Left;  GENERAL AND BLOCK    LIPOSUCTION  1985    RADIOFREQUENCY ABLATION Left 11/4/2020    Procedure: Radiofrequency Ablation left knee;  Surgeon: Andrew Escudero MD;  Location: [facility] OR;  Service: Pain Management;  Laterality: Left;    RADIOFREQUENCY ABLATION OF LUMBAR MEDIAL BRANCH NERVE AT SINGLE LEVEL Bilateral 4/12/2019    Procedure: Radiofrequency Ablation, Nerve, Spinal, Lumbar, Medial Branch, 1 Level;  Surgeon: Thad Manning MD;  Location: [facility] OR;  Service: Pain Management;  Laterality: Bilateral;  L3, 4, 5  lumbar  Touch-Writer pain management generator  SN [id number]  80 degrees for 75 seconds x2    RADIOFREQUENCY ABLATION OF LUMBAR MEDIAL BRANCH NERVE AT SINGLE LEVEL Bilateral 10/22/2019    Procedure: Radiofrequency Ablation, Nerve, Spinal, Lumbar, Medial Branch, L3,4,5;  Surgeon: Thad Manning MD;  Location: [facility] OR;  Service: Pain Management;  Laterality: Bilateral;  burned at 80 degrees C X 60 seconds X 2 each site    RADIOFREQUENCY ABLATION OF LUMBAR MEDIAL BRANCH NERVE AT SINGLE LEVEL Bilateral 5/27/2020    Procedure: Radiofrequency Ablation, Nerve, Spinal, Lumbar, Medial Branch, 1 Level;  Surgeon: Thad Manning MD;  Location: [facility] OR;  Service: Pain Management;  Laterality: Bilateral;  L3,4,5    REVERSE TOTAL SHOULDER ARTHROPLASTY Right 4/12/2022    Procedure: ARTHROPLASTY, SHOULDER, TOTAL, REVERSE;  Surgeon: Rio Pitts MD;  Location: [facility] OR;  Service: Orthopedics;  Laterality: Right;    SHOULDER ARTHROSCOPY Right 9/19/2021    Procedure: ARTHROSCOPY, SHOULDER;  Surgeon: Antonio Irwin MD;  Location: [facility] OR;   Service: Orthopedics;  Laterality: Right;  LIMITED DEBRIDMENT    TONSILLECTOMY      TOTAL REDUCTION MAMMOPLASTY       Family History   Problem Relation Age of Onset    Heart disease Mother      Social History     Tobacco Use    Smoking status: Former     Types: Cigarettes     Quit date: 1996     Years since quittin.0     Passive exposure: Past    Smokeless tobacco: Never   Substance Use Topics    Alcohol use: Yes     Comment: very little     Drug use: No       Review of patient's allergies indicates:   Allergen Reactions    Keflex [cephalexin]      Throat swelling    Pcn [penicillins]      Throat swelling    Latex, natural rubber Other (See Comments)     Redness with bandaids     Adhesive Other (See Comments)     bandainds redness at skin    Trelegy ellipta [fluticasone-umeclidin-vilanter]      Vision disturbance        Review of Systems:  Negative unless noted above    OBJECTIVE:     Vital Signs Range (Last 24H):  Temp:  [97.2 °F (36.2 °C)-98.5 °F (36.9 °C)]   Pulse:  [50-71]   Resp:  [16-20]   BP: (108-182)/(50-90)   SpO2:  [95 %-98 %]     Physical Exam:  General- NAD noted  HEENT- WNL  Neck- supple  CV- Regular rate and rhythm  Resp- Lungs CTA Bilaterally, No increased WOB  GI- Non tender/non-distended, BS normoactive x4 quads  Extrem- No cyanosis, clubbing, edema.  Derm- skin w/d  Neuro-  No flap.     Body mass index is 36.55 kg/m².    Laboratory:  CBC:   Recent Labs   Lab 23  0327   WBC 10.37   RBC 4.78   HGB 14.6   HCT 44.2      MCV 93   MCH 30.5   MCHC 33.0     CMP:   Recent Labs   Lab 23  0327   GLU 89   CALCIUM 9.9   ALBUMIN 4.3   PROT 7.7      K 3.2*   CO2 22*      BUN 21   CREATININE 1.2   ALKPHOS 97   ALT 13   AST 23   BILITOT 0.5       Diagnostic Results:  Labs: Reviewed      ASSESSMENT/PLAN:     Active Hospital Problems    Diagnosis  POA    *TARAN (acute kidney injury) [N17.9]  Yes    Confusion [R41.0]  Yes    Hyperlipidemia associated with type 2 diabetes mellitus  [E11.69, E78.5]  Yes    Diet-controlled type 2 diabetes mellitus [E11.9]  Yes    Moderate major depression, single episode [F32.1]  Yes    Hypertension associated with diabetes [E11.59, I15.2]  Yes    Hypothyroidism [E03.9]  Yes      Resolved Hospital Problems   No resolved problems to display.         Thank you for allowing us to participate in the care of your patient. We will follow the patient and provide recommendations as needed.      Time spent seeing patient( greater than 1/2 spent in direct contact) :     Kaylie Prajapati NP

## 2023-06-24 NOTE — CARE UPDATE
06/24/23 0715   Patient Assessment/Suction   Level of Consciousness (AVPU) alert   Respiratory Effort Normal;Unlabored   Expansion/Accessory Muscles/Retractions no use of accessory muscles   PRE-TX-O2   Device (Oxygen Therapy) room air   SpO2 98 %   Pulse Oximetry Type Intermittent   $ Pulse Oximetry - Multiple Charge Pulse Oximetry - Multiple   Pulse 60   Resp 17

## 2023-06-24 NOTE — ASSESSMENT & PLAN NOTE
Patient's FSGs are controlled on current medication regimen.  Last A1c reviewed-   Lab Results   Component Value Date    HGBA1C 5.9 (H) 12/12/2022     Most recent fingerstick glucose reviewed-   Recent Labs   Lab 06/23/23  1608 06/23/23  2110 06/24/23  1136   POCTGLUCOSE 77 116* 106     Current correctional scale  Medium  Maintain anti-hyperglycemic dose as follows-   Antihyperglycemics (From admission, onward)    Start     Stop Route Frequency Ordered    06/23/23 1604  insulin aspart U-100 pen 1-10 Units         -- SubQ Before meals & nightly PRN 06/23/23 1604        Hold Oral hypoglycemics while patient is in the hospital.

## 2023-06-24 NOTE — NURSING
Pt climbing out of bed, removing multiple IV's and becoming combative with staff. Patient is hallucinating and uncooperative. Avasys at bedside active. Order for soft wrist restraints given by Fran Amin Np. Queta (daughter) notified of current events and will come sit with patient. Educated patient and daughter that if current behavior is absent then restraints can be removed.

## 2023-06-24 NOTE — PLAN OF CARE
Problem: Adult Inpatient Plan of Care  Goal: Plan of Care Review  Outcome: Ongoing, Progressing  Goal: Patient-Specific Goal (Individualized)  Outcome: Ongoing, Progressing     Problem: Diabetes Comorbidity  Goal: Blood Glucose Level Within Targeted Range  Outcome: Ongoing, Progressing   Patient alert and oriented with times of confusion. resting in bed. NAD. Denies pain or SOB. VSS. Room air. Avasys active at bedside.  Bed alarm active. Plan of care reviewed with patient. Verbalizes understanding.Call light in reach. Pt free from fall or injury. Will monitor.

## 2023-06-24 NOTE — ASSESSMENT & PLAN NOTE
Patient with acute kidney injury likely due to IVVD/dehydration TARAN is currently improving. Labs reviewed- Renal function/electrolytes with Estimated Creatinine Clearance: 39.2 mL/min (based on SCr of 1.2 mg/dL). according to latest data. Monitor urine output and serial BMP and adjust therapy as needed. Avoid nephrotoxins and renally dose meds for GFR listed above. Renal US no obstruction.  - continue IVF  - nephrology following

## 2023-06-24 NOTE — PT/OT/SLP EVAL
"Physical Therapy Evaluation    Patient Name:  Genoveva Gilbert   MRN:  [id number]    Recommendations:     Discharge Recommendations: home with home health, home health PT   Discharge Equipment Recommendations: none (states that has cane and rollator)     Assessment:     Genoveva Gilbert is a 73 y.o. female admitted with a medical diagnosis of TARAN (acute kidney injury).  She presents with the following impairments/functional limitations: weakness, impaired endurance, impaired cognition, gait instability, impaired balance, impaired functional mobility, decreased lower extremity function  .    Rehab Prognosis: Good; patient would benefit from acute skilled PT services to address these deficits and reach maximum level of function.    Recent Surgery: * No surgery found *      Plan:     During this hospitalization, patient to be seen 6 x/week to address the identified rehab impairments via therapeutic activities, therapeutic exercises and progress toward the following goals:    Plan of Care Expires:  06/30/23    Subjective     Patient/Family Comments/goals: agrees to work with PT to walk in mcdonald, agrees to remain in chair after.    Living Environment:  Alone in condo with "stair lift", several family live near.  Prior to admission, patients level of function was independent without AD, + driving.  Equipment used at home: none.  DME owned (not currently used): single point cane and rollator .  Upon discharge, patient will have assistance from family.    Objective:     Communicated with RN (Jay) prior to session.  Patient found supine with bed alarm, telemetry (Telesitter)  upon PT entry to room.    General Precautions: Standard, fall  Orthopedic Precautions:N/A   Braces: N/A  Respiratory Status: Room air    Functional Mobility training:  Bed Mobility:     Rolling Left:  stand by assistance  Supine to Sit: stand by assistance  Transfers:     Sit to Stand:  stand by assistance and contact guard assistance with rolling " walker  Gait: 250' with RW with CG/SBA and cues      AM-PAC 6 CLICK MOBILITY  Total Score:18       Treatment & Education:  Mobility training as above with cues for technique  Rec'd to perform frequent ankle DF/PF to prevent DVTs    Patient left up in chair with call button in reach, chair alarm on, RNs (Luis Thompson) notified, and Telesitter present.    GOALS:   Multidisciplinary Problems       Physical Therapy Goals          Problem: Physical Therapy    Goal Priority Disciplines Outcome Goal Variances Interventions   Physical Therapy Goal     PT, PT/OT Ongoing, Progressing     Description: Goals to be met by: 6;     Patient will increase functional independence with mobility by performin). Supine to sit with Modified Leslie  2). Sit to supine with Modified Leslie  3). Sit to stand transfer with Modified Leslie  4). Gait  x > 200 feet with Modified Leslie                         History:     Past Medical History:   Diagnosis Date    Allergy     Anxiety     Arthritis, rheumatoid     Back pain     Chronic bronchiolitis     Depression     Fibromyalgia     GERD (gastroesophageal reflux disease)     High cholesterol     Hilar mass 2014    [place] (hard of hearing)     aid right ear    [place] (hard of hearing)     Hypertension     Incontinence of urine     Lymphedema     MS (multiple sclerosis)     benign; another MD stated she has no MS    Neuropathy     Restless leg syndrome     Rotator cuff tear 2015    Sciatica of left side 2018    Seasonal allergies     Sinus congestion     Sleep apnea     DOES NOT USE MACHINE    Spondylolysis     Thyroid disease     Type 2 diabetes mellitus with polyneuropathy     no med. was borderline    Wheezing        Past Surgical History:   Procedure Laterality Date    APPENDECTOMY      ARTHROSCOPIC DEBRIDEMENT OF SHOULDER Right 2022    Procedure: EXTENSIVE DEBRIDEMENT, SHOULDER, ARTHROSCOPIC;  Surgeon: Rio Pitts MD;   Location: [facility] OR;  Service: Orthopedics;  Laterality: Right;    BREAST SURGERY      reduction    CLOSED REDUCTION OF INJURY OF SHOULDER Right 9/19/2021    Procedure: CLOSED REDUCTION, SHOULDER;  Surgeon: Antonio Irwin MD;  Location: [facility] OR;  Service: Orthopedics;  Laterality: Right;    EPIDURAL STEROID INJECTION INTO LUMBAR SPINE N/A 6/12/2019    Procedure: Injection-steroid-epidural-lumbar;  Surgeon: Thad Manning MD;  Location: [facility] OR;  Service: Pain Management;  Laterality: N/A;  L5-S1    EPIDURAL STEROID INJECTION INTO LUMBAR SPINE N/A 9/16/2019    Procedure: Injection-steroid-epidural-lumbar;  Surgeon: Thad Manning MD;  Location: [facility] OR;  Service: Pain Management;  Laterality: N/A;  L5-S1    EYE SURGERY Bilateral     HYSTERECTOMY      partial - fibroids    INJECTION OF ANESTHETIC AGENT AROUND MEDIAL BRANCH NERVES INNERVATING LUMBAR FACET JOINT Bilateral 2/28/2019    Procedure: Block-nerve-medial branch-lumbar;  Surgeon: Thad Manning MD;  Location: [facility] OR;  Service: Pain Management;  Laterality: Bilateral;  L3, 4,5     INJECTION OF ANESTHETIC AGENT AROUND MEDIAL BRANCH NERVES INNERVATING LUMBAR FACET JOINT Bilateral 3/21/2019    Procedure: Block-nerve-medial branch-lumbar L3,4,5;  Surgeon: Thad Manning MD;  Location: [facility] OR;  Service: Pain Management;  Laterality: Bilateral;    KNEE ARTHROPLASTY Left 3/16/2021    Procedure: ARTHROPLASTY, KNEE;  Surgeon: Rio Pitts MD;  Location: [facility] OR;  Service: Orthopedics;  Laterality: Left;  GENERAL AND BLOCK    LIPOSUCTION  1985    RADIOFREQUENCY ABLATION Left 11/4/2020    Procedure: Radiofrequency Ablation left knee;  Surgeon: Andrew Escudero MD;  Location: [facility] OR;  Service: Pain Management;  Laterality: Left;    RADIOFREQUENCY ABLATION OF LUMBAR MEDIAL BRANCH NERVE AT SINGLE LEVEL Bilateral 4/12/2019    Procedure: Radiofrequency Ablation, Nerve, Spinal, Lumbar, Medial Branch, 1 Level;  Surgeon: Thad Manning MD;  Location: [facility] OR;  Service: Pain Management;   Laterality: Bilateral;  L3, 4, 5  lumbar  [email or web address] pain management generator  SN [id number]  80 degrees for 75 seconds x2    RADIOFREQUENCY ABLATION OF LUMBAR MEDIAL BRANCH NERVE AT SINGLE LEVEL Bilateral 10/22/2019    Procedure: Radiofrequency Ablation, Nerve, Spinal, Lumbar, Medial Branch, L3,4,5;  Surgeon: Thad Manning MD;  Location: [facility] OR;  Service: Pain Management;  Laterality: Bilateral;  burned at 80 degrees C X 60 seconds X 2 each site    RADIOFREQUENCY ABLATION OF LUMBAR MEDIAL BRANCH NERVE AT SINGLE LEVEL Bilateral 5/27/2020    Procedure: Radiofrequency Ablation, Nerve, Spinal, Lumbar, Medial Branch, 1 Level;  Surgeon: Thad Manning MD;  Location: [facility] OR;  Service: Pain Management;  Laterality: Bilateral;  L3,4,5    REVERSE TOTAL SHOULDER ARTHROPLASTY Right 4/12/2022    Procedure: ARTHROPLASTY, SHOULDER, TOTAL, REVERSE;  Surgeon: Rio Pitts MD;  Location: [facility] OR;  Service: Orthopedics;  Laterality: Right;    SHOULDER ARTHROSCOPY Right 9/19/2021    Procedure: ARTHROSCOPY, SHOULDER;  Surgeon: Antonio Irwin MD;  Location: [facility] OR;  Service: Orthopedics;  Laterality: Right;  LIMITED DEBRIDMENT    TONSILLECTOMY      TOTAL REDUCTION MAMMOPLASTY         Time Tracking:     PT Received On: 06/24/23  PT Start Time: 1012     PT Stop Time: 1027  PT Total Time (min): 15 min     Billable Minutes: Evaluation 15      06/24/2023

## 2023-06-24 NOTE — PLAN OF CARE
Problem: Physical Therapy  Goal: Physical Therapy Goal  Description: Goals to be met by:      Patient will increase functional independence with mobility by performin). Supine to sit with Modified Fisher  2). Sit to supine with Modified Fisher  3). Sit to stand transfer with Modified Fisher  4). Gait  x > 200 feet with Modified Fisher    Outcome: Ongoing, Progressing

## 2023-06-24 NOTE — SUBJECTIVE & OBJECTIVE
Interval History: Patient seen and examined. LUCIOON. Restraints off. Alert and oriented this AM but also later observed having verbal argument with someone not physically in room.      Objective:     Vital Signs (Most Recent):  Temp: 97.6 °F (36.4 °C) (06/24/23 0748)  Pulse: 68 (06/24/23 1239)  Resp: 17 (06/24/23 1239)  BP: (!) 182/90 (06/24/23 0748)  SpO2: 97 % (06/24/23 1239) Vital Signs (24h Range):  Temp:  [97.2 °F (36.2 °C)-98.2 °F (36.8 °C)] 97.6 °F (36.4 °C)  Pulse:  [50-70] 68  Resp:  [16-20] 17  SpO2:  [96 %-98 %] 97 %  BP: (140-182)/(70-90) 182/90     Weight: 84.9 kg (187 lb 2.7 oz)  Body mass index is 36.55 kg/m².    Intake/Output Summary (Last 24 hours) at 6/24/2023 1340  Last data filed at 6/24/2023 0817  Gross per 24 hour   Intake 1560 ml   Output 200 ml   Net 1360 ml         Physical Exam  Vitals reviewed.   Constitutional:       General: She is not in acute distress.  HENT:      Head: Normocephalic and atraumatic.   Cardiovascular:      Rate and Rhythm: Normal rate and regular rhythm.      Heart sounds: Normal heart sounds.   Pulmonary:      Effort: Pulmonary effort is normal. No respiratory distress.   Neurological:      Mental Status: She is alert and oriented to person, place, and time.   Psychiatric:         Attention and Perception: She perceives auditory and visual hallucinations.         Mood and Affect: Affect normal.           Significant Labs: All pertinent labs within the past 24 hours have been reviewed.    Significant Imaging: I have reviewed all pertinent imaging results/findings within the past 24 hours.

## 2023-06-25 VITALS
HEART RATE: 59 BPM | OXYGEN SATURATION: 98 % | DIASTOLIC BLOOD PRESSURE: 65 MMHG | HEIGHT: 60 IN | RESPIRATION RATE: 17 BRPM | WEIGHT: 187.19 LBS | SYSTOLIC BLOOD PRESSURE: 136 MMHG | BODY MASS INDEX: 36.75 KG/M2 | TEMPERATURE: 98 F

## 2023-06-25 LAB
ALBUMIN SERPL BCP-MCNC: 3.3 G/DL (ref 3.5–5.2)
ALP SERPL-CCNC: 67 U/L (ref 55–135)
ALT SERPL W/O P-5'-P-CCNC: 10 U/L (ref 10–44)
ANION GAP SERPL CALC-SCNC: 10 MMOL/L (ref 8–16)
AST SERPL-CCNC: 15 U/L (ref 10–40)
BASOPHILS # BLD AUTO: 0.06 K/UL (ref 0–0.2)
BASOPHILS NFR BLD: 0.9 % (ref 0–1.9)
BILIRUB SERPL-MCNC: 0.6 MG/DL (ref 0.1–1)
BUN SERPL-MCNC: 18 MG/DL (ref 8–23)
CALCIUM SERPL-MCNC: 8.7 MG/DL (ref 8.7–10.5)
CHLORIDE SERPL-SCNC: 106 MMOL/L (ref 95–110)
CO2 SERPL-SCNC: 27 MMOL/L (ref 23–29)
CREAT SERPL-MCNC: 0.8 MG/DL (ref 0.5–1.4)
DIFFERENTIAL METHOD: ABNORMAL
EOSINOPHIL # BLD AUTO: 0.4 K/UL (ref 0–0.5)
EOSINOPHIL NFR BLD: 5.4 % (ref 0–8)
ERYTHROCYTE [DISTWIDTH] IN BLOOD BY AUTOMATED COUNT: 13.6 % (ref 11.5–14.5)
EST. GFR  (NO RACE VARIABLE): >60 ML/MIN/1.73 M^2
GLUCOSE SERPL-MCNC: 86 MG/DL (ref 70–110)
HCT VFR BLD AUTO: 37.6 % (ref 37–48.5)
HGB BLD-MCNC: 11.7 G/DL (ref 12–16)
IMM GRANULOCYTES # BLD AUTO: 0 K/UL (ref 0–0.04)
IMM GRANULOCYTES NFR BLD AUTO: 0 % (ref 0–0.5)
LYMPHOCYTES # BLD AUTO: 3.5 K/UL (ref 1–4.8)
LYMPHOCYTES NFR BLD: 54.1 % (ref 18–48)
MAGNESIUM SERPL-MCNC: 1.8 MG/DL (ref 1.6–2.6)
MCH RBC QN AUTO: 29 PG (ref 27–31)
MCHC RBC AUTO-ENTMCNC: 31.1 G/DL (ref 32–36)
MCV RBC AUTO: 93 FL (ref 82–98)
MONOCYTES # BLD AUTO: 0.6 K/UL (ref 0.3–1)
MONOCYTES NFR BLD: 8.6 % (ref 4–15)
NEUTROPHILS # BLD AUTO: 2 K/UL (ref 1.8–7.7)
NEUTROPHILS NFR BLD: 31 % (ref 38–73)
NRBC BLD-RTO: 0 /100 WBC
PHOSPHATE SERPL-MCNC: 2.7 MG/DL (ref 2.7–4.5)
PLATELET # BLD AUTO: 188 K/UL (ref 150–450)
PMV BLD AUTO: 11.1 FL (ref 9.2–12.9)
POCT GLUCOSE: 83 MG/DL (ref 70–110)
POTASSIUM SERPL-SCNC: 3.4 MMOL/L (ref 3.5–5.1)
PROT SERPL-MCNC: 5.6 G/DL (ref 6–8.4)
RBC # BLD AUTO: 4.03 M/UL (ref 4–5.4)
SODIUM SERPL-SCNC: 143 MMOL/L (ref 136–145)
WBC # BLD AUTO: 6.53 K/UL (ref 3.9–12.7)

## 2023-06-25 PROCEDURE — 25000003 PHARM REV CODE 250: Performed by: NURSE PRACTITIONER

## 2023-06-25 PROCEDURE — 94761 N-INVAS EAR/PLS OXIMETRY MLT: CPT

## 2023-06-25 PROCEDURE — 96372 THER/PROPH/DIAG INJ SC/IM: CPT | Performed by: HOSPITALIST

## 2023-06-25 PROCEDURE — 25000003 PHARM REV CODE 250: Performed by: HOSPITALIST

## 2023-06-25 PROCEDURE — 85025 COMPLETE CBC W/AUTO DIFF WBC: CPT | Performed by: HOSPITALIST

## 2023-06-25 PROCEDURE — 36415 COLL VENOUS BLD VENIPUNCTURE: CPT | Performed by: HOSPITALIST

## 2023-06-25 PROCEDURE — 63600175 PHARM REV CODE 636 W HCPCS: Performed by: HOSPITALIST

## 2023-06-25 PROCEDURE — G0378 HOSPITAL OBSERVATION PER HR: HCPCS

## 2023-06-25 PROCEDURE — 80053 COMPREHEN METABOLIC PANEL: CPT | Performed by: HOSPITALIST

## 2023-06-25 PROCEDURE — 25000003 PHARM REV CODE 250: Performed by: STUDENT IN AN ORGANIZED HEALTH CARE EDUCATION/TRAINING PROGRAM

## 2023-06-25 PROCEDURE — 84100 ASSAY OF PHOSPHORUS: CPT | Performed by: HOSPITALIST

## 2023-06-25 PROCEDURE — 83735 ASSAY OF MAGNESIUM: CPT | Performed by: HOSPITALIST

## 2023-06-25 RX ORDER — QUETIAPINE FUMARATE 25 MG/1
25 TABLET, FILM COATED ORAL NIGHTLY
Qty: 30 TABLET | Refills: 11 | Status: ON HOLD
Start: 2023-06-25 | End: 2023-09-24

## 2023-06-25 RX ORDER — QUETIAPINE FUMARATE 25 MG/1
25 TABLET, FILM COATED ORAL NIGHTLY
Status: DISCONTINUED | OUTPATIENT
Start: 2023-06-25 | End: 2023-06-25 | Stop reason: HOSPADM

## 2023-06-25 RX ORDER — POTASSIUM CHLORIDE 20 MEQ/1
40 TABLET, EXTENDED RELEASE ORAL ONCE
Status: COMPLETED | OUTPATIENT
Start: 2023-06-25 | End: 2023-06-25

## 2023-06-25 RX ORDER — TALC
3 POWDER (GRAM) TOPICAL NIGHTLY
Status: DISCONTINUED | OUTPATIENT
Start: 2023-06-25 | End: 2023-06-25 | Stop reason: HOSPADM

## 2023-06-25 RX ORDER — HALOPERIDOL 5 MG/ML
2 INJECTION INTRAMUSCULAR EVERY 6 HOURS PRN
Status: DISCONTINUED | OUTPATIENT
Start: 2023-06-25 | End: 2023-06-25 | Stop reason: HOSPADM

## 2023-06-25 RX ADMIN — HEPARIN SODIUM 5000 UNITS: 5000 INJECTION INTRAVENOUS; SUBCUTANEOUS at 06:06

## 2023-06-25 RX ADMIN — POTASSIUM CHLORIDE 40 MEQ: 1500 TABLET, EXTENDED RELEASE ORAL at 10:06

## 2023-06-25 RX ADMIN — SODIUM CHLORIDE: 9 INJECTION, SOLUTION INTRAVENOUS at 03:06

## 2023-06-25 RX ADMIN — LEVOTHYROXINE SODIUM 88 MCG: 0.09 TABLET ORAL at 09:06

## 2023-06-25 RX ADMIN — ATORVASTATIN CALCIUM 10 MG: 10 TABLET, FILM COATED ORAL at 09:06

## 2023-06-25 RX ADMIN — VENLAFAXINE HYDROCHLORIDE 150 MG: 37.5 CAPSULE, EXTENDED RELEASE ORAL at 09:06

## 2023-06-25 RX ADMIN — METOPROLOL SUCCINATE 25 MG: 25 TABLET, EXTENDED RELEASE ORAL at 09:06

## 2023-06-25 RX ADMIN — AMLODIPINE BESYLATE 5 MG: 5 TABLET ORAL at 09:06

## 2023-06-25 RX ADMIN — POLYETHYLENE GLYCOL 3350 17 G: 17 POWDER, FOR SOLUTION ORAL at 09:06

## 2023-06-25 RX ADMIN — PREGABALIN 75 MG: 75 CAPSULE ORAL at 09:06

## 2023-06-25 NOTE — NURSING
"Patients daughter Queta  called, Password obtained, " [email or web address]". Informed that Tele Psych was done and recommendations confirmed. States will see her mom in am, does not want to upset her if she came to visit [facility]. Voiced understanding. . Encouraged to call with any questions.    "

## 2023-06-25 NOTE — PLAN OF CARE
Problem: Adult Inpatient Plan of Care  Goal: Plan of Care Review  6/25/2023 0547 by Melissa Hood RN  Outcome: Ongoing, Progressing  6/25/2023 0519 by Melissa Hood RN  Outcome: Ongoing, Progressing  Goal: Patient-Specific Goal (Individualized)  6/25/2023 0547 by Melissa Hood RN  Outcome: Ongoing, Progressing  6/25/2023 0519 by Melissa Hood RN  Outcome: Ongoing, Progressing  Goal: Absence of Hospital-Acquired Illness or Injury  6/25/2023 0547 by Melissa Hood RN  Outcome: Ongoing, Progressing  6/25/2023 0519 by Melissa Hood RN  Outcome: Ongoing, Progressing  Goal: Optimal Comfort and Wellbeing  6/25/2023 0547 by Melissa Hood RN  Outcome: Ongoing, Progressing  6/25/2023 0519 by Melissa Hood RN  Outcome: Ongoing, Progressing  Goal: Readiness for Transition of Care  6/25/2023 0547 by Melissa Hood RN  Outcome: Ongoing, Progressing  6/25/2023 0519 by Melissa Hood RN  Outcome: Ongoing, Progressing     Problem: Diabetes Comorbidity  Goal: Blood Glucose Level Within Targeted Range  6/25/2023 0547 by Melissa Hood RN  Outcome: Ongoing, Progressing  6/25/2023 0519 by Melissa Hood RN  Outcome: Ongoing, Progressing     Problem: Fluid and Electrolyte Imbalance (Acute Kidney Injury/Impairment)  Goal: Fluid and Electrolyte Balance  6/25/2023 0547 by Melissa Hood RN  Outcome: Ongoing, Progressing  6/25/2023 0519 by Melissa Hood RN  Outcome: Ongoing, Progressing     Problem: Oral Intake Inadequate (Acute Kidney Injury/Impairment)  Goal: Optimal Nutrition Intake  6/25/2023 0547 by Melissa Hood RN  Outcome: Ongoing, Progressing  6/25/2023 0519 by Melissa Hood RN  Outcome: Ongoing, Progressing     Problem: Renal Function Impairment (Acute Kidney Injury/Impairment)  Goal: Effective Renal Function  6/25/2023 0547 by Melissa Hood RN  Outcome: Ongoing, Progressing  6/25/2023 0519 by Melissa Hodo RN  Outcome: Ongoing, Progressing     Problem: Infection  Goal: Absence of Infection Signs and Symptoms  6/25/2023 0547 by Melissa Hood RN  Outcome:  Ongoing, Progressing  6/25/2023 0519 by Melissa Hood RN  Outcome: Ongoing, Progressing     Problem: Skin Injury Risk Increased  Goal: Skin Health and Integrity  6/25/2023 0547 by Melissa Hood RN  Outcome: Ongoing, Progressing  6/25/2023 0519 by Melissa Hood RN  Outcome: Ongoing, Progressing     Problem: Fall Injury Risk  Goal: Absence of Fall and Fall-Related Injury  6/25/2023 0547 by Melissa Hood RN  Outcome: Ongoing, Progressing  6/25/2023 0519 by Melissa Hood RN  Outcome: Ongoing, Progressing     Problem: Restraint, Nonbehavioral (Nonviolent)  Goal: Absence of Harm or Injury  6/25/2023 0547 by Melissa Hood RN  Outcome: Ongoing, Progressing  6/25/2023 0519 by Melissa Hood RN  Outcome: Ongoing, Progressing     Problem: Impaired Wound Healing  Goal: Optimal Wound Healing  6/25/2023 0547 by Melissa Hood RN  Outcome: Ongoing, Progressing  6/25/2023 0519 by Melissa Hood RN  Outcome: Ongoing, Progressing   POC discussed with patient, voiced understanding. Patient remains very sleepy whole shift. IV restarted, slept through. Wakes up for brief period, yet falls back to sleep. Sitter at bedside. Tele sitter in use. Up to bathroom earlier in shift when more awake. No complaints voiced.

## 2023-06-25 NOTE — CARE UPDATE
06/25/23 0700   Patient Assessment/Suction   Level of Consciousness (AVPU) alert   PRE-TX-O2   Device (Oxygen Therapy) room air   SpO2 (!) 93 %   Pulse Oximetry Type Intermittent   $ Pulse Oximetry - Multiple Charge Pulse Oximetry - Multiple   Pulse (!) 53   Resp 18

## 2023-06-25 NOTE — PROGRESS NOTES
Progress Note  Nephrology    Consult Requested By: Aroldo Thompson MD    Reason for Consult: TARAN    SUBJECTIVE:     History of Present Illness:  72 y/o female patient presented to ER with AMS.  Her granddaughter reported that patient has been taking 6 Benadryl at night in addition to multiple other medications patient is provided by multiple prescribers.  She also reports patient's appetite has been poor up until evening PTA when she eat very well and drank 2 margaritas.  Granddaughter reports pt does not normally drink alcohol,  and that patient was hallucinating,  pulling the blanket and eating imaginary food.   Workup in the ED showed creatinine of 2.9. Nephrology is consulted for TARAN.    6/24  BUN/Scr in normal range this am, eGFR still a bit low.   Gets lyrica 150 bid--too much for impaired renal function, cut dose to 75 bid for now, explained to pt that this is temporary.  Although mentation seems to be better from what I'm reading, she was hallucinating and combative as late as 4:30 am today.  Neurology has been consulted and work up in progress.  Hypokalemic, got PO repletion.  Pt states she had not been eating and drinking hardly anything at home, has fallen several times over the past 6 months.  Notably, TSH 35.  No problems urinating.    6/25  VSS, BUN/Scr in normal range.  Hypokalemic, ordered repletion.  Still having issues w/confusion and hallucinations per nurses' notes.  Currently sleeping quietly, NAD noted.    Assessment/plan:    TARAN  Hypokalemia  Acidosis, mild  HTN    --TARAN 2/2 poor po intake, hypothyroidism.  Levothyroxine per primary team.  Cut lyrica as above until renal function returns to normal--it is improving.  Renal US results pending, UA noted.  Agree w/IVF, cont to hold losartan.  Avoid nephrotoxic agents.  No NSAIDs, COXibs, or aminoglycoside antibiotics.  Dose all medications for level of renal function.  F/u labs in am.  --replete electrolytes as needed.    --monitor, no bicarb  needed at present  --avoid hypotension.  Keep MAP >55    Past Medical History:   Diagnosis Date    Allergy     Anxiety     Arthritis, rheumatoid     Back pain     Chronic bronchiolitis     Depression     Fibromyalgia     GERD (gastroesophageal reflux disease)     High cholesterol     Hilar mass 03/27/2014    [place]     aid right ear    [place]     Hypertension     Incontinence of urine     Lymphedema     MS (multiple sclerosis)     benign; another MD stated she has no MS    Neuropathy     Restless leg syndrome     Rotator cuff tear 04/16/2015    Sciatica of left side 01/05/2018    Seasonal allergies     Sinus congestion     Sleep apnea     DOES NOT USE MACHINE    Spondylolysis     Thyroid disease     Type 2 diabetes mellitus with polyneuropathy     no med. was borderline    Wheezing      Past Surgical History:   Procedure Laterality Date    APPENDECTOMY      ARTHROSCOPIC DEBRIDEMENT OF SHOULDER Right 2/18/2022    Procedure: EXTENSIVE DEBRIDEMENT, SHOULDER, ARTHROSCOPIC;  Surgeon: Rio Pitts MD;  Location: [facility] OR;  Service: Orthopedics;  Laterality: Right;    BREAST SURGERY      reduction    CLOSED REDUCTION OF INJURY OF SHOULDER Right 9/19/2021    Procedure: CLOSED REDUCTION, SHOULDER;  Surgeon: Antonio Irwin MD;  Location: [facility] OR;  Service: Orthopedics;  Laterality: Right;    EPIDURAL STEROID INJECTION INTO LUMBAR SPINE N/A 6/12/2019    Procedure: Injection-steroid-epidural-lumbar;  Surgeon: Thad Manning MD;  Location: [facility] OR;  Service: Pain Management;  Laterality: N/A;  L5-S1    EPIDURAL STEROID INJECTION INTO LUMBAR SPINE N/A 9/16/2019    Procedure: Injection-steroid-epidural-lumbar;  Surgeon: Thad Manning MD;  Location: [facility] OR;  Service: Pain Management;  Laterality: N/A;  L5-S1    EYE SURGERY Bilateral     HYSTERECTOMY      partial - fibroids    INJECTION OF ANESTHETIC AGENT AROUND MEDIAL BRANCH NERVES INNERVATING LUMBAR FACET JOINT Bilateral 2/28/2019     Procedure: Block-nerve-medial branch-lumbar;  Surgeon: Thda Manning MD;  Location: [facility];  Service: Pain Management;  Laterality: Bilateral;  L3, 4,5     INJECTION OF ANESTHETIC AGENT AROUND MEDIAL BRANCH NERVES INNERVATING LUMBAR FACET JOINT Bilateral 3/21/2019    Procedure: Block-nerve-medial branch-lumbar L3,4,5;  Surgeon: Thad Manning MD;  Location: [facility] OR;  Service: Pain Management;  Laterality: Bilateral;    KNEE ARTHROPLASTY Left 3/16/2021    Procedure: ARTHROPLASTY, KNEE;  Surgeon: Rio Pitts MD;  Location: [facility] OR;  Service: Orthopedics;  Laterality: Left;  GENERAL AND BLOCK    LIPOSUCTION  1985    RADIOFREQUENCY ABLATION Left 11/4/2020    Procedure: Radiofrequency Ablation left knee;  Surgeon: Andrew Escudero MD;  Location: [facility] OR;  Service: Pain Management;  Laterality: Left;    RADIOFREQUENCY ABLATION OF LUMBAR MEDIAL BRANCH NERVE AT SINGLE LEVEL Bilateral 4/12/2019    Procedure: Radiofrequency Ablation, Nerve, Spinal, Lumbar, Medial Branch, 1 Level;  Surgeon: Thad Manning MD;  Location: [facility];  Service: Pain Management;  Laterality: Bilateral;  L3, 4, 5  lumbar  Reamaze pain management generator  SN [id number]  80 degrees for 75 seconds x2    RADIOFREQUENCY ABLATION OF LUMBAR MEDIAL BRANCH NERVE AT SINGLE LEVEL Bilateral 10/22/2019    Procedure: Radiofrequency Ablation, Nerve, Spinal, Lumbar, Medial Branch, L3,4,5;  Surgeon: Thad Manning MD;  Location: [facility];  Service: Pain Management;  Laterality: Bilateral;  burned at 80 degrees C X 60 seconds X 2 each site    RADIOFREQUENCY ABLATION OF LUMBAR MEDIAL BRANCH NERVE AT SINGLE LEVEL Bilateral 5/27/2020    Procedure: Radiofrequency Ablation, Nerve, Spinal, Lumbar, Medial Branch, 1 Level;  Surgeon: Thad Manning MD;  Location: [facility];  Service: Pain Management;  Laterality: Bilateral;  L3,4,5    REVERSE TOTAL SHOULDER ARTHROPLASTY Right 4/12/2022    Procedure: ARTHROPLASTY, SHOULDER, TOTAL, REVERSE;  Surgeon: Rio Pitts MD;   Location: [facility] OR;  Service: Orthopedics;  Laterality: Right;    SHOULDER ARTHROSCOPY Right 2021    Procedure: ARTHROSCOPY, SHOULDER;  Surgeon: Antonio Irwin MD;  Location: [facility] OR;  Service: Orthopedics;  Laterality: Right;  LIMITED DEBRIDMENT    TONSILLECTOMY      TOTAL REDUCTION MAMMOPLASTY       Family History   Problem Relation Age of Onset    Heart disease Mother      Social History     Tobacco Use    Smoking status: Former     Types: Cigarettes     Quit date: 1996     Years since quittin.0     Passive exposure: Past    Smokeless tobacco: Never   Substance Use Topics    Alcohol use: Yes     Comment: very little     Drug use: No       Review of patient's allergies indicates:   Allergen Reactions    Keflex [cephalexin]      Throat swelling    Pcn [penicillins]      Throat swelling    Latex, natural rubber Other (See Comments)     Redness with bandaids     Adhesive Other (See Comments)     bandainds redness at skin    Trelegy ellipta [fluticasone-umeclidin-vilanter]      Vision disturbance        Review of Systems:  Negative unless noted above    OBJECTIVE:     Vital Signs Range (Last 24H):  Temp:  [97.7 °F (36.5 °C)-98.1 °F (36.7 °C)]   Pulse:  [43-71]   Resp:  [16-18]   BP: (117-140)/(62-76)   SpO2:  [93 %-98 %]     Physical Exam:  General- NAD noted  HEENT- WNL  Neck- supple  CV- Regular rate and rhythm  Resp- Lungs CTA Bilaterally, No increased WOB  GI- Non tender/non-distended, BS normoactive x4 quads  Extrem- No cyanosis, clubbing, edema.  Derm- skin w/d  Neuro-  No flap.     Body mass index is 36.55 kg/m².    Laboratory:  CBC:   Recent Labs   Lab 23  042   WBC 6.53   RBC 4.03   HGB 11.7*   HCT 37.6      MCV 93   MCH 29.0   MCHC 31.1*       CMP:   Recent Labs   Lab 23   GLU 86   CALCIUM 8.7   ALBUMIN 3.3*   PROT 5.6*      K 3.4*   CO2 27      BUN 18   CREATININE 0.8   ALKPHOS 67   ALT 10   AST 15   BILITOT 0.6         Diagnostic Results:  Labs:  Reviewed      ASSESSMENT/PLAN:     Active Hospital Problems    Diagnosis  POA    *Encephalopathy, metabolic [G93.41]  Yes    TARAN (acute kidney injury) [N17.9]  Yes    Hyperlipidemia associated with type 2 diabetes mellitus [E11.69, E78.5]  Yes    Diet-controlled type 2 diabetes mellitus [E11.9]  Yes    Moderate major depression, single episode [F32.1]  Yes    Hypokalemia [E87.6]  Yes    Hypertension associated with diabetes [E11.59, I15.2]  Yes    Hypothyroidism [E03.9]  Yes      Resolved Hospital Problems   No resolved problems to display.         Thank you for allowing us to participate in the care of your patient. We will follow the patient and provide recommendations as needed.      Time spent seeing patient( greater than 1/2 spent in direct contact) :     Kaylie Prajapati NP

## 2023-06-25 NOTE — PLAN OF CARE
Per patient flow center, Pt accepted to [facility] for inpatient psych services.  They will arrange transportation when available.      Patient cleared for discharge from case management standpoint.     06/25/23 1319   Final Note   Assessment Type Final Discharge Note   Anticipated Discharge Disposition Psych   Post-Acute Status   Post-Acute Authorization Placement   Post-Acute Placement Status Set-up Complete/Auth obtained   Discharge Delays None known at this time

## 2023-06-25 NOTE — DISCHARGE SUMMARY
[facility] Hospital Medicine  Discharge Summary      Patient Name: Genoveva Gilbert  MRN: [id number]  ROLF: [id number]  Patient Class: OP- Observation  Admission Date: 2023  Hospital Length of Stay: 0 days  Discharge Date and Time:  2023 1:03 PM  Attending Physician: Aroldo Thompson MD   Discharging Provider: Aroldo Thompson MD  Primary Care Provider: Rajesh Dubois MD    Primary Care Team: Networked reference to record PCT     HPI:   Patient is a 73-year-old female with GERD, hypertension, hypothyroidism, diabetes, hyperlipidemia, depression who is seen today in the ER after being found by a neighbor on the steps barefoot with clothes on inside out.  Granddaughter accompanies patient and provides much of the history.  She reports that patient's   months ago.  Patient has been taking 6 Benadryl at night in addition to multiple other medical patient is provided by multiple prescribers.  Granddaughter reports patient's appetite has been poor up until last night when she eat very well and drank 2 margaritas.  Granddaughter reports she does not normally drink alcohol.  Granddaughter reports patient has been hallucinating today pulling the blanket and eating imaginary food.  Patient is currently oriented to self and year but not location or situation.  Granddaughter's concerned that patient is very depressed.  She reports that she attempted the patient lives with her recently but that seems to have made things worse.  Patient currently lives alone with family members checking on her daily.  Workup in the ED showed creatinine of 2.9.  Patient will be admitted for TARAN and altered mental status.      * No surgery found *      Hospital Course:   Admitted due to bizarre behavior, altered mental status, and TARAN. Overall symptoms attributed to combination of overuse of benadryl coupled with alcohol intake as well as likely component of depression with psychotic features related to the  somewhat recent death of her spouse. She did briefly require restraint use. CT head w/o unremarkable. Also uncontrolled hypothyroidism noted which was attributed to not taking her replacement correctly so this was continued at same dose. She was given IVF. Nephrotoxic meds held. Sedating meds were held where possible. Nephrology, neurology, and psychiatry were consulted.  PT/OT followed and recommended home health. Her renal function improved to normal. Her mental status improved to alert and oriented but unfortunately she had intermittent confusion, hallucinations, and agitation requiring her to be placed under PEC. Psych consult was done and they recommended some med changes which were done. She was accepted and transferred to [facility]. By time of discharge the patient was tolerating a regular diet with improved symptoms. Patient seen and examined on day of discharge.    Physical exam on day of discharge:  Constitutional:       General: She is not in acute distress.  HENT:      Head: Normocephalic and atraumatic.   Cardiovascular:      Rate and Rhythm: Normal rate and regular rhythm.      Heart sounds: Normal heart sounds.   Pulmonary:      Effort: Pulmonary effort is normal. No respiratory distress.   Neurological:      Mental Status: She is alert and oriented to person, place, and time.   Psychiatric:         Attention and Perception: She perceives auditory and visual hallucinations.         Mood and Affect: Affect normal.          Goals of Care Treatment Preferences:  Code Status: Full Code      Consults:   Consults (From admission, onward)        Status Ordering Provider     Inpatient consult to PICC team ([facility] [place])  Once        Provider:  (Not yet assigned)    Acknowledged VALERIA AMOS     Inpatient consult to Nephrology  Once        Provider:  Bacilio Quevedo MD    Completed CUONG ARELLANO     Inpatient consult to Neurology  Once        Provider:  Gissell Felton MD     Acknowledged CUOGN ARELLANO     Inpatient consult to [facility]  Once        Provider:  Angelito Paez MD    Acknowledged CUONG ARELLANO          No new Assessment & Plan notes have been filed under this hospital service since the last note was generated.  Service: Hospital Medicine    Final Active Diagnoses:    Diagnosis Date Noted POA    PRINCIPAL PROBLEM:  Encephalopathy, metabolic [G93.41] 06/23/2023 Yes    TARAN (acute kidney injury) [N17.9] 06/23/2023 Yes    Hyperlipidemia associated with type 2 diabetes mellitus [E11.69, E78.5] 09/22/2022 Yes    Diet-controlled type 2 diabetes mellitus [E11.9] 03/16/2021 Yes    Moderate major depression, single episode [F32.1] 06/02/2020 Yes    Hypokalemia [E87.6] 03/28/2014 Yes    Hypertension associated with diabetes [E11.59, I15.2] 03/27/2014 Yes    Hypothyroidism [E03.9] 03/27/2014 Yes      Problems Resolved During this Admission:       Discharged Condition: fair    Disposition: transfer to behavioral health hospital      Significant Diagnostic Studies:     Admission on 06/23/2023   Component Date Value Ref Range Status    HIV 1/2 Ag/Ab 06/23/2023 Non-reactive  Non-reactive Final    Hepatitis C Ab 06/23/2023 Non-reactive  Non-reactive Final    WBC 06/23/2023 9.58  3.90 - 12.70 K/uL Final    RBC 06/23/2023 4.32  4.00 - 5.40 M/uL Final    Hemoglobin 06/23/2023 13.0  12.0 - 16.0 g/dL Final    Hematocrit 06/23/2023 39.2  37.0 - 48.5 % Final    MCV 06/23/2023 91  82 - 98 fL Final    MCH 06/23/2023 30.1  27.0 - 31.0 pg Final    MCHC 06/23/2023 33.2  32.0 - 36.0 g/dL Final    RDW 06/23/2023 13.4  11.5 - 14.5 % Final    Platelets 06/23/2023 218  150 - 450 K/uL Final    MPV 06/23/2023 9.7  9.2 - 12.9 fL Final    Immature Granulocytes 06/23/2023 0.2  0.0 - 0.5 % Final    Gran # (ANC) 06/23/2023 5.8  1.8 - 7.7 K/uL Final    Immature Grans (Abs) 06/23/2023 0.02  0.00 - 0.04 K/uL Final    Comment: Mild elevation in immature granulocytes is non  specific and   can be seen in a variety of conditions including stress response,   acute inflammation, trauma and pregnancy. Correlation with other   laboratory and clinical findings is essential.      Lymph # 06/23/2023 2.8  1.0 - 4.8 K/uL Final    Mono # 06/23/2023 0.8  0.3 - 1.0 K/uL Final    Eos # 06/23/2023 0.1  0.0 - 0.5 K/uL Final    Baso # 06/23/2023 0.06  0.00 - 0.20 K/uL Final    nRBC 06/23/2023 0  0 /100 WBC Final    Gran % 06/23/2023 60.7  38.0 - 73.0 % Final    Lymph % 06/23/2023 29.0  18.0 - 48.0 % Final    Mono % 06/23/2023 8.0  4.0 - 15.0 % Final    Eosinophil % 06/23/2023 1.5  0.0 - 8.0 % Final    Basophil % 06/23/2023 0.6  0.0 - 1.9 % Final    Differential Method 06/23/2023 Automated   Final    Sodium 06/23/2023 138  136 - 145 mmol/L Final    Potassium 06/23/2023 3.7  3.5 - 5.1 mmol/L Final    Chloride 06/23/2023 99  95 - 110 mmol/L Final    CO2 06/23/2023 27  23 - 29 mmol/L Final    Glucose 06/23/2023 125 (H)  70 - 110 mg/dL Final    BUN 06/23/2023 29 (H)  8 - 23 mg/dL Final    Creatinine 06/23/2023 2.9 (H)  0.5 - 1.4 mg/dL Final    Calcium 06/23/2023 10.2  8.7 - 10.5 mg/dL Final    Total Protein 06/23/2023 7.1  6.0 - 8.4 g/dL Final    Albumin 06/23/2023 4.3  3.5 - 5.2 g/dL Final    Total Bilirubin 06/23/2023 0.5  0.1 - 1.0 mg/dL Final    Comment: For infants and newborns, interpretation of results should be based  on gestational age, weight and in agreement with clinical  observations.    Premature Infant recommended reference ranges:  Up to 24 hours.............<8.0 mg/dL  Up to 48 hours............<12.0 mg/dL  3-5 days..................<15.0 mg/dL  6-29 days.................<15.0 mg/dL      Alkaline Phosphatase 06/23/2023 76  55 - 135 U/L Final    AST 06/23/2023 16  10 - 40 U/L Final    ALT 06/23/2023 12  10 - 44 U/L Final    eGFR 06/23/2023 16 (A)  >60 mL/min/1.73 m^2 Final    Anion Gap 06/23/2023 12  8 - 16 mmol/L Final    TSH 06/23/2023 35.452 (H)  0.400 - 4.000  uIU/mL Final    Specimen UA 06/23/2023 Urine, Clean Catch   Final    Color, UA 06/23/2023 Yellow  Yellow, Straw, Tori Final    Appearance, UA 06/23/2023 Hazy (A)  Clear Final    pH, UA 06/23/2023 6.0  5.0 - 8.0 Final    Specific [place], UA 06/23/2023 1.030  1.005 - 1.030 Final    Protein, UA 06/23/2023 Negative  Negative Final    Comment: Recommend a 24 hour urine protein or a urine   protein/creatinine ratio if globulin induced proteinuria is  clinically suspected.      Glucose, UA 06/23/2023 Negative  Negative Final    Ketones, UA 06/23/2023 Negative  Negative Final    Bilirubin (UA) 06/23/2023 Negative  Negative Final    Occult Blood UA 06/23/2023 Negative  Negative Final    Nitrite, UA 06/23/2023 Negative  Negative Final    Urobilinogen, UA 06/23/2023 Negative  <2.0 EU/dL Final    Leukocytes, UA 06/23/2023 Negative  Negative Final    Benzodiazepines 06/23/2023 Negative  Negative Final    Methadone metabolites 06/23/2023 Negative  Negative Final    Cocaine (Metab.) 06/23/2023 Negative  Negative Final    Opiate Scrn, Ur 06/23/2023 Negative  Negative Final    Barbiturate Screen, Ur 06/23/2023 Negative  Negative Final    Amphetamine Screen, Ur 06/23/2023 Negative  Negative Final    THC 06/23/2023 Negative  Negative Final    Phencyclidine 06/23/2023 Negative  Negative Final    Creatinine, Urine 06/23/2023 381.3 (H)  15.0 - 325.0 mg/dL Final    Toxicology Information 06/23/2023 SEE COMMENT   Final    Comment: This screen includes the following classes of drugs at the listed   cut-off:    Benzodiazepines 200 ng/ml  Methadone 300 ng/ml  Cocaine metabolite 300 ng/ml  Opiates 300 ng/ml  Barbiturates 200 ng/ml  Amphetamines 1000 ng/ml  Marijuana metabs (THC) 50 ng/ml  Phencyclidine (PCP) 25 ng/ml    This is a screening test. If results do not correlate with clinical   presentation, then a confirmatory send out test is advised.     This report is intended for use in clinical monitoring and  management   of   patients. It is not intended for use in employment related drug   testing.      Alcohol, Serum 06/23/2023 <10  <10 mg/dL Final    Acetaminophen (Tylenol), Serum 06/23/2023 <3.0 (L)  10.0 - 20.0 ug/mL Final    Comment: Toxic Levels:  Adults (4 hr post-ingestion).........>150 ug/mL  Adults (12 hr post-ingestion)........>40 ug/mL  Peds (2 hr post-ingestion, liquid)...>225 ug/mL      Salicylate Lvl 06/23/2023 <5.0 (L)  15.0 - 30.0 mg/dL Final    Comment: Toxic:  30.0 - 70.0 mg/dl  Lethal: >70.0 mg/dl      Free T4 06/23/2023 0.64 (L)  0.71 - 1.51 ng/dL Final    POCT Glucose 06/23/2023 77  70 - 110 mg/dL Final    POCT Glucose 06/23/2023 116 (H)  70 - 110 mg/dL Final    Sodium 06/24/2023 140  136 - 145 mmol/L Final    Potassium 06/24/2023 3.2 (L)  3.5 - 5.1 mmol/L Final    Chloride 06/24/2023 104  95 - 110 mmol/L Final    CO2 06/24/2023 22 (L)  23 - 29 mmol/L Final    Glucose 06/24/2023 89  70 - 110 mg/dL Final    BUN 06/24/2023 21  8 - 23 mg/dL Final    Creatinine 06/24/2023 1.2  0.5 - 1.4 mg/dL Final    Calcium 06/24/2023 9.9  8.7 - 10.5 mg/dL Final    Total Protein 06/24/2023 7.7  6.0 - 8.4 g/dL Final    Albumin 06/24/2023 4.3  3.5 - 5.2 g/dL Final    Total Bilirubin 06/24/2023 0.5  0.1 - 1.0 mg/dL Final    Comment: For infants and newborns, interpretation of results should be based  on gestational age, weight and in agreement with clinical  observations.    Premature Infant recommended reference ranges:  Up to 24 hours.............<8.0 mg/dL  Up to 48 hours............<12.0 mg/dL  3-5 days..................<15.0 mg/dL  6-29 days.................<15.0 mg/dL      Alkaline Phosphatase 06/24/2023 97  55 - 135 U/L Final    AST 06/24/2023 23  10 - 40 U/L Final    ALT 06/24/2023 13  10 - 44 U/L Final    eGFR 06/24/2023 47 (A)  >60 mL/min/1.73 m^2 Final    Anion Gap 06/24/2023 14  8 - 16 mmol/L Final    Magnesium 06/24/2023 1.9  1.6 - 2.6 mg/dL Final    Phosphorus 06/24/2023 2.9  2.7 -  4.5 mg/dL Final    WBC 06/24/2023 10.37  3.90 - 12.70 K/uL Final    RBC 06/24/2023 4.78  4.00 - 5.40 M/uL Final    Hemoglobin 06/24/2023 14.6  12.0 - 16.0 g/dL Final    Hematocrit 06/24/2023 44.2  37.0 - 48.5 % Final    MCV 06/24/2023 93  82 - 98 fL Final    MCH 06/24/2023 30.5  27.0 - 31.0 pg Final    MCHC 06/24/2023 33.0  32.0 - 36.0 g/dL Final    RDW 06/24/2023 13.3  11.5 - 14.5 % Final    Platelets 06/24/2023 222  150 - 450 K/uL Final    MPV 06/24/2023 10.4  9.2 - 12.9 fL Final    Immature Granulocytes 06/24/2023 0.2  0.0 - 0.5 % Final    Gran # (ANC) 06/24/2023 4.4  1.8 - 7.7 K/uL Final    Immature Grans (Abs) 06/24/2023 0.02  0.00 - 0.04 K/uL Final    Comment: Mild elevation in immature granulocytes is non specific and   can be seen in a variety of conditions including stress response,   acute inflammation, trauma and pregnancy. Correlation with other   laboratory and clinical findings is essential.      Lymph # 06/24/2023 4.6  1.0 - 4.8 K/uL Final    Mono # 06/24/2023 0.8  0.3 - 1.0 K/uL Final    Eos # 06/24/2023 0.6 (H)  0.0 - 0.5 K/uL Final    Baso # 06/24/2023 0.08  0.00 - 0.20 K/uL Final    nRBC 06/24/2023 0  0 /100 WBC Final    Gran % 06/24/2023 42.3  38.0 - 73.0 % Final    Lymph % 06/24/2023 44.2  18.0 - 48.0 % Final    Mono % 06/24/2023 7.2  4.0 - 15.0 % Final    Eosinophil % 06/24/2023 5.3  0.0 - 8.0 % Final    Basophil % 06/24/2023 0.8  0.0 - 1.9 % Final    Differential Method 06/24/2023 Automated   Final    Sed Rate 06/24/2023 12  0 - 20 mm/Hr Final    CRP 06/24/2023 2.5  0.0 - 8.2 mg/L Final    Vitamin B-12 06/24/2023 434  210 - 950 pg/mL Final    Ammonia 06/24/2023 26  10 - 50 umol/L Final    POCT Glucose 06/24/2023 106  70 - 110 mg/dL Final    POCT Glucose 06/24/2023 98  70 - 110 mg/dL Final    Sodium 06/25/2023 143  136 - 145 mmol/L Final    Potassium 06/25/2023 3.4 (L)  3.5 - 5.1 mmol/L Final    Chloride 06/25/2023 106  95 - 110 mmol/L Final    CO2 06/25/2023  27  23 - 29 mmol/L Final    Glucose 06/25/2023 86  70 - 110 mg/dL Final    BUN 06/25/2023 18  8 - 23 mg/dL Final    Creatinine 06/25/2023 0.8  0.5 - 1.4 mg/dL Final    Calcium 06/25/2023 8.7  8.7 - 10.5 mg/dL Final    Total Protein 06/25/2023 5.6 (L)  6.0 - 8.4 g/dL Final    Albumin 06/25/2023 3.3 (L)  3.5 - 5.2 g/dL Final    Total Bilirubin 06/25/2023 0.6  0.1 - 1.0 mg/dL Final    Comment: For infants and newborns, interpretation of results should be based  on gestational age, weight and in agreement with clinical  observations.    Premature Infant recommended reference ranges:  Up to 24 hours.............<8.0 mg/dL  Up to 48 hours............<12.0 mg/dL  3-5 days..................<15.0 mg/dL  6-29 days.................<15.0 mg/dL      Alkaline Phosphatase 06/25/2023 67  55 - 135 U/L Final    AST 06/25/2023 15  10 - 40 U/L Final    ALT 06/25/2023 10  10 - 44 U/L Final    eGFR 06/25/2023 >60  >60 mL/min/1.73 m^2 Final    Anion Gap 06/25/2023 10  8 - 16 mmol/L Final    Magnesium 06/25/2023 1.8  1.6 - 2.6 mg/dL Final    Phosphorus 06/25/2023 2.7  2.7 - 4.5 mg/dL Final    WBC 06/25/2023 6.53  3.90 - 12.70 K/uL Final    RBC 06/25/2023 4.03  4.00 - 5.40 M/uL Final    Hemoglobin 06/25/2023 11.7 (L)  12.0 - 16.0 g/dL Final    Hematocrit 06/25/2023 37.6  37.0 - 48.5 % Final    MCV 06/25/2023 93  82 - 98 fL Final    MCH 06/25/2023 29.0  27.0 - 31.0 pg Final    MCHC 06/25/2023 31.1 (L)  32.0 - 36.0 g/dL Final    RDW 06/25/2023 13.6  11.5 - 14.5 % Final    Platelets 06/25/2023 188  150 - 450 K/uL Final    MPV 06/25/2023 11.1  9.2 - 12.9 fL Final    Immature Granulocytes 06/25/2023 0.0  0.0 - 0.5 % Final    Gran # (ANC) 06/25/2023 2.0  1.8 - 7.7 K/uL Final    Immature Grans (Abs) 06/25/2023 0.00  0.00 - 0.04 K/uL Final    Comment: Mild elevation in immature granulocytes is non specific and   can be seen in a variety of conditions including stress response,   acute inflammation, trauma and pregnancy.  Correlation with other   laboratory and clinical findings is essential.      Lymph # 06/25/2023 3.5  1.0 - 4.8 K/uL Final    Mono # 06/25/2023 0.6  0.3 - 1.0 K/uL Final    Eos # 06/25/2023 0.4  0.0 - 0.5 K/uL Final    Baso # 06/25/2023 0.06  0.00 - 0.20 K/uL Final    nRBC 06/25/2023 0  0 /100 WBC Final    Gran % 06/25/2023 31.0 (L)  38.0 - 73.0 % Final    Lymph % 06/25/2023 54.1 (H)  18.0 - 48.0 % Final    Mono % 06/25/2023 8.6  4.0 - 15.0 % Final    Eosinophil % 06/25/2023 5.4  0.0 - 8.0 % Final    Basophil % 06/25/2023 0.9  0.0 - 1.9 % Final    Differential Method 06/25/2023 Automated   Final    POCT Glucose 06/25/2023 83  70 - 110 mg/dL Final         Imaging Results          CT Head Without Contrast (Final result)  Result time 06/23/23 12:12:09    Final result by Sid Bartholomew MD (06/23/23 12:12:09)                 Impression:      1. No acute intracranial CT findings.  Overall negative CT of the head for age.      Electronically signed by: Sid Bartholomew  Date:    06/23/2023  Time:    12:12             Narrative:    EXAMINATION:  CT HEAD WITHOUT CONTRAST    CLINICAL HISTORY:  Mental status change, unknown cause;    TECHNIQUE:  Low dose axial CT images obtained throughout the head without intravenous contrast. Sagittal and coronal reconstructions were performed.    COMPARISON:  MRI brain 05/16/2019.  CT head 05/16/2016.    FINDINGS:  Brain: There is no evidence of a mass, edema, midline shift, or intracranial hemorrhage. No extra-axial fluid collection. No CT evidence of an acute major vascular territorial infarct.    Ventricles: The ventricles, sulci, and cisterns are within normal limits.    Skull: The osseous structures are unremarkable in appearance.    Extracranial soft tissues: Limited imaging is within normal limits.    Other: Mild opacification of the left sphenoid sinus.  Visualized mastoids are clear.  Limited imaging of the orbits is unremarkable.                                  Pending  Diagnostic Studies:     Procedure Component Value Units Date/Time    Methylmalonic acid, serum [[id number]] Collected: 06/24/23 1048    Order Status: Sent Lab Status: In process Updated: 06/24/23 1107    Specimen: Blood     RPR [[id number]] Collected: 06/24/23 1048    Order Status: Sent Lab Status: In process Updated: 06/24/23 1651    Specimen: Blood     Vitamin B1 [[id number]] Collected: 06/24/23 1048    Order Status: Sent Lab Status: In process Updated: 06/24/23 1107    Specimen: Blood     Vitamin B6 [[id number]] Collected: 06/24/23 1048    Order Status: Sent Lab Status: In process Updated: 06/24/23 1107    Specimen: Blood          Medications:  Reconciled Home Medications:      Medication List      START taking these medications    QUEtiapine 25 MG Tab  Commonly known as: SEROQUEL  Take 1 tablet (25 mg total) by mouth every evening.        CHANGE how you take these medications    venlafaxine 150 MG Cp24  Commonly known as: EFFEXOR-XR  Take 1 capsule (150 mg total) by mouth once daily. For depression  What changed: Another medication with the same name was removed. Continue taking this medication, and follow the directions you see here.        CONTINUE taking these medications    ADVAIR -21 mcg/actuation Hfaa inhaler  Generic drug: fluticasone-salmeterol 230-21 mcg/dose  Inhale 2 puffs into the lungs 2 (two) times daily. Controller     albuterol 90 mcg/actuation inhaler  Commonly known as: PROAIR HFA  Inhale 2 puffs into the lungs every 6 (six) hours as needed for Wheezing. Rescue     amLODIPine 5 MG tablet  Commonly known as: NORVASC  Take 1 tablet (5 mg total) by mouth once daily. For blood pressure     biotin 1 mg Cap  Take by mouth.     ferrous sulfate 325 (65 FE) MG EC tablet  Take 1 tablet (325 mg total) by mouth once daily.     fexofenadine 180 MG tablet  Commonly known as: ALLEGRA     HYDROcodone-acetaminophen 5-325 mg per tablet  Commonly known as: NORCO  Take 1 tablet by mouth every 6 (six) hours as  needed for Pain.     ibuprofen 600 MG tablet  Commonly known as: ADVIL,MOTRIN  Take 1 tablet (600 mg total) by mouth 3 (three) times daily as needed for Pain.     levothyroxine 88 MCG tablet  Commonly known as: SYNTHROID  Take 1 tablet (88 mcg total) by mouth once daily.     lovastatin 40 MG tablet  Commonly known as: MEVACOR  Take 1 tablet (40 mg total) by mouth nightly. at bedtime. For cholesterol     magnesium oxide 400 mg (241.3 mg magnesium) tablet  Commonly known as: MAG-OX  Take 1 tablet (400 mg total) by mouth once daily.     metoprolol succinate 25 MG 24 hr tablet  Commonly known as: TOPROL-XL  Take 1 tablet (25 mg total) by mouth once daily. For blood pressure and heart     nystatin cream  Commonly known as: MYCOSTATIN  Apply topically 2 (two) times daily.     omeprazole 20 MG capsule  Commonly known as: PRILOSEC  Take 1 capsule (20 mg total) by mouth once daily. For heartburn     pramipexole 0.5 MG tablet  Commonly known as: MIRAPEX  Take 2 tablets (1 mg total) by mouth every evening. For restless legs     pregabalin 150 MG capsule  Commonly known as: LYRICA  Take 1 capsule (150 mg total) by mouth 2 (two) times daily.     ramelteon 8 mg tablet  Commonly known as: ROZEREM  Take 1 tablet (8 mg total) by mouth every evening.     torsemide 10 MG Tab  Commonly known as: DEMADEX  TAKE 1 TABLET(10 MG) BY MOUTH EVERY DAY        STOP taking these medications    hydroCHLOROthiazide 25 MG tablet  Commonly known as: HYDRODIURIL     losartan 50 MG tablet  Commonly known as: COZAAR     mirtazapine 7.5 MG Tab  Commonly known as: REMERON     potassium chloride 10 MEQ Cpsr  Commonly known as: MICRO-K     VALACYCLOVIR ORAL            Indwelling Lines/Drains at time of discharge:   Lines/Drains/Airways     None                 Time spent on the discharge of patient: 37 minutes         Aroldo Thompson MD  Department of Hospital Medicine  [facility]

## 2023-06-25 NOTE — CONSULTS
[facility]  Psychiatry  Telepsychiatry Consult Note    Please see previous notes:    Patient agreeable to consultation via telepsychiatry.    Tele-Consultation from Psychiatry started: 2023 at 700 pm  The chief complaint leading to psychiatric consultation is: agitation, AMS, hallucination, depression  This consultation was requested by Dr. Thompson, the Emergency Department attending physician.  The location of the consulting psychiatrist is  [facility] .  The patient location is  [facility] *   The patient arrived at the ED at: yesterday    Also present with the patient at the time of the consultation: nurse    Patient Identification:   Genoveva Gilbert is a 73 y.o. female.    Patient information was obtained from patient, past medical records, and primary team.  Patient presented voluntarily to the Emergency Department now on the floor and PEC'd      Altered Mental Status        Intermittent x 3 months, recent loss of  with ETOH use; pt denies ETOH use today; pt AAOx4 in triage      Time seen by provider: 10:35 AM on 2023     Genoveva Gilbert is a 73 y.o. female who presents to the ED via EMS with an onset of intermittent confusion over the last several months. History obtained from independent historian: EMS states the patient's family told them that she has been experiencing brief bouts of confusion over the last 3 months, and this morning the patient walked over to her neighbor's house half clothed to have a conversation with them and she didn't know where she was. She confirms that over the last several months she has been instances when she is talking where she forgets what she is saying or what is going on. The patient states that she takes 6 benadryl per night to go to sleep, and  she is unsure what the dosing of the benadryl is. The patient has been drinking alcohol over the last several months since her  , and she last drank alcohol yesterday  night. She states she took her routine morning medications this morning without any changes. She states that she falls frequently, most recently yesterday. She states that she hit her head in the fall yesterday, but denies loss of consciousness. She states that she was nauseous, vomiting, and having diarrhea yesterday. The patient denies chest pain, SOB, any urinary symptoms, any pain, or any other symptoms at this time. PMHx of thyroid disease, HTN, anxiety, depression, DM II with polyneuropathy, fibromyalgia, and RA. There is no pertinent PSHx.    Consults  Teleconsult Time Documentation  Subjective:     History of Present Illness:   the patient is a 73 year old woman with a history of depression who lost her  this year and has been having a deterioration in her functioning and mood state since that time. She has been mostly non compliant with her medications for several months has been eating poorly and not hydrating well. She's been using Benadryl 6 tablets nightly for sleep but despite this at times has gone as long as three nights in a row getting zero sleep and not napping during the day along with very depressed mood and very high anxiety. As noted above she has had acute confusional states recently most significantly after drinking alcohol the other day. She's not a drink or usually. She's been having intermittent confusion worse at night with agitation since admission and just required a PEC for safety after she was trying to leave the floor believing that she needed to go meet her  who is . She's been seen responding to internal stimuli and visual hallucinations.    The patient's interviewed after she's been redirected without medication back to bed. She's agreeable to interview and actually quite engaged. Affect is fairly reactive in comparison to what I expected given the history reported. She complains of significant insomnia for months. As described above. Severe anxiety about  being home alone and fearfulness of strangers and danger overall. Also thoughts of not wanting to be a burden to others. Admits to some passive wishes that her life was over and to poor compliance with medication related to that. Adamantly denies any active suicidal intent or thoughts to take more precipitous actions to harm herself. She denies that she was having any visual hallucinations prior to admission to the hospital. Admits to seeing her  since she's been here. She reports that in some ways it was comforting and in some ways it was frightening and at that point it becomes tearful during the interview saying that she was able to tell him her feelings and confide in him things that otherwise have been burdening her. Poor concentration poor appetite.     She reports that she's been on venlafaxine for about two years. It's probably been increased once during that time and now the dose is 150 milligrams but she does admit that compliance has been quite poor. Mirtazapine 7.5 milligrams QHS was added more recently but she states that it's had no positive impact on her ability to sleep hence the high dose Benadryl. She states that she's taken pramipexole 1 milligram QHS for restless leg for many years and has never had psychiatric side effects to it and cannot tolerate the restless leg without it. She's experienced depression on and off for many years. Years ago she lost her son to alcoholism when he was in his 20s. She reports that was a sho of mood for her as was the period soon after the death of her  this year. However at this time she rates her current mood as prison between lifetime sho and euthymia.      During interview we discussed the PEC a little bit. I explained the reasoning for it at which time she demonstrated some fluctuating mentation. She told me that she'd come to the hospital to visit a friend and then dontrell kept against her will hence her prior agitation. She was able to be  "verbally reoriented. She doesn't know the day of the week but knows that it's 2023 because she recalls a recent birthday party. She knows that the president is Nicola and also the prior president.  TSH is 35 and free T4 is low. She's having acute renal insufficiency as well.      Psychiatric History:   Previous Psychiatric Hospitalizations: No    Previous Medication Trials: Yes    Previous Suicide Attempts: no    History of Violence: no  History of Depression: yes  History of Tara: no  History of Auditory/Visual Hallucination yes  History of Delusions: no  Outpatient psychiatrist (current & past): No    Substance Abuse History:  Tobacco:No  Alcohol: No  Illicit Substances:No  Detox/Rehab: No    Legal History: Past charges/incarcerations: No     Family Psychiatric History: alcoholism      Social History:  Mostly deferred.  Living alone since   this year.  Son  in his 20s of alcoholism.  Has a daughter and granddaughter involved in her care.  Likes crafts and coloring    Psychiatric Mental Status Exam:  Arousal: alert  Sensorium/Orientation: oriented to person, place, month, year, not day and intermittently not situation  Behavior/Cooperation: friendly and cooperative, eye contact normal   Speech: normal tone, normal rate, normal pitch, normal volume  Language: grossly intact  Mood: " sad "   Affect:  laughs at times, not entirely congruent  Thought Process: goal-directed  Thought Content:   Auditory hallucinations: yes  Visual hallucinations: YES:       Paranoia: NO  Delusions:  NO  Suicidal ideation: NO  Homicidal ideation: NO  Attention/Concentration:  impaired  Memory:    Recent:  Decreased   Remote: Intact   3/3 immediate,  /3 at 5 min  Fund of Knowledge:average  Abstract reasoning: abstract  Insight: poor awareness of illness  Judgment: limited      Past Medical History:   Past Medical History:   Diagnosis Date    Allergy     Anxiety     Arthritis, rheumatoid     Back pain     Chronic " bronchiolitis     Depression     Fibromyalgia     GERD (gastroesophageal reflux disease)     High cholesterol     Hilar mass 03/27/2014    [place] (hard of hearing)     aid right ear    [place] (hard of hearing)     Hypertension     Incontinence of urine     Lymphedema     MS (multiple sclerosis)     benign; another MD stated she has no MS    Neuropathy     Restless leg syndrome     Rotator cuff tear 04/16/2015    Sciatica of left side 01/05/2018    Seasonal allergies     Sinus congestion     Sleep apnea     DOES NOT USE MACHINE    Spondylolysis     Thyroid disease     Type 2 diabetes mellitus with polyneuropathy     no med. was borderline    Wheezing       Laboratory Data:   Labs Reviewed   COMPREHENSIVE METABOLIC PANEL - Abnormal; Notable for the following components:       Result Value    Glucose 125 (*)     BUN 29 (*)     Creatinine 2.9 (*)     eGFR 16 (*)     All other components within normal limits    Narrative:     Release to patient->Immediate   TSH - Abnormal; Notable for the following components:    TSH 35.452 (*)     All other components within normal limits    Narrative:     Release to patient->Immediate   URINALYSIS, REFLEX TO URINE CULTURE - Abnormal; Notable for the following components:    Appearance, UA Hazy (*)     All other components within normal limits    Narrative:     Specimen Source->Urine   DRUG SCREEN PANEL, URINE EMERGENCY - Abnormal; Notable for the following components:    Creatinine, Urine 381.3 (*)     All other components within normal limits    Narrative:     Specimen Source->Urine   ACETAMINOPHEN LEVEL - Abnormal; Notable for the following components:    Acetaminophen (Tylenol), Serum <3.0 (*)     All other components within normal limits    Narrative:     Release to patient->Immediate   SALICYLATE LEVEL - Abnormal; Notable for the following components:    Salicylate Lvl <5.0 (*)     All other components within normal limits    Narrative:     Release to patient->Immediate   T4, FREE -  Abnormal; Notable for the following components:    Free T4 0.64 (*)     All other components within normal limits    Narrative:     Release to patient->Immediate   CBC W/ AUTO DIFFERENTIAL    Narrative:     Release to patient->Immediate   ALCOHOL,MEDICAL (ETHANOL)    Narrative:     Release to patient->Immediate          Allergies:    Review of patient's allergies indicates:   Allergen Reactions    Keflex [cephalexin]      Throat swelling    Pcn [penicillins]      Throat swelling    Latex, natural rubber Other (See Comments)     Redness with bandaids     Adhesive Other (See Comments)     bandainds redness at skin    Trelegy ellipta [fluticasone-umeclidin-vilanter]      Vision disturbance       Medications in ER:   Medications   amLODIPine tablet 5 mg (5 mg Oral Given 6/24/23 0814)   levothyroxine tablet 88 mcg (88 mcg Oral Given 6/24/23 0814)   metoprolol succinate (TOPROL-XL) 24 hr tablet 25 mg (25 mg Oral Given 6/24/23 0814)   mirtazapine tablet 7.5 mg (7.5 mg Oral Given 6/23/23 2107)   pramipexole tablet 1 mg (1 mg Oral Given 6/23/23 2107)   venlafaxine 24 hr capsule 150 mg (150 mg Oral Given 6/24/23 0814)   sodium chloride 0.9% flush 10 mL (has no administration in time range)   melatonin tablet 6 mg (6 mg Oral Given 6/23/23 2112)   ondansetron disintegrating tablet 8 mg (has no administration in time range)   prochlorperazine injection Soln 5 mg (has no administration in time range)   polyethylene glycol packet 17 g (17 g Oral Given 6/24/23 0813)   simethicone chewable tablet 80 mg (has no administration in time range)   aluminum-magnesium hydroxide-simethicone 200-200-20 mg/5 mL suspension 30 mL (has no administration in time range)   acetaminophen tablet 650 mg (has no administration in time range)   acetaminophen tablet 1,000 mg (has no administration in time range)   naloxone 0.4 mg/mL injection 0.02 mg (has no administration in time range)   insulin aspart U-100 pen 1-10 Units (has no administration in time  range)   glucagon (human recombinant) injection 1 mg (has no administration in time range)   dextrose 10% bolus 125 mL 125 mL (has no administration in time range)   dextrose 10% bolus 250 mL 250 mL (has no administration in time range)   dextrose 40 % gel 15,000 mg (has no administration in time range)   dextrose 40 % gel 30,000 mg (has no administration in time range)   heparin (porcine) injection 5,000 Units (5,000 Units Subcutaneous Given 6/24/23 1424)   0.9%  NaCl infusion ( Intravenous New Bag 6/23/23 1736)   pregabalin capsule 75 mg (has no administration in time range)   atorvastatin tablet 10 mg (10 mg Oral Given 6/24/23 1423)   haloperidol lactate injection 2 mg (has no administration in time range)   sodium chloride 0.9% bolus 1,000 mL 1,000 mL (0 mLs Intravenous Stopped 6/23/23 1513)   potassium chloride SA CR tablet 40 mEq (40 mEq Oral Given 6/24/23 0814)   haloperidoL tablet 5 mg (5 mg Oral Given 6/24/23 1657)       Medications at home: see above    No new subjective & objective note has been filed under this hospital service since the last note was generated.      Assessment - Diagnosis - Goals:     Diagnosis/Impression: severe MDD with mixed features.  Psychotic symptoms as part of MDD vs encephalopathy related to medical conditions plus anti-cholinergic misuse.    Gravely disabled.      Rec:   Continue venlafaxine xr 150mg daily  Stop mirtazapine  Start seroquel 25mg qhs + 12.5mg po q 2 hours prn agitation, not to exceed total dose of 100mg / 24 hours  Start melatonin 3mg qhs standing   Request re-eval when closer to medical readiness for SNF vs [facility]-psych updated evaluation.          Time with patient: 50 min      More than 50% of the time was spent counseling/coordinating care    Consulting clinician was informed of the encounter and consult note.    Consultation ended: 6/24/2023 at 752 pm    Maddi Garcia MD   Psychiatry  [facility]

## 2023-06-26 LAB — RPR SER QL: NORMAL

## 2023-06-29 LAB
METHYLMALONATE SERPL-SCNC: 0.32 UMOL/L
PYRIDOXAL SERPL-MCNC: 6 UG/L (ref 5–50)
VIT B1 BLD-MCNC: 51 UG/L (ref 38–122)

## 2023-07-05 ENCOUNTER — PATIENT OUTREACH (OUTPATIENT)
Dept: ADMINISTRATIVE | Facility: HOSPITAL | Age: 73
End: 2023-07-05
Payer: MEDICARE

## 2023-07-05 NOTE — LETTER
AUTHORIZATION FOR RELEASE OF   CONFIDENTIAL INFORMATION    Dear Dr. Valentin,    We are seeing Genoveva Gilbert, date of birth 1950, in the clinic at [facility]. Rajesh Dubois MD is the patient's PCP. Genoveva Gilbert has an outstanding lab/procedure at the time we reviewed her chart. In order to help keep her health information updated, she has authorized us to request the following medical record(s):                                             ( X )  COLONOSCOPY - most recent            Please fax records to [facility], Stephen Lee Lambert, MD, [phone]     If you have any questions, please contact [facility] at [phone].           Patient Name: Genoveva Gilbert  : 1950  Patient Phone #: [phone]

## 2023-07-05 NOTE — PROGRESS NOTES
Population Health Chart Review & Patient Outreach Details:     Reason for Outreach Encounter:     [x]  Non-Compliant Report   []  Payor Report (Humana, PHN, BCBS, MSSP, MCIP, UHC, etc.)   []  Pre-Visit Chart Review     Updates Requested / Reviewed:     []  Care Everywhere    []     []  External Sources ([facility], [facility], DIS, etc.)   []  Care Team Updated    Patient Outreach Method:    []  Telephone Outreach Completed   [] Successful   [] Left Voicemail   [] Unable to Contact (wrong number, no voicemail)  []  [facility] Portal Outreach Sent  []  Letter Outreach Mailed  []  Fax Sent for External Records  [x]  External Records Upload    Health Maintenance Topics Addressed and Outreach Outcomes / Actions Taken:        []      Breast Cancer Screening []  Mammo Scheduled      []  External Records Requested     []  Added Reminder to Complete to Upcoming Primary Care Appt Notes     []  Patient Declined     []  Patient Will Call Back to Schedule     []  Patient Will Schedule with External Provider / Order Routed if Applicable             []       Cervical Cancer Screening []  Pap Scheduled      []  External Records Requested     []  Added Reminder to Complete to Upcoming Primary Care Appt Notes     []  Patient Declined     []  Patient Will Call Back to Schedule     []  Patient Will Schedule with External Provider               [x]          Colorectal Cancer Screening []  Colonoscopy Case Request or Referral Placed     []  External Records Requested     []  Added Reminder to Complete to Upcoming Primary Care Appt Notes     []  Patient Declined     []  Patient Will Call Back to Schedule     []  Patient Will Schedule with External Provider     []  Fit Kit Mailed (add the SmartPhrase under additional notes)     []  Reminded Patient to Complete Home Test             []      Diabetic Eye Exam []  Eye Camera Scheduled or Optometry Referral Placed     []  External Records Requested     []  Added Reminder to Complete to  Upcoming Primary Care Appt Notes     []  Patient Declined     []  Patient Will Call Back to Schedule     []  Patient Will Schedule with External Provider             []      Blood Pressure Control []  Primary Care Follow Up Visit Scheduled     []  Remote Blood Pressure Reading Captured     []  Added Reminder to Complete to Upcoming Primary Care Appt Notes     []  Patient Declined     []  Patient Will Call Back / Patient Will Send Portal Message with Reading     []  Patient Will Call Back to Schedule Provider Visit             []       HbA1c & Other Labs []  Lab Appt Scheduled for Due Labs     []  Primary Care Follow Up Visit Scheduled      []  Reminded Patient to Complete Home Test     []  Added Reminder to Complete to Upcoming Primary Care Appt Notes     []  Patient Declined     []  Patient Will Call Back to Schedule     []  Patient Will Schedule with External Provider / Order Routed if Applicable           []    Schedule Primary Care Appt []  Primary Care Appt Scheduled     []  Patient Declined     []  Patient Will Call Back to Schedule     []  Pt Established with External Provider & Updated Care Team             []      Medication Adherence []  Primary Care Appointment Scheduled     []  Added Reminder to Upcoming Primary Care Appt Notes     []  Patient Reminded to  Prescription     []  Patient Declined, Provider Notified if Needed     []  Sent Provider Message to Review and/or Add Exclusion to Problem List             []      Osteoporosis Screening []  DXA Appointment Scheduled     []  External Records Requested     []  Added Reminder to Complete to Upcoming Primary Care Appt Notes     []  Patient Declined     []  Patient Will Call Back to Schedule     []  Patient Will Schedule with External Provider / Order Routed if Applicable     Additional Care Coordinator Notes:     Uploaded 2019 colonoscopy results    Further Action Needed If Patient Returns Outreach:

## 2023-07-05 NOTE — PROGRESS NOTES
Population Health Chart Review & Patient Outreach Details:     Reason for Outreach Encounter:     [x]  Non-Compliant Report   []  Payor Report (Humana, PHN, BCBS, MSSP, MCIP, UHC, etc.)   []  Pre-Visit Chart Review     Updates Requested / Reviewed:     [x]  Care Everywhere    []     []  External Sources ([facility], Quest, DIS, etc.)   []  Care Team Updated    Patient Outreach Method:    []  Telephone Outreach Completed   [] Successful   [] Left Voicemail   [] Unable to Contact (wrong number, no voicemail)  []  [facility] Outreach Sent  []  Letter Outreach Mailed  [x]  Fax Sent for External Records  []  External Records Upload    Health Maintenance Topics Addressed and Outreach Outcomes / Actions Taken:        []      Breast Cancer Screening []  Mammo Scheduled      []  External Records Requested     []  Added Reminder to Complete to Upcoming Primary Care Appt Notes     []  Patient Declined     []  Patient Will Call Back to Schedule     []  Patient Will Schedule with External Provider / Order Routed if Applicable             []       Cervical Cancer Screening []  Pap Scheduled      []  External Records Requested     []  Added Reminder to Complete to Upcoming Primary Care Appt Notes     []  Patient Declined     []  Patient Will Call Back to Schedule     []  Patient Will Schedule with External Provider               [x]          Colorectal Cancer Screening []  Colonoscopy Case Request or Referral Placed     []  External Records Requested     []  Added Reminder to Complete to Upcoming Primary Care Appt Notes     []  Patient Declined     []  Patient Will Call Back to Schedule     []  Patient Will Schedule with External Provider     []  Fit Kit Mailed (add the SmartPhrase under additional notes)     []  Reminded Patient to Complete Home Test             []      Diabetic Eye Exam []  Eye Camera Scheduled or Optometry Referral Placed     []  External Records Requested     []  Added Reminder to Complete to  Upcoming Primary Care Appt Notes     []  Patient Declined     []  Patient Will Call Back to Schedule     []  Patient Will Schedule with External Provider             []      Blood Pressure Control []  Primary Care Follow Up Visit Scheduled     []  Remote Blood Pressure Reading Captured     []  Added Reminder to Complete to Upcoming Primary Care Appt Notes     []  Patient Declined     []  Patient Will Call Back / Patient Will Send Portal Message with Reading     []  Patient Will Call Back to Schedule Provider Visit             []       HbA1c & Other Labs []  Lab Appt Scheduled for Due Labs     []  Primary Care Follow Up Visit Scheduled      []  Reminded Patient to Complete Home Test     []  Added Reminder to Complete to Upcoming Primary Care Appt Notes     []  Patient Declined     []  Patient Will Call Back to Schedule     []  Patient Will Schedule with External Provider / Order Routed if Applicable           []    Schedule Primary Care Appt []  Primary Care Appt Scheduled     []  Patient Declined     []  Patient Will Call Back to Schedule     []  Pt Established with External Provider & Updated Care Team             []      Medication Adherence []  Primary Care Appointment Scheduled     []  Added Reminder to Upcoming Primary Care Appt Notes     []  Patient Reminded to  Prescription     []  Patient Declined, Provider Notified if Needed     []  Sent Provider Message to Review and/or Add Exclusion to Problem List             []      Osteoporosis Screening []  DXA Appointment Scheduled     []  External Records Requested     []  Added Reminder to Complete to Upcoming Primary Care Appt Notes     []  Patient Declined     []  Patient Will Call Back to Schedule     []  Patient Will Schedule with External Provider / Order Routed if Applicable     Additional Care Coordinator Notes:         Further Action Needed If Patient Returns Outreach:

## 2023-07-06 ENCOUNTER — LAB VISIT (OUTPATIENT)
Dept: LAB | Facility: HOSPITAL | Age: 73
End: 2023-07-06
Attending: FAMILY MEDICINE
Payer: MEDICARE

## 2023-07-06 ENCOUNTER — OFFICE VISIT (OUTPATIENT)
Dept: FAMILY MEDICINE | Facility: CLINIC | Age: 73
End: 2023-07-06
Payer: MEDICARE

## 2023-07-06 DIAGNOSIS — G47.33 OBSTRUCTIVE SLEEP APNEA SYNDROME: ICD-10-CM

## 2023-07-06 DIAGNOSIS — M79.7 FIBROMYALGIA: ICD-10-CM

## 2023-07-06 DIAGNOSIS — G25.81 RESTLESS LEGS SYNDROME: ICD-10-CM

## 2023-07-06 DIAGNOSIS — G47.00 INSOMNIA, UNSPECIFIED TYPE: ICD-10-CM

## 2023-07-06 DIAGNOSIS — E03.9 ACQUIRED HYPOTHYROIDISM: ICD-10-CM

## 2023-07-06 DIAGNOSIS — E11.59 HYPERTENSION ASSOCIATED WITH DIABETES: ICD-10-CM

## 2023-07-06 DIAGNOSIS — I50.9 HEART FAILURE, UNSPECIFIED HF CHRONICITY, UNSPECIFIED HEART FAILURE TYPE: ICD-10-CM

## 2023-07-06 DIAGNOSIS — D64.9 ANEMIA, UNSPECIFIED TYPE: ICD-10-CM

## 2023-07-06 DIAGNOSIS — I15.2 HYPERTENSION ASSOCIATED WITH DIABETES: ICD-10-CM

## 2023-07-06 DIAGNOSIS — Z76.89 ENCOUNTER TO ESTABLISH CARE: Primary | ICD-10-CM

## 2023-07-06 DIAGNOSIS — E11.9 DIET-CONTROLLED TYPE 2 DIABETES MELLITUS: ICD-10-CM

## 2023-07-06 DIAGNOSIS — E78.2 MIXED HYPERLIPIDEMIA: ICD-10-CM

## 2023-07-06 DIAGNOSIS — F32.1 MODERATE MAJOR DEPRESSION, SINGLE EPISODE: ICD-10-CM

## 2023-07-06 LAB
ANION GAP SERPL CALC-SCNC: 7 MMOL/L (ref 8–16)
BNP SERPL-MCNC: 57 PG/ML (ref 0–99)
BUN SERPL-MCNC: 21 MG/DL (ref 8–23)
CALCIUM SERPL-MCNC: 9.3 MG/DL (ref 8.7–10.5)
CHLORIDE SERPL-SCNC: 105 MMOL/L (ref 95–110)
CO2 SERPL-SCNC: 27 MMOL/L (ref 23–29)
CREAT SERPL-MCNC: 0.7 MG/DL (ref 0.5–1.4)
EST. GFR  (NO RACE VARIABLE): >60 ML/MIN/1.73 M^2
GLUCOSE SERPL-MCNC: 90 MG/DL (ref 70–110)
POTASSIUM SERPL-SCNC: 3.9 MMOL/L (ref 3.5–5.1)
SODIUM SERPL-SCNC: 139 MMOL/L (ref 136–145)
T4 FREE SERPL-MCNC: 0.58 NG/DL (ref 0.71–1.51)
TSH SERPL DL<=0.005 MIU/L-ACNC: 31.68 UIU/ML (ref 0.34–5.6)

## 2023-07-06 PROCEDURE — 83036 HEMOGLOBIN GLYCOSYLATED A1C: CPT | Performed by: FAMILY MEDICINE

## 2023-07-06 PROCEDURE — 99214 PR OFFICE/OUTPT VISIT, EST, LEVL IV, 30-39 MIN: ICD-10-PCS | Mod: S$GLB,,, | Performed by: FAMILY MEDICINE

## 2023-07-06 PROCEDURE — 83880 ASSAY OF NATRIURETIC PEPTIDE: CPT | Performed by: FAMILY MEDICINE

## 2023-07-06 PROCEDURE — 3288F PR FALLS RISK ASSESSMENT DOCUMENTED: ICD-10-PCS | Mod: CPTII,S$GLB,, | Performed by: FAMILY MEDICINE

## 2023-07-06 PROCEDURE — 3044F PR MOST RECENT HEMOGLOBIN A1C LEVEL <7.0%: ICD-10-PCS | Mod: CPTII,S$GLB,, | Performed by: FAMILY MEDICINE

## 2023-07-06 PROCEDURE — 3044F HG A1C LEVEL LT 7.0%: CPT | Mod: CPTII,S$GLB,, | Performed by: FAMILY MEDICINE

## 2023-07-06 PROCEDURE — 4010F ACE/ARB THERAPY RXD/TAKEN: CPT | Mod: CPTII,S$GLB,, | Performed by: FAMILY MEDICINE

## 2023-07-06 PROCEDURE — 84443 ASSAY THYROID STIM HORMONE: CPT | Performed by: FAMILY MEDICINE

## 2023-07-06 PROCEDURE — 3288F FALL RISK ASSESSMENT DOCD: CPT | Mod: CPTII,S$GLB,, | Performed by: FAMILY MEDICINE

## 2023-07-06 PROCEDURE — 1159F MED LIST DOCD IN RCRD: CPT | Mod: CPTII,S$GLB,, | Performed by: FAMILY MEDICINE

## 2023-07-06 PROCEDURE — 1101F PT FALLS ASSESS-DOCD LE1/YR: CPT | Mod: CPTII,S$GLB,, | Performed by: FAMILY MEDICINE

## 2023-07-06 PROCEDURE — 80048 BASIC METABOLIC PNL TOTAL CA: CPT | Performed by: FAMILY MEDICINE

## 2023-07-06 PROCEDURE — 99214 OFFICE O/P EST MOD 30 MIN: CPT | Mod: S$GLB,,, | Performed by: FAMILY MEDICINE

## 2023-07-06 PROCEDURE — 4010F PR ACE/ARB THEARPY RXD/TAKEN: ICD-10-PCS | Mod: CPTII,S$GLB,, | Performed by: FAMILY MEDICINE

## 2023-07-06 PROCEDURE — 84439 ASSAY OF FREE THYROXINE: CPT | Performed by: FAMILY MEDICINE

## 2023-07-06 PROCEDURE — 1159F PR MEDICATION LIST DOCUMENTED IN MEDICAL RECORD: ICD-10-PCS | Mod: CPTII,S$GLB,, | Performed by: FAMILY MEDICINE

## 2023-07-06 PROCEDURE — 1101F PR PT FALLS ASSESS DOC 0-1 FALLS W/OUT INJ PAST YR: ICD-10-PCS | Mod: CPTII,S$GLB,, | Performed by: FAMILY MEDICINE

## 2023-07-06 PROCEDURE — 36415 COLL VENOUS BLD VENIPUNCTURE: CPT | Performed by: FAMILY MEDICINE

## 2023-07-06 RX ORDER — RIVASTIGMINE TARTRATE 6 MG/1
CAPSULE ORAL
Status: ON HOLD | COMMUNITY
End: 2023-09-24

## 2023-07-06 RX ORDER — LOSARTAN POTASSIUM 50 MG/1
50 TABLET ORAL
COMMUNITY
Start: 2023-06-26 | End: 2023-08-28 | Stop reason: SDUPTHER

## 2023-07-06 RX ORDER — OMEPRAZOLE 20 MG/1
CAPSULE, DELAYED RELEASE ORAL
COMMUNITY
End: 2023-08-02 | Stop reason: SDUPTHER

## 2023-07-06 RX ORDER — POTASSIUM CHLORIDE 750 MG/1
CAPSULE, EXTENDED RELEASE ORAL
COMMUNITY
Start: 2023-06-25 | End: 2023-07-28 | Stop reason: SDUPTHER

## 2023-07-06 RX ORDER — SERTRALINE HYDROCHLORIDE 50 MG/1
TABLET, FILM COATED ORAL
Qty: 90 TABLET | Refills: 1 | Status: SHIPPED | OUTPATIENT
Start: 2023-07-06 | End: 2023-11-13 | Stop reason: SDUPTHER

## 2023-07-06 RX ORDER — TRAZODONE HYDROCHLORIDE 50 MG/1
TABLET ORAL
Qty: 60 TABLET | Refills: 1 | Status: SHIPPED | OUTPATIENT
Start: 2023-07-06 | End: 2023-09-06

## 2023-07-06 RX ORDER — HYDROCHLOROTHIAZIDE 25 MG/1
25 TABLET ORAL
COMMUNITY
Start: 2023-06-26 | End: 2023-08-28 | Stop reason: SDUPTHER

## 2023-07-06 RX ORDER — PREGABALIN 75 MG/1
150 CAPSULE ORAL 2 TIMES DAILY
COMMUNITY
Start: 2023-06-26 | End: 2023-11-14

## 2023-07-06 NOTE — PROGRESS NOTES
"  SUBJECTIVE:    Patient ID: Genoveva Gilbert is a 73 y.o. female.    Chief Complaint: Establish Care, Hypertension, Hyperlipidemia, and Memory Loss    HPI  Genoveva Gilbert is a 73 y.o. F who comes in to establish care with this PCP. Formerly seen by Dr Dubois.     Acute issues:   Memory - feels declining x 4-5 months. Lost her  1/27/23. Close friend noticing she's not remembering things such as locations where she's been recently. Has gotten lost while driving. Pt forgetting conversations. Not forgetting names. Not leaving stove on. Does not find herself in unknown places. Wonders if related to losing her . Has struggled with depression throughout a lifetime. Also lost a son. Sleep and appetite not affected. +Isolating. No anhedonia. No SI or HI. Also w anxiety, feeling nervous.     Having trouble with sleep, both onset and maintenance    Care Team:  - Ortho - Dr Pitts, Dr Mckeon (pt does not recall but on chart)  - Psych - Dr Thompson (at most recent hospitalization)  - Pulm - Dr Hutchinson  - Derm - Dr Montenegro (not anymore)  - Lymphedema therapy - Estefany Campuzano (not anymore, did not help)    MedHx:  - ?Multiple sclerosis - per chart review "benign; another MD stated she has no MS" - pt states does not have it.  - DM - diet controlled. Most recent A1c 5.9% six months ago. In chart, dx'd 2021. Pt states was just diagnosed this hospitalization.  - HTN - rx'd Amlodipine 5mg, Toprol 25mg  - HLD - rx'd Lovastatin 40mg  - Heart failure - rx'd Torsemide 10mg - diagnosis questioned by pt   - Fibromyalgia - rx'd Lyrica 150mg BID  - OA - on Hydrocodone-APAP 5-325mg (rx'd by Dr Pitts), Ibuprofen 600mg TID  - Hypothyroidism - rx'd Levothyroxine 88 mcg  - NAFLD -  - GERD - on Omeprazole 20mg   - Anemia - rx'd Ferrous sulfate 325 (65)   - RLS - rx'd Pramipexole 1mg qHS  - Depression - rx'd Venlafaxine 150mg (has not taken in awhile), Quetiapine 25mg (does not take).  - Insomnia - rx'd Ramelteon 8mg. Also Quetiapine " 25mg - pt states not on either med.  - ESHA - not currently on CPAP  - Lymphedema - had decongestive therapy; states did not help  - Chronic back pain   - Hx metabolic encephalopathy   - Memory - on Rivastigmine. Has not seen Neuro    SurgHx:  Hysterectomy for fibroids  Breast reduction  Appendectomy  Tonsillectomy  Eye surgery  Shoulder arthroscopy  Knee arthroplasty  Liposuction    SocHx:  Lives by herself. Might move in w daughter/granddaugther vs assisted living.  EtOH -  none  Tobacco - former. Quit over 15y ago.  Recreational drugs - none      FamHx:  Pt is adopted. Biological mom  at 43yo. HTN, EtOHism, Aortic aneurysm ruptured. Also some CHF. Ovarian CA (sisters). On father's side all lived to be in 90s.      Health Maintenance:  *CRC screen - has never done. Has Cologuard at home.  *Cervical CA screen - partial hysterectomy. Needs [employer]  *Breast CA screen - last year (due). No hx abnormals.  *Bone density scan - has had, does not know when  *Immz due - Shingles    Recent labs include TSH 35.452 w fT4 0.64, CMP w hypokalemia and minimally decreased Hgb (11.7), normal ESR, CRP, B1, B12, Methylmalonic acid, B6, RPR, Mag, Phos.    Was on Zoloft in the past, worked well.       Review of patient's allergies indicates:   Allergen Reactions    Keflex [cephalexin]      Throat swelling    Pcn [penicillins]      Throat swelling    Latex, natural rubber Other (See Comments)     Redness with bandaids     Adhesive Other (See Comments)     bandainds redness at skin    Trelegy ellipta [fluticasone-umeclidin-vilanter]      Vision disturbance         Current Outpatient Medications:     amLODIPine (NORVASC) 5 MG tablet, Take 1 tablet (5 mg total) by mouth once daily. For blood pressure, Disp: 90 tablet, Rfl: 3    biotin 1 mg Cap, Take by mouth., Disp: , Rfl:     ferrous sulfate 325 (65 FE) MG EC tablet, Take 1 tablet (325 mg total) by mouth once daily., Disp: 90 tablet, Rfl: 3    fexofenadine (ALLEGRA) 180 MG tablet, , Disp:  , Rfl:     fluticasone-salmeterol 230-21 mcg/dose (ADVAIR HFA) 230-21 mcg/actuation HFAA inhaler, Inhale 2 puffs into the lungs 2 (two) times daily. Controller, Disp: 12 g, Rfl: 11    hydroCHLOROthiazide (HYDRODIURIL) 25 MG tablet, Take 25 mg by mouth., Disp: , Rfl:     ibuprofen (ADVIL,MOTRIN) 600 MG tablet, Take 1 tablet (600 mg total) by mouth 3 (three) times daily as needed for Pain., Disp: 90 tablet, Rfl: 0    levothyroxine (SYNTHROID) 88 MCG tablet, Take 1 tablet (88 mcg total) by mouth once daily., Disp: 90 tablet, Rfl: 3    losartan (COZAAR) 50 MG tablet, Take 50 mg by mouth., Disp: , Rfl:     lovastatin (MEVACOR) 40 MG tablet, Take 1 tablet (40 mg total) by mouth nightly. at bedtime. For cholesterol, Disp: 90 tablet, Rfl: 3    metoprolol succinate (TOPROL-XL) 25 MG 24 hr tablet, Take 1 tablet (25 mg total) by mouth once daily. For blood pressure and heart, Disp: 90 tablet, Rfl: 3    omeprazole (PRILOSEC) 20 MG capsule, TK ONE C PO ONCE D, Disp: , Rfl:     potassium chloride (MICRO-K) 10 MEQ CpSR, SMARTSIG:10 MEQ By Mouth 3 Times Daily, Disp: , Rfl:     pramipexole (MIRAPEX) 0.5 MG tablet, Take 2 tablets (1 mg total) by mouth every evening. For restless legs, Disp: 180 tablet, Rfl: 3    pregabalin (LYRICA) 75 MG capsule, Take 150 mg by mouth 2 (two) times daily., Disp: , Rfl:     rivastigmine tartrate (EXELON) 6 mg capsule, TK 1 C PO BID WF, Disp: , Rfl:     torsemide (DEMADEX) 10 MG Tab, TAKE 1 TABLET(10 MG) BY MOUTH EVERY DAY, Disp: 90 tablet, Rfl: 3    albuterol (PROAIR HFA) 90 mcg/actuation inhaler, Inhale 2 puffs into the lungs every 6 (six) hours as needed for Wheezing. Rescue (Patient not taking: Reported on 7/6/2023), Disp: 18 g, Rfl: 11    HYDROcodone-acetaminophen (NORCO) 5-325 mg per tablet, Take 1 tablet by mouth every 6 (six) hours as needed for Pain. (Patient not taking: Reported on 7/6/2023), Disp: 28 tablet, Rfl: 0    magnesium oxide (MAG-OX) 400 mg (241.3 mg magnesium) tablet, Take 1 tablet  (400 mg total) by mouth once daily. (Patient not taking: Reported on 7/6/2023), Disp: 90 tablet, Rfl: 3    nystatin (MYCOSTATIN) cream, Apply topically 2 (two) times daily. (Patient not taking: Reported on 7/6/2023), Disp: 60 g, Rfl: 1    QUEtiapine (SEROQUEL) 25 MG Tab, Take 1 tablet (25 mg total) by mouth every evening. (Patient not taking: Reported on 7/6/2023), Disp: 30 tablet, Rfl: 11    ramelteon (ROZEREM) 8 mg tablet, Take 1 tablet (8 mg total) by mouth every evening. (Patient not taking: Reported on 7/6/2023), Disp: 30 tablet, Rfl: 6    sertraline (ZOLOFT) 50 MG tablet, Take half tab PO daily x 1 week, then increase to 1 tab PO daily., Disp: 90 tablet, Rfl: 1    traZODone (DESYREL) 50 MG tablet, Take 1-2 tabs PO qHS prn  insomnia., Disp: 60 tablet, Rfl: 1    Review of Systems       Objective:      There were no vitals filed for this visit.  Physical Exam  Vitals reviewed.   Constitutional:       General: She is not in acute distress.  Cardiovascular:      Rate and Rhythm: Normal rate and regular rhythm.      Pulses: Normal pulses.   Pulmonary:      Effort: Pulmonary effort is normal.      Breath sounds: Normal breath sounds.   Skin:     General: Skin is warm and dry.   Neurological:      Mental Status: She is alert.   Psychiatric:      Comments: Depressed affect             Assessment:       1. Encounter to establish care    2. Diet-controlled type 2 diabetes mellitus    3. Hypertension associated with diabetes    4. Mixed hyperlipidemia    5. Acquired hypothyroidism    6. Moderate major depression, single episode    7. Restless legs syndrome    8. Heart failure, unspecified HF chronicity, unspecified heart failure type    9. Anemia, unspecified type    10. Fibromyalgia    11. Obstructive sleep apnea syndrome    12. Insomnia, unspecified type         Plan:       Encounter to establish care    Diet-controlled type 2 diabetes mellitus  -     Hemoglobin A1C; Future; Expected date: 07/06/2023    Hypertension  associated with diabetes  -     Basic Metabolic Panel; Future; Expected date: 07/06/2023    Mixed hyperlipidemia    Acquired hypothyroidism  -     TSH; Future; Expected date: 07/06/2023    Moderate major depression, single episode  -     sertraline (ZOLOFT) 50 MG tablet; Take half tab PO daily x 1 week, then increase to 1 tab PO daily.  Dispense: 90 tablet; Refill: 1    Restless legs syndrome    Heart failure, unspecified HF chronicity, unspecified heart failure type  -     BNP; Future; Expected date: 07/06/2023    Anemia, unspecified type    Fibromyalgia    Obstructive sleep apnea syndrome    Insomnia, unspecified type  -     traZODone (DESYREL) 50 MG tablet; Take 1-2 tabs PO qHS prn  insomnia.  Dispense: 60 tablet; Refill: 1    - Obtained history, reviewed available recent records, and discussed age appropriate, history driven recommendations for health maintenance including immunizations and applicable cancer screenings. Baseline labs as above.  - check A1c  - BP normal today  - repeat TSH, BMP, bnap  - rechallenge with Zoloft  - bring back for MoCA testing    Follow up in 2 weeks for lab review and to continue addressing acute and chronic issues.    No follow-ups on file.        7/31/2023 Katherin Herrera M.D.

## 2023-07-07 LAB
ESTIMATED AVG GLUCOSE: 117 MG/DL (ref 68–131)
HBA1C MFR BLD: 5.7 % (ref 4.5–6.2)

## 2023-07-12 DIAGNOSIS — E78.2 MIXED HYPERLIPIDEMIA: ICD-10-CM

## 2023-07-12 RX ORDER — LOSARTAN POTASSIUM 50 MG/1
50 TABLET ORAL
Status: CANCELLED | OUTPATIENT
Start: 2023-07-12

## 2023-07-12 RX ORDER — POTASSIUM CHLORIDE 750 MG/1
CAPSULE, EXTENDED RELEASE ORAL
Status: CANCELLED | OUTPATIENT
Start: 2023-07-12

## 2023-07-21 ENCOUNTER — PATIENT MESSAGE (OUTPATIENT)
Dept: PSYCHIATRY | Facility: CLINIC | Age: 73
End: 2023-07-21
Payer: MEDICARE

## 2023-07-25 NOTE — TELEPHONE ENCOUNTER
Refill Routing Note   Medication(s) are not appropriate for processing by [facility] for the following reason(s):      Non-participating provider    [facility] action(s):  Route Care Due:  None identified            Appointments  past 12m or future 3m with PCP    Date Provider   Last Visit   7/6/2023 Katherin Mccabe MD   Next Visit   8/28/2023 Katherin Mccabe MD   ED visits in past 90 days: 0        Note composed:8:36 PM 07/24/2023

## 2023-07-28 NOTE — TELEPHONE ENCOUNTER
----- Message from Rona Lin sent at 7/28/2023  3:14 PM CDT -----  Contact: Patient  Type:  RX Refill Request    Who Called:   Patient  Refill or New Rx:  Refill    RX Name and Strength:  potassium chloride (MICRO-K) 10 MEQ CpSR    Preferred Pharmacy with phone number:      [id number] - [place] - [address] AT [facility] [address]  [address] [address]  Phone: [phone] Fax: [phone]    Local or Mail Order:  Local  Ordering Provider:  Dr Dubois    Would the patient rather a call back or a response via [facility]?   Call back  Best Call Back Number:   [phone]      Additional Information: States she is out of this medication and would like a refill called in to the pharmacy - states the pharmacy has sent a fax to Dr Dubois and is waiting for a reply - please call to advise - thank you

## 2023-07-30 ENCOUNTER — PATIENT MESSAGE (OUTPATIENT)
Dept: FAMILY MEDICINE | Facility: CLINIC | Age: 73
End: 2023-07-30

## 2023-07-31 ENCOUNTER — HOSPITAL ENCOUNTER (OUTPATIENT)
Dept: RADIOLOGY | Facility: HOSPITAL | Age: 73
Discharge: HOME | End: 2023-07-31
Payer: MEDICARE

## 2023-07-31 DIAGNOSIS — M25.511 ACUTE PAIN OF RIGHT SHOULDER: ICD-10-CM

## 2023-07-31 PROCEDURE — 73030 X-RAY EXAM OF SHOULDER: CPT | Mod: TC,RT

## 2023-07-31 PROCEDURE — 73030 X-RAY EXAM OF SHOULDER: CPT | Mod: 26,RT,, | Performed by: RADIOLOGY

## 2023-07-31 PROCEDURE — 73030 XR SHOULDER TRAUMA 3 VIEW RIGHT: ICD-10-PCS | Mod: 26,RT,, | Performed by: RADIOLOGY

## 2023-07-31 NOTE — TELEPHONE ENCOUNTER
Unsure what this message means. Does rx need to be sent somewhere else? Please call pharmacy to clarify.

## 2023-08-01 RX ORDER — POTASSIUM CHLORIDE 750 MG/1
CAPSULE, EXTENDED RELEASE ORAL
Qty: 180 CAPSULE | OUTPATIENT
Start: 2023-08-01

## 2023-08-01 RX ORDER — POTASSIUM CHLORIDE 750 MG/1
10 CAPSULE, EXTENDED RELEASE ORAL 3 TIMES DAILY
Qty: 270 CAPSULE | Refills: 0 | Status: SHIPPED | OUTPATIENT
Start: 2023-08-01 | End: 2023-11-06

## 2023-08-01 NOTE — TELEPHONE ENCOUNTER
Refill potassium chloride (MICRO-K) 10 MEQ CpSR  Last office visit     07/06/23  Follow up visit     08/28/23  Medication pended

## 2023-08-02 DIAGNOSIS — I10 ESSENTIAL HYPERTENSION: ICD-10-CM

## 2023-08-02 DIAGNOSIS — E78.2 MIXED HYPERLIPIDEMIA: ICD-10-CM

## 2023-08-02 RX ORDER — LOSARTAN POTASSIUM 50 MG/1
50 TABLET ORAL
Status: CANCELLED | OUTPATIENT
Start: 2023-08-02

## 2023-08-02 NOTE — TELEPHONE ENCOUNTER
Pt also asking for mirtzipine 15mg which I do not see on her med list.  Pt states this is her 4th time calling the office.    Attempted to reach pt., no ans LVM

## 2023-08-02 NOTE — TELEPHONE ENCOUNTER
----- Message from Venessa Oneil sent at 8/1/2023  3:10 PM CDT -----  Regarding: RX  Patient called stating she fell and hurt her shoulder.  The patient has an appointment scheduled for 8/28/23 but is requesting an earlier appointment to get something prescribed for anxiety since her fall.  I offered an appointment with another provider but she declined.  Please call the patient to advise.

## 2023-08-02 NOTE — TELEPHONE ENCOUNTER
Pt is also asking if she should be taking mirtizaine.  She has been taking it but I don't see it on her med list.

## 2023-08-03 RX ORDER — RAMELTEON 8 MG/1
8 TABLET ORAL NIGHTLY
Qty: 30 TABLET | Refills: 0 | Status: SHIPPED | OUTPATIENT
Start: 2023-08-03 | End: 2023-11-14

## 2023-08-03 RX ORDER — LOVASTATIN 40 MG/1
40 TABLET ORAL NIGHTLY
Qty: 30 TABLET | Refills: 0 | Status: ON HOLD | OUTPATIENT
Start: 2023-08-03 | End: 2023-09-27 | Stop reason: HOSPADM

## 2023-08-03 RX ORDER — OMEPRAZOLE 20 MG/1
20 CAPSULE, DELAYED RELEASE ORAL DAILY
Qty: 90 CAPSULE | Refills: 1 | Status: SHIPPED | OUTPATIENT
Start: 2023-08-03 | End: 2024-02-15

## 2023-08-03 RX ORDER — MIRTAZAPINE 15 MG/1
15 TABLET, FILM COATED ORAL NIGHTLY
Qty: 30 TABLET | Refills: 0 | Status: SHIPPED | OUTPATIENT
Start: 2023-08-03 | End: 2023-09-05

## 2023-08-03 RX ORDER — AMLODIPINE BESYLATE 5 MG/1
5 TABLET ORAL DAILY
Qty: 30 TABLET | Refills: 0 | Status: SHIPPED | OUTPATIENT
Start: 2023-08-03 | End: 2023-10-24 | Stop reason: SDUPTHER

## 2023-08-04 NOTE — TELEPHONE ENCOUNTER
Pt was intended to be seen 2 weeks after her initial appt w me on 7/06/23, but had to be rescheduled. Needs appt to discuss abnormal labs and to clarify medication list. Please offer appt at 1pm on 8/10 or 8/11. Ok to take hospital follow up slot. Have her bring all meds she is actually taking for proper medication reconciliation.   Regarding Losartan, has she been taking it for her BP? At the time of her visit, she only reported Amlodipine and Metoprolol. Also, have her check her BP once a day and bring in logs to appt.

## 2023-08-14 ENCOUNTER — TELEPHONE (OUTPATIENT)
Dept: ORTHOPEDICS | Facility: CLINIC | Age: 73
End: 2023-08-14
Payer: MEDICARE

## 2023-08-15 ENCOUNTER — TELEPHONE (OUTPATIENT)
Dept: FAMILY MEDICINE | Facility: CLINIC | Age: 73
End: 2023-08-15

## 2023-08-28 RX ORDER — HYDROCHLOROTHIAZIDE 25 MG/1
25 TABLET ORAL DAILY
Qty: 90 TABLET | Refills: 0 | Status: ON HOLD | OUTPATIENT
Start: 2023-08-28 | End: 2023-09-27 | Stop reason: HOSPADM

## 2023-08-28 RX ORDER — LOSARTAN POTASSIUM 50 MG/1
50 TABLET ORAL DAILY
Qty: 90 TABLET | Refills: 0 | Status: ON HOLD | OUTPATIENT
Start: 2023-08-28 | End: 2023-09-27 | Stop reason: HOSPADM

## 2023-09-05 ENCOUNTER — TELEPHONE (OUTPATIENT)
Dept: ORTHOPEDICS | Facility: CLINIC | Age: 73
End: 2023-09-05
Payer: MEDICARE

## 2023-09-05 RX ORDER — MIRTAZAPINE 15 MG/1
15 TABLET, FILM COATED ORAL
Qty: 30 TABLET | Refills: 0 | Status: ON HOLD | OUTPATIENT
Start: 2023-09-05 | End: 2023-09-24

## 2023-09-05 NOTE — TELEPHONE ENCOUNTER
----- Message from Thad Alfaro sent at 9/5/2023 11:35 AM CDT -----  Regarding: R arm still hurts, call pt  [phone]  Contact: pt  [phone]  R arm still hurts, call pt  [phone]

## 2023-09-13 ENCOUNTER — TELEPHONE (OUTPATIENT)
Dept: FAMILY MEDICINE | Facility: CLINIC | Age: 73
End: 2023-09-13
Payer: MEDICARE

## 2023-09-13 ENCOUNTER — TELEPHONE (OUTPATIENT)
Dept: FAMILY MEDICINE | Facility: CLINIC | Age: 73
End: 2023-09-13

## 2023-09-13 NOTE — TELEPHONE ENCOUNTER
According to ' office note from 7-6-23-  - Insomnia - rx'd Ramelteon 8mg. Also Quetiapine 25mg - pt states not on either med.

## 2023-09-13 NOTE — TELEPHONE ENCOUNTER
----- Message from Jerrod Love NP sent at 9/12/2023  5:25 PM CDT -----  Regarding: RE: Script  What was she taking the Remeron for?   ----- Message -----  From: Carlee Downs MA  Sent: 9/12/2023   1:39 PM CDT  To: Katherin Mccabe MD  Subject: FW: Script                                         ----- Message -----  From: Anu Davis  Sent: 9/12/2023   9:55 AM CDT  To: [facility] Staff  Subject: Script                                           Patient left message on vm stating her insurance company won't pay for Remeron and is requesting a substitute.  Please return call.    Thank you

## 2023-09-14 DIAGNOSIS — E11.59 HYPERTENSION ASSOCIATED WITH DIABETES: Primary | ICD-10-CM

## 2023-09-14 DIAGNOSIS — I15.2 HYPERTENSION ASSOCIATED WITH DIABETES: Primary | ICD-10-CM

## 2023-09-15 RX ORDER — LOSARTAN POTASSIUM 50 MG/1
50 TABLET ORAL DAILY
Qty: 90 TABLET | Refills: 0 | OUTPATIENT
Start: 2023-09-15

## 2023-09-19 ENCOUNTER — TELEPHONE (OUTPATIENT)
Dept: FAMILY MEDICINE | Facility: CLINIC | Age: 73
End: 2023-09-19

## 2023-09-19 NOTE — TELEPHONE ENCOUNTER
Remeron is not covered by patient's insurance.  Is there something else you can prescribe in place of the Remeron?

## 2023-09-21 ENCOUNTER — OFFICE VISIT (OUTPATIENT)
Dept: ORTHOPEDICS | Facility: CLINIC | Age: 73
End: 2023-09-21
Payer: MEDICARE

## 2023-09-21 VITALS — BODY MASS INDEX: 36.71 KG/M2 | RESPIRATION RATE: 18 BRPM | HEIGHT: 60 IN | WEIGHT: 187 LBS

## 2023-09-21 DIAGNOSIS — Z96.611 STATUS POST REVERSE ARTHROPLASTY OF SHOULDER, RIGHT: Primary | ICD-10-CM

## 2023-09-21 PROCEDURE — 4010F ACE/ARB THERAPY RXD/TAKEN: CPT | Mod: CPTII,S$GLB,, | Performed by: ORTHOPAEDIC SURGERY

## 2023-09-21 PROCEDURE — 1159F PR MEDICATION LIST DOCUMENTED IN MEDICAL RECORD: ICD-10-PCS | Mod: CPTII,S$GLB,, | Performed by: ORTHOPAEDIC SURGERY

## 2023-09-21 PROCEDURE — 4010F PR ACE/ARB THEARPY RXD/TAKEN: ICD-10-PCS | Mod: CPTII,S$GLB,, | Performed by: ORTHOPAEDIC SURGERY

## 2023-09-21 PROCEDURE — 3044F HG A1C LEVEL LT 7.0%: CPT | Mod: CPTII,S$GLB,, | Performed by: ORTHOPAEDIC SURGERY

## 2023-09-21 PROCEDURE — 99214 PR OFFICE/OUTPT VISIT, EST, LEVL IV, 30-39 MIN: ICD-10-PCS | Mod: S$GLB,,, | Performed by: ORTHOPAEDIC SURGERY

## 2023-09-21 PROCEDURE — 99999 PR PBB SHADOW E&M-EST. PATIENT-LVL II: CPT | Mod: PBBFAC,,, | Performed by: ORTHOPAEDIC SURGERY

## 2023-09-21 PROCEDURE — 1160F RVW MEDS BY RX/DR IN RCRD: CPT | Mod: CPTII,S$GLB,, | Performed by: ORTHOPAEDIC SURGERY

## 2023-09-21 PROCEDURE — 1159F MED LIST DOCD IN RCRD: CPT | Mod: CPTII,S$GLB,, | Performed by: ORTHOPAEDIC SURGERY

## 2023-09-21 PROCEDURE — 1160F PR REVIEW ALL MEDS BY PRESCRIBER/CLIN PHARMACIST DOCUMENTED: ICD-10-PCS | Mod: CPTII,S$GLB,, | Performed by: ORTHOPAEDIC SURGERY

## 2023-09-21 PROCEDURE — 99999 PR PBB SHADOW E&M-EST. PATIENT-LVL II: ICD-10-PCS | Mod: PBBFAC,,, | Performed by: ORTHOPAEDIC SURGERY

## 2023-09-21 PROCEDURE — 3044F PR MOST RECENT HEMOGLOBIN A1C LEVEL <7.0%: ICD-10-PCS | Mod: CPTII,S$GLB,, | Performed by: ORTHOPAEDIC SURGERY

## 2023-09-21 PROCEDURE — 99214 OFFICE O/P EST MOD 30 MIN: CPT | Mod: S$GLB,,, | Performed by: ORTHOPAEDIC SURGERY

## 2023-09-21 NOTE — PROGRESS NOTES
Past Medical History:   Diagnosis Date    Allergy     Anxiety     Arthritis, rheumatoid     Back pain     Chronic bronchiolitis     Depression     Fibromyalgia     GERD (gastroesophageal reflux disease)     High cholesterol     Hilar mass 03/27/2014    [place]     aid right ear    [place]     Hypertension     Incontinence of urine     Lymphedema     MS (multiple sclerosis)     benign; another MD stated she has no MS    Neuropathy     Personal history of colonic polyps 12/05/2019    Restless leg syndrome     Rotator cuff tear 04/16/2015    Sciatica of left side 01/05/2018    Seasonal allergies     Sinus congestion     Sleep apnea     DOES NOT USE MACHINE    Spondylolysis     Thyroid disease     Type 2 diabetes mellitus with polyneuropathy     no med. was borderline    Wheezing        Past Surgical History:   Procedure Laterality Date    APPENDECTOMY      ARTHROSCOPIC DEBRIDEMENT OF SHOULDER Right 02/18/2022    Procedure: EXTENSIVE DEBRIDEMENT, SHOULDER, ARTHROSCOPIC;  Surgeon: Rio Pitts MD;  Location: [facility] OR;  Service: Orthopedics;  Laterality: Right;    BREAST SURGERY      reduction    CLOSED REDUCTION OF INJURY OF SHOULDER Right 09/19/2021    Procedure: CLOSED REDUCTION, SHOULDER;  Surgeon: Antonio Irwin MD;  Location: [facility] OR;  Service: Orthopedics;  Laterality: Right;    COLONOSCOPY W/ POLYPECTOMY N/A 12/05/2019    repeat in 5 yrs    EPIDURAL STEROID INJECTION INTO LUMBAR SPINE N/A 06/12/2019    Procedure: Injection-steroid-epidural-lumbar;  Surgeon: Thad Manning MD;  Location: [facility] OR;  Service: Pain Management;  Laterality: N/A;  L5-S1    EPIDURAL STEROID INJECTION INTO LUMBAR SPINE N/A 09/16/2019    Procedure: Injection-steroid-epidural-lumbar;  Surgeon: Thad Manning MD;  Location: [facility] OR;  Service: Pain Management;  Laterality: N/A;  L5-S1    EYE SURGERY Bilateral     HYSTERECTOMY      partial - fibroids    INJECTION OF ANESTHETIC AGENT AROUND MEDIAL BRANCH  NERVES INNERVATING LUMBAR FACET JOINT Bilateral 02/28/2019    Procedure: Block-nerve-medial branch-lumbar;  Surgeon: Thad Manning MD;  Location: [facility] OR;  Service: Pain Management;  Laterality: Bilateral;  L3, 4,5     INJECTION OF ANESTHETIC AGENT AROUND MEDIAL BRANCH NERVES INNERVATING LUMBAR FACET JOINT Bilateral 03/21/2019    Procedure: Block-nerve-medial branch-lumbar L3,4,5;  Surgeon: Thad Manning MD;  Location: [facility] OR;  Service: Pain Management;  Laterality: Bilateral;    KNEE ARTHROPLASTY Left 03/16/2021    Procedure: ARTHROPLASTY, KNEE;  Surgeon: Rio Pitts MD;  Location: [facility] OR;  Service: Orthopedics;  Laterality: Left;  GENERAL AND BLOCK    LIPOSUCTION  1985    RADIOFREQUENCY ABLATION Left 11/04/2020    Procedure: Radiofrequency Ablation left knee;  Surgeon: Andrew Escudero MD;  Location: [facility] OR;  Service: Pain Management;  Laterality: Left;    RADIOFREQUENCY ABLATION OF LUMBAR MEDIAL BRANCH NERVE AT SINGLE LEVEL Bilateral 04/12/2019    Procedure: Radiofrequency Ablation, Nerve, Spinal, Lumbar, Medial Branch, 1 Level;  Surgeon: Thad Manning MD;  Location: [facility] OR;  Service: Pain Management;  Laterality: Bilateral;  L3, 4, 5  lumbar  [employer] pain management generator  SN [id number]  80 degrees for 75 seconds x2    RADIOFREQUENCY ABLATION OF LUMBAR MEDIAL BRANCH NERVE AT SINGLE LEVEL Bilateral 10/22/2019    Procedure: Radiofrequency Ablation, Nerve, Spinal, Lumbar, Medial Branch, L3,4,5;  Surgeon: Thad Manning MD;  Location: [facility] OR;  Service: Pain Management;  Laterality: Bilateral;  burned at 80 degrees C X 60 seconds X 2 each site    RADIOFREQUENCY ABLATION OF LUMBAR MEDIAL BRANCH NERVE AT SINGLE LEVEL Bilateral 05/27/2020    Procedure: Radiofrequency Ablation, Nerve, Spinal, Lumbar, Medial Branch, 1 Level;  Surgeon: Thad Manning MD;  Location: [facility] OR;  Service: Pain Management;  Laterality: Bilateral;  L3,4,5    REVERSE TOTAL SHOULDER ARTHROPLASTY Right 04/12/2022    Procedure:  ARTHROPLASTY, SHOULDER, TOTAL, REVERSE;  Surgeon: Rio Pitts MD;  Location: [facility] OR;  Service: Orthopedics;  Laterality: Right;    SHOULDER ARTHROSCOPY Right 09/19/2021    Procedure: ARTHROSCOPY, SHOULDER;  Surgeon: Antonio Irwin MD;  Location: [facility] OR;  Service: Orthopedics;  Laterality: Right;  LIMITED DEBRIDMENT    TONSILLECTOMY      TOTAL REDUCTION MAMMOPLASTY         Current Outpatient Medications   Medication Sig    albuterol (PROAIR HFA) 90 mcg/actuation inhaler Inhale 2 puffs into the lungs every 6 (six) hours as needed for Wheezing. Rescue (Patient not taking: Reported on 7/6/2023)    amLODIPine (NORVASC) 5 MG tablet Take 1 tablet (5 mg total) by mouth once daily. For blood pressure    biotin 1 mg Cap Take by mouth.    fexofenadine (ALLEGRA) 180 MG tablet     fluticasone-salmeterol 230-21 mcg/dose (ADVAIR HFA) 230-21 mcg/actuation HFAA inhaler Inhale 2 puffs into the lungs 2 (two) times daily. Controller    hydroCHLOROthiazide (HYDRODIURIL) 25 MG tablet Take 1 tablet (25 mg total) by mouth once daily.    HYDROcodone-acetaminophen (NORCO) 5-325 mg per tablet Take 1 tablet by mouth every 6 (six) hours as needed for Pain. (Patient not taking: Reported on 7/6/2023)    ibuprofen (ADVIL,MOTRIN) 600 MG tablet Take 1 tablet (600 mg total) by mouth 3 (three) times daily as needed for Pain.    levothyroxine (SYNTHROID) 88 MCG tablet Take 1 tablet (88 mcg total) by mouth once daily.    losartan (COZAAR) 50 MG tablet Take 1 tablet (50 mg total) by mouth once daily.    lovastatin (MEVACOR) 40 MG tablet Take 1 tablet (40 mg total) by mouth nightly. at bedtime. For cholesterol    magnesium oxide (MAG-OX) 400 mg (241.3 mg magnesium) tablet Take 1 tablet (400 mg total) by mouth once daily. (Patient not taking: Reported on 7/6/2023)    metoprolol succinate (TOPROL-XL) 25 MG 24 hr tablet Take 1 tablet (25 mg total) by mouth once daily. For blood pressure and heart    mirtazapine (REMERON) 15 MG tablet TAKE 1  TABLET(15 MG) BY MOUTH EVERY EVENING    nystatin (MYCOSTATIN) cream Apply topically 2 (two) times daily. (Patient not taking: Reported on 2023)    omeprazole (PRILOSEC) 20 MG capsule Take 1 capsule (20 mg total) by mouth once daily.    potassium chloride (MICRO-K) 10 MEQ CpSR Take 1 capsule (10 mEq total) by mouth 3 (three) times daily.    pramipexole (MIRAPEX) 0.5 MG tablet Take 2 tablets (1 mg total) by mouth every evening. For restless legs    pregabalin (LYRICA) 75 MG capsule Take 150 mg by mouth 2 (two) times daily.    QUEtiapine (SEROQUEL) 25 MG Tab Take 1 tablet (25 mg total) by mouth every evening. (Patient not taking: Reported on 2023)    ramelteon (ROZEREM) 8 mg tablet Take 1 tablet (8 mg total) by mouth every evening.    rivastigmine tartrate (EXELON) 6 mg capsule TK 1 C PO BID WF    sertraline (ZOLOFT) 50 MG tablet Take half tab PO daily x 1 week, then increase to 1 tab PO daily.    torsemide (DEMADEX) 10 MG Tab TAKE 1 TABLET(10 MG) BY MOUTH EVERY DAY    traZODone (DESYREL) 50 MG tablet TAKE 1 TO 2 TABLETS BY MOUTH EVERY NIGHT AT BEDTIME AS NEEDED FOR INSOMNIA     No current facility-administered medications for this visit.       Review of patient's allergies indicates:   Allergen Reactions    Keflex [cephalexin]      Throat swelling    Pcn [penicillins]      Throat swelling    Latex, natural rubber Other (See Comments)     Redness with bandaids     Adhesive Other (See Comments)     bandainds redness at skin    Trelegy ellipta [fluticasone-umeclidin-vilanter]      Vision disturbance       Family History   Problem Relation Age of Onset    Heart disease Mother        Social History     Socioeconomic History    Marital status:    Tobacco Use    Smoking status: Former     Current packs/day: 0.00     Types: Cigarettes     Quit date: 1996     Years since quittin.3     Passive exposure: Past    Smokeless tobacco: Never   Substance and Sexual Activity    Alcohol use: Yes     Comment: very  little     Drug use: No    Sexual activity: Not Currently     Partners: Male     Social Determinants of Health     Financial Resource Strain: Unknown (6/24/2023)    Overall Financial Resource Strain (CARDIA)     Difficulty of Paying Living Expenses: Patient refused   Food Insecurity: Unknown (6/24/2023)    Hunger Vital Sign     Worried About Running Out of Food in the Last Year: Patient refused     Ran Out of Food in the Last Year: Patient refused   Transportation Needs: Unknown (6/24/2023)    PRAPARE - Transportation     Lack of Transportation (Medical): Patient refused     Lack of Transportation (Non-Medical): Patient refused   Physical Activity: Unknown (6/24/2023)    Exercise Vital Sign     Days of Exercise per Week: Patient refused     Minutes of Exercise per Session: Patient refused   Stress: Unknown (6/24/2023)    [employer] [place] [employer] - Occupational Stress Questionnaire     Feeling of Stress : Patient refused   Social Connections: Unknown (6/24/2023)    Social Connection and Isolation Panel [NHANES]     Frequency of Communication with Friends and Family: Patient refused     Frequency of Social Gatherings with Friends and Family: Patient refused     Attends Jain Services: Patient refused     Active Member of Clubs or Organizations: Patient refused     Attends Club or Organization Meetings: Patient refused     Marital Status: Patient refused   Housing Stability: Unknown (6/24/2023)    Housing Stability Vital Sign     Unable to Pay for Housing in the Last Year: Patient refused     Unstable Housing in the Last Year: Patient refused       Chief Complaint:   Chief Complaint   Patient presents with    Right Shoulder - Post-op Evaluation         History of present illness:  73-year-old female underwent  a reverse total shoulder arthroplasty for persistent instability.  Patient had some significant osteoporosis possibly compromising fixation of the base plate.  Patient has been having some  pain in the right shoulder but has not been seen for almost a year.  Patient rates her pain today is 0/10.  Denies pain but says it feels heavy and has a hard time moving in a times.        Review of Systems:    Musculoskeletal:  See HPI        Physical Examination:    Vital Signs:    Vitals:    09/21/23 1449   Resp: 18         Body mass index is 36.52 kg/m².    This a well-developed, well nourished patient in no acute distress.  They are alert and oriented and cooperative to examination.  Pt. walks without an antalgic gait.      Examination right shoulder shows well-healed surgical portals.  Patient has pretty limited range of motion with forward flexion of about 110° with external rotation of 40°.  Mild discomfort.    X-rays:  X-rays of the right shoulder available for review which show no obvious abnormalities involving the hardware or bones.  Assessment::     Right reverse total shoulder arthroplasty    Plan:  I reviewed the x-ray with her today.  I do not see anything overly concerning.  Recommended continued observation with x-rays annually.  Follow-up if symptoms worsen.    This note was created using [employer] voice recognition software that occasionally misinterpreted phrases or words.

## 2023-09-24 ENCOUNTER — HOSPITAL ENCOUNTER (INPATIENT)
Facility: HOSPITAL | Age: 73
LOS: 1 days | Discharge: HOME HEALTH | DRG: 948 | End: 2023-09-27
Attending: EMERGENCY MEDICINE | Admitting: HOSPITALIST
Payer: MEDICARE

## 2023-09-24 DIAGNOSIS — R06.02 SOB (SHORTNESS OF BREATH): ICD-10-CM

## 2023-09-24 DIAGNOSIS — I25.10 CORONARY ARTERY DISEASE INVOLVING NATIVE CORONARY ARTERY OF NATIVE HEART WITHOUT ANGINA PECTORIS: ICD-10-CM

## 2023-09-24 DIAGNOSIS — I25.10 CAD (CORONARY ARTERY DISEASE): ICD-10-CM

## 2023-09-24 DIAGNOSIS — R07.9 CHEST PAIN: ICD-10-CM

## 2023-09-24 DIAGNOSIS — E11.65 TYPE 2 DIABETES MELLITUS WITH HYPERGLYCEMIA, WITHOUT LONG-TERM CURRENT USE OF INSULIN: ICD-10-CM

## 2023-09-24 DIAGNOSIS — E11.69 HYPERLIPIDEMIA ASSOCIATED WITH TYPE 2 DIABETES MELLITUS: ICD-10-CM

## 2023-09-24 DIAGNOSIS — E78.2 MIXED HYPERLIPIDEMIA: Primary | ICD-10-CM

## 2023-09-24 DIAGNOSIS — E78.5 HYPERLIPIDEMIA ASSOCIATED WITH TYPE 2 DIABETES MELLITUS: ICD-10-CM

## 2023-09-24 DIAGNOSIS — R94.39 ABNORMAL STRESS TEST: ICD-10-CM

## 2023-09-24 LAB
ALBUMIN SERPL BCP-MCNC: 4.4 G/DL (ref 3.5–5.2)
ALP SERPL-CCNC: 80 U/L (ref 55–135)
ALT SERPL W/O P-5'-P-CCNC: 18 U/L (ref 10–44)
ANION GAP SERPL CALC-SCNC: 17 MMOL/L (ref 8–16)
AST SERPL-CCNC: 22 U/L (ref 10–40)
BASOPHILS # BLD AUTO: 0.03 K/UL (ref 0–0.2)
BASOPHILS NFR BLD: 0.5 % (ref 0–1.9)
BILIRUB SERPL-MCNC: 1 MG/DL (ref 0.1–1)
BNP SERPL-MCNC: 135 PG/ML (ref 0–99)
BUN SERPL-MCNC: 13 MG/DL (ref 8–23)
CALCIUM SERPL-MCNC: 9.9 MG/DL (ref 8.7–10.5)
CHLORIDE SERPL-SCNC: 104 MMOL/L (ref 95–110)
CO2 SERPL-SCNC: 19 MMOL/L (ref 23–29)
CREAT SERPL-MCNC: 0.8 MG/DL (ref 0.5–1.4)
D DIMER PPP IA.FEU-MCNC: 0.73 MG/L FEU
DIFFERENTIAL METHOD: NORMAL
EOSINOPHIL # BLD AUTO: 0 K/UL (ref 0–0.5)
EOSINOPHIL NFR BLD: 0.5 % (ref 0–8)
ERYTHROCYTE [DISTWIDTH] IN BLOOD BY AUTOMATED COUNT: 11.9 % (ref 11.5–14.5)
EST. GFR  (NO RACE VARIABLE): >60 ML/MIN/1.73 M^2
GLUCOSE SERPL-MCNC: 83 MG/DL (ref 70–110)
HCT VFR BLD AUTO: 43.3 % (ref 37–48.5)
HGB BLD-MCNC: 15 G/DL (ref 12–16)
IMM GRANULOCYTES # BLD AUTO: 0.02 K/UL (ref 0–0.04)
IMM GRANULOCYTES NFR BLD AUTO: 0.3 % (ref 0–0.5)
LYMPHOCYTES # BLD AUTO: 1.8 K/UL (ref 1–4.8)
LYMPHOCYTES NFR BLD: 29.8 % (ref 18–48)
MCH RBC QN AUTO: 29.7 PG (ref 27–31)
MCHC RBC AUTO-ENTMCNC: 34.6 G/DL (ref 32–36)
MCV RBC AUTO: 86 FL (ref 82–98)
MONOCYTES # BLD AUTO: 0.4 K/UL (ref 0.3–1)
MONOCYTES NFR BLD: 7.4 % (ref 4–15)
NEUTROPHILS # BLD AUTO: 3.7 K/UL (ref 1.8–7.7)
NEUTROPHILS NFR BLD: 61.5 % (ref 38–73)
NRBC BLD-RTO: 0 /100 WBC
PLATELET # BLD AUTO: 224 K/UL (ref 150–450)
PMV BLD AUTO: 9.6 FL (ref 9.2–12.9)
POTASSIUM SERPL-SCNC: 3.2 MMOL/L (ref 3.5–5.1)
PROT SERPL-MCNC: 7.7 G/DL (ref 6–8.4)
RBC # BLD AUTO: 5.05 M/UL (ref 4–5.4)
SODIUM SERPL-SCNC: 140 MMOL/L (ref 136–145)
TROPONIN I SERPL DL<=0.01 NG/ML-MCNC: 0.09 NG/ML (ref 0–0.03)
WBC # BLD AUTO: 5.94 K/UL (ref 3.9–12.7)

## 2023-09-24 PROCEDURE — 84484 ASSAY OF TROPONIN QUANT: CPT | Performed by: EMERGENCY MEDICINE

## 2023-09-24 PROCEDURE — G0378 HOSPITAL OBSERVATION PER HR: HCPCS

## 2023-09-24 PROCEDURE — 25000003 PHARM REV CODE 250: Performed by: EMERGENCY MEDICINE

## 2023-09-24 PROCEDURE — 93010 EKG 12-LEAD: ICD-10-PCS | Mod: ,,, | Performed by: SPECIALIST

## 2023-09-24 PROCEDURE — 25000003 PHARM REV CODE 250: Performed by: INTERNAL MEDICINE

## 2023-09-24 PROCEDURE — 99285 EMERGENCY DEPT VISIT HI MDM: CPT | Mod: 25

## 2023-09-24 PROCEDURE — 83880 ASSAY OF NATRIURETIC PEPTIDE: CPT | Performed by: EMERGENCY MEDICINE

## 2023-09-24 PROCEDURE — 93005 ELECTROCARDIOGRAM TRACING: CPT

## 2023-09-24 PROCEDURE — 94760 N-INVAS EAR/PLS OXIMETRY 1: CPT

## 2023-09-24 PROCEDURE — 93010 ELECTROCARDIOGRAM REPORT: CPT | Mod: ,,, | Performed by: SPECIALIST

## 2023-09-24 PROCEDURE — 85025 COMPLETE CBC W/AUTO DIFF WBC: CPT | Performed by: EMERGENCY MEDICINE

## 2023-09-24 PROCEDURE — 80053 COMPREHEN METABOLIC PANEL: CPT | Performed by: EMERGENCY MEDICINE

## 2023-09-24 PROCEDURE — 85379 FIBRIN DEGRADATION QUANT: CPT | Performed by: EMERGENCY MEDICINE

## 2023-09-24 PROCEDURE — 36415 COLL VENOUS BLD VENIPUNCTURE: CPT | Performed by: EMERGENCY MEDICINE

## 2023-09-24 RX ORDER — SODIUM CHLORIDE 0.9 % (FLUSH) 0.9 %
10 SYRINGE (ML) INJECTION EVERY 12 HOURS PRN
Status: DISCONTINUED | OUTPATIENT
Start: 2023-09-24 | End: 2023-09-27 | Stop reason: HOSPADM

## 2023-09-24 RX ORDER — SERTRALINE HYDROCHLORIDE 25 MG/1
25 TABLET, FILM COATED ORAL DAILY
Status: DISCONTINUED | OUTPATIENT
Start: 2023-09-25 | End: 2023-09-27 | Stop reason: HOSPADM

## 2023-09-24 RX ORDER — AMLODIPINE BESYLATE 5 MG/1
5 TABLET ORAL DAILY
Status: DISCONTINUED | OUTPATIENT
Start: 2023-09-25 | End: 2023-09-27 | Stop reason: HOSPADM

## 2023-09-24 RX ORDER — ATORVASTATIN CALCIUM 40 MG/1
40 TABLET, FILM COATED ORAL NIGHTLY
Status: DISCONTINUED | OUTPATIENT
Start: 2023-09-24 | End: 2023-09-27 | Stop reason: HOSPADM

## 2023-09-24 RX ORDER — FERROUS SULFATE 325(65) MG
1 TABLET, DELAYED RELEASE (ENTERIC COATED) ORAL DAILY
COMMUNITY
Start: 2023-09-08

## 2023-09-24 RX ORDER — LOSARTAN POTASSIUM 50 MG/1
50 TABLET ORAL DAILY
Status: DISCONTINUED | OUTPATIENT
Start: 2023-09-25 | End: 2023-09-26

## 2023-09-24 RX ORDER — ONDANSETRON 4 MG/1
4 TABLET, ORALLY DISINTEGRATING ORAL
Status: COMPLETED | OUTPATIENT
Start: 2023-09-24 | End: 2023-09-24

## 2023-09-24 RX ORDER — ONDANSETRON 4 MG/1
4 TABLET, ORALLY DISINTEGRATING ORAL ONCE
Status: COMPLETED | OUTPATIENT
Start: 2023-09-24 | End: 2023-09-24

## 2023-09-24 RX ORDER — LEVOTHYROXINE SODIUM 88 UG/1
88 TABLET ORAL
Status: DISCONTINUED | OUTPATIENT
Start: 2023-09-25 | End: 2023-09-27 | Stop reason: HOSPADM

## 2023-09-24 RX ORDER — ALPRAZOLAM 1 MG/1
1 TABLET ORAL
Status: COMPLETED | OUTPATIENT
Start: 2023-09-24 | End: 2023-09-24

## 2023-09-24 RX ORDER — NITROGLYCERIN 0.4 MG/1
0.4 TABLET SUBLINGUAL EVERY 5 MIN PRN
Status: DISCONTINUED | OUTPATIENT
Start: 2023-09-24 | End: 2023-09-27 | Stop reason: HOSPADM

## 2023-09-24 RX ORDER — HYDROCHLOROTHIAZIDE 25 MG/1
25 TABLET ORAL DAILY
Status: DISCONTINUED | OUTPATIENT
Start: 2023-09-25 | End: 2023-09-26

## 2023-09-24 RX ORDER — GLUCAGON 1 MG
1 KIT INJECTION
Status: DISCONTINUED | OUTPATIENT
Start: 2023-09-24 | End: 2023-09-25

## 2023-09-24 RX ORDER — TORSEMIDE 10 MG/1
10 TABLET ORAL DAILY
Status: DISCONTINUED | OUTPATIENT
Start: 2023-09-25 | End: 2023-09-26

## 2023-09-24 RX ORDER — OXYMETAZOLINE HCL 0.05 %
2 SPRAY, NON-AEROSOL (ML) NASAL
Status: COMPLETED | OUTPATIENT
Start: 2023-09-24 | End: 2023-09-24

## 2023-09-24 RX ORDER — TALC
6 POWDER (GRAM) TOPICAL NIGHTLY PRN
Status: DISCONTINUED | OUTPATIENT
Start: 2023-09-24 | End: 2023-09-27 | Stop reason: HOSPADM

## 2023-09-24 RX ORDER — IBUPROFEN 200 MG
16 TABLET ORAL
Status: DISCONTINUED | OUTPATIENT
Start: 2023-09-24 | End: 2023-09-25

## 2023-09-24 RX ORDER — PREGABALIN 75 MG/1
150 CAPSULE ORAL 2 TIMES DAILY
Status: DISCONTINUED | OUTPATIENT
Start: 2023-09-24 | End: 2023-09-27 | Stop reason: HOSPADM

## 2023-09-24 RX ORDER — IBUPROFEN 200 MG
24 TABLET ORAL
Status: DISCONTINUED | OUTPATIENT
Start: 2023-09-24 | End: 2023-09-25

## 2023-09-24 RX ORDER — NAPROXEN SODIUM 220 MG/1
81 TABLET, FILM COATED ORAL DAILY
Status: DISCONTINUED | OUTPATIENT
Start: 2023-09-25 | End: 2023-09-27 | Stop reason: HOSPADM

## 2023-09-24 RX ORDER — NALOXONE HCL 0.4 MG/ML
0.02 VIAL (ML) INJECTION
Status: DISCONTINUED | OUTPATIENT
Start: 2023-09-24 | End: 2023-09-27 | Stop reason: HOSPADM

## 2023-09-24 RX ORDER — ALBUTEROL SULFATE 0.83 MG/ML
2.5 SOLUTION RESPIRATORY (INHALATION) EVERY 6 HOURS PRN
Status: DISCONTINUED | OUTPATIENT
Start: 2023-09-24 | End: 2023-09-27 | Stop reason: HOSPADM

## 2023-09-24 RX ORDER — PRAMIPEXOLE DIHYDROCHLORIDE 1 MG/1
1 TABLET ORAL NIGHTLY
Status: DISCONTINUED | OUTPATIENT
Start: 2023-09-24 | End: 2023-09-27 | Stop reason: HOSPADM

## 2023-09-24 RX ADMIN — PRAMIPEXOLE DIHYDROCHLORIDE 1 MG: 1 TABLET ORAL at 09:09

## 2023-09-24 RX ADMIN — ONDANSETRON 4 MG: 4 TABLET, ORALLY DISINTEGRATING ORAL at 08:09

## 2023-09-24 RX ADMIN — ALPRAZOLAM 1 MG: 1 TABLET ORAL at 02:09

## 2023-09-24 RX ADMIN — ONDANSETRON 4 MG: 4 TABLET, ORALLY DISINTEGRATING ORAL at 01:09

## 2023-09-24 RX ADMIN — ATORVASTATIN CALCIUM 40 MG: 40 TABLET, FILM COATED ORAL at 09:09

## 2023-09-24 RX ADMIN — TRAZODONE HYDROCHLORIDE 25 MG: 50 TABLET ORAL at 09:09

## 2023-09-24 RX ADMIN — PREGABALIN 150 MG: 75 CAPSULE ORAL at 09:09

## 2023-09-24 RX ADMIN — Medication 2 SPRAY: at 01:09

## 2023-09-24 NOTE — Clinical Note
70 ml of contrast were injected throughout the case. 20 mL of contrast was the total wasted during the case. 90 mL was the total amount used during the case.

## 2023-09-24 NOTE — Clinical Note
MD in room, aware creatinine jumped from 1.0 to 1.4, elects to cancel procedure. Patient is educated from physician.

## 2023-09-24 NOTE — ED PROVIDER NOTES
Encounter Date: 9/24/2023       History     Chief Complaint   Patient presents with    Shortness of Breath     Started this morning - denies cough or fever     73-year-old female with a history of lymphedema, hypertension, fibromyalgia, depression and anxiety presents with shortness of breath that began this morning.  She is complaining of some right shoulder pain, previous rotator cuff surgery to the right shoulder.  This is causing tingling in the right arm but no numbness or weakness.  No facial droop no slurred speech no fevers no chills no runny nose no cough no congestion no weight gain no lower extremity swelling no abdominal pain.    The history is provided by the patient.     Review of patient's allergies indicates:   Allergen Reactions    Keflex [cephalexin]      Throat swelling    Pcn [penicillins]      Throat swelling    Latex, natural rubber Other (See Comments)     Redness with bandaids     Adhesive Other (See Comments)     bandainds redness at skin    Trelegy ellipta [fluticasone-umeclidin-vilanter]      Vision disturbance     Past Medical History:   Diagnosis Date    Allergy     Anxiety     Arthritis, rheumatoid     Back pain     Chronic bronchiolitis     Depression     Fibromyalgia     GERD (gastroesophageal reflux disease)     High cholesterol     Hilar mass 03/27/2014    [place]     aid right ear    [place]     Hypertension     Incontinence of urine     Lymphedema     MS (multiple sclerosis)     benign; another MD stated she has no MS    Neuropathy     Personal history of colonic polyps 12/05/2019    Restless leg syndrome     Rotator cuff tear 04/16/2015    Sciatica of left side 01/05/2018    Seasonal allergies     Sinus congestion     Sleep apnea     DOES NOT USE MACHINE    Spondylolysis     Thyroid disease     Type 2 diabetes mellitus with polyneuropathy     no med. was borderline    Wheezing      Past Surgical History:   Procedure Laterality Date    APPENDECTOMY       ARTHROSCOPIC DEBRIDEMENT OF SHOULDER Right 02/18/2022    Procedure: EXTENSIVE DEBRIDEMENT, SHOULDER, ARTHROSCOPIC;  Surgeon: Rio Pitts MD;  Location: [facility] OR;  Service: Orthopedics;  Laterality: Right;    BREAST SURGERY      reduction    CLOSED REDUCTION OF INJURY OF SHOULDER Right 09/19/2021    Procedure: CLOSED REDUCTION, SHOULDER;  Surgeon: Antonio Irwin MD;  Location: [facility] OR;  Service: Orthopedics;  Laterality: Right;    COLONOSCOPY W/ POLYPECTOMY N/A 12/05/2019    repeat in 5 yrs    EPIDURAL STEROID INJECTION INTO LUMBAR SPINE N/A 06/12/2019    Procedure: Injection-steroid-epidural-lumbar;  Surgeon: Thad Manning MD;  Location: [facility] OR;  Service: Pain Management;  Laterality: N/A;  L5-S1    EPIDURAL STEROID INJECTION INTO LUMBAR SPINE N/A 09/16/2019    Procedure: Injection-steroid-epidural-lumbar;  Surgeon: Thad Manning MD;  Location: [facility] OR;  Service: Pain Management;  Laterality: N/A;  L5-S1    EYE SURGERY Bilateral     HYSTERECTOMY      partial - fibroids    INJECTION OF ANESTHETIC AGENT AROUND MEDIAL BRANCH NERVES INNERVATING LUMBAR FACET JOINT Bilateral 02/28/2019    Procedure: Block-nerve-medial branch-lumbar;  Surgeon: Thad Manning MD;  Location: [facility] OR;  Service: Pain Management;  Laterality: Bilateral;  L3, 4,5     INJECTION OF ANESTHETIC AGENT AROUND MEDIAL BRANCH NERVES INNERVATING LUMBAR FACET JOINT Bilateral 03/21/2019    Procedure: Block-nerve-medial branch-lumbar L3,4,5;  Surgeon: Thad Manning MD;  Location: [facility] OR;  Service: Pain Management;  Laterality: Bilateral;    KNEE ARTHROPLASTY Left 03/16/2021    Procedure: ARTHROPLASTY, KNEE;  Surgeon: Rio Pitts MD;  Location: [facility] OR;  Service: Orthopedics;  Laterality: Left;  GENERAL AND BLOCK    LIPOSUCTION  1985    RADIOFREQUENCY ABLATION Left 11/04/2020    Procedure: Radiofrequency Ablation left knee;  Surgeon: Andrew Escudero MD;  Location: [facility] OR;  Service: Pain Management;  Laterality: Left;    RADIOFREQUENCY ABLATION  OF LUMBAR MEDIAL BRANCH NERVE AT SINGLE LEVEL Bilateral 2019    Procedure: Radiofrequency Ablation, Nerve, Spinal, Lumbar, Medial Branch, 1 Level;  Surgeon: Thad Manning MD;  [facility]: [facility] OR;  Service: Pain Management;  Laterality: Bilateral;  L3, 4, 5  lumbar  [employer] pain management generator  SN [id number]  80 degrees for 75 seconds x2    RADIOFREQUENCY ABLATION OF LUMBAR MEDIAL BRANCH NERVE AT SINGLE LEVEL Bilateral 10/22/2019    Procedure: Radiofrequency Ablation, Nerve, Spinal, Lumbar, Medial Branch, L3,4,5;  Surgeon: Thad Manning MD;  Location: [facility] OR;  Service: Pain Management;  Laterality: Bilateral;  burned at 80 degrees C X 60 seconds X 2 each site    RADIOFREQUENCY ABLATION OF LUMBAR MEDIAL BRANCH NERVE AT SINGLE LEVEL Bilateral 2020    Procedure: Radiofrequency Ablation, Nerve, Spinal, Lumbar, Medial Branch, 1 Level;  Surgeon: Thad Manning MD;  Location: [facility] OR;  Service: Pain Management;  Laterality: Bilateral;  L3,4,5    REVERSE TOTAL SHOULDER ARTHROPLASTY Right 2022    Procedure: ARTHROPLASTY, SHOULDER, TOTAL, REVERSE;  Surgeon: Rio Pitts MD;  [facility];  Service: Orthopedics;  Laterality: Right;    SHOULDER ARTHROSCOPY Right 2021    Procedure: ARTHROSCOPY, SHOULDER;  Surgeon: Antonio Irwin MD;  [facility];  Service: Orthopedics;  Laterality: Right;  LIMITED DEBRIDMENT    TONSILLECTOMY      TOTAL REDUCTION MAMMOPLASTY       Family History   Problem Relation Age of Onset    Heart disease Mother      Social History     Tobacco Use    Smoking status: Former     Current packs/day: 0.00     Types: Cigarettes     Quit date: 1996     Years since quittin.3     Passive exposure: Past    Smokeless tobacco: Never   Substance Use Topics    Alcohol use: Yes     Comment: very little     Drug use: No     Review of Systems   Constitutional:  Negative for fever.   Respiratory:  Positive for shortness of breath.    Cardiovascular:  Negative for  chest pain and leg swelling.   Gastrointestinal:  Negative for nausea.   Genitourinary:  Negative for flank pain.   Musculoskeletal:  Positive for arthralgias. Negative for back pain.   Skin:  Negative for rash.   Neurological:  Negative for weakness.   Hematological:  Does not bruise/bleed easily.   All other systems reviewed and are negative.      Physical Exam     Initial Vitals [09/24/23 1130]   BP Pulse Resp Temp SpO2   138/83 72 (!) 22 98.2 °F (36.8 °C) 100 %      MAP       --         Physical Exam    Nursing note and vitals reviewed.  Constitutional: She appears well-developed and well-nourished.   HENT:   Head: Normocephalic and atraumatic.   Eyes: EOM are normal.   Neck: Neck supple.   Normal range of motion.  Cardiovascular:  Normal rate, regular rhythm and normal heart sounds.     Exam reveals no gallop and no friction rub.       No murmur heard.  Pulses:       Radial pulses are 2+ on the right side and 2+ on the left side.   Pulmonary/Chest: Breath sounds normal. No respiratory distress. She has no wheezes. She has no rhonchi. She has no rales.   Musculoskeletal:         General: Tenderness present.      Right shoulder: Tenderness and bony tenderness present. No swelling, deformity, effusion or laceration. Decreased range of motion.      Right upper arm: No deformity.      Right elbow: Normal.        Arms:       Cervical back: Normal range of motion and neck supple.     Neurological: She is alert and oriented to person, place, and time.   Skin: Skin is warm and dry.   Psychiatric: She has a normal mood and affect. Her behavior is normal. Judgment and thought content normal.         ED Course   Procedures  Labs Reviewed   COMPREHENSIVE METABOLIC PANEL - Abnormal; Notable for the following components:       Result Value    Potassium 3.2 (*)     CO2 19 (*)     Anion Gap 17 (*)     All other components within normal limits   B-TYPE NATRIURETIC PEPTIDE - Abnormal; Notable for the following components:    BNP  135 (*)     All other components within normal limits   TROPONIN I - Abnormal; Notable for the following components:    Troponin I 0.093 (*)     All other components within normal limits    Narrative:       Troponin critical result(s) called and verbal readback obtained   from Aldair Keyes by [email or web address] 09/24/2023 13:45   D DIMER, QUANTITATIVE - Abnormal; Notable for the following components:    D-Dimer 0.73 (*)     All other components within normal limits    Narrative:     DDimer  critical result(s) called and verbal readback obtained from   Tamela May RN. by SHIRA 09/24/2023 13:42   CBC W/ AUTO DIFFERENTIAL          Imaging Results              X-Ray Chest AP Portable (Final result)  Result time 09/24/23 12:21:09      Final result by Ramón Cassidy MD (09/24/23 12:21:09)                   Impression:      Mild cardiomegaly with no acute findings      Electronically signed by: Ramón Cassidy MD  Date:    09/24/2023  Time:    12:21               Narrative:    EXAMINATION:  XR CHEST AP PORTABLE    CLINICAL HISTORY:  Chest Pain;    TECHNIQUE:  Single frontal view of the chest was performed.    COMPARISON:  12/06/2022    FINDINGS:  Cardiac silhouette remains mildly enlarged.    Lung volumes are low.  No definite parenchymal consolidation, pleural effusion, or pneumothorax visualized.  Prior right shoulder arthroplasty is again noted.                                       Medications   ondansetron disintegrating tablet 4 mg (4 mg Oral Given 9/24/23 1325)   oxymetazoline 0.05 % nasal spray 2 spray (2 sprays Each Nostril Given 9/24/23 1356)   ALPRAZolam tablet 1 mg (1 mg Oral Given 9/24/23 1444)     Medical Decision Making  73-year-old female hypertension and hyperlipidemia presents to the ER with shortness of breath, denies other complaints except for some nasal congestion.  Troponin is marginally elevated.  Old troponins have been much higher.  EKG does demonstrate some new T-wave inversion however in V2 and  V3.  Case discussed with cardiology here who recommends transfer to [facility].  Patient will be admitted to the hospital there.  D-dimer slightly elevated, but adjusted for age is normal.  Well-appearing in the emergency room at this time.    Amount and/or Complexity of Data Reviewed  Labs: ordered.  Radiology: ordered. Decision-making details documented in ED Course.  Discussion of management or test interpretation with external provider(s): 2884 Case discussed with Dr. Sim of Cardiology who recommends transfer to [facility] for possible angiogram if stress test is possible.  I will discuss this with the patient. [EF]  1414 Dr lagunas [facility] accepts patient [EF]      Risk  OTC drugs.  Prescription drug management.               ED Course as of 09/24/23 1615   Sun Sep 24, 2023   1134 SpO2: 100 % [EF]   1135 Resp(!): 22 [EF]   1135 Pulse: 72 [EF]   1135 Temp Source: Oral [EF]   1135 Temp: 98.2 °F (36.8 °C) [EF]   1135 BP: 138/83 [EF]   1214 SpO2: 100 % [EF]   1216 Pulse: 65 [EF]   1216 BP(!): 187/79  Nothing obvious on the chest x-ray, my interpretation, formal read is pending [EF]   1219 Sinus rhythm 66 beats per minute left axis no ST elevation, T-wave inversion V2 V3 is new compared to old EKGs independently interpreted [EF]   1226 X-Ray Chest AP Portable [EF]   1250 Dimer .73, normal if adjusted for age [EF]   1309 Lab is on down time.  White count is 6, hemoglobin 15, platelets 220.  BNP is 130.  Metabolic panel is normal.  Troponin is 0.093. [EF]              ED Course User Index  [EF] Kyle Warren MD                    Clinical Impression:   Final diagnoses:  [R06.02] SOB (shortness of breath)        ED Disposition Condition    Observation                 Kyle Warren MD  09/24/23 1616

## 2023-09-25 ENCOUNTER — CLINICAL SUPPORT (OUTPATIENT)
Dept: CARDIOLOGY | Facility: HOSPITAL | Age: 73
DRG: 948 | End: 2023-09-25
Attending: INTERNAL MEDICINE
Payer: MEDICARE

## 2023-09-25 ENCOUNTER — HOSPITAL ENCOUNTER (OUTPATIENT)
Dept: RADIOLOGY | Facility: HOSPITAL | Age: 73
Discharge: HOME | DRG: 948 | End: 2023-09-25
Attending: INTERNAL MEDICINE | Admitting: HOSPITALIST
Payer: MEDICARE

## 2023-09-25 PROBLEM — N17.9 AKI (ACUTE KIDNEY INJURY): Status: RESOLVED | Noted: 2023-06-23 | Resolved: 2023-09-25

## 2023-09-25 LAB
ANION GAP SERPL CALC-SCNC: 7 MMOL/L (ref 8–16)
APTT PPP: 29.1 SEC (ref 21–32)
BASOPHILS # BLD AUTO: 0.05 K/UL (ref 0–0.2)
BASOPHILS NFR BLD: 0.7 % (ref 0–1.9)
BUN SERPL-MCNC: 22 MG/DL (ref 8–23)
CALCIUM SERPL-MCNC: 8.9 MG/DL (ref 8.7–10.5)
CHLORIDE SERPL-SCNC: 105 MMOL/L (ref 95–110)
CO2 SERPL-SCNC: 27 MMOL/L (ref 23–29)
CREAT SERPL-MCNC: 1 MG/DL (ref 0.5–1.4)
CV PHARM DOSE: 0.4 MG
CV STRESS BASE HR: 56 BPM
DIASTOLIC BLOOD PRESSURE: 81 MMHG
DIFFERENTIAL METHOD: NORMAL
EOSINOPHIL # BLD AUTO: 0.1 K/UL (ref 0–0.5)
EOSINOPHIL NFR BLD: 1.9 % (ref 0–8)
ERYTHROCYTE [DISTWIDTH] IN BLOOD BY AUTOMATED COUNT: 12.3 % (ref 11.5–14.5)
EST. GFR  (NO RACE VARIABLE): 58.8 ML/MIN/1.73 M^2
GLUCOSE SERPL-MCNC: 85 MG/DL (ref 70–110)
GLUCOSE SERPL-MCNC: 98 MG/DL (ref 70–110)
HCT VFR BLD AUTO: 41.7 % (ref 37–48.5)
HGB BLD-MCNC: 13.8 G/DL (ref 12–16)
IMM GRANULOCYTES # BLD AUTO: 0.01 K/UL (ref 0–0.04)
IMM GRANULOCYTES NFR BLD AUTO: 0.1 % (ref 0–0.5)
LYMPHOCYTES # BLD AUTO: 2.9 K/UL (ref 1–4.8)
LYMPHOCYTES NFR BLD: 39 % (ref 18–48)
MCH RBC QN AUTO: 29.4 PG (ref 27–31)
MCHC RBC AUTO-ENTMCNC: 33.1 G/DL (ref 32–36)
MCV RBC AUTO: 89 FL (ref 82–98)
MONOCYTES # BLD AUTO: 0.5 K/UL (ref 0.3–1)
MONOCYTES NFR BLD: 6.3 % (ref 4–15)
NEUTROPHILS # BLD AUTO: 3.9 K/UL (ref 1.8–7.7)
NEUTROPHILS NFR BLD: 52 % (ref 38–73)
NRBC BLD-RTO: 0 /100 WBC
OHS CV CPX 1 MINUTE RECOVERY HEART RATE: 97 BPM
OHS CV CPX 85 PERCENT MAX PREDICTED HEART RATE MALE: 119
OHS CV CPX MAX PREDICTED HEART RATE: 140
OHS CV CPX PATIENT IS FEMALE: 1
OHS CV CPX PATIENT IS MALE: 0
OHS CV CPX PEAK DIASTOLIC BLOOD PRESSURE: 81 MMHG
OHS CV CPX PEAK HEAR RATE: 108 BPM
OHS CV CPX PEAK RATE PRESSURE PRODUCT: NORMAL
OHS CV CPX PEAK SYSTOLIC BLOOD PRESSURE: 157 MMHG
OHS CV CPX PERCENT MAX PREDICTED HEART RATE ACHIEVED: 77
OHS CV CPX RATE PRESSURE PRODUCT PRESENTING: 8792
PLATELET # BLD AUTO: 217 K/UL (ref 150–450)
PMV BLD AUTO: 9.3 FL (ref 9.2–12.9)
POTASSIUM SERPL-SCNC: 3.3 MMOL/L (ref 3.5–5.1)
RBC # BLD AUTO: 4.7 M/UL (ref 4–5.4)
SODIUM SERPL-SCNC: 139 MMOL/L (ref 136–145)
SYSTOLIC BLOOD PRESSURE: 157 MMHG
TROPONIN I SERPL HS-MCNC: 10 PG/ML (ref 0–14.9)
TROPONIN I SERPL HS-MCNC: 11.8 PG/ML (ref 0–14.9)
WBC # BLD AUTO: 7.43 K/UL (ref 3.9–12.7)

## 2023-09-25 PROCEDURE — 25000003 PHARM REV CODE 250: Performed by: INTERNAL MEDICINE

## 2023-09-25 PROCEDURE — 93018 NUCLEAR STRESS TEST (CUPID ONLY): ICD-10-PCS | Mod: ,,, | Performed by: INTERNAL MEDICINE

## 2023-09-25 PROCEDURE — 94760 N-INVAS EAR/PLS OXIMETRY 1: CPT

## 2023-09-25 PROCEDURE — 99213 OFFICE O/P EST LOW 20 MIN: CPT | Mod: 25,,, | Performed by: INTERNAL MEDICINE

## 2023-09-25 PROCEDURE — 36415 COLL VENOUS BLD VENIPUNCTURE: CPT | Performed by: NURSE PRACTITIONER

## 2023-09-25 PROCEDURE — 94761 N-INVAS EAR/PLS OXIMETRY MLT: CPT

## 2023-09-25 PROCEDURE — 93016 CV STRESS TEST SUPVJ ONLY: CPT | Mod: ,,, | Performed by: INTERNAL MEDICINE

## 2023-09-25 PROCEDURE — G0378 HOSPITAL OBSERVATION PER HR: HCPCS

## 2023-09-25 PROCEDURE — 99900035 HC TECH TIME PER 15 MIN (STAT)

## 2023-09-25 PROCEDURE — 93016 NUCLEAR STRESS TEST (CUPID ONLY): ICD-10-PCS | Mod: ,,, | Performed by: INTERNAL MEDICINE

## 2023-09-25 PROCEDURE — 84484 ASSAY OF TROPONIN QUANT: CPT | Mod: 91 | Performed by: INTERNAL MEDICINE

## 2023-09-25 PROCEDURE — 85025 COMPLETE CBC W/AUTO DIFF WBC: CPT | Performed by: NURSE PRACTITIONER

## 2023-09-25 PROCEDURE — 93017 CV STRESS TEST TRACING ONLY: CPT

## 2023-09-25 PROCEDURE — 99900031 HC PATIENT EDUCATION (STAT)

## 2023-09-25 PROCEDURE — 93018 CV STRESS TEST I&R ONLY: CPT | Mod: ,,, | Performed by: INTERNAL MEDICINE

## 2023-09-25 PROCEDURE — A9502 TC99M TETROFOSMIN: HCPCS

## 2023-09-25 PROCEDURE — 78452 HT MUSCLE IMAGE SPECT MULT: CPT | Mod: TC

## 2023-09-25 PROCEDURE — 85730 THROMBOPLASTIN TIME PARTIAL: CPT | Performed by: NURSE PRACTITIONER

## 2023-09-25 PROCEDURE — 63600175 PHARM REV CODE 636 W HCPCS: Performed by: INTERNAL MEDICINE

## 2023-09-25 PROCEDURE — 36415 COLL VENOUS BLD VENIPUNCTURE: CPT | Performed by: INTERNAL MEDICINE

## 2023-09-25 PROCEDURE — 25000003 PHARM REV CODE 250: Performed by: NURSE PRACTITIONER

## 2023-09-25 PROCEDURE — 80048 BASIC METABOLIC PNL TOTAL CA: CPT | Performed by: INTERNAL MEDICINE

## 2023-09-25 PROCEDURE — 99213 PR OFFICE/OUTPT VISIT, EST, LEVL III, 20-29 MIN: ICD-10-PCS | Mod: 25,,, | Performed by: INTERNAL MEDICINE

## 2023-09-25 PROCEDURE — 84484 ASSAY OF TROPONIN QUANT: CPT | Performed by: INTERNAL MEDICINE

## 2023-09-25 RX ORDER — IBUPROFEN 200 MG
16 TABLET ORAL
Status: DISCONTINUED | OUTPATIENT
Start: 2023-09-25 | End: 2023-09-27 | Stop reason: HOSPADM

## 2023-09-25 RX ORDER — LANOLIN ALCOHOL/MO/W.PET/CERES
800 CREAM (GRAM) TOPICAL
Status: DISCONTINUED | OUTPATIENT
Start: 2023-09-25 | End: 2023-09-27 | Stop reason: HOSPADM

## 2023-09-25 RX ORDER — IBUPROFEN 200 MG
24 TABLET ORAL
Status: DISCONTINUED | OUTPATIENT
Start: 2023-09-25 | End: 2023-09-27 | Stop reason: HOSPADM

## 2023-09-25 RX ORDER — REGADENOSON 0.08 MG/ML
0.4 INJECTION, SOLUTION INTRAVENOUS
Status: COMPLETED | OUTPATIENT
Start: 2023-09-25 | End: 2023-09-25

## 2023-09-25 RX ORDER — DIPHENHYDRAMINE HCL 25 MG
50 CAPSULE ORAL
Status: DISCONTINUED | OUTPATIENT
Start: 2023-09-25 | End: 2023-09-26 | Stop reason: HOSPADM

## 2023-09-25 RX ORDER — INSULIN ASPART 100 [IU]/ML
0-5 INJECTION, SOLUTION INTRAVENOUS; SUBCUTANEOUS
Status: DISCONTINUED | OUTPATIENT
Start: 2023-09-25 | End: 2023-09-27 | Stop reason: HOSPADM

## 2023-09-25 RX ORDER — SODIUM,POTASSIUM PHOSPHATES 280-250MG
2 POWDER IN PACKET (EA) ORAL
Status: DISCONTINUED | OUTPATIENT
Start: 2023-09-25 | End: 2023-09-27 | Stop reason: HOSPADM

## 2023-09-25 RX ORDER — GLUCAGON 1 MG
1 KIT INJECTION
Status: DISCONTINUED | OUTPATIENT
Start: 2023-09-25 | End: 2023-09-27 | Stop reason: HOSPADM

## 2023-09-25 RX ORDER — SODIUM CHLORIDE 9 MG/ML
INJECTION, SOLUTION INTRAVENOUS ONCE
Status: COMPLETED | OUTPATIENT
Start: 2023-09-25 | End: 2023-09-26

## 2023-09-25 RX ADMIN — TORSEMIDE 10 MG: 10 TABLET ORAL at 11:09

## 2023-09-25 RX ADMIN — TRAZODONE HYDROCHLORIDE 25 MG: 50 TABLET ORAL at 10:09

## 2023-09-25 RX ADMIN — PREGABALIN 150 MG: 75 CAPSULE ORAL at 11:09

## 2023-09-25 RX ADMIN — AMLODIPINE BESYLATE 5 MG: 5 TABLET ORAL at 11:09

## 2023-09-25 RX ADMIN — PRAMIPEXOLE DIHYDROCHLORIDE 1 MG: 1 TABLET ORAL at 09:09

## 2023-09-25 RX ADMIN — HYDROCHLOROTHIAZIDE 25 MG: 25 TABLET ORAL at 11:09

## 2023-09-25 RX ADMIN — LOSARTAN POTASSIUM 50 MG: 50 TABLET, FILM COATED ORAL at 11:09

## 2023-09-25 RX ADMIN — SERTRALINE HYDROCHLORIDE 25 MG: 25 TABLET ORAL at 11:09

## 2023-09-25 RX ADMIN — LEVOTHYROXINE SODIUM 88 MCG: 0.09 TABLET ORAL at 05:09

## 2023-09-25 RX ADMIN — POTASSIUM BICARBONATE 35 MEQ: 391 TABLET, EFFERVESCENT ORAL at 04:09

## 2023-09-25 RX ADMIN — ASPIRIN 81 MG 81 MG: 81 TABLET ORAL at 11:09

## 2023-09-25 RX ADMIN — PREGABALIN 150 MG: 75 CAPSULE ORAL at 09:09

## 2023-09-25 RX ADMIN — ATORVASTATIN CALCIUM 40 MG: 40 TABLET, FILM COATED ORAL at 09:09

## 2023-09-25 RX ADMIN — REGADENOSON 0.4 MG: 0.08 INJECTION, SOLUTION INTRAVENOUS at 10:09

## 2023-09-25 NOTE — ASSESSMENT & PLAN NOTE
lexiscan myoview tomorrow   ASA , statin  And EKG PRN   Cardiology aware , dr Sim   ECHO 2022 noted  Repeat cardiac enz

## 2023-09-25 NOTE — SUBJECTIVE & OBJECTIVE
Past Medical History:   Diagnosis Date    Allergy     Anxiety     Arthritis, rheumatoid     Back pain     Chronic bronchiolitis     Depression     Fibromyalgia     GERD (gastroesophageal reflux disease)     High cholesterol     Hilar mass 03/27/2014    [place]     aid right ear    [place]     Hypertension     Incontinence of urine     Lymphedema     MS (multiple sclerosis)     benign; another MD stated she has no MS    Neuropathy     Personal history of colonic polyps 12/05/2019    Restless leg syndrome     Rotator cuff tear 04/16/2015    Sciatica of left side 01/05/2018    Seasonal allergies     Sinus congestion     Sleep apnea     DOES NOT USE MACHINE    Spondylolysis     Thyroid disease     Type 2 diabetes mellitus with polyneuropathy     no med. was borderline    Wheezing        Past Surgical History:   Procedure Laterality Date    APPENDECTOMY      ARTHROSCOPIC DEBRIDEMENT OF SHOULDER Right 02/18/2022    Procedure: EXTENSIVE DEBRIDEMENT, SHOULDER, ARTHROSCOPIC;  Surgeon: Rio Pitts MD;  Location: [facility] OR;  Service: Orthopedics;  Laterality: Right;    BREAST SURGERY      reduction    CLOSED REDUCTION OF INJURY OF SHOULDER Right 09/19/2021    Procedure: CLOSED REDUCTION, SHOULDER;  Surgeon: Antonio Irwin MD;  Location: [facility] OR;  Service: Orthopedics;  Laterality: Right;    COLONOSCOPY W/ POLYPECTOMY N/A 12/05/2019    repeat in 5 yrs    EPIDURAL STEROID INJECTION INTO LUMBAR SPINE N/A 06/12/2019    Procedure: Injection-steroid-epidural-lumbar;  Surgeon: Thad Manning MD;  Location: [facility] OR;  Service: Pain Management;  Laterality: N/A;  L5-S1    EPIDURAL STEROID INJECTION INTO LUMBAR SPINE N/A 09/16/2019    Procedure: Injection-steroid-epidural-lumbar;  Surgeon: Thad Manning MD;  Location: [facility] OR;  Service: Pain Management;  Laterality: N/A;  L5-S1    EYE SURGERY Bilateral     HYSTERECTOMY      partial - fibroids    INJECTION OF ANESTHETIC AGENT AROUND MEDIAL BRANCH NERVES  INNERVATING LUMBAR FACET JOINT Bilateral 02/28/2019    Procedure: Block-nerve-medial branch-lumbar;  Surgeon: Thad Manning MD;  Location: [facility] OR;  Service: Pain Management;  Laterality: Bilateral;  L3, 4,5     INJECTION OF ANESTHETIC AGENT AROUND MEDIAL BRANCH NERVES INNERVATING LUMBAR FACET JOINT Bilateral 03/21/2019    Procedure: Block-nerve-medial branch-lumbar L3,4,5;  Surgeon: Thad Manning MD;  Location: [facility] OR;  Service: Pain Management;  Laterality: Bilateral;    KNEE ARTHROPLASTY Left 03/16/2021    Procedure: ARTHROPLASTY, KNEE;  Surgeon: Rio Pitts MD;  Location: [facility] OR;  Service: Orthopedics;  Laterality: Left;  GENERAL AND BLOCK    LIPOSUCTION  1985    RADIOFREQUENCY ABLATION Left 11/04/2020    Procedure: Radiofrequency Ablation left knee;  Surgeon: Andrew Escudero MD;  Location: [facility] OR;  Service: Pain Management;  Laterality: Left;    RADIOFREQUENCY ABLATION OF LUMBAR MEDIAL BRANCH NERVE AT SINGLE LEVEL Bilateral 04/12/2019    Procedure: Radiofrequency Ablation, Nerve, Spinal, Lumbar, Medial Branch, 1 Level;  Surgeon: Thad Manning MD;  Location: [facility] OR;  Service: Pain Management;  Laterality: Bilateral;  L3, 4, 5  lumbar  [employer] pain management generator  SN [id number]  80 degrees for 75 seconds x2    RADIOFREQUENCY ABLATION OF LUMBAR MEDIAL BRANCH NERVE AT SINGLE LEVEL Bilateral 10/22/2019    Procedure: Radiofrequency Ablation, Nerve, Spinal, Lumbar, Medial Branch, L3,4,5;  Surgeon: Thad Manning MD;  Location: [facility] OR;  Service: Pain Management;  Laterality: Bilateral;  burned at 80 degrees C X 60 seconds X 2 each site    RADIOFREQUENCY ABLATION OF LUMBAR MEDIAL BRANCH NERVE AT SINGLE LEVEL Bilateral 05/27/2020    Procedure: Radiofrequency Ablation, Nerve, Spinal, Lumbar, Medial Branch, 1 Level;  Surgeon: Thad Manning MD;  Location: [facility] OR;  Service: Pain Management;  Laterality: Bilateral;  L3,4,5    REVERSE TOTAL SHOULDER ARTHROPLASTY Right 04/12/2022    Procedure: ARTHROPLASTY,  SHOULDER, TOTAL, REVERSE;  Surgeon: Rio Pitts MD;  Location: [facility] OR;  Service: Orthopedics;  Laterality: Right;    SHOULDER ARTHROSCOPY Right 09/19/2021    Procedure: ARTHROSCOPY, SHOULDER;  Surgeon: Antonio Irwin MD;  Location: [facility] OR;  Service: Orthopedics;  Laterality: Right;  LIMITED DEBRIDMENT    TONSILLECTOMY      TOTAL REDUCTION MAMMOPLASTY         Review of patient's allergies indicates:   Allergen Reactions    Keflex [cephalexin]      Throat swelling    Pcn [penicillins]      Throat swelling    Latex, natural rubber Other (See Comments)     Redness with bandaids     Adhesive Other (See Comments)     bandainds redness at skin    Trelegy ellipta [fluticasone-umeclidin-vilanter]      Vision disturbance       No current facility-administered medications on file prior to encounter.     Current Outpatient Medications on File Prior to Encounter   Medication Sig    albuterol (PROAIR HFA) 90 mcg/actuation inhaler Inhale 2 puffs into the lungs every 6 (six) hours as needed for Wheezing. Rescue (Patient not taking: Reported on 7/6/2023)    amLODIPine (NORVASC) 5 MG tablet Take 1 tablet (5 mg total) by mouth once daily. For blood pressure    ferrous sulfate 325 (65 FE) MG EC tablet Take 1 tablet by mouth once daily.    fexofenadine (ALLEGRA) 180 MG tablet     hydroCHLOROthiazide (HYDRODIURIL) 25 MG tablet Take 1 tablet (25 mg total) by mouth once daily.    ibuprofen (ADVIL,MOTRIN) 600 MG tablet Take 1 tablet (600 mg total) by mouth 3 (three) times daily as needed for Pain.    levothyroxine (SYNTHROID) 88 MCG tablet Take 1 tablet (88 mcg total) by mouth once daily.    losartan (COZAAR) 50 MG tablet Take 1 tablet (50 mg total) by mouth once daily.    lovastatin (MEVACOR) 40 MG tablet Take 1 tablet (40 mg total) by mouth nightly. at bedtime. For cholesterol    nystatin (MYCOSTATIN) cream Apply topically 2 (two) times daily. (Patient not taking: Reported on 7/6/2023)    omeprazole (PRILOSEC) 20 MG  capsule Take 1 capsule (20 mg total) by mouth once daily.    potassium chloride (MICRO-K) 10 MEQ CpSR Take 1 capsule (10 mEq total) by mouth 3 (three) times daily.    pramipexole (MIRAPEX) 0.5 MG tablet Take 2 tablets (1 mg total) by mouth every evening. For restless legs    pregabalin (LYRICA) 75 MG capsule Take 150 mg by mouth 2 (two) times daily.    ramelteon (ROZEREM) 8 mg tablet Take 1 tablet (8 mg total) by mouth every evening.    sertraline (ZOLOFT) 50 MG tablet Take half tab PO daily x 1 week, then increase to 1 tab PO daily.    torsemide (DEMADEX) 10 MG Tab TAKE 1 TABLET(10 MG) BY MOUTH EVERY DAY    traZODone (DESYREL) 50 MG tablet TAKE 1 TO 2 TABLETS BY MOUTH EVERY NIGHT AT BEDTIME AS NEEDED FOR INSOMNIA    [DISCONTINUED] biotin 1 mg Cap Take by mouth.    [DISCONTINUED] fluticasone-salmeterol 230-21 mcg/dose (ADVAIR HFA) 230-21 mcg/actuation HFAA inhaler Inhale 2 puffs into the lungs 2 (two) times daily. Controller    [DISCONTINUED] HYDROcodone-acetaminophen (NORCO) 5-325 mg per tablet Take 1 tablet by mouth every 6 (six) hours as needed for Pain. (Patient not taking: Reported on 7/6/2023)    [DISCONTINUED] magnesium oxide (MAG-OX) 400 mg (241.3 mg magnesium) tablet Take 1 tablet (400 mg total) by mouth once daily. (Patient not taking: Reported on 7/6/2023)    [DISCONTINUED] metoprolol succinate (TOPROL-XL) 25 MG 24 hr tablet Take 1 tablet (25 mg total) by mouth once daily. For blood pressure and heart    [DISCONTINUED] mirtazapine (REMERON) 15 MG tablet TAKE 1 TABLET(15 MG) BY MOUTH EVERY EVENING    [DISCONTINUED] QUEtiapine (SEROQUEL) 25 MG Tab Take 1 tablet (25 mg total) by mouth every evening. (Patient not taking: Reported on 7/6/2023)    [DISCONTINUED] rivastigmine tartrate (EXELON) 6 mg capsule TK 1 C PO BID WF     Family History       Problem Relation (Age of Onset)    Heart disease Mother          Tobacco Use    Smoking status: Former     Current packs/day: 0.00     Types: Cigarettes     Quit date:  1996     Years since quittin.3     Passive exposure: Past    Smokeless tobacco: Never   Substance and Sexual Activity    Alcohol use: Yes     Comment: very little     Drug use: No    Sexual activity: Not Currently     Partners: Male     Review of Systems   Constitutional:  Negative for activity change and fever.   Respiratory:  Positive for chest tightness and shortness of breath. Negative for wheezing.    Cardiovascular:  Negative for chest pain and leg swelling.   Gastrointestinal:  Negative for abdominal distention and abdominal pain.   Genitourinary:  Negative for difficulty urinating.   Skin:  Negative for color change.   Neurological:  Negative for dizziness and facial asymmetry.   Psychiatric/Behavioral:  Negative for agitation and confusion.      Objective:     Vital Signs (Most Recent):  Temp: 99.5 °F (37.5 °C) (23)  Pulse: 66 (23)  Resp: 18 (23)  BP: (!) 155/86 (23)  SpO2: 95 % (23) Vital Signs (24h Range):  Temp:  [98.2 °F (36.8 °C)-99.5 °F (37.5 °C)] 99.5 °F (37.5 °C)  Pulse:  [62-77] 66  Resp:  [18-22] 18  SpO2:  [95 %-100 %] 95 %  BP: (138-197)/() 155/86     Weight: 77.6 kg (171 lb 1.6 oz)  Body mass index is 32.33 kg/m².     Physical Exam  Vitals and nursing note reviewed.   HENT:      Head: Normocephalic and atraumatic.      Nose: Nose normal.   Eyes:      Conjunctiva/sclera: Conjunctivae normal.   Neck:      Vascular: No JVD.   Cardiovascular:      Rate and Rhythm: Normal rate.      Heart sounds: Normal heart sounds.   Pulmonary:      Effort: Pulmonary effort is normal.      Breath sounds: Normal breath sounds.   Abdominal:      Palpations: Abdomen is soft.   Skin:     General: Skin is warm.   Neurological:      General: No focal deficit present.      Mental Status: She is alert and oriented to person, place, and time.                Significant Labs: All pertinent labs within the past 24 hours have been reviewed.  BMP:   Recent  Labs   Lab 09/24/23  1200   GLU 83      K 3.2*      CO2 19*   BUN 13   CREATININE 0.8   CALCIUM 9.9     CBC:   Recent Labs   Lab 09/24/23  1200   WBC 5.94   HGB 15.0   HCT 43.3        CMP:   Recent Labs   Lab 09/24/23  1200      K 3.2*      CO2 19*   GLU 83   BUN 13   CREATININE 0.8   CALCIUM 9.9   PROT 7.7   ALBUMIN 4.4   BILITOT 1.0   ALKPHOS 80   AST 22   ALT 18   ANIONGAP 17*       Significant Imaging: I have reviewed all pertinent imaging results/findings within the past 24 hours.

## 2023-09-25 NOTE — CARE UPDATE
Prn tx available    09/25/23 0800   Patient Assessment/Suction   Level of Consciousness (AVPU) alert   Respiratory Effort Normal;Unlabored   Expansion/Accessory Muscles/Retractions no use of accessory muscles;expansion symmetric   All Lung Fields Breath Sounds clear   Rhythm/Pattern, Respiratory unlabored;depth regular;no shortness of breath reported   PRE-TX-O2   Device (Oxygen Therapy) room air   SpO2 95 %   Pulse Oximetry Type Intermittent   $ Pulse Oximetry - Multiple Charge Pulse Oximetry - Multiple   Aerosol Therapy   $ Aerosol Therapy Charges PRN treatment not required   Daily Review of Necessity (SVN) completed

## 2023-09-25 NOTE — H&P
[facility] Medicine  History & Physical    Patient Name: Genoveva Gilbert  MRN: [id number]  Patient Class: OP- Observation  Admission Date: 9/24/2023  Attending Physician: Harshad Petit MD   Primary Care Provider: Katherin Guajardo MD         Patient information was obtained from patient, spouse/SO and ER records.     Subjective:     Principal Problem:Elevated troponin    Chief Complaint:   Chief Complaint   Patient presents with    Shortness of Breath     Started this morning - denies cough or fever        HPI: 73-year-old female with GERD, hypertension, hypothyroidism, diabetes, hyperlipidemia, depression was transferred to [facility] for cardiac eval .  She was at [facility] with mild left sided CP and + tropoonin and sent here for stress test and possible LHC . She was having Mild SOB began this morning as well .  She is complaining of some right shoulder pain, previous rotator cuff surgery to the right shoulder but her pain was at left side of the chest and dull and no radiation .  Troponin appear mildly positive and D dimer mildly positive too . CXR neg and EKG non ischemic .  ECHO 2022 was normal and which was done because of leg swelling from  lymphedema .      Past Medical History:   Diagnosis Date    Allergy     Anxiety     Arthritis, rheumatoid     Back pain     Chronic bronchiolitis     Depression     Fibromyalgia     GERD (gastroesophageal reflux disease)     High cholesterol     Hilar mass 03/27/2014    [place] (hard of hearing)     aid right ear    [place] (hard of hearing)     Hypertension     Incontinence of urine     Lymphedema     MS (multiple sclerosis)     benign; another MD stated she has no MS    Neuropathy     Personal history of colonic polyps 12/05/2019    Restless leg syndrome     Rotator cuff tear 04/16/2015    Sciatica of left side 01/05/2018    Seasonal allergies     Sinus congestion     Sleep apnea     DOES NOT USE MACHINE    Spondylolysis     Thyroid disease     Type 2  diabetes mellitus with polyneuropathy     no med. was borderline    Wheezing        Past Surgical History:   Procedure Laterality Date    APPENDECTOMY      ARTHROSCOPIC DEBRIDEMENT OF SHOULDER Right 02/18/2022    Procedure: EXTENSIVE DEBRIDEMENT, SHOULDER, ARTHROSCOPIC;  Surgeon: Rio Pitts MD;  Location: [facility] OR;  Service: Orthopedics;  Laterality: Right;    BREAST SURGERY      reduction    CLOSED REDUCTION OF INJURY OF SHOULDER Right 09/19/2021    Procedure: CLOSED REDUCTION, SHOULDER;  Surgeon: Antonio Irwin MD;  Location: [facility] OR;  Service: Orthopedics;  Laterality: Right;    COLONOSCOPY W/ POLYPECTOMY N/A 12/05/2019    repeat in 5 yrs    EPIDURAL STEROID INJECTION INTO LUMBAR SPINE N/A 06/12/2019    Procedure: Injection-steroid-epidural-lumbar;  Surgeon: Thad Manning MD;  Location: [facility] OR;  Service: Pain Management;  Laterality: N/A;  L5-S1    EPIDURAL STEROID INJECTION INTO LUMBAR SPINE N/A 09/16/2019    Procedure: Injection-steroid-epidural-lumbar;  Surgeon: Thad Manning MD;  Location: [facility] OR;  Service: Pain Management;  Laterality: N/A;  L5-S1    EYE SURGERY Bilateral     HYSTERECTOMY      partial - fibroids    INJECTION OF ANESTHETIC AGENT AROUND MEDIAL BRANCH NERVES INNERVATING LUMBAR FACET JOINT Bilateral 02/28/2019    Procedure: Block-nerve-medial branch-lumbar;  Surgeon: Thad Manning MD;  Location: [facility] OR;  Service: Pain Management;  Laterality: Bilateral;  L3, 4,5     INJECTION OF ANESTHETIC AGENT AROUND MEDIAL BRANCH NERVES INNERVATING LUMBAR FACET JOINT Bilateral 03/21/2019    Procedure: Block-nerve-medial branch-lumbar L3,4,5;  Surgeon: Thad Manning MD;  Location: [facility] OR;  Service: Pain Management;  Laterality: Bilateral;    KNEE ARTHROPLASTY Left 03/16/2021    Procedure: ARTHROPLASTY, KNEE;  Surgeon: Rio Pitts MD;  Location: [facility] OR;  Service: Orthopedics;  Laterality: Left;  GENERAL AND BLOCK    LIPOSUCTION  1985    RADIOFREQUENCY ABLATION Left 11/04/2020    Procedure:  Radiofrequency Ablation left knee;  Surgeon: Andrew Escudero MD;  Location: [facility] OR;  Service: Pain Management;  Laterality: Left;    RADIOFREQUENCY ABLATION OF LUMBAR MEDIAL BRANCH NERVE AT SINGLE LEVEL Bilateral 04/12/2019    Procedure: Radiofrequency Ablation, Nerve, Spinal, Lumbar, Medial Branch, 1 Level;  Surgeon: Thad Manning MD;  Location: [facility] OR;  Service: Pain Management;  Laterality: Bilateral;  L3, 4, 5  lumbar  [employer] pain management generator  SN [id number]  80 degrees for 75 seconds x2    RADIOFREQUENCY ABLATION OF LUMBAR MEDIAL BRANCH NERVE AT SINGLE LEVEL Bilateral 10/22/2019    Procedure: Radiofrequency Ablation, Nerve, Spinal, Lumbar, Medial Branch, L3,4,5;  Surgeon: Thad Manning MD;  Location: [facility] OR;  Service: Pain Management;  Laterality: Bilateral;  burned at 80 degrees C X 60 seconds X 2 each site    RADIOFREQUENCY ABLATION OF LUMBAR MEDIAL BRANCH NERVE AT SINGLE LEVEL Bilateral 05/27/2020    Procedure: Radiofrequency Ablation, Nerve, Spinal, Lumbar, Medial Branch, 1 Level;  Surgeon: Thad Manning MD;  Location: [facility] OR;  Service: Pain Management;  Laterality: Bilateral;  L3,4,5    REVERSE TOTAL SHOULDER ARTHROPLASTY Right 04/12/2022    Procedure: ARTHROPLASTY, SHOULDER, TOTAL, REVERSE;  Surgeon: Rio Pitts MD;  Location: [facility] OR;  Service: Orthopedics;  Laterality: Right;    SHOULDER ARTHROSCOPY Right 09/19/2021    Procedure: ARTHROSCOPY, SHOULDER;  Surgeon: Antonio Irwin MD;  Location: [facility] OR;  Service: Orthopedics;  Laterality: Right;  LIMITED DEBRIDMENT    TONSILLECTOMY      TOTAL REDUCTION MAMMOPLASTY         Review of patient's allergies indicates:   Allergen Reactions    Keflex [cephalexin]      Throat swelling    Pcn [penicillins]      Throat swelling    Latex, natural rubber Other (See Comments)     Redness with bandaids     Adhesive Other (See Comments)     bandainds redness at skin    Trelegy ellipta [fluticasone-umeclidin-vilanter]      Vision disturbance        No current facility-administered medications on file prior to encounter.     Current Outpatient Medications on File Prior to Encounter   Medication Sig    albuterol (PROAIR HFA) 90 mcg/actuation inhaler Inhale 2 puffs into the lungs every 6 (six) hours as needed for Wheezing. Rescue (Patient not taking: Reported on 7/6/2023)    amLODIPine (NORVASC) 5 MG tablet Take 1 tablet (5 mg total) by mouth once daily. For blood pressure    ferrous sulfate 325 (65 FE) MG EC tablet Take 1 tablet by mouth once daily.    fexofenadine (ALLEGRA) 180 MG tablet     hydroCHLOROthiazide (HYDRODIURIL) 25 MG tablet Take 1 tablet (25 mg total) by mouth once daily.    ibuprofen (ADVIL,MOTRIN) 600 MG tablet Take 1 tablet (600 mg total) by mouth 3 (three) times daily as needed for Pain.    levothyroxine (SYNTHROID) 88 MCG tablet Take 1 tablet (88 mcg total) by mouth once daily.    losartan (COZAAR) 50 MG tablet Take 1 tablet (50 mg total) by mouth once daily.    lovastatin (MEVACOR) 40 MG tablet Take 1 tablet (40 mg total) by mouth nightly. at bedtime. For cholesterol    nystatin (MYCOSTATIN) cream Apply topically 2 (two) times daily. (Patient not taking: Reported on 7/6/2023)    omeprazole (PRILOSEC) 20 MG capsule Take 1 capsule (20 mg total) by mouth once daily.    potassium chloride (MICRO-K) 10 MEQ CpSR Take 1 capsule (10 mEq total) by mouth 3 (three) times daily.    pramipexole (MIRAPEX) 0.5 MG tablet Take 2 tablets (1 mg total) by mouth every evening. For restless legs    pregabalin (LYRICA) 75 MG capsule Take 150 mg by mouth 2 (two) times daily.    ramelteon (ROZEREM) 8 mg tablet Take 1 tablet (8 mg total) by mouth every evening.    sertraline (ZOLOFT) 50 MG tablet Take half tab PO daily x 1 week, then increase to 1 tab PO daily.    torsemide (DEMADEX) 10 MG Tab TAKE 1 TABLET(10 MG) BY MOUTH EVERY DAY    traZODone (DESYREL) 50 MG tablet TAKE 1 TO 2 TABLETS BY MOUTH EVERY NIGHT AT BEDTIME AS NEEDED FOR INSOMNIA    [DISCONTINUED]  biotin 1 mg Cap Take by mouth.    [DISCONTINUED] fluticasone-salmeterol 230-21 mcg/dose (ADVAIR HFA) 230-21 mcg/actuation HFAA inhaler Inhale 2 puffs into the lungs 2 (two) times daily. Controller    [DISCONTINUED] HYDROcodone-acetaminophen (NORCO) 5-325 mg per tablet Take 1 tablet by mouth every 6 (six) hours as needed for Pain. (Patient not taking: Reported on 2023)    [DISCONTINUED] magnesium oxide (MAG-OX) 400 mg (241.3 mg magnesium) tablet Take 1 tablet (400 mg total) by mouth once daily. (Patient not taking: Reported on 2023)    [DISCONTINUED] metoprolol succinate (TOPROL-XL) 25 MG 24 hr tablet Take 1 tablet (25 mg total) by mouth once daily. For blood pressure and heart    [DISCONTINUED] mirtazapine (REMERON) 15 MG tablet TAKE 1 TABLET(15 MG) BY MOUTH EVERY EVENING    [DISCONTINUED] QUEtiapine (SEROQUEL) 25 MG Tab Take 1 tablet (25 mg total) by mouth every evening. (Patient not taking: Reported on 2023)    [DISCONTINUED] rivastigmine tartrate (EXELON) 6 mg capsule TK 1 C PO BID WF     Family History       Problem Relation (Age of Onset)    Heart disease Mother          Tobacco Use    Smoking status: Former     Current packs/day: 0.00     Types: Cigarettes     Quit date: 1996     Years since quittin.3     Passive exposure: Past    Smokeless tobacco: Never   Substance and Sexual Activity    Alcohol use: Yes     Comment: very little     Drug use: No    Sexual activity: Not Currently     Partners: Male     Review of Systems   Constitutional:  Negative for activity change and fever.   Respiratory:  Positive for chest tightness and shortness of breath. Negative for wheezing.    Cardiovascular:  Negative for chest pain and leg swelling.   Gastrointestinal:  Negative for abdominal distention and abdominal pain.   Genitourinary:  Negative for difficulty urinating.   Skin:  Negative for color change.   Neurological:  Negative for dizziness and facial asymmetry.   Psychiatric/Behavioral:  Negative  for agitation and confusion.      Objective:     Vital Signs (Most Recent):  Temp: 99.5 °F (37.5 °C) (09/24/23 1959)  Pulse: 66 (09/24/23 1959)  Resp: 18 (09/24/23 1959)  BP: (!) 155/86 (09/24/23 1959)  SpO2: 95 % (09/24/23 1959) Vital Signs (24h Range):  Temp:  [98.2 °F (36.8 °C)-99.5 °F (37.5 °C)] 99.5 °F (37.5 °C)  Pulse:  [62-77] 66  Resp:  [18-22] 18  SpO2:  [95 %-100 %] 95 %  BP: (138-197)/() 155/86     Weight: 77.6 kg (171 lb 1.6 oz)  Body mass index is 32.33 kg/m².     Physical Exam  Vitals and nursing note reviewed.   HENT:      Head: Normocephalic and atraumatic.      Nose: Nose normal.   Eyes:      Conjunctiva/sclera: Conjunctivae normal.   Neck:      Vascular: No JVD.   Cardiovascular:      Rate and Rhythm: Normal rate.      Heart sounds: Normal heart sounds.   Pulmonary:      Effort: Pulmonary effort is normal.      Breath sounds: Normal breath sounds.   Abdominal:      Palpations: Abdomen is soft.   Skin:     General: Skin is warm.   Neurological:      General: No focal deficit present.      Mental Status: She is alert and oriented to person, place, and time.                Significant Labs: All pertinent labs within the past 24 hours have been reviewed.  BMP:   Recent Labs   Lab 09/24/23  1200   GLU 83      K 3.2*      CO2 19*   BUN 13   CREATININE 0.8   CALCIUM 9.9     CBC:   Recent Labs   Lab 09/24/23  1200   WBC 5.94   HGB 15.0   HCT 43.3        CMP:   Recent Labs   Lab 09/24/23  1200      K 3.2*      CO2 19*   GLU 83   BUN 13   CREATININE 0.8   CALCIUM 9.9   PROT 7.7   ALBUMIN 4.4   BILITOT 1.0   ALKPHOS 80   AST 22   ALT 18   ANIONGAP 17*       Significant Imaging: I have reviewed all pertinent imaging results/findings within the past 24 hours.    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, 2022  aware      Type 2 diabetes mellitus with hyperglycemia, without long-term current use of insulin  SSI      Rheumatoid arthritis  Listed as diagnosis       NAFLD (nonalcoholic fatty liver disease)    Aware  Home meds      Gastroesophageal reflux disease  Home meds      Back pain, chronic  aware      Hypertension associated with diabetes    Aware  Home meds  SSI    Hyperlipidemia  Aware  Home meds        Fibromyalgia  aware    Hypothyroidism  Aware  Home meds      VTE Risk Mitigation (From admission, onward)           Ordered     IP VTE HIGH RISK PATIENT  Once         09/24/23 2016     Place sequential compression device  Until discontinued         09/24/23 2016                       On 09/24/2023, patient should be placed in hospital observation services under my care.        Harshad Petit MD  Department of Hospital Medicine  [facility]

## 2023-09-25 NOTE — PLAN OF CARE
[facility]  Initial Discharge Assessment       Primary Care Provider: Katherin Guajardo MD    Admission Diagnosis: SOB (shortness of breath) [R06.02]    Admission Date: 9/24/2023  Expected Discharge Date: 9/26/2023    Transition of Care Barriers: None    Payor: HU[employer] MANAGED MEDICARE / Plan: HU[employer] MEDICARE HMO / Product Type: Capitation /     Extended Emergency Contact Information  Primary Emergency Contact: yesenia logan  Mobile Phone: [phone]  Relation: Grandchild  Preferred language: English   needed? No  Secondary Emergency Contact: abigail logan  Mobile Phone: [phone]  Relation: Daughter  Preferred language: English   needed? No    Discharge Plan A: Home  Discharge Plan B: Home with family      [id number] - [place] - [address] AT [facility] [address]  [address] [address]  Phone: [phone] Fax: [phone]    SW met with patient at bedside to complete discharge planning assessment.  Patient alert and oriented xs 4.  Patient verified all demographic information on facesheet is correct.  Patient verified PCP is Dr. Janes Mccabe.  Patient verified primary health insurance is [employer].  Patient with NO home health or DME.  Patient with POA (Yesenia good) and Living Will.  Patient not on dialysis or medication coumadin.  Patient with no 30 day admission.  Patient with no financial issues at this time.  Patient family will provide transportation upon discharge from facility.  Patient independent with ADLs, live alone, drives self.      Initial Assessment (most recent)       Adult Discharge Assessment - 09/25/23 1340          Discharge Assessment    Assessment Type Discharge Planning Assessment     Confirmed/corrected address, phone number and insurance Yes     Confirmed Demographics Correct on Facesheet     Source of Information patient     Does patient/caregiver understand  observation status Yes     Communicated MATILDA with patient/caregiver Yes     People in Home alone     Facility Arrived From: home     Do you expect to return to your current living situation? Yes     Do you have help at home or someone to help you manage your care at home? Yes     Who are your caregiver(s) and their phone number(s)? family     Prior to hospitilization cognitive status: Alert/Oriented     Current cognitive status: Alert/Oriented     Equipment Currently Used at Home none     Readmission within 30 days? No     Patient currently being followed by outpatient case management? No     Do you currently have service(s) that help you manage your care at home? No     Do you take prescription medications? Yes     Do you have prescription coverage? Yes     Do you have any problems affording any of your prescribed medications? No     Is the patient taking medications as prescribed? yes     Who is going to help you get home at discharge? family     How do you get to doctors appointments? family or friend will provide     Are you on dialysis? No     Do you take coumadin? No     DME Needed Upon Discharge  none     Discharge Plan discussed with: Patient     Transition of Care Barriers None     Discharge Plan A Home     Discharge Plan B Home with family        Physical Activity    On average, how many days per week do you engage in moderate to strenuous exercise (like a brisk walk)? 0 days     On average, how many minutes do you engage in exercise at this level? 0 min        Financial Resource Strain    How hard is it for you to pay for the very basics like food, housing, medical care, and heating? Not hard at all        Housing Stability    In the last 12 months, was there a time when you were not able to pay the mortgage or rent on time? No     In the last 12 months, was there a time when you did not have a steady place to sleep or slept in a shelter (including now)? No        Transportation Needs    In the past 12  months, has lack of transportation kept you from medical appointments or from getting medications? No     In the past 12 months, has lack of transportation kept you from meetings, work, or from getting things needed for daily living? No        Food Insecurity    Within the past 12 months, you worried that your food would run out before you got the money to buy more. Never true     Within the past 12 months, the food you bought just didn't last and you didn't have money to get more. Never true        Stress    Do you feel stress - tense, restless, nervous, or anxious, or unable to sleep at night because your mind is troubled all the time - these days? Only a little        Social Connections    In a typical week, how many times do you talk on the phone with family, friends, or neighbors? More than three times a week     How often do you get together with friends or relatives? More than three times a week     How often do you attend Alevism or Scientology services? Never     Do you belong to any clubs or organizations such as Alevism groups, unions, fraternal or athletic groups, or school groups? No     How often do you attend meetings of the clubs or organizations you belong to? Never     Are you , , , , never , or living with a partner?         Alcohol Use    Q1: How often do you have a drink containing alcohol? Monthly or less     Q2: How many drinks containing alcohol do you have on a typical day when you are drinking? 1 or 2     Q3: How often do you have six or more drinks on one occasion? Never

## 2023-09-25 NOTE — CONSULTS
"[facility]  Department of Cardiology  Consult Note      PATIENT NAME: Genoveva Gilbert  MRN: [id number]  TODAY'S DATE: 09/25/2023  ADMIT DATE: 9/24/2023                          CONSULT REQUESTED BY: Jesus Christianson MD    SUBJECTIVE     PRINCIPAL PROBLEM: Elevated troponin      REASON FOR CONSULT:  Chest pain, shortness of breath, elevated troponin      HPI:  Pt is 72 yo female who reported to ER with c/o feelings of a panic attack and shortness of breath that lasted about 30 minutes. She denied chest pain. No left arm or neck, reported right shoulder pain, but she has had surgery in this joint in the past. Sensation of panic and shortness of breath subsided on its own after about half an hour.  Denies having any significant shortness of breath presently.      Hospitalist HPI: "73-year-old female with GERD, hypertension, hypothyroidism, diabetes, hyperlipidemia, depression was transferred to [facility] for cardiac eval. She was at [facility] with mild left sided CP and + troponin and sent here for stress test and possible LHC . She was having Mild SOB began this morning as well. She is complaining of some right shoulder pain, previous rotator cuff surgery to the right shoulder but her pain was at left side of the chest and dull and no radiation .  Troponin appear mildly positive and D dimer mildly positive too . CXR neg and EKG non ischemic .  ECHO 2022 was normal and which was done because of leg swelling from  lymphedema"    Review of patient's allergies indicates:   Allergen Reactions    Keflex [cephalexin]      Throat swelling    Pcn [penicillins]      Throat swelling    Latex, natural rubber Other (See Comments)     Redness with bandaids     Adhesive Other (See Comments)     bandainds redness at skin    Trelegy ellipta [fluticasone-umeclidin-vilanter]      Vision disturbance       Past Medical History:   Diagnosis Date    Allergy     Anxiety     Arthritis, rheumatoid     Back pain     Chronic bronchiolitis     " Depression     Fibromyalgia     GERD (gastroesophageal reflux disease)     High cholesterol     Hilar mass 03/27/2014    [place] (hard of hearing)     aid right ear    [place] (hard of hearing)     Hypertension     Incontinence of urine     Lymphedema     MS (multiple sclerosis)     benign; another MD stated she has no MS    Neuropathy     Personal history of colonic polyps 12/05/2019    Restless leg syndrome     Rotator cuff tear 04/16/2015    Sciatica of left side 01/05/2018    Seasonal allergies     Sinus congestion     Sleep apnea     DOES NOT USE MACHINE    Spondylolysis     Thyroid disease     Type 2 diabetes mellitus with polyneuropathy     no med. was borderline    Wheezing      Past Surgical History:   Procedure Laterality Date    APPENDECTOMY      ARTHROSCOPIC DEBRIDEMENT OF SHOULDER Right 02/18/2022    Procedure: EXTENSIVE DEBRIDEMENT, SHOULDER, ARTHROSCOPIC;  Surgeon: Rio Pitts MD;  Location: [facility] OR;  Service: Orthopedics;  Laterality: Right;    BREAST SURGERY      reduction    CLOSED REDUCTION OF INJURY OF SHOULDER Right 09/19/2021    Procedure: CLOSED REDUCTION, SHOULDER;  Surgeon: Antonio Irwin MD;  Location: [facility] OR;  Service: Orthopedics;  Laterality: Right;    COLONOSCOPY W/ POLYPECTOMY N/A 12/05/2019    repeat in 5 yrs    EPIDURAL STEROID INJECTION INTO LUMBAR SPINE N/A 06/12/2019    Procedure: Injection-steroid-epidural-lumbar;  Surgeon: Thad Manning MD;  Location: [facility] OR;  Service: Pain Management;  Laterality: N/A;  L5-S1    EPIDURAL STEROID INJECTION INTO LUMBAR SPINE N/A 09/16/2019    Procedure: Injection-steroid-epidural-lumbar;  Surgeon: Thad Manning MD;  Location: [facility] OR;  Service: Pain Management;  Laterality: N/A;  L5-S1    EYE SURGERY Bilateral     HYSTERECTOMY      partial - fibroids    INJECTION OF ANESTHETIC AGENT AROUND MEDIAL BRANCH NERVES INNERVATING LUMBAR FACET JOINT Bilateral 02/28/2019    Procedure: Block-nerve-medial branch-lumbar;  Surgeon: Thad Manning MD;   Location: [facility] OR;  Service: Pain Management;  Laterality: Bilateral;  L3, 4,5     INJECTION OF ANESTHETIC AGENT AROUND MEDIAL BRANCH NERVES INNERVATING LUMBAR FACET JOINT Bilateral 03/21/2019    Procedure: Block-nerve-medial branch-lumbar L3,4,5;  Surgeon: Thad Manning MD;  Location: [facility] OR;  Service: Pain Management;  Laterality: Bilateral;    KNEE ARTHROPLASTY Left 03/16/2021    Procedure: ARTHROPLASTY, KNEE;  Surgeon: Rio Pitts MD;  Location: [facility] OR;  Service: Orthopedics;  Laterality: Left;  GENERAL AND BLOCK    LIPOSUCTION  1985    RADIOFREQUENCY ABLATION Left 11/04/2020    Procedure: Radiofrequency Ablation left knee;  Surgeon: Andrew Esucdero MD;  Location: [facility] OR;  Service: Pain Management;  Laterality: Left;    RADIOFREQUENCY ABLATION OF LUMBAR MEDIAL BRANCH NERVE AT SINGLE LEVEL Bilateral 04/12/2019    Procedure: Radiofrequency Ablation, Nerve, Spinal, Lumbar, Medial Branch, 1 Level;  Surgeon: Thad Manning MD;  Location: [facility] OR;  Service: Pain Management;  Laterality: Bilateral;  L3, 4, 5  lumbar  [employer] pain management generator  SN [id number]  80 degrees for 75 seconds x2    RADIOFREQUENCY ABLATION OF LUMBAR MEDIAL BRANCH NERVE AT SINGLE LEVEL Bilateral 10/22/2019    Procedure: Radiofrequency Ablation, Nerve, Spinal, Lumbar, Medial Branch, L3,4,5;  Surgeon: Thad Manning MD;  Location: [facility] OR;  Service: Pain Management;  Laterality: Bilateral;  burned at 80 degrees C X 60 seconds X 2 each site    RADIOFREQUENCY ABLATION OF LUMBAR MEDIAL BRANCH NERVE AT SINGLE LEVEL Bilateral 05/27/2020    Procedure: Radiofrequency Ablation, Nerve, Spinal, Lumbar, Medial Branch, 1 Level;  Surgeon: Thad Manning MD;  Location: [facility] OR;  Service: Pain Management;  Laterality: Bilateral;  L3,4,5    REVERSE TOTAL SHOULDER ARTHROPLASTY Right 04/12/2022    Procedure: ARTHROPLASTY, SHOULDER, TOTAL, REVERSE;  Surgeon: Rio Pitts MD;  Location: [facility] OR;  Service: Orthopedics;  Laterality: Right;     SHOULDER ARTHROSCOPY Right 2021    Procedure: ARTHROSCOPY, SHOULDER;  Surgeon: Antonio Irwin MD;  [facility];  Service: Orthopedics;  Laterality: Right;  LIMITED DEBRIDMENT    TONSILLECTOMY      TOTAL REDUCTION MAMMOPLASTY       Social History     Tobacco Use    Smoking status: Former     Current packs/day: 0.00     Types: Cigarettes     Quit date: 1996     Years since quittin.3     Passive exposure: Past    Smokeless tobacco: Never   Substance Use Topics    Alcohol use: Yes     Comment: very little     Drug use: No        REVIEW OF SYSTEMS  CONSTITUTIONAL: Negative for chills, fatigue and fever.   EYES: No double vision, No blurred vision  NEURO: No headaches, No dizziness  RESPIRATORY: +shortness of breath per HPI; Negative for cough, and wheezing.    CARDIOVASCULAR: Negative for chest pain. Negative for palpitations and leg swelling.   GI: Negative for abdominal pain, No melena, diarrhea, nausea and vomiting.   : Negative for dysuria and frequency, Negative for hematuria  SKIN: Negative for bruising, Negative for edema or discoloration noted.   ENDOCRINE: Negative for polyphagia, Negative for heat intolerance, Negative for cold intolerance  PSYCHIATRIC: panic sensation as per HPI; Negative for depression, Negative for anxiety, Negative for memory loss  MUSCULOSKELETAL: Negative for neck pain, Negative for muscle weakness, Negative for back pain     OBJECTIVE     VITAL SIGNS (Most Recent)  Temp: 97.9 °F (36.6 °C) (23)  Pulse: 63 (23)  Resp: 18 (23)  BP: (!) 136/91 (23)  SpO2: 95 % (23)    VENTILATION STATUS  Resp: 18 (23)  SpO2: 95 % (23)           I & O (Last 24H):  Intake/Output Summary (Last 24 hours) at 2023 1008  Last data filed at 2023 0906  Gross per 24 hour   Intake 25 ml   Output 100 ml   Net -75 ml       WEIGHTS  Wt Readings from Last 3 Encounters:   23 77.6 kg (171 lb 1.6 oz)    09/24/23 1130 84.8 kg (186 lb 15.2 oz)   09/21/23 1449 84.8 kg (187 lb)   06/23/23 1916 84.9 kg (187 lb 2.7 oz)   06/23/23 1026 81.6 kg (180 lb)       PHYSICAL EXAM  GENERAL: well built, well nourished, well-developed in no apparent distress alert and oriented.   HEENT: Normocephalic. Pupils normal and conjunctivae normal.  Mucous membranes normal, no cyanosis or icterus, trachea central,no pallor or icterus is noted..   NECK: No JVD. No bruit..   THYROID: Thyroid not enlarged. No nodules present..   CARDIAC: Regular rate and rhythm. S1 is normal.S2 is normal.No gallops, clicks noted at this time.  CHEST ANATOMY: normal.   LUNGS: Clear to auscultation. No wheezing or rhonchi..   ABDOMEN: Soft no masses or organomegaly.  No abdomen pulsations or bruits.  Normal bowel sounds. No pulsations and no masses felt, No guarding or rebound.   URINARY: No christie catheter   EXTREMITIES: No cyanosis, clubbing or edema noted at this time., no calf tenderness bilaterally.   PERIPHERAL VASCULAR SYSTEM: Good palpable distal pulses.   CENTRAL NERVOUS SYSTEM: No focal motor or sensory deficits noted.   SKIN: Skin without lesions, moist, well perfused.   MUSCLE STRENGTH & TONE: No noteable weakness, atrophy or abnormal movement.     HOME MEDICATIONS:  No current facility-administered medications on file prior to encounter.     Current Outpatient Medications on File Prior to Encounter   Medication Sig Dispense Refill    albuterol (PROAIR HFA) 90 mcg/actuation inhaler Inhale 2 puffs into the lungs every 6 (six) hours as needed for Wheezing. Rescue (Patient not taking: Reported on 7/6/2023) 18 g 11    amLODIPine (NORVASC) 5 MG tablet Take 1 tablet (5 mg total) by mouth once daily. For blood pressure 30 tablet 0    ferrous sulfate 325 (65 FE) MG EC tablet Take 1 tablet by mouth once daily.      fexofenadine (ALLEGRA) 180 MG tablet       hydroCHLOROthiazide (HYDRODIURIL) 25 MG tablet Take 1 tablet (25 mg total) by mouth once daily. 90  tablet 0    ibuprofen (ADVIL,MOTRIN) 600 MG tablet Take 1 tablet (600 mg total) by mouth 3 (three) times daily as needed for Pain. 90 tablet 0    levothyroxine (SYNTHROID) 88 MCG tablet Take 1 tablet (88 mcg total) by mouth once daily. 90 tablet 3    losartan (COZAAR) 50 MG tablet Take 1 tablet (50 mg total) by mouth once daily. 90 tablet 0    lovastatin (MEVACOR) 40 MG tablet Take 1 tablet (40 mg total) by mouth nightly. at bedtime. For cholesterol 30 tablet 0    nystatin (MYCOSTATIN) cream Apply topically 2 (two) times daily. (Patient not taking: Reported on 7/6/2023) 60 g 1    omeprazole (PRILOSEC) 20 MG capsule Take 1 capsule (20 mg total) by mouth once daily. 90 capsule 1    potassium chloride (MICRO-K) 10 MEQ CpSR Take 1 capsule (10 mEq total) by mouth 3 (three) times daily. 270 capsule 0    pramipexole (MIRAPEX) 0.5 MG tablet Take 2 tablets (1 mg total) by mouth every evening. For restless legs 180 tablet 3    pregabalin (LYRICA) 75 MG capsule Take 150 mg by mouth 2 (two) times daily.      ramelteon (ROZEREM) 8 mg tablet Take 1 tablet (8 mg total) by mouth every evening. 30 tablet 0    sertraline (ZOLOFT) 50 MG tablet Take half tab PO daily x 1 week, then increase to 1 tab PO daily. 90 tablet 1    torsemide (DEMADEX) 10 MG Tab TAKE 1 TABLET(10 MG) BY MOUTH EVERY DAY 90 tablet 3    traZODone (DESYREL) 50 MG tablet TAKE 1 TO 2 TABLETS BY MOUTH EVERY NIGHT AT BEDTIME AS NEEDED FOR INSOMNIA 60 tablet 1       SCHEDULED MEDS:   amLODIPine  5 mg Oral Daily    aspirin  81 mg Oral Daily    atorvastatin  40 mg Oral QHS    hydroCHLOROthiazide  25 mg Oral Daily    levothyroxine  88 mcg Oral Before breakfast    losartan  50 mg Oral Daily    pramipexole  1 mg Oral QHS    pregabalin  150 mg Oral BID    sertraline  25 mg Oral Daily    torsemide  10 mg Oral Daily       CONTINUOUS INFUSIONS:    PRN MEDS:albuterol, dextrose 50%, dextrose 50%, glucagon (human recombinant), glucose, glucose, melatonin, naloxone, nitroGLYCERIN,  "sodium chloride 0.9%, traZODone    LABS AND DIAGNOSTICS     CBC LAST 3 DAYS  Recent Labs   Lab 09/24/23  1200   WBC 5.94   RBC 5.05   HGB 15.0   HCT 43.3   MCV 86   MCH 29.7   MCHC 34.6   RDW 11.9      MPV 9.6   GRAN 61.5  3.7   LYMPH 29.8  1.8   MONO 7.4  0.4   BASO 0.03   NRBC 0       COAGULATION LAST 3 DAYS  No results for input(s): "LABPT", "INR", "APTT" in the last 168 hours.    CHEMISTRY LAST 3 DAYS  Recent Labs   Lab 09/24/23  1200      K 3.2*      CO2 19*   ANIONGAP 17*   BUN 13   CREATININE 0.8   GLU 83   CALCIUM 9.9   ALBUMIN 4.4   PROT 7.7   ALKPHOS 80   ALT 18   AST 22   BILITOT 1.0       CARDIAC PROFILE LAST 3 DAYS  Recent Labs   Lab 09/24/23  1200   *   TROPONINI 0.093*       ENDOCRINE LAST 3 DAYS  No results for input(s): "TSH", "PROCAL" in the last 168 hours.    LAST ARTERIAL BLOOD GAS  ABG  No results for input(s): "PH", "PO2", "PCO2", "HCO3", "BE" in the last 168 hours.    LAST 7 DAYS MICROBIOLOGY   Microbiology Results (last 7 days)       ** No results found for the last 168 hours. **            MOST RECENT IMAGING  X-Ray Chest AP Portable  Narrative: EXAMINATION:  XR CHEST AP PORTABLE    CLINICAL HISTORY:  Chest Pain;    TECHNIQUE:  Single frontal view of the chest was performed.    COMPARISON:  12/06/2022    FINDINGS:  Cardiac silhouette remains mildly enlarged.    Lung volumes are low.  No definite parenchymal consolidation, pleural effusion, or pneumothorax visualized.  Prior right shoulder arthroplasty is again noted.  Impression: Mild cardiomegaly with no acute findings    Electronically signed by: Ramón Cassidy MD  Date:    09/24/2023  Time:    12:21      ECHOCARDIOGRAM RESULTS (last 5)  Results for orders placed during the hospital encounter of 10/05/22    Echo    Interpretation Summary  · The left ventricle is normal in size with mild concentric hypertrophy and normal systolic function.  · The estimated ejection fraction is 62%.  · Normal left ventricular " diastolic function.  · Normal right ventricular size with normal right ventricular systolic function.  · Moderate left atrial enlargement.  · Mild tricuspid regurgitation.  · Normal central venous pressure (3 mmHg).  · The estimated PA systolic pressure is 39 mmHg.  · There is mild pulmonary hypertension.  · Mild mitral regurgitation.  · Mild aortic regurgitation.      Results for orders placed during the hospital encounter of 03/25/19    Transthoracic echo (TTE) complete (Cupid Only)    Interpretation Summary  · Normal left ventricular systolic function. The estimated ejection fraction is 55%  · Normal LV diastolic function.  · Normal right ventricular systolic function.  · Normal central venous pressure (3 mm Hg).  · The estimated PA systolic pressure is 30 mm Hg      CURRENT/PREVIOUS VISIT EKG  Results for orders placed or performed during the hospital encounter of 06/23/23   EKG 12-lead    Collection Time: 06/23/23 10:58 AM    Narrative    Test Reason : Z00.8,    Vent. Rate : 067 BPM     Atrial Rate : 067 BPM     P-R Int : 150 ms          QRS Dur : 096 ms      QT Int : 448 ms       P-R-T Axes : 048 001 059 degrees     QTc Int : 473 ms    Normal sinus rhythm with sinus arrhythmia  Normal ECG  When compared with ECG of 05-OCT-2022 12:42,  No significant change was found  Confirmed by Mukesh ROSAS, Randall CORDOVA ([id number]) on 6/26/2023 7:26:22 PM    Referred By: [email or web address]   SELF           Confirmed By:Randall Mayorga MD           ASSESSMENT/PLAN:     Active Hospital Problems    Diagnosis    *Elevated troponin    Chest pain    Status post [place] reverse arthroplasty of shoulder, right April 12, 2022    Type 2 diabetes mellitus with hyperglycemia, without long-term current use of insulin    Rheumatoid arthritis    NAFLD (nonalcoholic fatty liver disease)    Gastroesophageal reflux disease    Back pain, chronic    Hypothyroidism    Fibromyalgia    Hyperlipidemia    Hypertension associated with diabetes       ASSESSMENT & PLAN:    Shortness of breath  Chest pain  HLD  HTN  DM 2  Hypokalemia    RECOMMENDATIONS:  Pt admitted with c/o shortness of breath and chest pain. Two sets of HS troponin were negative. Minimal elevation of initial troponin at [facility] x 1.   Obtain echocardiogram to evaluate LV and valvular function. (Echo in 10/2022 showed Efx 62%)  Nuclear stress test obtained today was suspicious for reversible ischemia. Cardiac Cath?  , CXR showed mild cardiomegaly with no acute findings  BP trend well controlled. Continue amlodipine 5 mg daily, HCTZ 25 mg daily, Losartan 50 mg daily, and Torsemide 10 mg daily.  Elevated D Dimer (0.73).   Check Lipid panel in AM.   Potassium 3.2 on 9/24, 3.3 today. Replace per protocol and Continue to check and replace potassium and magnesium. Goal for potassium is 4.0, and goal for magnesium is 2.0.     Emma Cristina NP  [facility]  Department of Cardiology  Date of Service: 09/25/2023      73-year-old lady with history of type 2 diabetes arterial hypertension dyslipidemia admitted with feeling of anxiety and some shortness of breath lasting about an hour and a half duration.  She was noted to have abnormal troponin at [facility] however repeat troponins here were within reasonable range.  Patient had no further episodes of chest discomfort.  I have personally interviewed and examined the patient, I have reviewed the Nurse Practitioner's history and physical, assessment, and plan. I agree with the findings and plan.  Patient underwent myocardial perfusion imaging study and noted to have some reversibility in the LAD distribution probably significant disease in the diagonal branch of the LAD.  Recommend to obtain cardiac catheterization for more definite diagnosis.  In the meantime continue on optimize medical therapy with amlodipine 5 mg daily and will add isosorbide mononitrate 30 mg daily to her regimen.    Dr. Nick Ospina M.D.  [facility]  Department of  Cardiology  Date of Service: 09/25/2023  10:08 AM

## 2023-09-25 NOTE — HPI
73-year-old female with GERD, hypertension, hypothyroidism, diabetes, hyperlipidemia, depression was transferred to [facility] for cardiac eval .  She was at [facility] with mild left sided CP and + tropoonin and sent here for stress test and possible LHC . She was having Mild SOB began this morning as well .  She is complaining of some right shoulder pain, previous rotator cuff surgery to the right shoulder but her pain was at left side of the chest and dull and no radiation .  Troponin appear mildly positive and D dimer mildly positive too . CXR neg and EKG non ischemic .  ECHO 2022 was normal and which was done because of leg swelling from  lymphedema .

## 2023-09-25 NOTE — PLAN OF CARE
09/25/23 1340   GRIDER Message   Medicare Outpatient and Observation Notification regarding financial responsibility Explained to patient/caregiver;Signed/date by patient/caregiver   Date GRIDER was signed 09/25/23   Time GRIDER was signed 1340

## 2023-09-26 ENCOUNTER — CLINICAL SUPPORT (OUTPATIENT)
Dept: CARDIOLOGY | Facility: HOSPITAL | Age: 73
DRG: 948 | End: 2023-09-26
Attending: EMERGENCY MEDICINE
Payer: MEDICARE

## 2023-09-26 VITALS — HEIGHT: 61 IN | WEIGHT: 171.06 LBS | BODY MASS INDEX: 32.3 KG/M2

## 2023-09-26 LAB
ANION GAP SERPL CALC-SCNC: 8 MMOL/L (ref 8–16)
AORTIC ROOT ANNULUS: 3.1 CM
AORTIC VALVE CUSP SEPERATION: 1.5 CM
APTT PPP: 27.2 SEC (ref 21–32)
ASCENDING AORTA: 3.1 CM
AV INDEX (PROSTH): 0.63
AV MEAN GRADIENT: 7 MMHG
AV PEAK GRADIENT: 13 MMHG
AV REGURGITATION PRESSURE HALF TIME: 448 MS
AV VALVE AREA BY VELOCITY RATIO: 2.24 CM²
AV VALVE AREA: 1.97 CM²
AV VELOCITY RATIO: 0.71
BASOPHILS # BLD AUTO: 0.03 K/UL (ref 0–0.2)
BASOPHILS NFR BLD: 0.5 % (ref 0–1.9)
BSA FOR ECHO PROCEDURE: 1.83 M2
BUN SERPL-MCNC: 39 MG/DL (ref 8–23)
CALCIUM SERPL-MCNC: 8.7 MG/DL (ref 8.7–10.5)
CHLORIDE SERPL-SCNC: 101 MMOL/L (ref 95–110)
CHOLEST SERPL-MCNC: 214 MG/DL (ref 120–199)
CHOLEST/HDLC SERPL: 5 {RATIO} (ref 2–5)
CO2 SERPL-SCNC: 30 MMOL/L (ref 23–29)
CREAT SERPL-MCNC: 1.4 MG/DL (ref 0.5–1.4)
CV ECHO LV RWT: 0.45 CM
DIFFERENTIAL METHOD: NORMAL
DOP CALC AO PEAK VEL: 1.78 M/S
DOP CALC AO VTI: 35.6 CM
DOP CALC LVOT AREA: 3.1 CM2
DOP CALC LVOT DIAMETER: 2 CM
DOP CALC LVOT PEAK VEL: 1.27 M/S
DOP CALC LVOT STROKE VOLUME: 70.02 CM3
DOP CALC MV VTI: 34.4 CM
DOP CALCLVOT PEAK VEL VTI: 22.3 CM
E WAVE DECELERATION TIME: 310 MSEC
E/A RATIO: 0.65
E/E' RATIO: 10.46 M/S
ECHO LV POSTERIOR WALL: 0.82 CM (ref 0.6–1.1)
EOSINOPHIL # BLD AUTO: 0.2 K/UL (ref 0–0.5)
EOSINOPHIL NFR BLD: 3.4 % (ref 0–8)
ERYTHROCYTE [DISTWIDTH] IN BLOOD BY AUTOMATED COUNT: 12.3 % (ref 11.5–14.5)
EST. GFR  (NO RACE VARIABLE): 39.2 ML/MIN/1.73 M^2
FRACTIONAL SHORTENING: 35 % (ref 28–44)
GLUCOSE SERPL-MCNC: 102 MG/DL (ref 70–110)
GLUCOSE SERPL-MCNC: 104 MG/DL (ref 70–110)
GLUCOSE SERPL-MCNC: 153 MG/DL (ref 70–110)
GLUCOSE SERPL-MCNC: 77 MG/DL (ref 70–110)
HCT VFR BLD AUTO: 39.7 % (ref 37–48.5)
HDLC SERPL-MCNC: 43 MG/DL (ref 40–75)
HDLC SERPL: 20.1 % (ref 20–50)
HGB BLD-MCNC: 13.5 G/DL (ref 12–16)
IMM GRANULOCYTES # BLD AUTO: 0.01 K/UL (ref 0–0.04)
IMM GRANULOCYTES NFR BLD AUTO: 0.2 % (ref 0–0.5)
INR PPP: 1 (ref 0.8–1.2)
INTERVENTRICULAR SEPTUM: 0.85 CM (ref 0.6–1.1)
IVC DIAMETER: 1.38 CM
LDLC SERPL CALC-MCNC: 147.8 MG/DL (ref 63–159)
LEFT ATRIUM SIZE: 3.6 CM
LEFT ATRIUM VOLUME INDEX MOD: 43.1 ML/M2
LEFT ATRIUM VOLUME MOD: 76.2 CM3
LEFT INTERNAL DIMENSION IN SYSTOLE: 2.36 CM (ref 2.1–4)
LEFT VENTRICLE DIASTOLIC VOLUME INDEX: 31.81 ML/M2
LEFT VENTRICLE DIASTOLIC VOLUME: 56.3 ML
LEFT VENTRICLE MASS INDEX: 48 G/M2
LEFT VENTRICLE SYSTOLIC VOLUME INDEX: 10.9 ML/M2
LEFT VENTRICLE SYSTOLIC VOLUME: 19.3 ML
LEFT VENTRICULAR INTERNAL DIMENSION IN DIASTOLE: 3.65 CM (ref 3.5–6)
LEFT VENTRICULAR MASS: 85.42 G
LV LATERAL E/E' RATIO: 9.71 M/S
LV SEPTAL E/E' RATIO: 11.33 M/S
LVOT MG: 3 MMHG
LVOT MV: 0.76 CM/S
LYMPHOCYTES # BLD AUTO: 2.3 K/UL (ref 1–4.8)
LYMPHOCYTES NFR BLD: 36.1 % (ref 18–48)
MCH RBC QN AUTO: 30.5 PG (ref 27–31)
MCHC RBC AUTO-ENTMCNC: 34 G/DL (ref 32–36)
MCV RBC AUTO: 90 FL (ref 82–98)
MONOCYTES # BLD AUTO: 0.5 K/UL (ref 0.3–1)
MONOCYTES NFR BLD: 6.9 % (ref 4–15)
MV MEAN GRADIENT: 2 MMHG
MV PEAK A VEL: 1.04 M/S
MV PEAK E VEL: 0.68 M/S
MV PEAK GRADIENT: 5 MMHG
MV STENOSIS PRESSURE HALF TIME: 58 MS
MV VALVE AREA BY CONTINUITY EQUATION: 2.04 CM2
MV VALVE AREA P 1/2 METHOD: 3.79 CM2
NEUTROPHILS # BLD AUTO: 3.4 K/UL (ref 1.8–7.7)
NEUTROPHILS NFR BLD: 52.9 % (ref 38–73)
NONHDLC SERPL-MCNC: 171 MG/DL
NRBC BLD-RTO: 0 /100 WBC
PISA AR MAX VEL: 2.87 M/S
PISA MRMAX VEL: 4.84 M/S
PISA TR MAX VEL: 2.42 M/S
PLATELET # BLD AUTO: 209 K/UL (ref 150–450)
PMV BLD AUTO: 9.9 FL (ref 9.2–12.9)
POTASSIUM SERPL-SCNC: 3.5 MMOL/L (ref 3.5–5.1)
PROTHROMBIN TIME: 11.4 SEC (ref 9–12.5)
PV MV: 0.69 M/S
PV PEAK GRADIENT: 4 MMHG
PV PEAK VELOCITY: 1.04 M/S
RA PRESSURE ESTIMATED: 3 MMHG
RBC # BLD AUTO: 4.43 M/UL (ref 4–5.4)
RV TB RVSP: 5 MMHG
RV TISSUE DOPPLER FREE WALL SYSTOLIC VELOCITY 1 (APICAL 4 CHAMBER VIEW): 17.2 CM/S
SODIUM SERPL-SCNC: 139 MMOL/L (ref 136–145)
TDI LATERAL: 0.07 M/S
TDI SEPTAL: 0.06 M/S
TDI: 0.07 M/S
TR MAX PG: 23 MMHG
TRICUSPID ANNULAR PLANE SYSTOLIC EXCURSION: 2.66 CM
TRIGL SERPL-MCNC: 116 MG/DL (ref 30–150)
TV REST PULMONARY ARTERY PRESSURE: 26 MMHG
WBC # BLD AUTO: 6.48 K/UL (ref 3.9–12.7)
Z-SCORE OF LEFT VENTRICULAR DIMENSION IN END DIASTOLE: -2.93
Z-SCORE OF LEFT VENTRICULAR DIMENSION IN END SYSTOLE: -1.96

## 2023-09-26 PROCEDURE — 93306 TTE W/DOPPLER COMPLETE: CPT

## 2023-09-26 PROCEDURE — C1894 INTRO/SHEATH, NON-LASER: HCPCS | Performed by: INTERNAL MEDICINE

## 2023-09-26 PROCEDURE — 99900035 HC TECH TIME PER 15 MIN (STAT)

## 2023-09-26 PROCEDURE — 99232 PR SUBSEQUENT HOSPITAL CARE,LEVL II: ICD-10-PCS | Mod: ,,, | Performed by: INTERNAL MEDICINE

## 2023-09-26 PROCEDURE — 25000003 PHARM REV CODE 250: Performed by: HOSPITALIST

## 2023-09-26 PROCEDURE — 85610 PROTHROMBIN TIME: CPT | Performed by: NURSE PRACTITIONER

## 2023-09-26 PROCEDURE — 93306 ECHO (CUPID ONLY): ICD-10-PCS | Mod: 26,,, | Performed by: INTERNAL MEDICINE

## 2023-09-26 PROCEDURE — 85025 COMPLETE CBC W/AUTO DIFF WBC: CPT | Performed by: NURSE PRACTITIONER

## 2023-09-26 PROCEDURE — C1887 CATHETER, GUIDING: HCPCS | Performed by: INTERNAL MEDICINE

## 2023-09-26 PROCEDURE — 25000003 PHARM REV CODE 250: Performed by: INTERNAL MEDICINE

## 2023-09-26 PROCEDURE — 94761 N-INVAS EAR/PLS OXIMETRY MLT: CPT

## 2023-09-26 PROCEDURE — 94760 N-INVAS EAR/PLS OXIMETRY 1: CPT

## 2023-09-26 PROCEDURE — 80061 LIPID PANEL: CPT | Performed by: NURSE PRACTITIONER

## 2023-09-26 PROCEDURE — 85730 THROMBOPLASTIN TIME PARTIAL: CPT | Performed by: NURSE PRACTITIONER

## 2023-09-26 PROCEDURE — 99232 SBSQ HOSP IP/OBS MODERATE 35: CPT | Mod: ,,, | Performed by: INTERNAL MEDICINE

## 2023-09-26 PROCEDURE — 80048 BASIC METABOLIC PNL TOTAL CA: CPT | Performed by: INTERNAL MEDICINE

## 2023-09-26 PROCEDURE — C1769 GUIDE WIRE: HCPCS | Performed by: INTERNAL MEDICINE

## 2023-09-26 PROCEDURE — 94799 UNLISTED PULMONARY SVC/PX: CPT

## 2023-09-26 PROCEDURE — 93306 TTE W/DOPPLER COMPLETE: CPT | Mod: 26,,, | Performed by: INTERNAL MEDICINE

## 2023-09-26 PROCEDURE — 99900031 HC PATIENT EDUCATION (STAT)

## 2023-09-26 PROCEDURE — 21400001 HC TELEMETRY ROOM

## 2023-09-26 PROCEDURE — 36415 COLL VENOUS BLD VENIPUNCTURE: CPT | Performed by: INTERNAL MEDICINE

## 2023-09-26 PROCEDURE — 25000003 PHARM REV CODE 250: Performed by: NURSE PRACTITIONER

## 2023-09-26 PROCEDURE — 36415 COLL VENOUS BLD VENIPUNCTURE: CPT | Performed by: NURSE PRACTITIONER

## 2023-09-26 RX ORDER — ISOSORBIDE MONONITRATE 30 MG/1
30 TABLET, EXTENDED RELEASE ORAL DAILY
Status: DISCONTINUED | OUTPATIENT
Start: 2023-09-27 | End: 2023-09-27

## 2023-09-26 RX ORDER — SODIUM CHLORIDE 0.9 % (FLUSH) 0.9 %
2 SYRINGE (ML) INJECTION
Status: DISCONTINUED | OUTPATIENT
Start: 2023-09-26 | End: 2023-09-27 | Stop reason: HOSPADM

## 2023-09-26 RX ORDER — DIPHENHYDRAMINE HCL 25 MG
50 CAPSULE ORAL
Status: DISCONTINUED | OUTPATIENT
Start: 2023-09-26 | End: 2023-09-27 | Stop reason: HOSPADM

## 2023-09-26 RX ORDER — SODIUM CHLORIDE 9 MG/ML
INJECTION, SOLUTION INTRAVENOUS CONTINUOUS
Status: DISCONTINUED | OUTPATIENT
Start: 2023-09-26 | End: 2023-09-26

## 2023-09-26 RX ORDER — SODIUM CHLORIDE 9 MG/ML
INJECTION, SOLUTION INTRAVENOUS CONTINUOUS
Status: DISCONTINUED | OUTPATIENT
Start: 2023-09-26 | End: 2023-09-27

## 2023-09-26 RX ADMIN — ASPIRIN 81 MG 81 MG: 81 TABLET ORAL at 09:09

## 2023-09-26 RX ADMIN — SODIUM CHLORIDE: 0.9 INJECTION, SOLUTION INTRAVENOUS at 11:09

## 2023-09-26 RX ADMIN — SODIUM CHLORIDE: 0.9 INJECTION, SOLUTION INTRAVENOUS at 05:09

## 2023-09-26 RX ADMIN — LEVOTHYROXINE SODIUM 88 MCG: 0.09 TABLET ORAL at 05:09

## 2023-09-26 RX ADMIN — PRAMIPEXOLE DIHYDROCHLORIDE 1 MG: 1 TABLET ORAL at 08:09

## 2023-09-26 RX ADMIN — DIPHENHYDRAMINE HYDROCHLORIDE 50 MG: 25 CAPSULE ORAL at 09:09

## 2023-09-26 RX ADMIN — PREGABALIN 150 MG: 75 CAPSULE ORAL at 08:09

## 2023-09-26 RX ADMIN — TRAZODONE HYDROCHLORIDE 25 MG: 50 TABLET ORAL at 08:09

## 2023-09-26 RX ADMIN — ATORVASTATIN CALCIUM 40 MG: 40 TABLET, FILM COATED ORAL at 08:09

## 2023-09-26 RX ADMIN — POTASSIUM BICARBONATE 50 MEQ: 977.5 TABLET, EFFERVESCENT ORAL at 05:09

## 2023-09-26 NOTE — PROGRESS NOTES
"[facility]  Department of Cardiology  Progress Note      PATIENT NAME: Genoveva Gilbert  MRN: [id number]  TODAY'S DATE: 09/26/2023  ADMIT DATE: 9/24/2023                          CONSULT REQUESTED BY: Jesus Christianson MD    SUBJECTIVE     PRINCIPAL PROBLEM: Elevated troponin      REASON FOR CONSULT:  Chest pain, shortness of breath, elevated troponin    INTERVAL HISTORY:  9/26/23  Pt anxious. Angiogram cancelled today for rise in creatinine, 1.4.  IVFs infusing. No recent chest pain.     HPI:  Pt is 72 yo female who reported to ER with c/o feelings of a panic attack and shortness of breath that lasted about 30 minutes. She denied chest pain. No left arm or neck, reported right shoulder pain, but she has had surgery in this joint in the past. Sensation of panic and shortness of breath subsided on its own after about half an hour.  Denies having any significant shortness of breath presently.      Hospitalist HPI: "73-year-old female with GERD, hypertension, hypothyroidism, diabetes, hyperlipidemia, depression was transferred to [facility] for cardiac eval. She was at [facility] with mild left sided CP and + troponin and sent here for stress test and possible LHC . She was having Mild SOB began this morning as well. She is complaining of some right shoulder pain, previous rotator cuff surgery to the right shoulder but her pain was at left side of the chest and dull and no radiation .  Troponin appear mildly positive and D dimer mildly positive too . CXR neg and EKG non ischemic .  ECHO 2022 was normal and which was done because of leg swelling from  lymphedema"    Review of patient's allergies indicates:   Allergen Reactions    Keflex [cephalexin]      Throat swelling    Pcn [penicillins]      Throat swelling    Latex, natural rubber Other (See Comments)     Redness with bandaids     Adhesive Other (See Comments)     bandainds redness at skin    Trelegy ellipta [fluticasone-umeclidin-vilanter]      Vision disturbance "       Past Medical History:   Diagnosis Date    Allergy     Anxiety     Arthritis, rheumatoid     Back pain     Chronic bronchiolitis     Depression     Fibromyalgia     GERD (gastroesophageal reflux disease)     High cholesterol     Hilar mass 03/27/2014    [place] (hard of hearing)     aid right ear    [place] (hard of hearing)     Hypertension     Incontinence of urine     Lymphedema     MS (multiple sclerosis)     benign; another MD stated she has no MS    Neuropathy     Personal history of colonic polyps 12/05/2019    Restless leg syndrome     Rotator cuff tear 04/16/2015    Sciatica of left side 01/05/2018    Seasonal allergies     Sinus congestion     Sleep apnea     DOES NOT USE MACHINE    Spondylolysis     Thyroid disease     Type 2 diabetes mellitus with polyneuropathy     no med. was borderline    Wheezing      Past Surgical History:   Procedure Laterality Date    APPENDECTOMY      ARTHROSCOPIC DEBRIDEMENT OF SHOULDER Right 02/18/2022    Procedure: EXTENSIVE DEBRIDEMENT, SHOULDER, ARTHROSCOPIC;  Surgeon: Rio Pitts MD;  Location: [facility] OR;  Service: Orthopedics;  Laterality: Right;    BREAST SURGERY      reduction    CLOSED REDUCTION OF INJURY OF SHOULDER Right 09/19/2021    Procedure: CLOSED REDUCTION, SHOULDER;  Surgeon: Antonio Irwin MD;  Location: [facility] OR;  Service: Orthopedics;  Laterality: Right;    COLONOSCOPY W/ POLYPECTOMY N/A 12/05/2019    repeat in 5 yrs    EPIDURAL STEROID INJECTION INTO LUMBAR SPINE N/A 06/12/2019    Procedure: Injection-steroid-epidural-lumbar;  Surgeon: Thad Manning MD;  Location: [facility] OR;  Service: Pain Management;  Laterality: N/A;  L5-S1    EPIDURAL STEROID INJECTION INTO LUMBAR SPINE N/A 09/16/2019    Procedure: Injection-steroid-epidural-lumbar;  Surgeon: Thad Manning MD;  Location: [facility] OR;  Service: Pain Management;  Laterality: N/A;  L5-S1    EYE SURGERY Bilateral     HYSTERECTOMY      partial - fibroids    INJECTION OF ANESTHETIC AGENT AROUND MEDIAL BRANCH  NERVES INNERVATING LUMBAR FACET JOINT Bilateral 02/28/2019    Procedure: Block-nerve-medial branch-lumbar;  Surgeon: Thad Manning MD;  Location: [facility] OR;  Service: Pain Management;  Laterality: Bilateral;  L3, 4,5     INJECTION OF ANESTHETIC AGENT AROUND MEDIAL BRANCH NERVES INNERVATING LUMBAR FACET JOINT Bilateral 03/21/2019    Procedure: Block-nerve-medial branch-lumbar L3,4,5;  Surgeon: Thad Manning MD;  Location: [facility] OR;  Service: Pain Management;  Laterality: Bilateral;    KNEE ARTHROPLASTY Left 03/16/2021    Procedure: ARTHROPLASTY, KNEE;  Surgeon: Rio Pitts MD;  Location: [facility] OR;  Service: Orthopedics;  Laterality: Left;  GENERAL AND BLOCK    LIPOSUCTION  1985    RADIOFREQUENCY ABLATION Left 11/04/2020    Procedure: Radiofrequency Ablation left knee;  Surgeon: Andrew Escudero MD;  Location: [facility] OR;  Service: Pain Management;  Laterality: Left;    RADIOFREQUENCY ABLATION OF LUMBAR MEDIAL BRANCH NERVE AT SINGLE LEVEL Bilateral 04/12/2019    Procedure: Radiofrequency Ablation, Nerve, Spinal, Lumbar, Medial Branch, 1 Level;  Surgeon: Thad Manning MD;  Location: [facility] OR;  Service: Pain Management;  Laterality: Bilateral;  L3, 4, 5  lumbar  [employer] pain management generator  SN [id number]  80 degrees for 75 seconds x2    RADIOFREQUENCY ABLATION OF LUMBAR MEDIAL BRANCH NERVE AT SINGLE LEVEL Bilateral 10/22/2019    Procedure: Radiofrequency Ablation, Nerve, Spinal, Lumbar, Medial Branch, L3,4,5;  Surgeon: Thad Manning MD;  Location: [facility] OR;  Service: Pain Management;  Laterality: Bilateral;  burned at 80 degrees C X 60 seconds X 2 each site    RADIOFREQUENCY ABLATION OF LUMBAR MEDIAL BRANCH NERVE AT SINGLE LEVEL Bilateral 05/27/2020    Procedure: Radiofrequency Ablation, Nerve, Spinal, Lumbar, Medial Branch, 1 Level;  Surgeon: Thad Manning MD;  Location: [facility] OR;  Service: Pain Management;  Laterality: Bilateral;  L3,4,5    REVERSE TOTAL SHOULDER ARTHROPLASTY Right 04/12/2022    Procedure:  ARTHROPLASTY, SHOULDER, TOTAL, REVERSE;  Surgeon: Rio Pitts MD;  Location: [facility] OR;  Service: Orthopedics;  Laterality: Right;    SHOULDER ARTHROSCOPY Right 2021    Procedure: ARTHROSCOPY, SHOULDER;  Surgeon: Antonio Irwin MD;  Location: [facility] OR;  Service: Orthopedics;  Laterality: Right;  LIMITED DEBRIDMENT    TONSILLECTOMY      TOTAL REDUCTION MAMMOPLASTY       Social History     Tobacco Use    Smoking status: Former     Current packs/day: 0.00     Types: Cigarettes     Quit date: 1996     Years since quittin.3     Passive exposure: Past    Smokeless tobacco: Never   Substance Use Topics    Alcohol use: Yes     Comment: very little     Drug use: No        REVIEW OF SYSTEMS  CONSTITUTIONAL: Negative for chills, fatigue and fever.   EYES: No double vision, No blurred vision  NEURO: No headaches, No dizziness  RESPIRATORY: +shortness of breath per HPI; Negative for cough, and wheezing.    CARDIOVASCULAR: Negative for chest pain. Negative for palpitations and leg swelling.   GI: Negative for abdominal pain, No melena, diarrhea, nausea and vomiting.   : Negative for dysuria and frequency, Negative for hematuria  SKIN: Negative for bruising, Negative for edema or discoloration noted.   ENDOCRINE: Negative for polyphagia, Negative for heat intolerance, Negative for cold intolerance  PSYCHIATRIC: panic sensation as per HPI; +anxiety; + prior history memory loss/global amnesia with TARAN 3 months ago  MUSCULOSKELETAL: Negative for neck pain, Negative for muscle weakness, Negative for back pain     OBJECTIVE     VITAL SIGNS (Most Recent)  Temp: 97.7 °F (36.5 °C) (23 0740)  Pulse: 67 (23 0740)  Resp: 18 (23 0740)  BP: 125/77 (23 0740)  SpO2: 98 % (23 0744)    VENTILATION STATUS  Resp: 18 (23)  SpO2: 98 % (2344)           I & O (Last 24H):  Intake/Output Summary (Last 24 hours) at 2023 1004  Last data filed at 2023 0744  Gross per 24  hour   Intake 480 ml   Output --   Net 480 ml         WEIGHTS  Wt Readings from Last 3 Encounters:   09/24/23 1959 77.6 kg (171 lb 1.6 oz)   09/24/23 1130 84.8 kg (186 lb 15.2 oz)   09/21/23 1449 84.8 kg (187 lb)   06/23/23 1916 84.9 kg (187 lb 2.7 oz)   06/23/23 1026 81.6 kg (180 lb)       PHYSICAL EXAM  GENERAL: well built, well nourished, well-developed in no apparent distress alert and oriented.   HEENT: Normocephalic. Pupils normal and conjunctivae normal.  Mucous membranes normal, no cyanosis or icterus, trachea central,no pallor or icterus is noted..   NECK: No JVD. No bruit..   THYROID: Thyroid not enlarged. No nodules present..   CARDIAC: Regular rate and rhythm. S1 is normal.S2 is normal.No gallops, clicks noted at this time.  CHEST ANATOMY: normal.   LUNGS: Clear to auscultation. No wheezing or rhonchi..   ABDOMEN: Soft no masses or organomegaly.  No abdomen pulsations or bruits.  Normal bowel sounds. No pulsations and no masses felt, No guarding or rebound.   URINARY: No christie catheter   EXTREMITIES: No cyanosis, clubbing or edema noted at this time., no calf tenderness bilaterally.   PERIPHERAL VASCULAR SYSTEM: Good palpable distal pulses.   CENTRAL NERVOUS SYSTEM: No focal motor or sensory deficits noted.   SKIN: Skin without lesions, moist, well perfused.   MUSCLE STRENGTH & TONE: No noteable weakness, atrophy or abnormal movement.     HOME MEDICATIONS:  No current facility-administered medications on file prior to encounter.     Current Outpatient Medications on File Prior to Encounter   Medication Sig Dispense Refill    albuterol (PROAIR HFA) 90 mcg/actuation inhaler Inhale 2 puffs into the lungs every 6 (six) hours as needed for Wheezing. Rescue (Patient not taking: Reported on 7/6/2023) 18 g 11    amLODIPine (NORVASC) 5 MG tablet Take 1 tablet (5 mg total) by mouth once daily. For blood pressure 30 tablet 0    ferrous sulfate 325 (65 FE) MG EC tablet Take 1 tablet by mouth once daily.       fexofenadine (ALLEGRA) 180 MG tablet       hydroCHLOROthiazide (HYDRODIURIL) 25 MG tablet Take 1 tablet (25 mg total) by mouth once daily. 90 tablet 0    ibuprofen (ADVIL,MOTRIN) 600 MG tablet Take 1 tablet (600 mg total) by mouth 3 (three) times daily as needed for Pain. 90 tablet 0    levothyroxine (SYNTHROID) 88 MCG tablet Take 1 tablet (88 mcg total) by mouth once daily. 90 tablet 3    losartan (COZAAR) 50 MG tablet Take 1 tablet (50 mg total) by mouth once daily. 90 tablet 0    lovastatin (MEVACOR) 40 MG tablet Take 1 tablet (40 mg total) by mouth nightly. at bedtime. For cholesterol 30 tablet 0    nystatin (MYCOSTATIN) cream Apply topically 2 (two) times daily. (Patient not taking: Reported on 7/6/2023) 60 g 1    omeprazole (PRILOSEC) 20 MG capsule Take 1 capsule (20 mg total) by mouth once daily. 90 capsule 1    potassium chloride (MICRO-K) 10 MEQ CpSR Take 1 capsule (10 mEq total) by mouth 3 (three) times daily. 270 capsule 0    pramipexole (MIRAPEX) 0.5 MG tablet Take 2 tablets (1 mg total) by mouth every evening. For restless legs 180 tablet 3    pregabalin (LYRICA) 75 MG capsule Take 150 mg by mouth 2 (two) times daily.      ramelteon (ROZEREM) 8 mg tablet Take 1 tablet (8 mg total) by mouth every evening. 30 tablet 0    sertraline (ZOLOFT) 50 MG tablet Take half tab PO daily x 1 week, then increase to 1 tab PO daily. 90 tablet 1    torsemide (DEMADEX) 10 MG Tab TAKE 1 TABLET(10 MG) BY MOUTH EVERY DAY 90 tablet 3    traZODone (DESYREL) 50 MG tablet TAKE 1 TO 2 TABLETS BY MOUTH EVERY NIGHT AT BEDTIME AS NEEDED FOR INSOMNIA 60 tablet 1       SCHEDULED MEDS:   amLODIPine  5 mg Oral Daily    aspirin  81 mg Oral Daily    atorvastatin  40 mg Oral QHS    hydroCHLOROthiazide  25 mg Oral Daily    levothyroxine  88 mcg Oral Before breakfast    losartan  50 mg Oral Daily    pramipexole  1 mg Oral QHS    pregabalin  150 mg Oral BID    sertraline  25 mg Oral Daily    torsemide  10 mg Oral Daily       CONTINUOUS  "INFUSIONS:    PRN MEDS:albuterol, dextrose 50%, dextrose 50%, diphenhydrAMINE, glucagon (human recombinant), glucose, glucose, insulin aspart U-100, magnesium oxide, magnesium oxide, melatonin, naloxone, nitroGLYCERIN, potassium bicarbonate, potassium bicarbonate, potassium bicarbonate, potassium, sodium phosphates, potassium, sodium phosphates, potassium, sodium phosphates, sodium chloride 0.9%, traZODone    LABS AND DIAGNOSTICS     CBC LAST 3 DAYS  Recent Labs   Lab 09/24/23  1200 09/25/23  1816   WBC 5.94 7.43   RBC 5.05 4.70   HGB 15.0 13.8   HCT 43.3 41.7   MCV 86 89   MCH 29.7 29.4   MCHC 34.6 33.1   RDW 11.9 12.3    217   MPV 9.6 9.3   GRAN 61.5  3.7 52.0  3.9   LYMPH 29.8  1.8 39.0  2.9   MONO 7.4  0.4 6.3  0.5   BASO 0.03 0.05   NRBC 0 0         COAGULATION LAST 3 DAYS  Recent Labs   Lab 09/25/23  1816   APTT 29.1       CHEMISTRY LAST 3 DAYS  Recent Labs   Lab 09/24/23  1200 09/25/23  0930 09/26/23  0423    139 139   K 3.2* 3.3* 3.5    105 101   CO2 19* 27 30*   ANIONGAP 17* 7* 8   BUN 13 22 39*   CREATININE 0.8 1.0 1.4   GLU 83 85 104   CALCIUM 9.9 8.9 8.7   ALBUMIN 4.4  --   --    PROT 7.7  --   --    ALKPHOS 80  --   --    ALT 18  --   --    AST 22  --   --    BILITOT 1.0  --   --          CARDIAC PROFILE LAST 3 DAYS  Recent Labs   Lab 09/24/23  1200   *   TROPONINI 0.093*         ENDOCRINE LAST 3 DAYS  No results for input(s): "TSH", "PROCAL" in the last 168 hours.    LAST ARTERIAL BLOOD GAS  ABG  No results for input(s): "PH", "PO2", "PCO2", "HCO3", "BE" in the last 168 hours.    LAST 7 DAYS MICROBIOLOGY   Microbiology Results (last 7 days)       ** No results found for the last 168 hours. **            MOST RECENT IMAGING  X-Ray Chest AP Single View  XR CHEST 1 VIEW    CLINICAL HISTORY:  73 years Female pre op abnormal stress test    COMPARISON: 12/6/2022    FINDINGS: Cardiomediastinal silhouette is within normal limits. Lungs are normally expanded with no airspace " consolidation. No pleural effusion or pneumothorax. No acute osseous abnormality. There is a reverse right shoulder prosthesis.    IMPRESSION: No acute pulmonary process.    Electronically signed by:  Ghislaine Patton MD  09/26/2023 07:58 AM CDT Workstation: [id number]      ECHOCARDIOGRAM RESULTS (last 5)  Results for orders placed during the hospital encounter of 10/05/22    Echo    Interpretation Summary  · The left ventricle is normal in size with mild concentric hypertrophy and normal systolic function.  · The estimated ejection fraction is 62%.  · Normal left ventricular diastolic function.  · Normal right ventricular size with normal right ventricular systolic function.  · Moderate left atrial enlargement.  · Mild tricuspid regurgitation.  · Normal central venous pressure (3 mmHg).  · The estimated PA systolic pressure is 39 mmHg.  · There is mild pulmonary hypertension.  · Mild mitral regurgitation.  · Mild aortic regurgitation.      Results for orders placed during the hospital encounter of 03/25/19    Transthoracic echo (TTE) complete (Cupid Only)    Interpretation Summary  · Normal left ventricular systolic function. The estimated ejection fraction is 55%  · Normal LV diastolic function.  · Normal right ventricular systolic function.  · Normal central venous pressure (3 mm Hg).  · The estimated PA systolic pressure is 30 mm Hg      CURRENT/PREVIOUS VISIT EKG  Results for orders placed or performed during the hospital encounter of 06/23/23   EKG 12-lead    Collection Time: 06/23/23 10:58 AM    Narrative    Test Reason : Z00.8,    Vent. Rate : 067 BPM     Atrial Rate : 067 BPM     P-R Int : 150 ms          QRS Dur : 096 ms      QT Int : 448 ms       P-R-T Axes : 048 001 059 degrees     QTc Int : 473 ms    Normal sinus rhythm with sinus arrhythmia  Normal ECG  When compared with ECG of 05-OCT-2022 12:42,  No significant change was found  Confirmed by Randall Mayorga MD [id number]) on 6/26/2023 7:26:22  PM    Referred By: NATALIA   SELF           Confirmed By:Randall Mayorga MD           ASSESSMENT/PLAN:     Active Hospital Problems    Diagnosis    *Elevated troponin    Status post [place] reverse arthroplasty of shoulder, right April 12, 2022    Type 2 diabetes mellitus with hyperglycemia, without long-term current use of insulin    Rheumatoid arthritis    NAFLD (nonalcoholic fatty liver disease)    Gastroesophageal reflux disease    Back pain, chronic    Hypothyroidism    Hyperlipidemia    Hypertension associated with diabetes       ASSESSMENT & PLAN:   Shortness of breath  Chest pain  HLD  HTN  DM 2  Hypokalemia  Anxiety    RECOMMENDATIONS:  Pt admitted with c/o shortness of breath + chest pain. Two sets of HS troponin negative. Minimal elevation of initial troponin at [facility] x 1.   Obtain echocardiogram to evaluate LV and valvular function. (Echo in 10/2022 showed Efx 62%) ECHO pending  Nuclear stress test 9/25/23 was suspicious for reversible ischemia.   , CXR showed mild cardiomegaly with no acute findings  BP trend well controlled. Continue amlodipine 5 mg daily  . Continue Lipitor 40 mg QHS   Potassium 3.5-replaced/Mag 1.8. Replace per protocol and Continue to check and replace potassium and magnesium. Goal for potassium is 4.0, and goal for magnesium is 2.0.   8.   Continue hydration with NS at 50 ml/hr and repeat CMP in am. Hopeful to proceed with angiogram tomorrow pending renal function  9. NPO after midnight for tentative angiogram tomorrow    Emma Cristina NP  [facility]  Department of Cardiology  Date of Service: 09/26/2023 09/26/2023:   Angiogram has been canceled due to transient elevation creatinine will continue hydration and plan on repeat revascularization tomorrow  Add isosorbide mononitrate 30 mg daily to the regimen  I have personally interviewed and examined the patient, I have reviewed the Nurse Practitioner's history and physical, assessment, and  plan. I agree with the findings and plan.      09/25/2023:     73-year-old lady with history of type 2 diabetes arterial hypertension dyslipidemia admitted with feeling of anxiety and some shortness of breath lasting about an hour and a half duration.  She was noted to have abnormal troponin at [facility] however repeat troponins here were within reasonable range.  Patient had no further episodes of chest discomfort.  I have personally interviewed and examined the patient, I have reviewed the Nurse Practitioner's history and physical, assessment, and plan. I agree with the findings and plan.  Patient underwent myocardial perfusion imaging study and noted to have some reversibility in the LAD distribution probably significant disease in the diagonal branch of the LAD.  Recommend to obtain cardiac catheterization for more definite diagnosis.  In the meantime continue on optimize medical therapy with amlodipine 5 mg daily and will add isosorbide mononitrate 30 mg daily to her regimen.    Dr. Nick Ospina M.D.  [facility]  Department of Cardiology  Date of Service: 09/26/2023  10:08 AM

## 2023-09-26 NOTE — PROGRESS NOTES
Angiogram canceled today due to elevated creatinine to 1.4.  She had an episode of TARAN 2-3 months ago from which she recovered.  IV hydration today and repeat labs tomorrow. Hold torsemide, HCTZ and Losartan for now.  Will perform cardiac catheterization tomorrow if kidney function is normal.  Discussed with patient and patient's daughter.

## 2023-09-26 NOTE — CARE UPDATE
09/26/23 0400   Patient Assessment/Suction   Level of Consciousness (AVPU) alert   Respiratory Effort Normal;Unlabored   PRE-TX-O2   Device (Oxygen Therapy) room air   SpO2 (!) 94 %   Pulse Oximetry Type Intermittent   $ Pulse Oximetry - Single Charge Pulse Oximetry - Single   $ Pulse Oximetry - Multiple Charge Pulse Oximetry - Multiple   Pulse 65   Resp 18   Aerosol Therapy   $ Aerosol Therapy Charges PRN treatment not required   Respiratory Evaluation   $ Care Plan Tech Time 15 min   $ Eval/Re-eval Charges Re-evaluation

## 2023-09-26 NOTE — SUBJECTIVE & OBJECTIVE
Interval History:  no new complaints.  No chest pain.  Troponin level drawn here at this hospital is low.    Review of Systems   Constitutional:  Negative for fever.   Respiratory:  Negative for shortness of breath.    Cardiovascular:  Negative for chest pain.     Objective:     Vital Signs (Most Recent):  Temp: 98.5 °F (36.9 °C) (09/25/23 1907)  Pulse: 95 (09/25/23 1930)  Resp: 20 (09/25/23 1930)  BP: (!) 111/59 (09/25/23 1907)  SpO2: 97 % (09/25/23 1930) Vital Signs (24h Range):  Temp:  [97.9 °F (36.6 °C)-98.8 °F (37.1 °C)] 98.5 °F (36.9 °C)  Pulse:  [63-97] 95  Resp:  [18-20] 20  SpO2:  [90 %-97 %] 97 %  BP: (111-148)/(59-91) 111/59     Weight: 77.6 kg (171 lb 1.6 oz)  Body mass index is 32.33 kg/m².    Intake/Output Summary (Last 24 hours) at 9/25/2023 2035  Last data filed at 9/25/2023 1100  Gross per 24 hour   Intake 265 ml   Output 100 ml   Net 165 ml         Physical Exam  Constitutional:       General: She is not in acute distress.     Appearance: She is not ill-appearing.   Eyes:      General:         Right eye: No discharge.         Left eye: No discharge.   Neck:      Vascular: No JVD.   Cardiovascular:      Rate and Rhythm: Normal rate and regular rhythm.   Pulmonary:      Effort: Pulmonary effort is normal.      Breath sounds: Normal breath sounds.   Abdominal:      General: Abdomen is flat. Bowel sounds are normal. There is no distension.      Palpations: Abdomen is soft.      Tenderness: There is no abdominal tenderness.   Musculoskeletal:      Right lower leg: No edema.      Left lower leg: No edema.   Skin:     General: Skin is warm and moist.      Findings: No rash.   Neurological:      Mental Status: She is alert and oriented to person, place, and time.   Psychiatric:         Attention and Perception: Attention normal.         Mood and Affect: Mood and affect normal.         Speech: Speech normal.             Significant Labs: All pertinent labs within the past 24 hours have been  reviewed.    Significant Imaging:  No new imaging

## 2023-09-26 NOTE — CARE UPDATE
09/25/23 1930   Patient Assessment/Suction   Level of Consciousness (AVPU) alert   Respiratory Effort Normal;Unlabored   PRE-TX-O2   Device (Oxygen Therapy) room air   SpO2 97 %   Pulse Oximetry Type Intermittent   $ Pulse Oximetry - Single Charge Pulse Oximetry - Single   $ Pulse Oximetry - Multiple Charge Pulse Oximetry - Multiple   Pulse 95   Resp 20   Aerosol Therapy   $ Aerosol Therapy Charges PRN treatment not required   Education   $ Education Bronchodilator;15 min

## 2023-09-26 NOTE — ASSESSMENT & PLAN NOTE
"Patient's FSGs are controlled on current medication regimen.  Last A1c reviewed-   Lab Results   Component Value Date    HGBA1C 5.7 07/06/2023     Most recent fingerstick glucose reviewed- No results for input(s): "POCTGLUCOSE" in the last 24 hours.  Current correctional scale  Low  Maintain anti-hyperglycemic dose as follows-   Antihyperglycemics (From admission, onward)    Start     Stop Route Frequency Ordered    09/25/23 2137  insulin aspart U-100 pen 0-5 Units         -- SubQ Before meals & nightly PRN 09/25/23 2037        Hold Oral hypoglycemics while patient is in the hospital.      "

## 2023-09-26 NOTE — CARE UPDATE
Prn tx available   09/26/23 0744   Patient Assessment/Suction   Level of Consciousness (AVPU) alert   Respiratory Effort Normal;Unlabored   Expansion/Accessory Muscles/Retractions no use of accessory muscles;expansion symmetric   All Lung Fields Breath Sounds clear   Rhythm/Pattern, Respiratory unlabored;depth regular;no shortness of breath reported   Cough Frequency no cough   PRE-TX-O2   Device (Oxygen Therapy) room air   SpO2 98 %   Pulse Oximetry Type Intermittent   $ Pulse Oximetry - Multiple Charge Pulse Oximetry - Multiple   Positioning   Body Position position changed independently   Head of Bed (HOB) Positioning HOB elevated;HOB at 30-45 degrees   Education   $ Education Bronchodilator;15 min   Respiratory Evaluation   $ Care Plan Tech Time 15 min   $ Eval/Re-eval Charges Re-evaluation

## 2023-09-26 NOTE — PLAN OF CARE
Stress test positive for reversible ischemia - patient NPO for [facility] with Dr. Lubin on today    CM following for any discharge needs       09/26/23 0841   Discharge Reassessment   Assessment Type Discharge Planning Reassessment   Did the patient's condition or plan change since previous assessment? Yes   Discharge Plan discussed with: Patient   Discharge Plan A Home   Discharge Plan B Home;Home Health   Why the patient remains in the hospital Requires continued medical care   Post-Acute Status   Discharge Delays (!) Change in Medical Condition

## 2023-09-26 NOTE — ASSESSMENT & PLAN NOTE
At outside hospital.  Levels drawn here are not elevated.  Cardiology consult.  Pharm Myoview stress today.    Troponin I High Sensitivity   Date Value Ref Range Status   09/25/2023 10.0 0.0 - 14.9 pg/mL Final     Comment:     Troponin results differ between methods. Do not use   results between Troponin methods interchangeably.    Alkaline Phospatase levels above 400 U/L may   cause false positive results.    Access hsTnI should not be used for patients taking   Asfotase cristiana (Strensiq).     09/25/2023 11.8 0.0 - 14.9 pg/mL Final     Comment:     Troponin results differ between methods. Do not use   results between Troponin methods interchangeably.    Alkaline Phospatase levels above 400 U/L may   cause false positive results.    Access hsTnI should not be used for patients taking   Asfotase cristiana (Strensiq).

## 2023-09-26 NOTE — PROGRESS NOTES
On license of [facility] Medicine  Progress Note    Patient Name: Genoveva Gilbert  MRN: [id number]  Patient Class: OP- Observation   Admission Date: 9/24/2023  Length of Stay: 0 days  Attending Physician: Jesus Christianson MD  Primary Care Provider: Katherin Guajardo MD        Subjective:     Principal Problem:Elevated troponin        HPI:  73-year-old female with GERD, hypertension, hypothyroidism, diabetes, hyperlipidemia, depression was transferred to [facility] for cardiac eval .  She was at [facility] with mild left sided CP and + tropoonin and sent here for stress test and possible LHC . She was having Mild SOB began this morning as well .  She is complaining of some right shoulder pain, previous rotator cuff surgery to the right shoulder but her pain was at left side of the chest and dull and no radiation .  Troponin appear mildly positive and D dimer mildly positive too . CXR neg and EKG non ischemic .  ECHO 2022 was normal and which was done because of leg swelling from  lymphedema .      Overview/Hospital Course:  No notes on file    Interval History:  no new complaints.  No chest pain.  Troponin level drawn here at this hospital is low.    Review of Systems   Constitutional:  Negative for fever.   Respiratory:  Negative for shortness of breath.    Cardiovascular:  Negative for chest pain.     Objective:     Vital Signs (Most Recent):  Temp: 98.5 °F (36.9 °C) (09/25/23 1907)  Pulse: 95 (09/25/23 1930)  Resp: 20 (09/25/23 1930)  BP: (!) 111/59 (09/25/23 1907)  SpO2: 97 % (09/25/23 1930) Vital Signs (24h Range):  Temp:  [97.9 °F (36.6 °C)-98.8 °F (37.1 °C)] 98.5 °F (36.9 °C)  Pulse:  [63-97] 95  Resp:  [18-20] 20  SpO2:  [90 %-97 %] 97 %  BP: (111-148)/(59-91) 111/59     Weight: 77.6 kg (171 lb 1.6 oz)  Body mass index is 32.33 kg/m².    Intake/Output Summary (Last 24 hours) at 9/25/2023 2035  Last data filed at 9/25/2023 1100  Gross per 24 hour   Intake 265 ml   Output 100 ml   Net 165 ml          Physical Exam  Constitutional:       General: She is not in acute distress.     Appearance: She is not ill-appearing.   Eyes:      General:         Right eye: No discharge.         Left eye: No discharge.   Neck:      Vascular: No JVD.   Cardiovascular:      Rate and Rhythm: Normal rate and regular rhythm.   Pulmonary:      Effort: Pulmonary effort is normal.      Breath sounds: Normal breath sounds.   Abdominal:      General: Abdomen is flat. Bowel sounds are normal. There is no distension.      Palpations: Abdomen is soft.      Tenderness: There is no abdominal tenderness.   Musculoskeletal:      Right lower leg: No edema.      Left lower leg: No edema.   Skin:     General: Skin is warm and moist.      Findings: No rash.   Neurological:      Mental Status: She is alert and oriented to person, place, and time.   Psychiatric:         Attention and Perception: Attention normal.         Mood and Affect: Mood and affect normal.         Speech: Speech normal.             Significant Labs: All pertinent labs within the past 24 hours have been reviewed.    Significant Imaging:  No new imaging      Assessment/Plan:      * Elevated troponin  At outside hospital.  Levels drawn here are not elevated.  Cardiology consult.  Pharm Myoview stress today.    Troponin I High Sensitivity   Date Value Ref Range Status   09/25/2023 10.0 0.0 - 14.9 pg/mL Final     Comment:     Troponin results differ between methods. Do not use   results between Troponin methods interchangeably.    Alkaline Phospatase levels above 400 U/L may   cause false positive results.    Access hsTnI should not be used for patients taking   Asfotase cristiana (Strensiq).     09/25/2023 11.8 0.0 - 14.9 pg/mL Final     Comment:     Troponin results differ between methods. Do not use   results between Troponin methods interchangeably.    Alkaline Phospatase levels above 400 U/L may   cause false positive results.    Access hsTnI should not be used for patients taking  "  Asfotase cristiana (Strensiq).           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  Last A1c reviewed-   Lab Results   Component Value Date    HGBA1C 5.7 07/06/2023     Most recent fingerstick glucose reviewed- No results for input(s): "POCTGLUCOSE" in the last 24 hours.  Current correctional scale  Low  Maintain anti-hyperglycemic dose as follows-   Antihyperglycemics (From admission, onward)    Start     Stop Route Frequency Ordered    09/25/23 2137  insulin aspart U-100 pen 0-5 Units         -- SubQ Before meals & nightly PRN 09/25/23 2037        Hold Oral hypoglycemics while patient is in the hospital.        Rheumatoid arthritis  Listed as diagnosis.  No acute issues.      NAFLD (nonalcoholic fatty liver disease)  Aware  Stable liver function.      Gastroesophageal reflux disease  No issues.      Back pain, chronic  aware      Hypertension associated with diabetes  Continue home BP meds and monitor pressures.  So far, they're controlled.    Hyperlipidemia  No acute issues        Fibromyalgia  aware    Hypothyroidism  Stable.  Continue usual dose of levothyroxine.        VTE Risk Mitigation (From admission, onward)         Ordered     IP VTE HIGH RISK PATIENT  Once         09/24/23 2016     Place sequential compression device  Until discontinued         09/24/23 2016                Discharge Planning   MATILDA: 9/26/2023     Code Status: Full Code   Is the patient medically ready for discharge?:     Reason for patient still in hospital (select all that apply): Patient trending condition, Imaging and Consult recommendations  Discharge Plan A: Home                  Jesus Christianson MD  Department of Hospital Medicine   [facility]  "

## 2023-09-27 ENCOUNTER — TELEPHONE (OUTPATIENT)
Dept: CARDIOLOGY | Facility: CLINIC | Age: 73
End: 2023-09-27
Payer: MEDICARE

## 2023-09-27 VITALS
HEART RATE: 63 BPM | DIASTOLIC BLOOD PRESSURE: 86 MMHG | HEIGHT: 61 IN | RESPIRATION RATE: 18 BRPM | BODY MASS INDEX: 32.31 KG/M2 | WEIGHT: 171.13 LBS | OXYGEN SATURATION: 100 % | TEMPERATURE: 98 F | SYSTOLIC BLOOD PRESSURE: 123 MMHG

## 2023-09-27 DIAGNOSIS — E78.2 MIXED HYPERLIPIDEMIA: ICD-10-CM

## 2023-09-27 PROBLEM — R79.89 ELEVATED TROPONIN: Status: RESOLVED | Noted: 2022-10-05 | Resolved: 2023-09-27

## 2023-09-27 PROBLEM — I25.10 CORONARY ARTERY DISEASE INVOLVING NATIVE CORONARY ARTERY: Status: ACTIVE | Noted: 2023-09-27

## 2023-09-27 LAB
ANION GAP SERPL CALC-SCNC: 6 MMOL/L (ref 8–16)
BASOPHILS # BLD AUTO: 0.02 K/UL (ref 0–0.2)
BASOPHILS NFR BLD: 0.4 % (ref 0–1.9)
BUN SERPL-MCNC: 28 MG/DL (ref 8–23)
CALCIUM SERPL-MCNC: 8.4 MG/DL (ref 8.7–10.5)
CHLORIDE SERPL-SCNC: 111 MMOL/L (ref 95–110)
CO2 SERPL-SCNC: 28 MMOL/L (ref 23–29)
CREAT SERPL-MCNC: 0.8 MG/DL (ref 0.5–1.4)
DIFFERENTIAL METHOD: ABNORMAL
EOSINOPHIL # BLD AUTO: 0.2 K/UL (ref 0–0.5)
EOSINOPHIL NFR BLD: 4.3 % (ref 0–8)
ERYTHROCYTE [DISTWIDTH] IN BLOOD BY AUTOMATED COUNT: 12.3 % (ref 11.5–14.5)
EST. GFR  (NO RACE VARIABLE): >60 ML/MIN/1.73 M^2
GLUCOSE SERPL-MCNC: 87 MG/DL (ref 70–110)
GLUCOSE SERPL-MCNC: 97 MG/DL (ref 70–110)
GLUCOSE SERPL-MCNC: 99 MG/DL (ref 70–110)
HCT VFR BLD AUTO: 38.9 % (ref 37–48.5)
HGB BLD-MCNC: 12.3 G/DL (ref 12–16)
IMM GRANULOCYTES # BLD AUTO: 0.01 K/UL (ref 0–0.04)
IMM GRANULOCYTES NFR BLD AUTO: 0.2 % (ref 0–0.5)
LYMPHOCYTES # BLD AUTO: 2.7 K/UL (ref 1–4.8)
LYMPHOCYTES NFR BLD: 50 % (ref 18–48)
MCH RBC QN AUTO: 29.5 PG (ref 27–31)
MCHC RBC AUTO-ENTMCNC: 31.6 G/DL (ref 32–36)
MCV RBC AUTO: 93 FL (ref 82–98)
MONOCYTES # BLD AUTO: 0.5 K/UL (ref 0.3–1)
MONOCYTES NFR BLD: 9.1 % (ref 4–15)
NEUTROPHILS # BLD AUTO: 1.9 K/UL (ref 1.8–7.7)
NEUTROPHILS NFR BLD: 36 % (ref 38–73)
NRBC BLD-RTO: 0 /100 WBC
PLATELET # BLD AUTO: 168 K/UL (ref 150–450)
PMV BLD AUTO: 9.8 FL (ref 9.2–12.9)
POTASSIUM SERPL-SCNC: 3.9 MMOL/L (ref 3.5–5.1)
RBC # BLD AUTO: 4.17 M/UL (ref 4–5.4)
SODIUM SERPL-SCNC: 145 MMOL/L (ref 136–145)
WBC # BLD AUTO: 5.38 K/UL (ref 3.9–12.7)

## 2023-09-27 PROCEDURE — 25000003 PHARM REV CODE 250: Performed by: NURSE PRACTITIONER

## 2023-09-27 PROCEDURE — 63600175 PHARM REV CODE 636 W HCPCS: Performed by: INTERNAL MEDICINE

## 2023-09-27 PROCEDURE — C1760 CLOSURE DEV, VASC: HCPCS | Performed by: INTERNAL MEDICINE

## 2023-09-27 PROCEDURE — 25000003 PHARM REV CODE 250: Performed by: HOSPITALIST

## 2023-09-27 PROCEDURE — 93010 EKG 12-LEAD: ICD-10-PCS | Mod: ,,, | Performed by: INTERNAL MEDICINE

## 2023-09-27 PROCEDURE — 93454 PR CATH PLACE/CORONARY ANGIO, IMG SUPER/INTERP: ICD-10-PCS | Mod: 26,,, | Performed by: INTERNAL MEDICINE

## 2023-09-27 PROCEDURE — 93454 CORONARY ARTERY ANGIO S&I: CPT | Performed by: INTERNAL MEDICINE

## 2023-09-27 PROCEDURE — 80048 BASIC METABOLIC PNL TOTAL CA: CPT | Performed by: INTERNAL MEDICINE

## 2023-09-27 PROCEDURE — 93005 ELECTROCARDIOGRAM TRACING: CPT | Performed by: INTERNAL MEDICINE

## 2023-09-27 PROCEDURE — 93454 CORONARY ARTERY ANGIO S&I: CPT | Mod: 26,,, | Performed by: INTERNAL MEDICINE

## 2023-09-27 PROCEDURE — 36140 INTRO NDL ICATH UPR/LXTR ART: CPT | Mod: 59,RT,, | Performed by: INTERNAL MEDICINE

## 2023-09-27 PROCEDURE — 25500020 PHARM REV CODE 255: Performed by: INTERNAL MEDICINE

## 2023-09-27 PROCEDURE — 36140 INTRO NDL ICATH UPR/LXTR ART: CPT | Mod: 59,RT | Performed by: INTERNAL MEDICINE

## 2023-09-27 PROCEDURE — 25000003 PHARM REV CODE 250: Performed by: INTERNAL MEDICINE

## 2023-09-27 PROCEDURE — 93010 ELECTROCARDIOGRAM REPORT: CPT | Mod: ,,, | Performed by: INTERNAL MEDICINE

## 2023-09-27 PROCEDURE — C1769 GUIDE WIRE: HCPCS | Performed by: INTERNAL MEDICINE

## 2023-09-27 PROCEDURE — 36140 PR PLACE CATH EXTREM ARTERY: ICD-10-PCS | Mod: 59,RT,, | Performed by: INTERNAL MEDICINE

## 2023-09-27 PROCEDURE — 99232 PR SUBSEQUENT HOSPITAL CARE,LEVL II: ICD-10-PCS | Mod: ,,, | Performed by: INTERNAL MEDICINE

## 2023-09-27 PROCEDURE — 99232 SBSQ HOSP IP/OBS MODERATE 35: CPT | Mod: ,,, | Performed by: INTERNAL MEDICINE

## 2023-09-27 PROCEDURE — 27201423 OPTIME MED/SURG SUP & DEVICES STERILE SUPPLY: Performed by: INTERNAL MEDICINE

## 2023-09-27 PROCEDURE — 85025 COMPLETE CBC W/AUTO DIFF WBC: CPT | Performed by: INTERNAL MEDICINE

## 2023-09-27 PROCEDURE — 36415 COLL VENOUS BLD VENIPUNCTURE: CPT | Performed by: INTERNAL MEDICINE

## 2023-09-27 PROCEDURE — 99152 MOD SED SAME PHYS/QHP 5/>YRS: CPT | Performed by: INTERNAL MEDICINE

## 2023-09-27 PROCEDURE — C1894 INTRO/SHEATH, NON-LASER: HCPCS | Performed by: INTERNAL MEDICINE

## 2023-09-27 PROCEDURE — C1887 CATHETER, GUIDING: HCPCS | Performed by: INTERNAL MEDICINE

## 2023-09-27 PROCEDURE — 99153 MOD SED SAME PHYS/QHP EA: CPT | Performed by: INTERNAL MEDICINE

## 2023-09-27 PROCEDURE — 99152 MOD SED SAME PHYS/QHP 5/>YRS: CPT | Mod: ,,, | Performed by: INTERNAL MEDICINE

## 2023-09-27 PROCEDURE — 99152 PR MOD CONSCIOUS SEDATION, SAME PHYS, 5+ YRS, FIRST 15 MIN: ICD-10-PCS | Mod: ,,, | Performed by: INTERNAL MEDICINE

## 2023-09-27 DEVICE — ANGIO-SEAL VIP VASCULAR CLOSURE DEVICE
Type: IMPLANTABLE DEVICE | Site: GROIN | Status: FUNCTIONAL
Brand: ANGIO-SEAL

## 2023-09-27 RX ORDER — LOVASTATIN 40 MG/1
TABLET ORAL
Qty: 30 TABLET | Refills: 0 | Status: SHIPPED | OUTPATIENT
Start: 2023-09-27 | End: 2023-10-02 | Stop reason: SDUPTHER

## 2023-09-27 RX ORDER — ISOSORBIDE MONONITRATE 60 MG/1
60 TABLET, EXTENDED RELEASE ORAL DAILY
Status: DISCONTINUED | OUTPATIENT
Start: 2023-09-28 | End: 2023-09-27 | Stop reason: HOSPADM

## 2023-09-27 RX ORDER — ASPIRIN 81 MG/1
81 TABLET ORAL DAILY
Refills: 0 | COMMUNITY
Start: 2023-09-27 | End: 2023-11-14

## 2023-09-27 RX ORDER — IODIXANOL 320 MG/ML
INJECTION, SOLUTION INTRAVASCULAR
Status: DISCONTINUED | OUTPATIENT
Start: 2023-09-27 | End: 2023-09-27 | Stop reason: HOSPADM

## 2023-09-27 RX ORDER — SODIUM CHLORIDE 9 MG/ML
INJECTION, SOLUTION INTRAVENOUS CONTINUOUS
Status: ACTIVE | OUTPATIENT
Start: 2023-09-27 | End: 2023-09-27

## 2023-09-27 RX ORDER — ISOSORBIDE MONONITRATE 60 MG/1
60 TABLET, EXTENDED RELEASE ORAL DAILY
Qty: 30 TABLET | Refills: 0 | Status: SHIPPED | OUTPATIENT
Start: 2023-09-28 | End: 2023-10-24 | Stop reason: SDUPTHER

## 2023-09-27 RX ORDER — ATORVASTATIN CALCIUM 40 MG/1
40 TABLET, FILM COATED ORAL NIGHTLY
Qty: 90 TABLET | Refills: 0 | Status: SHIPPED | OUTPATIENT
Start: 2023-09-27 | End: 2023-11-14 | Stop reason: SDUPTHER

## 2023-09-27 RX ORDER — MIDAZOLAM HYDROCHLORIDE 1 MG/ML
INJECTION INTRAMUSCULAR; INTRAVENOUS
Status: DISCONTINUED | OUTPATIENT
Start: 2023-09-27 | End: 2023-09-27 | Stop reason: HOSPADM

## 2023-09-27 RX ORDER — FENTANYL CITRATE 50 UG/ML
INJECTION, SOLUTION INTRAMUSCULAR; INTRAVENOUS
Status: DISCONTINUED | OUTPATIENT
Start: 2023-09-27 | End: 2023-09-27 | Stop reason: HOSPADM

## 2023-09-27 RX ORDER — NITROGLYCERIN 5 MG/ML
INJECTION, SOLUTION INTRAVENOUS
Status: DISCONTINUED | OUTPATIENT
Start: 2023-09-27 | End: 2023-09-27 | Stop reason: HOSPADM

## 2023-09-27 RX ORDER — LIDOCAINE HYDROCHLORIDE 10 MG/ML
INJECTION INFILTRATION; PERINEURAL
Status: DISCONTINUED | OUTPATIENT
Start: 2023-09-27 | End: 2023-09-27 | Stop reason: HOSPADM

## 2023-09-27 RX ADMIN — DIPHENHYDRAMINE HYDROCHLORIDE 50 MG: 25 CAPSULE ORAL at 09:09

## 2023-09-27 RX ADMIN — LEVOTHYROXINE SODIUM 88 MCG: 0.09 TABLET ORAL at 05:09

## 2023-09-27 RX ADMIN — SODIUM CHLORIDE: 0.9 INJECTION, SOLUTION INTRAVENOUS at 05:09

## 2023-09-27 RX ADMIN — ASPIRIN 81 MG 81 MG: 81 TABLET ORAL at 09:09

## 2023-09-27 RX ADMIN — SODIUM CHLORIDE: 0.9 INJECTION, SOLUTION INTRAVENOUS at 02:09

## 2023-09-27 NOTE — PROGRESS NOTES
[facility] Medicine  Progress Note    Patient Name: Genoveva Gilbert  MRN: [id number]  Patient Class: IP- Inpatient   Admission Date: 9/24/2023  Length of Stay: 0 days  Attending Physician: Jesus Christianson MD  Primary Care Provider: Katherin Guajardo MD        Subjective:     Principal Problem:Elevated troponin        HPI:  73-year-old female with GERD, hypertension, hypothyroidism, diabetes, hyperlipidemia, depression was transferred to [facility] for cardiac eval .  She was at [facility] with mild left sided CP and + tropoonin and sent here for stress test and possible LHC . She was having Mild SOB began this morning as well .  She is complaining of some right shoulder pain, previous rotator cuff surgery to the right shoulder but her pain was at left side of the chest and dull and no radiation .  Troponin appear mildly positive and D dimer mildly positive too . CXR neg and EKG non ischemic .  ECHO 2022 was normal and which was done because of leg swelling from  lymphedema .      Overview/Hospital Course:  No notes on file    Interval History:  no new complaints.  No chest pain.  Stress test was positive for reversible ischemia.  She will undergo PTCA today.    Review of Systems   Constitutional:  Negative for fever.   Respiratory:  Negative for shortness of breath.    Cardiovascular:  Negative for chest pain.     Objective:     Vital Signs (Most Recent):  Temp: 99.6 °F (37.6 °C) (09/26/23 1905)  Pulse: 66 (09/26/23 1905)  Resp: 18 (09/26/23 1905)  BP: (!) 161/71 (09/26/23 1905)  SpO2: 97 % (09/26/23 1905) Vital Signs (24h Range):  Temp:  [97.4 °F (36.3 °C)-99.6 °F (37.6 °C)] 99.6 °F (37.6 °C)  Pulse:  [51-68] 66  Resp:  [16-18] 18  SpO2:  [94 %-98 %] 97 %  BP: (106-161)/(58-77) 161/71     Weight: 77.6 kg (171 lb 1.6 oz)  Body mass index is 32.33 kg/m².    Intake/Output Summary (Last 24 hours) at 9/26/2023 1948  Last data filed at 9/26/2023 1300  Gross per 24 hour   Intake 440 ml   Output --   Net  440 ml           Physical Exam  Constitutional:       General: She is not in acute distress.     Appearance: She is not ill-appearing.   Eyes:      General:         Right eye: No discharge.         Left eye: No discharge.   Neck:      Vascular: No JVD.   Cardiovascular:      Rate and Rhythm: Normal rate and regular rhythm.   Pulmonary:      Effort: Pulmonary effort is normal.      Breath sounds: Normal breath sounds.   Abdominal:      General: Abdomen is flat. Bowel sounds are normal. There is no distension.      Palpations: Abdomen is soft.      Tenderness: There is no abdominal tenderness.   Musculoskeletal:      Right lower leg: No edema.      Left lower leg: No edema.   Skin:     General: Skin is warm and moist.      Findings: No rash.   Neurological:      Mental Status: She is alert and oriented to person, place, and time.   Psychiatric:         Attention and Perception: Attention normal.         Mood and Affect: Mood and affect normal.         Speech: Speech normal.             Significant Labs: All pertinent labs within the past 24 hours have been reviewed.    Significant Imaging: I have reviewed all pertinent imaging results/findings within the past 24 hours.      Assessment/Plan:      * Elevated troponin  At outside hospital.  Levels drawn here are not elevated.  Cardiology consult.  Pharm Myoview stress test yesterday showed ischemia.  She will undergo PTCA today.    Troponin I High Sensitivity   Date Value Ref Range Status   09/25/2023 10.0 0.0 - 14.9 pg/mL Final     Comment:     Troponin results differ between methods. Do not use   results between Troponin methods interchangeably.    Alkaline Phospatase levels above 400 U/L may   cause false positive results.    Access hsTnI should not be used for patients taking   Asfotase cristiana (Strensiq).     09/25/2023 11.8 0.0 - 14.9 pg/mL Final     Comment:     Troponin results differ between methods. Do not use   results between Troponin methods  interchangeably.    Alkaline Phospatase levels above 400 U/L may   cause false positive results.    Access hsTnI should not be used for patients taking   Asfotase cristiana (Strensiq).           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  Last A1c reviewed-   Lab Results   Component Value Date    HGBA1C 5.7 07/06/2023     Current correctional scale  Low  Maintain anti-hyperglycemic dose as follows-   Antihyperglycemics (From admission, onward)    Start     Stop Route Frequency Ordered    09/25/23 2137  insulin aspart U-100 pen 0-5 Units         -- SubQ Before meals & nightly PRN 09/25/23 2037        Hold Oral hypoglycemics while patient is in the hospital.        Rheumatoid arthritis  Listed as diagnosis.  No acute issues.      NAFLD (nonalcoholic fatty liver disease)  Aware  Stable liver function.      Gastroesophageal reflux disease  No issues.      Back pain, chronic  aware      Hypertension associated with diabetes  Continue home BP meds and monitor pressures.  Home med includes Norvasc 5 mg daily.  So far, BP controlled.  Imdur has been added, 30 mg daily.    Hyperlipidemia  No acute issues        Fibromyalgia  aware    Hypothyroidism  Stable.  Continue usual dose of levothyroxine.        VTE Risk Mitigation (From admission, onward)         Ordered     IP VTE HIGH RISK PATIENT  Once         09/24/23 2016     Place sequential compression device  Until discontinued         09/24/23 2016                Discharge Planning   MATILDA: 9/27/2023     Code Status: Full Code   Is the patient medically ready for discharge?:     Reason for patient still in hospital (select all that apply): Patient trending condition, Treatment and Consult recommendations  Discharge Plan A: Home   Discharge Delays: (!) Change in Medical  Condition              Jesus Christianson MD  Department of Hospital Medicine   [facility]

## 2023-09-27 NOTE — TELEPHONE ENCOUNTER
----- Message from Bernarda Muniz RN sent at 9/27/2023  3:05 PM CDT -----  Good morning    Patient presented to [facility] with chest pain. Stress positive, brought to cath lab this AM with Dr. Webber. Patient is anticipated to discharge either later today or tomorrow, pending cards clearance.     Please schedule follow up appt and contact patient with appt information.     Thank you  Bernarda muniz RN CM

## 2023-09-27 NOTE — HOSPITAL COURSE
Though the TnI was elevated at the transferring hospital, levels drawn at our hospital were low.  We eduard hs-cTn, and they were all less than 20.  Cardiology service consulted with us.  Patient went for pharmacologic Myoview stress testing, and it showed areas of reversible ischemia.  She was given IVF to optimze her renal function, and then she underwent PTCA.  There was CAD but none requiring PCI.  She was put on aspirin and atorvastatin.  She felt well on discharge.  No SOB or chest pain.    Physical Exam  Constitutional:       General: She is not in acute distress.     Appearance: She is not ill-appearing.   Neck:      Vascular: No JVD.   Cardiovascular:      Rate and Rhythm: Normal rate and regular rhythm.   Pulmonary:      Effort: Pulmonary effort is normal.      Breath sounds: Normal breath sounds.

## 2023-09-27 NOTE — ASSESSMENT & PLAN NOTE
At outside hospital.  Levels drawn here are not elevated.  Cardiology consult.  Pharm Myoview stress test yesterday showed ischemia.  She will undergo PTCA today.    Troponin I High Sensitivity   Date Value Ref Range Status   09/25/2023 10.0 0.0 - 14.9 pg/mL Final     Comment:     Troponin results differ between methods. Do not use   results between Troponin methods interchangeably.    Alkaline Phospatase levels above 400 U/L may   cause false positive results.    Access hsTnI should not be used for patients taking   Asfotase cristiana (Strensiq).     09/25/2023 11.8 0.0 - 14.9 pg/mL Final     Comment:     Troponin results differ between methods. Do not use   results between Troponin methods interchangeably.    Alkaline Phospatase levels above 400 U/L may   cause false positive results.    Access hsTnI should not be used for patients taking   Asfotase cristiana (Strensiq).

## 2023-09-27 NOTE — PLAN OF CARE
spoke to Precious ([facility] home health) regarding Pt's acceptance and tele-monitoring program. Per Precious, Pt can be assigned to tele-monitoring program, but Pt won't be seen until next week, 10/2 at the earliest.       09/27/23 1539   Post-Acute Status   Post-Acute Authorization Home [facility]   Home Health Status Pending post-acute provider review/more information requested   Discharge Delays None known at this time   Discharge Plan   Discharge Plan A Home Health   Discharge Plan B [facility]

## 2023-09-27 NOTE — PLAN OF CARE
Patient cleared for discharge from case management standpoint.    Follow up appointments scheduled and added to AVS.    Pt won't be seen by home health until 10/2. Disposition changed to home.    Chart and discharge orders reviewed.  Patient discharged home with no further case management needs.       09/27/23 1629   Final Note   Assessment Type Final Discharge Note   Anticipated Discharge Disposition Home   What phone number can be called within the next 1-3 days to see how you are doing after discharge? [phone]   Hospital Resources/Appts/Education Provided Provided patient/caregiver with written discharge plan information;Appointments scheduled and added to AVS;Post-Acute resouces added to AVS   Post-Acute Status   Post-Acute Authorization Home Health   Home Health Status Pending post-acute provider review/more information requested   Discharge Delays None known at this time

## 2023-09-27 NOTE — PLAN OF CARE
Patient to follow up with pcp 10/11 as previously scheduled.     Inbasket message sent to Dr. Webber's clinic requesting hospital follow up appt       09/27/23 3850   Post-Acute Status   Post-Acute Authorization Other   Other Status Awaiting f/u Appts

## 2023-09-27 NOTE — PLAN OF CARE
spoke to Pt's granchild (rj logansey (Grandchild) [phone] (Mobile)) about Pt's discharge plan. Pt's grandchild requested information for skilled-nursing, nursing home admission, home health, in-home care, and services for the elderly.  explained the criteria for skilled-nursing admission and process to admit Pt to nursing home for skilled-nursing.  explained each listed service and delivered community resource book to Pt's bedside.     Pt's grandchild requested home health for Pt at discharge and inquired about services for in-home support due to Pt's poor attention to ADLs - eating, bathing, etc. Per Pt's grandchild, Pt does leave home, eat throughout the day, or socialize.  suggested adult day health care for social engagement. Pt's grandchild agreeable to suggestion.  informed Pt about in-home care and that it would be an out of pocket cost. Pt's grandchild verbalized understanding.  sent referral to [facility] home health. Pt's grandchild agreeable to referral.     spoke to ADRIANA Menchaca about requesting out-patient case management and NP at home program for Pt at discharge.       09/27/23 1404   Discharge Reassessment   Assessment Type Discharge Planning Reassessment   Did the patient's condition or plan change since previous assessment? Yes   Discharge Plan discussed with: Caregiver;POA   Name(s) and Number(s) yesenia logan (Grandchild)   [phone] (Mobile)   Communicated MATILDA with patient/caregiver Yes   Discharge Plan A Home Health   Discharge Plan B Home with family   DME Needed Upon Discharge  none   Transition of Care Barriers None   Post-Acute Status   Post-Acute Authorization Home Health   Home Health Status Referrals Sent   Discharge Delays None known at this time

## 2023-09-27 NOTE — SUBJECTIVE & OBJECTIVE
Interval History:  no new complaints.  No chest pain.  Stress test was positive for reversible ischemia.  She will undergo PTCA today.    Review of Systems   Constitutional:  Negative for fever.   Respiratory:  Negative for shortness of breath.    Cardiovascular:  Negative for chest pain.     Objective:     Vital Signs (Most Recent):  Temp: 99.6 °F (37.6 °C) (09/26/23 1905)  Pulse: 66 (09/26/23 1905)  Resp: 18 (09/26/23 1905)  BP: (!) 161/71 (09/26/23 1905)  SpO2: 97 % (09/26/23 1905) Vital Signs (24h Range):  Temp:  [97.4 °F (36.3 °C)-99.6 °F (37.6 °C)] 99.6 °F (37.6 °C)  Pulse:  [51-68] 66  Resp:  [16-18] 18  SpO2:  [94 %-98 %] 97 %  BP: (106-161)/(58-77) 161/71     Weight: 77.6 kg (171 lb 1.6 oz)  Body mass index is 32.33 kg/m².    Intake/Output Summary (Last 24 hours) at 9/26/2023 1948  Last data filed at 9/26/2023 1300  Gross per 24 hour   Intake 440 ml   Output --   Net 440 ml           Physical Exam  Constitutional:       General: She is not in acute distress.     Appearance: She is not ill-appearing.   Eyes:      General:         Right eye: No discharge.         Left eye: No discharge.   Neck:      Vascular: No JVD.   Cardiovascular:      Rate and Rhythm: Normal rate and regular rhythm.   Pulmonary:      Effort: Pulmonary effort is normal.      Breath sounds: Normal breath sounds.   Abdominal:      General: Abdomen is flat. Bowel sounds are normal. There is no distension.      Palpations: Abdomen is soft.      Tenderness: There is no abdominal tenderness.   Musculoskeletal:      Right lower leg: No edema.      Left lower leg: No edema.   Skin:     General: Skin is warm and moist.      Findings: No rash.   Neurological:      Mental Status: She is alert and oriented to person, place, and time.   Psychiatric:         Attention and Perception: Attention normal.         Mood and Affect: Mood and affect normal.         Speech: Speech normal.             Significant Labs: All pertinent labs within the past 24 hours  have been reviewed.    Significant Imaging: I have reviewed all pertinent imaging results/findings within the past 24 hours.

## 2023-09-27 NOTE — ASSESSMENT & PLAN NOTE
Patient's FSGs are controlled on current medication regimen.  Last A1c reviewed-   Lab Results   Component Value Date    HGBA1C 5.7 07/06/2023     Current correctional scale  Low  Maintain anti-hyperglycemic dose as follows-   Antihyperglycemics (From admission, onward)    Start     Stop Route Frequency Ordered    09/25/23 2137  insulin aspart U-100 pen 0-5 Units         -- SubQ Before meals & nightly PRN 09/25/23 2037        Hold Oral hypoglycemics while patient is in the hospital.

## 2023-09-27 NOTE — PLAN OF CARE
Patient cleared for discharge from case management standpoint.    Follow up appointments scheduled and added to AVS. Per Precious ([facility] health), Pt can't be seen until 10/2.  sent referral to [facility] via [facility].    Chart and discharge orders reviewed.  Patient discharged home with no further case management needs.       09/27/23 1629   Final Note   Assessment Type Final Discharge Note   Anticipated Discharge Disposition Home-Health   What phone number can be called within the next 1-3 days to see how you are doing after discharge? [phone]   Hospital Resources/Appts/Education Provided Provided patient/caregiver with written discharge plan information;Appointments scheduled and added to AVS;Post-Acute resouces added to AVS   Post-Acute Status   Post-Acute Authorization Home [facility]   Home Health Status Referrals Sent   Discharge Delays None known at this time

## 2023-09-27 NOTE — PROGRESS NOTES
"[facility]  Department of Cardiology  Progress Note      PATIENT NAME: Genoveva Gilbert  MRN: [id number]  TODAY'S DATE: 09/27/2023  ADMIT DATE: 9/24/2023                          CONSULT REQUESTED BY: Jesus Christianson MD    SUBJECTIVE     PRINCIPAL PROBLEM: Elevated troponin      REASON FOR CONSULT:  Chest pain, shortness of breath, elevated troponin    INTERVAL HISTORY:  9/27/23:  Patient seen resting in bed anticipating angiogram today. Denies any chest pain. Creatinine improved.     9/26/23  Pt anxious. Angiogram cancelled today for rise in creatinine, 1.4.  IVFs infusing. No recent chest pain.     HPI:  Pt is 74 yo female who reported to ER with c/o feelings of a panic attack and shortness of breath that lasted about 30 minutes. She denied chest pain. No left arm or neck, reported right shoulder pain, but she has had surgery in this joint in the past. Sensation of panic and shortness of breath subsided on its own after about half an hour.  Denies having any significant shortness of breath presently.      Hospitalist HPI: "73-year-old female with GERD, hypertension, hypothyroidism, diabetes, hyperlipidemia, depression was transferred to [facility] for cardiac eval. She was at [facility] with mild left sided CP and + troponin and sent here for stress test and possible LHC . She was having Mild SOB began this morning as well. She is complaining of some right shoulder pain, previous rotator cuff surgery to the right shoulder but her pain was at left side of the chest and dull and no radiation .  Troponin appear mildly positive and D dimer mildly positive too . CXR neg and EKG non ischemic .  ECHO 2022 was normal and which was done because of leg swelling from  lymphedema"    Review of patient's allergies indicates:   Allergen Reactions    Keflex [cephalexin]      Throat swelling    Pcn [penicillins]      Throat swelling    Latex, natural rubber Other (See Comments)     Redness with bandaids     Adhesive Other (See " Comments)     bandainds redness at skin    Trelegy ellipta [fluticasone-umeclidin-vilanter]      Vision disturbance       Past Medical History:   Diagnosis Date    Allergy     Anxiety     Arthritis, rheumatoid     Back pain     Chronic bronchiolitis     Depression     Fibromyalgia     GERD (gastroesophageal reflux disease)     High cholesterol     Hilar mass 03/27/2014    [place] (hard of hearing)     aid right ear    [place] (hard of hearing)     Hypertension     Incontinence of urine     Lymphedema     MS (multiple sclerosis)     benign; another MD stated she has no MS    Neuropathy     Personal history of colonic polyps 12/05/2019    Restless leg syndrome     Rotator cuff tear 04/16/2015    Sciatica of left side 01/05/2018    Seasonal allergies     Sinus congestion     Sleep apnea     DOES NOT USE MACHINE    Spondylolysis     Thyroid disease     Type 2 diabetes mellitus with polyneuropathy     no med. was borderline    Wheezing      Past Surgical History:   Procedure Laterality Date    APPENDECTOMY      ARTHROSCOPIC DEBRIDEMENT OF SHOULDER Right 02/18/2022    Procedure: EXTENSIVE DEBRIDEMENT, SHOULDER, ARTHROSCOPIC;  Surgeon: Rio Pitts MD;  Location: [facility] OR;  Service: Orthopedics;  Laterality: Right;    BREAST SURGERY      reduction    CLOSED REDUCTION OF INJURY OF SHOULDER Right 09/19/2021    Procedure: CLOSED REDUCTION, SHOULDER;  Surgeon: Antonio Irwin MD;  Location: [facility] OR;  Service: Orthopedics;  Laterality: Right;    COLONOSCOPY W/ POLYPECTOMY N/A 12/05/2019    repeat in 5 yrs    EPIDURAL STEROID INJECTION INTO LUMBAR SPINE N/A 06/12/2019    Procedure: Injection-steroid-epidural-lumbar;  Surgeon: Thad Manning MD;  Location: [facility] OR;  Service: Pain Management;  Laterality: N/A;  L5-S1    EPIDURAL STEROID INJECTION INTO LUMBAR SPINE N/A 09/16/2019    Procedure: Injection-steroid-epidural-lumbar;  Surgeon: Thad Manning MD;  Location: [facility] OR;  Service: Pain Management;  Laterality: N/A;  L5-S1    EYE  SURGERY Bilateral     HYSTERECTOMY      partial - fibroids    INJECTION OF ANESTHETIC AGENT AROUND MEDIAL BRANCH NERVES INNERVATING LUMBAR FACET JOINT Bilateral 02/28/2019    Procedure: Block-nerve-medial branch-lumbar;  Surgeon: Thad Manning MD;  Location: [facility] OR;  Service: Pain Management;  Laterality: Bilateral;  L3, 4,5     INJECTION OF ANESTHETIC AGENT AROUND MEDIAL BRANCH NERVES INNERVATING LUMBAR FACET JOINT Bilateral 03/21/2019    Procedure: Block-nerve-medial branch-lumbar L3,4,5;  Surgeon: Thad Manning MD;  Location: [facility] OR;  Service: Pain Management;  Laterality: Bilateral;    KNEE ARTHROPLASTY Left 03/16/2021    Procedure: ARTHROPLASTY, KNEE;  Surgeon: Rio Pitts MD;  Location: [facility] OR;  Service: Orthopedics;  Laterality: Left;  GENERAL AND BLOCK    LIPOSUCTION  1985    RADIOFREQUENCY ABLATION Left 11/04/2020    Procedure: Radiofrequency Ablation left knee;  Surgeon: Andrew Escudero MD;  Location: [facility] OR;  Service: Pain Management;  Laterality: Left;    RADIOFREQUENCY ABLATION OF LUMBAR MEDIAL BRANCH NERVE AT SINGLE LEVEL Bilateral 04/12/2019    Procedure: Radiofrequency Ablation, Nerve, Spinal, Lumbar, Medial Branch, 1 Level;  Surgeon: Thad Manning MD;  Location: [facility] OR;  Service: Pain Management;  Laterality: Bilateral;  L3, 4, 5  lumbar  [employer] pain management generator  SN [id number]  80 degrees for 75 seconds x2    RADIOFREQUENCY ABLATION OF LUMBAR MEDIAL BRANCH NERVE AT SINGLE LEVEL Bilateral 10/22/2019    Procedure: Radiofrequency Ablation, Nerve, Spinal, Lumbar, Medial Branch, L3,4,5;  Surgeon: Thad Manning MD;  Location: [facility] OR;  Service: Pain Management;  Laterality: Bilateral;  burned at 80 degrees C X 60 seconds X 2 each site    RADIOFREQUENCY ABLATION OF LUMBAR MEDIAL BRANCH NERVE AT SINGLE LEVEL Bilateral 05/27/2020    Procedure: Radiofrequency Ablation, Nerve, Spinal, Lumbar, Medial Branch, 1 Level;  Surgeon: Thad Manning MD;  Location: [facility] OR;  Service: Pain Management;   Laterality: Bilateral;  L3,4,5    REVERSE TOTAL SHOULDER ARTHROPLASTY Right 2022    Procedure: ARTHROPLASTY, SHOULDER, TOTAL, REVERSE;  Surgeon: Rio Pitts MD;  Location: [facility] OR;  Service: Orthopedics;  Laterality: Right;    SHOULDER ARTHROSCOPY Right 2021    Procedure: ARTHROSCOPY, SHOULDER;  Surgeon: Antonio Irwin MD;  Location: [facility] OR;  Service: Orthopedics;  Laterality: Right;  LIMITED DEBRIDMENT    TONSILLECTOMY      TOTAL REDUCTION MAMMOPLASTY       Social History     Tobacco Use    Smoking status: Former     Current packs/day: 0.00     Types: Cigarettes     Quit date: 1996     Years since quittin.3     Passive exposure: Past    Smokeless tobacco: Never   Substance Use Topics    Alcohol use: Yes     Comment: very little     Drug use: No        REVIEW OF SYSTEMS  CONSTITUTIONAL: Negative for chills, fatigue and fever.   EYES: No double vision, No blurred vision  NEURO: No headaches, No dizziness  RESPIRATORY: +shortness of breath per HPI; Negative for cough, and wheezing.    CARDIOVASCULAR: Negative for chest pain. Negative for palpitations and leg swelling.   GI: Negative for abdominal pain, No melena, diarrhea, nausea and vomiting.   : Negative for dysuria and frequency, Negative for hematuria  SKIN: Negative for bruising, Negative for edema or discoloration noted.   ENDOCRINE: Negative for polyphagia, Negative for heat intolerance, Negative for cold intolerance  PSYCHIATRIC: panic sensation as per HPI; +anxiety; + prior history memory loss/global amnesia with [facility] 3 months ago  MUSCULOSKELETAL: Negative for neck pain, Negative for muscle weakness, Negative for back pain     OBJECTIVE     VITAL SIGNS (Most Recent)  Temp: 98 °F (36.7 °C) (23)  Pulse: (!) 58 (23)  Resp: 18 (23)  BP: (!) 159/68 (23)  SpO2: 100 % (23)    VENTILATION STATUS  Resp: 18 (23)  SpO2: 100 % (23)           I & O (Last  24H):  Intake/Output Summary (Last 24 hours) at 9/27/2023 1332  Last data filed at 9/27/2023 0822  Gross per 24 hour   Intake 120 ml   Output --   Net 120 ml         WEIGHTS  Wt Readings from Last 3 Encounters:   09/24/23 1959 77.6 kg (171 lb 1.6 oz)   09/24/23 1130 84.8 kg (186 lb 15.2 oz)   09/26/23 1308 77.6 kg (171 lb 1.2 oz)   09/21/23 1449 84.8 kg (187 lb)     Physical examination:      Constitutional:  Well-built well-nourished in no apparent distress, alert and oriented    Neck: no carotid bruit, no JVD, no masses    Lungs: diminished breath sounds bibasilar, rhonchi bilaterally  Chest Wall: no tenderness  CARDIAC:  regular rate and rhythm, S1, S2 normal, no murmur, click, rub or gallop  Abdomen: soft, non-tender; bowel sounds normal; no masses,  no organomegaly, no guarding or rebound noted  Extremities: Extremities normal, atraumatic, no cyanosis, clubbing, or edema  Skin: Skin color, texture, turgor normal. No rashes or lesions  Neuro: Alert and responsive.  Moving all extremities      HOME MEDICATIONS:  No current facility-administered medications on file prior to encounter.     Current Outpatient Medications on File Prior to Encounter   Medication Sig Dispense Refill    albuterol (PROAIR HFA) 90 mcg/actuation inhaler Inhale 2 puffs into the lungs every 6 (six) hours as needed for Wheezing. Rescue (Patient not taking: Reported on 7/6/2023) 18 g 11    amLODIPine (NORVASC) 5 MG tablet Take 1 tablet (5 mg total) by mouth once daily. For blood pressure 30 tablet 0    ferrous sulfate 325 (65 FE) MG EC tablet Take 1 tablet by mouth once daily.      fexofenadine (ALLEGRA) 180 MG tablet       hydroCHLOROthiazide (HYDRODIURIL) 25 MG tablet Take 1 tablet (25 mg total) by mouth once daily. 90 tablet 0    ibuprofen (ADVIL,MOTRIN) 600 MG tablet Take 1 tablet (600 mg total) by mouth 3 (three) times daily as needed for Pain. 90 tablet 0    levothyroxine (SYNTHROID) 88 MCG tablet Take 1 tablet (88 mcg total) by mouth  once daily. 90 tablet 3    losartan (COZAAR) 50 MG tablet Take 1 tablet (50 mg total) by mouth once daily. 90 tablet 0    lovastatin (MEVACOR) 40 MG tablet Take 1 tablet (40 mg total) by mouth nightly. at bedtime. For cholesterol 30 tablet 0    nystatin (MYCOSTATIN) cream Apply topically 2 (two) times daily. (Patient not taking: Reported on 7/6/2023) 60 g 1    omeprazole (PRILOSEC) 20 MG capsule Take 1 capsule (20 mg total) by mouth once daily. 90 capsule 1    potassium chloride (MICRO-K) 10 MEQ CpSR Take 1 capsule (10 mEq total) by mouth 3 (three) times daily. 270 capsule 0    pramipexole (MIRAPEX) 0.5 MG tablet Take 2 tablets (1 mg total) by mouth every evening. For restless legs 180 tablet 3    pregabalin (LYRICA) 75 MG capsule Take 150 mg by mouth 2 (two) times daily.      ramelteon (ROZEREM) 8 mg tablet Take 1 tablet (8 mg total) by mouth every evening. 30 tablet 0    sertraline (ZOLOFT) 50 MG tablet Take half tab PO daily x 1 week, then increase to 1 tab PO daily. 90 tablet 1    torsemide (DEMADEX) 10 MG Tab TAKE 1 TABLET(10 MG) BY MOUTH EVERY DAY 90 tablet 3    traZODone (DESYREL) 50 MG tablet TAKE 1 TO 2 TABLETS BY MOUTH EVERY NIGHT AT BEDTIME AS NEEDED FOR INSOMNIA 60 tablet 1       SCHEDULED MEDS:   amLODIPine  5 mg Oral Daily    aspirin  81 mg Oral Daily    atorvastatin  40 mg Oral QHS    isosorbide mononitrate  30 mg Oral Daily    levothyroxine  88 mcg Oral Before breakfast    pramipexole  1 mg Oral QHS    pregabalin  150 mg Oral BID    sertraline  25 mg Oral Daily       CONTINUOUS INFUSIONS:   sodium chloride 0.9%      sodium chloride 0.9%         PRN MEDS:albuterol, dextrose 50%, dextrose 50%, glucagon (human recombinant), glucose, glucose, insulin aspart U-100, magnesium oxide, magnesium oxide, melatonin, naloxone, nitroGLYCERIN, potassium bicarbonate, potassium bicarbonate, potassium bicarbonate, potassium, sodium phosphates, potassium, sodium phosphates, potassium, sodium phosphates, sodium chloride  "0.9%, sodium chloride 0.9%, sodium chloride 0.9%, traZODone    LABS AND DIAGNOSTICS     CBC LAST 3 DAYS  Recent Labs   Lab 09/25/23  1816 09/26/23  1526 09/27/23  0924   WBC 7.43 6.48 5.38   RBC 4.70 4.43 4.17   HGB 13.8 13.5 12.3   HCT 41.7 39.7 38.9   MCV 89 90 93   MCH 29.4 30.5 29.5   MCHC 33.1 34.0 31.6*   RDW 12.3 12.3 12.3    209 168   MPV 9.3 9.9 9.8   GRAN 52.0  3.9 52.9  3.4 36.0*  1.9   LYMPH 39.0  2.9 36.1  2.3 50.0*  2.7   MONO 6.3  0.5 6.9  0.5 9.1  0.5   BASO 0.05 0.03 0.02   NRBC 0 0 0         COAGULATION LAST 3 DAYS  Recent Labs   Lab 09/25/23  1816 09/26/23  1527   INR  --  1.0   APTT 29.1 27.2         CHEMISTRY LAST 3 DAYS  Recent Labs   Lab 09/24/23  1200 09/25/23  0930 09/26/23  0423 09/27/23  0430    139 139 145   K 3.2* 3.3* 3.5 3.9    105 101 111*   CO2 19* 27 30* 28   ANIONGAP 17* 7* 8 6*   BUN 13 22 39* 28*   CREATININE 0.8 1.0 1.4 0.8   GLU 83 85 104 99   CALCIUM 9.9 8.9 8.7 8.4*   ALBUMIN 4.4  --   --   --    PROT 7.7  --   --   --    ALKPHOS 80  --   --   --    ALT 18  --   --   --    AST 22  --   --   --    BILITOT 1.0  --   --   --          CARDIAC PROFILE LAST 3 DAYS  Recent Labs   Lab 09/24/23  1200   *   TROPONINI 0.093*         ENDOCRINE LAST 3 DAYS  No results for input(s): "TSH", "PROCAL" in the last 168 hours.    LAST ARTERIAL BLOOD GAS  ABG  No results for input(s): "PH", "PO2", "PCO2", "HCO3", "BE" in the last 168 hours.    LAST 7 DAYS MICROBIOLOGY   Microbiology Results (last 7 days)       ** No results found for the last 168 hours. **            MOST RECENT IMAGING  Cardiac catheterization  Aborted invasive cardiology procedure- see Procedure Log link for details.      ECHOCARDIOGRAM RESULTS (last 5)  Results for orders placed during the hospital encounter of 10/05/22    Echo    Interpretation Summary  · The left ventricle is normal in size with mild concentric hypertrophy and normal systolic function.  · The estimated ejection fraction is " 62%.  · Normal left ventricular diastolic function.  · Normal right ventricular size with normal right ventricular systolic function.  · Moderate left atrial enlargement.  · Mild tricuspid regurgitation.  · Normal central venous pressure (3 mmHg).  · The estimated PA systolic pressure is 39 mmHg.  · There is mild pulmonary hypertension.  · Mild mitral regurgitation.  · Mild aortic regurgitation.      Results for orders placed during the hospital encounter of 03/25/19    Transthoracic echo (TTE) complete (Cupid Only)    Interpretation Summary  · Normal left ventricular systolic function. The estimated ejection fraction is 55%  · Normal LV diastolic function.  · Normal right ventricular systolic function.  · Normal central venous pressure (3 mm Hg).  · The estimated PA systolic pressure is 30 mm Hg      CURRENT/PREVIOUS VISIT EKG  Results for orders placed or performed during the hospital encounter of 09/24/23   EKG 12-lead    Collection Time: 09/27/23  7:19 AM    Narrative    Test Reason : R06.02,R07.9,E11.65,R94.39,    Vent. Rate : 049 BPM     Atrial Rate : 049 BPM     P-R Int : 146 ms          QRS Dur : 092 ms      QT Int : 468 ms       P-R-T Axes : 061 013 017 degrees     QTc Int : 422 ms    Sinus bradycardia  Otherwise normal ECG  When compared with ECG of 24-SEP-2023 12:12,  T wave inversion no longer evident in Anterior leads  QT has shortened    Referred By: [email or web address]   SELF           Confirmed By:            ASSESSMENT/PLAN:     Active Hospital Problems    Diagnosis    *Elevated troponin    Status post [place] reverse arthroplasty of shoulder, right April 12, 2022    Type 2 diabetes mellitus with hyperglycemia, without long-term current use of insulin    Rheumatoid arthritis    NAFLD (nonalcoholic fatty liver disease)    Gastroesophageal reflux disease    Back pain, chronic    Hypothyroidism    Hyperlipidemia    Hypertension associated with diabetes       ASSESSMENT & PLAN:   Shortness of breath  Chest  pain  HLD  HTN  DM 2  Hypokalemia  Anxiety    RECOMMENDATIONS:  Renal function improved. Awaiting angiogram. Further recommendations to follow.     Nida Mcknight NP  [facility]  Department of Cardiology  Date of Service: 09/27/2023      I have personally interviewed and examined the patient, I have reviewed the Nurse Practitioner's history and physical, assessment, and plan. I have personally evaluated the patient at bedside and agree with the findings and made appropriate changes as necessary in recommendations.    I have personally reviewed the cardiac catheterization angiogram results with the interventional cardiologist no significant disease noted in the LAD and circumflex artery distribution.  Evidence of coronary spasm in the proximal portion of the right coronary artery.  Recommend to optimize medical therapy to include nitrates for long-term management.  Continue isosorbide mononitrate but may consider increasing it to 60 mg as tolerated continue on statin therapy and calcium blockers as tolerated follow-up in the office in 2-4 weeks          Nick Ospina MD  Department of Cardiology  [facility]  09/27/2023 1:35 PM

## 2023-09-27 NOTE — DISCHARGE SUMMARY
[facility]  Discharge Summary      Patient Name: Genoveva Gilbert  MRN: [id number]  ROLF: [id number]  Patient Class: IP- Inpatient  Admission Date: 9/24/2023  Hospital Length of Stay: 1 days  Discharge Date and Time:  09/27/2023 4:26 PM  Attending Physician: Jesus Christianson MD   Discharging Provider: Jesus Christianson MD  Primary Care Provider: Katherin Guajardo MD    Primary Care Team: Networked reference to record PCT     HPI:   73-year-old female with GERD, hypertension, hypothyroidism, diabetes, hyperlipidemia, depression was transferred to [facility] for cardiac eval .  She was at [facility] with mild left sided CP and + tropoonin and sent here for stress test and possible LHC . She was having Mild SOB began this morning as well .  She is complaining of some right shoulder pain, previous rotator cuff surgery to the right shoulder but her pain was at left side of the chest and dull and no radiation .  Troponin appear mildly positive and D dimer mildly positive too . CXR neg and EKG non ischemic .  ECHO 2022 was normal and which was done because of leg swelling from  lymphedema .      Procedure(s) (LRB):  ANGIOGRAM, CORONARY ARTERY (N/A)      Hospital Course:   Though the TnI was elevated at the transferring hospital, levels drawn at our hospital were low.  We eduard hs-cTn, and they were all less than 20.  Cardiology service consulted with us.  Patient went for pharmacologic Myoview stress testing, and it showed areas of reversible ischemia.  She was given IVF to optimze her renal function, and then she underwent PTCA.  There was CAD but none requiring PCI.  She was put on aspirin and atorvastatin.  She felt well on discharge.  No SOB or chest pain.    Physical Exam  Constitutional:       General: She is not in acute distress.     Appearance: She is not ill-appearing.   Neck:      Vascular: No JVD.   Cardiovascular:      Rate and Rhythm: Normal rate and regular rhythm.   Pulmonary:       Effort: Pulmonary effort is normal.      Breath sounds: Normal breath sounds.        Goals of Care Treatment Preferences:  Code Status: Full Code      Consults:   Consults (From admission, onward)          Status Ordering Provider     Inpatient consult to Registered Dietitian/Nutritionist  Once        Provider:  (Not yet assigned)    Acknowledged MIKAEL MATTHEW     Inpatient consult to Cardiology  Once        Provider:  Rosa Webber MD    Completed BRENNAN ALFORD              Final Active Diagnoses:    Diagnosis Date Noted POA    Coronary artery disease involving native coronary artery [I25.10] 09/27/2023 Yes    Status post Domenic reverse arthroplasty of shoulder, right April 12, 2022 [Z96.611] 04/25/2022 Not Applicable    Type 2 diabetes mellitus with hyperglycemia, without long-term current use of insulin [E11.65] 06/02/2020 Yes    Rheumatoid arthritis [M06.9] 06/02/2020 Yes    NAFLD (nonalcoholic fatty liver disease) [K76.0] 07/10/2017 Yes    Gastroesophageal reflux disease [K21.9] 06/21/2017 Yes    Back pain, chronic [M54.9, G89.29] 08/02/2014 Yes    Hypothyroidism [E03.9] 03/27/2014 Yes    Hyperlipidemia [E78.5] 03/27/2014 Yes    Hypertension associated with diabetes [E11.59, I15.2] 03/27/2014 Yes      Problems Resolved During this Admission:    Diagnosis Date Noted Date Resolved POA    PRINCIPAL PROBLEM:  Elevated troponin [R77.8] 10/05/2022 09/27/2023 Yes    TARAN (acute kidney injury) [N17.9] 09/26/2023 09/27/2023 No       Discharged Condition: good    Disposition: Home-Health Care [facility]    Follow Up:   Follow-up Information       Katherin Guajardo MD Follow up.    Specialty: Family Medicine  Contact information:  [address] [place] [address]  [address]  [facility] [address] [phone]                           Patient Instructions:      Ambulatory referral/consult to Home Health   Standing Status: Future   Referral Priority: Routine Referral Type: Home Health   Referral Reason: Specialty Services Required    Requested Specialty: Home Health Services   Number of Visits Requested: 1     Ambulatory referral/consult to [facility]   Standing Status: Future   Referral Priority: Routine Referral Type: Consultation   Referral Reason: Specialty Services Required   Number of Visits Requested: 1     Ambulatory referral/consult to Outpatient Case Management   Referral Priority: Routine Referral Type: Consultation   Referral Reason: Specialty Services Required   Number of Visits Requested: 1     Diet Adult Regular     Order Specific Question Answer Comments   Additional restrictions: Low Chol/Sat Fat      Activity as tolerated       Significant Diagnostic Studies:   Results for orders placed during the hospital encounter of 09/24/23    NM Myocardial Perfusion Spect Multi Pharmacologic    Narrative  Myocardial Perfusion Imaging with SPECT Gated acquisition and Ejection Fraction single day protocol    CLINICAL INFORMATION:  Chest pain.    PROCEDURE:    Lexiscan is utilized as a pharmacologic stress agent. At peak stress, 23.5 millicuries of technetium Myoview were administered intravenously.  The rest portion of the study is accomplished on the same day, utilizing 10.6 millicuries of technetium Myoview intravenously.    FINDINGS:    There is diminished tracer activity within the septal and anteroseptal wall, more pronounced on the stress portion of the examination and concerning for pharmacologically induced reversibility. The distribution of activity elsewhere within the myocardium appears matched. The chamber size is within normal limits.  There is diminished thickening of the myocardium anteroseptal leads on the gated acquisition. The Estimated ejection fraction is 60%.    IMPRESSION:    FOCALLY DIMINISHED ACTIVITY WITHIN THE ANTEROSEPTAL AND SEPTAL WALL ON THE STRESS AS COMPARED TO REST EXAMINATION SUSPICIOUS FOR PHARMACOLOGICALLY INDUCED REVERSIBILITY.    ESTIMATED EJECTION FRACTION OF 60%.    Electronically  signed by:  Luis Hardy MD  9/25/2023 12:25 PM CDT Workstation: [id number]        Pending Diagnostic Studies:       None           Medications:  Reconciled Home Medications:      Medication List        START taking these medications      aspirin 81 MG EC tablet  Commonly known as: ECOTRIN  Take 1 tablet (81 mg total) by mouth once daily.     atorvastatin 40 MG tablet  Commonly known as: LIPITOR  Take 1 tablet (40 mg total) by mouth every evening.     isosorbide mononitrate 60 MG 24 hr tablet  Commonly known as: IMDUR  Take 1 tablet (60 mg total) by mouth once daily.  Start taking on: September 28, 2023            CONTINUE taking these medications      amLODIPine 5 MG tablet  Commonly known as: NORVASC  Take 1 tablet (5 mg total) by mouth once daily. For blood pressure     ferrous sulfate 325 (65 FE) MG EC tablet  Take 1 tablet by mouth once daily.     fexofenadine 180 MG tablet  Commonly known as: ALLEGRA     ibuprofen 600 MG tablet  Commonly known as: ADVIL,MOTRIN  Take 1 tablet (600 mg total) by mouth 3 (three) times daily as needed for Pain.     levothyroxine 88 MCG tablet  Commonly known as: SYNTHROID  Take 1 tablet (88 mcg total) by mouth once daily.     omeprazole 20 MG capsule  Commonly known as: PRILOSEC  Take 1 capsule (20 mg total) by mouth once daily.     potassium chloride 10 MEQ Cpsr  Commonly known as: MICRO-K  Take 1 capsule (10 mEq total) by mouth 3 (three) times daily.     pramipexole 0.5 MG tablet  Commonly known as: MIRAPEX  Take 2 tablets (1 mg total) by mouth every evening. For restless legs     pregabalin 75 MG capsule  Commonly known as: LYRICA  Take 150 mg by mouth 2 (two) times daily.     ramelteon 8 mg tablet  Commonly known as: ROZEREM  Take 1 tablet (8 mg total) by mouth every evening.     sertraline 50 MG tablet  Commonly known as: ZOLOFT  Take half tab PO daily x 1 week, then increase to 1 tab PO daily.     torsemide 10 MG Tab  Commonly known as: DEMADEX  TAKE 1 TABLET(10 MG) BY MOUTH  EVERY DAY     traZODone 50 MG tablet  Commonly known as: DESYREL  TAKE 1 TO 2 TABLETS BY MOUTH EVERY NIGHT AT BEDTIME AS NEEDED FOR INSOMNIA            STOP taking these medications      albuterol 90 mcg/actuation inhaler  Commonly known as: PROAIR HFA     hydroCHLOROthiazide 25 MG tablet  Commonly known as: HYDRODIURIL     losartan 50 MG tablet  Commonly known as: COZAAR     lovastatin 40 MG tablet  Commonly known as: MEVACOR     nystatin cream  Commonly known as: MYCOSTATIN              Indwelling Lines/Drains at time of discharge:   Lines/Drains/Airways       None                   Time spent on the discharge of patient: 21 minutes         Jesus Christianson MD  Department of Hospital Medicine  [facility]

## 2023-09-27 NOTE — ASSESSMENT & PLAN NOTE
Continue home BP meds and monitor pressures.  Home med includes Norvasc 5 mg daily.  So far, BP controlled.  Imdur has been added, 30 mg daily.

## 2023-09-27 NOTE — PROGRESS NOTES
[facility]  Adult Nutrition   Consult Note (Nutrition Education)     SUMMARY     Education Summary    Pt is a 73 y.o female with hx of NAFLD, Type 2 DM, GERD, and HF. Pt was seen bedside for nutrition education per consult.    Reviewed Ms. Palmer's current at home habits. Discussed and provided the My Plate Method, Healthy Tips for Shopping, and Change Plan worksheet. Established goals of increasing PRO more frequently (and potential selections) and implementation of small frequent meals. Encouraged the patient to continue with her intake of vegetables.    Outpatient information also provided.     Leon Harris, Provisional Licensed Dietitian 09/27/2023 5:01 PM

## 2023-09-28 ENCOUNTER — TELEPHONE (OUTPATIENT)
Dept: FAMILY MEDICINE | Facility: CLINIC | Age: 73
End: 2023-09-28

## 2023-09-28 ENCOUNTER — PATIENT OUTREACH (OUTPATIENT)
Dept: ADMINISTRATIVE | Facility: CLINIC | Age: 73
End: 2023-09-28
Payer: MEDICARE

## 2023-09-28 NOTE — PLAN OF CARE
Pt not accepted by [facility].  placed additional referrals observed reply:    [facility] Phone: [phone]   Response: Yes, willing to accept patient  Comments: We can see Monday. Mary      [facility] Phone: [phone]    Response: No, unable to accept patient  Reason: Do Not Offer Required Service     [facility] Phone: [phone]   Response: No, unable to accept patient  Reason: Out of Network  Comments: Sorry we are not in network with Vishal     [facility] Phone: [phone]    Response: No, unable to accept patient  Reason: Other (see comments)  Comments: Staffing issue    [facility] Phone: [phone]  Response: No, unable to accept patient  Reason: Other (see comments)      placed additional referrals via careport.       09/28/23 1646   Post-Acute Status   Post-Acute Authorization [facility]   Home Health Status Referrals Sent   Discharge Delays None known at this time   Discharge Plan   Discharge Plan A Home Health   Discharge Plan B Home Health

## 2023-09-28 NOTE — TELEPHONE ENCOUNTER
Attempted to contact patient in regards to TCC call  Voicemail is full unable to leave message     Attempted to contact patient in the portal

## 2023-09-28 NOTE — PLAN OF CARE
Pt accepted to [facility]. Start-of-care 9/30/2023.       09/28/23 1658   Post-Acute Status   Post-Acute Authorization [facility]   Home Health Status Set-up Complete/Auth obtained   Discharge Delays None known at this time   Discharge Plan   Discharge Plan A Home Health   Discharge Plan B [facility]

## 2023-09-28 NOTE — PROGRESS NOTES
2nd Attempt made to reach patient for TCC call. Left voicemail please call [phone] leave first name, last name, and  Deneen will return your call.

## 2023-09-29 NOTE — PROGRESS NOTES
3rd Attempt made to reach patient for TCC call. Left voicemail please call [phone] leave first name, last name, and  Deneen will return your call.

## 2023-10-02 ENCOUNTER — PATIENT OUTREACH (OUTPATIENT)
Dept: FAMILY MEDICINE | Facility: CLINIC | Age: 73
End: 2023-10-02

## 2023-10-02 DIAGNOSIS — E78.2 MIXED HYPERLIPIDEMIA: ICD-10-CM

## 2023-10-02 RX ORDER — LOVASTATIN 40 MG/1
TABLET ORAL
Qty: 90 TABLET | Refills: 0 | Status: SHIPPED | OUTPATIENT
Start: 2023-10-02 | End: 2023-10-19

## 2023-10-02 NOTE — TELEPHONE ENCOUNTER
Attempted to call patient for hospital follow up Received voicemail.  Mailbox was full and could not take anymore messages.

## 2023-10-10 ENCOUNTER — PATIENT OUTREACH (OUTPATIENT)
Dept: ADMINISTRATIVE | Facility: OTHER | Age: 73
End: 2023-10-10
Payer: MEDICARE

## 2023-10-10 NOTE — PROGRESS NOTES
CHW - Case Closure    This Community Health Worker spoke to patient via telephone today.     Pt/Caregiver reported: Pt stated she appreciates the referral for cm but doesn't need community resources at this time    Pt/Caregiver denied any additional needs at this time and agrees with episode closure at this time.  Provided patient with Community Health Worker's contact information and encouraged him/her to contact this Community Health Worker if additional needs arise.

## 2023-10-12 ENCOUNTER — TELEPHONE (OUTPATIENT)
Dept: FAMILY MEDICINE | Facility: CLINIC | Age: 73
End: 2023-10-12

## 2023-10-12 ENCOUNTER — OFFICE VISIT (OUTPATIENT)
Dept: FAMILY MEDICINE | Facility: CLINIC | Age: 73
End: 2023-10-12
Payer: MEDICARE

## 2023-10-12 VITALS
BODY MASS INDEX: 32.42 KG/M2 | HEIGHT: 61 IN | SYSTOLIC BLOOD PRESSURE: 118 MMHG | DIASTOLIC BLOOD PRESSURE: 70 MMHG | TEMPERATURE: 98 F | WEIGHT: 171.69 LBS | HEART RATE: 88 BPM | RESPIRATION RATE: 20 BRPM

## 2023-10-12 DIAGNOSIS — G47.00 INSOMNIA, UNSPECIFIED TYPE: Primary | ICD-10-CM

## 2023-10-12 DIAGNOSIS — R11.0 NAUSEA: ICD-10-CM

## 2023-10-12 DIAGNOSIS — R41.3 MEMORY DISORDER: ICD-10-CM

## 2023-10-12 PROCEDURE — 1111F PR DISCHARGE MEDS RECONCILED W/ CURRENT OUTPATIENT MED LIST: ICD-10-PCS | Mod: CPTII,S$GLB,, | Performed by: NURSE PRACTITIONER

## 2023-10-12 PROCEDURE — 3044F PR MOST RECENT HEMOGLOBIN A1C LEVEL <7.0%: ICD-10-PCS | Mod: CPTII,S$GLB,, | Performed by: NURSE PRACTITIONER

## 2023-10-12 PROCEDURE — 3288F FALL RISK ASSESSMENT DOCD: CPT | Mod: CPTII,S$GLB,, | Performed by: NURSE PRACTITIONER

## 2023-10-12 PROCEDURE — 3074F SYST BP LT 130 MM HG: CPT | Mod: CPTII,S$GLB,, | Performed by: NURSE PRACTITIONER

## 2023-10-12 PROCEDURE — 4010F ACE/ARB THERAPY RXD/TAKEN: CPT | Mod: CPTII,S$GLB,, | Performed by: NURSE PRACTITIONER

## 2023-10-12 PROCEDURE — 4010F PR ACE/ARB THEARPY RXD/TAKEN: ICD-10-PCS | Mod: CPTII,S$GLB,, | Performed by: NURSE PRACTITIONER

## 2023-10-12 PROCEDURE — 1159F PR MEDICATION LIST DOCUMENTED IN MEDICAL RECORD: ICD-10-PCS | Mod: CPTII,S$GLB,, | Performed by: NURSE PRACTITIONER

## 2023-10-12 PROCEDURE — 1101F PR PT FALLS ASSESS DOC 0-1 FALLS W/OUT INJ PAST YR: ICD-10-PCS | Mod: CPTII,S$GLB,, | Performed by: NURSE PRACTITIONER

## 2023-10-12 PROCEDURE — 1125F PR PAIN SEVERITY QUANTIFIED, PAIN PRESENT: ICD-10-PCS | Mod: CPTII,S$GLB,, | Performed by: NURSE PRACTITIONER

## 2023-10-12 PROCEDURE — 1159F MED LIST DOCD IN RCRD: CPT | Mod: CPTII,S$GLB,, | Performed by: NURSE PRACTITIONER

## 2023-10-12 PROCEDURE — 1111F DSCHRG MED/CURRENT MED MERGE: CPT | Mod: CPTII,S$GLB,, | Performed by: NURSE PRACTITIONER

## 2023-10-12 PROCEDURE — 1125F AMNT PAIN NOTED PAIN PRSNT: CPT | Mod: CPTII,S$GLB,, | Performed by: NURSE PRACTITIONER

## 2023-10-12 PROCEDURE — 3078F PR MOST RECENT DIASTOLIC BLOOD PRESSURE < 80 MM HG: ICD-10-PCS | Mod: CPTII,S$GLB,, | Performed by: NURSE PRACTITIONER

## 2023-10-12 PROCEDURE — 99213 PR OFFICE/OUTPT VISIT, EST, LEVL III, 20-29 MIN: ICD-10-PCS | Mod: S$GLB,,, | Performed by: NURSE PRACTITIONER

## 2023-10-12 PROCEDURE — 3074F PR MOST RECENT SYSTOLIC BLOOD PRESSURE < 130 MM HG: ICD-10-PCS | Mod: CPTII,S$GLB,, | Performed by: NURSE PRACTITIONER

## 2023-10-12 PROCEDURE — 1101F PT FALLS ASSESS-DOCD LE1/YR: CPT | Mod: CPTII,S$GLB,, | Performed by: NURSE PRACTITIONER

## 2023-10-12 PROCEDURE — 99213 OFFICE O/P EST LOW 20 MIN: CPT | Mod: S$GLB,,, | Performed by: NURSE PRACTITIONER

## 2023-10-12 PROCEDURE — 3078F DIAST BP <80 MM HG: CPT | Mod: CPTII,S$GLB,, | Performed by: NURSE PRACTITIONER

## 2023-10-12 PROCEDURE — 3044F HG A1C LEVEL LT 7.0%: CPT | Mod: CPTII,S$GLB,, | Performed by: NURSE PRACTITIONER

## 2023-10-12 PROCEDURE — 3288F PR FALLS RISK ASSESSMENT DOCUMENTED: ICD-10-PCS | Mod: CPTII,S$GLB,, | Performed by: NURSE PRACTITIONER

## 2023-10-12 RX ORDER — ONDANSETRON 8 MG/1
8 TABLET, ORALLY DISINTEGRATING ORAL ONCE
Qty: 1 TABLET | Refills: 0 | Status: SHIPPED | OUTPATIENT
Start: 2023-10-12 | End: 2023-10-12

## 2023-10-12 RX ORDER — DOXEPIN 3 MG/1
3 TABLET, FILM COATED ORAL NIGHTLY PRN
Qty: 30 TABLET | Refills: 1 | Status: SHIPPED | OUTPATIENT
Start: 2023-10-12 | End: 2023-10-13 | Stop reason: ALTCHOICE

## 2023-10-12 NOTE — PROGRESS NOTES
Patient ID: Genoveva Gilbert is a 73 y.o. female.    Chief Complaint: Follow-up (74 yo female here for recheck. Pt also want to consult about insomnia, depression, and nauseated daily. KM)    Ms. Gilbert is a patient of Dr. Marrero who I am seeing today for follow up of several issues. She is accompanied by her ex /caregiver. The issues she is experiencing are nausea daily after she takes her calcium pill. She also states she is not able to get any sleep. She has tried several medication none of which has been helpful. Lastly, she states she has been depressed. She has a memory issue and her care giver states it is getting worse. She seems to have been on medication for this in the past but for some reason this medication has been discontinued and she is not sure why. Family is requesting follow up with Neurology for further workup. She denies any acute issues. All of the above mentioned issues have been present for several weeks to months.        Past Medical History:   Diagnosis Date    Allergy     Anxiety     Arthritis, rheumatoid     Back pain     Chronic bronchiolitis     Coronary artery disease involving native coronary artery 9/27/2023    Depression     Fibromyalgia     GERD (gastroesophageal reflux disease)     High cholesterol     Hilar mass 03/27/2014    [place] (hard of hearing)     aid right ear    [place] (hard of hearing)     Hypertension     Incontinence of urine     Lymphedema     MS (multiple sclerosis)     benign; another MD stated she has no MS    Neuropathy     Personal history of colonic polyps 12/05/2019    Restless leg syndrome     Rotator cuff tear 04/16/2015    Sciatica of left side 01/05/2018    Seasonal allergies     Sinus congestion     Sleep apnea     DOES NOT USE MACHINE    Spondylolysis     Thyroid disease     Type 2 diabetes mellitus with polyneuropathy     no med. was borderline    Wheezing      Past Surgical History:   Procedure Laterality Date    APPENDECTOMY      ARTHROSCOPIC  DEBRIDEMENT OF SHOULDER Right 02/18/2022    Procedure: EXTENSIVE DEBRIDEMENT, SHOULDER, ARTHROSCOPIC;  Surgeon: Rio Pitts MD;  Location: [facility] OR;  Service: Orthopedics;  Laterality: Right;    BREAST SURGERY      reduction    CLOSED REDUCTION OF INJURY OF SHOULDER Right 09/19/2021    Procedure: CLOSED REDUCTION, SHOULDER;  Surgeon: Antonio Irwin MD;  Location: [facility] OR;  Service: Orthopedics;  Laterality: Right;    COLONOSCOPY W/ POLYPECTOMY N/A 12/05/2019    repeat in 5 yrs    CORONARY ANGIOGRAPHY N/A 9/27/2023    Procedure: ANGIOGRAM, CORONARY ARTERY;  Surgeon: Rosa Webber MD;  Location: [facility] CATH/EP LAB;  Service: Cardiology;  Laterality: N/A;    EPIDURAL STEROID INJECTION INTO LUMBAR SPINE N/A 06/12/2019    Procedure: Injection-steroid-epidural-lumbar;  Surgeon: Thad Manning MD;  Location: [facility] OR;  Service: Pain Management;  Laterality: N/A;  L5-S1    EPIDURAL STEROID INJECTION INTO LUMBAR SPINE N/A 09/16/2019    Procedure: Injection-steroid-epidural-lumbar;  Surgeon: Thad Manning MD;  Location: [facility] OR;  Service: Pain Management;  Laterality: N/A;  L5-S1    EYE SURGERY Bilateral     HYSTERECTOMY      partial - fibroids    INJECTION OF ANESTHETIC AGENT AROUND MEDIAL BRANCH NERVES INNERVATING LUMBAR FACET JOINT Bilateral 02/28/2019    Procedure: Block-nerve-medial branch-lumbar;  Surgeon: Thad Manning MD;  Location: [facility];  Service: Pain Management;  Laterality: Bilateral;  L3, 4,5     INJECTION OF ANESTHETIC AGENT AROUND MEDIAL BRANCH NERVES INNERVATING LUMBAR FACET JOINT Bilateral 03/21/2019    Procedure: Block-nerve-medial branch-lumbar L3,4,5;  Surgeon: Thad Manning MD;  Location: [facility] OR;  Service: Pain Management;  Laterality: Bilateral;    KNEE ARTHROPLASTY Left 03/16/2021    Procedure: ARTHROPLASTY, KNEE;  Surgeon: Rio Pitts MD;  Location: [facility] OR;  Service: Orthopedics;  Laterality: Left;  GENERAL AND BLOCK    LIPOSUCTION  1985    RADIOFREQUENCY ABLATION Left  11/04/2020    Procedure: Radiofrequency Ablation left knee;  Surgeon: Andrew Escudero MD;  Location: [facility] OR;  Service: Pain Management;  Laterality: Left;    RADIOFREQUENCY ABLATION OF LUMBAR MEDIAL BRANCH NERVE AT SINGLE LEVEL Bilateral 04/12/2019    Procedure: Radiofrequency Ablation, Nerve, Spinal, Lumbar, Medial Branch, 1 Level;  Surgeon: Thad Manning MD;  Location: [facility] OR;  Service: Pain Management;  Laterality: Bilateral;  L3, 4, 5  lumbar  Daptiv pain management generator  SN [id number]  80 degrees for 75 seconds x2    RADIOFREQUENCY ABLATION OF LUMBAR MEDIAL BRANCH NERVE AT SINGLE LEVEL Bilateral 10/22/2019    Procedure: Radiofrequency Ablation, Nerve, Spinal, Lumbar, Medial Branch, L3,4,5;  Surgeon: Thad Manning MD;  Location: [facility] OR;  Service: Pain Management;  Laterality: Bilateral;  burned at 80 degrees C X 60 seconds X 2 each site    RADIOFREQUENCY ABLATION OF LUMBAR MEDIAL BRANCH NERVE AT SINGLE LEVEL Bilateral 05/27/2020    Procedure: Radiofrequency Ablation, Nerve, Spinal, Lumbar, Medial Branch, 1 Level;  Surgeon: Thad Manning MD;  Location: [facility] OR;  Service: Pain Management;  Laterality: Bilateral;  L3,4,5    REVERSE TOTAL SHOULDER ARTHROPLASTY Right 04/12/2022    Procedure: ARTHROPLASTY, SHOULDER, TOTAL, REVERSE;  Surgeon: Rio Pitts MD;  Location: [facility] OR;  Service: Orthopedics;  Laterality: Right;    SHOULDER ARTHROSCOPY Right 09/19/2021    Procedure: ARTHROSCOPY, SHOULDER;  Surgeon: Antonio Irwin MD;  Location: [facility] OR;  Service: Orthopedics;  Laterality: Right;  LIMITED DEBRIDMENT    TONSILLECTOMY      TOTAL REDUCTION MAMMOPLASTY           Tobacco History:  reports that she quit smoking about 27 years ago. Her smoking use included cigarettes. She has been exposed to tobacco smoke. She has never used smokeless tobacco.      Review of patient's allergies indicates:   Allergen Reactions    Keflex [cephalexin]      Throat swelling    Pcn [penicillins]      Throat swelling     Latex, natural rubber Other (See Comments)     Redness with bandaids     Adhesive Other (See Comments)     bandainds redness at skin    Trelegy ellipta [fluticasone-umeclidin-vilanter]      Vision disturbance       Current Outpatient Medications:     amLODIPine (NORVASC) 5 MG tablet, Take 1 tablet (5 mg total) by mouth once daily. For blood pressure, Disp: 30 tablet, Rfl: 0    aspirin (ECOTRIN) 81 MG EC tablet, Take 1 tablet (81 mg total) by mouth once daily., Disp: , Rfl: 0    atorvastatin (LIPITOR) 40 MG tablet, Take 1 tablet (40 mg total) by mouth every evening., Disp: 90 tablet, Rfl: 0    ferrous sulfate 325 (65 FE) MG EC tablet, Take 1 tablet by mouth once daily., Disp: , Rfl:     fexofenadine (ALLEGRA) 180 MG tablet, , Disp: , Rfl:     ibuprofen (ADVIL,MOTRIN) 600 MG tablet, Take 1 tablet (600 mg total) by mouth 3 (three) times daily as needed for Pain., Disp: 90 tablet, Rfl: 0    isosorbide mononitrate (IMDUR) 60 MG 24 hr tablet, Take 1 tablet (60 mg total) by mouth once daily., Disp: 30 tablet, Rfl: 0    levothyroxine (SYNTHROID) 88 MCG tablet, Take 1 tablet (88 mcg total) by mouth once daily., Disp: 90 tablet, Rfl: 3    lovastatin (MEVACOR) 40 MG tablet, TAKE 1 TABLET(40 MG) BY MOUTH EVERY NIGHT AT BEDTIME FOR CHOLESTEROL, Disp: 90 tablet, Rfl: 0    omeprazole (PRILOSEC) 20 MG capsule, Take 1 capsule (20 mg total) by mouth once daily., Disp: 90 capsule, Rfl: 1    potassium chloride (MICRO-K) 10 MEQ CpSR, Take 1 capsule (10 mEq total) by mouth 3 (three) times daily., Disp: 270 capsule, Rfl: 0    pramipexole (MIRAPEX) 0.5 MG tablet, Take 2 tablets (1 mg total) by mouth every evening. For restless legs, Disp: 180 tablet, Rfl: 3    pregabalin (LYRICA) 75 MG capsule, Take 150 mg by mouth 2 (two) times daily., Disp: , Rfl:     ramelteon (ROZEREM) 8 mg tablet, Take 1 tablet (8 mg total) by mouth every evening., Disp: 30 tablet, Rfl: 0    sertraline (ZOLOFT) 50 MG tablet, Take half tab PO daily x 1 week, then  "increase to 1 tab PO daily., Disp: 90 tablet, Rfl: 1    torsemide (DEMADEX) 10 MG Tab, TAKE 1 TABLET(10 MG) BY MOUTH EVERY DAY, Disp: 90 tablet, Rfl: 3    traZODone (DESYREL) 50 MG tablet, TAKE 1 TO 2 TABLETS BY MOUTH EVERY NIGHT AT BEDTIME AS NEEDED FOR INSOMNIA, Disp: 60 tablet, Rfl: 1    ondansetron (ZOFRAN-ODT) 4 MG TbDL, Take 1 tablet (4 mg total) by mouth every 8 (eight) hours as needed (Nausea)., Disp: 30 tablet, Rfl: 0    suvorexant (BELSOMRA) 5 mg Tab, Take 5 mg by mouth nightly as needed (Insomnia)., Disp: 30 tablet, Rfl: 1    Review of Systems   Constitutional:  Positive for activity change and fatigue.   Gastrointestinal:  Positive for nausea.   Psychiatric/Behavioral:  Positive for decreased concentration and sleep disturbance. The patient is nervous/anxious.           Objective:      Vitals:    10/12/23 1310   BP: 118/70   Pulse: 88   Resp: 20   Temp: 98 °F (36.7 °C)   TempSrc: Oral   Weight: 77.9 kg (171 lb 11.2 oz)   Height: 5' 1" (1.549 m)     Physical Exam  Constitutional:       Appearance: She is obese.   Cardiovascular:      Rate and Rhythm: Normal rate and regular rhythm.      Heart sounds: Normal heart sounds.   Pulmonary:      Effort: Pulmonary effort is normal.      Breath sounds: Normal breath sounds.   Abdominal:      General: Bowel sounds are normal.      Palpations: Abdomen is soft.   Neurological:      Mental Status: She is confused.   Psychiatric:         Mood and Affect: Mood is depressed.         Behavior: Behavior is slowed.           Assessment:       1. Insomnia, unspecified type    2. Nausea    3. Memory disorder           Plan:       Insomnia, unspecified type  -     Discontinue: doxepin (SILENOR) 3 mg Tab; Take 3 mg by mouth nightly as needed (Insomnia).  Dispense: 30 tablet; Refill: 1  -     suvorexant (BELSOMRA) 5 mg Tab; Take 5 mg by mouth nightly as needed (Insomnia).  Dispense: 30 tablet; Refill: 1    Nausea  -     ondansetron (ZOFRAN-ODT) 8 MG TbDL; Take 1 tablet (8 mg " total) by mouth once. for 1 dose  Dispense: 1 tablet; Refill: 0  -     ondansetron (ZOFRAN-ODT) 4 MG TbDL; Take 1 tablet (4 mg total) by mouth every 8 (eight) hours as needed (Nausea).  Dispense: 30 tablet; Refill: 0    Memory disorder  -     Ambulatory referral/consult to Neurology; Future; Expected date: 10/19/2023      Follow up if symptoms worsen or fail to improve.        10/16/2023 Mayi Campbell NP

## 2023-10-12 NOTE — TELEPHONE ENCOUNTER
Per Imelda, Doxepin 3 mg tablets is not covered under pt's plan. Alternative meds covered under pt's insurance:    Belsomra  Zolpidem Tartrate

## 2023-10-12 NOTE — PHYSICIAN QUERY
PT Name: Genoveva Gilbert  MR #: [id number]     DOCUMENTATION CLARIFICATION     CDS/: Raven [place]               Contact information:  This form is a permanent document in the medical record.    Query Date: October 12, 2023    By submitting this query, we are merely seeking further clarification of documentation.  Please utilize your independent clinical judgment when addressing the question(s) below.    The Medical Record contains the following:   Indicator Supporting Clinical Findings Location in Medical Record    Kidney (Renal) Failure/Injury Angiogram canceled today due to elevated creatinine to 1.4. She had an episode of TARAN 2-3 months ago from which she recovered. IV hydration today and repeat labs tomorrow. Progress Notes by Rosa Webber MD9/26/2023    BUN/Creatinine           GFR 13/0.8     >60      9/24/2023  13/1.0    58.8      9/25/2023  39/1.4    39.2      9/26/2023  28/0.8    >60       9/27/2023 Lab Results    Urine: Casts         Eosinophils 0.5, 1.9, 3.4, 4.3      9/24/2023-9/27/2023  Lab Results    Dehydration No     Nausea/Vomiting Nol     Dialysis/CRRT No            Provider, please specify the diagnosis or diagnoses associated with above clinical findings.     [    ] Acute Kidney Failure/Injury with Acute Cortical Necrosis    [    ] Acute Kidney Failure/Injury with Medullary Necrosis    [    ] Acute Kidney Failure/Injury with Tubular Necrosis     [  x  ] Unspecified Acute Kidney Failure/Injury     [    ] Other Acute Kidney Failure/Injury (please specify):        [    ] Other (please specify):

## 2023-10-13 RX ORDER — SUVOREXANT 5 MG/1
5 TABLET, FILM COATED ORAL NIGHTLY PRN
Qty: 30 TABLET | Refills: 1 | Status: SHIPPED | OUTPATIENT
Start: 2023-10-13 | End: 2023-11-13 | Stop reason: SDUPTHER

## 2023-10-15 PROBLEM — N17.9 AKI (ACUTE KIDNEY INJURY): Status: RESOLVED | Noted: 2023-09-26 | Resolved: 2023-09-27

## 2023-10-16 ENCOUNTER — TELEPHONE (OUTPATIENT)
Dept: FAMILY MEDICINE | Facility: CLINIC | Age: 73
End: 2023-10-16

## 2023-10-16 RX ORDER — ONDANSETRON 4 MG/1
4 TABLET, ORALLY DISINTEGRATING ORAL EVERY 8 HOURS PRN
Qty: 30 TABLET | Refills: 0 | Status: SHIPPED | OUTPATIENT
Start: 2023-10-16 | End: 2023-11-14

## 2023-10-16 NOTE — TELEPHONE ENCOUNTER
Patient called stating she saw you last week and you were supposed to send something in for nausea. The pharmacy does not have anything, could you please send it to [facility] [address].  Please advise.

## 2023-10-18 ENCOUNTER — EXTERNAL HOME HEALTH (OUTPATIENT)
Dept: HOME HEALTH SERVICES | Facility: HOSPITAL | Age: 73
End: 2023-10-18
Payer: MEDICARE

## 2023-10-19 ENCOUNTER — OFFICE VISIT (OUTPATIENT)
Dept: CARDIOLOGY | Facility: CLINIC | Age: 73
End: 2023-10-19
Payer: MEDICARE

## 2023-10-19 VITALS
WEIGHT: 174.19 LBS | SYSTOLIC BLOOD PRESSURE: 130 MMHG | HEART RATE: 64 BPM | DIASTOLIC BLOOD PRESSURE: 82 MMHG | HEIGHT: 61 IN | BODY MASS INDEX: 32.89 KG/M2

## 2023-10-19 DIAGNOSIS — I10 HYPERTENSION, UNSPECIFIED TYPE: ICD-10-CM

## 2023-10-19 DIAGNOSIS — I25.10 CORONARY ARTERY DISEASE INVOLVING NATIVE CORONARY ARTERY OF NATIVE HEART WITHOUT ANGINA PECTORIS: Primary | ICD-10-CM

## 2023-10-19 DIAGNOSIS — E78.2 MIXED HYPERLIPIDEMIA: ICD-10-CM

## 2023-10-19 PROCEDURE — 1111F PR DISCHARGE MEDS RECONCILED W/ CURRENT OUTPATIENT MED LIST: ICD-10-PCS | Mod: CPTII,S$GLB,, | Performed by: INTERNAL MEDICINE

## 2023-10-19 PROCEDURE — 1126F AMNT PAIN NOTED NONE PRSNT: CPT | Mod: CPTII,S$GLB,, | Performed by: INTERNAL MEDICINE

## 2023-10-19 PROCEDURE — 3044F HG A1C LEVEL LT 7.0%: CPT | Mod: CPTII,S$GLB,, | Performed by: INTERNAL MEDICINE

## 2023-10-19 PROCEDURE — 1101F PT FALLS ASSESS-DOCD LE1/YR: CPT | Mod: CPTII,S$GLB,, | Performed by: INTERNAL MEDICINE

## 2023-10-19 PROCEDURE — 3288F FALL RISK ASSESSMENT DOCD: CPT | Mod: CPTII,S$GLB,, | Performed by: INTERNAL MEDICINE

## 2023-10-19 PROCEDURE — 99214 OFFICE O/P EST MOD 30 MIN: CPT | Mod: S$GLB,,, | Performed by: INTERNAL MEDICINE

## 2023-10-19 PROCEDURE — 1101F PR PT FALLS ASSESS DOC 0-1 FALLS W/OUT INJ PAST YR: ICD-10-PCS | Mod: CPTII,S$GLB,, | Performed by: INTERNAL MEDICINE

## 2023-10-19 PROCEDURE — 4010F PR ACE/ARB THEARPY RXD/TAKEN: ICD-10-PCS | Mod: CPTII,S$GLB,, | Performed by: INTERNAL MEDICINE

## 2023-10-19 PROCEDURE — 3079F PR MOST RECENT DIASTOLIC BLOOD PRESSURE 80-89 MM HG: ICD-10-PCS | Mod: CPTII,S$GLB,, | Performed by: INTERNAL MEDICINE

## 2023-10-19 PROCEDURE — 99999 PR PBB SHADOW E&M-EST. PATIENT-LVL III: CPT | Mod: PBBFAC,,, | Performed by: INTERNAL MEDICINE

## 2023-10-19 PROCEDURE — 1111F DSCHRG MED/CURRENT MED MERGE: CPT | Mod: CPTII,S$GLB,, | Performed by: INTERNAL MEDICINE

## 2023-10-19 PROCEDURE — 99214 PR OFFICE/OUTPT VISIT, EST, LEVL IV, 30-39 MIN: ICD-10-PCS | Mod: S$GLB,,, | Performed by: INTERNAL MEDICINE

## 2023-10-19 PROCEDURE — 99999 PR PBB SHADOW E&M-EST. PATIENT-LVL III: ICD-10-PCS | Mod: PBBFAC,,, | Performed by: INTERNAL MEDICINE

## 2023-10-19 PROCEDURE — 3075F SYST BP GE 130 - 139MM HG: CPT | Mod: CPTII,S$GLB,, | Performed by: INTERNAL MEDICINE

## 2023-10-19 PROCEDURE — 4010F ACE/ARB THERAPY RXD/TAKEN: CPT | Mod: CPTII,S$GLB,, | Performed by: INTERNAL MEDICINE

## 2023-10-19 PROCEDURE — 3079F DIAST BP 80-89 MM HG: CPT | Mod: CPTII,S$GLB,, | Performed by: INTERNAL MEDICINE

## 2023-10-19 PROCEDURE — 1126F PR PAIN SEVERITY QUANTIFIED, NO PAIN PRESENT: ICD-10-PCS | Mod: CPTII,S$GLB,, | Performed by: INTERNAL MEDICINE

## 2023-10-19 PROCEDURE — 3044F PR MOST RECENT HEMOGLOBIN A1C LEVEL <7.0%: ICD-10-PCS | Mod: CPTII,S$GLB,, | Performed by: INTERNAL MEDICINE

## 2023-10-19 PROCEDURE — 3075F PR MOST RECENT SYSTOLIC BLOOD PRESS GE 130-139MM HG: ICD-10-PCS | Mod: CPTII,S$GLB,, | Performed by: INTERNAL MEDICINE

## 2023-10-19 PROCEDURE — 3288F PR FALLS RISK ASSESSMENT DOCUMENTED: ICD-10-PCS | Mod: CPTII,S$GLB,, | Performed by: INTERNAL MEDICINE

## 2023-10-19 NOTE — PROGRESS NOTES
Subjective:    Patient ID:  Genoveva Gilbert is a 73 y.o. female patient here for evaluation Carotid Artery Disease, Hyperlipidemia, and Hypertension      History of Present Illness:     73-year-old female with GERD, hypertension, hypothyroidism, diabetes, hyperlipidemia, depression, lymphedema admitted to [facility] last month with chest pain found to have abnormal stress test.  Patient underwent cardiac catheterization which showed normal obstructive artery disease.  She was subsequently discharged on medical therapy.  Patient came for follow up today after the discharge.  She is doing well.  Compliant with the medications.      Nonobstructive coronary artery disease.        Dominance:  Codominant     Left Main:  Short vessel.  No disease.     LAD:  Mild disease.  Slightly sluggish flow through the vessel.  D1:  Very small-sized  D2:  Small-sized.  Patent.     LCx:  No disease.  OM1:  Medium caliber, long vessel.  No disease.     RCA:  Medium-sized, codominant vessel.  Discrete 40% stenosis in proximal RCA.  No significant disease in mid and distal RCA.  Catheter-induced vasospasm was observed during the procedure which improved with intracoronary nitroglycerin injection.  RPDA:  No disease.  RPL:  No disease.     Angiogram was initially attempted via right radial access.  However the wire could not be advanced beyond the elbow due to aneurysmal and tortuous brachial artery.  Access was then switched to right femoral artery and angiogram was then successfully performed.  Review of patient's allergies indicates:   Allergen Reactions    Keflex [cephalexin]      Throat swelling    Pcn [penicillins]      Throat swelling    Latex, natural rubber Other (See Comments)     Redness with bandaids     Adhesive Other (See Comments)     bandainds redness at skin    Trelegy ellipta [fluticasone-umeclidin-vilanter]      Vision disturbance       Past Medical History:   Diagnosis Date    Allergy     Anxiety      Arthritis, rheumatoid     Back pain     Chronic bronchiolitis     Coronary artery disease involving native coronary artery 9/27/2023    Depression     Fibromyalgia     GERD (gastroesophageal reflux disease)     High cholesterol     Hilar mass 03/27/2014    [place]     aid right ear    [place]     Hypertension     Incontinence of urine     Lymphedema     MS (multiple sclerosis)     benign; another MD stated she has no MS    Neuropathy     Personal history of colonic polyps 12/05/2019    Restless leg syndrome     Rotator cuff tear 04/16/2015    Sciatica of left side 01/05/2018    Seasonal allergies     Sinus congestion     Sleep apnea     DOES NOT USE MACHINE    Spondylolysis     Thyroid disease     Type 2 diabetes mellitus with polyneuropathy     no med. was borderline    Wheezing      Past Surgical History:   Procedure Laterality Date    APPENDECTOMY      ARTHROSCOPIC DEBRIDEMENT OF SHOULDER Right 02/18/2022    Procedure: EXTENSIVE DEBRIDEMENT, SHOULDER, ARTHROSCOPIC;  Surgeon: Rio Pitts MD;  Location: [facility] OR;  Service: Orthopedics;  Laterality: Right;    BREAST SURGERY      reduction    CLOSED REDUCTION OF INJURY OF SHOULDER Right 09/19/2021    Procedure: CLOSED REDUCTION, SHOULDER;  Surgeon: Antonio Irwin MD;  Location: [facility] OR;  Service: Orthopedics;  Laterality: Right;    COLONOSCOPY W/ POLYPECTOMY N/A 12/05/2019    repeat in 5 yrs    CORONARY ANGIOGRAPHY N/A 9/27/2023    Procedure: ANGIOGRAM, CORONARY ARTERY;  Surgeon: Rosa Webber MD;  Location: [facility] CATH/EP LAB;  Service: Cardiology;  Laterality: N/A;    EPIDURAL STEROID INJECTION INTO LUMBAR SPINE N/A 06/12/2019    Procedure: Injection-steroid-epidural-lumbar;  Surgeon: Thad Manning MD;  Location: [facility] OR;  Service: Pain Management;  Laterality: N/A;  L5-S1    EPIDURAL STEROID INJECTION INTO LUMBAR SPINE N/A 09/16/2019    Procedure: Injection-steroid-epidural-lumbar;  Surgeon: Thad Manning MD;  Location:  [facility] OR;  Service: Pain Management;  Laterality: N/A;  L5-S1    EYE SURGERY Bilateral     HYSTERECTOMY      partial - fibroids    INJECTION OF ANESTHETIC AGENT AROUND MEDIAL BRANCH NERVES INNERVATING LUMBAR FACET JOINT Bilateral 02/28/2019    Procedure: Block-nerve-medial branch-lumbar;  Surgeon: Thad Manning MD;  Location: [facility] OR;  Service: Pain Management;  Laterality: Bilateral;  L3, 4,5     INJECTION OF ANESTHETIC AGENT AROUND MEDIAL BRANCH NERVES INNERVATING LUMBAR FACET JOINT Bilateral 03/21/2019    Procedure: Block-nerve-medial branch-lumbar L3,4,5;  Surgeon: Thad Manning MD;  Location: [facility] OR;  Service: Pain Management;  Laterality: Bilateral;    KNEE ARTHROPLASTY Left 03/16/2021    Procedure: ARTHROPLASTY, KNEE;  Surgeon: Rio Pitts MD;  Location: [facility] OR;  Service: Orthopedics;  Laterality: Left;  GENERAL AND BLOCK    LIPOSUCTION  1985    RADIOFREQUENCY ABLATION Left 11/04/2020    Procedure: Radiofrequency Ablation left knee;  Surgeon: Andrew Escudero MD;  Location: [facility] OR;  Service: Pain Management;  Laterality: Left;    RADIOFREQUENCY ABLATION OF LUMBAR MEDIAL BRANCH NERVE AT SINGLE LEVEL Bilateral 04/12/2019    Procedure: Radiofrequency Ablation, Nerve, Spinal, Lumbar, Medial Branch, 1 Level;  Surgeon: Thad Manning MD;  Location: [facility] OR;  Service: Pain Management;  Laterality: Bilateral;  L3, 4, 5  lumbar  Atmosferiq pain management generator  SN [id number]  80 degrees for 75 seconds x2    RADIOFREQUENCY ABLATION OF LUMBAR MEDIAL BRANCH NERVE AT SINGLE LEVEL Bilateral 10/22/2019    Procedure: Radiofrequency Ablation, Nerve, Spinal, Lumbar, Medial Branch, L3,4,5;  Surgeon: Thad Manning MD;  Location: [facility] OR;  Service: Pain Management;  Laterality: Bilateral;  burned at 80 degrees C X 60 seconds X 2 each site    RADIOFREQUENCY ABLATION OF LUMBAR MEDIAL BRANCH NERVE AT SINGLE LEVEL Bilateral 05/27/2020    Procedure: Radiofrequency Ablation, Nerve, Spinal, Lumbar, Medial Branch, 1 Level;   Surgeon: Thad Manning MD;  Location: [facility] OR;  Service: Pain Management;  Laterality: Bilateral;  L3,4,5    REVERSE TOTAL SHOULDER ARTHROPLASTY Right 2022    Procedure: ARTHROPLASTY, SHOULDER, TOTAL, REVERSE;  Surgeon: Rio Pitts MD;  Location: [facility] OR;  Service: Orthopedics;  Laterality: Right;    SHOULDER ARTHROSCOPY Right 2021    Procedure: ARTHROSCOPY, SHOULDER;  Surgeon: Antonio Irwin MD;  Location: [facility] OR;  Service: Orthopedics;  Laterality: Right;  LIMITED DEBRIDMENT    TONSILLECTOMY      TOTAL REDUCTION MAMMOPLASTY       Social History     Tobacco Use    Smoking status: Former     Current packs/day: 0.00     Types: Cigarettes     Quit date: 1996     Years since quittin.4     Passive exposure: Past    Smokeless tobacco: Never   Substance Use Topics    Alcohol use: Yes     Comment: very little     Drug use: No        Review of Systems   Negative except as mentioned in HPI         Objective        Vitals:    10/19/23 1511   BP: 130/82   Pulse: 64       LIPIDS - LAST 2   Lab Results   Component Value Date    CHOL 214 (H) 2023    CHOL 150 09/15/2022    HDL 43 2023    HDL 34 (L) 09/15/2022    LDLCALC 147.8 2023    LDLCALC 79.0 09/15/2022    TRIG 116 2023    TRIG 185 (H) 09/15/2022    CHOLHDL 20.1 2023    CHOLHDL 22.7 09/15/2022       CBC - LAST 2  Lab Results   Component Value Date    WBC 5.38 2023    WBC 6.48 2023    RBC 4.17 2023    RBC 4.43 2023    HGB 12.3 2023    HGB 13.5 2023    HCT 38.9 2023    HCT 39.7 2023    MCV 93 2023    MCV 90 2023    MCH 29.5 2023    MCH 30.5 2023    MCHC 31.6 (L) 2023    MCHC 34.0 2023    RDW 12.3 2023    RDW 12.3 2023     2023     2023    MPV 9.8 2023    MPV 9.9 2023    GRAN 1.9 2023    GRAN 36.0 (L) 2023    LYMPH 2.7 2023    LYMPH 50.0 (H) 2023    MONO 0.5  09/27/2023    MONO 9.1 09/27/2023    BASO 0.02 09/27/2023    BASO 0.03 09/26/2023    NRBC 0 09/27/2023    NRBC 0 09/26/2023       CHEMISTRY & LIVER FUNCTION - LAST 2  Lab Results   Component Value Date     09/27/2023     09/26/2023    K 3.9 09/27/2023    K 3.5 09/26/2023     (H) 09/27/2023     09/26/2023    CO2 28 09/27/2023    CO2 30 (H) 09/26/2023    ANIONGAP 6 (L) 09/27/2023    ANIONGAP 8 09/26/2023    BUN 28 (H) 09/27/2023    BUN 39 (H) 09/26/2023    CREATININE 0.8 09/27/2023    CREATININE 1.4 09/26/2023    GLU 99 09/27/2023     09/26/2023    CALCIUM 8.4 (L) 09/27/2023    CALCIUM 8.7 09/26/2023    MG 1.8 06/25/2023    MG 1.9 06/24/2023    ALBUMIN 4.4 09/24/2023    ALBUMIN 3.3 (L) 06/25/2023    PROT 7.7 09/24/2023    PROT 5.6 (L) 06/25/2023    ALKPHOS 80 09/24/2023    ALKPHOS 67 06/25/2023    ALT 18 09/24/2023    ALT 10 06/25/2023    AST 22 09/24/2023    AST 15 06/25/2023    BILITOT 1.0 09/24/2023    BILITOT 0.6 06/25/2023        CARDIAC PROFILE - LAST 2  Lab Results   Component Value Date     (H) 09/24/2023    BNP 57 07/06/2023    CPK 80 12/06/2022     05/06/2016     05/06/2016    CPKMB 3.1 05/06/2016    TROPONINI 0.093 (HH) 09/24/2023    TROPONINI 0.201 (H) 10/06/2022    TROPONINIHS 10.0 09/25/2023    TROPONINIHS 11.8 09/25/2023        COAGULATION - LAST 2  Lab Results   Component Value Date    INR 1.0 09/26/2023    INR 1.0 07/03/2017    APTT 27.2 09/26/2023    APTT 29.1 09/25/2023       ENDOCRINE & PSA - LAST 2  Lab Results   Component Value Date    HGBA1C 5.7 07/06/2023    HGBA1C 5.9 (H) 12/12/2022    TSH 31.680 (H) 07/06/2023    TSH 35.452 (H) 06/23/2023        ECHOCARDIOGRAM RESULTS  Results for orders placed during the hospital encounter of 09/24/23    Echo    Interpretation Summary    Left Ventricle: The left ventricle is normal in size. Normal wall thickness. There is concentric remodeling. Normal wall motion. There is hyperdynamic systolic function  with a visually estimated ejection fraction of 70 - 75%. There is normal diastolic function. No LV apical cavity obstruction at rest.    Left Atrium: Left atrium is moderately dilated.    Right Ventricle: Normal right ventricular cavity size. Wall thickness is normal. Right ventricle wall motion  is normal. Systolic function is normal.    Aortic Valve: The aortic valve is a trileaflet valve. There is mild aortic valve sclerosis. There is mild annular calcification present.    Tricuspid Valve: There is mild regurgitation with a centrally directed jet.    IVC/SVC: Normal venous pressure at 3 mmHg.      CURRENT/PREVIOUS VISIT EKG  Results for orders placed or performed during the hospital encounter of 09/24/23   EKG 12-lead    Collection Time: 09/27/23  7:19 AM    Narrative    Test Reason : R06.02,R07.9,E11.65,R94.39,    Vent. Rate : 049 BPM     Atrial Rate : 049 BPM     P-R Int : 146 ms          QRS Dur : 092 ms      QT Int : 468 ms       P-R-T Axes : 061 013 017 degrees     QTc Int : 422 ms    Sinus bradycardia  Nonspecific ST and T wave abnormality  When compared with ECG of 24-SEP-2023 12:12,  T wave inversion no longer evident in Anterior leads  QT has shortened  Confirmed by Mark Ospina MD ([id number]) on 10/2/2023 8:06:34 PM    Referred By: [email or web address]   SELF           Confirmed By:Mark Ospina MD     No valid procedures specified.   Results for orders placed during the hospital encounter of 09/24/23    Nuclear Stress Test    Interpretation Summary    The ECG portion of the study is negative for ischemia.    The patient reported no chest pain during the stress test.    There were no arrhythmias during stress.    The nuclear portion of this study will be reported separately.    No valid procedures specified.          PREVIOUS STRESS TEST              PREVIOUS ANGIOGRAM        PHYSICAL EXAM    CONSTITUTIONAL: Well built, well nourished in no apparent distress  HEENT: No pallor  NECK: no JVD  LUNGS: CTA b/l  HEART:  regular rate and rhythm, S1, S2 normal, no murmur   ABDOMEN: soft, non-tender; bowel sounds normal  EXTREMITIES: No edema.  Radial and right groin Access sites normal  NEURO: AAO X 3   SKIN:  No rash  Psych:  Normal affect    I HAVE REVIEWED :    The vital signs, nurses notes, and all the pertinent radiology and labs.        Current Outpatient Medications   Medication Instructions    amLODIPine (NORVASC) 5 mg, Oral, Daily, For blood pressure    aspirin (ECOTRIN) 81 mg, Oral, Daily    atorvastatin (LIPITOR) 40 mg, Oral, Nightly    BELSOMRA 5 mg, Oral, Nightly PRN    ferrous sulfate 325 (65 FE) MG EC tablet 1 tablet, Oral, Daily    fexofenadine (ALLEGRA) 180 MG tablet No dose, route, or frequency recorded.    ibuprofen (ADVIL,MOTRIN) 600 mg, Oral, 3 times daily PRN    isosorbide mononitrate (IMDUR) 60 mg, Oral, Daily    levothyroxine (SYNTHROID) 88 mcg, Oral, Daily    omeprazole (PRILOSEC) 20 mg, Oral, Daily    ondansetron (ZOFRAN-ODT) 4 mg, Oral, Every 8 hours PRN    potassium chloride (MICRO-K) 10 MEQ CpSR 10 mEq, Oral, 3 times daily    pramipexole (MIRAPEX) 1 mg, Oral, Nightly, For restless legs    pregabalin (LYRICA) 150 mg, Oral, 2 times daily    ramelteon (ROZEREM) 8 mg, Oral, Nightly    sertraline (ZOLOFT) 50 MG tablet Take half tab PO daily x 1 week, then increase to 1 tab PO daily.    torsemide (DEMADEX) 10 MG Tab TAKE 1 TABLET(10 MG) BY MOUTH EVERY DAY    traZODone (DESYREL) 50 MG tablet TAKE 1 TO 2 TABLETS BY MOUTH EVERY NIGHT AT BEDTIME AS NEEDED FOR INSOMNIA          Assessment & Plan   73-year-old female with GERD, hypertension, hypothyroidism, diabetes, hyperlipidemia, depression, lymphedema admitted to [facility] last month with chest pain found to have abnormal stress test.  Patient underwent cardiac catheterization which showed non obstructive artery disease.  She was subsequently discharged on medical therapy.  Patient came for follow up today after the discharge.  She is doing well.   Compliant with the medications.    Nonobstructive coronary artery disease on coronary angiogram  Hypertension  Hyperlipidemia    -continue current medical therapy including Imdur 60 mg daily.  Continue metoprolol, amlodipine, statin and aspirin  -repeat blood work with PCP  Follow up in about 6 months (around 4/19/2024).

## 2023-10-24 DIAGNOSIS — I10 ESSENTIAL HYPERTENSION: ICD-10-CM

## 2023-10-24 RX ORDER — ISOSORBIDE MONONITRATE 60 MG/1
60 TABLET, EXTENDED RELEASE ORAL DAILY
Qty: 90 TABLET | Refills: 0 | Status: SHIPPED | OUTPATIENT
Start: 2023-10-24 | End: 2024-01-17

## 2023-10-24 RX ORDER — RESPIRATORY SYNCYTIAL VISUS VACCINE RECOMBINANT, ADJUVANTED 120MCG/0.5
KIT INTRAMUSCULAR
COMMUNITY
Start: 2023-09-16

## 2023-10-24 RX ORDER — AMLODIPINE BESYLATE 5 MG/1
5 TABLET ORAL DAILY
Qty: 30 TABLET | Refills: 3 | Status: SHIPPED | OUTPATIENT
Start: 2023-10-24 | End: 2024-01-26

## 2023-10-26 DIAGNOSIS — R44.3 HALLUCINATIONS: ICD-10-CM

## 2023-10-26 DIAGNOSIS — R41.89 COGNITIVE DECLINE: Primary | ICD-10-CM

## 2023-10-26 DIAGNOSIS — R41.3 MEMORY CHANGES: ICD-10-CM

## 2023-10-26 DIAGNOSIS — R20.0 RIGHT ARM NUMBNESS: ICD-10-CM

## 2023-10-26 DIAGNOSIS — G45.9 TIA (TRANSIENT ISCHEMIC ATTACK): ICD-10-CM

## 2023-10-26 DIAGNOSIS — G45.9 TIA (TRANSIENT ISCHEMIC ATTACK): Primary | ICD-10-CM

## 2023-10-30 ENCOUNTER — TELEPHONE (OUTPATIENT)
Dept: PHARMACY | Facility: HOSPITAL | Age: 73
End: 2023-10-30

## 2023-10-31 ENCOUNTER — LAB VISIT (OUTPATIENT)
Dept: LAB | Facility: HOSPITAL | Age: 73
End: 2023-10-31
Attending: STUDENT IN AN ORGANIZED HEALTH CARE EDUCATION/TRAINING PROGRAM
Payer: MEDICARE

## 2023-10-31 LAB
AMMONIA PLAS-SCNC: 26 UMOL/L (ref 10–50)
FOLATE SERPL-MCNC: 17.4 NG/ML (ref 4–24)
T4 FREE SERPL-MCNC: 1.17 NG/DL (ref 0.71–1.51)
TSH SERPL DL<=0.005 MIU/L-ACNC: 7.03 UIU/ML (ref 0.34–5.6)
VIT B12 SERPL-MCNC: 311 PG/ML (ref 210–950)

## 2023-10-31 PROCEDURE — 84443 ASSAY THYROID STIM HORMONE: CPT | Performed by: STUDENT IN AN ORGANIZED HEALTH CARE EDUCATION/TRAINING PROGRAM

## 2023-10-31 PROCEDURE — 84425 ASSAY OF VITAMIN B-1: CPT | Performed by: STUDENT IN AN ORGANIZED HEALTH CARE EDUCATION/TRAINING PROGRAM

## 2023-10-31 PROCEDURE — 82746 ASSAY OF FOLIC ACID SERUM: CPT | Performed by: STUDENT IN AN ORGANIZED HEALTH CARE EDUCATION/TRAINING PROGRAM

## 2023-10-31 PROCEDURE — 82607 VITAMIN B-12: CPT | Performed by: STUDENT IN AN ORGANIZED HEALTH CARE EDUCATION/TRAINING PROGRAM

## 2023-10-31 PROCEDURE — 36415 COLL VENOUS BLD VENIPUNCTURE: CPT | Performed by: STUDENT IN AN ORGANIZED HEALTH CARE EDUCATION/TRAINING PROGRAM

## 2023-10-31 PROCEDURE — 86592 SYPHILIS TEST NON-TREP QUAL: CPT | Performed by: STUDENT IN AN ORGANIZED HEALTH CARE EDUCATION/TRAINING PROGRAM

## 2023-10-31 PROCEDURE — 82140 ASSAY OF AMMONIA: CPT | Performed by: STUDENT IN AN ORGANIZED HEALTH CARE EDUCATION/TRAINING PROGRAM

## 2023-10-31 PROCEDURE — 84439 ASSAY OF FREE THYROXINE: CPT | Performed by: STUDENT IN AN ORGANIZED HEALTH CARE EDUCATION/TRAINING PROGRAM

## 2023-11-01 LAB — RPR SER QL: NORMAL

## 2023-11-03 ENCOUNTER — HOSPITAL ENCOUNTER (OUTPATIENT)
Dept: RADIOLOGY | Facility: HOSPITAL | Age: 73
Discharge: HOME | End: 2023-11-03
Attending: STUDENT IN AN ORGANIZED HEALTH CARE EDUCATION/TRAINING PROGRAM
Payer: MEDICARE

## 2023-11-03 ENCOUNTER — HOSPITAL ENCOUNTER (OUTPATIENT)
Dept: RADIOLOGY | Facility: HOSPITAL | Age: 73
Discharge: HOME | End: 2023-11-03
Attending: NURSE PRACTITIONER
Payer: MEDICARE

## 2023-11-03 DIAGNOSIS — R44.3 HALLUCINATIONS: ICD-10-CM

## 2023-11-03 DIAGNOSIS — G45.9 TIA (TRANSIENT ISCHEMIC ATTACK): ICD-10-CM

## 2023-11-03 DIAGNOSIS — R41.89 COGNITIVE DECLINE: ICD-10-CM

## 2023-11-03 DIAGNOSIS — R41.3 MEMORY CHANGES: ICD-10-CM

## 2023-11-03 DIAGNOSIS — R20.0 RIGHT ARM NUMBNESS: ICD-10-CM

## 2023-11-03 PROCEDURE — 70551 MRI BRAIN STEM W/O DYE: CPT | Mod: 26,,, | Performed by: RADIOLOGY

## 2023-11-03 PROCEDURE — 70544 MRA BRAIN WITHOUT CONTRAST: ICD-10-PCS | Mod: 26,,, | Performed by: RADIOLOGY

## 2023-11-03 PROCEDURE — 70551 MRI BRAIN STEM W/O DYE: CPT | Mod: TC

## 2023-11-03 PROCEDURE — 70544 MR ANGIOGRAPHY HEAD W/O DYE: CPT | Mod: TC

## 2023-11-03 PROCEDURE — 93880 EXTRACRANIAL BILAT STUDY: CPT | Mod: TC

## 2023-11-03 PROCEDURE — 93880 US CAROTID BILATERAL: ICD-10-PCS | Mod: 26,,, | Performed by: RADIOLOGY

## 2023-11-03 PROCEDURE — 70544 MR ANGIOGRAPHY HEAD W/O DYE: CPT | Mod: 26,,, | Performed by: RADIOLOGY

## 2023-11-03 PROCEDURE — 93880 EXTRACRANIAL BILAT STUDY: CPT | Mod: 26,,, | Performed by: RADIOLOGY

## 2023-11-03 PROCEDURE — 70551 MRI BRAIN WITHOUT CONTRAST: ICD-10-PCS | Mod: 26,,, | Performed by: RADIOLOGY

## 2023-11-04 LAB — VIT B1 BLD-SCNC: 90.2 NMOL/L (ref 66.5–200)

## 2023-11-06 RX ORDER — POTASSIUM CHLORIDE 750 MG/1
CAPSULE, EXTENDED RELEASE ORAL
Qty: 270 CAPSULE | Refills: 0 | Status: SHIPPED | OUTPATIENT
Start: 2023-11-06 | End: 2023-11-14

## 2023-11-06 NOTE — TELEPHONE ENCOUNTER
Refill request POTASSIUM CL 10MEQ ER CAPSULES    Last office visit     10/12/23  Next office visit     11/30/23  Medication pended

## 2023-11-10 ENCOUNTER — TELEPHONE (OUTPATIENT)
Dept: PHARMACY | Facility: HOSPITAL | Age: 73
End: 2023-11-10

## 2023-11-13 DIAGNOSIS — G47.00 INSOMNIA, UNSPECIFIED TYPE: ICD-10-CM

## 2023-11-13 DIAGNOSIS — F32.1 MODERATE MAJOR DEPRESSION, SINGLE EPISODE: ICD-10-CM

## 2023-11-13 DIAGNOSIS — R60.9 EDEMA, UNSPECIFIED TYPE: ICD-10-CM

## 2023-11-13 RX ORDER — SUVOREXANT 5 MG/1
5 TABLET, FILM COATED ORAL NIGHTLY PRN
Qty: 30 TABLET | Refills: 1 | Status: SHIPPED | OUTPATIENT
Start: 2023-11-13 | End: 2023-11-14 | Stop reason: SDUPTHER

## 2023-11-13 RX ORDER — SERTRALINE HYDROCHLORIDE 50 MG/1
TABLET, FILM COATED ORAL
Qty: 90 TABLET | Refills: 0 | Status: SHIPPED | OUTPATIENT
Start: 2023-11-13 | End: 2023-11-14 | Stop reason: SDUPTHER

## 2023-11-13 NOTE — TELEPHONE ENCOUNTER
Refill request    sertraline (ZOLOFT) 50 MG tablet       Last office visit   10/12/23    Next office visit     11/14/23  Medication pended

## 2023-11-14 ENCOUNTER — OFFICE VISIT (OUTPATIENT)
Dept: FAMILY MEDICINE | Facility: CLINIC | Age: 73
End: 2023-11-14
Payer: MEDICARE

## 2023-11-14 VITALS
DIASTOLIC BLOOD PRESSURE: 88 MMHG | OXYGEN SATURATION: 96 % | HEART RATE: 88 BPM | WEIGHT: 168 LBS | TEMPERATURE: 99 F | BODY MASS INDEX: 31.72 KG/M2 | SYSTOLIC BLOOD PRESSURE: 136 MMHG | HEIGHT: 61 IN

## 2023-11-14 DIAGNOSIS — Z11.1 PPD SCREENING TEST: ICD-10-CM

## 2023-11-14 DIAGNOSIS — G47.00 INSOMNIA, UNSPECIFIED TYPE: ICD-10-CM

## 2023-11-14 DIAGNOSIS — Z02.2 ENCOUNTER FOR EXAMINATION FOR ADMISSION TO NURSING HOME: Primary | ICD-10-CM

## 2023-11-14 DIAGNOSIS — F32.1 MODERATE MAJOR DEPRESSION, SINGLE EPISODE: ICD-10-CM

## 2023-11-14 DIAGNOSIS — E78.2 MIXED HYPERLIPIDEMIA: ICD-10-CM

## 2023-11-14 PROCEDURE — 99214 PR OFFICE/OUTPT VISIT, EST, LEVL IV, 30-39 MIN: ICD-10-PCS | Mod: S$GLB,,, | Performed by: NURSE PRACTITIONER

## 2023-11-14 PROCEDURE — 1101F PR PT FALLS ASSESS DOC 0-1 FALLS W/OUT INJ PAST YR: ICD-10-PCS | Mod: CPTII,S$GLB,, | Performed by: NURSE PRACTITIONER

## 2023-11-14 PROCEDURE — 1101F PT FALLS ASSESS-DOCD LE1/YR: CPT | Mod: CPTII,S$GLB,, | Performed by: NURSE PRACTITIONER

## 2023-11-14 PROCEDURE — 3288F PR FALLS RISK ASSESSMENT DOCUMENTED: ICD-10-PCS | Mod: CPTII,S$GLB,, | Performed by: NURSE PRACTITIONER

## 2023-11-14 PROCEDURE — 1160F RVW MEDS BY RX/DR IN RCRD: CPT | Mod: CPTII,S$GLB,, | Performed by: NURSE PRACTITIONER

## 2023-11-14 PROCEDURE — 99214 OFFICE O/P EST MOD 30 MIN: CPT | Mod: S$GLB,,, | Performed by: NURSE PRACTITIONER

## 2023-11-14 PROCEDURE — 3044F HG A1C LEVEL LT 7.0%: CPT | Mod: CPTII,S$GLB,, | Performed by: NURSE PRACTITIONER

## 2023-11-14 PROCEDURE — 4010F PR ACE/ARB THEARPY RXD/TAKEN: ICD-10-PCS | Mod: CPTII,S$GLB,, | Performed by: NURSE PRACTITIONER

## 2023-11-14 PROCEDURE — 3288F FALL RISK ASSESSMENT DOCD: CPT | Mod: CPTII,S$GLB,, | Performed by: NURSE PRACTITIONER

## 2023-11-14 PROCEDURE — 86580 POCT TB SKIN TEST: ICD-10-PCS | Mod: S$GLB,,, | Performed by: NURSE PRACTITIONER

## 2023-11-14 PROCEDURE — 3079F PR MOST RECENT DIASTOLIC BLOOD PRESSURE 80-89 MM HG: ICD-10-PCS | Mod: CPTII,S$GLB,, | Performed by: NURSE PRACTITIONER

## 2023-11-14 PROCEDURE — 1159F MED LIST DOCD IN RCRD: CPT | Mod: CPTII,S$GLB,, | Performed by: NURSE PRACTITIONER

## 2023-11-14 PROCEDURE — 3079F DIAST BP 80-89 MM HG: CPT | Mod: CPTII,S$GLB,, | Performed by: NURSE PRACTITIONER

## 2023-11-14 PROCEDURE — 1126F PR PAIN SEVERITY QUANTIFIED, NO PAIN PRESENT: ICD-10-PCS | Mod: CPTII,S$GLB,, | Performed by: NURSE PRACTITIONER

## 2023-11-14 PROCEDURE — 1126F AMNT PAIN NOTED NONE PRSNT: CPT | Mod: CPTII,S$GLB,, | Performed by: NURSE PRACTITIONER

## 2023-11-14 PROCEDURE — 3044F PR MOST RECENT HEMOGLOBIN A1C LEVEL <7.0%: ICD-10-PCS | Mod: CPTII,S$GLB,, | Performed by: NURSE PRACTITIONER

## 2023-11-14 PROCEDURE — 1160F PR REVIEW ALL MEDS BY PRESCRIBER/CLIN PHARMACIST DOCUMENTED: ICD-10-PCS | Mod: CPTII,S$GLB,, | Performed by: NURSE PRACTITIONER

## 2023-11-14 PROCEDURE — 1159F PR MEDICATION LIST DOCUMENTED IN MEDICAL RECORD: ICD-10-PCS | Mod: CPTII,S$GLB,, | Performed by: NURSE PRACTITIONER

## 2023-11-14 PROCEDURE — 86580 TB INTRADERMAL TEST: CPT | Mod: S$GLB,,, | Performed by: NURSE PRACTITIONER

## 2023-11-14 PROCEDURE — 4010F ACE/ARB THERAPY RXD/TAKEN: CPT | Mod: CPTII,S$GLB,, | Performed by: NURSE PRACTITIONER

## 2023-11-14 PROCEDURE — 3075F PR MOST RECENT SYSTOLIC BLOOD PRESS GE 130-139MM HG: ICD-10-PCS | Mod: CPTII,S$GLB,, | Performed by: NURSE PRACTITIONER

## 2023-11-14 PROCEDURE — 3075F SYST BP GE 130 - 139MM HG: CPT | Mod: CPTII,S$GLB,, | Performed by: NURSE PRACTITIONER

## 2023-11-14 RX ORDER — SUVOREXANT 5 MG/1
5 TABLET, FILM COATED ORAL NIGHTLY PRN
Qty: 30 TABLET | Refills: 0 | Status: SHIPPED | OUTPATIENT
Start: 2023-11-14 | End: 2024-03-27 | Stop reason: SDUPTHER

## 2023-11-14 RX ORDER — SERTRALINE HYDROCHLORIDE 50 MG/1
TABLET, FILM COATED ORAL
Qty: 90 TABLET | Refills: 0 | Status: SHIPPED | OUTPATIENT
Start: 2023-11-14 | End: 2023-11-14 | Stop reason: ALTCHOICE

## 2023-11-14 RX ORDER — SERTRALINE HYDROCHLORIDE 100 MG/1
100 TABLET, FILM COATED ORAL DAILY
Qty: 90 TABLET | Refills: 1 | Status: SHIPPED | OUTPATIENT
Start: 2023-11-14 | End: 2024-03-27 | Stop reason: ALTCHOICE

## 2023-11-14 RX ORDER — ATORVASTATIN CALCIUM 40 MG/1
40 TABLET, FILM COATED ORAL NIGHTLY
Qty: 90 TABLET | Refills: 0 | Status: SHIPPED | OUTPATIENT
Start: 2023-11-14 | End: 2024-03-27

## 2023-11-14 NOTE — PROGRESS NOTES
SUBJECTIVE:      Patient ID: Genoveva Gilbert is a 73 y.o. female.    Chief Complaint: No chief complaint on file.    73-year-old female with an extensive past medical history presents to the clinic to have forms filled out. She is an established patient of Dr. Marrero. She has some memory issues and has not been taking her meds as prescribed. She plans to move into an assisted living facility at [facility].  Requesting refills of medications, vaccine records, and TB skin test.  She also reports increased depression living alone after her  passed away. Currently on Zoloft 50 mg, reports compliance, requesting dosage increase. She has been sleeping well with Belsomra, requesting refill.         Family History   Problem Relation Age of Onset    Heart disease Mother       Social History     Socioeconomic History    Marital status:    Tobacco Use    Smoking status: Former     Current packs/day: 0.00     Types: Cigarettes     Quit date: 1996     Years since quittin.4     Passive exposure: Past    Smokeless tobacco: Never   Substance and Sexual Activity    Alcohol use: Yes     Comment: very little     Drug use: No    Sexual activity: Not Currently     Partners: Male     Social Determinants of Health     Financial Resource Strain: Low Risk  (2023)    Overall Financial Resource Strain (CARDIA)     Difficulty of Paying Living Expenses: Not hard at all   Food Insecurity: No Food Insecurity (2023)    Hunger Vital Sign     Worried About Running Out of Food in the Last Year: Never true     Ran Out of Food in the Last Year: Never true   Transportation Needs: No Transportation Needs (2023)    PRAPARE - Transportation     Lack of Transportation (Medical): No     Lack of Transportation (Non-Medical): No   Physical Activity: Inactive (2023)    Exercise Vital Sign     Days of Exercise per Week: 0 days     Minutes of Exercise per Session: 0 min   Stress: No Stress Concern Present  (9/25/2023)    [employer] - Occupational Stress Questionnaire     Feeling of Stress : Only a little   Social Connections: Socially Isolated (9/25/2023)    Social Connection and Isolation Panel [NHANES]     Frequency of Communication with Friends and Family: More than three times a week     Frequency of Social Gatherings with Friends and Family: More than three times a week     Attends Shinto Services: Never     Active Member of Clubs or Organizations: No     Attends Club or Organization Meetings: Never     Marital Status:    Housing Stability: Unknown (9/25/2023)    Housing Stability Vital Sign     Unable to Pay for Housing in the Last Year: No     Unstable Housing in the Last Year: No     Current Outpatient Medications   Medication Sig Dispense Refill    amLODIPine (NORVASC) 5 MG tablet Take 1 tablet (5 mg total) by mouth once daily. For blood pressure 30 tablet 3    AREXVY, PF, 120 mcg/0.5 mL SusR vaccine       ferrous sulfate 325 (65 FE) MG EC tablet Take 1 tablet by mouth once daily.      isosorbide mononitrate (IMDUR) 60 MG 24 hr tablet Take 1 tablet (60 mg total) by mouth once daily. 90 tablet 0    levothyroxine (SYNTHROID) 88 MCG tablet Take 1 tablet (88 mcg total) by mouth once daily. 90 tablet 3    omeprazole (PRILOSEC) 20 MG capsule Take 1 capsule (20 mg total) by mouth once daily. 90 capsule 1    pramipexole (MIRAPEX) 0.5 MG tablet Take 2 tablets (1 mg total) by mouth every evening. For restless legs 180 tablet 3    torsemide (DEMADEX) 10 MG Tab TAKE 1 TABLET(10 MG) BY MOUTH EVERY DAY 90 tablet 3    atorvastatin (LIPITOR) 40 MG tablet Take 1 tablet (40 mg total) by mouth every evening. 90 tablet 0    sertraline (ZOLOFT) 100 MG tablet Take 1 tablet (100 mg total) by mouth once daily. 90 tablet 1    suvorexant (BELSOMRA) 5 mg Tab Take 5 mg by mouth nightly as needed (Insomnia). 30 tablet 0     No current facility-administered medications for this visit.     Review of  patient's allergies indicates:   Allergen Reactions    Keflex [cephalexin]      Throat swelling    Pcn [penicillins]      Throat swelling    Latex, natural rubber Other (See Comments)     Redness with bandaids     Adhesive Other (See Comments)     bandainds redness at skin    Trelegy ellipta [fluticasone-umeclidin-vilanter]      Vision disturbance      Past Medical History:   Diagnosis Date    Allergy     Anxiety     Arthritis, rheumatoid     Back pain     Chronic bronchiolitis     Coronary artery disease involving native coronary artery 9/27/2023    Depression     Fibromyalgia     GERD (gastroesophageal reflux disease)     High cholesterol     Hilar mass 03/27/2014    Hughes (hard of hearing)     aid right ear    Hughes (hard of hearing)     Hypertension     Incontinence of urine     Lymphedema     MS (multiple sclerosis)     benign; another MD stated she has no MS    Neuropathy     Personal history of colonic polyps 12/05/2019    Restless leg syndrome     Rotator cuff tear 04/16/2015    Sciatica of left side 01/05/2018    Seasonal allergies     Sinus congestion     Sleep apnea     DOES NOT USE MACHINE    Spondylolysis     Thyroid disease     Type 2 diabetes mellitus with polyneuropathy     no med. was borderline    Wheezing      Past Surgical History:   Procedure Laterality Date    APPENDECTOMY      ARTHROSCOPIC DEBRIDEMENT OF SHOULDER Right 02/18/2022    Procedure: EXTENSIVE DEBRIDEMENT, SHOULDER, ARTHROSCOPIC;  Surgeon: Rio Pitts MD;  Location: [facility] OR;  Service: Orthopedics;  Laterality: Right;    BREAST SURGERY      reduction    CLOSED REDUCTION OF INJURY OF SHOULDER Right 09/19/2021    Procedure: CLOSED REDUCTION, SHOULDER;  Surgeon: Antonio Irwin MD;  Location: [facility] OR;  Service: Orthopedics;  Laterality: Right;    COLONOSCOPY W/ POLYPECTOMY N/A 12/05/2019    repeat in 5 yrs    CORONARY ANGIOGRAPHY N/A 9/27/2023    Procedure: ANGIOGRAM, CORONARY ARTERY;  Surgeon: Rosa Webber MD;   Location: [facility] CATH/EP LAB;  Service: Cardiology;  Laterality: N/A;    EPIDURAL STEROID INJECTION INTO LUMBAR SPINE N/A 06/12/2019    Procedure: Injection-steroid-epidural-lumbar;  Surgeon: Thad Manning MD;  Location: [facility];  Service: Pain Management;  Laterality: N/A;  L5-S1    EPIDURAL STEROID INJECTION INTO LUMBAR SPINE N/A 09/16/2019    Procedure: Injection-steroid-epidural-lumbar;  Surgeon: Thad Manning MD;  Location: [facility] OR;  Service: Pain Management;  Laterality: N/A;  L5-S1    EYE SURGERY Bilateral     HYSTERECTOMY      partial - fibroids    INJECTION OF ANESTHETIC AGENT AROUND MEDIAL BRANCH NERVES INNERVATING LUMBAR FACET JOINT Bilateral 02/28/2019    Procedure: Block-nerve-medial branch-lumbar;  Surgeon: Thad Manning MD;  Location: [facility];  Service: Pain Management;  Laterality: Bilateral;  L3, 4,5     INJECTION OF ANESTHETIC AGENT AROUND MEDIAL BRANCH NERVES INNERVATING LUMBAR FACET JOINT Bilateral 03/21/2019    Procedure: Block-nerve-medial branch-lumbar L3,4,5;  Surgeon: Thad Manning MD;  Location: [facility];  Service: Pain Management;  Laterality: Bilateral;    KNEE ARTHROPLASTY Left 03/16/2021    Procedure: ARTHROPLASTY, KNEE;  Surgeon: Rio Pitts MD;  Location: [facility] OR;  Service: Orthopedics;  Laterality: Left;  GENERAL AND BLOCK    LIPOSUCTION  1985    RADIOFREQUENCY ABLATION Left 11/04/2020    Procedure: Radiofrequency Ablation left knee;  Surgeon: Andrew Escudero MD;  Location: [facility] OR;  Service: Pain Management;  Laterality: Left;    RADIOFREQUENCY ABLATION OF LUMBAR MEDIAL BRANCH NERVE AT SINGLE LEVEL Bilateral 04/12/2019    Procedure: Radiofrequency Ablation, Nerve, Spinal, Lumbar, Medial Branch, 1 Level;  Surgeon: Thad Manning MD;  Location: [facility];  Service: Pain Management;  Laterality: Bilateral;  L3, 4, 5  lumbar  Amen. pain management generator  SN [id number]  80 degrees for 75 seconds x2    RADIOFREQUENCY ABLATION OF LUMBAR MEDIAL BRANCH NERVE AT SINGLE LEVEL Bilateral  10/22/2019    Procedure: Radiofrequency Ablation, Nerve, Spinal, Lumbar, Medial Branch, L3,4,5;  Surgeon: Thad Manning MD;  Location: [facility] OR;  Service: Pain Management;  Laterality: Bilateral;  burned at 80 degrees C X 60 seconds X 2 each site    RADIOFREQUENCY ABLATION OF LUMBAR MEDIAL BRANCH NERVE AT SINGLE LEVEL Bilateral 05/27/2020    Procedure: Radiofrequency Ablation, Nerve, Spinal, Lumbar, Medial Branch, 1 Level;  Surgeon: Thad Manning MD;  Location: [facility] OR;  Service: Pain Management;  Laterality: Bilateral;  L3,4,5    REVERSE TOTAL SHOULDER ARTHROPLASTY Right 04/12/2022    Procedure: ARTHROPLASTY, SHOULDER, TOTAL, REVERSE;  Surgeon: Rio Pitts MD;  Location: [facility] OR;  Service: Orthopedics;  Laterality: Right;    SHOULDER ARTHROSCOPY Right 09/19/2021    Procedure: ARTHROSCOPY, SHOULDER;  Surgeon: Antonio Irwin MD;  Location: [facility] OR;  Service: Orthopedics;  Laterality: Right;  LIMITED DEBRIDMENT    TONSILLECTOMY      TOTAL REDUCTION MAMMOPLASTY         Review of Systems   Constitutional:  Negative for activity change, appetite change, chills, diaphoresis, fatigue, fever and unexpected weight change.   HENT:  Negative for congestion, ear pain, sinus pressure, sore throat, trouble swallowing and voice change.    Eyes:  Negative for pain, discharge and visual disturbance.   Respiratory:  Negative for cough, chest tightness, shortness of breath and wheezing.    Cardiovascular:  Negative for chest pain and palpitations.   Gastrointestinal:  Negative for abdominal pain, constipation, diarrhea, nausea and vomiting.   Genitourinary:  Negative for difficulty urinating, flank pain, frequency and urgency.   Musculoskeletal:  Negative for back pain, joint swelling and neck pain.   Skin:  Negative for color change and rash.   Neurological:  Negative for dizziness, seizures, syncope, weakness, numbness and headaches.   Hematological:  Negative for adenopathy.   Psychiatric/Behavioral:  Positive for  "confusion, dysphoric mood and sleep disturbance. The patient is not nervous/anxious.       OBJECTIVE:      Vitals:    11/14/23 1050   BP: 136/88   BP Location: Left arm   Patient Position: Sitting   BP Method: Medium (Automatic)   Pulse: 88   Temp: 98.7 °F (37.1 °C)   TempSrc: Oral   SpO2: 96%   Weight: 76.2 kg (168 lb)   Height: 5' 1" (1.549 m)     Physical Exam  Vitals and nursing note reviewed.   Constitutional:       General: She is awake. She is not in acute distress.     Appearance: Normal appearance. She is obese. She is not ill-appearing, toxic-appearing or diaphoretic.   HENT:      Head: Normocephalic and atraumatic.      Right Ear: Tympanic membrane, ear canal and external ear normal. Decreased hearing noted.      Left Ear: Tympanic membrane, ear canal and external ear normal.      Ears:      Comments: Wearing hearing aid right ear only     Nose: Nose normal.      Mouth/Throat:      Lips: Pink.      Mouth: Mucous membranes are moist.      Dentition: No dental tenderness.      Tongue: Tongue does not deviate from midline.      Pharynx: Oropharynx is clear. Uvula midline. No pharyngeal swelling, oropharyngeal exudate, posterior oropharyngeal erythema or uvula swelling.   Eyes:      General: Lids are normal. Gaze aligned appropriately.      Conjunctiva/sclera: Conjunctivae normal.      Right eye: Right conjunctiva is not injected.      Left eye: Left conjunctiva is not injected.      Pupils: Pupils are equal, round, and reactive to light.   Neck:      Thyroid: No thyroid tenderness.   Cardiovascular:      Rate and Rhythm: Normal rate and regular rhythm.      Pulses: Normal pulses.      Heart sounds: Normal heart sounds, S1 normal and S2 normal. No murmur heard.     No friction rub. No gallop.   Pulmonary:      Effort: Pulmonary effort is normal. No respiratory distress.      Breath sounds: Normal breath sounds. No stridor. No decreased breath sounds, wheezing, rhonchi or rales.   Chest:      Chest wall: No " tenderness.   Abdominal:      Palpations: Abdomen is soft.      Tenderness: There is no abdominal tenderness. There is no guarding or rebound.   Musculoskeletal:      Cervical back: Neck supple.      Right lower leg: No edema.      Left lower leg: No edema.   Lymphadenopathy:      Cervical: No cervical adenopathy.   Skin:     General: Skin is warm and dry.      Capillary Refill: Capillary refill takes less than 2 seconds.      Findings: No erythema or rash.   Neurological:      Mental Status: She is alert. Mental status is at baseline. She is disoriented.      Comments: Disoriented to time   Psychiatric:         Attention and Perception: Attention normal.         Mood and Affect: Mood is depressed. Affect is tearful.         Speech: Speech normal.         Behavior: Behavior normal. Behavior is cooperative.         Thought Content: Thought content normal.         Cognition and Memory: Memory is impaired.        Assessment:       1. Encounter for examination for admission to nursing home    2. Moderate major depression, single episode    3. Insomnia, unspecified type    4. Mixed hyperlipidemia    5. PPD screening test        Plan:       Encounter for examination for admission to nursing home  Will filled out, awaiting for PPD results before scanning into media. Her past medical history is extensive so will provide a copy of my note so all the information is available. CXR completed in Sept, no signs of TB. Immunizations required are up to date. Unable to do a COVID-10 PCR today as she is asymptotic.     Moderate major depression, single episode  Zoloft increased to 100 mg due to worsening depression.  If depression continues may benefit with the addition of Vraylar or Wellbutrin.   -     Discontinue: sertraline (ZOLOFT) 50 MG tablet; Take half tab PO daily x 1 week, then increase to 1 tab PO daily.  Dispense: 90 tablet; Refill: 0  -     sertraline (ZOLOFT) 100 MG tablet; Take 1 tablet (100 mg total) by mouth once  daily.  Dispense: 90 tablet; Refill: 1    Insomnia, unspecified type  Stable, continue Belsomra.   -     suvorexant (BELSOMRA) 5 mg Tab; Take 5 mg by mouth nightly as needed (Insomnia).  Dispense: 30 tablet; Refill: 0    Mixed hyperlipidemia  LDL not at goal on 9/26, patient was not likely taking the Lipitor 40 mg.  Med refilled.  Take as prescribed. Limit red meat, butter, fried foods, cheese, and other foods that have a lot of saturated fat. Consume more: lean meats, fish, fruits, vegetables, whole grains, beans, lentils, and nuts.    -     atorvastatin (LIPITOR) 40 MG tablet; Take 1 tablet (40 mg total) by mouth every evening.  Dispense: 90 tablet; Refill: 0    PPD screening test  -     POCT TB Skin Test    This note was created using [employer] voice recognition software that occasionally misinterprets phrases or words.     I spent a total of 30 minutes on the day of the visit.This includes face to face time and non-face to face time preparing to see the patient (eg, review of tests), obtaining and/or reviewing separately obtained history, documenting clinical information in the electronic or other health record, independently interpreting results and communicating results to the patient/family/caregiver, or care coordinator.    Follow up in about 3 months (around 2/14/2024) for insomnia with PCP.      11/14/2023 SHEY Holbrook, FNP

## 2023-11-16 ENCOUNTER — TELEPHONE (OUTPATIENT)
Dept: FAMILY MEDICINE | Facility: CLINIC | Age: 73
End: 2023-11-16

## 2023-11-16 LAB
TB INDURATION - 48 HR READ: 0 MM
TB INDURATION - 72 HR READ: 0 MM
TB SKIN TEST - 48 HR READ: NEGATIVE
TB SKIN TEST - 72 HR READ: NEGATIVE

## 2023-11-22 NOTE — TELEPHONE ENCOUNTER
Refill request torsemide (DEMADEX) 10 MG Tab     Last office visit     11/14/23  Next office visit     12/6/23  Medication pended

## 2023-11-26 RX ORDER — TORSEMIDE 10 MG/1
10 TABLET ORAL DAILY
Qty: 90 TABLET | Refills: 0 | Status: SHIPPED | OUTPATIENT
Start: 2023-11-26 | End: 2024-01-17

## 2023-12-01 ENCOUNTER — TELEPHONE (OUTPATIENT)
Dept: FAMILY MEDICINE | Facility: CLINIC | Age: 73
End: 2023-12-01

## 2023-12-01 NOTE — TELEPHONE ENCOUNTER
Talked to patient's spouse about papers to sign and he states he has all the papers needed , but will check to make sure.

## 2023-12-06 ENCOUNTER — OFFICE VISIT (OUTPATIENT)
Dept: FAMILY MEDICINE | Facility: CLINIC | Age: 73
End: 2023-12-06
Payer: MEDICARE

## 2023-12-06 VITALS
BODY MASS INDEX: 31.6 KG/M2 | SYSTOLIC BLOOD PRESSURE: 127 MMHG | OXYGEN SATURATION: 96 % | DIASTOLIC BLOOD PRESSURE: 75 MMHG | HEART RATE: 77 BPM | WEIGHT: 167.38 LBS | HEIGHT: 61 IN

## 2023-12-06 DIAGNOSIS — E03.9 ACQUIRED HYPOTHYROIDISM: ICD-10-CM

## 2023-12-06 DIAGNOSIS — F01.A3 MILD VASCULAR DEMENTIA WITH MOOD DISTURBANCE: ICD-10-CM

## 2023-12-06 DIAGNOSIS — F32.1 MODERATE MAJOR DEPRESSION, SINGLE EPISODE: Primary | ICD-10-CM

## 2023-12-06 PROCEDURE — 99214 PR OFFICE/OUTPT VISIT, EST, LEVL IV, 30-39 MIN: ICD-10-PCS | Mod: S$GLB,,,

## 2023-12-06 PROCEDURE — 99214 OFFICE O/P EST MOD 30 MIN: CPT | Mod: S$GLB,,,

## 2023-12-06 PROCEDURE — 3288F FALL RISK ASSESSMENT DOCD: CPT | Mod: CPTII,S$GLB,,

## 2023-12-06 PROCEDURE — 1101F PR PT FALLS ASSESS DOC 0-1 FALLS W/OUT INJ PAST YR: ICD-10-PCS | Mod: CPTII,S$GLB,,

## 2023-12-06 PROCEDURE — 1159F MED LIST DOCD IN RCRD: CPT | Mod: CPTII,S$GLB,,

## 2023-12-06 PROCEDURE — 3074F PR MOST RECENT SYSTOLIC BLOOD PRESSURE < 130 MM HG: ICD-10-PCS | Mod: CPTII,S$GLB,,

## 2023-12-06 PROCEDURE — 4010F PR ACE/ARB THEARPY RXD/TAKEN: ICD-10-PCS | Mod: CPTII,S$GLB,,

## 2023-12-06 PROCEDURE — 3078F PR MOST RECENT DIASTOLIC BLOOD PRESSURE < 80 MM HG: ICD-10-PCS | Mod: CPTII,S$GLB,,

## 2023-12-06 PROCEDURE — 1101F PT FALLS ASSESS-DOCD LE1/YR: CPT | Mod: CPTII,S$GLB,,

## 2023-12-06 PROCEDURE — 3288F PR FALLS RISK ASSESSMENT DOCUMENTED: ICD-10-PCS | Mod: CPTII,S$GLB,,

## 2023-12-06 PROCEDURE — 3074F SYST BP LT 130 MM HG: CPT | Mod: CPTII,S$GLB,,

## 2023-12-06 PROCEDURE — 3044F HG A1C LEVEL LT 7.0%: CPT | Mod: CPTII,S$GLB,,

## 2023-12-06 PROCEDURE — 1126F AMNT PAIN NOTED NONE PRSNT: CPT | Mod: CPTII,S$GLB,,

## 2023-12-06 PROCEDURE — 1126F PR PAIN SEVERITY QUANTIFIED, NO PAIN PRESENT: ICD-10-PCS | Mod: CPTII,S$GLB,,

## 2023-12-06 PROCEDURE — 3044F PR MOST RECENT HEMOGLOBIN A1C LEVEL <7.0%: ICD-10-PCS | Mod: CPTII,S$GLB,,

## 2023-12-06 PROCEDURE — 1159F PR MEDICATION LIST DOCUMENTED IN MEDICAL RECORD: ICD-10-PCS | Mod: CPTII,S$GLB,,

## 2023-12-06 PROCEDURE — 3078F DIAST BP <80 MM HG: CPT | Mod: CPTII,S$GLB,,

## 2023-12-06 PROCEDURE — 4010F ACE/ARB THERAPY RXD/TAKEN: CPT | Mod: CPTII,S$GLB,,

## 2023-12-06 RX ORDER — INFLUENZA A VIRUS A/VICTORIA/4897/2022 IVR-238 (H1N1) ANTIGEN (FORMALDEHYDE INACTIVATED), INFLUENZA A VIRUS A/DARWIN/9/2021 SAN-010 (H3N2) ANTIGEN (FORMALDEHYDE INACTIVATED), INFLUENZA B VIRUS B/PHUKET/3073/2013 ANTIGEN (FORMALDEHYDE INACTIVATED), AND INFLUENZA B VIRUS B/MICHIGAN/01/2021 ANTIGEN (FORMALDEHYDE INACTIVATED) 60; 60; 60; 60 UG/.7ML; UG/.7ML; UG/.7ML; UG/.7ML
INJECTION, SUSPENSION INTRAMUSCULAR
COMMUNITY
Start: 2023-09-16

## 2023-12-06 RX ORDER — MEMANTINE HYDROCHLORIDE 5 MG/1
5 TABLET ORAL 2 TIMES DAILY
Qty: 60 TABLET | Refills: 2 | Status: SHIPPED | OUTPATIENT
Start: 2023-12-06

## 2023-12-06 RX ORDER — ONDANSETRON 8 MG/1
TABLET, ORALLY DISINTEGRATING ORAL
COMMUNITY
Start: 2023-10-12

## 2023-12-06 NOTE — PROGRESS NOTES
SUBJECTIVE:      Patient ID: Genoveva Gilbert is a 73 y.o. female.    Chief Complaint: Follow-up and Depression (Med change)    Estela is a 73yr female, who is an established of Dr. Bennett. She presents today for follow up on depression. PMH:  Depression, dementia, anemia, hypothyroidism, diabetes, has a myalgia, heart failure, hyperlipidemia, CAD, hypertension, GERD, ESHA, and insomnia.  Patient is here today with her ex-.  She gives permission for staff to also speak in front and with him. She reports that she continues to be depressed, sad, and tearful at times. Denies SI, self harm, or HI. She reports she likes living at the assistive living center now. But get upset because she feels that she has lost her independency, is no longer able to drive, and is aware that she is experiencing more forgetfulness.  Ex- is one of patient's care taker and reports that in October her neurologist dx her with mild dementia and instructed the family that she is no longer safe to live alone or to drive. Family is concern that they have noticed that her dementia continues to progress quickly, where she forgets that family have come to visit her the day before.     Review chart and labs. Noted that Patient's Zoloft was increased on 11/14, by SETH Love from 50mg to 100mg qd. Grand daughter is in charge organizing patient's medications for her to take. Grand daughter was contacted during the visit and she reported that the patient was still taking Zoloft 50mg, and never increased. The new script is still at the pharmacy.     Lab reviewed from the Neurologist TSH was 7.034. Pt reports that she does not take the levothyroxine my its self in the morning on an empty stomach.     Depression  Visit Type: follow-up  Patient presents with the following symptoms: anhedonia, decreased concentration, depressed mood, excessive worry, fatigue, memory impairment and restlessness.  Patient is not experiencing: chest pain, choking  sensation, feelings of hopelessness, feelings of worthlessness, insomnia, irritability, muscle tension, nervousness/anxiety, palpitations, panic, shortness of breath, suicidal ideas, suicidal planning, thoughts of death, weight gain and weight loss.  Frequency of symptoms: most days   Severity: interfering with daily activities   Sleep quality: good  Nighttime awakenings: none  Compliance with medications:  %    Thyroid Problem  Presents for follow-up visit. Symptoms include constipation, depressed mood and fatigue. Patient reports no anxiety, diarrhea, dry skin, hair loss, hoarse voice, nail problem, palpitations, weight gain or weight loss. The symptoms have been worsening.       Review of patient's allergies indicates:   Allergen Reactions    Keflex [cephalexin]      Throat swelling    Pcn [penicillins]      Throat swelling    Latex, natural rubber Other (See Comments)     Redness with bandaids     Adhesive Other (See Comments)     bandainds redness at skin    Trelegy ellipta [fluticasone-umeclidin-vilanter]      Vision disturbance      Past Medical History:   Diagnosis Date    Allergy     Anxiety     Arthritis, rheumatoid     Back pain     Chronic bronchiolitis     Coronary artery disease involving native coronary artery 9/27/2023    Depression     Fibromyalgia     GERD (gastroesophageal reflux disease)     High cholesterol     Hilar mass 03/27/2014    [place] (hard of hearing)     aid right ear    [place] (hard of hearing)     Hypertension     Incontinence of urine     Lymphedema     MS (multiple sclerosis)     benign; another MD stated she has no MS    Neuropathy     Personal history of colonic polyps 12/05/2019    Restless leg syndrome     Rotator cuff tear 04/16/2015    Sciatica of left side 01/05/2018    Seasonal allergies     Sinus congestion     Sleep apnea     DOES NOT USE MACHINE    Spondylolysis     Thyroid disease     Type 2 diabetes mellitus with polyneuropathy     no med. was borderline    Wheezing       Past Surgical History:   Procedure Laterality Date    APPENDECTOMY      ARTHROSCOPIC DEBRIDEMENT OF SHOULDER Right 02/18/2022    Procedure: EXTENSIVE DEBRIDEMENT, SHOULDER, ARTHROSCOPIC;  Surgeon: Rio Pitts MD;  Location: [facility] OR;  Service: Orthopedics;  Laterality: Right;    BREAST SURGERY      reduction    CLOSED REDUCTION OF INJURY OF SHOULDER Right 09/19/2021    Procedure: CLOSED REDUCTION, SHOULDER;  Surgeon: Antonio Irwin MD;  Location: [facility] OR;  Service: Orthopedics;  Laterality: Right;    COLONOSCOPY W/ POLYPECTOMY N/A 12/05/2019    repeat in 5 yrs    CORONARY ANGIOGRAPHY N/A 9/27/2023    Procedure: ANGIOGRAM, CORONARY ARTERY;  Surgeon: Rosa Webber MD;  Location: [facility] CATH/EP LAB;  Service: Cardiology;  Laterality: N/A;    EPIDURAL STEROID INJECTION INTO LUMBAR SPINE N/A 06/12/2019    Procedure: Injection-steroid-epidural-lumbar;  Surgeon: Thad Manning MD;  Location: [facility];  Service: Pain Management;  Laterality: N/A;  L5-S1    EPIDURAL STEROID INJECTION INTO LUMBAR SPINE N/A 09/16/2019    Procedure: Injection-steroid-epidural-lumbar;  Surgeon: Thad Manning MD;  Location: [facility];  Service: Pain Management;  Laterality: N/A;  L5-S1    EYE SURGERY Bilateral     HYSTERECTOMY      partial - fibroids    INJECTION OF ANESTHETIC AGENT AROUND MEDIAL BRANCH NERVES INNERVATING LUMBAR FACET JOINT Bilateral 02/28/2019    Procedure: Block-nerve-medial branch-lumbar;  Surgeon: Thad Manning MD;  Location: [facility];  Service: Pain Management;  Laterality: Bilateral;  L3, 4,5     INJECTION OF ANESTHETIC AGENT AROUND MEDIAL BRANCH NERVES INNERVATING LUMBAR FACET JOINT Bilateral 03/21/2019    Procedure: Block-nerve-medial branch-lumbar L3,4,5;  Surgeon: Thad Manning MD;  Location: [facility];  Service: Pain Management;  Laterality: Bilateral;    KNEE ARTHROPLASTY Left 03/16/2021    Procedure: ARTHROPLASTY, KNEE;  Surgeon: Rio Pitts MD;  Location: [facility];  Service: Orthopedics;   Laterality: Left;  GENERAL AND BLOCK    LIPOSUCTION  1985    RADIOFREQUENCY ABLATION Left 11/04/2020    Procedure: Radiofrequency Ablation left knee;  Surgeon: Andrew Escudero MD;  Location: [facility] OR;  Service: Pain Management;  Laterality: Left;    RADIOFREQUENCY ABLATION OF LUMBAR MEDIAL BRANCH NERVE AT SINGLE LEVEL Bilateral 04/12/2019    Procedure: Radiofrequency Ablation, Nerve, Spinal, Lumbar, Medial Branch, 1 Level;  Surgeon: Thad Manning MD;  Location: [facility] OR;  Service: Pain Management;  Laterality: Bilateral;  L3, 4, 5  lumbar  REach pain management generator  SN [id number]  80 degrees for 75 seconds x2    RADIOFREQUENCY ABLATION OF LUMBAR MEDIAL BRANCH NERVE AT SINGLE LEVEL Bilateral 10/22/2019    Procedure: Radiofrequency Ablation, Nerve, Spinal, Lumbar, Medial Branch, L3,4,5;  Surgeon: Thad Manning MD;  Location: [facility] OR;  Service: Pain Management;  Laterality: Bilateral;  burned at 80 degrees C X 60 seconds X 2 each site    RADIOFREQUENCY ABLATION OF LUMBAR MEDIAL BRANCH NERVE AT SINGLE LEVEL Bilateral 05/27/2020    Procedure: Radiofrequency Ablation, Nerve, Spinal, Lumbar, Medial Branch, 1 Level;  Surgeon: Thad Manning MD;  Location: [facility] OR;  Service: Pain Management;  Laterality: Bilateral;  L3,4,5    REVERSE TOTAL SHOULDER ARTHROPLASTY Right 04/12/2022    Procedure: ARTHROPLASTY, SHOULDER, TOTAL, REVERSE;  Surgeon: Rio Pitts MD;  Location: [facility] OR;  Service: Orthopedics;  Laterality: Right;    SHOULDER ARTHROSCOPY Right 09/19/2021    Procedure: ARTHROSCOPY, SHOULDER;  Surgeon: Antonio Irwin MD;  Location: [facility] OR;  Service: Orthopedics;  Laterality: Right;  LIMITED DEBRIDMENT    TONSILLECTOMY      TOTAL REDUCTION MAMMOPLASTY       Current Outpatient Medications   Medication Sig Dispense Refill    amLODIPine (NORVASC) 5 MG tablet Take 1 tablet (5 mg total) by mouth once daily. For blood pressure 30 tablet 3    AREXVY, PF, 120 mcg/0.5 mL SusR vaccine       atorvastatin (LIPITOR) 40  MG tablet Take 1 tablet (40 mg total) by mouth every evening. 90 tablet 0    ferrous sulfate 325 (65 FE) MG EC tablet Take 1 tablet by mouth once daily.      FLUZONE HIGHDOSE QUAD 23-24  mcg/0.7 mL Syrg       isosorbide mononitrate (IMDUR) 60 MG 24 hr tablet Take 1 tablet (60 mg total) by mouth once daily. 90 tablet 0    levothyroxine (SYNTHROID) 88 MCG tablet Take 1 tablet (88 mcg total) by mouth once daily. 90 tablet 3    omeprazole (PRILOSEC) 20 MG capsule Take 1 capsule (20 mg total) by mouth once daily. 90 capsule 1    pramipexole (MIRAPEX) 0.5 MG tablet Take 2 tablets (1 mg total) by mouth every evening. For restless legs 180 tablet 3    sertraline (ZOLOFT) 100 MG tablet Take 1 tablet (100 mg total) by mouth once daily. 90 tablet 1    suvorexant (BELSOMRA) 5 mg Tab Take 5 mg by mouth nightly as needed (Insomnia). 30 tablet 0    torsemide (DEMADEX) 10 MG Tab Take 1 tablet (10 mg total) by mouth once daily. 90 tablet 0    memantine (NAMENDA) 5 MG Tab Take 1 tablet (5 mg total) by mouth 2 (two) times daily. One tablet a day for 7 days, then increase one table 2 times a day. 60 tablet 2    ondansetron (ZOFRAN-ODT) 8 MG TbDL Take by mouth.       No current facility-administered medications for this visit.     Family History   Problem Relation Age of Onset    Heart disease Mother       Social History     Socioeconomic History    Marital status:    Tobacco Use    Smoking status: Former     Current packs/day: 0.00     Types: Cigarettes     Quit date: 1996     Years since quittin.5     Passive exposure: Past    Smokeless tobacco: Never   Substance and Sexual Activity    Alcohol use: Yes     Comment: very little     Drug use: No    Sexual activity: Not Currently     Partners: Male     Social Determinants of Health     Financial Resource Strain: Low Risk  (2023)    Overall Financial Resource Strain (CARDIA)     Difficulty of Paying Living Expenses: Not hard at all   Food Insecurity: No Food  Insecurity (9/25/2023)    Hunger Vital Sign     Worried About Running Out of Food in the Last Year: Never true     Ran Out of Food in the Last Year: Never true   Transportation Needs: No Transportation Needs (9/25/2023)    PRAPARE - Transportation     Lack of Transportation (Medical): No     Lack of Transportation (Non-Medical): No   Physical Activity: Inactive (9/25/2023)    Exercise Vital Sign     Days of Exercise per Week: 0 days     Minutes of Exercise per Session: 0 min   Stress: No Stress Concern Present (9/25/2023)    [employer] [place] [employer] - Occupational Stress Questionnaire     Feeling of Stress : Only a little   Social Connections: Socially Isolated (9/25/2023)    Social Connection and Isolation Panel [NHANES]     Frequency of Communication with Friends and Family: More than three times a week     Frequency of Social Gatherings with Friends and Family: More than three times a week     Attends Taoist Services: Never     Active Member of Clubs or Organizations: No     Attends Club or Organization Meetings: Never     Marital Status:    Housing Stability: Unknown (9/25/2023)    Housing Stability Vital Sign     Unable to Pay for Housing in the Last Year: No     Unstable Housing in the Last Year: No       Review of Systems   Constitutional:  Positive for fatigue. Negative for chills, fever, irritability, unexpected weight change, weight gain and weight loss.   HENT:  Negative for nasal congestion, ear pain, hearing loss, hoarse voice, rhinorrhea, sore throat and voice change.    Eyes:  Negative for photophobia and visual disturbance.   Respiratory:  Negative for choking, chest tightness, shortness of breath and wheezing.    Cardiovascular:  Negative for chest pain, palpitations and leg swelling.   Gastrointestinal:  Positive for constipation. Negative for abdominal pain, blood in stool, diarrhea, nausea and vomiting.   Endocrine: Negative for polydipsia, polyphagia, polyuria and hair  "loss.   Genitourinary:  Negative for difficulty urinating, dysuria, frequency and hematuria.   Musculoskeletal:  Negative for arthralgias, back pain and myalgias.   Integumentary:  Negative for rash and wound.   Neurological:  Positive for memory loss (short term issues). Negative for dizziness, syncope, weakness, light-headedness, headaches and coordination difficulties.   Psychiatric/Behavioral:  Positive for decreased concentration, depression and dysphoric mood. Negative for self-injury, sleep disturbance and suicidal ideas. The patient is not nervous/anxious and does not have insomnia.       OBJECTIVE:      Vitals:    12/06/23 1310   BP: 127/75   BP Location: Right forearm   Patient Position: Sitting   BP Method: Medium (Automatic)   Pulse: 77   SpO2: 96%   Weight: 75.9 kg (167 lb 6.4 oz)   Height: 5' 1" (1.549 m)     Physical Exam  Vitals and nursing note reviewed.   Constitutional:       General: She is not in acute distress.     Appearance: She is well-developed.   HENT:      Head: Normocephalic and atraumatic.      Nose: Nose normal.      Mouth/Throat:      Pharynx: Uvula midline.   Eyes:      General: Lids are normal.      Conjunctiva/sclera: Conjunctivae normal.      Pupils: Pupils are equal, round, and reactive to light.      Right eye: Pupil is round and reactive.      Left eye: Pupil is round and reactive.   Neck:      Thyroid: No thyromegaly.      Vascular: No JVD.      Trachea: Trachea normal.   Cardiovascular:      Rate and Rhythm: Normal rate and regular rhythm.      Pulses: Normal pulses.      Heart sounds: Normal heart sounds.   Pulmonary:      Effort: Pulmonary effort is normal. No tachypnea or respiratory distress.      Breath sounds: Normal breath sounds. No wheezing or rhonchi.   Musculoskeletal:         General: Normal range of motion.      Cervical back: Normal range of motion and neck supple.      Right lower leg: No edema.      Left lower leg: No edema.   Lymphadenopathy:      Cervical: " No cervical adenopathy.   Skin:     General: Skin is warm and dry.      Findings: No rash.   Neurological:      Mental Status: She is alert and oriented to person, place, and time.   Psychiatric:         Mood and Affect: Mood is depressed. Affect is tearful.         Speech: Speech normal.         Behavior: Behavior normal. Behavior is cooperative.         Thought Content: Thought content normal. Thought content does not include homicidal or suicidal ideation. Thought content does not include homicidal or suicidal plan.         Cognition and Memory: She exhibits impaired recent memory.        Assessment:       1. Moderate major depression, single episode    2. Mild vascular dementia with mood disturbance    3. Acquired hypothyroidism        Plan:       Moderate major depression, single episode  Please take Zoloft as prescribed, Zoloft 100 mg once a day.  If experiencing suicidal ideation, self-harm, or homicidal ideation please call 911 or go directly to the ER.  Patient will be started on daily antidepressant and was informed that symptom relief may take 2-4 weeks. Patient was encouraged to avoid abrupt cessation of medication and was educated on the potential medication side effects.   An adjunctive treatment includes:  -Obtain 7-8hr of sleep at night (avoid caffeine and watching TV late at night)  -Participate in activities to enhance interpersonal relationship and hobbies   -Consider seeking counseling- will refer and provide resources when patient desires patient defers at this time  -Will follow-up 6 weeks.    Advised patient to go to ER if having Suicidal or Homicidal Ideation.     Mild vascular dementia with mood disturbance  Discussed with patient how to take new medication.  Please take Namenda 5 mg once a day x7 days, then increase to Namenda 5 mg twice a day.  Discuss the medication with patient and family member both voiced understanding.  Discuss the possibility of increasing at a later date, if patient  tolerates well.   -     memantine (NAMENDA) 5 MG Tab; Take 1 tablet (5 mg total) by mouth 2 (two) times daily. One tablet a day for 7 days, then increase one table 2 times a day.  Dispense: 60 tablet; Refill: 2    Acquired hypothyroidism  TSH was 7.034 on October 31st.  Patient has been taking levothyroxine same in combination with her other medication.  Discussed with patient and family member that medication should be taken by itself 1st thing in the morning 60 minutes of or taking other medications or consuming food.  Patient is currently on a PPI which could be preventing a portion of the medication.  Discussed with patient the appropriate way to take medication and that we will reassess a TSH level in 6 weeks.  If she is still subtherapeutic at that time then we will increase medication.  Both family and patient voiced understanding.  -     TSH; Future; Expected date: 01/03/2024        Follow up in about 6 weeks (around 1/17/2024) for depression, thyroid.      12/6/2023 SHEY Allen, FNP    This note was created using [employer] voice recognition software that occasionally misinterprets phrases or words.

## 2023-12-06 NOTE — PATIENT INSTRUCTIONS
Levothyroxine should be taken by its with water first thing in the morning, then wait 1 hr before eating or taking other medications.  Food and the Prilosec prevents absorption of the levothyroxine. We will recheck TSH in 6 weeks, once taking medications correctly. Get labs before visit      Namenda: Take one table once a day x 7 days, then increase to one tablet twice a day.     Please make to start taking the Zoloft 100 mg that was started on 11/14/23. May double the 50 mg that have at home, then use the 100 mg tablets.

## 2024-01-16 ENCOUNTER — TELEPHONE (OUTPATIENT)
Dept: FAMILY MEDICINE | Facility: CLINIC | Age: 74
End: 2024-01-16
Payer: MEDICARE

## 2024-01-16 DIAGNOSIS — R60.9 EDEMA, UNSPECIFIED TYPE: ICD-10-CM

## 2024-01-16 NOTE — TELEPHONE ENCOUNTER
----- Message from Clarissa Ren sent at 1/16/2024 12:32 PM CST -----  Contact: [phone]/ [place]  Type:  Patient Requesting Referral    Who Called:    Does the patient already have the specialty appointment scheduled?:  no  If yes, what is the date of that appointment?:  no  Referral to What Specialty:  Mammo  Reason for Referral:  f/u with concerns  Does the patient want the referral with a specific physician?:  no  [facility] the [facility]  Patient Requesting a Call Back?:  yes  Best Call Back Number:  [phone]/ [place]  Additional Information:   Pt would like to reschedule pt mammo f/u appt that was a no show on 12/13 due to no appt reminder. Please send referral to reschedule and a reminder to follow.

## 2024-01-17 RX ORDER — TORSEMIDE 10 MG/1
10 TABLET ORAL
Qty: 90 TABLET | Refills: 0 | Status: SHIPPED | OUTPATIENT
Start: 2024-01-17 | End: 2024-03-27 | Stop reason: SDUPTHER

## 2024-01-17 RX ORDER — ISOSORBIDE MONONITRATE 60 MG/1
TABLET, EXTENDED RELEASE ORAL
Qty: 90 TABLET | Refills: 0 | Status: SHIPPED | OUTPATIENT
Start: 2024-01-17 | End: 2024-03-27 | Stop reason: SDUPTHER

## 2024-01-22 ENCOUNTER — PATIENT MESSAGE (OUTPATIENT)
Dept: PSYCHIATRY | Facility: CLINIC | Age: 74
End: 2024-01-22
Payer: MEDICARE

## 2024-01-23 DIAGNOSIS — I10 ESSENTIAL HYPERTENSION: ICD-10-CM

## 2024-01-26 ENCOUNTER — TELEPHONE (OUTPATIENT)
Dept: FAMILY MEDICINE | Facility: CLINIC | Age: 74
End: 2024-01-26
Payer: MEDICARE

## 2024-01-26 RX ORDER — AMLODIPINE BESYLATE 5 MG/1
TABLET ORAL
Qty: 30 TABLET | Refills: 0 | Status: SHIPPED | OUTPATIENT
Start: 2024-01-26 | End: 2024-03-27 | Stop reason: SDUPTHER

## 2024-01-26 NOTE — TELEPHONE ENCOUNTER
LOV 12-6-23  NOV 1-30-24      ----- Message from Keturah Riddle sent at 1/25/2024  3:38 PM CST -----  Regarding: suvorexant (BELSOMRA) 5 mg Tab  Patient called requesting a dosage increase for suvorexant (BELSOMRA) 5 mg Tab. States she has not slept in three nights. Please contact patient to advise or for any questions.     Thank You

## 2024-01-29 ENCOUNTER — LAB VISIT (OUTPATIENT)
Dept: LAB | Facility: HOSPITAL | Age: 74
End: 2024-01-29
Payer: MEDICARE

## 2024-01-29 ENCOUNTER — TELEPHONE (OUTPATIENT)
Dept: FAMILY MEDICINE | Facility: CLINIC | Age: 74
End: 2024-01-29
Payer: MEDICARE

## 2024-01-29 DIAGNOSIS — E03.9 ACQUIRED HYPOTHYROIDISM: ICD-10-CM

## 2024-01-29 LAB
T4 FREE SERPL-MCNC: 0.85 NG/DL (ref 0.71–1.51)
TSH SERPL DL<=0.005 MIU/L-ACNC: 23.19 UIU/ML (ref 0.34–5.6)

## 2024-01-29 PROCEDURE — 36415 COLL VENOUS BLD VENIPUNCTURE: CPT

## 2024-01-29 PROCEDURE — 84439 ASSAY OF FREE THYROXINE: CPT

## 2024-01-29 PROCEDURE — 84443 ASSAY THYROID STIM HORMONE: CPT

## 2024-01-29 NOTE — TELEPHONE ENCOUNTER
Generally evaluation is needed for increase in dose, especially for controlled substance. Furthermore, based on chart review this was prescribed at beginning of December so given reported changes are from past few days, she warrants clinical evaluation. Pt does have appt with DANG Arreola on 1/30/24.

## 2024-01-30 ENCOUNTER — OFFICE VISIT (OUTPATIENT)
Dept: FAMILY MEDICINE | Facility: CLINIC | Age: 74
End: 2024-01-30
Payer: MEDICARE

## 2024-01-30 VITALS
SYSTOLIC BLOOD PRESSURE: 130 MMHG | BODY MASS INDEX: 32.66 KG/M2 | HEIGHT: 61 IN | HEART RATE: 65 BPM | DIASTOLIC BLOOD PRESSURE: 71 MMHG | OXYGEN SATURATION: 95 % | WEIGHT: 173 LBS

## 2024-01-30 DIAGNOSIS — E03.9 ACQUIRED HYPOTHYROIDISM: ICD-10-CM

## 2024-01-30 DIAGNOSIS — I10 PRIMARY HYPERTENSION: ICD-10-CM

## 2024-01-30 DIAGNOSIS — R73.03 PREDIABETES: ICD-10-CM

## 2024-01-30 DIAGNOSIS — F32.1 MODERATE MAJOR DEPRESSION, SINGLE EPISODE: ICD-10-CM

## 2024-01-30 DIAGNOSIS — E78.2 MIXED HYPERLIPIDEMIA: ICD-10-CM

## 2024-01-30 DIAGNOSIS — E03.9 ACQUIRED HYPOTHYROIDISM: Primary | ICD-10-CM

## 2024-01-30 DIAGNOSIS — F03.A3 MILD DEMENTIA WITH MOOD DISTURBANCE, UNSPECIFIED DEMENTIA TYPE: ICD-10-CM

## 2024-01-30 PROBLEM — E11.65 TYPE 2 DIABETES MELLITUS WITH HYPERGLYCEMIA, WITHOUT LONG-TERM CURRENT USE OF INSULIN: Status: RESOLVED | Noted: 2020-06-02 | Resolved: 2024-01-30

## 2024-01-30 PROBLEM — F03.90 DEMENTIA: Status: ACTIVE | Noted: 2024-01-30

## 2024-01-30 PROBLEM — E11.9 DIET-CONTROLLED TYPE 2 DIABETES MELLITUS: Status: RESOLVED | Noted: 2021-03-16 | Resolved: 2024-01-30

## 2024-01-30 PROCEDURE — 99999 PR PBB SHADOW E&M-EST. PATIENT-LVL IV: CPT | Mod: PBBFAC,HCNC,,

## 2024-01-30 PROCEDURE — 3288F FALL RISK ASSESSMENT DOCD: CPT | Mod: HCNC,CPTII,S$GLB,

## 2024-01-30 PROCEDURE — 1101F PT FALLS ASSESS-DOCD LE1/YR: CPT | Mod: HCNC,CPTII,S$GLB,

## 2024-01-30 PROCEDURE — 99214 OFFICE O/P EST MOD 30 MIN: CPT | Mod: HCNC,S$GLB,,

## 2024-01-30 PROCEDURE — 1126F AMNT PAIN NOTED NONE PRSNT: CPT | Mod: HCNC,CPTII,S$GLB,

## 2024-01-30 PROCEDURE — 3078F DIAST BP <80 MM HG: CPT | Mod: HCNC,CPTII,S$GLB,

## 2024-01-30 PROCEDURE — 3075F SYST BP GE 130 - 139MM HG: CPT | Mod: HCNC,CPTII,S$GLB,

## 2024-01-30 PROCEDURE — 1159F MED LIST DOCD IN RCRD: CPT | Mod: HCNC,CPTII,S$GLB,

## 2024-01-30 RX ORDER — LEVOTHYROXINE SODIUM 112 UG/1
112 TABLET ORAL
Qty: 90 TABLET | Refills: 0 | Status: SHIPPED | OUTPATIENT
Start: 2024-01-30 | End: 2024-03-28 | Stop reason: SDUPTHER

## 2024-01-30 RX ORDER — LEVOTHYROXINE SODIUM 112 UG/1
112 TABLET ORAL
Qty: 60 TABLET | Refills: 0 | Status: SHIPPED | OUTPATIENT
Start: 2024-01-30 | End: 2024-01-30

## 2024-01-30 NOTE — PROGRESS NOTES
SUBJECTIVE:      Patient ID: Genoveva Gilbert is a 73 y.o. female.    Chief Complaint: lab review    Estela is a 73yr female, who was an established of Dr. Bennett.  She presents today for follow up on hypothyroidism, labs, and depression. PMH:  Depression, dementia, anemia, hypothyroidism, diabetes, has a myalgia, heart failure, hyperlipidemia, CAD, hypertension, GERD, ESHA, and insomnia.      Hypothyroidism:  Last visit patient was noted with a TSH level of 7, which had previously been 31.  She was taking levothyroxine 88 mcg daily.  Through discussion patient was reported taking medication with her other medications including her PPI.  Patient and family member educated on proper way to take levothyroxine.  Current TSH however increased to 23 despite proper medication administration.  Patient reports fatigue.  Discuss increasing dosage of medication and re-evaluating labs.     Depression:  Patient currently taking Zoloft 100 mg daily, tolerating well without side effects.  Last office visit it was noted that she had not increase that and was taking the Zoloft 50 mg.  She was having difficulty with adjusting with the moving into an assisted living center.  Today she reports that she is feeling great.  She is now participating in activities at the facility, including being go and socializing frequently with other residents.  Denies depressed mood, restlessness, irritability, hopeless, suicidal ideation, homicidal ideation, or self-harm.  Family reports that they do notice that she is adjusting well and seems to be very happy with her new living situation.     Dementia:  Patient was started on Namenda 5 mg b.i.d., she is tolerating well.  Family and patient report they can see a difference and she is experiencing a decrease in forgetfulness.  She is also adjusting well to her new living environment.  No reports of tearfulness.     Patient is overdue for follow-up for hyperlipidemia, prediabetes, and hypertension.   Discuss with patient about doing a follow up and completing labs for next visit.     Depression  Visit Type: follow-up  Patient presents with the following symptoms: fatigue and memory impairment.  Patient is not experiencing: anhedonia, chest pain, choking sensation, decreased concentration, depressed mood, excessive worry, feelings of hopelessness, feelings of worthlessness, insomnia, irritability, muscle tension, nervousness/anxiety, palpitations, panic, restlessness, shortness of breath, suicidal ideas, suicidal planning, thoughts of death, weight gain and weight loss.  Frequency of symptoms: most days   Severity: interfering with daily activities   Sleep quality: good  Nighttime awakenings: none  Compliance with medications:  %    Thyroid Problem  Presents for follow-up visit. Symptoms include constipation and fatigue. Patient reports no anxiety, depressed mood, diarrhea, dry skin, hair loss, hoarse voice, nail problem, palpitations, weight gain or weight loss. The symptoms have been worsening.       Review of patient's allergies indicates:   Allergen Reactions    Keflex [cephalexin]      Throat swelling    Pcn [penicillins]      Throat swelling    Latex, natural rubber Other (See Comments)     Redness with bandaids     Adhesive Other (See Comments)     bandainds redness at skin    Trelegy ellipta [fluticasone-umeclidin-vilanter]      Vision disturbance      Past Medical History:   Diagnosis Date    Allergy     Anxiety     Arthritis, rheumatoid     Back pain     Chronic bronchiolitis     Coronary artery disease involving native coronary artery 9/27/2023    Depression     Fibromyalgia     GERD (gastroesophageal reflux disease)     High cholesterol     Hilar mass 03/27/2014    [place] (hard of hearing)     aid right ear    [place] (hard of hearing)     Hypertension     Incontinence of urine     Lymphedema     MS (multiple sclerosis)     benign; another MD stated she has no MS    Neuropathy     Personal history of  colonic polyps 12/05/2019    Restless leg syndrome     Rotator cuff tear 04/16/2015    Sciatica of left side 01/05/2018    Seasonal allergies     Sinus congestion     Sleep apnea     DOES NOT USE MACHINE    Spondylolysis     Thyroid disease     Type 2 diabetes mellitus with polyneuropathy     no med. was borderline    Wheezing      Past Surgical History:   Procedure Laterality Date    APPENDECTOMY      ARTHROSCOPIC DEBRIDEMENT OF SHOULDER Right 02/18/2022    Procedure: EXTENSIVE DEBRIDEMENT, SHOULDER, ARTHROSCOPIC;  Surgeon: Rio Pitts MD;  Location: [facility] OR;  Service: Orthopedics;  Laterality: Right;    BREAST SURGERY      reduction    CLOSED REDUCTION OF INJURY OF SHOULDER Right 09/19/2021    Procedure: CLOSED REDUCTION, SHOULDER;  Surgeon: Antonio Irwin MD;  Location: [facility] OR;  Service: Orthopedics;  Laterality: Right;    COLONOSCOPY W/ POLYPECTOMY N/A 12/05/2019    repeat in 5 yrs    CORONARY ANGIOGRAPHY N/A 9/27/2023    Procedure: ANGIOGRAM, CORONARY ARTERY;  Surgeon: Rosa Webber MD;  Location: [facility] CATH/EP LAB;  Service: Cardiology;  Laterality: N/A;    EPIDURAL STEROID INJECTION INTO LUMBAR SPINE N/A 06/12/2019    Procedure: Injection-steroid-epidural-lumbar;  Surgeon: Thad Manning MD;  Location: [facility] OR;  Service: Pain Management;  Laterality: N/A;  L5-S1    EPIDURAL STEROID INJECTION INTO LUMBAR SPINE N/A 09/16/2019    Procedure: Injection-steroid-epidural-lumbar;  Surgeon: Thad Manning MD;  [facility];  Service: Pain Management;  Laterality: N/A;  L5-S1    EYE SURGERY Bilateral     HYSTERECTOMY      partial - fibroids    INJECTION OF ANESTHETIC AGENT AROUND MEDIAL BRANCH NERVES INNERVATING LUMBAR FACET JOINT Bilateral 02/28/2019    Procedure: Block-nerve-medial branch-lumbar;  Surgeon: Thad Manning MD;  [facility];  Service: Pain Management;  Laterality: Bilateral;  L3, 4,5     INJECTION OF ANESTHETIC AGENT AROUND MEDIAL BRANCH NERVES INNERVATING LUMBAR FACET JOINT  Bilateral 03/21/2019    Procedure: Block-nerve-medial branch-lumbar L3,4,5;  Surgeon: Thad Manning MD;  Location: [facility] OR;  Service: Pain Management;  Laterality: Bilateral;    KNEE ARTHROPLASTY Left 03/16/2021    Procedure: ARTHROPLASTY, KNEE;  Surgeon: Rio Pitts MD;  Location: [facility] OR;  Service: Orthopedics;  Laterality: Left;  GENERAL AND BLOCK    LIPOSUCTION  1985    RADIOFREQUENCY ABLATION Left 11/04/2020    Procedure: Radiofrequency Ablation left knee;  Surgeon: Andrew Escudero MD;  Location: [facility] OR;  Service: Pain Management;  Laterality: Left;    RADIOFREQUENCY ABLATION OF LUMBAR MEDIAL BRANCH NERVE AT SINGLE LEVEL Bilateral 04/12/2019    Procedure: Radiofrequency Ablation, Nerve, Spinal, Lumbar, Medial Branch, 1 Level;  Surgeon: Thad Manning MD;  Location: [facility] OR;  Service: Pain Management;  Laterality: Bilateral;  L3, 4, 5  lumbar  i-Neumatic[employer] pain management generator  SN [id number]  80 degrees for 75 seconds x2    RADIOFREQUENCY ABLATION OF LUMBAR MEDIAL BRANCH NERVE AT SINGLE LEVEL Bilateral 10/22/2019    Procedure: Radiofrequency Ablation, Nerve, Spinal, Lumbar, Medial Branch, L3,4,5;  Surgeon: Thad Manning MD;  Location: [facility] OR;  Service: Pain Management;  Laterality: Bilateral;  burned at 80 degrees C X 60 seconds X 2 each site    RADIOFREQUENCY ABLATION OF LUMBAR MEDIAL BRANCH NERVE AT SINGLE LEVEL Bilateral 05/27/2020    Procedure: Radiofrequency Ablation, Nerve, Spinal, Lumbar, Medial Branch, 1 Level;  Surgeon: Thad Manning MD;  Location: [facility] OR;  Service: Pain Management;  Laterality: Bilateral;  L3,4,5    REVERSE TOTAL SHOULDER ARTHROPLASTY Right 04/12/2022    Procedure: ARTHROPLASTY, SHOULDER, TOTAL, REVERSE;  Surgeon: Rio Pitts MD;  Location: [facility] OR;  Service: Orthopedics;  Laterality: Right;    SHOULDER ARTHROSCOPY Right 09/19/2021    Procedure: ARTHROSCOPY, SHOULDER;  Surgeon: Antonio Irwin MD;  Location: [facility] OR;  Service: Orthopedics;  Laterality: Right;   LIMITED DEBRIDMENT    TONSILLECTOMY      TOTAL REDUCTION MAMMOPLASTY       Current Outpatient Medications   Medication Sig Dispense Refill    amLODIPine (NORVASC) 5 MG tablet TAKE 1 TABLET(5 MG) BY MOUTH EVERY DAY FOR BLOOD PRESSURE 30 tablet 0    AREXVY, PF, 120 mcg/0.5 mL SusR vaccine       atorvastatin (LIPITOR) 40 MG tablet Take 1 tablet (40 mg total) by mouth every evening. 90 tablet 0    ferrous sulfate 325 (65 FE) MG EC tablet Take 1 tablet by mouth once daily.      FLUZONE HIGHDOSE QUAD 23-24  mcg/0.7 mL Syrg       isosorbide mononitrate (IMDUR) 60 MG 24 hr tablet TAKE 1 TABLET(60 MG) BY MOUTH ONCE DAILY. 90 tablet 0    memantine (NAMENDA) 5 MG Tab Take 1 tablet (5 mg total) by mouth 2 (two) times daily. One tablet a day for 7 days, then increase one table 2 times a day. 60 tablet 2    omeprazole (PRILOSEC) 20 MG capsule Take 1 capsule (20 mg total) by mouth once daily. 90 capsule 1    ondansetron (ZOFRAN-ODT) 8 MG TbDL Take by mouth.      pramipexole (MIRAPEX) 0.5 MG tablet Take 2 tablets (1 mg total) by mouth every evening. For restless legs 180 tablet 3    sertraline (ZOLOFT) 100 MG tablet Take 1 tablet (100 mg total) by mouth once daily. 90 tablet 1    suvorexant (BELSOMRA) 5 mg Tab Take 5 mg by mouth nightly as needed (Insomnia). 30 tablet 0    torsemide (DEMADEX) 10 MG Tab TAKE 1 TABLET(10 MG) BY MOUTH EVERY DAY 90 tablet 0    levothyroxine (SYNTHROID) 112 MCG tablet TAKE 1 TABLET(112 MCG) BY MOUTH BEFORE BREAKFAST 90 tablet 0     No current facility-administered medications for this visit.     Family History   Problem Relation Age of Onset    Heart disease Mother       Social History     Socioeconomic History    Marital status:    Tobacco Use    Smoking status: Former     Current packs/day: 0.00     Types: Cigarettes     Quit date: 1996     Years since quittin.6     Passive exposure: Past    Smokeless tobacco: Never   Substance and Sexual Activity    Alcohol use: Yes     Comment:  very little     Drug use: No    Sexual activity: Not Currently     Partners: Male     Social Determinants of Health     Financial Resource Strain: Low Risk  (9/25/2023)    Overall Financial Resource Strain (CARDIA)     Difficulty of Paying Living Expenses: Not hard at all   Food Insecurity: No Food Insecurity (9/25/2023)    Hunger Vital Sign     Worried About Running Out of Food in the Last Year: Never true     Ran Out of Food in the Last Year: Never true   Transportation Needs: No Transportation Needs (9/25/2023)    PRAPARE - Transportation     Lack of Transportation (Medical): No     Lack of Transportation (Non-Medical): No   Physical Activity: Inactive (9/25/2023)    Exercise Vital Sign     Days of Exercise per Week: 0 days     Minutes of Exercise per Session: 0 min   Stress: No Stress Concern Present (9/25/2023)    [employer] - Occupational Stress Questionnaire     Feeling of Stress : Only a little   Social Connections: Socially Isolated (9/25/2023)    Social Connection and Isolation Panel [NHANES]     Frequency of Communication with Friends and Family: More than three times a week     Frequency of Social Gatherings with Friends and Family: More than three times a week     Attends Confucianist Services: Never     Active Member of Clubs or Organizations: No     Attends Club or Organization Meetings: Never     Marital Status:    Housing Stability: Unknown (9/25/2023)    Housing Stability Vital Sign     Unable to Pay for Housing in the Last Year: No     Unstable Housing in the Last Year: No       Review of Systems   Constitutional:  Positive for fatigue. Negative for chills, fever, irritability, unexpected weight change, weight gain and weight loss.   HENT:  Negative for nasal congestion, ear pain, hearing loss, hoarse voice, rhinorrhea, sore throat and voice change.    Eyes:  Negative for photophobia and visual disturbance.   Respiratory:  Negative for choking, chest tightness,  "shortness of breath and wheezing.    Cardiovascular:  Negative for chest pain, palpitations and leg swelling.   Gastrointestinal:  Positive for constipation. Negative for abdominal pain, blood in stool, diarrhea, nausea and vomiting.   Endocrine: Negative for polydipsia, polyphagia, polyuria and hair loss.   Genitourinary:  Negative for difficulty urinating, dysuria, frequency and hematuria.   Musculoskeletal:  Negative for arthralgias, back pain and myalgias.   Integumentary:  Negative for rash and wound.   Neurological:  Negative for dizziness, syncope, weakness, light-headedness and headaches.   Psychiatric/Behavioral:  Negative for decreased concentration, dysphoric mood, self-injury, sleep disturbance and suicidal ideas. The patient is not nervous/anxious and does not have insomnia.       OBJECTIVE:      Vitals:    01/30/24 1312   BP: 130/71   BP Location: Right arm   Patient Position: Sitting   BP Method: Large (Automatic)   Pulse: 65   SpO2: 95%   Weight: 78.5 kg (173 lb)   Height: 5' 1" (1.549 m)     Physical Exam  Vitals and nursing note reviewed.   Constitutional:       General: She is not in acute distress.     Appearance: She is well-developed.   HENT:      Head: Normocephalic and atraumatic.      Nose: Nose normal.      Mouth/Throat:      Pharynx: Uvula midline.   Eyes:      General: Lids are normal.      Conjunctiva/sclera: Conjunctivae normal.      Pupils: Pupils are equal, round, and reactive to light.      Right eye: Pupil is round and reactive.      Left eye: Pupil is round and reactive.   Neck:      Thyroid: No thyromegaly.      Vascular: No JVD.      Trachea: Trachea normal.   Cardiovascular:      Rate and Rhythm: Normal rate and regular rhythm.      Pulses: Normal pulses.      Heart sounds: Normal heart sounds.   Pulmonary:      Effort: Pulmonary effort is normal. No tachypnea or respiratory distress.      Breath sounds: Normal breath sounds. No wheezing or rhonchi.   Musculoskeletal:         " General: Normal range of motion.      Cervical back: Normal range of motion and neck supple.      Right lower leg: No edema.      Left lower leg: No edema.   Lymphadenopathy:      Cervical: No cervical adenopathy.   Skin:     General: Skin is warm and dry.      Findings: No rash.   Neurological:      Mental Status: She is alert and oriented to person, place, and time.   Psychiatric:         Mood and Affect: Mood is depressed. Affect is tearful.         Speech: Speech normal.         Behavior: Behavior normal. Behavior is cooperative.         Thought Content: Thought content normal. Thought content does not include homicidal or suicidal ideation. Thought content does not include homicidal or suicidal plan.         Cognition and Memory: She exhibits impaired recent memory.        Assessment:       1. Acquired hypothyroidism    2. Moderate major depression, single episode    3. Mild dementia with mood disturbance, unspecified dementia type    4. Prediabetes    5. Mixed hyperlipidemia    6. Primary hypertension        Plan:       Acquired hypothyroidism  Uncontrolled.  Change levothyroxine dose from 88 mcg to 112 mcg daily on an empty stomach before breakfast.  Discuss proper medication administration.  We will recheck levels TSH in 6-8 weeks, if controlled  Then we will repeat in 6 months  -   levothyroxine (SYNTHROID) 112 MCG tablet; Take 1 tablet (112 mcg total) by mouth before breakfast.  Dispense: 60 tablet; Refill: 0  -     TSH; Future; Expected date: 03/05/2024    Moderate major depression, single episode  Controlled.  Continue taking Zoloft 100 mg daily.  Discuss with patient to continue getting proper rest and participating in daily activities at the assisted living facility.  She is doing much better.     Mild dementia with mood disturbance, unspecified dementia type  Continue taking the Namenda 5 mg twice a day, family and patient report that medication has been effective.  Decrease in tearfulness and  forgetfulness.     Prediabetes  -     CBC Auto Differential; Future; Expected date: 01/30/2024  -     HEMOGLOBIN A1C; Future; Expected date: 01/30/2024    Mixed hyperlipidemia  -     Lipid Panel; Future; Expected date: 01/30/2024    Primary hypertension  -     Comprehensive Metabolic Panel; Future; Expected date: 01/30/2024    -patient is overdue for labs for prediabetes, hyperlipidemia, and hypertension.  We will follow up on these diagnosis is and labs at next visit.  Instructed patient to continue current medication regimen for her hypertension and hyperlipidemia.  No refills on those medications are necessary at this time, they were recently filled by Dr. Bennett and Dr. Diaz.     Follow up for HTN, HLD, TSH.      1/30/2024 SHEY Allen, FNP    This note was created using [employer] voice recognition software that occasionally misinterprets phrases or words.

## 2024-02-15 RX ORDER — OMEPRAZOLE 20 MG/1
20 CAPSULE, DELAYED RELEASE ORAL
Qty: 90 CAPSULE | Refills: 1 | Status: SHIPPED | OUTPATIENT
Start: 2024-02-15

## 2024-02-21 ENCOUNTER — HOSPITAL ENCOUNTER (OUTPATIENT)
Dept: RADIOLOGY | Facility: HOSPITAL | Age: 74
Discharge: HOME | End: 2024-02-21
Attending: PHYSICIAN ASSISTANT
Payer: MEDICARE

## 2024-02-21 DIAGNOSIS — R92.8 ABNORMAL MAMMOGRAPHY: ICD-10-CM

## 2024-02-21 PROCEDURE — 76642 ULTRASOUND BREAST LIMITED: CPT | Mod: TC,PO,LT

## 2024-02-21 PROCEDURE — 77062 BREAST TOMOSYNTHESIS BI: CPT | Mod: TC,PO

## 2024-02-27 DIAGNOSIS — Z00.00 ENCOUNTER FOR MEDICARE ANNUAL WELLNESS EXAM: ICD-10-CM

## 2024-03-27 ENCOUNTER — OFFICE VISIT (OUTPATIENT)
Dept: FAMILY MEDICINE | Facility: CLINIC | Age: 74
End: 2024-03-27
Payer: MEDICARE

## 2024-03-27 VITALS
HEART RATE: 69 BPM | HEIGHT: 61 IN | SYSTOLIC BLOOD PRESSURE: 134 MMHG | DIASTOLIC BLOOD PRESSURE: 83 MMHG | BODY MASS INDEX: 32.15 KG/M2 | OXYGEN SATURATION: 96 % | TEMPERATURE: 99 F | WEIGHT: 170.31 LBS

## 2024-03-27 DIAGNOSIS — E03.9 ACQUIRED HYPOTHYROIDISM: Primary | ICD-10-CM

## 2024-03-27 DIAGNOSIS — R60.9 EDEMA, UNSPECIFIED TYPE: ICD-10-CM

## 2024-03-27 DIAGNOSIS — F32.1 MODERATE MAJOR DEPRESSION, SINGLE EPISODE: ICD-10-CM

## 2024-03-27 DIAGNOSIS — I10 ESSENTIAL HYPERTENSION: ICD-10-CM

## 2024-03-27 DIAGNOSIS — G47.00 INSOMNIA, UNSPECIFIED TYPE: ICD-10-CM

## 2024-03-27 PROCEDURE — 3079F DIAST BP 80-89 MM HG: CPT | Mod: HCNC,CPTII,S$GLB,

## 2024-03-27 PROCEDURE — 99215 OFFICE O/P EST HI 40 MIN: CPT | Mod: HCNC,S$GLB,,

## 2024-03-27 PROCEDURE — 3075F SYST BP GE 130 - 139MM HG: CPT | Mod: HCNC,CPTII,S$GLB,

## 2024-03-27 PROCEDURE — 99999 PR PBB SHADOW E&M-EST. PATIENT-LVL IV: CPT | Mod: PBBFAC,HCNC,,

## 2024-03-27 PROCEDURE — 3288F FALL RISK ASSESSMENT DOCD: CPT | Mod: HCNC,CPTII,S$GLB,

## 2024-03-27 PROCEDURE — 1159F MED LIST DOCD IN RCRD: CPT | Mod: HCNC,CPTII,S$GLB,

## 2024-03-27 PROCEDURE — 1101F PT FALLS ASSESS-DOCD LE1/YR: CPT | Mod: HCNC,CPTII,S$GLB,

## 2024-03-27 PROCEDURE — 1125F AMNT PAIN NOTED PAIN PRSNT: CPT | Mod: HCNC,CPTII,S$GLB,

## 2024-03-27 RX ORDER — TORSEMIDE 10 MG/1
10 TABLET ORAL DAILY
Qty: 90 TABLET | Refills: 0 | Status: SHIPPED | OUTPATIENT
Start: 2024-03-27

## 2024-03-27 RX ORDER — AMLODIPINE BESYLATE 5 MG/1
TABLET ORAL
Qty: 90 TABLET | Refills: 1 | Status: SHIPPED | OUTPATIENT
Start: 2024-03-27

## 2024-03-27 RX ORDER — ESCITALOPRAM OXALATE 10 MG/1
10 TABLET ORAL DAILY
Qty: 30 TABLET | Refills: 0 | Status: SHIPPED | OUTPATIENT
Start: 2024-03-27 | End: 2024-04-30

## 2024-03-27 RX ORDER — ISOSORBIDE MONONITRATE 60 MG/1
TABLET, EXTENDED RELEASE ORAL
Qty: 90 TABLET | Refills: 1 | Status: SHIPPED | OUTPATIENT
Start: 2024-03-27 | End: 2024-05-27 | Stop reason: SDUPTHER

## 2024-03-27 RX ORDER — SUVOREXANT 5 MG/1
5 TABLET, FILM COATED ORAL NIGHTLY PRN
Qty: 30 TABLET | Refills: 2 | Status: SHIPPED | OUTPATIENT
Start: 2024-03-27 | End: 2024-05-09

## 2024-03-27 NOTE — PROGRESS NOTES
SUBJECTIVE:      Patient ID: Genoveva Gilbert is a 74 y.o. female.    Chief Complaint: Hypertension, Hyperlipidemia, and Hypothyroidism    Estela is a 73yr female, who is an established patient that has transitioned care from Dr. Marrero. She presents today for follow up on hypothyroidism, HTN, and depression. PMH:  Depression, dementia, anemia, hypothyroidism, diabetes, has a myalgia, heart failure, hyperlipidemia, CAD, hypertension, GERD, ESHA, MS and insomnia.  Labs were not completed prior to visit.     Hypothyroidism:  Last visit patient was noted with a TSH level of 23, Medication was increased. She continues to taking Levothyroxine 125 mcg daily.  Unable to assess if new dosage is appropriate, due to labs being completed.  Discuss with patient about getting labs completed today or tomorrow and we will refill medication based off of labs.  Continues to have fatigue and depression at times.  Has a total of 75 lb weight loss over the past 3 years.     Depression:  Continues taking Zoloft 100 mg daily, tolerating well without side effects.  She is still adjusting to the living center in which she moved to 6 months ago.  She participating in activities at the facility, including being go and socializing frequently with other residents.  Last office visit Family reported that they do notice that she is adjusting well and seems to be very happy with her new living situation, however since last office visit family has noticed that she is very depressed at least 1 day every 2 weeks where she is want to be isolated to her room and not participate in activities.  Patient noted to be tearful when talking about feeling depressed and missing her independence. Patient states that the Zoloft was doing well but feels that she needs something else at this time for more of a depression.  She is open to transitioning from Zoloft to Lexapro.  Denies suicidal ideation, homicidal ideation, or self-harm.     Hypertension:  Continues  taking amlodipine 5 mg, torsemide 10 mg, and isosorbide 60 mg daily.  Reports that she is tolerating well and denies any side effects.  Denies chest pain, chest palpitations, chest tightness, SOB, orthopnea, peripheral edema, or blurry vision.  Does not check her blood pressure at home.  Blood pressure today is 134/83.     Insomnia:  Continues taking Belsomra 5 mg every night.  It appears that she should have been out of medication a proximally 2 weeks ago.  Patient does state that she has been having difficulty falling asleep and restless the last 2 weeks.  Goes to bed approximately 11:00 p.m. after watching TV in bed, takes about an hour so to fall asleep.  Notes that she has been more irritable lately as well.  She believes that she has been taking this medication though and not missed a dose.    Depression  Visit Type: follow-up  Patient presents with the following symptoms: depressed mood, fatigue, insomnia, memory impairment and restlessness.  Patient is not experiencing: anhedonia, chest pain, choking sensation, decreased concentration, excessive worry, feelings of hopelessness, feelings of worthlessness, irritability, muscle tension, palpitations, panic, shortness of breath, suicidal ideas, suicidal planning, thoughts of death, weight gain and weight loss.  Frequency of symptoms: most days   Severity: interfering with daily activities   Sleep quality: good  Nighttime awakenings: none  Compliance with medications:  %    Thyroid Problem  Presents for follow-up visit. Symptoms include depressed mood and fatigue. Patient reports no constipation, diarrhea, dry skin, hair loss, hoarse voice, leg swelling, nail problem, palpitations, visual change, weight gain or weight loss. The symptoms have been worsening.       Review of patient's allergies indicates:   Allergen Reactions    Keflex [cephalexin]      Throat swelling    Pcn [penicillins]      Throat swelling    Latex, natural rubber Other (See Comments)      Redness with bandaids     Adhesive Other (See Comments)     bandainds redness at skin    Trelegy ellipta [fluticasone-umeclidin-vilanter]      Vision disturbance      Past Medical History:   Diagnosis Date    Allergy     Anxiety     Arthritis, rheumatoid     Back pain     Chronic bronchiolitis     Coronary artery disease involving native coronary artery 9/27/2023    Depression     Fibromyalgia     GERD (gastroesophageal reflux disease)     High cholesterol     Hilar mass 03/27/2014    Sycuan (hard of hearing)     aid right ear    Sycuan (hard of hearing)     Hypertension     Incontinence of urine     Lymphedema     MS (multiple sclerosis)     benign; another MD stated she has no MS    Neuropathy     Personal history of colonic polyps 12/05/2019    Restless leg syndrome     Rotator cuff tear 04/16/2015    Sciatica of left side 01/05/2018    Seasonal allergies     Sinus congestion     Sleep apnea     DOES NOT USE MACHINE    Spondylolysis     Thyroid disease     Type 2 diabetes mellitus with polyneuropathy     no med. was borderline    Wheezing      Past Surgical History:   Procedure Laterality Date    APPENDECTOMY      ARTHROSCOPIC DEBRIDEMENT OF SHOULDER Right 02/18/2022    Procedure: EXTENSIVE DEBRIDEMENT, SHOULDER, ARTHROSCOPIC;  Surgeon: Rio Pitts MD;  Location: [facility] OR;  Service: Orthopedics;  Laterality: Right;    BREAST SURGERY      reduction    CLOSED REDUCTION OF INJURY OF SHOULDER Right 09/19/2021    Procedure: CLOSED REDUCTION, SHOULDER;  Surgeon: Antonio Irwin MD;  Location: [facility] OR;  Service: Orthopedics;  Laterality: Right;    COLONOSCOPY W/ POLYPECTOMY N/A 12/05/2019    repeat in 5 yrs    CORONARY ANGIOGRAPHY N/A 9/27/2023    Procedure: ANGIOGRAM, CORONARY ARTERY;  Surgeon: Rosa Webber MD;  Location: [facility] CATH/EP LAB;  Service: Cardiology;  Laterality: N/A;    EPIDURAL STEROID INJECTION INTO LUMBAR SPINE N/A 06/12/2019    Procedure: Injection-steroid-epidural-lumbar;  Surgeon: Thad  SHABNAM Manning MD;  Location: [facility] OR;  Service: Pain Management;  Laterality: N/A;  L5-S1    EPIDURAL STEROID INJECTION INTO LUMBAR SPINE N/A 09/16/2019    Procedure: Injection-steroid-epidural-lumbar;  Surgeon: Thad Manning MD;  Location: [facility] OR;  Service: Pain Management;  Laterality: N/A;  L5-S1    EYE SURGERY Bilateral     HYSTERECTOMY      partial - fibroids    INJECTION OF ANESTHETIC AGENT AROUND MEDIAL BRANCH NERVES INNERVATING LUMBAR FACET JOINT Bilateral 02/28/2019    Procedure: Block-nerve-medial branch-lumbar;  Surgeon: Thad Manning MD;  Location: [facility] OR;  Service: Pain Management;  Laterality: Bilateral;  L3, 4,5     INJECTION OF ANESTHETIC AGENT AROUND MEDIAL BRANCH NERVES INNERVATING LUMBAR FACET JOINT Bilateral 03/21/2019    Procedure: Block-nerve-medial branch-lumbar L3,4,5;  Surgeon: Thad Manning MD;  Location: [facility] OR;  Service: Pain Management;  Laterality: Bilateral;    KNEE ARTHROPLASTY Left 03/16/2021    Procedure: ARTHROPLASTY, KNEE;  Surgeon: Rio Pitts MD;  Location: [facility] OR;  Service: Orthopedics;  Laterality: Left;  GENERAL AND BLOCK    LIPOSUCTION  1985    RADIOFREQUENCY ABLATION Left 11/04/2020    Procedure: Radiofrequency Ablation left knee;  Surgeon: Andrew Escudero MD;  Location: [facility] OR;  Service: Pain Management;  Laterality: Left;    RADIOFREQUENCY ABLATION OF LUMBAR MEDIAL BRANCH NERVE AT SINGLE LEVEL Bilateral 04/12/2019    Procedure: Radiofrequency Ablation, Nerve, Spinal, Lumbar, Medial Branch, 1 Level;  Surgeon: Thad Manning MD;  Location: [facility] OR;  Service: Pain Management;  Laterality: Bilateral;  L3, 4, 5  lumbar  Voice Of TV pain management generator  SN [id number]  80 degrees for 75 seconds x2    RADIOFREQUENCY ABLATION OF LUMBAR MEDIAL BRANCH NERVE AT SINGLE LEVEL Bilateral 10/22/2019    Procedure: Radiofrequency Ablation, Nerve, Spinal, Lumbar, Medial Branch, L3,4,5;  Surgeon: Thad Manning MD;  Location: [facility] OR;  Service: Pain Management;  Laterality: Bilateral;   burned at 80 degrees C X 60 seconds X 2 each site    RADIOFREQUENCY ABLATION OF LUMBAR MEDIAL BRANCH NERVE AT SINGLE LEVEL Bilateral 05/27/2020    Procedure: Radiofrequency Ablation, Nerve, Spinal, Lumbar, Medial Branch, 1 Level;  Surgeon: Thad Manning MD;  Location: [facility] OR;  Service: Pain Management;  Laterality: Bilateral;  L3,4,5    REVERSE TOTAL SHOULDER ARTHROPLASTY Right 04/12/2022    Procedure: ARTHROPLASTY, SHOULDER, TOTAL, REVERSE;  Surgeon: Rio Pitts MD;  Location: [facility] OR;  Service: Orthopedics;  Laterality: Right;    SHOULDER ARTHROSCOPY Right 09/19/2021    Procedure: ARTHROSCOPY, SHOULDER;  Surgeon: Antonio Irwin MD;  Location: [facility] OR;  Service: Orthopedics;  Laterality: Right;  LIMITED DEBRIDMENT    TONSILLECTOMY      TOTAL REDUCTION MAMMOPLASTY       Current Outpatient Medications   Medication Sig Dispense Refill    atorvastatin (LIPITOR) 40 MG tablet Take 1 tablet (40 mg total) by mouth every evening. 90 tablet 0    ferrous sulfate 325 (65 FE) MG EC tablet Take 1 tablet by mouth once daily.      FLUZONE HIGHDOSE QUAD 23-24  mcg/0.7 mL Syrg       levothyroxine (SYNTHROID) 112 MCG tablet TAKE 1 TABLET(112 MCG) BY MOUTH BEFORE BREAKFAST 90 tablet 0    memantine (NAMENDA) 5 MG Tab Take 1 tablet (5 mg total) by mouth 2 (two) times daily. One tablet a day for 7 days, then increase one table 2 times a day. 60 tablet 2    omeprazole (PRILOSEC) 20 MG capsule TAKE 1 CAPSULE(20 MG) BY MOUTH EVERY DAY 90 capsule 1    ondansetron (ZOFRAN-ODT) 8 MG TbDL Take by mouth.      pramipexole (MIRAPEX) 0.5 MG tablet Take 2 tablets (1 mg total) by mouth every evening. For restless legs 180 tablet 3    amLODIPine (NORVASC) 5 MG tablet TAKE 1 TABLET(5 MG) BY MOUTH EVERY DAY FOR BLOOD PRESSURE 90 tablet 1    AREXVY, PF, 120 mcg/0.5 mL SusR vaccine       EScitalopram oxalate (LEXAPRO) 10 MG tablet Take 1 tablet (10 mg total) by mouth once daily. 30 tablet 0    isosorbide mononitrate (IMDUR) 60 MG 24 hr  tablet TAKE 1 TABLET(60 MG) BY MOUTH ONCE DAILY. 90 tablet 1    suvorexant (BELSOMRA) 5 mg Tab Take 5 mg by mouth nightly as needed (Insomnia). 30 tablet 2    torsemide (DEMADEX) 10 MG Tab Take 1 tablet (10 mg total) by mouth once daily. 90 tablet 0     No current facility-administered medications for this visit.     Family History   Problem Relation Age of Onset    Heart disease Mother       Social History     Socioeconomic History    Marital status:    Tobacco Use    Smoking status: Former     Current packs/day: 0.00     Types: Cigarettes     Quit date: 1996     Years since quittin.8     Passive exposure: Past    Smokeless tobacco: Never   Substance and Sexual Activity    Alcohol use: Yes     Comment: very little     Drug use: No    Sexual activity: Not Currently     Partners: Male     Social Determinants of Health     Financial Resource Strain: Low Risk  (2023)    Overall Financial Resource Strain (CARDIA)     Difficulty of Paying Living Expenses: Not hard at all   Food Insecurity: No Food Insecurity (2023)    Hunger Vital Sign     Worried About Running Out of Food in the Last Year: Never true     Ran Out of Food in the Last Year: Never true   Transportation Needs: No Transportation Needs (2023)    PRAPARE - Transportation     Lack of Transportation (Medical): No     Lack of Transportation (Non-Medical): No   Physical Activity: Inactive (2023)    Exercise Vital Sign     Days of Exercise per Week: 0 days     Minutes of Exercise per Session: 0 min   Stress: No Stress Concern Present (2023)    [employer] - Occupational Stress Questionnaire     Feeling of Stress : Only a little   Social Connections: Socially Isolated (2023)    Social Connection and Isolation Panel [NHANES]     Frequency of Communication with Friends and Family: More than three times a week     Frequency of Social Gatherings with Friends and Family: More than three times a  "week     Attends [employer] Services: Never     Active Member of Clubs or Organizations: No     Attends Club or Organization Meetings: Never     Marital Status:    Housing Stability: Unknown (9/25/2023)    Housing Stability Vital Sign     Unable to Pay for Housing in the Last Year: No     Unstable Housing in the Last Year: No       Review of Systems   Constitutional:  Positive for fatigue. Negative for chills, fever, irritability, unexpected weight change, weight gain and weight loss.   HENT:  Negative for nasal congestion, ear pain, hearing loss, hoarse voice, rhinorrhea, sore throat and voice change.    Eyes:  Negative for photophobia and visual disturbance.   Respiratory:  Negative for choking, chest tightness, shortness of breath and wheezing.    Cardiovascular:  Negative for chest pain, palpitations and leg swelling.   Gastrointestinal:  Negative for abdominal pain, blood in stool, constipation, diarrhea, nausea and vomiting.   Endocrine: Negative for polydipsia, polyphagia, polyuria and hair loss.   Genitourinary:  Negative for difficulty urinating, dysuria, frequency and hematuria.   Musculoskeletal:  Negative for arthralgias, back pain and myalgias.   Integumentary:  Negative for rash and wound.   Neurological:  Positive for memory loss. Negative for dizziness, syncope, weakness, light-headedness and headaches.   Psychiatric/Behavioral:  Positive for agitation, dysphoric mood and sleep disturbance. Negative for decreased concentration, self-injury and suicidal ideas. The patient has insomnia.       OBJECTIVE:      Vitals:    03/27/24 1311   BP: 134/83   BP Location: Left arm   Patient Position: Sitting   BP Method: Medium (Automatic)   Pulse: 69   Temp: 99.3 °F (37.4 °C)   TempSrc: Oral   SpO2: 96%   Weight: 77.2 kg (170 lb 4.8 oz)   Height: 5' 1" (1.549 m)     Physical Exam  Vitals and nursing note reviewed.   Constitutional:       General: She is not in acute distress.     Appearance: She is " well-developed.   HENT:      Head: Normocephalic and atraumatic.      Nose: Nose normal.      Mouth/Throat:      Pharynx: Uvula midline.   Eyes:      General: Lids are normal.      Conjunctiva/sclera: Conjunctivae normal.      Pupils: Pupils are equal, round, and reactive to light.      Right eye: Pupil is round and reactive.      Left eye: Pupil is round and reactive.   Neck:      Thyroid: No thyromegaly.      Vascular: No JVD.      Trachea: Trachea normal.   Cardiovascular:      Rate and Rhythm: Normal rate and regular rhythm.      Pulses: Normal pulses.      Heart sounds: Normal heart sounds.   Pulmonary:      Effort: Pulmonary effort is normal. No tachypnea or respiratory distress.      Breath sounds: Normal breath sounds. No wheezing or rhonchi.   Musculoskeletal:         General: Normal range of motion.      Cervical back: Normal range of motion and neck supple.      Right lower leg: No edema.      Left lower leg: No edema.   Lymphadenopathy:      Cervical: No cervical adenopathy.   Skin:     General: Skin is warm and dry.      Findings: No rash.   Neurological:      Mental Status: She is alert and oriented to person, place, and time.   Psychiatric:         Mood and Affect: Mood is depressed. Affect is tearful.         Speech: Speech normal.         Behavior: Behavior normal. Behavior is cooperative.         Thought Content: Thought content normal. Thought content does not include homicidal or suicidal ideation. Thought content does not include homicidal or suicidal plan.         Cognition and Memory: She exhibits impaired recent memory.        Assessment:       1. Acquired hypothyroidism    2. Essential hypertension    3. Insomnia, unspecified type    4. Moderate major depression, single episode    5. Edema, unspecified type        Plan:       Acquired hypothyroidism  Continue taking levothyroxine 125 mcg daily., discuss to complete labs to evaluate medication adjustment.  Continue making sure that you take  this medication on an empty stomach 30-60 minutes before any other medications or food.  We will send in a refill once labs are completed.     Essential hypertension  Controlled at this time.  Blood pressure 134/83.  Continue taking amlodipine, isosorbide, and torsemide as prescribed.  Increase physical activity and decrease sodium intake.  -     amLODIPine (NORVASC) 5 MG tablet; TAKE 1 TABLET(5 MG) BY MOUTH EVERY DAY FOR BLOOD PRESSURE  Dispense: 90 tablet; Refill: 1  -     isosorbide mononitrate (IMDUR) 60 MG 24 hr tablet; TAKE 1 TABLET(60 MG) BY MOUTH ONCE DAILY.  Dispense: 90 tablet; Refill: 1    Insomnia, unspecified type  Begin retaking the Belsomra 5 mg nightly,  cut back on electronics usage at least 2 hours before sleep, avoiding caffeine intake after lunch, creating a consistent bedtime routine & incorporating relaxation exercises  -     suvorexant (BELSOMRA) 5 mg Tab; Take 5 mg by mouth nightly as needed (Insomnia).  Dispense: 30 tablet; Refill: 2    Moderate major depression, single episode  Discuss with patient about decreasing Zoloft to 50 mg daily times 14 days then stopped the Zoloft and begins parking Lexapro 10 mg daily.  We will follow up with patient in 4-6 weeks for evaluation of medications.  Discuss with patient about not isolating and participating in activities in her living center.  -     EScitalopram oxalate (LEXAPRO) 10 MG tablet; Take 1 tablet (10 mg total) by mouth once daily.  Dispense: 30 tablet; Refill: 0    Edema, unspecified type  -     torsemide (DEMADEX) 10 MG Tab; Take 1 tablet (10 mg total) by mouth once daily.  Dispense: 90 tablet; Refill: 0    I spent a total of 45 minutes on the day of the visit.  This includes face to face time and non-face to face time preparing to see the patient (eg, review of tests), obtaining and/or reviewing separately obtained history, documenting clinical information in the electronic or other health record, independently interpreting results and  communicating results to the patient/family/caregiver, or care coordinator.      Follow up in about 6 weeks (around 5/8/2024) for HTN, insomnia, thyroid.      3/27/2024 SHEY Allen, SETH    This note was created using [employer] voice recognition software that occasionally misinterprets phrases or words.

## 2024-03-27 NOTE — PATIENT INSTRUCTIONS
Cut Zoloft to 50mg once a day for 14 days, then stop Zoloft the lexapro 10mg daily, I will see you in 4-6 weeks.

## 2024-03-28 ENCOUNTER — LAB VISIT (OUTPATIENT)
Dept: LAB | Facility: HOSPITAL | Age: 74
End: 2024-03-28
Payer: MEDICARE

## 2024-03-28 ENCOUNTER — PATIENT MESSAGE (OUTPATIENT)
Dept: FAMILY MEDICINE | Facility: CLINIC | Age: 74
End: 2024-03-28
Payer: MEDICARE

## 2024-03-28 DIAGNOSIS — E78.2 MIXED HYPERLIPIDEMIA: ICD-10-CM

## 2024-03-28 DIAGNOSIS — E03.9 ACQUIRED HYPOTHYROIDISM: ICD-10-CM

## 2024-03-28 DIAGNOSIS — R73.03 PREDIABETES: ICD-10-CM

## 2024-03-28 DIAGNOSIS — I10 PRIMARY HYPERTENSION: ICD-10-CM

## 2024-03-28 LAB
ALBUMIN SERPL BCP-MCNC: 4.6 G/DL (ref 3.5–5.2)
ALP SERPL-CCNC: 64 U/L (ref 55–135)
ALT SERPL W/O P-5'-P-CCNC: 13 U/L (ref 10–44)
ANION GAP SERPL CALC-SCNC: 7 MMOL/L (ref 8–16)
AST SERPL-CCNC: 16 U/L (ref 10–40)
BASOPHILS # BLD AUTO: 0.05 K/UL (ref 0–0.2)
BASOPHILS NFR BLD: 0.9 % (ref 0–1.9)
BILIRUB SERPL-MCNC: 0.6 MG/DL (ref 0.1–1)
BUN SERPL-MCNC: 17 MG/DL (ref 8–23)
CALCIUM SERPL-MCNC: 9.8 MG/DL (ref 8.7–10.5)
CHLORIDE SERPL-SCNC: 106 MMOL/L (ref 95–110)
CHOLEST SERPL-MCNC: 181 MG/DL (ref 120–199)
CHOLEST/HDLC SERPL: 3.3 {RATIO} (ref 2–5)
CO2 SERPL-SCNC: 32 MMOL/L (ref 23–29)
CREAT SERPL-MCNC: 0.7 MG/DL (ref 0.5–1.4)
DIFFERENTIAL METHOD BLD: ABNORMAL
EOSINOPHIL # BLD AUTO: 0.1 K/UL (ref 0–0.5)
EOSINOPHIL NFR BLD: 2.4 % (ref 0–8)
ERYTHROCYTE [DISTWIDTH] IN BLOOD BY AUTOMATED COUNT: 12.2 % (ref 11.5–14.5)
EST. GFR  (NO RACE VARIABLE): >60 ML/MIN/1.73 M^2
ESTIMATED AVG GLUCOSE: 117 MG/DL (ref 68–131)
GLUCOSE SERPL-MCNC: 97 MG/DL (ref 70–110)
HBA1C MFR BLD: 5.7 % (ref 4.5–6.2)
HCT VFR BLD AUTO: 45.1 % (ref 37–48.5)
HDLC SERPL-MCNC: 55 MG/DL (ref 40–75)
HDLC SERPL: 30.4 % (ref 20–50)
HGB BLD-MCNC: 14.9 G/DL (ref 12–16)
IMM GRANULOCYTES # BLD AUTO: 0.02 K/UL (ref 0–0.04)
IMM GRANULOCYTES NFR BLD AUTO: 0.3 % (ref 0–0.5)
LDLC SERPL CALC-MCNC: 109.6 MG/DL (ref 63–159)
LYMPHOCYTES # BLD AUTO: 2.5 K/UL (ref 1–4.8)
LYMPHOCYTES NFR BLD: 42.8 % (ref 18–48)
MCH RBC QN AUTO: 30.8 PG (ref 27–31)
MCHC RBC AUTO-ENTMCNC: 33 G/DL (ref 32–36)
MCV RBC AUTO: 93 FL (ref 82–98)
MONOCYTES # BLD AUTO: 0.4 K/UL (ref 0.3–1)
MONOCYTES NFR BLD: 7.6 % (ref 4–15)
NEUTROPHILS # BLD AUTO: 2.7 K/UL (ref 1.8–7.7)
NEUTROPHILS NFR BLD: 46 % (ref 38–73)
NONHDLC SERPL-MCNC: 126 MG/DL
NRBC BLD-RTO: 0 /100 WBC
PLATELET # BLD AUTO: 250 K/UL (ref 150–450)
PMV BLD AUTO: 9.1 FL (ref 9.2–12.9)
POTASSIUM SERPL-SCNC: 3.5 MMOL/L (ref 3.5–5.1)
PROT SERPL-MCNC: 7.1 G/DL (ref 6–8.4)
RBC # BLD AUTO: 4.84 M/UL (ref 4–5.4)
SODIUM SERPL-SCNC: 145 MMOL/L (ref 136–145)
T4 FREE SERPL-MCNC: 0.69 NG/DL (ref 0.71–1.51)
TRIGL SERPL-MCNC: 82 MG/DL (ref 30–150)
TSH SERPL DL<=0.005 MIU/L-ACNC: 9.93 UIU/ML (ref 0.34–5.6)
WBC # BLD AUTO: 5.8 K/UL (ref 3.9–12.7)

## 2024-03-28 PROCEDURE — 36415 COLL VENOUS BLD VENIPUNCTURE: CPT

## 2024-03-28 PROCEDURE — 84439 ASSAY OF FREE THYROXINE: CPT

## 2024-03-28 PROCEDURE — 80061 LIPID PANEL: CPT

## 2024-03-28 PROCEDURE — 84443 ASSAY THYROID STIM HORMONE: CPT

## 2024-03-28 PROCEDURE — 80053 COMPREHEN METABOLIC PANEL: CPT

## 2024-03-28 PROCEDURE — 83036 HEMOGLOBIN GLYCOSYLATED A1C: CPT

## 2024-03-28 PROCEDURE — 85025 COMPLETE CBC W/AUTO DIFF WBC: CPT

## 2024-03-28 RX ORDER — LEVOTHYROXINE SODIUM 125 UG/1
125 TABLET ORAL
Qty: 90 TABLET | Refills: 0 | Status: SHIPPED | OUTPATIENT
Start: 2024-03-28 | End: 2024-05-08 | Stop reason: SDUPTHER

## 2024-04-04 DIAGNOSIS — R60.9 EDEMA, UNSPECIFIED TYPE: ICD-10-CM

## 2024-04-05 NOTE — TELEPHONE ENCOUNTER
Refill Routing Note   Medication(s) are not appropriate for processing by [facility] for the following reason(s):        Non-participating provider    [facility] action(s):  Route             Appointments  past 12m or future 3m with PCP    Date Provider   Last Visit   3/27/2024 Yuridia Arreola NP   Next Visit   5/8/2024 Yuridia Arreola NP   ED visits in past 90 days: 0        Note composed:1:24 PM 04/05/2024

## 2024-04-07 RX ORDER — TORSEMIDE 10 MG/1
10 TABLET ORAL
Qty: 90 TABLET | Refills: 0 | OUTPATIENT
Start: 2024-04-07

## 2024-04-11 NOTE — PROGRESS NOTES
swab collected   [No Acute Distress] : no acute distress [Normal Oropharynx] : normal oropharynx [Normal Appearance] : normal appearance [No Neck Mass] : no neck mass [Normal Rate/Rhythm] : normal rate/rhythm [Normal S1, S2] : normal s1, s2 [No Murmurs] : no murmurs [No Resp Distress] : no resp distress [Clear to Auscultation Bilaterally] : clear to auscultation bilaterally [Kyphosis] : kyphosis [Benign] : benign [Normal Gait] : normal gait [No Clubbing] : no clubbing [No Cyanosis] : no cyanosis [No Edema] : no edema [FROM] : FROM [Normal Color/ Pigmentation] : normal color/ pigmentation [No Focal Deficits] : no focal deficits [Oriented x3] : oriented x3 [Normal Affect] : normal affect

## 2024-04-30 ENCOUNTER — TELEPHONE (OUTPATIENT)
Dept: FAMILY MEDICINE | Facility: CLINIC | Age: 74
End: 2024-04-30
Payer: MEDICARE

## 2024-04-30 DIAGNOSIS — F32.1 MODERATE MAJOR DEPRESSION, SINGLE EPISODE: ICD-10-CM

## 2024-04-30 RX ORDER — ESCITALOPRAM OXALATE 10 MG/1
10 TABLET ORAL
Qty: 30 TABLET | Refills: 0 | Status: SHIPPED | OUTPATIENT
Start: 2024-04-30 | End: 2024-05-08 | Stop reason: SDUPTHER

## 2024-05-02 ENCOUNTER — LAB VISIT (OUTPATIENT)
Dept: LAB | Facility: HOSPITAL | Age: 74
End: 2024-05-02
Payer: MEDICARE

## 2024-05-02 ENCOUNTER — PATIENT MESSAGE (OUTPATIENT)
Dept: PSYCHIATRY | Facility: CLINIC | Age: 74
End: 2024-05-02
Payer: MEDICARE

## 2024-05-02 DIAGNOSIS — E03.9 ACQUIRED HYPOTHYROIDISM: ICD-10-CM

## 2024-05-02 LAB
T4 FREE SERPL-MCNC: 0.85 NG/DL (ref 0.71–1.51)
TSH SERPL DL<=0.005 MIU/L-ACNC: 8.37 UIU/ML (ref 0.34–5.6)

## 2024-05-02 PROCEDURE — 84443 ASSAY THYROID STIM HORMONE: CPT

## 2024-05-02 PROCEDURE — 84436 ASSAY OF TOTAL THYROXINE: CPT | Mod: XB

## 2024-05-02 PROCEDURE — 36415 COLL VENOUS BLD VENIPUNCTURE: CPT

## 2024-05-02 PROCEDURE — 84439 ASSAY OF FREE THYROXINE: CPT

## 2024-05-03 DIAGNOSIS — F01.A3 MILD VASCULAR DEMENTIA WITH MOOD DISTURBANCE: ICD-10-CM

## 2024-05-03 RX ORDER — MEMANTINE HYDROCHLORIDE 5 MG/1
TABLET ORAL
Qty: 60 TABLET | Refills: 2 | Status: SHIPPED | OUTPATIENT
Start: 2024-05-03

## 2024-05-04 LAB — T4 SERPL-MCNC: 7.9 UG/DL (ref 4.5–12)

## 2024-05-08 ENCOUNTER — OFFICE VISIT (OUTPATIENT)
Dept: FAMILY MEDICINE | Facility: CLINIC | Age: 74
End: 2024-05-08
Payer: MEDICARE

## 2024-05-08 VITALS
WEIGHT: 171.69 LBS | BODY MASS INDEX: 32.42 KG/M2 | OXYGEN SATURATION: 99 % | SYSTOLIC BLOOD PRESSURE: 138 MMHG | DIASTOLIC BLOOD PRESSURE: 88 MMHG | HEIGHT: 61 IN | HEART RATE: 61 BPM | TEMPERATURE: 99 F

## 2024-05-08 DIAGNOSIS — F32.1 MODERATE MAJOR DEPRESSION, SINGLE EPISODE: ICD-10-CM

## 2024-05-08 DIAGNOSIS — E03.9 ACQUIRED HYPOTHYROIDISM: ICD-10-CM

## 2024-05-08 PROCEDURE — 99213 OFFICE O/P EST LOW 20 MIN: CPT | Mod: HCNC,S$GLB,,

## 2024-05-08 PROCEDURE — 1160F RVW MEDS BY RX/DR IN RCRD: CPT | Mod: HCNC,CPTII,S$GLB,

## 2024-05-08 PROCEDURE — 1101F PT FALLS ASSESS-DOCD LE1/YR: CPT | Mod: HCNC,CPTII,S$GLB,

## 2024-05-08 PROCEDURE — 1126F AMNT PAIN NOTED NONE PRSNT: CPT | Mod: HCNC,CPTII,S$GLB,

## 2024-05-08 PROCEDURE — 3075F SYST BP GE 130 - 139MM HG: CPT | Mod: HCNC,CPTII,S$GLB,

## 2024-05-08 PROCEDURE — 99999 PR PBB SHADOW E&M-EST. PATIENT-LVL IV: CPT | Mod: PBBFAC,HCNC,,

## 2024-05-08 PROCEDURE — 1159F MED LIST DOCD IN RCRD: CPT | Mod: HCNC,CPTII,S$GLB,

## 2024-05-08 PROCEDURE — 3288F FALL RISK ASSESSMENT DOCD: CPT | Mod: HCNC,CPTII,S$GLB,

## 2024-05-08 PROCEDURE — 3079F DIAST BP 80-89 MM HG: CPT | Mod: HCNC,CPTII,S$GLB,

## 2024-05-08 RX ORDER — ESCITALOPRAM OXALATE 10 MG/1
10 TABLET ORAL DAILY
Qty: 90 TABLET | Refills: 1 | Status: SHIPPED | OUTPATIENT
Start: 2024-05-08

## 2024-05-08 RX ORDER — LEVOTHYROXINE SODIUM 137 UG/1
137 TABLET ORAL
Qty: 90 TABLET | Refills: 0 | Status: SHIPPED | OUTPATIENT
Start: 2024-05-08

## 2024-05-08 NOTE — PROGRESS NOTES
SUBJECTIVE:      Patient ID: Genoveva Gilbert is a 74 y.o. female.    Chief Complaint: Insomnia, Hypertension, and Thyroid Problem (TS lab f/u)    Estela is a 73yr female, who is an established patient. She presents today for follow up on hypothyroidism, and depression. PMH:  Depression, dementia, anemia, hypothyroidism, diabetes, has a myalgia, heart failure, hyperlipidemia, CAD, hypertension, GERD, ESHA, MS and insomnia.  Labs were not completed prior to visit.     Hypothyroidism: Continues to taking Levothyroxine 125 mcg daily, Tolerating well without SE. TSH improved from 23 to 9, now at 8. Still above goal. Has been taking by its self in the morning.     Depression: Has transition to lexapro 10mg from Zoloft.  Tolerating well and denies any side effects.  Reports that her depression has improved since transition.  She has been participating in functions at the facility, does not feel the urge to isolate, and reports that she is sleeping better.  Denies suicidal ideation, homicidal ideation, or self-harm.       Thyroid Problem  Presents for follow-up visit. Symptoms include fatigue (improving). Patient reports no constipation, depressed mood, diarrhea, dry skin, hair loss, hoarse voice, leg swelling, nail problem, palpitations, visual change, weight gain or weight loss. The symptoms have been stable.   Depression  Visit Type: follow-up  Patient presents with the following symptoms: fatigue and memory impairment.  Patient is not experiencing: anhedonia, chest pain, choking sensation, decreased concentration, depressed mood, excessive worry, feelings of hopelessness, feelings of worthlessness, insomnia, irritability, muscle tension, palpitations, panic, restlessness, shortness of breath, suicidal ideas, suicidal planning, thoughts of death, weight gain and weight loss.  Frequency of symptoms: occasionally   Severity: mild   Sleep quality: good  Nighttime awakenings: none  Compliance with medications:   %        Review of patient's allergies indicates:   Allergen Reactions    Keflex [cephalexin]      Throat swelling    Pcn [penicillins]      Throat swelling    Latex, natural rubber Other (See Comments)     Redness with bandaids     Adhesive Other (See Comments)     bandainds redness at skin    Trelegy ellipta [fluticasone-umeclidin-vilanter]      Vision disturbance      Past Medical History:   Diagnosis Date    Allergy     Anxiety     Arthritis, rheumatoid     Back pain     Chronic bronchiolitis     Coronary artery disease involving native coronary artery 9/27/2023    Depression     Fibromyalgia     GERD (gastroesophageal reflux disease)     High cholesterol     Hilar mass 03/27/2014    [place] (hard of hearing)     aid right ear    [place] (hard of hearing)     Hypertension     Incontinence of urine     Lymphedema     MS (multiple sclerosis)     benign; another MD stated she has no MS    Neuropathy     Personal history of colonic polyps 12/05/2019    Restless leg syndrome     Rotator cuff tear 04/16/2015    Sciatica of left side 01/05/2018    Seasonal allergies     Sinus congestion     Sleep apnea     DOES NOT USE MACHINE    Spondylolysis     Thyroid disease     Type 2 diabetes mellitus with polyneuropathy     no med. was borderline    Wheezing      Past Surgical History:   Procedure Laterality Date    APPENDECTOMY      ARTHROSCOPIC DEBRIDEMENT OF SHOULDER Right 02/18/2022    Procedure: EXTENSIVE DEBRIDEMENT, SHOULDER, ARTHROSCOPIC;  Surgeon: Rio Pitts MD;  Location: [facility] OR;  Service: Orthopedics;  Laterality: Right;    BREAST SURGERY      reduction    CLOSED REDUCTION OF INJURY OF SHOULDER Right 09/19/2021    Procedure: CLOSED REDUCTION, SHOULDER;  Surgeon: Antonio Irwin MD;  Location: [facility] OR;  Service: Orthopedics;  Laterality: Right;    COLONOSCOPY W/ POLYPECTOMY N/A 12/05/2019    repeat in 5 yrs    CORONARY ANGIOGRAPHY N/A 9/27/2023    Procedure: ANGIOGRAM, CORONARY ARTERY;  Surgeon: Lawanda  Rosa Munoz MD;  Location: [facility] CATH/EP LAB;  Service: Cardiology;  Laterality: N/A;    EPIDURAL STEROID INJECTION INTO LUMBAR SPINE N/A 06/12/2019    Procedure: Injection-steroid-epidural-lumbar;  Surgeon: Thad Manning MD;  Location: [facility];  Service: Pain Management;  Laterality: N/A;  L5-S1    EPIDURAL STEROID INJECTION INTO LUMBAR SPINE N/A 09/16/2019    Procedure: Injection-steroid-epidural-lumbar;  Surgeon: Thad Manning MD;  Location: [facility];  Service: Pain Management;  Laterality: N/A;  L5-S1    EYE SURGERY Bilateral     HYSTERECTOMY      partial - fibroids    INJECTION OF ANESTHETIC AGENT AROUND MEDIAL BRANCH NERVES INNERVATING LUMBAR FACET JOINT Bilateral 02/28/2019    Procedure: Block-nerve-medial branch-lumbar;  Surgeon: Thad Manning MD;  Location: [facility];  Service: Pain Management;  Laterality: Bilateral;  L3, 4,5     INJECTION OF ANESTHETIC AGENT AROUND MEDIAL BRANCH NERVES INNERVATING LUMBAR FACET JOINT Bilateral 03/21/2019    Procedure: Block-nerve-medial branch-lumbar L3,4,5;  Surgeon: Thad Manning MD;  Location: [facility];  Service: Pain Management;  Laterality: Bilateral;    KNEE ARTHROPLASTY Left 03/16/2021    Procedure: ARTHROPLASTY, KNEE;  Surgeon: Rio Pitts MD;  Location: [facility] OR;  Service: Orthopedics;  Laterality: Left;  GENERAL AND BLOCK    LIPOSUCTION  1985    RADIOFREQUENCY ABLATION Left 11/04/2020    Procedure: Radiofrequency Ablation left knee;  Surgeon: Andrew Escudero MD;  Location: [facility] OR;  Service: Pain Management;  Laterality: Left;    RADIOFREQUENCY ABLATION OF LUMBAR MEDIAL BRANCH NERVE AT SINGLE LEVEL Bilateral 04/12/2019    Procedure: Radiofrequency Ablation, Nerve, Spinal, Lumbar, Medial Branch, 1 Level;  Surgeon: Thad Manning MD;  Location: [facility];  Service: Pain Management;  Laterality: Bilateral;  L3, 4, 5  lumbar  [employer] pain management generator   [id number]  80 degrees for 75 seconds x2    RADIOFREQUENCY ABLATION OF LUMBAR MEDIAL BRANCH NERVE AT  SINGLE LEVEL Bilateral 10/22/2019    Procedure: Radiofrequency Ablation, Nerve, Spinal, Lumbar, Medial Branch, L3,4,5;  Surgeon: Thad Manning MD;  Location: [facility] OR;  Service: Pain Management;  Laterality: Bilateral;  burned at 80 degrees C X 60 seconds X 2 each site    RADIOFREQUENCY ABLATION OF LUMBAR MEDIAL BRANCH NERVE AT SINGLE LEVEL Bilateral 05/27/2020    Procedure: Radiofrequency Ablation, Nerve, Spinal, Lumbar, Medial Branch, 1 Level;  Surgeon: Thad Manning MD;  Location: [facility] OR;  Service: Pain Management;  Laterality: Bilateral;  L3,4,5    REVERSE TOTAL SHOULDER ARTHROPLASTY Right 04/12/2022    Procedure: ARTHROPLASTY, SHOULDER, TOTAL, REVERSE;  Surgeon: Rio Pitts MD;  Location: [facility] OR;  Service: Orthopedics;  Laterality: Right;    SHOULDER ARTHROSCOPY Right 09/19/2021    Procedure: ARTHROSCOPY, SHOULDER;  Surgeon: Antonio Irwin MD;  Location: [facility] OR;  Service: Orthopedics;  Laterality: Right;  LIMITED DEBRIDMENT    TONSILLECTOMY      TOTAL REDUCTION MAMMOPLASTY       Current Outpatient Medications   Medication Sig Dispense Refill    amLODIPine (NORVASC) 5 MG tablet TAKE 1 TABLET(5 MG) BY MOUTH EVERY DAY FOR BLOOD PRESSURE 90 tablet 1    AREXVY, PF, 120 mcg/0.5 mL SusR vaccine       ferrous sulfate 325 (65 FE) MG EC tablet Take 1 tablet by mouth once daily.      FLUZONE HIGHDOSE QUAD 23-24  mcg/0.7 mL Syrg       isosorbide mononitrate (IMDUR) 60 MG 24 hr tablet TAKE 1 TABLET(60 MG) BY MOUTH ONCE DAILY. 90 tablet 1    memantine (NAMENDA) 5 MG Tab TAKE 1 TABLET BY MOUTH ONCE DAILY FOR 7 DAYS, THEN 1 TABLET TWICE DAILY 60 tablet 2    omeprazole (PRILOSEC) 20 MG capsule TAKE 1 CAPSULE(20 MG) BY MOUTH EVERY DAY 90 capsule 1    ondansetron (ZOFRAN-ODT) 8 MG TbDL Take by mouth.      pramipexole (MIRAPEX) 0.5 MG tablet Take 2 tablets (1 mg total) by mouth every evening. For restless legs 180 tablet 3    suvorexant (BELSOMRA) 5 mg Tab Take 5 mg by mouth nightly as needed (Insomnia). 30  tablet 2    torsemide (DEMADEX) 10 MG Tab Take 1 tablet (10 mg total) by mouth once daily. 90 tablet 0    atorvastatin (LIPITOR) 40 MG tablet Take 1 tablet (40 mg total) by mouth every evening. 90 tablet 0    EScitalopram oxalate (LEXAPRO) 10 MG tablet Take 1 tablet (10 mg total) by mouth once daily. 90 tablet 1    levothyroxine (SYNTHROID) 137 MCG Tab tablet Take 1 tablet (137 mcg total) by mouth before breakfast. 90 tablet 0     No current facility-administered medications for this visit.     Family History   Problem Relation Name Age of Onset    Heart disease Mother        Social History     Socioeconomic History    Marital status:    Tobacco Use    Smoking status: Former     Current packs/day: 0.00     Types: Cigarettes     Quit date: 1996     Years since quittin.9     Passive exposure: Past    Smokeless tobacco: Never   Substance and Sexual Activity    Alcohol use: Yes     Comment: very little     Drug use: No    Sexual activity: Not Currently     Partners: Male     Social Determinants of Health     Financial Resource Strain: Low Risk  (2023)    Overall Financial Resource Strain (CARDIA)     Difficulty of Paying Living Expenses: Not hard at all   Food Insecurity: No Food Insecurity (2023)    Hunger Vital Sign     Worried About Running Out of Food in the Last Year: Never true     Ran Out of Food in the Last Year: Never true   Transportation Needs: No Transportation Needs (2023)    PRAPARE - Transportation     Lack of Transportation (Medical): No     Lack of Transportation (Non-Medical): No   Physical Activity: Inactive (2023)    Exercise Vital Sign     Days of Exercise per Week: 0 days     Minutes of Exercise per Session: 0 min   Stress: No Stress Concern Present (2023)    [employer] [place] [employer] - Occupational Stress Questionnaire     Feeling of Stress : Only a little   Housing Stability: Unknown (2023)    Housing Stability Vital Sign     Unable to  "Pay for Housing in the Last Year: No     Unstable Housing in the Last Year: No       Review of Systems   Constitutional:  Positive for fatigue (improving). Negative for chills, fever, irritability, unexpected weight change, weight gain and weight loss.   HENT:  Negative for nasal congestion, ear pain, hearing loss, hoarse voice, rhinorrhea, sore throat and voice change.    Eyes:  Negative for photophobia and visual disturbance.   Respiratory:  Negative for choking, chest tightness, shortness of breath and wheezing.    Cardiovascular:  Negative for chest pain, palpitations and leg swelling.   Gastrointestinal:  Negative for abdominal pain, blood in stool, constipation, diarrhea, nausea and vomiting.   Endocrine: Negative for polydipsia, polyphagia, polyuria and hair loss.   Genitourinary:  Negative for difficulty urinating, dysuria, frequency and hematuria.   Musculoskeletal:  Negative for arthralgias, back pain and myalgias.   Integumentary:  Negative for rash and wound.   Neurological:  Positive for memory loss. Negative for dizziness, syncope, weakness, light-headedness and headaches.   Psychiatric/Behavioral:  Positive for dysphoric mood (Improving). Negative for agitation, decreased concentration, self-injury, sleep disturbance and suicidal ideas. The patient does not have insomnia.       OBJECTIVE:      Vitals:    05/08/24 1302 05/08/24 1309   BP: (!) 140/94 138/88   BP Location: Left arm Left arm   Patient Position: Sitting Sitting   BP Method: Medium (Manual) Medium (Automatic)   Pulse: 61    Temp: 98.7 °F (37.1 °C)    TempSrc: Oral    SpO2: 99%    Weight: 77.9 kg (171 lb 11.2 oz)    Height: 5' 1" (1.549 m)      Physical Exam  Vitals and nursing note reviewed.   Constitutional:       General: She is not in acute distress.     Appearance: She is well-developed.   HENT:      Head: Normocephalic and atraumatic.      Nose: Nose normal.      Mouth/Throat:      Pharynx: Uvula midline.   Eyes:      General: Lids are " normal.      Conjunctiva/sclera: Conjunctivae normal.      Pupils: Pupils are equal, round, and reactive to light.      Right eye: Pupil is round and reactive.      Left eye: Pupil is round and reactive.   Neck:      Thyroid: No thyromegaly.      Vascular: No JVD.      Trachea: Trachea normal.   Cardiovascular:      Rate and Rhythm: Normal rate and regular rhythm.      Pulses: Normal pulses.      Heart sounds: Normal heart sounds.   Pulmonary:      Effort: Pulmonary effort is normal. No tachypnea or respiratory distress.      Breath sounds: Normal breath sounds. No wheezing or rhonchi.   Musculoskeletal:         General: Normal range of motion.      Cervical back: Normal range of motion and neck supple.      Right lower leg: No edema.      Left lower leg: No edema.   Lymphadenopathy:      Cervical: No cervical adenopathy.   Skin:     General: Skin is warm and dry.      Findings: No rash.   Neurological:      Mental Status: She is alert and oriented to person, place, and time.   Psychiatric:         Mood and Affect: Mood normal. Mood is not depressed. Affect is not tearful.         Speech: Speech normal.         Behavior: Behavior normal. Behavior is cooperative.         Thought Content: Thought content normal. Thought content does not include homicidal or suicidal ideation. Thought content does not include homicidal or suicidal plan.         Cognition and Memory: She exhibits impaired recent memory (slight).        Assessment:       1. Moderate major depression, single episode    2. Acquired hypothyroidism        Plan:       Moderate major depression, single episode  -     EScitalopram oxalate (LEXAPRO) 10 MG tablet; Take 1 tablet (10 mg total) by mouth once daily.  Dispense: 90 tablet; Refill: 1    Acquired hypothyroidism  -     levothyroxine (SYNTHROID) 137 MCG Tab tablet; Take 1 tablet (137 mcg total) by mouth before breakfast.  Dispense: 90 tablet; Refill: 0  -     TSH; Future; Expected date:  06/05/2024    -depression is stable at this time on the Lexapro 10 mg.  Continue taking Lexapro 10 mg daily.  Continue participating in activities at the facility.  -thyroid levels continue to be above goal.  Increase levothyroxine to 137 mcg daily.  Discuss with patient and family member.  We will recheck labs in 6 weeks.     Follow up in about 2 months (around 7/8/2024) for HTN, HLD, thyroid, insomnia.      5/8/2024 SHEY Allen, PRECIOUSP    This note was created using [employer] voice recognition software that occasionally misinterprets phrases or words.

## 2024-05-09 DIAGNOSIS — G47.00 INSOMNIA, UNSPECIFIED TYPE: ICD-10-CM

## 2024-05-09 DIAGNOSIS — G25.81 RESTLESS LEG SYNDROME: ICD-10-CM

## 2024-05-09 RX ORDER — PRAMIPEXOLE DIHYDROCHLORIDE 0.5 MG/1
TABLET ORAL
Qty: 180 TABLET | Refills: 0 | Status: SHIPPED | OUTPATIENT
Start: 2024-05-09

## 2024-05-09 RX ORDER — SUVOREXANT 5 MG/1
1 TABLET, FILM COATED ORAL NIGHTLY
Qty: 30 TABLET | Refills: 1 | Status: SHIPPED | OUTPATIENT
Start: 2024-05-09

## 2024-05-09 RX ORDER — FERROUS SULFATE 325(65) MG
TABLET, DELAYED RELEASE (ENTERIC COATED) ORAL DAILY
Qty: 90 TABLET | Refills: 0 | Status: SHIPPED | OUTPATIENT
Start: 2024-05-09

## 2024-05-09 NOTE — TELEPHONE ENCOUNTER
Refill Routing Note   Medication(s) are not appropriate for processing by [facility] for the following reason(s):        Non-participating provider    [facility] action(s):  Route             Appointments  past 12m or future 3m with PCP    Date Provider   Last Visit   5/8/2024 Yuridia Arreola NP   Next Visit   7/8/2024 Yuridia Arreola NP   ED visits in past 90 days: 0        Note composed:10:08 AM 05/09/2024

## 2024-05-09 NOTE — TELEPHONE ENCOUNTER
Refill Routing Note   Medication(s) are not appropriate for processing by [facility] for the following reason(s):        Non-participating provider    [facility] action(s):  Route             Appointments  past 12m or future 3m with PCP    Date Provider   Last Visit   5/8/2024 Yuridia Arreola NP   Next Visit   7/8/2024 Yuridia Arreola NP   ED visits in past 90 days: 0        Note composed:10:01 AM 05/09/2024

## 2024-05-20 ENCOUNTER — PATIENT MESSAGE (OUTPATIENT)
Dept: PSYCHIATRY | Facility: CLINIC | Age: 74
End: 2024-05-20
Payer: MEDICARE

## 2024-05-27 DIAGNOSIS — I10 ESSENTIAL HYPERTENSION: ICD-10-CM

## 2024-05-27 RX ORDER — ISOSORBIDE MONONITRATE 60 MG/1
TABLET, EXTENDED RELEASE ORAL
Qty: 90 TABLET | Refills: 1 | Status: SHIPPED | OUTPATIENT
Start: 2024-05-27

## 2024-05-27 NOTE — TELEPHONE ENCOUNTER
Received a second refill request from [facility]. I tried to call the pharmacy but they are closed today. The last refill dated for the patient to get Imdur was 1/17/24. Can you please send a new prescription and cancel the  one sent 3/27/24 that I'm sure they are going to say they did not receive.   Please advise

## 2024-06-04 DIAGNOSIS — I50.9 HEART FAILURE, UNSPECIFIED HF CHRONICITY, UNSPECIFIED HEART FAILURE TYPE: ICD-10-CM

## 2024-06-04 RX ORDER — METOPROLOL SUCCINATE 25 MG/1
TABLET, EXTENDED RELEASE ORAL
Qty: 90 TABLET | Refills: 3 | OUTPATIENT
Start: 2024-06-04

## 2024-06-04 NOTE — TELEPHONE ENCOUNTER
Refill Decision Note   Genoveva Gilbert  is requesting a refill authorization.  Brief Assessment and Rationale for Refill:  Quick Discontinue     Medication Therapy Plan:  discontinued on 9/24/2023 by Harshad Petit MD.      Comments:     Note composed:10:41 AM 06/04/2024             Appointments     Last Visit   5/4/2023 Rajesh Dubois MD   Next Visit   Visit date not found Rajesh Dubois MD

## 2024-06-21 ENCOUNTER — TELEPHONE (OUTPATIENT)
Dept: FAMILY MEDICINE | Facility: CLINIC | Age: 74
End: 2024-06-21
Payer: MEDICARE

## 2024-06-26 ENCOUNTER — OFFICE VISIT (OUTPATIENT)
Dept: FAMILY MEDICINE | Facility: CLINIC | Age: 74
End: 2024-06-26
Payer: MEDICARE

## 2024-06-26 ENCOUNTER — LAB VISIT (OUTPATIENT)
Dept: LAB | Facility: HOSPITAL | Age: 74
End: 2024-06-26
Payer: MEDICARE

## 2024-06-26 VITALS
TEMPERATURE: 99 F | SYSTOLIC BLOOD PRESSURE: 141 MMHG | HEART RATE: 75 BPM | OXYGEN SATURATION: 95 % | DIASTOLIC BLOOD PRESSURE: 91 MMHG | WEIGHT: 170.38 LBS | BODY MASS INDEX: 32.2 KG/M2

## 2024-06-26 DIAGNOSIS — F32.1 MODERATE MAJOR DEPRESSION, SINGLE EPISODE: Primary | ICD-10-CM

## 2024-06-26 DIAGNOSIS — E03.9 ACQUIRED HYPOTHYROIDISM: ICD-10-CM

## 2024-06-26 LAB — TSH SERPL DL<=0.005 MIU/L-ACNC: 0.49 UIU/ML (ref 0.4–4)

## 2024-06-26 PROCEDURE — 36415 COLL VENOUS BLD VENIPUNCTURE: CPT | Mod: HCNC

## 2024-06-26 PROCEDURE — 99213 OFFICE O/P EST LOW 20 MIN: CPT | Mod: HCNC,S$GLB,,

## 2024-06-26 PROCEDURE — 3080F DIAST BP >= 90 MM HG: CPT | Mod: HCNC,CPTII,S$GLB,

## 2024-06-26 PROCEDURE — 3288F FALL RISK ASSESSMENT DOCD: CPT | Mod: HCNC,CPTII,S$GLB,

## 2024-06-26 PROCEDURE — 1126F AMNT PAIN NOTED NONE PRSNT: CPT | Mod: HCNC,CPTII,S$GLB,

## 2024-06-26 PROCEDURE — 3077F SYST BP >= 140 MM HG: CPT | Mod: HCNC,CPTII,S$GLB,

## 2024-06-26 PROCEDURE — 1159F MED LIST DOCD IN RCRD: CPT | Mod: HCNC,CPTII,S$GLB,

## 2024-06-26 PROCEDURE — 84443 ASSAY THYROID STIM HORMONE: CPT | Mod: HCNC

## 2024-06-26 PROCEDURE — 1101F PT FALLS ASSESS-DOCD LE1/YR: CPT | Mod: HCNC,CPTII,S$GLB,

## 2024-06-26 PROCEDURE — 99999 PR PBB SHADOW E&M-EST. PATIENT-LVL IV: CPT | Mod: PBBFAC,HCNC,,

## 2024-06-26 NOTE — PROGRESS NOTES
SUBJECTIVE:      Patient ID: Genoveva Gilbert is a 74 y.o. female.    Chief Complaint: Depression    Estela is a 74yr female, who is an established patient.  She presents today for increased depression.  Her ex-, who was good friends with her is here with her today. PMH:  Depression, dementia, anemia, hypothyroidism, diabetes, has a myalgia, heart failure, hyperlipidemia, CAD, hypertension, GERD, ESHA and insomnia.     Patient is here today with increased depression and tearfulness.  Last office visit on 5/8/2024 patient was doing better and tolerating Lexapro 10 mg.  Former has been is with her today and states that every year in February she has depressed for a week or 2 because of her son's birthday, and also depressed a couple of weeks in May due to anniversary of her son's death.  He is concerned because her depression this time has lasted for 3 or 4 weeks.  She denies suicidal ideation, homicidal ideation, or self-harm.  Endorses that she just feels lonely and does not want to be around people.  And has been very tearful.  She does continue to go to meals at the facility, but she is eating only 1-2 meals a day.  Does not want to participate in any of the activities.  Granddaughter is the 1 that sets up patient's medications.  She told her grandfather that is it appears she is only taking her Lexapro 3 times a week.  Patient has reminders around her apartment to remind her to take her medication.  Patient says that sometimes she is forgetful or she just does not care to take it.  Family is going to look into the medication management program that is offered at the facility.  Family and patient is in agreeance to increasing dose and we will re-evaluate in 2 weeks at her scheduled appointment.    Depression  Visit Type: follow-up  Patient presents with the following symptoms: anhedonia, depressed mood, fatigue, irritability and memory impairment.  Patient is not experiencing: chest pain, choking sensation,  decreased concentration, excessive worry, feelings of hopelessness, feelings of worthlessness, insomnia, muscle tension, palpitations, panic, restlessness, shortness of breath, suicidal ideas, suicidal planning and thoughts of death.  Frequency of symptoms: occasionally   Severity: interfering with daily activities   Sleep quality: good  Nighttime awakenings: none  Compliance with medications:  %        Review of patient's allergies indicates:   Allergen Reactions    Keflex [cephalexin]      Throat swelling    Pcn [penicillins]      Throat swelling    Latex, natural rubber Other (See Comments)     Redness with bandaids     Adhesive Other (See Comments)     bandainds redness at skin    Trelegy ellipta [fluticasone-umeclidin-vilanter]      Vision disturbance      Past Medical History:   Diagnosis Date    Allergy     Anxiety     Arthritis, rheumatoid     Back pain     Chronic bronchiolitis     Coronary artery disease involving native coronary artery 9/27/2023    Depression     Fibromyalgia     GERD (gastroesophageal reflux disease)     High cholesterol     Hilar mass 03/27/2014    [place]     aid right ear    [place]     Hypertension     Incontinence of urine     Lymphedema     MS (multiple sclerosis)     benign; another MD stated she has no MS    Neuropathy     Personal history of colonic polyps 12/05/2019    Restless leg syndrome     Rotator cuff tear 04/16/2015    Sciatica of left side 01/05/2018    Seasonal allergies     Sinus congestion     Sleep apnea     DOES NOT USE MACHINE    Spondylolysis     Thyroid disease     Type 2 diabetes mellitus with polyneuropathy     no med. was borderline    Wheezing      Past Surgical History:   Procedure Laterality Date    APPENDECTOMY      ARTHROSCOPIC DEBRIDEMENT OF SHOULDER Right 02/18/2022    Procedure: EXTENSIVE DEBRIDEMENT, SHOULDER, ARTHROSCOPIC;  Surgeon: Rio Pitts MD;  Location: [facility];  Service: Orthopedics;  Laterality:  Right;    BREAST SURGERY      reduction    CLOSED REDUCTION OF INJURY OF SHOULDER Right 09/19/2021    Procedure: CLOSED REDUCTION, SHOULDER;  Surgeon: Antonio Irwin MD;  Location: [facility] OR;  Service: Orthopedics;  Laterality: Right;    COLONOSCOPY W/ POLYPECTOMY N/A 12/05/2019    repeat in 5 yrs    CORONARY ANGIOGRAPHY N/A 9/27/2023    Procedure: ANGIOGRAM, CORONARY ARTERY;  Surgeon: Rosa Webber MD;  Location: [facility] CATH/EP LAB;  Service: Cardiology;  Laterality: N/A;    EPIDURAL STEROID INJECTION INTO LUMBAR SPINE N/A 06/12/2019    Procedure: Injection-steroid-epidural-lumbar;  Surgeon: Thad Manning MD;  Location: [facility] OR;  Service: Pain Management;  Laterality: N/A;  L5-S1    EPIDURAL STEROID INJECTION INTO LUMBAR SPINE N/A 09/16/2019    Procedure: Injection-steroid-epidural-lumbar;  Surgeon: Thad Manning MD;  Location: [facility] OR;  Service: Pain Management;  Laterality: N/A;  L5-S1    EYE SURGERY Bilateral     HYSTERECTOMY      partial - fibroids    INJECTION OF ANESTHETIC AGENT AROUND MEDIAL BRANCH NERVES INNERVATING LUMBAR FACET JOINT Bilateral 02/28/2019    Procedure: Block-nerve-medial branch-lumbar;  Surgeon: Thad Manning MD;  Location: [facility] OR;  Service: Pain Management;  Laterality: Bilateral;  L3, 4,5     INJECTION OF ANESTHETIC AGENT AROUND MEDIAL BRANCH NERVES INNERVATING LUMBAR FACET JOINT Bilateral 03/21/2019    Procedure: Block-nerve-medial branch-lumbar L3,4,5;  Surgeon: Thad Manning MD;  Location: [facility] OR;  Service: Pain Management;  Laterality: Bilateral;    KNEE ARTHROPLASTY Left 03/16/2021    Procedure: ARTHROPLASTY, KNEE;  Surgeon: Rio Pitts MD;  Location: [facility] OR;  Service: Orthopedics;  Laterality: Left;  GENERAL AND BLOCK    LIPOSUCTION  1985    RADIOFREQUENCY ABLATION Left 11/04/2020    Procedure: Radiofrequency Ablation left knee;  Surgeon: Andrew Escudero MD;  Location: [facility] OR;  Service: Pain Management;  Laterality: Left;    RADIOFREQUENCY ABLATION OF LUMBAR MEDIAL  BRANCH NERVE AT SINGLE LEVEL Bilateral 04/12/2019    Procedure: Radiofrequency Ablation, Nerve, Spinal, Lumbar, Medial Branch, 1 Level;  Surgeon: Thad Manning MD;  Location: [facility] OR;  Service: Pain Management;  Laterality: Bilateral;  L3, 4, 5  lumbar  [employer] pain management generator  SN [id number]  80 degrees for 75 seconds x2    RADIOFREQUENCY ABLATION OF LUMBAR MEDIAL BRANCH NERVE AT SINGLE LEVEL Bilateral 10/22/2019    Procedure: Radiofrequency Ablation, Nerve, Spinal, Lumbar, Medial Branch, L3,4,5;  Surgeon: Thad Manning MD;  Location: [facility] OR;  Service: Pain Management;  Laterality: Bilateral;  burned at 80 degrees C X 60 seconds X 2 each site    RADIOFREQUENCY ABLATION OF LUMBAR MEDIAL BRANCH NERVE AT SINGLE LEVEL Bilateral 05/27/2020    Procedure: Radiofrequency Ablation, Nerve, Spinal, Lumbar, Medial Branch, 1 Level;  Surgeon: Thad Manning MD;  Location: [facility] OR;  Service: Pain Management;  Laterality: Bilateral;  L3,4,5    REVERSE TOTAL SHOULDER ARTHROPLASTY Right 04/12/2022    Procedure: ARTHROPLASTY, SHOULDER, TOTAL, REVERSE;  Surgeon: Rio Pitts MD;  Location: [facility] OR;  Service: Orthopedics;  Laterality: Right;    SHOULDER ARTHROSCOPY Right 09/19/2021    Procedure: ARTHROSCOPY, SHOULDER;  Surgeon: Antonio Irwin MD;  Location: [facility] OR;  Service: Orthopedics;  Laterality: Right;  LIMITED DEBRIDMENT    TONSILLECTOMY      TOTAL REDUCTION MAMMOPLASTY       Current Outpatient Medications   Medication Sig Dispense Refill    amLODIPine (NORVASC) 5 MG tablet TAKE 1 TABLET(5 MG) BY MOUTH EVERY DAY FOR BLOOD PRESSURE 90 tablet 1    AREXVY, PF, 120 mcg/0.5 mL SusR vaccine       atorvastatin (LIPITOR) 40 MG tablet Take 1 tablet (40 mg total) by mouth every evening. 90 tablet 0    EScitalopram oxalate (LEXAPRO) 10 MG tablet Take 1 tablet (10 mg total) by mouth once daily. 90 tablet 1    ferrous sulfate 325 (65 FE) MG EC tablet TAKE 1 TABLET BY MOUTH ONCE DAILY 90 tablet 0    FLUZONE HIGHDOSE  QUAD 23-24  mcg/0.7 mL Syrg       isosorbide mononitrate (IMDUR) 60 MG 24 hr tablet TAKE 1 TABLET(60 MG) BY MOUTH ONCE DAILY. 90 tablet 1    levothyroxine (SYNTHROID) 137 MCG Tab tablet Take 1 tablet (137 mcg total) by mouth before breakfast. 90 tablet 0    memantine (NAMENDA) 5 MG Tab TAKE 1 TABLET BY MOUTH ONCE DAILY FOR 7 DAYS, THEN 1 TABLET TWICE DAILY 60 tablet 2    omeprazole (PRILOSEC) 20 MG capsule TAKE 1 CAPSULE(20 MG) BY MOUTH EVERY DAY 90 capsule 1    ondansetron (ZOFRAN-ODT) 8 MG TbDL Take by mouth.      pramipexole (MIRAPEX) 0.5 MG tablet TAKE 2 TABLETS BY MOUTH EVERY EVENING FOR RESTLESS LEGS 180 tablet 0    suvorexant (BELSOMRA) 5 mg Tab TAKE 1 TABLET BY MOUTH NIGHTLY AS NEEDED FOR INSOMNIA 30 tablet 1    torsemide (DEMADEX) 10 MG Tab Take 1 tablet (10 mg total) by mouth once daily. 90 tablet 0     No current facility-administered medications for this visit.     Family History   Problem Relation Name Age of Onset    Heart disease Mother        Social History     Socioeconomic History    Marital status:    Tobacco Use    Smoking status: Former     Current packs/day: 0.00     Types: Cigarettes     Quit date: 1996     Years since quittin.0     Passive exposure: Past    Smokeless tobacco: Never   Substance and Sexual Activity    Alcohol use: Yes     Comment: very little     Drug use: No    Sexual activity: Not Currently     Partners: Male     Social Determinants of Health     Financial Resource Strain: Low Risk  (2023)    Overall Financial Resource Strain (CARDIA)     Difficulty of Paying Living Expenses: Not hard at all   Food Insecurity: No Food Insecurity (2023)    Hunger Vital Sign     Worried About Running Out of Food in the Last Year: Never true     Ran Out of Food in the Last Year: Never true   Transportation Needs: No Transportation Needs (2023)    PRAPARE - Transportation     Lack of Transportation (Medical): No     Lack of Transportation (Non-Medical): No    Physical Activity: Inactive (9/25/2023)    Exercise Vital Sign     Days of Exercise per Week: 0 days     Minutes of Exercise per Session: 0 min   Stress: No Stress Concern Present (9/25/2023)    [employer] [place] - Occupational Stress Questionnaire     Feeling of Stress : Only a little   Housing Stability: Unknown (9/25/2023)    Housing Stability Vital Sign     Unable to Pay for Housing in the Last Year: No     Unstable Housing in the Last Year: No       Review of Systems   Constitutional:  Positive for fatigue and irritability. Negative for chills, fever and unexpected weight change.   HENT:  Negative for nasal congestion, ear pain, hearing loss, rhinorrhea, sore throat and voice change.    Eyes:  Negative for photophobia and visual disturbance.   Respiratory:  Negative for choking, chest tightness, shortness of breath and wheezing.    Cardiovascular:  Negative for chest pain, palpitations and leg swelling.   Gastrointestinal:  Negative for abdominal pain, blood in stool, constipation, diarrhea, nausea and vomiting.   Endocrine: Negative for polydipsia, polyphagia and polyuria.   Genitourinary:  Negative for difficulty urinating, dysuria, frequency and hematuria.   Musculoskeletal:  Negative for arthralgias, back pain and myalgias.   Integumentary:  Negative for rash and wound.   Neurological:  Positive for memory loss (Stable). Negative for dizziness, syncope, weakness, light-headedness and headaches.   Psychiatric/Behavioral:  Positive for agitation and dysphoric mood. Negative for decreased concentration, self-injury, sleep disturbance and suicidal ideas. The patient does not have insomnia.       OBJECTIVE:      Vitals:    06/26/24 1423   BP: (!) 141/91   BP Location: Right arm   Patient Position: Sitting   BP Method: Large (Automatic)   Pulse: 75   Temp: 99.3 °F (37.4 °C)   TempSrc: Oral   SpO2: 95%   Weight: 77.3 kg (170 lb 6.4 oz)     Physical Exam  Vitals and nursing note reviewed.    Constitutional:       General: She is not in acute distress.     Appearance: She is well-developed.   HENT:      Head: Normocephalic and atraumatic.      Nose: Nose normal.      Mouth/Throat:      Pharynx: Uvula midline.   Eyes:      General: Lids are normal.      Conjunctiva/sclera: Conjunctivae normal.      Pupils: Pupils are equal, round, and reactive to light.      Right eye: Pupil is round and reactive.      Left eye: Pupil is round and reactive.   Neck:      Thyroid: No thyromegaly.      Vascular: No JVD.      Trachea: Trachea normal.   Cardiovascular:      Rate and Rhythm: Normal rate and regular rhythm.      Pulses: Normal pulses.      Heart sounds: Normal heart sounds.   Pulmonary:      Effort: Pulmonary effort is normal. No tachypnea or respiratory distress.      Breath sounds: Normal breath sounds. No wheezing or rhonchi.   Musculoskeletal:         General: Normal range of motion.      Cervical back: Normal range of motion and neck supple.      Right lower leg: No edema.      Left lower leg: No edema.   Lymphadenopathy:      Cervical: No cervical adenopathy.   Skin:     General: Skin is warm and dry.      Findings: No rash.   Neurological:      Mental Status: She is alert and oriented to person, place, and time.   Psychiatric:         Mood and Affect: Mood is depressed. Affect is tearful.         Speech: Speech normal.         Behavior: Behavior normal. Behavior is cooperative.         Thought Content: Thought content normal. Thought content does not include homicidal or suicidal ideation. Thought content does not include homicidal or suicidal plan.         Cognition and Memory: She exhibits impaired recent memory (slight).        Assessment:       1. Moderate major depression, single episode        Plan:       Moderate major depression, single episode  Increasing Lexapro to 20 mg daily.  We will follow up with patient 2 weeks.  Encouraged patient to participate in activities at the facility, continue  going to have meals in the dining room.  Encouraged patient to CBT-patient deferred at this time.  Discuss with patient about going to a ER or call 911 if she is having suicidal ideation, homicidal ideation, or self-harm.  Discuss with patient about importance of compliance with medication and ways to remember to take medication.    I spent a total of 20 minutes on the day of the visit.  This includes face to face time and non-face to face time preparing to see the patient (eg, review of tests), obtaining and/or reviewing separately obtained history, documenting clinical information in the electronic or other health record, independently interpreting results and communicating results to the patient/family/caregiver, or care coordinator.   No follow-ups on file.      6/26/2024 HSEY Allen, SETH    This note was created using [employer] voice recognition software that occasionally misinterprets phrases or words.

## 2024-07-10 ENCOUNTER — OFFICE VISIT (OUTPATIENT)
Dept: FAMILY MEDICINE | Facility: CLINIC | Age: 74
End: 2024-07-10
Payer: MEDICARE

## 2024-07-10 VITALS
HEIGHT: 61 IN | HEART RATE: 77 BPM | SYSTOLIC BLOOD PRESSURE: 138 MMHG | TEMPERATURE: 100 F | OXYGEN SATURATION: 97 % | BODY MASS INDEX: 33.32 KG/M2 | DIASTOLIC BLOOD PRESSURE: 80 MMHG | WEIGHT: 176.5 LBS

## 2024-07-10 DIAGNOSIS — F32.1 MODERATE MAJOR DEPRESSION, SINGLE EPISODE: ICD-10-CM

## 2024-07-10 DIAGNOSIS — G25.81 RESTLESS LEG SYNDROME: ICD-10-CM

## 2024-07-10 DIAGNOSIS — R60.9 EDEMA, UNSPECIFIED TYPE: ICD-10-CM

## 2024-07-10 DIAGNOSIS — E78.2 MIXED HYPERLIPIDEMIA: ICD-10-CM

## 2024-07-10 DIAGNOSIS — I10 ESSENTIAL HYPERTENSION: ICD-10-CM

## 2024-07-10 DIAGNOSIS — K21.9 GASTROESOPHAGEAL REFLUX DISEASE WITHOUT ESOPHAGITIS: ICD-10-CM

## 2024-07-10 DIAGNOSIS — F03.93 DEMENTIA WITH MOOD DISTURBANCE, UNSPECIFIED DEMENTIA SEVERITY, UNSPECIFIED DEMENTIA TYPE: Primary | ICD-10-CM

## 2024-07-10 PROCEDURE — 1125F AMNT PAIN NOTED PAIN PRSNT: CPT | Mod: HCNC,CPTII,S$GLB,

## 2024-07-10 PROCEDURE — 3075F SYST BP GE 130 - 139MM HG: CPT | Mod: HCNC,CPTII,S$GLB,

## 2024-07-10 PROCEDURE — 1159F MED LIST DOCD IN RCRD: CPT | Mod: HCNC,CPTII,S$GLB,

## 2024-07-10 PROCEDURE — 99214 OFFICE O/P EST MOD 30 MIN: CPT | Mod: HCNC,S$GLB,,

## 2024-07-10 PROCEDURE — 3288F FALL RISK ASSESSMENT DOCD: CPT | Mod: HCNC,CPTII,S$GLB,

## 2024-07-10 PROCEDURE — 3079F DIAST BP 80-89 MM HG: CPT | Mod: HCNC,CPTII,S$GLB,

## 2024-07-10 PROCEDURE — 1160F RVW MEDS BY RX/DR IN RCRD: CPT | Mod: HCNC,CPTII,S$GLB,

## 2024-07-10 PROCEDURE — 1101F PT FALLS ASSESS-DOCD LE1/YR: CPT | Mod: HCNC,CPTII,S$GLB,

## 2024-07-10 PROCEDURE — 99999 PR PBB SHADOW E&M-EST. PATIENT-LVL IV: CPT | Mod: PBBFAC,HCNC,,

## 2024-07-10 RX ORDER — BREXPIPRAZOLE 0.5 MG/1
0.5 TABLET ORAL DAILY
Qty: 30 TABLET | Refills: 2 | Status: SHIPPED | OUTPATIENT
Start: 2024-07-10 | End: 2024-07-10

## 2024-07-10 RX ORDER — ATORVASTATIN CALCIUM 40 MG/1
40 TABLET, FILM COATED ORAL NIGHTLY
Qty: 90 TABLET | Refills: 0 | Status: SHIPPED | OUTPATIENT
Start: 2024-07-10 | End: 2024-10-08

## 2024-07-10 RX ORDER — METOPROLOL SUCCINATE 25 MG/1
25 TABLET, EXTENDED RELEASE ORAL DAILY
Qty: 90 TABLET | Refills: 1 | Status: SHIPPED | OUTPATIENT
Start: 2024-07-10 | End: 2024-07-10

## 2024-07-10 RX ORDER — ESCITALOPRAM OXALATE 20 MG/1
20 TABLET ORAL DAILY
Qty: 90 TABLET | Refills: 1 | Status: SHIPPED | OUTPATIENT
Start: 2024-07-10

## 2024-07-10 RX ORDER — LEVOTHYROXINE SODIUM 88 UG/1
TABLET ORAL
COMMUNITY
End: 2024-07-10

## 2024-07-10 RX ORDER — PRAMIPEXOLE DIHYDROCHLORIDE 0.5 MG/1
1 TABLET ORAL NIGHTLY
Qty: 180 TABLET | Refills: 0 | Status: SHIPPED | OUTPATIENT
Start: 2024-07-10

## 2024-07-10 RX ORDER — TORSEMIDE 10 MG/1
10 TABLET ORAL DAILY
Qty: 90 TABLET | Refills: 0 | Status: SHIPPED | OUTPATIENT
Start: 2024-07-10

## 2024-07-10 RX ORDER — METOPROLOL SUCCINATE 25 MG/1
25 TABLET, EXTENDED RELEASE ORAL
COMMUNITY
Start: 2024-01-17 | End: 2024-07-10 | Stop reason: SDUPTHER

## 2024-07-10 RX ORDER — SERTRALINE HYDROCHLORIDE 100 MG/1
100 TABLET, FILM COATED ORAL
COMMUNITY
Start: 2024-04-23 | End: 2024-07-10

## 2024-07-10 RX ORDER — OMEPRAZOLE 20 MG/1
20 CAPSULE, DELAYED RELEASE ORAL DAILY
Qty: 90 CAPSULE | Refills: 1 | Status: SHIPPED | OUTPATIENT
Start: 2024-07-10

## 2024-07-10 RX ORDER — BREXPIPRAZOLE 0.5 MG/1
TABLET ORAL
Qty: 42 TABLET | Refills: 0 | Status: SHIPPED | OUTPATIENT
Start: 2024-07-10 | End: 2024-08-07

## 2024-07-10 NOTE — PROGRESS NOTES
SUBJECTIVE:      Patient ID: Genoveva Gilbert is a 74 y.o. female.    Chief Complaint: Hypertension, Hyperlipidemia, TSH, and Insomnia (/)    Estela is a 74yr female, who is an established patient. She presents today for follow up on hypothyroidism, HTN, and depression. PMH:  Depression, dementia, anemia, hypothyroidism, diabetes, has a myalgia, heart failure, hyperlipidemia, CAD, hypertension, GERD, ESHA, MS and insomnia.  Labs were not completed prior to visit.   Dementia:  Continues taking Namenda twice a day as prescribed.  No side effects noted.  Family believes that this medication is working of the have not seen progression and forgetfulness.    Hypothyroidism:  Patient is currently taking levothyroxine 137 mcg.  It was titrated up due to patient's thyroid has been poorly controlled for some time.  TSH level has dropped to 0.4.  Denies heart palpitations, tremors, increased anxiety. Has a total of 75 lb weight loss over the past 3 years.     Depression:  Medications has been adjusted and patient is currently taking Lexapro 20 mg daily, denies any side effects.  However since last visit last month she continues to have increased depression and crying moments.  Continues to not want to engage in activities at the assisted living.  And has been isolating herself to her room at times.  Majority of the time though she will get out to eat and then go right back to her room.  Family and patient is still acknowledge that she is having less depressive episodes with the Lexapro compared to the Zoloft.  They are open to starting a add on medication.  Patient denies suicidal ideation, homicidal ideation, or self-harm.     Hypertension:  Continues taking amlodipine 5 mg, torsemide 10 mg, and isosorbide 60 mg daily.  Reports that she is tolerating well and denies any side effects.  Denies chest pain, chest palpitations, chest tightness, SOB, orthopnea, peripheral edema, or blurry vision.  Does not check her blood pressure  at home.  Blood pressure today is 138/80.     Insomnia:  Continues taking Belsomra 5 mg every night.  Tolerating well and denies any side effects.  Even with the increasing depression and sadness, she denies increased with inability to sleep.  Denies drowsiness in the morning.  States that she wakes up well rested.     Hypertension  This is a chronic problem. The current episode started more than 1 year ago. The problem is unchanged. The problem is controlled. Associated symptoms include anxiety. Pertinent negatives include no blurred vision, chest pain, headaches, malaise/fatigue, neck pain, orthopnea, palpitations, peripheral edema, PND, shortness of breath or sweats. There are no associated agents to hypertension. Risk factors for coronary artery disease include dyslipidemia, obesity, post-menopausal state, stress and sedentary lifestyle. Past treatments include diuretics, beta blockers and calcium channel blockers. The current treatment provides significant improvement. Compliance problems include diet and exercise.  Identifiable causes of hypertension include a thyroid problem.   Hyperlipidemia  Pertinent negatives include no chest pain, myalgias or shortness of breath.   Depression  Visit Type: follow-up  Patient presents with the following symptoms: depressed mood, fatigue, feelings of hopelessness, feelings of worthlessness, insomnia, memory impairment and restlessness.  Patient is not experiencing: anhedonia, chest pain, choking sensation, decreased concentration, excessive worry, irritability, muscle tension, nervousness/anxiety, palpitations, panic, shortness of breath, suicidal ideas, suicidal planning, thoughts of death, weight gain and weight loss.  Frequency of symptoms: most days   Severity: interfering with daily activities   Sleep quality: good  Nighttime awakenings: none  Compliance with medications:  %    Thyroid Problem  Presents for follow-up visit. Symptoms include depressed mood and  fatigue. Patient reports no anxiety, cold intolerance, constipation, diaphoresis, diarrhea, dry skin, hair loss, hoarse voice, leg swelling, menstrual problem, nail problem, palpitations, tremors, visual change, weight gain or weight loss. The symptoms have been stable. Her past medical history is significant for hyperlipidemia.       Review of patient's allergies indicates:   Allergen Reactions    Keflex [cephalexin]      Throat swelling    Pcn [penicillins]      Throat swelling    Latex, natural rubber Other (See Comments)     Redness with bandaids     Adhesive Other (See Comments)     bandainds redness at skin    Trelegy ellipta [fluticasone-umeclidin-vilanter]      Vision disturbance      Past Medical History:   Diagnosis Date    Allergy     Anxiety     Arthritis, rheumatoid     Back pain     Chronic bronchiolitis     Coronary artery disease involving native coronary artery 9/27/2023    Depression     Fibromyalgia     GERD (gastroesophageal reflux disease)     High cholesterol     Hilar mass 03/27/2014    [place] (hard of hearing[place]     aid right ear    [place]hard [place] hearing[place]     Hypertension     Incontinence of urine     Lymphedema     MS (multiple sclerosis)     benign; another MD stated she has no MS    Neuropathy     Personal history of colonic polyps 12/05/2019    Restless leg syndrome     Rotator cuff tear 04/16/2015    Sciatica of left side 01/05/2018    Seasonal allergies     Sinus congestion     Sleep apnea     DOES NOT USE MACHINE    Spondylolysis     Thyroid disease     Type 2 diabetes mellitus with polyneuropathy     no med. was borderline    Wheezing      Past Surgical History:   Procedure Laterality Date    APPENDECTOMY      ARTHROSCOPIC DEBRIDEMENT OF SHOULDER Right 02/18/2022    Procedure: EXTENSIVE DEBRIDEMENT, SHOULDER, ARTHROSCOPIC;  Surgeon: Rio Pitts MD;  Location: [facility];  Service: Orthopedics;  Laterality: Right;    BREAST SURGERY      reduction    CLOSED REDUCTION OF INJURY OF  SHOULDER Right 09/19/2021    Procedure: CLOSED REDUCTION, SHOULDER;  Surgeon: Antonio Irwin MD;  Location: [facility] OR;  Service: Orthopedics;  Laterality: Right;    COLONOSCOPY W/ POLYPECTOMY N/A 12/05/2019    repeat in 5 yrs    CORONARY ANGIOGRAPHY N/A 9/27/2023    Procedure: ANGIOGRAM, CORONARY ARTERY;  Surgeon: Rosa Webber MD;  Location: [facility] CATH/EP LAB;  Service: Cardiology;  Laterality: N/A;    EPIDURAL STEROID INJECTION INTO LUMBAR SPINE N/A 06/12/2019    Procedure: Injection-steroid-epidural-lumbar;  Surgeon: Thad Manning MD;  Location: [facility];  Service: Pain Management;  Laterality: N/A;  L5-S1    EPIDURAL STEROID INJECTION INTO LUMBAR SPINE N/A 09/16/2019    Procedure: Injection-steroid-epidural-lumbar;  Surgeon: Thad Manning MD;  Location: [facility];  Service: Pain Management;  Laterality: N/A;  L5-S1    EYE SURGERY Bilateral     HYSTERECTOMY      partial - fibroids    INJECTION OF ANESTHETIC AGENT AROUND MEDIAL BRANCH NERVES INNERVATING LUMBAR FACET JOINT Bilateral 02/28/2019    Procedure: Block-nerve-medial branch-lumbar;  Surgeon: Thad Manning MD;  Location: [facility];  Service: Pain Management;  Laterality: Bilateral;  L3, 4,5     INJECTION OF ANESTHETIC AGENT AROUND MEDIAL BRANCH NERVES INNERVATING LUMBAR FACET JOINT Bilateral 03/21/2019    Procedure: Block-nerve-medial branch-lumbar L3,4,5;  Surgeon: Thad Manning MD;  Location: [facility];  Service: Pain Management;  Laterality: Bilateral;    KNEE ARTHROPLASTY Left 03/16/2021    Procedure: ARTHROPLASTY, KNEE;  Surgeon: Rio Pitts MD;  Location: [facility] OR;  Service: Orthopedics;  Laterality: Left;  GENERAL AND BLOCK    LIPOSUCTION  1985    RADIOFREQUENCY ABLATION Left 11/04/2020    Procedure: Radiofrequency Ablation left knee;  Surgeon: Andrew Escudero MD;  Location: [facility] OR;  Service: Pain Management;  Laterality: Left;    RADIOFREQUENCY ABLATION OF LUMBAR MEDIAL BRANCH NERVE AT SINGLE LEVEL Bilateral 04/12/2019    Procedure: Radiofrequency  Ablation, Nerve, Spinal, Lumbar, Medial Branch, 1 Level;  Surgeon: Thad Manning MD;  Location: [facility] OR;  Service: Pain Management;  Laterality: Bilateral;  L3, 4, 5  lumbar  Lumiant pain management generator  SN [id number]  80 degrees for 75 seconds x2    RADIOFREQUENCY ABLATION OF LUMBAR MEDIAL BRANCH NERVE AT SINGLE LEVEL Bilateral 10/22/2019    Procedure: Radiofrequency Ablation, Nerve, Spinal, Lumbar, Medial Branch, L3,4,5;  Surgeon: Thad Manning MD;  Location: [facility] OR;  Service: Pain Management;  Laterality: Bilateral;  burned at 80 degrees C X 60 seconds X 2 each site    RADIOFREQUENCY ABLATION OF LUMBAR MEDIAL BRANCH NERVE AT SINGLE LEVEL Bilateral 05/27/2020    Procedure: Radiofrequency Ablation, Nerve, Spinal, Lumbar, Medial Branch, 1 Level;  Surgeon: Thad Manning MD;  Location: [facility] OR;  Service: Pain Management;  Laterality: Bilateral;  L3,4,5    REVERSE TOTAL SHOULDER ARTHROPLASTY Right 04/12/2022    Procedure: ARTHROPLASTY, SHOULDER, TOTAL, REVERSE;  Surgeon: Rio Pitts MD;  Location: [facility] OR;  Service: Orthopedics;  Laterality: Right;    SHOULDER ARTHROSCOPY Right 09/19/2021    Procedure: ARTHROSCOPY, SHOULDER;  Surgeon: Antonio Irwin MD;  Location: [facility] OR;  Service: Orthopedics;  Laterality: Right;  LIMITED DEBRIDMENT    TONSILLECTOMY      TOTAL REDUCTION MAMMOPLASTY       Current Outpatient Medications   Medication Sig Dispense Refill    amLODIPine (NORVASC) 5 MG tablet TAKE 1 TABLET(5 MG) BY MOUTH EVERY DAY FOR BLOOD PRESSURE 90 tablet 1    AREXVY, PF, 120 mcg/0.5 mL SusR vaccine       ferrous sulfate 325 (65 FE) MG EC tablet TAKE 1 TABLET BY MOUTH ONCE DAILY 90 tablet 0    FLUZONE HIGHDOSE QUAD 23-24  mcg/0.7 mL Syrg       isosorbide mononitrate (IMDUR) 60 MG 24 hr tablet TAKE 1 TABLET(60 MG) BY MOUTH ONCE DAILY. 90 tablet 1    levothyroxine (SYNTHROID) 137 MCG Tab tablet Take 1 tablet (137 mcg total) by mouth before breakfast. 90 tablet 0    memantine (NAMENDA) 5 MG Tab  Take 1 tablet (5 mg total) by mouth once daily. 60 tablet 2    ondansetron (ZOFRAN-ODT) 8 MG TbDL Take by mouth.      suvorexant (BELSOMRA) 5 mg Tab Take 1 tablet by mouth every evening. 30 tablet 1    atorvastatin (LIPITOR) 40 MG tablet Take 1 tablet (40 mg total) by mouth every evening. 90 tablet 0    brexpiprazole (REXULTI) 0.5 mg Tab Take 0.5 mg by mouth once daily for 14 days, THEN 1 mg once daily for 14 days. 42 tablet 0    EScitalopram oxalate (LEXAPRO) 20 MG tablet Take 1 tablet (20 mg total) by mouth once daily. 90 tablet 1    metoprolol succinate (TOPROL-XL) 25 MG 24 hr tablet Take 1 tablet (25 mg total) by mouth once daily. 90 tablet 1    omeprazole (PRILOSEC) 20 MG capsule Take 1 capsule (20 mg total) by mouth once daily. 90 capsule 1    pramipexole (MIRAPEX) 0.5 MG tablet Take 2 tablets (1 mg total) by mouth every evening. 180 tablet 0    torsemide (DEMADEX) 10 MG Tab Take 1 tablet (10 mg total) by mouth once daily. 90 tablet 0     No current facility-administered medications for this visit.     Family History   Problem Relation Name Age of Onset    Heart disease Mother        Social History     Socioeconomic History    Marital status:    Tobacco Use    Smoking status: Former     Current packs/day: 0.00     Types: Cigarettes     Quit date: 1996     Years since quittin.1     Passive exposure: Past    Smokeless tobacco: Never   Substance and Sexual Activity    Alcohol use: Yes     Comment: very little     Drug use: No    Sexual activity: Not Currently     Partners: Male     Social Determinants of Health     Financial Resource Strain: Low Risk  (2023)    Overall Financial Resource Strain (CARDIA)     Difficulty of Paying Living Expenses: Not hard at all   Food Insecurity: No Food Insecurity (2023)    Hunger Vital Sign     Worried About Running Out of Food in the Last Year: Never true     Ran Out of Food in the Last Year: Never true   Transportation Needs: No Transportation Needs  (9/25/2023)    PRAPARE - Transportation     Lack of Transportation (Medical): No     Lack of Transportation (Non-Medical): No   Physical Activity: Inactive (9/25/2023)    Exercise Vital Sign     Days of Exercise per Week: 0 days     Minutes of Exercise per Session: 0 min   Stress: No Stress Concern Present (9/25/2023)    [employer] [place] [employer] - Occupational Stress Questionnaire     Feeling of Stress : Only a little   Housing Stability: Unknown (9/25/2023)    Housing Stability Vital Sign     Unable to Pay for Housing in the Last Year: No     Unstable Housing in the Last Year: No       Review of Systems   Constitutional:  Positive for fatigue. Negative for chills, diaphoresis, fever, irritability, malaise/fatigue, unexpected weight change, weight gain and weight loss.   HENT:  Negative for nasal congestion, ear pain, hearing loss, hoarse voice, rhinorrhea, sore throat and voice change.    Eyes:  Negative for blurred vision, photophobia and visual disturbance.   Respiratory:  Negative for choking, chest tightness, shortness of breath and wheezing.    Cardiovascular:  Negative for chest pain, palpitations, orthopnea, leg swelling and PND.   Gastrointestinal:  Negative for abdominal pain, blood in stool, constipation, diarrhea, nausea and vomiting.   Endocrine: Negative for cold intolerance, polydipsia, polyphagia, polyuria and hair loss.   Genitourinary:  Negative for difficulty urinating, dysuria, frequency, hematuria and menstrual problem.   Musculoskeletal:  Negative for arthralgias, back pain, myalgias and neck pain.   Integumentary:  Negative for rash and wound.   Neurological:  Positive for memory loss (Stable). Negative for dizziness, tremors, syncope, weakness, light-headedness and headaches.   Psychiatric/Behavioral:  Positive for agitation and dysphoric mood. Negative for decreased concentration, self-injury, sleep disturbance and suicidal ideas. The patient has insomnia. The patient is not  "nervous/anxious.       OBJECTIVE:      Vitals:    07/10/24 1429   BP: 138/80   BP Location: Left arm   Patient Position: Sitting   BP Method: Large (Automatic)   Pulse: 77   Temp: 99.7 °F (37.6 °C)   TempSrc: Oral   SpO2: 97%   Weight: 80.1 kg (176 lb 8 oz)   Height: 5' 1" (1.549 m)     Physical Exam  Vitals and nursing note reviewed.   Constitutional:       General: She is not in acute distress.     Appearance: She is well-developed.   HENT:      Head: Normocephalic and atraumatic.      Nose: Nose normal.      Mouth/Throat:      Pharynx: Uvula midline.   Eyes:      General: Lids are normal.      Conjunctiva/sclera: Conjunctivae normal.      Pupils: Pupils are equal, round, and reactive to light.      Right eye: Pupil is round and reactive.      Left eye: Pupil is round and reactive.   Neck:      Thyroid: No thyromegaly.      Vascular: No JVD.      Trachea: Trachea normal.   Cardiovascular:      Rate and Rhythm: Normal rate and regular rhythm.      Pulses: Normal pulses.      Heart sounds: Normal heart sounds.   Pulmonary:      Effort: Pulmonary effort is normal. No tachypnea or respiratory distress.      Breath sounds: Normal breath sounds. No wheezing or rhonchi.   Musculoskeletal:         General: Normal range of motion.      Cervical back: Normal range of motion and neck supple.      Right lower leg: No edema.      Left lower leg: No edema.   Lymphadenopathy:      Cervical: No cervical adenopathy.   Skin:     General: Skin is warm and dry.      Findings: No rash.   Neurological:      Mental Status: She is alert and oriented to person, place, and time.   Psychiatric:         Mood and Affect: Mood is depressed. Affect is tearful.         Speech: Speech normal.         Behavior: Behavior normal. Behavior is cooperative.         Thought Content: Thought content normal. Thought content does not include homicidal or suicidal ideation. Thought content does not include homicidal or suicidal plan.         Cognition and " Memory: She exhibits impaired recent memory (slight).        Assessment:       1. Dementia with mood disturbance, unspecified dementia severity, unspecified dementia type    2. Edema, unspecified type    3. Restless leg syndrome    4. Moderate major depression, single episode    5. Mixed hyperlipidemia    6. Essential hypertension    7. Gastroesophageal reflux disease without esophagitis        Plan:       Dementia with mood disturbance, unspecified dementia severity, unspecified dementia type  Continue Namenda as prescribed.  Added results he as an add on for mood stabilizing with the dementia.  Discuss with patient about taking 0.5 mg x 14 days then increase to 1 mg x 14 days.  Did discuss this with family and we would re-evaluate in 4 weeks.   -     brexpiprazole (REXULTI) 0.5 mg Tab; Take 0.5 mg by mouth once daily for 14 days, THEN 1 mg once daily for 14 days.  Dispense: 42 tablet; Refill: 0    Edema, unspecified type  Stable, kidney function remained stable  -     torsemide (DEMADEX) 10 MG Tab; Take 1 tablet (10 mg total) by mouth once daily.  Dispense: 90 tablet; Refill: 0    Restless leg syndrome  Stable.  Continue taking medication as prescribed  -     pramipexole (MIRAPEX) 0.5 MG tablet; Take 2 tablets (1 mg total) by mouth every evening.  Dispense: 180 tablet; Refill: 0    Moderate major depression, single episode  Uncontrolled.  Patient denies suicidal, homicidal, or self-harm.  Continues taking Lexapro 20 mg daily.  We will add on adjunct therapy of results he 0.5 mg x 14 days and increase to 1 mg daily.  We will follow up with patient in 4 weeks.  -     EScitalopram oxalate (LEXAPRO) 20 MG tablet; Take 1 tablet (20 mg total) by mouth once daily.  Dispense: 90 tablet; Refill: 1    Mixed hyperlipidemia  Controlled.  Continue taking atorvastatin 40 mg daily.  XIOMARA one hundred eighty-one, trigs 82, HDL 55, . Discussed risk factors associated with HLD (stroke, MI, etc.), lifestyle modifications, and  medication management.    -Limit: red meat, butter, fried foods, cheese, and other food with high saturated fat contents.  -Consume more: lean meats, fish, fruits, vegetables, whole grains, beans, lentils, and nuts.  -Increase fiber: oatmeal, bran, or fiber supplement.   -Weight loss, 30-45 min of cardiovascular exercise daily,   -     atorvastatin (LIPITOR) 40 MG tablet; Take 1 tablet (40 mg total) by mouth every evening.  Dispense: 90 tablet; Refill: 0    Essential hypertension  Below goal.  Continues taking amlodipine 5 mg, torsemide 10 mg, and isosorbide 60 mg daily.  Limit sodium intake, caffeine intake, and increase physical exercise.  Discuss risks associated with hypertension.      Gastroesophageal reflux disease without esophagitis  -     omeprazole (PRILOSEC) 20 MG capsule; Take 1 capsule (20 mg total) by mouth once daily.  Dispense: 90 capsule; Refill: 1      -patient to follow up in 4 weeks to evaluate the addition of results 80  -we will recheck thyroid function at that time to see if she needs a decrease in levothyroxine  -we will reorder her Belsomra in 4 weeks    I spent a total of 30 minutes on the day of the visit.  This includes face to face time and non-face to face time preparing to see the patient (eg, review of tests), obtaining and/or reviewing separately obtained history, documenting clinical information in the electronic or other health record, independently interpreting results and communicating results to the patient/family/caregiver, or care coordinator.   Follow up in about 1 month (around 8/10/2024) for thyroid, depression.      7/10/2024 SHEY Allen, PRECIOUSP    This note was created using [email or web address] voice recognition software that occasionally misinterprets phrases or words.

## 2024-07-12 DIAGNOSIS — M25.511 RIGHT SHOULDER PAIN, UNSPECIFIED CHRONICITY: Primary | ICD-10-CM

## 2024-07-18 ENCOUNTER — OFFICE VISIT (OUTPATIENT)
Dept: ORTHOPEDICS | Facility: CLINIC | Age: 74
End: 2024-07-18
Payer: MEDICARE

## 2024-07-18 ENCOUNTER — HOSPITAL ENCOUNTER (OUTPATIENT)
Dept: RADIOLOGY | Facility: HOSPITAL | Age: 74
Discharge: HOME | End: 2024-07-18
Attending: ORTHOPAEDIC SURGERY
Payer: MEDICARE

## 2024-07-18 VITALS — BODY MASS INDEX: 33.34 KG/M2 | HEIGHT: 61 IN | RESPIRATION RATE: 16 BRPM | WEIGHT: 176.56 LBS

## 2024-07-18 DIAGNOSIS — M25.511 RIGHT SHOULDER PAIN, UNSPECIFIED CHRONICITY: ICD-10-CM

## 2024-07-18 DIAGNOSIS — Z96.611 STATUS POST REVERSE ARTHROPLASTY OF SHOULDER, RIGHT: ICD-10-CM

## 2024-07-18 DIAGNOSIS — M25.511 RIGHT SHOULDER PAIN, UNSPECIFIED CHRONICITY: Primary | ICD-10-CM

## 2024-07-18 PROCEDURE — 73030 X-RAY EXAM OF SHOULDER: CPT | Mod: TC,PO,RT

## 2024-07-18 PROCEDURE — 99214 OFFICE O/P EST MOD 30 MIN: CPT | Mod: 25,S$GLB,, | Performed by: ORTHOPAEDIC SURGERY

## 2024-07-18 PROCEDURE — 1160F RVW MEDS BY RX/DR IN RCRD: CPT | Mod: CPTII,S$GLB,, | Performed by: ORTHOPAEDIC SURGERY

## 2024-07-18 PROCEDURE — 1125F AMNT PAIN NOTED PAIN PRSNT: CPT | Mod: CPTII,S$GLB,, | Performed by: ORTHOPAEDIC SURGERY

## 2024-07-18 PROCEDURE — 20610 DRAIN/INJ JOINT/BURSA W/O US: CPT | Mod: RT,S$GLB,, | Performed by: ORTHOPAEDIC SURGERY

## 2024-07-18 PROCEDURE — 3288F FALL RISK ASSESSMENT DOCD: CPT | Mod: CPTII,S$GLB,, | Performed by: ORTHOPAEDIC SURGERY

## 2024-07-18 PROCEDURE — 1159F MED LIST DOCD IN RCRD: CPT | Mod: CPTII,S$GLB,, | Performed by: ORTHOPAEDIC SURGERY

## 2024-07-18 PROCEDURE — 1101F PT FALLS ASSESS-DOCD LE1/YR: CPT | Mod: CPTII,S$GLB,, | Performed by: ORTHOPAEDIC SURGERY

## 2024-07-18 PROCEDURE — 99999 PR PBB SHADOW E&M-EST. PATIENT-LVL III: CPT | Mod: PBBFAC,,, | Performed by: ORTHOPAEDIC SURGERY

## 2024-07-18 PROCEDURE — 73030 X-RAY EXAM OF SHOULDER: CPT | Mod: 26,RT,, | Performed by: RADIOLOGY

## 2024-07-18 RX ORDER — TRIAMCINOLONE ACETONIDE 40 MG/ML
40 INJECTION, SUSPENSION INTRA-ARTICULAR; INTRAMUSCULAR
Status: DISCONTINUED | OUTPATIENT
Start: 2024-07-18 | End: 2024-07-18 | Stop reason: HOSPADM

## 2024-07-18 RX ADMIN — TRIAMCINOLONE ACETONIDE 40 MG: 40 INJECTION, SUSPENSION INTRA-ARTICULAR; INTRAMUSCULAR at 01:07

## 2024-07-18 NOTE — PROGRESS NOTES
Past Medical History:   Diagnosis Date    Allergy     Anxiety     Arthritis, rheumatoid     Back pain     Chronic bronchiolitis     Coronary artery disease involving native coronary artery 9/27/2023    Depression     Fibromyalgia     GERD (gastroesophageal reflux disease)     High cholesterol     Hilar mass 03/27/2014    [place] (hard of hearing)     aid right ear    [place] (hard of hearing)     Hypertension     Incontinence of urine     Lymphedema     MS (multiple sclerosis)     benign; another MD stated she has no MS    Neuropathy     Personal history of colonic polyps 12/05/2019    Restless leg syndrome     Rotator cuff tear 04/16/2015    Sciatica of left side 01/05/2018    Seasonal allergies     Sinus congestion     Sleep apnea     DOES NOT USE MACHINE    Spondylolysis     Thyroid disease     Type 2 diabetes mellitus with polyneuropathy     no med. was borderline    Wheezing        Past Surgical History:   Procedure Laterality Date    APPENDECTOMY      ARTHROSCOPIC DEBRIDEMENT OF SHOULDER Right 02/18/2022    Procedure: EXTENSIVE DEBRIDEMENT, SHOULDER, ARTHROSCOPIC;  Surgeon: Rio Pitts MD;  Location: [facility] OR;  Service: Orthopedics;  Laterality: Right;    BREAST SURGERY      reduction    CLOSED REDUCTION OF INJURY OF SHOULDER Right 09/19/2021    Procedure: CLOSED REDUCTION, SHOULDER;  Surgeon: Antonio Irwin MD;  Location: [facility] OR;  Service: Orthopedics;  Laterality: Right;    COLONOSCOPY W/ POLYPECTOMY N/A 12/05/2019    repeat in 5 yrs    CORONARY ANGIOGRAPHY N/A 9/27/2023    Procedure: ANGIOGRAM, CORONARY ARTERY;  Surgeon: Rosa Webber MD;  Location: [facility] CATH/EP LAB;  Service: Cardiology;  Laterality: N/A;    EPIDURAL STEROID INJECTION INTO LUMBAR SPINE N/A 06/12/2019    Procedure: Injection-steroid-epidural-lumbar;  Surgeon: Thad Manning MD;  Location: [facility] OR;  Service: Pain Management;  Laterality: N/A;  L5-S1    EPIDURAL STEROID INJECTION INTO LUMBAR SPINE N/A 09/16/2019    Procedure:  Injection-steroid-epidural-lumbar;  Surgeon: Thad Manning MD;  Location: [facility];  Service: Pain Management;  Laterality: N/A;  L5-S1    EYE SURGERY Bilateral     HYSTERECTOMY      partial - fibroids    INJECTION OF ANESTHETIC AGENT AROUND MEDIAL BRANCH NERVES INNERVATING LUMBAR FACET JOINT Bilateral 02/28/2019    Procedure: Block-nerve-medial branch-lumbar;  Surgeon: Thad Manning MD;  Location: [facility] OR;  Service: Pain Management;  Laterality: Bilateral;  L3, 4,5     INJECTION OF ANESTHETIC AGENT AROUND MEDIAL BRANCH NERVES INNERVATING LUMBAR FACET JOINT Bilateral 03/21/2019    Procedure: Block-nerve-medial branch-lumbar L3,4,5;  Surgeon: Thad Manning MD;  Location: [facility] OR;  Service: Pain Management;  Laterality: Bilateral;    KNEE ARTHROPLASTY Left 03/16/2021    Procedure: ARTHROPLASTY, KNEE;  Surgeon: Rio Pitts MD;  Location: [facility] OR;  Service: Orthopedics;  Laterality: Left;  GENERAL AND BLOCK    LIPOSUCTION  1985    RADIOFREQUENCY ABLATION Left 11/04/2020    Procedure: Radiofrequency Ablation left knee;  Surgeon: Andrew Escudero MD;  Location: [facility] OR;  Service: Pain Management;  Laterality: Left;    RADIOFREQUENCY ABLATION OF LUMBAR MEDIAL BRANCH NERVE AT SINGLE LEVEL Bilateral 04/12/2019    Procedure: Radiofrequency Ablation, Nerve, Spinal, Lumbar, Medial Branch, 1 Level;  Surgeon: Thad Manning MD;  Location: [facility];  Service: Pain Management;  Laterality: Bilateral;  L3, 4, 5  lumbar  [employer] pain management generator  SN [id number]  80 degrees for 75 seconds x2    RADIOFREQUENCY ABLATION OF LUMBAR MEDIAL BRANCH NERVE AT SINGLE LEVEL Bilateral 10/22/2019    Procedure: Radiofrequency Ablation, Nerve, Spinal, Lumbar, Medial Branch, L3,4,5;  Surgeon: Thad Manning MD;  Location: [facility] OR;  Service: Pain Management;  Laterality: Bilateral;  burned at 80 degrees C X 60 seconds X 2 each site    RADIOFREQUENCY ABLATION OF LUMBAR MEDIAL BRANCH NERVE AT SINGLE LEVEL Bilateral 05/27/2020    Procedure:  Radiofrequency Ablation, Nerve, Spinal, Lumbar, Medial Branch, 1 Level;  Surgeon: Thad Manning MD;  Location: [facility] OR;  Service: Pain Management;  Laterality: Bilateral;  L3,4,5    REVERSE TOTAL SHOULDER ARTHROPLASTY Right 04/12/2022    Procedure: ARTHROPLASTY, SHOULDER, TOTAL, REVERSE;  Surgeon: Rio Pitts MD;  Location: [facility] OR;  Service: Orthopedics;  Laterality: Right;    SHOULDER ARTHROSCOPY Right 09/19/2021    Procedure: ARTHROSCOPY, SHOULDER;  Surgeon: Antonio Irwin MD;  Location: [facility] OR;  Service: Orthopedics;  Laterality: Right;  LIMITED DEBRIDMENT    TONSILLECTOMY      TOTAL REDUCTION MAMMOPLASTY         Current Outpatient Medications   Medication Sig    amLODIPine (NORVASC) 5 MG tablet TAKE 1 TABLET(5 MG) BY MOUTH EVERY DAY FOR BLOOD PRESSURE    AREXVY, PF, 120 mcg/0.5 mL SusR vaccine     atorvastatin (LIPITOR) 40 MG tablet Take 1 tablet (40 mg total) by mouth every evening.    brexpiprazole (REXULTI) 0.5 mg Tab Take 0.5 mg by mouth once daily for 14 days, THEN 1 mg once daily for 14 days.    EScitalopram oxalate (LEXAPRO) 20 MG tablet Take 1 tablet (20 mg total) by mouth once daily.    ferrous sulfate 325 (65 FE) MG EC tablet TAKE 1 TABLET BY MOUTH ONCE DAILY    FLUZONE HIGHDOSE QUAD 23-24  mcg/0.7 mL Syrg     isosorbide mononitrate (IMDUR) 60 MG 24 hr tablet TAKE 1 TABLET(60 MG) BY MOUTH ONCE DAILY.    levothyroxine (SYNTHROID) 137 MCG Tab tablet Take 1 tablet (137 mcg total) by mouth before breakfast.    memantine (NAMENDA) 5 MG Tab Take 1 tablet (5 mg total) by mouth once daily.    omeprazole (PRILOSEC) 20 MG capsule Take 1 capsule (20 mg total) by mouth once daily.    ondansetron (ZOFRAN-ODT) 8 MG TbDL Take by mouth.    pramipexole (MIRAPEX) 0.5 MG tablet Take 2 tablets (1 mg total) by mouth every evening.    suvorexant (BELSOMRA) 5 mg Tab Take 1 tablet by mouth every evening.    torsemide (DEMADEX) 10 MG Tab Take 1 tablet (10 mg total) by mouth once daily.     No current  facility-administered medications for this visit.       Review of patient's allergies indicates:   Allergen Reactions    Keflex [cephalexin]      Throat swelling    Pcn [penicillins]      Throat swelling    Latex, natural rubber Other (See Comments)     Redness with bandaids     Adhesive Other (See Comments)     bandainds redness at skin    Trelegy ellipta [fluticasone-umeclidin-vilanter]      Vision disturbance       Family History   Problem Relation Name Age of Onset    Heart disease Mother         Social History     Socioeconomic History    Marital status:    Tobacco Use    Smoking status: Former     Current packs/day: 0.00     Types: Cigarettes     Quit date: 1996     Years since quittin.1     Passive exposure: Past    Smokeless tobacco: Never   Substance and Sexual Activity    Alcohol use: Yes     Comment: very little     Drug use: No    Sexual activity: Not Currently     Partners: Male     Social Determinants of Health     Financial Resource Strain: Low Risk  (2023)    Overall Financial Resource Strain (CARDIA)     Difficulty of Paying Living Expenses: Not hard at all   Food Insecurity: No Food Insecurity (2023)    Hunger Vital Sign     Worried About Running Out of Food in the Last Year: Never true     Ran Out of Food in the Last Year: Never true   Transportation Needs: No Transportation Needs (2023)    PRAPARE - Transportation     Lack of Transportation (Medical): No     Lack of Transportation (Non-Medical): No   Physical Activity: Inactive (2023)    Exercise Vital Sign     Days of Exercise per Week: 0 days     Minutes of Exercise per Session: 0 min   Stress: No Stress Concern Present (2023)    [employer] [place] [employer] - Occupational Stress Questionnaire     Feeling of Stress : Only a little   Housing Stability: Unknown (2023)    Housing Stability Vital Sign     Unable to Pay for Housing in the Last Year: No     Unstable Housing in the Last Year:  No       Chief Complaint:   Chief Complaint   Patient presents with    Right Shoulder - Pain     Hx rTSA , continued pain        Date of surgery:  April 12, 2022    History of present illness:  74-year-old female underwent  a reverse total shoulder arthroplasty for persistent her shoulder instability after dislocation.  Patient had some significant osteoporosis possibly compromising fixation of the base plate.  Pain is 6/10.  Pain hurts around the top of the shoulder.  Hurts with range of motion.  No recent falls or injuries.  Describes as a constant achiness.      Review of Systems:    Musculoskeletal:  See HPI        Physical Examination:    Vital Signs:    Vitals:    07/18/24 1322   Resp: 16         Body mass index is 33.37 kg/m².    This a well-developed, well nourished patient in no acute distress.  They are alert and oriented and cooperative to examination.  Pt. walks without an antalgic gait.      Examination right shoulder shows well-healed surgical portals.  Patient has pretty limited range of motion with forward flexion of about 110° with external rotation of 40°.  Moderate discomfort.    X-rays:  X-rays of the right shoulder is ordered and review which show no obvious abnormalities involving the hardware or bones.  No change from x-rays a year ago.  There is some anteversion of the base plate and glenosphere in relation to the scapula      Assessment:: Right reverse total shoulder arthroplasty  Right shoulder pain      Plan:  I reviewed the x-ray with her today.  Offered her a subacromial injection to see if it will not help with her pain.  Recommended continued observation with x-rays annually.  Follow-up in 3 months.  Might require more of a workup if still having pain at that time.      This note was created using [employer] voice recognition software that occasionally misinterpreted phrases or words.

## 2024-07-18 NOTE — PROCEDURES
Large Joint Aspiration/Injection: R subacromial bursa    Date/Time: 7/18/2024 1:45 PM    Performed by: Rio Pitts MD  Authorized by: Rio Pitts MD    Consent Done?:  Yes (Verbal)  Indications:  Pain  Site marked: the procedure site was marked    Timeout: prior to procedure the correct patient, procedure, and site was verified    Local anesthetic: Ropivicaine.  Anesthetic total (ml):  3      Details:  Needle Size:  20 G  Ultrasonic Guidance for needle placement?: No    Approach:  Posterior  Location:  Shoulder  Site:  R subacromial bursa  Medications:  40 mg triamcinolone acetonide 40 mg/mL  Patient tolerance:  Patient tolerated the procedure well with no immediate complications

## 2024-07-19 ENCOUNTER — TELEPHONE (OUTPATIENT)
Dept: ORTHOPEDICS | Facility: CLINIC | Age: 74
End: 2024-07-19
Payer: MEDICARE

## 2024-07-19 NOTE — TELEPHONE ENCOUNTER
Pt advised can take up to two weeks for the injections to start working. Advised pt to call back in two weeks if still no relief.

## 2024-07-19 NOTE — TELEPHONE ENCOUNTER
----- Message from Gee Yoo MA sent at 7/19/2024 11:18 AM CDT -----  Contact: patient  Still having pain in right arm.  Just got injection yesterday.    Call back number is [phone]

## 2024-07-25 ENCOUNTER — PATIENT MESSAGE (OUTPATIENT)
Dept: PSYCHIATRY | Facility: CLINIC | Age: 74
End: 2024-07-25
Payer: MEDICARE

## 2024-08-02 DIAGNOSIS — U07.1 COVID: Primary | ICD-10-CM

## 2024-08-02 NOTE — PROGRESS NOTES
Is at an assisted living center was diagnosed with COVID.  She is having mild symptoms, but does have comorbidities.  COVID risk score equals 7  Current Outpatient Medications on File Prior to Visit   Medication Sig Dispense Refill    amLODIPine (NORVASC) 5 MG tablet TAKE 1 TABLET(5 MG) BY MOUTH EVERY DAY FOR BLOOD PRESSURE 90 tablet 1    AREXVY, PF, 120 mcg/0.5 mL SusR vaccine       atorvastatin (LIPITOR) 40 MG tablet Take 1 tablet (40 mg total) by mouth every evening. 90 tablet 0    brexpiprazole (REXULTI) 0.5 mg Tab Take 0.5 mg by mouth once daily for 14 days, THEN 1 mg once daily for 14 days. 42 tablet 0    EScitalopram oxalate (LEXAPRO) 20 MG tablet Take 1 tablet (20 mg total) by mouth once daily. 90 tablet 1    ferrous sulfate 325 (65 FE) MG EC tablet TAKE 1 TABLET BY MOUTH ONCE DAILY 90 tablet 0    FLUZONE HIGHDOSE QUAD 23-24  mcg/0.7 mL Syrg       isosorbide mononitrate (IMDUR) 60 MG 24 hr tablet TAKE 1 TABLET(60 MG) BY MOUTH ONCE DAILY. 90 tablet 1    levothyroxine (SYNTHROID) 137 MCG Tab tablet Take 1 tablet (137 mcg total) by mouth before breakfast. 90 tablet 0    memantine (NAMENDA) 5 MG Tab Take 1 tablet (5 mg total) by mouth once daily. 60 tablet 2    omeprazole (PRILOSEC) 20 MG capsule Take 1 capsule (20 mg total) by mouth once daily. 90 capsule 1    ondansetron (ZOFRAN-ODT) 8 MG TbDL Take by mouth.      pramipexole (MIRAPEX) 0.5 MG tablet Take 2 tablets (1 mg total) by mouth every evening. 180 tablet 0    suvorexant (BELSOMRA) 5 mg Tab Take 1 tablet by mouth every evening. 30 tablet 1    torsemide (DEMADEX) 10 MG Tab Take 1 tablet (10 mg total) by mouth once daily. 90 tablet 0     No current facility-administered medications on file prior to visit.     Patient will have to hold the Belsomra and atorvastatin for 10 days.

## 2024-08-05 ENCOUNTER — PATIENT MESSAGE (OUTPATIENT)
Dept: PSYCHIATRY | Facility: CLINIC | Age: 74
End: 2024-08-05
Payer: MEDICARE

## 2024-08-12 ENCOUNTER — LAB VISIT (OUTPATIENT)
Dept: LAB | Facility: HOSPITAL | Age: 74
End: 2024-08-12
Payer: MEDICARE

## 2024-08-12 ENCOUNTER — OFFICE VISIT (OUTPATIENT)
Dept: FAMILY MEDICINE | Facility: CLINIC | Age: 74
End: 2024-08-12
Payer: MEDICARE

## 2024-08-12 VITALS
BODY MASS INDEX: 31.18 KG/M2 | TEMPERATURE: 98 F | HEART RATE: 94 BPM | OXYGEN SATURATION: 98 % | WEIGHT: 165 LBS | DIASTOLIC BLOOD PRESSURE: 74 MMHG | SYSTOLIC BLOOD PRESSURE: 106 MMHG

## 2024-08-12 DIAGNOSIS — F03.93 DEMENTIA WITH MOOD DISTURBANCE, UNSPECIFIED DEMENTIA SEVERITY, UNSPECIFIED DEMENTIA TYPE: Primary | ICD-10-CM

## 2024-08-12 DIAGNOSIS — E03.9 ACQUIRED HYPOTHYROIDISM: ICD-10-CM

## 2024-08-12 LAB
T4 FREE SERPL-MCNC: 1.34 NG/DL (ref 0.71–1.51)
TSH SERPL DL<=0.005 MIU/L-ACNC: 0.06 UIU/ML (ref 0.4–4)

## 2024-08-12 PROCEDURE — 1101F PT FALLS ASSESS-DOCD LE1/YR: CPT | Mod: HCNC,CPTII,S$GLB,

## 2024-08-12 PROCEDURE — 1126F AMNT PAIN NOTED NONE PRSNT: CPT | Mod: HCNC,CPTII,S$GLB,

## 2024-08-12 PROCEDURE — 3074F SYST BP LT 130 MM HG: CPT | Mod: HCNC,CPTII,S$GLB,

## 2024-08-12 PROCEDURE — 1159F MED LIST DOCD IN RCRD: CPT | Mod: HCNC,CPTII,S$GLB,

## 2024-08-12 PROCEDURE — 36415 COLL VENOUS BLD VENIPUNCTURE: CPT | Mod: HCNC

## 2024-08-12 PROCEDURE — 3078F DIAST BP <80 MM HG: CPT | Mod: HCNC,CPTII,S$GLB,

## 2024-08-12 PROCEDURE — 3288F FALL RISK ASSESSMENT DOCD: CPT | Mod: HCNC,CPTII,S$GLB,

## 2024-08-12 PROCEDURE — 99999 PR PBB SHADOW E&M-EST. PATIENT-LVL IV: CPT | Mod: PBBFAC,HCNC,,

## 2024-08-12 PROCEDURE — 84443 ASSAY THYROID STIM HORMONE: CPT | Mod: HCNC

## 2024-08-12 PROCEDURE — 84439 ASSAY OF FREE THYROXINE: CPT | Mod: HCNC

## 2024-08-12 PROCEDURE — 99214 OFFICE O/P EST MOD 30 MIN: CPT | Mod: HCNC,S$GLB,,

## 2024-08-12 RX ORDER — BREXPIPRAZOLE 2 MG/1
2 TABLET ORAL DAILY
Qty: 30 TABLET | Refills: 2 | Status: SHIPPED | OUTPATIENT
Start: 2024-08-12

## 2024-08-12 NOTE — PROGRESS NOTES
SUBJECTIVE:      Patient ID: Genoveva Gilbert is a 74 y.o. female.    Chief Complaint: Thyroid and Depression    Estela is a 74yr female, who is an established patient. She presents today for follow up on hypothyroidism and depression. PMH:  Depression, dementia, anemia, hypothyroidism, diabetes, has a myalgia, heart failure, hyperlipidemia, CAD, hypertension, GERD, ESHA, MS and insomnia.  Labs were not completed prior to visit.   Dementia:  Continues taking Namenda twice a day as prescribed.  No side effects noted.  Family believes that this medication is working of the have not seen progression and forgetfulness.    She continues to take levothyroxine 137 mcg daily.  This medication has been adjusted a few times to try to get therapeutic TSH.  Lab reveals that her TSH went from 8.3 drastically to 0.4.  Patient denies weight loss, palpitations, anxiousness, or hair loss.  She is tolerating the medication well and denies any side effects.     Depression:  Continues taking Lexapro 20 mg daily, last office visit she was started on Rexulti and titrated from 0.5 mg to 1 mg.  She has been on this medication for 1 month.  She is tolerating well and denies any side effects.  Patient reports over the last 2 weeks though she has been isolated to her room at the facility due to her having COVID and other residents also having COVID.  She does not feel a difference, says that she cries every now and then.  Denies suicidal ideation, homicidal ideation, or self-harm.  Does feel isolated and sad at times.  But she has been trying to keep herself busy with playing games.  She is smiling in the office today and is not tearful.         Review of patient's allergies indicates:   Allergen Reactions    Keflex [cephalexin]      Throat swelling    Pcn [penicillins]      Throat swelling    Latex, natural rubber Other (See Comments)     Redness with bandaids     Adhesive Other (See Comments)     bandainds redness at skin    Trelegy ellipta  [fluticasone-umeclidin-vilanter]      Vision disturbance      Past Medical History:   Diagnosis Date    Allergy     Anxiety     Arthritis, rheumatoid     Back pain     Chronic bronchiolitis     Coronary artery disease involving native coronary artery 9/27/2023    Depression     Fibromyalgia     GERD (gastroesophageal reflux disease)     High cholesterol     Hilar mass 03/27/2014    [place] (hard of hearing)     aid right ear    [place] (hard of hearing)     Hypertension     Incontinence of urine     Lymphedema     MS (multiple sclerosis)     benign; another MD stated she has no MS    Neuropathy     Personal history of colonic polyps 12/05/2019    Restless leg syndrome     Rotator cuff tear 04/16/2015    Sciatica of left side 01/05/2018    Seasonal allergies     Sinus congestion     Sleep apnea     DOES NOT USE MACHINE    Spondylolysis     Thyroid disease     Type 2 diabetes mellitus with polyneuropathy     no med. was borderline    Wheezing      Past Surgical History:   Procedure Laterality Date    APPENDECTOMY      ARTHROSCOPIC DEBRIDEMENT OF SHOULDER Right 02/18/2022    Procedure: EXTENSIVE DEBRIDEMENT, SHOULDER, ARTHROSCOPIC;  Surgeon: Rio Pitts MD;  Location: [facility] OR;  Service: Orthopedics;  Laterality: Right;    BREAST SURGERY      reduction    CLOSED REDUCTION OF INJURY OF SHOULDER Right 09/19/2021    Procedure: CLOSED REDUCTION, SHOULDER;  Surgeon: Antonio Irwin MD;  Location: [facility] OR;  Service: Orthopedics;  Laterality: Right;    COLONOSCOPY W/ POLYPECTOMY N/A 12/05/2019    repeat in 5 yrs    CORONARY ANGIOGRAPHY N/A 9/27/2023    Procedure: ANGIOGRAM, CORONARY ARTERY;  Surgeon: Rosa Webber MD;  Location: [facility] CATH/EP LAB;  Service: Cardiology;  Laterality: N/A;    EPIDURAL STEROID INJECTION INTO LUMBAR SPINE N/A 06/12/2019    Procedure: Injection-steroid-epidural-lumbar;  Surgeon: Thad Manning MD;  Location: [facility] OR;  Service: Pain Management;  Laterality: N/A;  L5-S1    EPIDURAL STEROID  INJECTION INTO LUMBAR SPINE N/A 09/16/2019    Procedure: Injection-steroid-epidural-lumbar;  Surgeon: Thad Manning MD;  [facility];  Service: Pain Management;  Laterality: N/A;  L5-S1    EYE SURGERY Bilateral     HYSTERECTOMY      partial - fibroids    INJECTION OF ANESTHETIC AGENT AROUND MEDIAL BRANCH NERVES INNERVATING LUMBAR FACET JOINT Bilateral 02/28/2019    Procedure: Block-nerve-medial branch-lumbar;  Surgeon: Thad Manning MD;  [facility];  Service: Pain Management;  Laterality: Bilateral;  L3, 4,5     INJECTION OF ANESTHETIC AGENT AROUND MEDIAL BRANCH NERVES INNERVATING LUMBAR FACET JOINT Bilateral 03/21/2019    Procedure: Block-nerve-medial branch-lumbar L3,4,5;  Surgeon: Thad Manning MD;  [facility];  Service: Pain Management;  Laterality: Bilateral;    KNEE ARTHROPLASTY Left 03/16/2021    Procedure: ARTHROPLASTY, KNEE;  Surgeon: Rio Pitts MD;  Location: [facility] OR;  Service: Orthopedics;  Laterality: Left;  GENERAL AND BLOCK    LIPOSUCTION  1985    RADIOFREQUENCY ABLATION Left 11/04/2020    Procedure: Radiofrequency Ablation left knee;  Surgeon: Andrew Escudero MD;  Location: [facility] OR;  Service: Pain Management;  Laterality: Left;    RADIOFREQUENCY ABLATION OF LUMBAR MEDIAL BRANCH NERVE AT SINGLE LEVEL Bilateral 04/12/2019    Procedure: Radiofrequency Ablation, Nerve, Spinal, Lumbar, Medial Branch, 1 Level;  Surgeon: Thad Manning MD;  Location: [facility];  Service: Pain Management;  Laterality: Bilateral;  L3, 4, 5  lumbar  Ylopo pain management generator  SN [id number]  80 degrees for 75 seconds x2    RADIOFREQUENCY ABLATION OF LUMBAR MEDIAL BRANCH NERVE AT SINGLE LEVEL Bilateral 10/22/2019    Procedure: Radiofrequency Ablation, Nerve, Spinal, Lumbar, Medial Branch, L3,4,5;  Surgeon: Thad Manning MD;  Location: [facility];  Service: Pain Management;  Laterality: Bilateral;  burned at 80 degrees C X 60 seconds X 2 each site    RADIOFREQUENCY ABLATION OF LUMBAR MEDIAL BRANCH NERVE  AT SINGLE LEVEL Bilateral 05/27/2020    Procedure: Radiofrequency Ablation, Nerve, Spinal, Lumbar, Medial Branch, 1 Level;  Surgeon: Thad Manning MD;  Location: [facility] OR;  Service: Pain Management;  Laterality: Bilateral;  L3,4,5    REVERSE TOTAL SHOULDER ARTHROPLASTY Right 04/12/2022    Procedure: ARTHROPLASTY, SHOULDER, TOTAL, REVERSE;  Surgeon: Rio Pitts MD;  Location: [facility] OR;  Service: Orthopedics;  Laterality: Right;    SHOULDER ARTHROSCOPY Right 09/19/2021    Procedure: ARTHROSCOPY, SHOULDER;  Surgeon: Antonio Irwin MD;  Location: [facility] OR;  Service: Orthopedics;  Laterality: Right;  LIMITED DEBRIDMENT    TONSILLECTOMY      TOTAL REDUCTION MAMMOPLASTY       Current Outpatient Medications   Medication Sig Dispense Refill    amLODIPine (NORVASC) 5 MG tablet TAKE 1 TABLET(5 MG) BY MOUTH EVERY DAY FOR BLOOD PRESSURE 90 tablet 1    AREXVY, PF, 120 mcg/0.5 mL SusR vaccine       atorvastatin (LIPITOR) 40 MG tablet Take 1 tablet (40 mg total) by mouth every evening. 90 tablet 0    EScitalopram oxalate (LEXAPRO) 20 MG tablet Take 1 tablet (20 mg total) by mouth once daily. 90 tablet 1    ferrous sulfate 325 (65 FE) MG EC tablet TAKE 1 TABLET BY MOUTH ONCE DAILY 90 tablet 0    FLUZONE HIGHDOSE QUAD 23-24  mcg/0.7 mL Syrg       isosorbide mononitrate (IMDUR) 60 MG 24 hr tablet TAKE 1 TABLET(60 MG) BY MOUTH ONCE DAILY. 90 tablet 1    levothyroxine (SYNTHROID) 137 MCG Tab tablet Take 1 tablet (137 mcg total) by mouth before breakfast. 90 tablet 0    memantine (NAMENDA) 5 MG Tab Take 1 tablet (5 mg total) by mouth once daily. 60 tablet 2    omeprazole (PRILOSEC) 20 MG capsule Take 1 capsule (20 mg total) by mouth once daily. 90 capsule 1    ondansetron (ZOFRAN-ODT) 8 MG TbDL Take by mouth.      pramipexole (MIRAPEX) 0.5 MG tablet Take 2 tablets (1 mg total) by mouth every evening. 180 tablet 0    suvorexant (BELSOMRA) 5 mg Tab Take 1 tablet by mouth every evening. 30 tablet 1    torsemide (DEMADEX) 10  MG Tab Take 1 tablet (10 mg total) by mouth once daily. 90 tablet 0    brexpiprazole (REXULTI) 2 mg Tab Take 1 tablet (2 mg total) by mouth once daily. 30 tablet 2     No current facility-administered medications for this visit.     Family History   Problem Relation Name Age of Onset    Heart disease Mother        Social History     Socioeconomic History    Marital status:    Tobacco Use    Smoking status: Former     Current packs/day: 0.00     Types: Cigarettes     Quit date: 1996     Years since quittin.2     Passive exposure: Past    Smokeless tobacco: Never   Substance and Sexual Activity    Alcohol use: Yes     Comment: very little     Drug use: No    Sexual activity: Not Currently     Partners: Male     Social Determinants of Health     Financial Resource Strain: Low Risk  (2023)    Overall Financial Resource Strain (CARDIA)     Difficulty of Paying Living Expenses: Not hard at all   Food Insecurity: No Food Insecurity (2023)    Hunger Vital Sign     Worried About Running Out of Food in the Last Year: Never true     Ran Out of Food in the Last Year: Never true   Transportation Needs: No Transportation Needs (2023)    PRAPARE - Transportation     Lack of Transportation (Medical): No     Lack of Transportation (Non-Medical): No   Physical Activity: Inactive (2023)    Exercise Vital Sign     Days of Exercise per Week: 0 days     Minutes of Exercise per Session: 0 min   Stress: No Stress Concern Present (2023)    [employer] - Occupational Stress Questionnaire     Feeling of Stress : Only a little   Housing Stability: Unknown (2023)    Housing Stability Vital Sign     Unable to Pay for Housing in the Last Year: No     Unstable Housing in the Last Year: No       Review of Systems   Constitutional:  Negative for chills, diaphoresis, fatigue, fever and unexpected weight change.   HENT:  Negative for nasal congestion, ear pain, hearing loss,  rhinorrhea, sore throat and voice change.    Eyes:  Negative for photophobia and visual disturbance.   Respiratory:  Negative for choking, chest tightness, shortness of breath and wheezing.    Cardiovascular:  Negative for chest pain, palpitations and leg swelling.   Gastrointestinal:  Negative for abdominal pain, blood in stool, constipation, diarrhea, nausea and vomiting.   Endocrine: Negative for cold intolerance, polydipsia, polyphagia and polyuria.   Genitourinary:  Negative for difficulty urinating, dysuria, frequency, hematuria and menstrual problem.   Musculoskeletal:  Negative for arthralgias, back pain, myalgias and neck pain.   Integumentary:  Negative for rash and wound.   Neurological:  Positive for memory loss (Stable). Negative for dizziness, tremors, syncope, weakness, light-headedness and headaches.   Psychiatric/Behavioral:  Positive for depressed mood (improving) and dysphoric mood (improving). Negative for agitation, decreased concentration, self-injury, sleep disturbance and suicidal ideas. The patient is not nervous/anxious.       OBJECTIVE:      Vitals:    08/12/24 1432   BP: 106/74   BP Location: Right arm   Patient Position: Sitting   BP Method: Medium (Automatic)   Pulse: 94   Temp: 98.2 °F (36.8 °C)   TempSrc: Oral   SpO2: 98%   Weight: 74.8 kg (165 lb)     Physical Exam  Vitals and nursing note reviewed.   Constitutional:       General: She is not in acute distress.     Appearance: She is well-developed.   HENT:      Head: Normocephalic and atraumatic.      Nose: Nose normal.      Mouth/Throat:      Pharynx: Uvula midline.   Eyes:      General: Lids are normal.      Conjunctiva/sclera: Conjunctivae normal.      Pupils: Pupils are equal, round, and reactive to light.      Right eye: Pupil is round and reactive.      Left eye: Pupil is round and reactive.   Neck:      Thyroid: No thyromegaly.      Vascular: No JVD.      Trachea: Trachea normal.   Cardiovascular:      Rate and Rhythm: Normal  rate and regular rhythm.      Pulses: Normal pulses.      Heart sounds: Normal heart sounds.   Pulmonary:      Effort: Pulmonary effort is normal. No tachypnea or respiratory distress.      Breath sounds: Normal breath sounds. No wheezing or rhonchi.   Musculoskeletal:         General: Normal range of motion.      Cervical back: Normal range of motion and neck supple.      Right lower leg: No edema.      Left lower leg: No edema.   Lymphadenopathy:      Cervical: No cervical adenopathy.   Skin:     General: Skin is warm and dry.      Findings: No rash.   Neurological:      Mental Status: She is alert and oriented to person, place, and time.   Psychiatric:         Mood and Affect: Mood is not anxious or depressed. Affect is not tearful.         Speech: Speech normal.         Behavior: Behavior normal. Behavior is cooperative.         Thought Content: Thought content normal. Thought content does not include homicidal or suicidal ideation. Thought content does not include homicidal or suicidal plan.         Cognition and Memory: She exhibits impaired recent memory (slight).        Assessment:       1. Dementia with mood disturbance, unspecified dementia severity, unspecified dementia type    2. Acquired hypothyroidism        Plan:       Dementia with mood disturbance, unspecified dementia severity, unspecified dementia type  Observation in office shows an improvement in mood and tearfulness.  However patient does not feel a difference.  Discuss about increasing dosing.  She denies suicidal, homicidal, or self-harm.  She has been isolated though over the last 2 weeks due to COVID. Increasing Rexulti to 2mg daily.-we will reassess in 8 weeks.   -     brexpiprazole (REXULTI) 2 mg Tab; Take 1 tablet (2 mg total) by mouth once daily.  Dispense: 30 tablet; Refill: 2    Acquired hypothyroidism  Rechecking TSH level today to make sure she is not over supplemented due to the drastic drop in TSH levels.  She is asymptomatic at  this time.  Plan to continue the levothyroxine 137 mcg daily.   -     TSH; Future; Expected date: 08/12/2024        Follow up in about 2 months (around 10/12/2024) for anxiety, TSH.      8/12/2024 SHEY Allen, PRECIOUSP    This note was created using [employer] voice recognition software that occasionally misinterprets phrases or words.

## 2024-08-13 ENCOUNTER — PATIENT MESSAGE (OUTPATIENT)
Dept: FAMILY MEDICINE | Facility: CLINIC | Age: 74
End: 2024-08-13
Payer: MEDICARE

## 2024-08-13 DIAGNOSIS — E03.9 ACQUIRED HYPOTHYROIDISM: ICD-10-CM

## 2024-08-13 DIAGNOSIS — I10 ESSENTIAL HYPERTENSION: ICD-10-CM

## 2024-08-13 DIAGNOSIS — F03.93 DEMENTIA WITH MOOD DISTURBANCE, UNSPECIFIED DEMENTIA SEVERITY, UNSPECIFIED DEMENTIA TYPE: ICD-10-CM

## 2024-08-13 DIAGNOSIS — E03.9 ACQUIRED HYPOTHYROIDISM: Primary | ICD-10-CM

## 2024-08-13 RX ORDER — LEVOTHYROXINE SODIUM 125 UG/1
125 TABLET ORAL
Qty: 90 TABLET | Refills: 0 | Status: SHIPPED | OUTPATIENT
Start: 2024-08-13 | End: 2024-08-13 | Stop reason: SDUPTHER

## 2024-08-13 RX ORDER — LEVOTHYROXINE SODIUM 125 UG/1
125 TABLET ORAL
Qty: 90 TABLET | Refills: 0 | Status: SHIPPED | OUTPATIENT
Start: 2024-08-13

## 2024-08-13 RX ORDER — AMLODIPINE BESYLATE 5 MG/1
TABLET ORAL
Qty: 90 TABLET | Refills: 1 | OUTPATIENT
Start: 2024-08-13

## 2024-08-13 NOTE — TELEPHONE ENCOUNTER
They changed pharmacy to [facility] [address]     Message regarding medication change was given to patient, verbal understanding was expressed.

## 2024-08-13 NOTE — TELEPHONE ENCOUNTER
----- Message from Yuridia Arreola NP sent at 8/13/2024 11:37 AM CDT -----  Please call patient/family and let them know that I have decreased her levothyroxine to 125 mcg daily.  Her numbers have now been overcorrected, so we need to back off the dosage a little.

## 2024-08-28 ENCOUNTER — TELEPHONE (OUTPATIENT)
Dept: ORTHOPEDICS | Facility: CLINIC | Age: 74
End: 2024-08-28
Payer: MEDICARE

## 2024-08-28 NOTE — TELEPHONE ENCOUNTER
----- Message from Genny Murphy MA sent at 8/28/2024 11:18 AM CDT -----  Contact: pt  Still having pain and discomfort in shoulder   Refused to schedule appt   Wants, pain medication   Call back

## 2024-08-29 ENCOUNTER — TELEPHONE (OUTPATIENT)
Dept: FAMILY MEDICINE | Facility: CLINIC | Age: 74
End: 2024-08-29
Payer: MEDICARE

## 2024-08-29 NOTE — TELEPHONE ENCOUNTER
----- Message from Loreta Wilder sent at 8/29/2024  3:18 PM CDT -----  The patient saw Yuridia. The new prescription that was sent over for Dementia may need a PA. Can you check.and let Delta know.   Delta the patients  called.  # [phone]  [facility] [address]

## 2024-09-01 DIAGNOSIS — E03.9 ACQUIRED HYPOTHYROIDISM: ICD-10-CM

## 2024-09-04 RX ORDER — LEVOTHYROXINE SODIUM 125 UG/1
125 TABLET ORAL
Qty: 90 TABLET | Refills: 0 | Status: SHIPPED | OUTPATIENT
Start: 2024-09-04

## 2024-09-09 ENCOUNTER — TELEPHONE (OUTPATIENT)
Dept: FAMILY MEDICINE | Facility: CLINIC | Age: 74
End: 2024-09-09
Payer: MEDICARE

## 2024-09-09 DIAGNOSIS — G47.00 INSOMNIA, UNSPECIFIED TYPE: ICD-10-CM

## 2024-09-09 RX ORDER — SUVOREXANT 5 MG/1
1 TABLET, FILM COATED ORAL NIGHTLY
Qty: 30 TABLET | Refills: 1 | Status: CANCELLED | OUTPATIENT
Start: 2024-09-09

## 2024-09-09 NOTE — TELEPHONE ENCOUNTER
----- Message from Sofi Perez sent at 9/9/2024 10:53 AM CDT -----  Contact: rich Sorenson caretaker Looking for prescription on sleeping , memory medication due to no script at the pharmacy.   [facility] [address] [facility]   [phone]

## 2024-09-09 NOTE — TELEPHONE ENCOUNTER
----- Message from Keturah Riddle sent at 9/9/2024 12:55 PM CDT -----  Regarding: Refill for suvorexant (BELSOMRA) 5 mg Tab  Patient's spouse came in stating that she needs a refill for suvorexant (BELSOMRA) 5 mg Tab to be sent to [facility], [address] [place] [address]. Please contact patient with any questions and if/when done.     Thank You

## 2024-09-09 NOTE — TELEPHONE ENCOUNTER
----- Message from Loreta Wilder sent at 9/9/2024  2:52 PM CDT -----   Delta the patients  called. He said to cancel the message from today. He said he got everything straight.  No  call back needed. Delta's # [phone] GH

## 2024-09-09 NOTE — TELEPHONE ENCOUNTER
- Message from Loreta Wilder sent at 9/9/2024  2:52 PM CDT -----   Delta the patients  called. He said to cancel the message from today. He said he got everything straight.  No  call back needed. Delta's # [phone] GH

## 2024-09-10 ENCOUNTER — TELEPHONE (OUTPATIENT)
Dept: FAMILY MEDICINE | Facility: CLINIC | Age: 74
End: 2024-09-10
Payer: MEDICARE

## 2024-09-10 NOTE — TELEPHONE ENCOUNTER
Called patient to r/s appt due to provider being out of office. Pt said they will call back once they have a chance to look at their calendar. Gave patient call back number. Patient expressed verbal understanding.

## 2024-09-30 DIAGNOSIS — I10 ESSENTIAL HYPERTENSION: ICD-10-CM

## 2024-09-30 RX ORDER — ISOSORBIDE MONONITRATE 60 MG/1
TABLET, EXTENDED RELEASE ORAL
Qty: 90 TABLET | Refills: 0 | Status: SHIPPED | OUTPATIENT
Start: 2024-09-30

## 2024-10-16 ENCOUNTER — TELEPHONE (OUTPATIENT)
Dept: FAMILY MEDICINE | Facility: CLINIC | Age: 74
End: 2024-10-16
Payer: MEDICARE

## 2024-10-16 NOTE — TELEPHONE ENCOUNTER
----- Message from Sofi sent at 10/16/2024  4:03 PM CDT -----  Contact: Ayaka Marley ellyn Grand daughter Calling to get on track with patients prescriptions   [phone]  Left voicemail @8:45am

## 2024-10-17 ENCOUNTER — OFFICE VISIT (OUTPATIENT)
Dept: ORTHOPEDICS | Facility: CLINIC | Age: 74
End: 2024-10-17
Payer: MEDICARE

## 2024-10-17 DIAGNOSIS — Z96.611 STATUS POST REVERSE ARTHROPLASTY OF SHOULDER, RIGHT: Primary | ICD-10-CM

## 2024-10-17 PROCEDURE — 99999 PR PBB SHADOW E&M-EST. PATIENT-LVL I: CPT | Mod: PBBFAC,HCNC,, | Performed by: ORTHOPAEDIC SURGERY

## 2024-10-17 NOTE — PROGRESS NOTES
Past Medical History:   Diagnosis Date    Allergy     Anxiety     Arthritis, rheumatoid     Back pain     Chronic bronchiolitis     Coronary artery disease involving native coronary artery 9/27/2023    Depression     Fibromyalgia     GERD (gastroesophageal reflux disease)     High cholesterol     Hilar mass 03/27/2014    [place] (hard of hearing)     aid right ear    [place] (hard of hearing)     Hypertension     Incontinence of urine     Lymphedema     MS (multiple sclerosis)     benign; another MD stated she has no MS    Neuropathy     Personal history of colonic polyps 12/05/2019    Restless leg syndrome     Rotator cuff tear 04/16/2015    Sciatica of left side 01/05/2018    Seasonal allergies     Sinus congestion     Sleep apnea     DOES NOT USE MACHINE    Spondylolysis     Thyroid disease     Type 2 diabetes mellitus with polyneuropathy     no med. was borderline    Wheezing        Past Surgical History:   Procedure Laterality Date    APPENDECTOMY      ARTHROSCOPIC DEBRIDEMENT OF SHOULDER Right 02/18/2022    Procedure: EXTENSIVE DEBRIDEMENT, SHOULDER, ARTHROSCOPIC;  Surgeon: Rio Pitts MD;  Location: [facility] OR;  Service: Orthopedics;  Laterality: Right;    BREAST SURGERY      reduction    CLOSED REDUCTION OF INJURY OF SHOULDER Right 09/19/2021    Procedure: CLOSED REDUCTION, SHOULDER;  Surgeon: Antonio Irwin MD;  Location: [facility] OR;  Service: Orthopedics;  Laterality: Right;    COLONOSCOPY W/ POLYPECTOMY N/A 12/05/2019    repeat in 5 yrs    CORONARY ANGIOGRAPHY N/A 9/27/2023    Procedure: ANGIOGRAM, CORONARY ARTERY;  Surgeon: Rosa Webber MD;  Location: [facility] CATH/EP LAB;  Service: Cardiology;  Laterality: N/A;    EPIDURAL STEROID INJECTION INTO LUMBAR SPINE N/A 06/12/2019    Procedure: Injection-steroid-epidural-lumbar;  Surgeon: Thad Manning MD;  Location: [facility] OR;  Service: Pain Management;  Laterality: N/A;  L5-S1    EPIDURAL STEROID INJECTION INTO LUMBAR SPINE N/A 09/16/2019    Procedure:  Injection-steroid-epidural-lumbar;  Surgeon: Thad Mnaning MD;  Location: [facility];  Service: Pain Management;  Laterality: N/A;  L5-S1    EYE SURGERY Bilateral     HYSTERECTOMY      partial - fibroids    INJECTION OF ANESTHETIC AGENT AROUND MEDIAL BRANCH NERVES INNERVATING LUMBAR FACET JOINT Bilateral 02/28/2019    Procedure: Block-nerve-medial branch-lumbar;  Surgeon: Thad Manning MD;  Location: [facility] OR;  Service: Pain Management;  Laterality: Bilateral;  L3, 4,5     INJECTION OF ANESTHETIC AGENT AROUND MEDIAL BRANCH NERVES INNERVATING LUMBAR FACET JOINT Bilateral 03/21/2019    Procedure: Block-nerve-medial branch-lumbar L3,4,5;  Surgeon: Thad Manning MD;  Location: [facility] OR;  Service: Pain Management;  Laterality: Bilateral;    KNEE ARTHROPLASTY Left 03/16/2021    Procedure: ARTHROPLASTY, KNEE;  Surgeon: Rio Pitts MD;  Location: [facility] OR;  Service: Orthopedics;  Laterality: Left;  GENERAL AND BLOCK    LIPOSUCTION  1985    RADIOFREQUENCY ABLATION Left 11/04/2020    Procedure: Radiofrequency Ablation left knee;  Surgeon: Andrew Escudero MD;  Location: [facility] OR;  Service: Pain Management;  Laterality: Left;    RADIOFREQUENCY ABLATION OF LUMBAR MEDIAL BRANCH NERVE AT SINGLE LEVEL Bilateral 04/12/2019    Procedure: Radiofrequency Ablation, Nerve, Spinal, Lumbar, Medial Branch, 1 Level;  Surgeon: Thad Manning MD;  Location: [facility];  Service: Pain Management;  Laterality: Bilateral;  L3, 4, 5  lumbar  SpeakingPal pain management generator  SN [id number]  80 degrees for 75 seconds x2    RADIOFREQUENCY ABLATION OF LUMBAR MEDIAL BRANCH NERVE AT SINGLE LEVEL Bilateral 10/22/2019    Procedure: Radiofrequency Ablation, Nerve, Spinal, Lumbar, Medial Branch, L3,4,5;  Surgeon: Thad Manning MD;  Location: [facility] OR;  Service: Pain Management;  Laterality: Bilateral;  burned at 80 degrees C X 60 seconds X 2 each site    RADIOFREQUENCY ABLATION OF LUMBAR MEDIAL BRANCH NERVE AT SINGLE LEVEL Bilateral 05/27/2020    Procedure:  Radiofrequency Ablation, Nerve, Spinal, Lumbar, Medial Branch, 1 Level;  Surgeon: Thad Manning MD;  Location: [facility];  Service: Pain Management;  Laterality: Bilateral;  L3,4,5    REVERSE TOTAL SHOULDER ARTHROPLASTY Right 04/12/2022    Procedure: ARTHROPLASTY, SHOULDER, TOTAL, REVERSE;  Surgeon: Rio Pitts MD;  Location: [facility] OR;  Service: Orthopedics;  Laterality: Right;    SHOULDER ARTHROSCOPY Right 09/19/2021    Procedure: ARTHROSCOPY, SHOULDER;  Surgeon: Antonio Irwin MD;  Location: [facility] OR;  Service: Orthopedics;  Laterality: Right;  LIMITED DEBRIDMENT    TONSILLECTOMY      TOTAL REDUCTION MAMMOPLASTY         Current Outpatient Medications   Medication Sig    amLODIPine (NORVASC) 5 MG tablet TAKE 1 TABLET(5 MG) BY MOUTH EVERY DAY FOR BLOOD PRESSURE    AREXVY, PF, 120 mcg/0.5 mL SusR vaccine     atorvastatin (LIPITOR) 40 MG tablet Take 1 tablet (40 mg total) by mouth every evening.    brexpiprazole (REXULTI) 2 mg Tab Take 1 tablet (2 mg total) by mouth once daily.    EScitalopram oxalate (LEXAPRO) 20 MG tablet Take 1 tablet (20 mg total) by mouth once daily.    ferrous sulfate 325 (65 FE) MG EC tablet TAKE 1 TABLET BY MOUTH ONCE DAILY    FLUZONE HIGHDOSE QUAD 23-24  mcg/0.7 mL Syrg     isosorbide mononitrate (IMDUR) 60 MG 24 hr tablet TAKE 1 TABLET BY MOUTH ONCE DAILY    levothyroxine (SYNTHROID) 125 MCG tablet TAKE 1 TABLET BY MOUTH BEFORE BREAKFAST.    memantine (NAMENDA) 5 MG Tab Take 1 tablet (5 mg total) by mouth once daily.    omeprazole (PRILOSEC) 20 MG capsule Take 1 capsule (20 mg total) by mouth once daily.    ondansetron (ZOFRAN-ODT) 8 MG TbDL Take by mouth.    pramipexole (MIRAPEX) 0.5 MG tablet Take 2 tablets (1 mg total) by mouth every evening.    suvorexant (BELSOMRA) 5 mg Tab Take 1 tablet by mouth every evening.    torsemide (DEMADEX) 10 MG Tab Take 1 tablet (10 mg total) by mouth once daily.     No current facility-administered medications for this visit.       Review of  patient's allergies indicates:   Allergen Reactions    Keflex [cephalexin]      Throat swelling    Pcn [penicillins]      Throat swelling    Latex, natural rubber Other (See Comments)     Redness with bandaids     Adhesive Other (See Comments)     bandainds redness at skin    Trelegy ellipta [fluticasone-umeclidin-vilanter]      Vision disturbance       Family History   Problem Relation Name Age of Onset    Heart disease Mother         Social History     Socioeconomic History    Marital status:    Tobacco Use    Smoking status: Former     Current packs/day: 0.00     Types: Cigarettes     Quit date: 1996     Years since quittin.3     Passive exposure: Past    Smokeless tobacco: Never   Substance and Sexual Activity    Alcohol use: Yes     Comment: very little     Drug use: No    Sexual activity: Not Currently     Partners: Male     Social Drivers of Health     Financial Resource Strain: Low Risk  (2023)    Overall Financial Resource Strain (CARDIA)     Difficulty of Paying Living Expenses: Not hard at all   Food Insecurity: No Food Insecurity (2023)    Hunger Vital Sign     Worried About Running Out of Food in the Last Year: Never true     Ran Out of Food in the Last Year: Never true   Transportation Needs: No Transportation Needs (2023)    PRAPARE - Transportation     Lack of Transportation (Medical): No     Lack of Transportation (Non-Medical): No   Physical Activity: Inactive (2023)    Exercise Vital Sign     Days of Exercise per Week: 0 days     Minutes of Exercise per Session: 0 min   Stress: No Stress Concern Present (2023)    [employer] - Occupational Stress Questionnaire     Feeling of Stress : Only a little   Housing Stability: Unknown (2023)    Housing Stability Vital Sign     Unable to Pay for Housing in the Last Year: No     Unstable Housing in the Last Year: No       Chief Complaint:   No chief complaint on file.      Date of  surgery:  April 12, 2022    History of present illness:  74-year-old female underwent  a reverse total shoulder arthroplasty for persistent shoulder instability after dislocation.  Patient had some significant osteoporosis possibly compromising fixation of the base plate.  She had some pain around the top of her shoulder about 3 months ago.  We gave her an injection.  It seems to have helped.  No real pain.  Just tight with range of motion.  Overall she is pleased again.      Review of Systems:    Musculoskeletal:  See HPI        Physical Examination:    Vital Signs:    There were no vitals filed for this visit.        There is no height or weight on file to calculate BMI.    This a well-developed, well nourished patient in no acute distress.  They are alert and oriented and cooperative to examination.  Pt. walks without an antalgic gait.      Examination right shoulder shows well-healed surgical portals.  Patient has pretty limited range of motion with forward flexion of about 110° with external rotation of 40°.  No real discomfort though.    X-rays:  X-rays of the right shoulder is review which show no obvious abnormalities involving the hardware or bones.  No change from x-rays a year ago.  There is some anteversion of the base plate and glenosphere in relation to the scapula      Assessment:: Right reverse total shoulder arthroplasty        Plan:  I reviewed the x-ray with her today.    Recommended continued observation with x-rays annually.  Follow-up sooner if need be      This note was created using [employer] voice recognition software that occasionally misinterpreted phrases or words.

## 2024-10-22 ENCOUNTER — OFFICE VISIT (OUTPATIENT)
Dept: FAMILY MEDICINE | Facility: CLINIC | Age: 74
End: 2024-10-22
Payer: MEDICARE

## 2024-10-22 VITALS
WEIGHT: 180.81 LBS | BODY MASS INDEX: 34.16 KG/M2 | OXYGEN SATURATION: 97 % | SYSTOLIC BLOOD PRESSURE: 132 MMHG | HEART RATE: 76 BPM | DIASTOLIC BLOOD PRESSURE: 70 MMHG | TEMPERATURE: 98 F

## 2024-10-22 DIAGNOSIS — R60.9 EDEMA, UNSPECIFIED TYPE: ICD-10-CM

## 2024-10-22 DIAGNOSIS — E03.9 ACQUIRED HYPOTHYROIDISM: Primary | ICD-10-CM

## 2024-10-22 DIAGNOSIS — E78.2 MIXED HYPERLIPIDEMIA: ICD-10-CM

## 2024-10-22 DIAGNOSIS — I10 ESSENTIAL HYPERTENSION: ICD-10-CM

## 2024-10-22 DIAGNOSIS — F03.93 DEMENTIA WITH MOOD DISTURBANCE, UNSPECIFIED DEMENTIA SEVERITY, UNSPECIFIED DEMENTIA TYPE: ICD-10-CM

## 2024-10-22 DIAGNOSIS — F32.1 MODERATE MAJOR DEPRESSION, SINGLE EPISODE: ICD-10-CM

## 2024-10-22 DIAGNOSIS — G25.81 RESTLESS LEG SYNDROME: ICD-10-CM

## 2024-10-22 DIAGNOSIS — R73.03 PREDIABETES: ICD-10-CM

## 2024-10-22 PROCEDURE — 1159F MED LIST DOCD IN RCRD: CPT | Mod: HCNC,CPTII,S$GLB,

## 2024-10-22 PROCEDURE — 99214 OFFICE O/P EST MOD 30 MIN: CPT | Mod: HCNC,S$GLB,,

## 2024-10-22 PROCEDURE — 99999 PR PBB SHADOW E&M-EST. PATIENT-LVL IV: CPT | Mod: PBBFAC,HCNC,,

## 2024-10-22 PROCEDURE — 3075F SYST BP GE 130 - 139MM HG: CPT | Mod: HCNC,CPTII,S$GLB,

## 2024-10-22 PROCEDURE — 1160F RVW MEDS BY RX/DR IN RCRD: CPT | Mod: HCNC,CPTII,S$GLB,

## 2024-10-22 PROCEDURE — 3078F DIAST BP <80 MM HG: CPT | Mod: HCNC,CPTII,S$GLB,

## 2024-10-22 RX ORDER — PRAMIPEXOLE DIHYDROCHLORIDE 0.5 MG/1
1 TABLET ORAL NIGHTLY
Qty: 180 TABLET | Refills: 0 | Status: SHIPPED | OUTPATIENT
Start: 2024-10-22

## 2024-10-22 RX ORDER — PRAMIPEXOLE DIHYDROCHLORIDE 0.5 MG/1
1 TABLET ORAL NIGHTLY
Qty: 180 TABLET | Refills: 0 | Status: SHIPPED | OUTPATIENT
Start: 2024-10-22 | End: 2024-10-22 | Stop reason: SDUPTHER

## 2024-10-22 RX ORDER — TORSEMIDE 10 MG/1
10 TABLET ORAL DAILY
Qty: 90 TABLET | Refills: 0 | Status: SHIPPED | OUTPATIENT
Start: 2024-10-22

## 2024-10-22 RX ORDER — ISOSORBIDE MONONITRATE 60 MG/1
TABLET, EXTENDED RELEASE ORAL
Qty: 90 TABLET | Refills: 0 | Status: SHIPPED | OUTPATIENT
Start: 2024-10-22

## 2024-10-22 RX ORDER — ATORVASTATIN CALCIUM 40 MG/1
40 TABLET, FILM COATED ORAL NIGHTLY
Qty: 90 TABLET | Refills: 0 | Status: SHIPPED | OUTPATIENT
Start: 2024-10-22 | End: 2025-01-20

## 2024-10-22 RX ORDER — PRAMIPEXOLE DIHYDROCHLORIDE 0.5 MG/1
1 TABLET ORAL NIGHTLY
Qty: 180 TABLET | Refills: 0 | Status: SHIPPED | OUTPATIENT
Start: 2024-10-22 | End: 2024-10-22

## 2024-10-22 RX ORDER — BREXPIPRAZOLE 2 MG/1
2 TABLET ORAL DAILY
Qty: 30 TABLET | Refills: 2 | Status: SHIPPED | OUTPATIENT
Start: 2024-10-22

## 2024-10-30 PROBLEM — E78.5 HYPERLIPIDEMIA ASSOCIATED WITH TYPE 2 DIABETES MELLITUS: Status: RESOLVED | Noted: 2022-09-22 | Resolved: 2024-10-30

## 2024-10-30 PROBLEM — E66.01 MORBID OBESITY WITH BMI OF 40.0-44.9, ADULT: Status: RESOLVED | Noted: 2017-06-21 | Resolved: 2024-10-30

## 2024-10-30 PROBLEM — E11.69 HYPERLIPIDEMIA ASSOCIATED WITH TYPE 2 DIABETES MELLITUS: Status: RESOLVED | Noted: 2022-09-22 | Resolved: 2024-10-30

## 2024-11-11 DIAGNOSIS — K21.9 GASTROESOPHAGEAL REFLUX DISEASE WITHOUT ESOPHAGITIS: ICD-10-CM

## 2024-11-11 RX ORDER — OMEPRAZOLE 20 MG/1
20 CAPSULE, DELAYED RELEASE ORAL
Qty: 90 CAPSULE | Refills: 1 | Status: SHIPPED | OUTPATIENT
Start: 2024-11-11

## 2024-11-20 NOTE — TELEPHONE ENCOUNTER
Pt stated she fell a few days after her procedure and her back is really hurting. Pt advised the pain meds that Dr Crews sent her was too early, but she can get them on 6/9. Pt filled a script from Dr Crews on 5/12 for #30 Foley and cant pick this script up till 6/9.    12:30

## 2024-12-07 DIAGNOSIS — K21.9 GASTROESOPHAGEAL REFLUX DISEASE WITHOUT ESOPHAGITIS: ICD-10-CM

## 2024-12-07 DIAGNOSIS — E03.9 ACQUIRED HYPOTHYROIDISM: ICD-10-CM

## 2024-12-07 DIAGNOSIS — F32.1 MODERATE MAJOR DEPRESSION, SINGLE EPISODE: ICD-10-CM

## 2024-12-07 DIAGNOSIS — F01.A3 MILD VASCULAR DEMENTIA WITH MOOD DISTURBANCE: ICD-10-CM

## 2024-12-07 DIAGNOSIS — E78.2 MIXED HYPERLIPIDEMIA: ICD-10-CM

## 2024-12-09 RX ORDER — MEMANTINE HYDROCHLORIDE 5 MG/1
5 TABLET ORAL
Qty: 90 TABLET | Refills: 1 | Status: SHIPPED | OUTPATIENT
Start: 2024-12-09

## 2024-12-09 RX ORDER — LEVOTHYROXINE SODIUM 125 UG/1
125 TABLET ORAL
Qty: 90 TABLET | Refills: 1 | Status: SHIPPED | OUTPATIENT
Start: 2024-12-09

## 2024-12-09 RX ORDER — ESCITALOPRAM OXALATE 20 MG/1
20 TABLET ORAL
Qty: 90 TABLET | Refills: 1 | Status: SHIPPED | OUTPATIENT
Start: 2024-12-09

## 2024-12-09 RX ORDER — ATORVASTATIN CALCIUM 40 MG/1
40 TABLET, FILM COATED ORAL NIGHTLY
Qty: 90 TABLET | Refills: 0 | Status: SHIPPED | OUTPATIENT
Start: 2024-12-09

## 2024-12-09 RX ORDER — OMEPRAZOLE 20 MG/1
20 CAPSULE, DELAYED RELEASE ORAL
Qty: 90 CAPSULE | Refills: 1 | Status: SHIPPED | OUTPATIENT
Start: 2024-12-09

## 2024-12-16 ENCOUNTER — PATIENT MESSAGE (OUTPATIENT)
Dept: PSYCHIATRY | Facility: CLINIC | Age: 74
End: 2024-12-16
Payer: MEDICARE

## 2025-01-13 DIAGNOSIS — Z00.00 ENCOUNTER FOR MEDICARE ANNUAL WELLNESS EXAM: ICD-10-CM

## 2025-02-08 DIAGNOSIS — I10 ESSENTIAL HYPERTENSION: ICD-10-CM

## 2025-02-11 RX ORDER — ISOSORBIDE MONONITRATE 60 MG/1
TABLET, EXTENDED RELEASE ORAL
Qty: 90 TABLET | Refills: 1 | Status: SHIPPED | OUTPATIENT
Start: 2025-02-11

## 2025-02-14 DIAGNOSIS — I10 ESSENTIAL HYPERTENSION: ICD-10-CM

## 2025-02-14 RX ORDER — AMLODIPINE BESYLATE 5 MG/1
TABLET ORAL
Qty: 90 TABLET | Refills: 0 | Status: SHIPPED | OUTPATIENT
Start: 2025-02-14

## 2025-04-07 ENCOUNTER — TELEPHONE (OUTPATIENT)
Dept: FAMILY MEDICINE | Facility: CLINIC | Age: 75
End: 2025-04-07
Payer: MEDICARE

## 2025-04-14 DIAGNOSIS — G25.81 RESTLESS LEG SYNDROME: ICD-10-CM

## 2025-04-14 RX ORDER — PRAMIPEXOLE DIHYDROCHLORIDE 0.5 MG/1
1 TABLET ORAL NIGHTLY
Qty: 180 TABLET | Refills: 0 | Status: SHIPPED | OUTPATIENT
Start: 2025-04-14

## 2025-04-23 ENCOUNTER — PATIENT MESSAGE (OUTPATIENT)
Dept: GASTROENTEROLOGY | Facility: CLINIC | Age: 75
End: 2025-04-23
Payer: MEDICARE

## 2025-04-23 ENCOUNTER — OFFICE VISIT (OUTPATIENT)
Dept: FAMILY MEDICINE | Facility: CLINIC | Age: 75
End: 2025-04-23
Payer: MEDICARE

## 2025-04-23 ENCOUNTER — LAB VISIT (OUTPATIENT)
Dept: LAB | Facility: HOSPITAL | Age: 75
End: 2025-04-23
Payer: MEDICARE

## 2025-04-23 VITALS
DIASTOLIC BLOOD PRESSURE: 70 MMHG | SYSTOLIC BLOOD PRESSURE: 112 MMHG | HEART RATE: 77 BPM | BODY MASS INDEX: 30.53 KG/M2 | OXYGEN SATURATION: 95 % | TEMPERATURE: 99 F | WEIGHT: 161.63 LBS

## 2025-04-23 DIAGNOSIS — K21.9 GASTROESOPHAGEAL REFLUX DISEASE WITHOUT ESOPHAGITIS: ICD-10-CM

## 2025-04-23 DIAGNOSIS — E03.9 ACQUIRED HYPOTHYROIDISM: ICD-10-CM

## 2025-04-23 DIAGNOSIS — E78.2 MIXED HYPERLIPIDEMIA: ICD-10-CM

## 2025-04-23 DIAGNOSIS — G25.81 RESTLESS LEGS SYNDROME: ICD-10-CM

## 2025-04-23 DIAGNOSIS — F51.04 PSYCHOPHYSIOLOGICAL INSOMNIA: ICD-10-CM

## 2025-04-23 DIAGNOSIS — F03.93 DEMENTIA WITH MOOD DISTURBANCE, UNSPECIFIED DEMENTIA SEVERITY, UNSPECIFIED DEMENTIA TYPE: ICD-10-CM

## 2025-04-23 DIAGNOSIS — R73.03 PREDIABETES: ICD-10-CM

## 2025-04-23 DIAGNOSIS — F01.A3 MILD VASCULAR DEMENTIA WITH MOOD DISTURBANCE: Primary | ICD-10-CM

## 2025-04-23 DIAGNOSIS — F32.1 MODERATE MAJOR DEPRESSION, SINGLE EPISODE: ICD-10-CM

## 2025-04-23 DIAGNOSIS — I10 ESSENTIAL HYPERTENSION: ICD-10-CM

## 2025-04-23 DIAGNOSIS — I10 PRIMARY HYPERTENSION: ICD-10-CM

## 2025-04-23 DIAGNOSIS — Z12.11 SCREENING FOR COLON CANCER: ICD-10-CM

## 2025-04-23 LAB
ABSOLUTE EOSINOPHIL (OHS): 0.08 K/UL
ABSOLUTE MONOCYTE (OHS): 0.62 K/UL (ref 0.3–1)
ABSOLUTE NEUTROPHIL COUNT (OHS): 4.88 K/UL (ref 1.8–7.7)
ALBUMIN SERPL BCP-MCNC: 3.8 G/DL (ref 3.5–5.2)
ALP SERPL-CCNC: 80 UNIT/L (ref 40–150)
ALT SERPL W/O P-5'-P-CCNC: 6 UNIT/L (ref 10–44)
ANION GAP (OHS): 11 MMOL/L (ref 8–16)
AST SERPL-CCNC: 13 UNIT/L (ref 11–45)
BASOPHILS # BLD AUTO: 0.04 K/UL
BASOPHILS NFR BLD AUTO: 0.5 %
BILIRUB SERPL-MCNC: 0.7 MG/DL (ref 0.1–1)
BUN SERPL-MCNC: 14 MG/DL (ref 8–23)
CALCIUM SERPL-MCNC: 9.1 MG/DL (ref 8.7–10.5)
CHLORIDE SERPL-SCNC: 108 MMOL/L (ref 95–110)
CHOLEST SERPL-MCNC: 202 MG/DL (ref 120–199)
CHOLEST/HDLC SERPL: 4.9 {RATIO} (ref 2–5)
CO2 SERPL-SCNC: 23 MMOL/L (ref 23–29)
CREAT SERPL-MCNC: 0.8 MG/DL (ref 0.5–1.4)
EAG (OHS): 100 MG/DL (ref 68–131)
ERYTHROCYTE [DISTWIDTH] IN BLOOD BY AUTOMATED COUNT: 13 % (ref 11.5–14.5)
GFR SERPLBLD CREATININE-BSD FMLA CKD-EPI: >60 ML/MIN/1.73/M2
GLUCOSE SERPL-MCNC: 112 MG/DL (ref 70–110)
HBA1C MFR BLD: 5.1 % (ref 4–5.6)
HCT VFR BLD AUTO: 44.1 % (ref 37–48.5)
HDLC SERPL-MCNC: 41 MG/DL (ref 40–75)
HDLC SERPL: 20.3 % (ref 20–50)
HGB BLD-MCNC: 13.9 GM/DL (ref 12–16)
IMM GRANULOCYTES # BLD AUTO: 0.02 K/UL (ref 0–0.04)
IMM GRANULOCYTES NFR BLD AUTO: 0.2 % (ref 0–0.5)
LDLC SERPL CALC-MCNC: 139.2 MG/DL (ref 63–159)
LYMPHOCYTES # BLD AUTO: 2.65 K/UL (ref 1–4.8)
MCH RBC QN AUTO: 29.6 PG (ref 27–31)
MCHC RBC AUTO-ENTMCNC: 31.5 G/DL (ref 32–36)
MCV RBC AUTO: 94 FL (ref 82–98)
NONHDLC SERPL-MCNC: 161 MG/DL
NUCLEATED RBC (/100WBC) (OHS): 0 /100 WBC
PLATELET # BLD AUTO: 287 K/UL (ref 150–450)
PMV BLD AUTO: 10.4 FL (ref 9.2–12.9)
POTASSIUM SERPL-SCNC: 3.9 MMOL/L (ref 3.5–5.1)
PROT SERPL-MCNC: 6.7 GM/DL (ref 6–8.4)
RBC # BLD AUTO: 4.7 M/UL (ref 4–5.4)
RELATIVE EOSINOPHIL (OHS): 1 %
RELATIVE LYMPHOCYTE (OHS): 32 % (ref 18–48)
RELATIVE MONOCYTE (OHS): 7.5 % (ref 4–15)
RELATIVE NEUTROPHIL (OHS): 58.8 % (ref 38–73)
SODIUM SERPL-SCNC: 142 MMOL/L (ref 136–145)
T4 FREE SERPL-MCNC: 0.87 NG/DL (ref 0.71–1.51)
TRIGL SERPL-MCNC: 109 MG/DL (ref 30–150)
TSH SERPL-ACNC: 8.87 UIU/ML (ref 0.4–4)
WBC # BLD AUTO: 8.29 K/UL (ref 3.9–12.7)

## 2025-04-23 PROCEDURE — 85025 COMPLETE CBC W/AUTO DIFF WBC: CPT | Mod: HCNC

## 2025-04-23 PROCEDURE — 36415 COLL VENOUS BLD VENIPUNCTURE: CPT | Mod: HCNC,PN

## 2025-04-23 PROCEDURE — 80061 LIPID PANEL: CPT | Mod: HCNC

## 2025-04-23 PROCEDURE — 99999 PR PBB SHADOW E&M-EST. PATIENT-LVL IV: CPT | Mod: PBBFAC,HCNC,,

## 2025-04-23 PROCEDURE — 83036 HEMOGLOBIN GLYCOSYLATED A1C: CPT | Mod: HCNC

## 2025-04-23 PROCEDURE — 84439 ASSAY OF FREE THYROXINE: CPT | Mod: HCNC

## 2025-04-23 PROCEDURE — 84443 ASSAY THYROID STIM HORMONE: CPT | Mod: HCNC

## 2025-04-23 PROCEDURE — 80053 COMPREHEN METABOLIC PANEL: CPT | Mod: HCNC

## 2025-04-23 RX ORDER — OMEPRAZOLE 20 MG/1
20 CAPSULE, DELAYED RELEASE ORAL DAILY
Qty: 90 CAPSULE | Refills: 1 | Status: SHIPPED | OUTPATIENT
Start: 2025-04-23

## 2025-04-23 RX ORDER — MEMANTINE HYDROCHLORIDE 5 MG/1
5 TABLET ORAL DAILY
Qty: 90 TABLET | Refills: 1 | Status: SHIPPED | OUTPATIENT
Start: 2025-04-23

## 2025-04-23 RX ORDER — BREXPIPRAZOLE 2 MG/1
2 TABLET ORAL DAILY
Qty: 90 TABLET | Refills: 1 | Status: SHIPPED | OUTPATIENT
Start: 2025-04-23

## 2025-04-23 RX ORDER — ESCITALOPRAM OXALATE 20 MG/1
20 TABLET ORAL DAILY
Qty: 90 TABLET | Refills: 1 | Status: SHIPPED | OUTPATIENT
Start: 2025-04-23

## 2025-04-23 RX ORDER — ATORVASTATIN CALCIUM 40 MG/1
40 TABLET, FILM COATED ORAL NIGHTLY
Qty: 90 TABLET | Refills: 0 | Status: SHIPPED | OUTPATIENT
Start: 2025-04-23

## 2025-04-23 NOTE — PROGRESS NOTES
SUBJECTIVE:      Patient ID: Genoveva Gilbert is a 75 y.o. female.    Chief Complaint: Hypertension, Anxiety, and Thyroid Problem    Estela is a 75 year-old female, who is an established patient. She presents today for follow up on hypothyroidism, HTN, anxiety and depression.  Chronic medical conditions consists of:  Depression, anxiety, dementia, anemia, hypothyroidism, diabetes, has a myalgia, heart failure, hyperlipidemia, CAD, hypertension, GERD, ESHA, MS and insomnia.  Review of medications was completed in office.  Granddaughter does medications for patient.  Patient did not get labs completed, but states that she get get them done after. She reports no complaints today and that she has been doing very well since last visit.     Dementia:  Continues taking Namenda once a day as prescribed.  No side effects noted. Family reports that they have notice a change and she has not been as forgetful. Noted improvement with anxiety as well.     Hypothyroidism:  Levothyroxine is on 125 mcg daily after last labs.  Patient is past due for recheck of thyroid function.  Denies heart palpitations, tremors, increased anxiety.     Depression:  Patient is currently taking Lexapro 20 mg during the day and Rexulti 2 mg in the evening time, that was increased at last visit.  Appears to be tolerating well and denies any side effects.  Reports that she has not been anxious or depressed lately.  She reports that she is no longer isolating in his now participating in socializing with other residents in her facility.  She denies suicidal ideation, homicidal ideation, or self-harming behavior. Failed Zoloft in the past.    Hypertension:  Continues taking amlodipine 5 mg, torsemide 10 mg, isosorbide 60 mg daily.  Reports that she is tolerating well and denies any side effects.  Denies chest pain, chest palpitations, chest tightness, SOB, orthopnea, peripheral edema, or blurry vision.  Does not check her blood pressure at home.  Blood  pressure today is 112/70.     Insomnia:  She was taking Rexulti 2 mg at night, which has been more effective with helping her sleep.  Patient states that she is sleeping well and does not wake up frequently during the night.  Failed Belsomra    Patient does present to clinic with her ex , who has been present at each visit and helps with patient's history.    Hypertension  This is a chronic problem. The current episode started more than 1 year ago. The problem is unchanged. The problem is controlled. Pertinent negatives include no anxiety, blurred vision, chest pain, headaches, malaise/fatigue, neck pain, orthopnea, palpitations, peripheral edema, PND, shortness of breath or sweats. There are no associated agents to hypertension. Risk factors for coronary artery disease include dyslipidemia, obesity, post-menopausal state, stress and sedentary lifestyle. Past treatments include diuretics, beta blockers and calcium channel blockers. The current treatment provides significant improvement. Compliance problems include diet and exercise.  Identifiable causes of hypertension include a thyroid problem.   Anxiety  Patient reports no chest pain, decreased concentration, depressed mood, dizziness, excessive worry, insomnia, irritability, muscle tension, nausea, nervous/anxious behavior, palpitations, panic, restlessness, shortness of breath or suicidal ideas.       Thyroid Problem  Presents for follow-up visit. Patient reports no anxiety, cold intolerance, constipation, depressed mood, diaphoresis, diarrhea, dry skin, fatigue, hair loss, hoarse voice, leg swelling, menstrual problem, nail problem, palpitations, tremors, visual change, weight gain or weight loss. The symptoms have been stable.   Depression  Visit Type: follow-up  Patient presents with the following symptoms: memory impairment.  Patient is not experiencing: anhedonia, chest pain, choking sensation, decreased concentration, depressed mood, excessive  worry, fatigue, feelings of hopelessness, feelings of worthlessness, insomnia, irritability, muscle tension, nervousness/anxiety, palpitations, panic, restlessness, shortness of breath, suicidal ideas, suicidal planning, thoughts of death, weight gain and weight loss.  Frequency of symptoms: most days   Severity: mild   Sleep quality: good  Nighttime awakenings: none  Compliance with medications:  %          Review of patient's allergies indicates:   Allergen Reactions    Keflex [cephalexin]      Throat swelling    Pcn [penicillins]      Throat swelling    Latex, natural rubber Other (See Comments)     Redness with bandaids     Adhesive Other (See Comments)     bandainds redness at skin    Trelegy ellipta [fluticasone-umeclidin-vilanter]      Vision disturbance      Past Medical History:   Diagnosis Date    Allergy     Anxiety     Arthritis, rheumatoid     Back pain     Chronic bronchiolitis     Coronary artery disease involving native coronary artery 9/27/2023    Depression     Fibromyalgia     GERD (gastroesophageal reflux disease)     High cholesterol     Hilar mass 03/27/2014    [place] (hard of hearing)     aid right ear    [place] (hard of hearing)     Hypertension     Incontinence of urine     Lymphedema     MS (multiple sclerosis)     benign; another MD stated she has no MS    Neuropathy     Personal history of colonic polyps 12/05/2019    Restless leg syndrome     Rotator cuff tear 04/16/2015    Sciatica of left side 01/05/2018    Seasonal allergies     Sinus congestion     Sleep apnea     DOES NOT USE MACHINE    Spondylolysis     Thyroid disease     Type 2 diabetes mellitus with polyneuropathy     no med. was borderline    Wheezing      Past Surgical History:   Procedure Laterality Date    APPENDECTOMY      ARTHROSCOPIC DEBRIDEMENT OF SHOULDER Right 02/18/2022    Procedure: EXTENSIVE DEBRIDEMENT, SHOULDER, ARTHROSCOPIC;  Surgeon: Rio Pitts MD;  Location: [facility];  Service: Orthopedics;   Laterality: Right;    BREAST SURGERY      reduction    CLOSED REDUCTION OF INJURY OF SHOULDER Right 09/19/2021    Procedure: CLOSED REDUCTION, SHOULDER;  Surgeon: Antonio Irwin MD;  Location: [facility] OR;  Service: Orthopedics;  Laterality: Right;    COLONOSCOPY W/ POLYPECTOMY N/A 12/05/2019    repeat in 5 yrs    CORONARY ANGIOGRAPHY N/A 9/27/2023    Procedure: ANGIOGRAM, CORONARY ARTERY;  Surgeon: Rosa Webber MD;  Location: [facility] CATH/EP LAB;  Service: Cardiology;  Laterality: N/A;    EPIDURAL STEROID INJECTION INTO LUMBAR SPINE N/A 06/12/2019    Procedure: Injection-steroid-epidural-lumbar;  Surgeon: Thad Manning MD;  Location: [facility] OR;  Service: Pain Management;  Laterality: N/A;  L5-S1    EPIDURAL STEROID INJECTION INTO LUMBAR SPINE N/A 09/16/2019    Procedure: Injection-steroid-epidural-lumbar;  Surgeon: Thad Manning MD;  Location: [facility] OR;  Service: Pain Management;  Laterality: N/A;  L5-S1    EYE SURGERY Bilateral     HYSTERECTOMY      partial - fibroids    INJECTION OF ANESTHETIC AGENT AROUND MEDIAL BRANCH NERVES INNERVATING LUMBAR FACET JOINT Bilateral 02/28/2019    Procedure: Block-nerve-medial branch-lumbar;  Surgeon: Thad Manning MD;  Location: [facility] OR;  Service: Pain Management;  Laterality: Bilateral;  L3, 4,5     INJECTION OF ANESTHETIC AGENT AROUND MEDIAL BRANCH NERVES INNERVATING LUMBAR FACET JOINT Bilateral 03/21/2019    Procedure: Block-nerve-medial branch-lumbar L3,4,5;  Surgeon: Thad Manning MD;  Location: [facility] OR;  Service: Pain Management;  Laterality: Bilateral;    KNEE ARTHROPLASTY Left 03/16/2021    Procedure: ARTHROPLASTY, KNEE;  Surgeon: Rio Pitts MD;  Location: [facility] OR;  Service: Orthopedics;  Laterality: Left;  GENERAL AND BLOCK    LIPOSUCTION  1985    RADIOFREQUENCY ABLATION Left 11/04/2020    Procedure: Radiofrequency Ablation left knee;  Surgeon: Andrew Escudero MD;  Location: [facility] OR;  Service: Pain Management;  Laterality: Left;    RADIOFREQUENCY ABLATION OF LUMBAR  MEDIAL BRANCH NERVE AT SINGLE LEVEL Bilateral 2019    Procedure: Radiofrequency Ablation, Nerve, Spinal, Lumbar, Medial Branch, 1 Level;  Surgeon: Thad Manning MD;  Location: [facility] OR;  Service: Pain Management;  Laterality: Bilateral;  L3, 4, 5  lumbar  [employer] pain management generator  SN [id number]  80 degrees for 75 seconds x2    RADIOFREQUENCY ABLATION OF LUMBAR MEDIAL BRANCH NERVE AT SINGLE LEVEL Bilateral 10/22/2019    Procedure: Radiofrequency Ablation, Nerve, Spinal, Lumbar, Medial Branch, L3,4,5;  Surgeon: Thad Manning MD;  Location: [facility] OR;  Service: Pain Management;  Laterality: Bilateral;  burned at 80 degrees C X 60 seconds X 2 each site    RADIOFREQUENCY ABLATION OF LUMBAR MEDIAL BRANCH NERVE AT SINGLE LEVEL Bilateral 2020    Procedure: Radiofrequency Ablation, Nerve, Spinal, Lumbar, Medial Branch, 1 Level;  Surgeon: Thad Manning MD;  Location: [facility] OR;  Service: Pain Management;  Laterality: Bilateral;  L3,4,5    REVERSE TOTAL SHOULDER ARTHROPLASTY Right 2022    Procedure: ARTHROPLASTY, SHOULDER, TOTAL, REVERSE;  Surgeon: Rio Pitts MD;  Location: [facility] OR;  Service: Orthopedics;  Laterality: Right;    SHOULDER ARTHROSCOPY Right 2021    Procedure: ARTHROSCOPY, SHOULDER;  Surgeon: Antonio Irwin MD;  Location: [facility] OR;  Service: Orthopedics;  Laterality: Right;  LIMITED DEBRIDMENT    TONSILLECTOMY      TOTAL REDUCTION MAMMOPLASTY       Current Medications[1]  Family History   Problem Relation Name Age of Onset    Heart disease Mother        Social History     Socioeconomic History    Marital status:    Tobacco Use    Smoking status: Former     Current packs/day: 0.00     Types: Cigarettes     Quit date: 1996     Years since quittin.9     Passive exposure: Past    Smokeless tobacco: Never   Substance and Sexual Activity    Alcohol use: Yes     Comment: very little     Drug use: No    Sexual activity: Not Currently     Partners: Male     Social  Drivers of Health     Financial Resource Strain: Low Risk  (9/25/2023)    Overall Financial Resource Strain (CARDIA)     Difficulty of Paying Living Expenses: Not hard at all   Food Insecurity: No Food Insecurity (9/25/2023)    Hunger Vital Sign     Worried About Running Out of Food in the Last Year: Never true     Ran Out of Food in the Last Year: Never true   Transportation Needs: No Transportation Needs (9/25/2023)    PRAPARE - Transportation     Lack of Transportation (Medical): No     Lack of Transportation (Non-Medical): No   Physical Activity: Inactive (9/25/2023)    Exercise Vital Sign     Days of Exercise per Week: 0 days     Minutes of Exercise per Session: 0 min   Stress: No Stress Concern Present (9/25/2023)    [employer] [place] [employer] - Occupational Stress Questionnaire     Feeling of Stress : Only a little   Housing Stability: Unknown (9/25/2023)    Housing Stability Vital Sign     Unable to Pay for Housing in the Last Year: No     Unstable Housing in the Last Year: No       Review of Systems   Constitutional:  Negative for chills, diaphoresis, fatigue, fever, irritability, malaise/fatigue, unexpected weight change, weight gain and weight loss.   HENT:  Negative for nasal congestion, ear pain, hearing loss, hoarse voice, rhinorrhea, sore throat and voice change.    Eyes:  Negative for blurred vision, photophobia and visual disturbance.   Respiratory:  Negative for choking, chest tightness, shortness of breath and wheezing.    Cardiovascular:  Negative for chest pain, palpitations, orthopnea, leg swelling and PND.   Gastrointestinal:  Negative for abdominal pain, blood in stool, constipation, diarrhea, nausea and vomiting.   Endocrine: Negative for cold intolerance, polydipsia, polyphagia, polyuria and hair loss.   Genitourinary:  Negative for difficulty urinating, dysuria, frequency, hematuria and menstrual problem.   Musculoskeletal:  Negative for arthralgias, back pain, myalgias and  neck pain.   Integumentary:  Negative for rash and wound.   Neurological:  Positive for memory loss (Stable). Negative for dizziness, tremors, syncope, weakness, light-headedness and headaches.   Psychiatric/Behavioral:  Positive for depression. Negative for agitation, decreased concentration, depressed mood, dysphoric mood, self-injury, sleep disturbance and suicidal ideas. The patient is not nervous/anxious and does not have insomnia.       OBJECTIVE:      Vitals:    04/23/25 1309   BP: 112/70   BP Location: Right arm   Patient Position: Sitting   Pulse: 77   Temp: 98.8 °F (37.1 °C)   TempSrc: Oral   SpO2: 95%   Weight: 73.3 kg (161 lb 9.6 oz)     Physical Exam  Vitals and nursing note reviewed.   Constitutional:       General: She is not in acute distress.     Appearance: Normal appearance. She is well-developed. She is obese. She is not ill-appearing or toxic-appearing.      Comments: BMI 30.5   HENT:      Head: Normocephalic and atraumatic.      Right Ear: External ear normal.      Left Ear: External ear normal.      Nose: Nose normal.      Mouth/Throat:      Mouth: Mucous membranes are moist.      Pharynx: Oropharynx is clear. Uvula midline.   Eyes:      General: Lids are normal.      Conjunctiva/sclera: Conjunctivae normal.      Pupils: Pupils are equal, round, and reactive to light.      Right eye: Pupil is round and reactive.      Left eye: Pupil is round and reactive.   Neck:      Thyroid: No thyromegaly.      Vascular: No JVD.      Trachea: Trachea normal.   Cardiovascular:      Rate and Rhythm: Normal rate and regular rhythm.      Pulses: Normal pulses.      Heart sounds: Normal heart sounds.   Pulmonary:      Effort: Pulmonary effort is normal. No tachypnea or respiratory distress.      Breath sounds: Normal breath sounds. No stridor. No wheezing, rhonchi or rales.   Chest:      Chest wall: No tenderness.   Musculoskeletal:         General: Normal range of motion.      Cervical back: Normal range of  motion and neck supple.      Right lower leg: No edema.      Left lower leg: No edema.   Lymphadenopathy:      Cervical: No cervical adenopathy.   Skin:     General: Skin is warm and dry.      Findings: No rash.   Neurological:      Mental Status: She is alert and oriented to person, place, and time.   Psychiatric:         Mood and Affect: Mood normal. Mood is not anxious or depressed. Affect is not tearful.         Speech: Speech normal.         Behavior: Behavior normal. Behavior is cooperative.         Thought Content: Thought content normal. Thought content does not include homicidal or suicidal ideation. Thought content does not include homicidal or suicidal plan.         Judgment: Judgment normal.        Assessment:       1. Mild vascular dementia with mood disturbance    2. Dementia with mood disturbance, unspecified dementia severity, unspecified dementia type    3. Moderate major depression, single episode    4. Restless legs syndrome    5. Mixed hyperlipidemia    6. Primary hypertension    7. Acquired hypothyroidism    8. Psychophysiological insomnia    9. Gastroesophageal reflux disease without esophagitis    10. Screening for colon cancer        Plan:         Mild vascular dementia with mood disturbance  Stable  Continue Namenda daily  -     memantine (NAMENDA) 5 MG Tab; Take 1 tablet (5 mg total) by mouth once daily.  Dispense: 90 tablet; Refill: 1    Dementia with mood disturbance, unspecified dementia severity, unspecified dementia type  Stable  Continue Rexulti 2 mg nightly  -     brexpiprazole (REXULTI) 2 mg Tab; Take 1 tablet (2 mg total) by mouth once daily.  Dispense: 90 tablet; Refill: 1    Moderate major depression, single episode  Stable  Continue Lexapro 20 mg daily  -     EScitalopram oxalate (LEXAPRO) 20 MG tablet; Take 1 tablet (20 mg total) by mouth once daily.  Dispense: 90 tablet; Refill: 1    Restless legs syndrome  Stable  Continue Mirapex 1 mg nightly    Mixed hyperlipidemia  Labs  are pending  Continue atorvastatin 40 mg daily  -     atorvastatin (LIPITOR) 40 MG tablet; Take 1 tablet (40 mg total) by mouth every evening.  Dispense: 90 tablet; Refill: 0    Primary hypertension  Controlled  Labs are pending  Continue amlodipine 5 mg daily, torsemide 10 mg daily, and isosorbide 60 mg daily  Continue limiting sodium and caffeine intake    Acquired hypothyroidism  Labs are pending  Symptoms are stable  Continue thyroxine 125 mcg daily-  Will adjust based off of labs    Psychophysiological insomnia  Stable  Continue Rexulti 2 mg nightly    Gastroesophageal reflux disease without esophagitis  Controlled  Continue Prilosec 20 mg daily  -     omeprazole (PRILOSEC) 20 MG capsule; Take 1 capsule (20 mg total) by mouth once daily.  Dispense: 90 capsule; Refill: 1    Screening for colon cancer  -     Case Request Endoscopy: COLONOSCOPY, SCREENING, HIGH RISK PATIENT        Follow up in about 6 months (around 10/23/2025) for HTN, HLD, thyroid, insomnia.      4/23/2025 SHEY Allen, SETH    This note was created using [employer] voice recognition software that occasionally misinterprets phrases or words.          [1]   Current Outpatient Medications   Medication Sig Dispense Refill    amLODIPine (NORVASC) 5 MG tablet TAKE 1 TABLET(5 MG) BY MOUTH EVERY DAY FOR BLOOD PRESSURE 90 tablet 0    AREXVY, PF, 120 mcg/0.5 mL SusR vaccine       ferrous sulfate 325 (65 FE) MG EC tablet TAKE 1 TABLET BY MOUTH ONCE DAILY 90 tablet 0    FLUZONE HIGHDOSE QUAD 23-24  mcg/0.7 mL Syrg       isosorbide mononitrate (IMDUR) 60 MG 24 hr tablet TAKE 1 TABLET BY MOUTH EVERY DAY 90 tablet 1    levothyroxine (SYNTHROID) 125 MCG tablet TAKE 1 TABLET BY MOUTH EVERY DAY BEFORE BREAKFAST 90 tablet 1    ondansetron (ZOFRAN-ODT) 8 MG TbDL Take by mouth.      pramipexole (MIRAPEX) 0.5 MG tablet TAKE 2 TABLETS (1 MG TOTAL) BY MOUTH EVERY EVENING. 180 tablet 0    torsemide (DEMADEX) 10 MG Tab Take 1 tablet (10 mg total) by mouth once  daily. 90 tablet 0    atorvastatin (LIPITOR) 40 MG tablet Take 1 tablet (40 mg total) by mouth every evening. 90 tablet 0    brexpiprazole (REXULTI) 2 mg Tab Take 1 tablet (2 mg total) by mouth once daily. 90 tablet 1    EScitalopram oxalate (LEXAPRO) 20 MG tablet Take 1 tablet (20 mg total) by mouth once daily. 90 tablet 1    memantine (NAMENDA) 5 MG Tab Take 1 tablet (5 mg total) by mouth once daily. 90 tablet 1    omeprazole (PRILOSEC) 20 MG capsule Take 1 capsule (20 mg total) by mouth once daily. 90 capsule 1     No current facility-administered medications for this visit.

## 2025-04-24 ENCOUNTER — TELEPHONE (OUTPATIENT)
Dept: FAMILY MEDICINE | Facility: CLINIC | Age: 75
End: 2025-04-24
Payer: MEDICARE

## 2025-04-24 ENCOUNTER — RESULTS FOLLOW-UP (OUTPATIENT)
Dept: FAMILY MEDICINE | Facility: CLINIC | Age: 75
End: 2025-04-24

## 2025-04-24 NOTE — PROGRESS NOTES
The TSH is high indicating at the thyroid is a little too weak.  However the T4 is therapeutic unless we are trying to shrink a goiter I would continue the same dose and recheck in 6 months.

## 2025-04-24 NOTE — TELEPHONE ENCOUNTER
----- Message from Nicolas Conti MD sent at 4/24/2025  8:19 AM CDT -----  The TSH is high indicating at the thyroid is a little too weak.  However the T4 is therapeutic unless we are trying to shrink a goiter I would continue the same dose and recheck in 6 months.  ----- Message -----  From: Lab, Background User  Sent: 4/23/2025   9:39 PM CDT  To: Yuridia Arreola NP

## 2025-05-15 DIAGNOSIS — I10 ESSENTIAL HYPERTENSION: ICD-10-CM

## 2025-05-15 RX ORDER — AMLODIPINE BESYLATE 5 MG/1
TABLET ORAL
Qty: 90 TABLET | Refills: 0 | Status: SHIPPED | OUTPATIENT
Start: 2025-05-15

## 2025-06-14 ENCOUNTER — PATIENT MESSAGE (OUTPATIENT)
Dept: FAMILY MEDICINE | Facility: CLINIC | Age: 75
End: 2025-06-14
Payer: MEDICARE

## 2025-06-25 ENCOUNTER — PATIENT OUTREACH (OUTPATIENT)
Dept: ADMINISTRATIVE | Facility: HOSPITAL | Age: 75
End: 2025-06-25
Payer: MEDICARE

## 2025-06-25 NOTE — PROGRESS NOTES
Care Coordination Encounter Details:       MyChart Portal Status:         []  Reviewed MyChart Portal Status offered / enrolled if applicable        Additional Notes:     MyChart Outcomes: Pt is enrolled & active          Updates Requested / Reviewed:        Updated Care Coordination Note and Immunizations Reconciliation Completed or Queried: [place]         Health Maintenance Screening(s) Due:      Health Maintenance Topics Overdue:      VBHM Score: 0     Patient is not due for any topics at this time.                       Health Maintenance Topic(s) Outreach Outcomes & Actions Taken:           Additional Notes:             Chronic Disease Management:     Diabetes Measures        Lab Results   Component Value Date    HGBA1C 5.1 04/23/2025           [x]  Reviewed chart for active Diabetes diagnosis     []  Scheduled necessary follow up appointments if needed         Additional Notes:             Hypertension Measures        BP Readings from Last 1 Encounters:   04/23/25 112/70           [x]  Reviewed chart for active Hypertension diagnosis     []  Reviewed & documented Home BP Cuff     []  Documented a Remote BP if needed & applicable     []  Scheduled necessary follow up appointments with Primary Care if needed         Additional Notes:             Provider Team Continuity:     Last PCP Visit Date: 4/23/2025          [x]  Reviewed Primary Care Provider Visits, Annual Wellness Visit, and Future          Appointments to ensure appointments have been scheduled and/or           completed        Additional Notes:             Social Determinants of Health          [x]  Reviewed, completed, and/or updated the following sections:                  Food Insecurity, Transportation Needs, Financial Resource Strain,                 Tobacco Use        Additional Notes:  Pt portal message sent regarding value base resources.           Care Management, Digital Medicine, and/or Education Referrals    OPCM Risk Score: 47.4          Next Steps - Referral Actions: Digital Medicine Outcomes and Actions Taken: Pt Declined or Not Eligible        Additional Notes:

## 2025-07-05 DIAGNOSIS — R60.9 EDEMA, UNSPECIFIED TYPE: ICD-10-CM

## 2025-07-07 ENCOUNTER — OFFICE VISIT (OUTPATIENT)
Dept: FAMILY MEDICINE | Facility: CLINIC | Age: 75
End: 2025-07-07
Payer: MEDICARE

## 2025-07-07 VITALS
SYSTOLIC BLOOD PRESSURE: 106 MMHG | BODY MASS INDEX: 28.78 KG/M2 | TEMPERATURE: 99 F | OXYGEN SATURATION: 97 % | DIASTOLIC BLOOD PRESSURE: 74 MMHG | HEART RATE: 78 BPM | WEIGHT: 152.31 LBS

## 2025-07-07 DIAGNOSIS — R60.9 EDEMA, UNSPECIFIED TYPE: ICD-10-CM

## 2025-07-07 PROCEDURE — 99999 PR PBB SHADOW E&M-EST. PATIENT-LVL IV: CPT | Mod: PBBFAC,HCNC,,

## 2025-07-07 PROCEDURE — 99214 OFFICE O/P EST MOD 30 MIN: CPT | Mod: HCNC,S$GLB,,

## 2025-07-07 PROCEDURE — 1101F PT FALLS ASSESS-DOCD LE1/YR: CPT | Mod: CPTII,HCNC,S$GLB,

## 2025-07-07 PROCEDURE — 1160F RVW MEDS BY RX/DR IN RCRD: CPT | Mod: CPTII,HCNC,S$GLB,

## 2025-07-07 PROCEDURE — 3078F DIAST BP <80 MM HG: CPT | Mod: CPTII,HCNC,S$GLB,

## 2025-07-07 PROCEDURE — 3074F SYST BP LT 130 MM HG: CPT | Mod: CPTII,HCNC,S$GLB,

## 2025-07-07 PROCEDURE — 3288F FALL RISK ASSESSMENT DOCD: CPT | Mod: CPTII,HCNC,S$GLB,

## 2025-07-07 PROCEDURE — 1159F MED LIST DOCD IN RCRD: CPT | Mod: CPTII,HCNC,S$GLB,

## 2025-07-07 PROCEDURE — 1126F AMNT PAIN NOTED NONE PRSNT: CPT | Mod: CPTII,HCNC,S$GLB,

## 2025-07-07 RX ORDER — TORSEMIDE 10 MG/1
10 TABLET ORAL
Qty: 90 TABLET | Refills: 0 | Status: SHIPPED | OUTPATIENT
Start: 2025-07-07

## 2025-07-07 NOTE — PROGRESS NOTES
SUBJECTIVE:      Patient ID: Genoveva Gilbert is a 75 y.o. female.    Chief Complaint: Edema (In hands and fingers. Started yesterday. Now it is both hands. Not painful but are stiff.)    History of Present Illness    CHIEF COMPLAINT:  Genoveva presents with swelling in both hands and eyes, with the right hand being more severely affected.    HPI:  Genoveva reports swelling in both hands, with the right hand more severely affected than the left. Her daughter had noticed swelling in the left hand and fingers the previous day, but now both hands are affected. She also has noticeable swelling (edema) to right upper eyelid. She denies any pain in her hands and reports that they feel better when cold. She does not recall any injury or impact to her hands. She mentions having gone to [employer] the day before, where she had soup and salad, but notes that the hand swelling was present before that meal.  She is unable to recall she has had an increase his sodium intake, but admits to eating some potato chips recently.  She denies any shortness of breath, even when walking from the waiting room, chest pain, or chest tightness.  She is currently on torsemide 10 mg daily for edema and hypertension.        Review of Systems   Constitutional:  Negative for activity change, appetite change, chills, diaphoresis, fatigue, fever and unexpected weight change.   HENT:  Negative for congestion, ear pain, sinus pressure, sore throat, trouble swallowing and voice change.    Eyes:  Negative for pain, discharge and visual disturbance.   Respiratory:  Negative for apnea, cough, choking, chest tightness, shortness of breath and wheezing.    Cardiovascular:  Positive for leg swelling. Negative for chest pain and palpitations.        Pascual Hand edema R>L   Gastrointestinal:  Negative for abdominal pain, constipation, diarrhea, nausea and vomiting.   Genitourinary:  Negative for frequency.   Musculoskeletal:  Negative for back pain, joint  swelling and neck pain.   Skin:  Negative for color change and rash.   Neurological:  Negative for dizziness, seizures, syncope, weakness, numbness and headaches.   Hematological:  Negative for adenopathy.   Psychiatric/Behavioral:  Positive for confusion and sleep disturbance. Negative for dysphoric mood. The patient is not nervous/anxious.        Past Medical History:   Diagnosis Date    Allergy     Anxiety     Arthritis, rheumatoid     Back pain     Chronic bronchiolitis     Coronary artery disease involving native coronary artery 9/27/2023    Depression     Fibromyalgia     GERD (gastroesophageal reflux disease)     High cholesterol     Hilar mass 03/27/2014    [place] (hard of hearing)     aid right ear    [place] (hard of hearing)     Hypertension     Incontinence of urine     Lymphedema     MS (multiple sclerosis)     benign; another MD stated she has no MS    Neuropathy     Personal history of colonic polyps 12/05/2019    Restless leg syndrome     Rotator cuff tear 04/16/2015    Sciatica of left side 01/05/2018    Seasonal allergies     Sinus congestion     Sleep apnea     DOES NOT USE MACHINE    Spondylolysis     Thyroid disease     Type 2 diabetes mellitus with polyneuropathy     no med. was borderline    Wheezing      Current Medications[1]  Review of patient's allergies indicates:   Allergen Reactions    Keflex [cephalexin]      Throat swelling    Pcn [penicillins]      Throat swelling    Latex, natural rubber Other (See Comments)     Redness with bandaids     Adhesive Other (See Comments)     bandainds redness at skin    Trelegy ellipta [fluticasone-umeclidin-vilanter]      Vision disturbance      Past Surgical History:   Procedure Laterality Date    APPENDECTOMY      ARTHROSCOPIC DEBRIDEMENT OF SHOULDER Right 02/18/2022    Procedure: EXTENSIVE DEBRIDEMENT, SHOULDER, ARTHROSCOPIC;  Surgeon: Rio Pitts MD;  Location: [facility];  Service: Orthopedics;  Laterality: Right;    BREAST SURGERY      reduction     CLOSED REDUCTION OF INJURY OF SHOULDER Right 09/19/2021    Procedure: CLOSED REDUCTION, SHOULDER;  Surgeon: Antonio Irwin MD;  Location: [facility] OR;  Service: Orthopedics;  Laterality: Right;    COLONOSCOPY W/ POLYPECTOMY N/A 12/05/2019    repeat in 5 yrs    CORONARY ANGIOGRAPHY N/A 9/27/2023    Procedure: ANGIOGRAM, CORONARY ARTERY;  Surgeon: Rosa Webber MD;  Location: [facility] CATH/EP LAB;  Service: Cardiology;  Laterality: N/A;    EPIDURAL STEROID INJECTION INTO LUMBAR SPINE N/A 06/12/2019    Procedure: Injection-steroid-epidural-lumbar;  Surgeon: Thad Manning MD;  Location: [facility] OR;  Service: Pain Management;  Laterality: N/A;  L5-S1    EPIDURAL STEROID INJECTION INTO LUMBAR SPINE N/A 09/16/2019    Procedure: Injection-steroid-epidural-lumbar;  Surgeon: Thad Manning MD;  Location: [facility] OR;  Service: Pain Management;  Laterality: N/A;  L5-S1    EYE SURGERY Bilateral     HYSTERECTOMY      partial - fibroids    INJECTION OF ANESTHETIC AGENT AROUND MEDIAL BRANCH NERVES INNERVATING LUMBAR FACET JOINT Bilateral 02/28/2019    Procedure: Block-nerve-medial branch-lumbar;  Surgeon: Thad Manning MD;  Location: [facility] OR;  Service: Pain Management;  Laterality: Bilateral;  L3, 4,5     INJECTION OF ANESTHETIC AGENT AROUND MEDIAL BRANCH NERVES INNERVATING LUMBAR FACET JOINT Bilateral 03/21/2019    Procedure: Block-nerve-medial branch-lumbar L3,4,5;  Surgeon: Thad Manning MD;  Location: [facility] OR;  Service: Pain Management;  Laterality: Bilateral;    KNEE ARTHROPLASTY Left 03/16/2021    Procedure: ARTHROPLASTY, KNEE;  Surgeon: Rio Pitts MD;  Location: [facility] OR;  Service: Orthopedics;  Laterality: Left;  GENERAL AND BLOCK    LIPOSUCTION  1985    RADIOFREQUENCY ABLATION Left 11/04/2020    Procedure: Radiofrequency Ablation left knee;  Surgeon: Andrew Escudero MD;  Location: [facility] OR;  Service: Pain Management;  Laterality: Left;    RADIOFREQUENCY ABLATION OF LUMBAR MEDIAL BRANCH NERVE AT SINGLE LEVEL Bilateral  2019    Procedure: Radiofrequency Ablation, Nerve, Spinal, Lumbar, Medial Branch, 1 Level;  Surgeon: Thad Manning MD;  Location: [facility] OR;  Service: Pain Management;  Laterality: Bilateral;  L3, 4, 5  lumbar  [employer] pain management generator  SN [id number]  80 degrees for 75 seconds x2    RADIOFREQUENCY ABLATION OF LUMBAR MEDIAL BRANCH NERVE AT SINGLE LEVEL Bilateral 10/22/2019    Procedure: Radiofrequency Ablation, Nerve, Spinal, Lumbar, Medial Branch, L3,4,5;  Surgeon: Thad Manning MD;  Location: [facility] OR;  Service: Pain Management;  Laterality: Bilateral;  burned at 80 degrees C X 60 seconds X 2 each site    RADIOFREQUENCY ABLATION OF LUMBAR MEDIAL BRANCH NERVE AT SINGLE LEVEL Bilateral 2020    Procedure: Radiofrequency Ablation, Nerve, Spinal, Lumbar, Medial Branch, 1 Level;  Surgeon: Thad Manning MD;  Location: [facility] OR;  Service: Pain Management;  Laterality: Bilateral;  L3,4,5    REVERSE TOTAL SHOULDER ARTHROPLASTY Right 2022    Procedure: ARTHROPLASTY, SHOULDER, TOTAL, REVERSE;  Surgeon: Rio Pitts MD;  Location: [facility] OR;  Service: Orthopedics;  Laterality: Right;    SHOULDER ARTHROSCOPY Right 2021    Procedure: ARTHROSCOPY, SHOULDER;  Surgeon: Antonio Irwin MD;  Location: [facility] OR;  Service: Orthopedics;  Laterality: Right;  LIMITED DEBRIDMENT    TONSILLECTOMY      TOTAL REDUCTION MAMMOPLASTY       Social History     Socioeconomic History    Marital status:    Tobacco Use    Smoking status: Former     Current packs/day: 0.00     Types: Cigarettes     Quit date: 1996     Years since quittin.1     Passive exposure: Past    Smokeless tobacco: Never   Substance and Sexual Activity    Alcohol use: Yes     Comment: very little     Drug use: No    Sexual activity: Not Currently     Partners: Male     Social Drivers of Health     Financial Resource Strain: Low Risk  (2023)    Overall Financial Resource Strain (CARDIA)     Difficulty of Paying Living  Expenses: Not hard at all   Food Insecurity: No Food Insecurity (9/25/2023)    Hunger Vital Sign     Worried About Running Out of Food in the Last Year: Never true     Ran Out of Food in the Last Year: Never true   Transportation Needs: No Transportation Needs (9/25/2023)    PRAPARE - Transportation     Lack of Transportation (Medical): No     Lack of Transportation (Non-Medical): No   Physical Activity: Inactive (9/25/2023)    Exercise Vital Sign     Days of Exercise per Week: 0 days     Minutes of Exercise per Session: 0 min   Stress: No Stress Concern Present (9/25/2023)    [employer] [place] [employer] - Occupational Stress Questionnaire     Feeling of Stress : Only a little   Housing Stability: Unknown (9/25/2023)    Housing Stability Vital Sign     Unable to Pay for Housing in the Last Year: No     Unstable Housing in the Last Year: No     Family History   Problem Relation Name Age of Onset    Heart disease Mother            OBJECTIVE:      Vitals:    07/07/25 1334   BP: 106/74   BP Location: Right arm   Patient Position: Sitting   Pulse: 78   Temp: 98.8 °F (37.1 °C)   TempSrc: Oral   SpO2: 97%   Weight: 69.1 kg (152 lb 5.4 oz)     Physical Exam  Vitals and nursing note reviewed.   Constitutional:       General: She is not in acute distress.     Appearance: Normal appearance. She is well-developed. She is obese. She is not ill-appearing or toxic-appearing.      Comments: BMI 30.5   HENT:      Head: Normocephalic and atraumatic.      Right Ear: External ear normal.      Left Ear: External ear normal.      Nose: Nose normal.      Mouth/Throat:      Mouth: Mucous membranes are moist.      Pharynx: Oropharynx is clear. Uvula midline.   Eyes:      General: Lids are normal.         Right eye: No foreign body, discharge or hordeolum.         Left eye: No foreign body, discharge or hordeolum.      Conjunctiva/sclera: Conjunctivae normal.      Right eye: Right conjunctiva is not injected. No exudate.      Pupils: Pupils are equal, round, and reactive to light.      Right eye: Pupil is round and reactive.      Left eye: Pupil is round and reactive.      Comments: Edema noted to right upper eye lid   Neck:      Thyroid: No thyromegaly.      Vascular: No JVD.      Trachea: Trachea normal.   Cardiovascular:      Rate and Rhythm: Normal rate and regular rhythm.      Pulses: Normal pulses.      Heart sounds: Normal heart sounds.   Pulmonary:      Effort: Pulmonary effort is normal. No tachypnea or respiratory distress.      Breath sounds: Normal breath sounds. No stridor. No wheezing, rhonchi or rales.   Chest:      Chest wall: No tenderness.   Musculoskeletal:         General: Normal range of motion.      Cervical back: Normal range of motion and neck supple.      Right lower leg: Edema (1/4) present.      Left lower leg: Edema (1/4) present.      Comments: Edema to Pascual hands R>L, Edema noted to right upper eye lid   Lymphadenopathy:      Cervical: No cervical adenopathy.   Skin:     General: Skin is warm and dry.      Findings: No rash.   Neurological:      Mental Status: She is alert and oriented to person, place, and time.   Psychiatric:         Mood and Affect: Mood normal. Mood is not anxious or depressed. Affect is not tearful.         Speech: Speech normal.         Behavior: Behavior normal. Behavior is cooperative.         Thought Content: Thought content normal. Thought content does not include homicidal or suicidal ideation. Thought content does not include homicidal or suicidal plan.         Judgment: Judgment normal.       Lab Visit on 04/23/2025   Component Date Value Ref Range Status    TSH 04/23/2025 8.867 (H)  0.400 - 4.000 uIU/mL Final    Free T4 04/23/2025 0.87  0.71 - 1.51 ng/dL Final    Sodium 04/23/2025 142  136 - 145 mmol/L Final    Potassium 04/23/2025 3.9  3.5 - 5.1 mmol/L Final    Chloride 04/23/2025 108  95 - 110 mmol/L Final    CO2 04/23/2025 23  23 - 29 mmol/L Final    Glucose 04/23/2025 112  (H)  70 - 110 mg/dL Final    BUN 04/23/2025 14  8 - 23 mg/dL Final    Creatinine 04/23/2025 0.8  0.5 - 1.4 mg/dL Final    Calcium 04/23/2025 9.1  8.7 - 10.5 mg/dL Final    Protein Total 04/23/2025 6.7  6.0 - 8.4 gm/dL Final    Albumin 04/23/2025 3.8  3.5 - 5.2 g/dL Final    Bilirubin Total 04/23/2025 0.7  0.1 - 1.0 mg/dL Final    For infants and newborns, interpretation of results should be based   on gestational age, weight and in agreement with clinical   observations.    Premature Infant recommended reference ranges:   0-24 hours:  <8.0 mg/dL   24-48 hours: <12.0 mg/dL   3-5 days:    <15.0 mg/dL   6-29 days:   <15.0 mg/dL    ALP 04/23/2025 80  40 - 150 unit/L Final    AST 04/23/2025 13  11 - 45 unit/L Final    ALT 04/23/2025 6 (L)  10 - 44 unit/L Final    Anion Gap 04/23/2025 11  8 - 16 mmol/L Final    eGFR 04/23/2025 >60  >60 mL/min/1.73/m2 Final    Estimated GFR calculated using the CKD-EPI creatinine (2021) equation.    Cholesterol Total 04/23/2025 202 (H)  120 - 199 mg/dL Final    The National Cholesterol Education Program (NCEP) has set the  following guidelines (reference ranges) for Cholesterol:  Optimal.....................<200 mg/dL  Borderline High.............200-239 mg/dL  High........................> or = 240 mg/dL    Triglyceride 04/23/2025 109  30 - 150 mg/dL Final    The National Cholesterol Education Program (NCEP) has set the  following guidelines (reference values) for triglycerides:  Normal......................<150 mg/dL  Borderline High.............150-199 mg/dL  High........................200-499 mg/dL    HDL Cholesterol 04/23/2025 41  40 - 75 mg/dL Final    The National Cholesterol Education Program (NCEP) has set the   following guidelines (reference values) for HDL Cholesterol:   Low...............<40 mg/dL   Optimal...........>60 mg/dL    LDL Cholesterol 04/23/2025 139.2  63.0 - 159.0 mg/dL Final    The National Cholesterol Education Program (NCEP) has set the  following guidelines  (reference values) for LDL Cholesterol:  Optimal.......................<130 mg/dL  Borderline High...............130-159 mg/dL  High..........................160-189 mg/dL  Very High.....................>190 mg/dL  LDL calculated using the Friedewald equation.    HDL/Cholesterol Ratio 04/23/2025 20.3  20.0 - 50.0 % Final    Cholesterol/HDL Ratio 04/23/2025 4.9  2.0 - 5.0 Final    Non HDL Cholesterol 04/23/2025 161  mg/dL Final    Risk category and Non-HDL cholesterol goals:  Coronary heart disease (CHD)or equivalent (10-year risk of CHD >20%):  Non-HDL cholesterol goal     <130 mg/dL  Two or more CHD risk factors and 10-year risk of CHD <= 20%:  Non-HDL cholesterol goal     <160 mg/dL  0 to 1 CHD risk factor:  Non-HDL cholesterol goal     <190 mg/dL    Hemoglobin A1c 04/23/2025 5.1  4.0 - 5.6 % Final    ADA Screening Guidelines:  5.7-6.4%  Consistent with prediabetes  >=6.5%  Consistent with diabetes    High levels of fetal hemoglobin interfere with the HbA1C  assay. Heterozygous hemoglobin variants (HbS, HgC, etc)do  not significantly interfere with this assay.   However, presence of multiple variants may affect accuracy.    Estimated Average Glucose 04/23/2025 100  68 - 131 mg/dL Final    WBC 04/23/2025 8.29  3.90 - 12.70 K/uL Final    RBC 04/23/2025 4.70  4.00 - 5.40 M/uL Final    HGB 04/23/2025 13.9  12.0 - 16.0 gm/dL Final    HCT 04/23/2025 44.1  37.0 - 48.5 % Final    MCV 04/23/2025 94  82 - 98 fL Final    MCH 04/23/2025 29.6  27.0 - 31.0 pg Final    MCHC 04/23/2025 31.5 (L)  32.0 - 36.0 g/dL Final    RDW 04/23/2025 13.0  11.5 - 14.5 % Final    Platelet Count 04/23/2025 287  150 - 450 K/uL Final    MPV 04/23/2025 10.4  9.2 - 12.9 fL Final    Nucleated RBC 04/23/2025 0  <=0 /100 WBC Final    Neut % 04/23/2025 58.8  38 - 73 % Final    Lymph % 04/23/2025 32.0  18 - 48 % Final    Mono % 04/23/2025 7.5  4 - 15 % Final    Eos % 04/23/2025 1.0  <=8 % Final    Basophil % 04/23/2025 0.5  <=1.9 % Final    Imm Grans %  04/23/2025 0.2  0.0 - 0.5 % Final    Neut # 04/23/2025 4.88  1.8 - 7.7 K/uL Final    Lymph # 04/23/2025 2.65  1 - 4.8 K/uL Final    Mono # 04/23/2025 0.62  0.3 - 1 K/uL Final    Eos # 04/23/2025 0.08  <=0.5 K/uL Final    Baso # 04/23/2025 0.04  <=0.2 K/uL Final    Imm Grans # 04/23/2025 0.02  0.00 - 0.04 K/uL Final    Mild elevation in immature granulocytes is non specific and can be seen in a variety of conditions including stress response, acute inflammation, trauma and pregnancy. Correlation with other laboratory and clinical findings is essential.   ]     Assessment:       1. Edema, unspecified type        Plan:       Assessment & Plan    PLAN SUMMARY:   Increase Torsemide from 10 mg to 20 mg daily for 5 days   Follow low sodium diet   Follow-up in 1 week to assess response to increased medication    Edema    Genoveva denies shortness of breath and difficulty breathing when walking.   Examination reveals clear lungs without crackles, normal heart sounds, and swelling in the eyes and upper extremities without redness or itching, suggesting fluid retention rather than allergic reaction.   Discussed possible high sodium intake as cause of fluid retention.   Increased Torsemide from 10 mg to 20 mg daily for 4-5 days, with instructions to take additional dose in the morning to avoid nighttime urination and to skip the extra dose if experiencing dizziness or significant fatigue.   Advised following a low sodium diet to help manage fluid retention.   Follow-up scheduled in 1 week to assess response to increased medication.   Genoveva instructed to contact office if experiencing chest pain, chest tightness, or shortness of breath.    HYPERTENSION:   Genoveva's blood pressure is currently within acceptable range, though occasional dizziness was noted.   Instructed to monitor blood pressure regularly, especially when experiencing dizziness or fatigue.   Recommend specific caution regarding high-sodium foods like potato chips  and french fries, suggesting baked chips as a lower-sodium alternative.        Edema, unspecified type        Follow up in about 1 week (around 7/14/2025) for edema.      7/7/2025 SHEY Allen, SETH  This note was generated with the assistance of ambient listening technology. Verbal consent was obtained by the patient and accompanying visitor(s) for the recording of patient appointment to facilitate this note. I attest to having reviewed and edited the generated note for accuracy, though some syntax or spelling errors may persist. Please contact the author of this note for any clarification.              [1]   Current Outpatient Medications   Medication Sig Dispense Refill    amLODIPine (NORVASC) 5 MG tablet TAKE 1 TABLET(5 MG) BY MOUTH EVERY DAY FOR BLOOD PRESSURE 90 tablet 0    AREXVY, PF, 120 mcg/0.5 mL SusR vaccine       atorvastatin (LIPITOR) 40 MG tablet Take 1 tablet (40 mg total) by mouth every evening. 90 tablet 0    brexpiprazole (REXULTI) 2 mg Tab Take 1 tablet (2 mg total) by mouth once daily. 90 tablet 1    EScitalopram oxalate (LEXAPRO) 20 MG tablet Take 1 tablet (20 mg total) by mouth once daily. 90 tablet 1    ferrous sulfate 325 (65 FE) MG EC tablet TAKE 1 TABLET BY MOUTH ONCE DAILY 90 tablet 0    FLUZONE HIGHDOSE QUAD 23-24  mcg/0.7 mL Syrg       isosorbide mononitrate (IMDUR) 60 MG 24 hr tablet TAKE 1 TABLET BY MOUTH EVERY DAY 90 tablet 1    levothyroxine (SYNTHROID) 125 MCG tablet TAKE 1 TABLET BY MOUTH EVERY DAY BEFORE BREAKFAST 90 tablet 1    memantine (NAMENDA) 5 MG Tab Take 1 tablet (5 mg total) by mouth once daily. 90 tablet 1    omeprazole (PRILOSEC) 20 MG capsule Take 1 capsule (20 mg total) by mouth once daily. 90 capsule 1    ondansetron (ZOFRAN-ODT) 8 MG TbDL Take by mouth.      pramipexole (MIRAPEX) 0.5 MG tablet TAKE 2 TABLETS (1 MG TOTAL) BY MOUTH EVERY EVENING. 180 tablet 0    torsemide (DEMADEX) 10 MG Tab TAKE 1 TABLET BY MOUTH EVERY DAY 90 tablet 0     No current  facility-administered medications for this visit.

## 2025-07-13 DIAGNOSIS — I10 ESSENTIAL HYPERTENSION: ICD-10-CM

## 2025-07-14 DIAGNOSIS — G25.81 RESTLESS LEG SYNDROME: ICD-10-CM

## 2025-07-15 RX ORDER — AMLODIPINE BESYLATE 5 MG/1
TABLET ORAL
Qty: 90 TABLET | Refills: 0 | Status: SHIPPED | OUTPATIENT
Start: 2025-07-15

## 2025-07-15 RX ORDER — PRAMIPEXOLE DIHYDROCHLORIDE 0.5 MG/1
1 TABLET ORAL NIGHTLY
Qty: 180 TABLET | Refills: 0 | Status: SHIPPED | OUTPATIENT
Start: 2025-07-15

## 2025-07-18 DIAGNOSIS — E03.9 ACQUIRED HYPOTHYROIDISM: ICD-10-CM

## 2025-07-21 RX ORDER — LEVOTHYROXINE SODIUM 125 UG/1
125 TABLET ORAL
Qty: 90 TABLET | Refills: 1 | Status: SHIPPED | OUTPATIENT
Start: 2025-07-21

## 2025-07-24 DIAGNOSIS — E78.2 MIXED HYPERLIPIDEMIA: ICD-10-CM

## 2025-07-24 RX ORDER — ATORVASTATIN CALCIUM 40 MG/1
40 TABLET, FILM COATED ORAL NIGHTLY
Qty: 90 TABLET | Refills: 0 | Status: SHIPPED | OUTPATIENT
Start: 2025-07-24

## (undated) DEVICE — SOL 9P NACL IRR PIC IL

## (undated) DEVICE — SLING SHLDR IMMOBILIZER LG

## (undated) DEVICE — SEE MEDLINE ITEM 157117

## (undated) DEVICE — GLOVE SURG ULTRA TOUCH 7.5

## (undated) DEVICE — ALCOHOL 70% ISOP RUBBING 4OZ

## (undated) DEVICE — SEE MEDLINE ITEM 157131

## (undated) DEVICE — STRAP OR TABLE 5IN X 72IN

## (undated) DEVICE — SUT FIBERWIRE #5 1/2 X 38

## (undated) DEVICE — SLING ORTHOPEDIC LARGE

## (undated) DEVICE — SEE MEDLINE ITEM 107746

## (undated) DEVICE — SYR DISP LL 5CC

## (undated) DEVICE — TOWEL OR DISP STRL BLUE 4/PK

## (undated) DEVICE — TUBING ARTHROSCOPY

## (undated) DEVICE — ELECTRODE BLD EXT 6.50 ST DISP

## (undated) DEVICE — SHEATH PINNACLE 6FRX10CM W/GUIDEWIRE

## (undated) DEVICE — GLOVE PROTEXIS PI CLASSIC 7.5

## (undated) DEVICE — ELECTRODE REM PLYHSV RETURN 9

## (undated) DEVICE — SLEEVE SCD EXPRESS KNEE MEDIUM

## (undated) DEVICE — ADHESIVE DERMABOND ADVANCED

## (undated) DEVICE — SUT STRATAFIX SPIRAL VIOLET

## (undated) DEVICE — SEE L#159833

## (undated) DEVICE — SLEEVE LATERAL TRACTION ARM

## (undated) DEVICE — GLOVE SURG ULTRA TOUCH 8

## (undated) DEVICE — SUT 2-0 ETHILON 18 FS

## (undated) DEVICE — TUBING MINIBORE EXTENSION

## (undated) DEVICE — NDL SAFETY 25G X 1.5 ECLIPSE

## (undated) DEVICE — PACK CUSTOM UNIV BASIN SLI

## (undated) DEVICE — NDL SPINAL 18GX3.5 SPINOCAN

## (undated) DEVICE — NDL HYPODERMIC BLUNT 18G 1.5IN

## (undated) DEVICE — BLADE SURG CARBON STEEL SZ11

## (undated) DEVICE — APPLICATOR CHLORAPREP CLR 10.5

## (undated) DEVICE — SHEET DRAPE MEDIUM

## (undated) DEVICE — PACK SHOULDER

## (undated) DEVICE — TUBE SUCTION YANKAUER HI CAP

## (undated) DEVICE — NDL SPINAL 25GX3.5 SPINOCAN

## (undated) DEVICE — SYR 50CC LL

## (undated) DEVICE — GLOVE BIOGEL PIMICRO INDIC 6.5

## (undated) DEVICE — SUT STRATAFIX PDS 1 CTX 18IN

## (undated) DEVICE — CATHETER DIAGNOSTIC DXTERITY 5FR JR4.0

## (undated) DEVICE — DRAPE STERI-DRAPE 1000 17X11IN

## (undated) DEVICE — CUBE COLD THERAPY POLAR CARE

## (undated) DEVICE — SUT ETHILON 3-0 PS2 18 BLK

## (undated) DEVICE — PROBE ABLATION RF ARTHSCP 3.5

## (undated) DEVICE — SUT 2-0 12-18IN SILK

## (undated) DEVICE — DRAPE INCISE IOBAN 2 23X17IN

## (undated) DEVICE — PAD DERMAPROX THCK 11X15X1IN

## (undated) DEVICE — BLADE SURG CARBON STEEL #10

## (undated) DEVICE — SEE MEDLINE ITEM 146420

## (undated) DEVICE — GLOVE SURG ULTRA TOUCH 7

## (undated) DEVICE — PAD ABD 8X10 STERILE

## (undated) DEVICE — APPLICATOR CHLORAPREP ORN 26ML

## (undated) DEVICE — CANNULA TWIST IN 7MM X 7CM

## (undated) DEVICE — TUBING SUC UNIV W/CONN 12FT

## (undated) DEVICE — SOL SOD CHLORIDE 0.9% 10ML

## (undated) DEVICE — LINER SUCTION 3000CC

## (undated) DEVICE — DRAPE STERI U-SHAPED 47X51IN

## (undated) DEVICE — SYS LABEL CORRECT MED

## (undated) DEVICE — WRAP PROTECTIVE LEG POS STRL

## (undated) DEVICE — PIN PINABALL HEADLESS
Type: IMPLANTABLE DEVICE | Site: KNEE | Status: NON-FUNCTIONAL
Removed: 2021-03-16

## (undated) DEVICE — DRESSING AQUACEL AG 3.5X10IN

## (undated) DEVICE — CHLORAPREP 10.5 ML APPLICATOR

## (undated) DEVICE — INTERPULSE SET

## (undated) DEVICE — SYR GLASS 5CC LUER LOK

## (undated) DEVICE — CONTAINER SPECIMEN STRL 4OZ

## (undated) DEVICE — SCRUB 10% POVIDONE IODINE 4OZ

## (undated) DEVICE — DRAPE STERI INSTRUMENT 1018

## (undated) DEVICE — SPONGE SUPER KERLIX 6X6.75IN

## (undated) DEVICE — UNDERGLOVES BIOGEL PI SIZE 7.5

## (undated) DEVICE — SEE MEDLINE ITEM 157166

## (undated) DEVICE — BIT DRILL 3.1MM

## (undated) DEVICE — TR BAND

## (undated) DEVICE — SHAVER EXCALIBUR 5.5MM 13CM

## (undated) DEVICE — DRESSING N ADH OIL EMUL 3X8 3S

## (undated) DEVICE — COVER SURG LIGHT HANDLE

## (undated) DEVICE — GOWN TOGA SYS PEELWY ZIP 2 XL

## (undated) DEVICE — CONNECTOR TUBING STR 5 IN 1

## (undated) DEVICE — MANIFOLD 4 PORT

## (undated) DEVICE — SEE MEDLINE ITEM 146231

## (undated) DEVICE — GUIDEWIRE J TIP EXCHANGE FIXED CORE 260

## (undated) DEVICE — TUBING PUMP ARTHROSCOPY STRL

## (undated) DEVICE — SOL IRR NACL .9% 3000ML

## (undated) DEVICE — NDL SURG MAYO CATGUT

## (undated) DEVICE — NDL 27G X 1 1/4

## (undated) DEVICE — SYR 10CC LUER LOCK

## (undated) DEVICE — COVER TRNSDUC CIV-FLX 8.9X91.5

## (undated) DEVICE — SEE MEDLINE ITEM 146292

## (undated) DEVICE — UNDERGLOVES BIOGEL PI SIZE 8.5

## (undated) DEVICE — BNDG COFLEX FOAM LF2 ST 6X5YD

## (undated) DEVICE — SEE MEDLINE ITEM 152622

## (undated) DEVICE — GLOVE SURG ULTRA TOUCH 6

## (undated) DEVICE — GLOVE SURG ULTRA TOUCH 8.5

## (undated) DEVICE — PACK SET UP 190 OMC-NS

## (undated) DEVICE — SEE MEDLINE ITEM 157118

## (undated) DEVICE — SUT ETHILON 3-0 FS-1 30

## (undated) DEVICE — SEE MEDLINE ITEM 152530

## (undated) DEVICE — SPONGE LAP 18X18 PREWASHED

## (undated) DEVICE — CATHETER DIAGNOSTIC DXTERITY 5FR JL3.5

## (undated) DEVICE — PAD GROUNDING DISPER ELECTRODE

## (undated) DEVICE — PASSER SUTURE ENDO SUTURE

## (undated) DEVICE — GLOVE SURGEONS ULTRA TOUCH 6.5

## (undated) DEVICE — SUT STRATAFIX SPRL PS-2 3-0

## (undated) DEVICE — DRAPE PLASTIC U 60X72

## (undated) DEVICE — NDL BOX COUNTER

## (undated) DEVICE — SKINMARKER W/RULER DEVON

## (undated) DEVICE — PACK BASIC

## (undated) DEVICE — SPONGE BULKEE II ABSRB 6X6.75

## (undated) DEVICE — CUTTER AGGRESSIVE PLUS 3.5MM

## (undated) DEVICE — MAT QUICK 40X30 FLOOR FLUID LF

## (undated) DEVICE — BANDAGE ESMARK 6X12

## (undated) DEVICE — CANNULA RADIOPAQUE 20G CURVED

## (undated) DEVICE — GUIDEWIRE WHOLEY 260CMX0.035IN

## (undated) DEVICE — SEE MEDLINE ITEM 146313

## (undated) DEVICE — GUIDEWIRE RUNTHROUGH 180CM

## (undated) DEVICE — UNDERGLOVES BIOGEL PI SIZE 8

## (undated) DEVICE — STOCKINETTE IMPERV INTRM 9X44

## (undated) DEVICE — KIT COOLED RF 75MM SGL PROBE

## (undated) DEVICE — PADDING CAST SPECIALIST 6X4YD

## (undated) DEVICE — SET DECANTER MEDICHOICE

## (undated) DEVICE — INTRODUCER KIT STICK MINI MAX 5F X 10

## (undated) DEVICE — GUIDEWIRE ANGLED GLIDE .035-150 GR3506

## (undated) DEVICE — NDL TUOHY EPIDURAL 20G X 3.5

## (undated) DEVICE — SUT VICRYL 2-0 CT-1 18 CR

## (undated) DEVICE — TOGA FLYTE PEEL AWAY XLARGE

## (undated) DEVICE — DRESSING N ADH OIL EMUL 3X3

## (undated) DEVICE — SUT ETHILON 2-0 FS 18IN BLK

## (undated) DEVICE — SHEATH INTRODUCER SLENDER 6FX10CM

## (undated) DEVICE — SUT 2/0 18IN COATED VICRYL

## (undated) DEVICE — PAD CAST SPECIALIST STRL 6

## (undated) DEVICE — PACK LOWER EXTREMITY

## (undated) DEVICE — TAPE MEDIPORE 4IN X 2YDS

## (undated) DEVICE — SUT 0 VICRYL / CT-1

## (undated) DEVICE — SUT X424H ETHIBOND 0-0

## (undated) DEVICE — DRESSING GAUZE OIL EMUL 3X8

## (undated) DEVICE — BLADE SAG DUAL 18MMX1.27MMX90M

## (undated) DEVICE — SEE MEDLINE ITEM 152487

## (undated) DEVICE — BLADE INCISOR PLATINUM 4.5MM

## (undated) DEVICE — TOURNIQUET SB QC DP 34X4IN